# Patient Record
Sex: FEMALE | Race: OTHER | HISPANIC OR LATINO | Employment: OTHER | URBAN - NONMETROPOLITAN AREA
[De-identification: names, ages, dates, MRNs, and addresses within clinical notes are randomized per-mention and may not be internally consistent; named-entity substitution may affect disease eponyms.]

---

## 2017-01-04 ENCOUNTER — APPOINTMENT (OUTPATIENT)
Dept: WOUND CARE | Facility: HOSPITAL | Age: 62
End: 2017-01-04

## 2017-01-11 ENCOUNTER — APPOINTMENT (OUTPATIENT)
Dept: WOUND CARE | Facility: HOSPITAL | Age: 62
End: 2017-01-11

## 2017-01-18 ENCOUNTER — APPOINTMENT (OUTPATIENT)
Dept: WOUND CARE | Facility: HOSPITAL | Age: 62
End: 2017-01-18

## 2017-01-25 ENCOUNTER — APPOINTMENT (OUTPATIENT)
Dept: WOUND CARE | Facility: HOSPITAL | Age: 62
End: 2017-01-25

## 2017-01-25 PROCEDURE — 97605 NEG PRS WND THER DME<=50SQCM: CPT

## 2017-01-31 ENCOUNTER — OFFICE VISIT (OUTPATIENT)
Dept: GASTROENTEROLOGY | Facility: CLINIC | Age: 62
End: 2017-01-31

## 2017-01-31 VITALS
OXYGEN SATURATION: 98 % | SYSTOLIC BLOOD PRESSURE: 120 MMHG | BODY MASS INDEX: 29.63 KG/M2 | DIASTOLIC BLOOD PRESSURE: 68 MMHG | WEIGHT: 161 LBS | HEART RATE: 68 BPM | HEIGHT: 62 IN

## 2017-01-31 DIAGNOSIS — E86.0 DEHYDRATION: Primary | ICD-10-CM

## 2017-01-31 PROCEDURE — 99213 OFFICE O/P EST LOW 20 MIN: CPT | Performed by: INTERNAL MEDICINE

## 2017-01-31 NOTE — PROGRESS NOTES
[facility] Gastroenterology - Office note  1/31/2017    Ewa Blackwell 1955     Chief Complaint   Patient presents with   • GI Problem     Here to discuss recent surgery and ostomy       HISTORY OF PRESENT ILLNESS    Ewa Blackwell is a 61 y.o. female who presents for follow-up.  Doing much better.  The diarrhea has essentially resolved.  More dietary associated now nausea has improved.  Hopefully the wound VAC will come off in a few weeks     Past Medical History   Diagnosis Date   • A-fib    • Abnormal ECG      tachycardia, a fib   • Acid reflux    • Anemia    • Arthritis    • Diabetes mellitus    • Hyperkalemia    • Hypertension    • Hyponatremia    • IBS (irritable bowel syndrome)    • Sleep apnea      USES CPAP   • Vaginal vault prolapse        Past Surgical History   Procedure Laterality Date   • Colpopexy vaginal       2014   • Hysterectomy     • Sacrocolpopexy N/A 9/21/2016     Procedure: SACROCOLPOPEXY LAPAROSCOPIC WITH [employer] SI ROBOT, CONVERTED TO OPEN SACROCOLPOPEXY, RIGHT SALPINGO- OOPHERECTOMY, CYSTO;  Surgeon: Maribell Beth MD;  Location[facility];  Service:    • Exploratory laparotomy N/A 10/14/2016     Procedure: LAPAROTOMY EXPLORATORY, LYSIS OF ADHESIONS, IRRIAGATION OF ABDOMEN, POSSIBLE BOWEL RESECTION;  Surgeon: Bacilio Marcial MD;  Location: [facility] OR;  Service:    • Endoscopy N/A 11/3/2016     Procedure: ESOPHAGOGASTRODUODENOSCOPY WITH ANESTHESIA;  Surgeon: Alberto Farmer MD;  Location: [facility] ENDOSCOPY;  Service:    • Colonoscopy  09/28/2015     Normal exam repeat in 5 years   • Colon surgery     • Colostomy           Current Outpatient Prescriptions:   •  cholestyramine (QUESTRAN) 4 G packet, Take 1 packet by mouth Every 8 (Eight) Hours., Disp: 90 packet, Rfl: 0  •  ferrous sulfate 325 (65 FE) MG tablet, Take 1 tablet by mouth 2 (Two) Times a Day With Meals., Disp: 60 tablet, Rfl: 0  •  HYDROcodone-acetaminophen (NORCO) 5-325 MG per tablet, Take 1 tablet by mouth  Every 6 (Six) Hours As Needed for moderate pain (4-6)., Disp: , Rfl:   •  loperamide (IMODIUM) 2 MG capsule, Take 1 capsule by mouth 2 (Two) Times a Day., Disp: , Rfl: 0  •  Multiple Vitamin (MULTI VITAMIN DAILY PO), Take 1 tablet by mouth., Disp: , Rfl:   •  omeprazole (PriLOSEC) 20 MG capsule, Take 20 mg by mouth daily., Disp: , Rfl:   •  ondansetron (ZOFRAN) 4 MG tablet, 1-2 tablet every 6 hours as needed for nause, Disp: , Rfl: 0  •  PRADAXA 150 MG capsu, Take 150 mg by mouth Daily., Disp: , Rfl:   •  PREMARIN 0.625 MG/GM vaginal cream, Insert 0.625 mg into the vagina 2 (two) times a week. TAKES ON MONDAYS AND FRIDAYS, Disp: , Rfl:   •  RESTASIS 0.05 % ophthalmic emulsion, Administer 1 drop to both eyes As Needed., Disp: , Rfl:   •  therapeutic multivitamin-minerals (THERAGRAN-M) tablet, Take 1 tablet by mouth daily, Disp: , Rfl:   •  CARTIA  MG 24 hr capsule, Take 180 mg by mouth daily., Disp: , Rfl:   •  Cholecalciferol (VITAMIN D3) 5000 UNITS capsule capsule, Take 5,000 Units by mouth Daily. Patient no longer takes, Disp: , Rfl:   •  dakin's (HYSEPT) 0.25 % solution topical solution, Apply  topically Every 12 (Twelve) Hours. (Patient not taking: Reported on 12/21/2016), Disp: 473 mL, Rfl: 2  •  diphenoxylate-atropine (LOMOTIL) 2.5-0.025 MG per tablet, Take 1 tablet by mouth Every 2 (Two) Hours As Needed for diarrhea., Disp: 36 tablet, Rfl: 0  •  Magnesium 500 MG tablet, Take 500 mg by mouth daily., Disp: , Rfl:   •  metFORMIN (GLUCOPHAGE) 1000 MG tablet, Take 1,000 mg by mouth Daily. Patient no longer taking, Disp: , Rfl:   •  nystatin (MYCOSTATIN) 846217 UNIT/GM powder, Apply  topically As Needed (Patient has an high output Ileostomy & her skin is irritated.   New powder NOW so can put wafer/pouch back on)., Disp: 60 g, Rfl: 2  •  rosuvastatin (CRESTOR) 10 MG tablet, Take 10 mg by mouth Daily. Patient no longer taking, Disp: , Rfl:   •  traMADol (ULTRAM) 50 MG tablet, TK 1 T PO BID PRN, Disp: , Rfl:  "1  •  ZOFRAN 8 MG tablet, Take 1 tablet by mouth Every 8 (Eight) Hours As Needed for nausea or vomiting for up to 10 doses., Disp: 10 tablet, Rfl: 1    Allergies   Allergen Reactions   • Morphine And Related Dizziness     Gets very sick    • Percocet [Oxycodone-Acetaminophen] Dizziness     nausea       Social History     Social History   • Marital status:      Spouse name: N/A   • Number of children: N/A   • Years of education: N/A     Occupational History   • Not on file.     Social History Main Topics   • Smoking status: Never Smoker   • Smokeless tobacco: Never Used   • Alcohol use No   • Drug use: No   • Sexual activity: Defer     Other Topics Concern   • Not on file     Social History Narrative       Family History   Problem Relation Age of Onset   • Colon cancer Neg Hx    • Colon polyps Neg Hx        Review of Systems   All other systems reviewed and are negative.      Vitals:    01/31/17 1416   BP: 120/68   Pulse: 68   SpO2: 98%   Weight: 161 lb (73 kg)   Height: 62\" (157.5 cm)    Body mass index is 29.45 kg/(m^2).    Physical Exam   Constitutional: She is oriented to person, place, and time. She appears well-developed and well-nourished.   HENT:   Head: Normocephalic.   Eyes: Pupils are equal, round, and reactive to light.   Cardiovascular: Normal rate.    Pulmonary/Chest: Effort normal.   Abdominal: Soft.   Stoma is pink.  Functioning normally   Neurological: She is alert and oriented to person, place, and time.         ASSESSMENT AND PLAN      1. Dehydration  Stomas functionally much better at this time.  Less diarrhea.  Tolerating regular diet.  We will see back as needed    EMR Dragon/transcription disclaimer: Much of this encounter note is an electronic transcription/translation of spoken language to printed text.  The electronic translation of spoken language may permit erroneous, or at times, nonsensical words or phrases to be inadvertently transcribed.  Although I have reviewed the note for " such errors, some may still exist.    Joe France MD  1/31/2017  2:35 PM

## 2017-01-31 NOTE — MR AVS SNAPSHOT
Ewa Blackwell   1/31/2017 2:30 PM   Office Visit    Dept Phone:  [phone]   Encounter #:  [id number]    Provider:  Joe France MD   Department:  [facility] GASTROENTEROLOGY                Your Full Care Plan              Your Updated Medication List          This list is accurate as of: 1/31/17  2:34 PM.  Always use your most recent med list.                CARTIA  MG 24 hr capsule   Generic drug:  diltiaZEM CD       cholestyramine 4 G packet   Commonly known as:  QUESTRAN   Take 1 packet by mouth Every 8 (Eight) Hours.       dakin's 0.25 % solution topical solution   Apply  topically Every 12 (Twelve) Hours.       diphenoxylate-atropine 2.5-0.025 MG per tablet   Commonly known as:  LOMOTIL   Take 1 tablet by mouth Every 2 (Two) Hours As Needed for diarrhea.       ferrous sulfate 325 (65 FE) MG tablet   Take 1 tablet by mouth 2 (Two) Times a Day With Meals.       HYDROcodone-acetaminophen 5-325 MG per tablet   Commonly known as:  NORCO       loperamide 2 MG capsule   Commonly known as:  IMODIUM   Take 1 capsule by mouth 2 (Two) Times a Day.       Magnesium 500 MG tablet       metFORMIN 1000 MG tablet   Commonly known as:  GLUCOPHAGE       MULTI VITAMIN DAILY PO       nystatin 244424 UNIT/GM powder   Commonly known as:  MYCOSTATIN   Apply  topically As Needed (Patient has an high output Ileostomy & her skin is irritated.   New powder NOW so can put wafer/pouch back on).       omeprazole 20 MG capsule   Commonly known as:  priLOSEC       * ondansetron 4 MG tablet   Commonly known as:  ZOFRAN       * ZOFRAN 8 MG tablet   Generic drug:  ondansetron   Take 1 tablet by mouth Every 8 (Eight) Hours As Needed for nausea or vomiting for up to 10 doses.       PRADAXA 150 MG capsu   Generic drug:  dabigatran etexilate       PREMARIN 0.625 MG/GM vaginal cream   Generic drug:  conjugated estrogens       RESTASIS 0.05 % ophthalmic emulsion   Generic drug:  cycloSPORINE  "      rosuvastatin 10 MG tablet   Commonly known as:  CRESTOR       therapeutic multivitamin-minerals tablet       traMADol 50 MG tablet   Commonly known as:  ULTRAM       vitamin D3 5000 UNITS capsule capsule       * Notice:  This list has 2 medication(s) that are the same as other medications prescribed for you. Read the directions carefully, and ask your doctor or other care provider to review them with you.            Instructions     None    Patient Instructions History      Upcoming Appointments     Visit Type Date Time Department    OFFICE VISIT 1/31/2017  2:30 PM [facility] GASTRO [facility]    WOUND CHECK 2/1/2017  9:45 AM [facility] WOUND CARE      MyChart Signup     Our records indicate that you have declined [facility]. If you would like to sign up for MyChart, please email [facility] [email or web address] or call [phone] to obtain an activation code.             Other Info from Your Visit           Your Appointments     Feb 01, 2017  9:45 AM CST   Wound Check with Tang Rice MD   [facility] ([place])    [address] [place] [address] [address]  [facility] [id number] [phone]              Allergies     Morphine And Related Intolerance Dizziness    Gets very sick     Percocet [Oxycodone-acetaminophen] Intolerance Dizziness    nausea      Reason for Visit     GI Problem Here to discuss recent surgery and ostomy      Vital Signs     Blood Pressure Pulse Height Weight Oxygen Saturation Body Mass Index    120/68 68 62\" (157.5 cm) 161 lb (73 kg) 98% 29.45 kg/m2    Smoking Status                   Never Smoker             "

## 2017-02-01 ENCOUNTER — LAB REQUISITION (OUTPATIENT)
Dept: LAB | Facility: HOSPITAL | Age: 62
End: 2017-02-01

## 2017-02-01 ENCOUNTER — APPOINTMENT (OUTPATIENT)
Dept: WOUND CARE | Facility: HOSPITAL | Age: 62
End: 2017-02-01

## 2017-02-01 DIAGNOSIS — Z00.00 ENCOUNTER FOR GENERAL ADULT MEDICAL EXAMINATION WITHOUT ABNORMAL FINDINGS: ICD-10-CM

## 2017-02-01 PROCEDURE — 87205 SMEAR GRAM STAIN: CPT | Performed by: SURGERY

## 2017-02-01 PROCEDURE — 87186 SC STD MICRODIL/AGAR DIL: CPT | Performed by: SURGERY

## 2017-02-01 PROCEDURE — G0463 HOSPITAL OUTPT CLINIC VISIT: HCPCS

## 2017-02-01 PROCEDURE — 87070 CULTURE OTHR SPECIMN AEROBIC: CPT | Performed by: SURGERY

## 2017-02-01 PROCEDURE — 87077 CULTURE AEROBIC IDENTIFY: CPT | Performed by: SURGERY

## 2017-02-04 LAB
BACTERIA SPEC AEROBE CULT: ABNORMAL
BACTERIA SPEC AEROBE CULT: ABNORMAL
GRAM STN SPEC: ABNORMAL
GRAM STN SPEC: ABNORMAL

## 2017-02-08 ENCOUNTER — TRANSCRIBE ORDERS (OUTPATIENT)
Dept: ADMINISTRATIVE | Facility: HOSPITAL | Age: 62
End: 2017-02-08

## 2017-02-08 ENCOUNTER — APPOINTMENT (OUTPATIENT)
Dept: WOUND CARE | Facility: HOSPITAL | Age: 62
End: 2017-02-08

## 2017-02-08 DIAGNOSIS — T81.30XA ABDOMINAL WOUND DEHISCENCE, INITIAL ENCOUNTER: Primary | ICD-10-CM

## 2017-02-09 ENCOUNTER — APPOINTMENT (OUTPATIENT)
Dept: CT IMAGING | Facility: HOSPITAL | Age: 62
End: 2017-02-09

## 2017-02-09 ENCOUNTER — HOSPITAL ENCOUNTER (EMERGENCY)
Facility: HOSPITAL | Age: 62
Discharge: HOME | End: 2017-02-09
Attending: EMERGENCY MEDICINE | Admitting: EMERGENCY MEDICINE

## 2017-02-09 VITALS
OXYGEN SATURATION: 99 % | TEMPERATURE: 97.9 F | HEIGHT: 63 IN | RESPIRATION RATE: 16 BRPM | HEART RATE: 71 BPM | SYSTOLIC BLOOD PRESSURE: 110 MMHG | DIASTOLIC BLOOD PRESSURE: 54 MMHG | WEIGHT: 160 LBS | BODY MASS INDEX: 28.35 KG/M2

## 2017-02-09 DIAGNOSIS — K63.2 FISTULA OF INTESTINE TO ABDOMINAL WALL: Primary | ICD-10-CM

## 2017-02-09 LAB
ALBUMIN SERPL-MCNC: 3.3 G/DL (ref 3.5–5)
ALBUMIN/GLOB SERPL: 1.1 G/DL (ref 1.1–2.5)
ALP SERPL-CCNC: 259 U/L (ref 24–120)
ALT SERPL W P-5'-P-CCNC: 40 U/L (ref 0–54)
ANION GAP SERPL CALCULATED.3IONS-SCNC: 7 MMOL/L (ref 4–13)
AST SERPL-CCNC: 37 U/L (ref 7–45)
BASOPHILS # BLD AUTO: 0.02 10*3/MM3 (ref 0–0.2)
BASOPHILS NFR BLD AUTO: 0.3 % (ref 0–2)
BILIRUB SERPL-MCNC: 1.1 MG/DL (ref 0.1–1)
BUN BLD-MCNC: 15 MG/DL (ref 5–21)
BUN/CREAT SERPL: 20.8 (ref 7–25)
CALCIUM SPEC-SCNC: 9.3 MG/DL (ref 8.4–10.4)
CHLORIDE SERPL-SCNC: 106 MMOL/L (ref 98–110)
CO2 SERPL-SCNC: 25 MMOL/L (ref 24–31)
CREAT BLD-MCNC: 0.72 MG/DL (ref 0.5–1.4)
D-LACTATE SERPL-SCNC: 2.9 MMOL/L (ref 0.5–2)
DEPRECATED RDW RBC AUTO: 52.4 FL (ref 40–54)
EOSINOPHIL # BLD AUTO: 0.06 10*3/MM3 (ref 0–0.7)
EOSINOPHIL NFR BLD AUTO: 0.8 % (ref 0–4)
ERYTHROCYTE [DISTWIDTH] IN BLOOD BY AUTOMATED COUNT: 16 % (ref 12–15)
GFR SERPL CREATININE-BSD FRML MDRD: 82 ML/MIN/1.73
GLOBULIN UR ELPH-MCNC: 3.1 GM/DL
GLUCOSE BLD-MCNC: 166 MG/DL (ref 70–100)
HCT VFR BLD AUTO: 38.4 % (ref 37–47)
HGB BLD-MCNC: 12.6 G/DL (ref 12–16)
IMM GRANULOCYTES # BLD: 0.02 10*3/MM3 (ref 0–0.03)
IMM GRANULOCYTES NFR BLD: 0.3 % (ref 0–5)
LYMPHOCYTES # BLD AUTO: 0.99 10*3/MM3 (ref 0.72–4.86)
LYMPHOCYTES NFR BLD AUTO: 13.9 % (ref 15–45)
MCH RBC QN AUTO: 29.6 PG (ref 28–32)
MCHC RBC AUTO-ENTMCNC: 32.8 G/DL (ref 33–36)
MCV RBC AUTO: 90.4 FL (ref 82–98)
MONOCYTES # BLD AUTO: 0.54 10*3/MM3 (ref 0.19–1.3)
MONOCYTES NFR BLD AUTO: 7.6 % (ref 4–12)
NEUTROPHILS # BLD AUTO: 5.49 10*3/MM3 (ref 1.87–8.4)
NEUTROPHILS NFR BLD AUTO: 77.1 % (ref 39–78)
PLATELET # BLD AUTO: 271 10*3/MM3 (ref 130–400)
PMV BLD AUTO: 10.7 FL (ref 6–12)
POTASSIUM BLD-SCNC: 3.7 MMOL/L (ref 3.5–5.3)
PROT SERPL-MCNC: 6.4 G/DL (ref 6.3–8.7)
RBC # BLD AUTO: 4.25 10*6/MM3 (ref 4.2–5.4)
SODIUM BLD-SCNC: 138 MMOL/L (ref 135–145)
WBC NRBC COR # BLD: 7.12 10*3/MM3 (ref 4.8–10.8)

## 2017-02-09 PROCEDURE — 80053 COMPREHEN METABOLIC PANEL: CPT | Performed by: EMERGENCY MEDICINE

## 2017-02-09 PROCEDURE — 96375 TX/PRO/DX INJ NEW DRUG ADDON: CPT

## 2017-02-09 PROCEDURE — 85025 COMPLETE CBC W/AUTO DIFF WBC: CPT | Performed by: EMERGENCY MEDICINE

## 2017-02-09 PROCEDURE — 74177 CT ABD & PELVIS W/CONTRAST: CPT

## 2017-02-09 PROCEDURE — 25010000002 MEPERIDINE PER 100 MG: Performed by: EMERGENCY MEDICINE

## 2017-02-09 PROCEDURE — 96361 HYDRATE IV INFUSION ADD-ON: CPT

## 2017-02-09 PROCEDURE — 36415 COLL VENOUS BLD VENIPUNCTURE: CPT

## 2017-02-09 PROCEDURE — 96374 THER/PROPH/DIAG INJ IV PUSH: CPT

## 2017-02-09 PROCEDURE — 0 IOPAMIDOL PER 1 ML: Performed by: EMERGENCY MEDICINE

## 2017-02-09 PROCEDURE — 99284 EMERGENCY DEPT VISIT MOD MDM: CPT

## 2017-02-09 PROCEDURE — 87040 BLOOD CULTURE FOR BACTERIA: CPT | Performed by: EMERGENCY MEDICINE

## 2017-02-09 PROCEDURE — 25010000002 ONDANSETRON PER 1 MG: Performed by: EMERGENCY MEDICINE

## 2017-02-09 PROCEDURE — 83605 ASSAY OF LACTIC ACID: CPT | Performed by: EMERGENCY MEDICINE

## 2017-02-09 RX ORDER — ONDANSETRON 2 MG/ML
4 INJECTION INTRAMUSCULAR; INTRAVENOUS ONCE
Status: COMPLETED | OUTPATIENT
Start: 2017-02-09 | End: 2017-02-09

## 2017-02-09 RX ORDER — SODIUM CHLORIDE 9 MG/ML
125 INJECTION, SOLUTION INTRAVENOUS CONTINUOUS
Status: DISCONTINUED | OUTPATIENT
Start: 2017-02-09 | End: 2017-02-09 | Stop reason: HOSPADM

## 2017-02-09 RX ORDER — SODIUM CHLORIDE 0.9 % (FLUSH) 0.9 %
10 SYRINGE (ML) INJECTION AS NEEDED
Status: DISCONTINUED | OUTPATIENT
Start: 2017-02-09 | End: 2017-02-09 | Stop reason: HOSPADM

## 2017-02-09 RX ORDER — MEPERIDINE HYDROCHLORIDE 25 MG/ML
25 INJECTION INTRAMUSCULAR; INTRAVENOUS; SUBCUTANEOUS ONCE
Status: COMPLETED | OUTPATIENT
Start: 2017-02-09 | End: 2017-02-09

## 2017-02-09 RX ADMIN — IOPAMIDOL 100 ML: 755 INJECTION, SOLUTION INTRAVENOUS at 12:03

## 2017-02-09 RX ADMIN — MEPERIDINE HYDROCHLORIDE 25 MG: 25 INJECTION, SOLUTION INTRAMUSCULAR; INTRAVENOUS; SUBCUTANEOUS at 11:31

## 2017-02-09 RX ADMIN — SODIUM CHLORIDE 125 ML/HR: 9 INJECTION, SOLUTION INTRAVENOUS at 11:30

## 2017-02-09 RX ADMIN — ONDANSETRON HYDROCHLORIDE 4 MG: 2 SOLUTION INTRAMUSCULAR; INTRAVENOUS at 11:30

## 2017-02-09 NOTE — ED PROVIDER NOTES
Subjective   HPI Comments: Had surgery where colon was nicked and wound up with colostomy and wound drainage tube.  That drainage tube was pulled last week and had lot of pus coming out it and that was cultured but they were never called with results or treatment.  Now still having drainage and more pain.    Patient is a 61 y.o. female presenting with abdominal pain.   History provided by:  Patient and spouse   used: No    Abdominal Pain   Pain location:  RLQ  Pain quality: aching    Pain radiates to:  Does not radiate  Pain severity:  Severe  Onset quality:  Gradual  Timing:  Constant  Progression:  Worsening  Chronicity:  Recurrent  Context: previous surgery    Context: not alcohol use, not awakening from sleep, not diet changes, not eating, not laxative use, not medication withdrawal, not recent illness, not recent sexual activity, not recent travel, not retching, not sick contacts, not suspicious food intake and not trauma    Relieved by:  Nothing  Worsened by:  Nothing  Ineffective treatments:  None tried  Associated symptoms: no anorexia, no belching, no chest pain, no chills, no constipation and no fatigue    Risk factors: no alcohol abuse, no aspirin use and not elderly        Review of Systems   Constitutional: Negative.  Negative for chills and fatigue.   HENT: Negative.    Respiratory: Negative.    Cardiovascular: Negative for chest pain.   Gastrointestinal: Positive for abdominal pain. Negative for anorexia and constipation.   Genitourinary: Negative.    Musculoskeletal: Negative.    Hematological: Negative.    Psychiatric/Behavioral: Negative.    All other systems reviewed and are negative.      Past Medical History   Diagnosis Date   • A-fib    • Abnormal ECG      tachycardia, a fib   • Acid reflux    • Anemia    • Arthritis    • Diabetes mellitus    • Hyperkalemia    • Hypertension    • Hyponatremia    • IBS (irritable bowel syndrome)    • Sleep apnea      USES CPAP   • Vaginal  vault prolapse        Allergies   Allergen Reactions   • Morphine And Related Dizziness     Gets very sick    • Percocet [Oxycodone-Acetaminophen] Dizziness     nausea       Past Surgical History   Procedure Laterality Date   • Colpopexy vaginal       2014   • Hysterectomy     • Sacrocolpopexy N/A 9/21/2016     Procedure: SACROCOLPOPEXY LAPAROSCOPIC WITH [employer] SI ROBOT, CONVERTED TO OPEN SACROCOLPOPEXY, RIGHT SALPINGO- OOPHERECTOMY, CYSTO;  Surgeon: Maribell Beth MD;  Location: [facility] OR;  Service:    • Exploratory laparotomy N/A 10/14/2016     Procedure: LAPAROTOMY EXPLORATORY, LYSIS OF ADHESIONS, IRRIAGATION OF ABDOMEN, POSSIBLE BOWEL RESECTION;  Surgeon: Bacilio Marcial MD;  Location: [facility] OR;  Service:    • Endoscopy N/A 11/3/2016     Procedure: ESOPHAGOGASTRODUODENOSCOPY WITH ANESTHESIA;  Surgeon: Alberto Farmer MD;  Location: [facility] ENDOSCOPY;  Service:    • Colonoscopy  09/28/2015     Normal exam repeat in 5 years   • Colon surgery     • Colostomy         Family History   Problem Relation Age of Onset   • Colon cancer Neg Hx    • Colon polyps Neg Hx        Social History     Social History   • Marital status:      Spouse name: N/A   • Number of children: N/A   • Years of education: N/A     Social History Main Topics   • Smoking status: Never Smoker   • Smokeless tobacco: Never Used   • Alcohol use No   • Drug use: No   • Sexual activity: Defer     Other Topics Concern   • None     Social History Narrative       Prior to Admission medications    Medication Sig Start Date End Date Taking? Authorizing Provider   cholestyramine (QUESTRAN) 4 G packet Take 1 packet by mouth Every 8 (Eight) Hours. 12/23/16  Yes MARÍA Frederick   ferrous sulfate 325 (65 FE) MG tablet Take 1 tablet by mouth 2 (Two) Times a Day With Meals. 12/9/16  Yes MARÍA Frederick   HYDROcodone-acetaminophen (NORCO) 5-325 MG per tablet Take 1 tablet by mouth Every 6 (Six) Hours As Needed for moderate pain (4-6).   Yes  Historical Provider, MD   loperamide (IMODIUM) 2 MG capsule Take 1 capsule by mouth 2 (Two) Times a Day. 12/9/16  Yes MARÍA Frederick   nystatin (MYCOSTATIN) 189998 UNIT/GM powder Apply  topically As Needed (Patient has an high output Ileostomy & her skin is irritated.   New powder NOW so can put wafer/pouch back on). 11/14/16  Yes Maribell Beth MD   omeprazole (PriLOSEC) 20 MG capsule Take 20 mg by mouth daily. 8/11/16  Yes Historical Provider, MD   ondansetron (ZOFRAN) 4 MG tablet 1-2 tablet every 6 hours as needed for nause 9/28/16  Yes Historical Provider, MD   PRADAXA 150 MG capsu Take 150 mg by mouth Daily. 8/8/16  Yes Historical Provider, MD   PREMARIN 0.625 MG/GM vaginal cream Insert 0.625 mg into the vagina 2 (two) times a week. TAKES ON MONDAYS AND FRIDAYS 7/8/16  Yes Historical Provider, MD   therapeutic multivitamin-minerals (THERAGRAN-M) tablet Take 1 tablet by mouth daily   Yes Historical Provider, MD   traMADol (ULTRAM) 50 MG tablet TK 1 T PO BID PRN 10/3/16  Yes Historical Provider, MD   ZOFRAN 8 MG tablet Take 1 tablet by mouth Every 8 (Eight) Hours As Needed for nausea or vomiting for up to 10 doses.  Patient taking differently: Take 4 mg by mouth Every 12 (Twelve) Hours As Needed for nausea or vomiting. 11/11/16  Yes Bacilio Marcial MD   CARTIA  MG 24 hr capsule Take 180 mg by mouth daily. 8/15/16   Historical Provider, MD   Cholecalciferol (VITAMIN D3) 5000 UNITS capsule capsule Take 5,000 Units by mouth Daily. Patient no longer takes    Historical Provider, MD   dakin's (HYSEPT) 0.25 % solution topical solution Apply  topically Every 12 (Twelve) Hours.  Patient not taking: Reported on 12/21/2016 11/14/16   Maribell Beth MD   diphenoxylate-atropine (LOMOTIL) 2.5-0.025 MG per tablet Take 1 tablet by mouth Every 2 (Two) Hours As Needed for diarrhea. 12/9/16   MARÍA Frederick   Magnesium 500 MG tablet Take 500 mg by mouth daily.    Historical Provider, MD   metFORMIN  (GLUCOPHAGE) 1000 MG tablet Take 1,000 mg by mouth Daily. Patient no longer taking 8/1/16   Historical Provider, MD   Multiple Vitamin (MULTI VITAMIN DAILY PO) Take 1 tablet by mouth.    Historical Provider, MD   RESTASIS 0.05 % ophthalmic emulsion Administer 1 drop to both eyes As Needed. 8/11/16   Historical Provider, MD   rosuvastatin (CRESTOR) 10 MG tablet Take 10 mg by mouth Daily. Patient no longer taking    Historical Provider, MD       Medications   meperidine (DEMEROL) injection 25 mg (25 mg Intravenous Given 2/9/17 1131)   ondansetron (ZOFRAN) injection 4 mg (4 mg Intravenous Given 2/9/17 1130)   iopamidol (ISOVUE-370) 76 % injection 100 mL (100 mL Intravenous Given 2/9/17 1203)       Vitals:    02/09/17 1603   BP: 110/54   Pulse: 71   Resp: 16   Temp: 97.9 °F (36.6 °C)   SpO2: 99%         Objective   Physical Exam   Constitutional: She is oriented to person, place, and time. She appears well-developed and well-nourished.   HENT:   Head: Normocephalic and atraumatic.   Neck: Normal range of motion. Neck supple.   Cardiovascular: Normal rate and regular rhythm.    Pulmonary/Chest: Effort normal and breath sounds normal.   Abdominal: Soft. Bowel sounds are normal.   Colostomy noted and wound with drainage to right of midline.  Tender around this area.   Musculoskeletal: Normal range of motion.   Neurological: She is alert and oriented to person, place, and time.   Skin: Skin is warm and dry.   Psychiatric: She has a normal mood and affect. Her behavior is normal.   Nursing note and vitals reviewed.      Procedures         Lab Results (last 24 hours)     Procedure Component Value Units Date/Time    Blood Culture [[id number]] Collected:  02/09/17 1120    Specimen:  Blood from Arm, Left Updated:  02/09/17 1139    CBC & Differential [[id number]] Collected:  02/09/17 1128    Specimen:  Blood Updated:  02/09/17 1139    Narrative:       The following orders were created for panel order CBC & Differential.  Procedure                                Abnormality         Status                     ---------                               -----------         ------                     CBC Auto Differential[[id number]]         Abnormal            Final result                 Please view results for these tests on the individual orders.    Comprehensive Metabolic Panel [[id number]]  (Abnormal) Collected:  02/09/17 1128    Specimen:  Blood Updated:  02/09/17 1149     Glucose 166 (H) mg/dL      BUN 15 mg/dL      Creatinine 0.72 mg/dL      Sodium 138 mmol/L      Potassium 3.7 mmol/L      Chloride 106 mmol/L      CO2 25.0 mmol/L      Calcium 9.3 mg/dL      Total Protein 6.4 g/dL      Albumin 3.30 (L) g/dL      ALT (SGPT) 40 U/L      AST (SGOT) 37 U/L      Alkaline Phosphatase 259 (H) U/L      Total Bilirubin 1.1 (H) mg/dL      eGFR Non African Amer 82 mL/min/1.73      Globulin 3.1 gm/dL      A/G Ratio 1.1 g/dL      BUN/Creatinine Ratio 20.8      Anion Gap 7.0 mmol/L     Lactic Acid, Plasma [[id number]]  (Abnormal) Collected:  02/09/17 1128    Specimen:  Blood Updated:  02/09/17 1151     Lactate 2.9 (C) mmol/L     Blood Culture [[id number]] Collected:  02/09/17 1128    Specimen:  Blood from Arm, Right Updated:  02/09/17 1137    CBC Auto Differential [[id number]]  (Abnormal) Collected:  02/09/17 1128    Specimen:  Blood Updated:  02/09/17 1139     WBC 7.12 10*3/mm3      RBC 4.25 10*6/mm3      Hemoglobin 12.6 g/dL      Hematocrit 38.4 %      MCV 90.4 fL      MCH 29.6 pg      MCHC 32.8 (L) g/dL      RDW 16.0 (H) %      RDW-SD 52.4 fl      MPV 10.7 fL      Platelets 271 10*3/mm3      Neutrophil % 77.1 %      Lymphocyte % 13.9 (L) %      Monocyte % 7.6 %      Eosinophil % 0.8 %      Basophil % 0.3 %      Immature Grans % 0.3 %      Neutrophils, Absolute 5.49 10*3/mm3      Lymphocytes, Absolute 0.99 10*3/mm3      Monocytes, Absolute 0.54 10*3/mm3      Eosinophils, Absolute 0.06 10*3/mm3      Basophils, Absolute 0.02 10*3/mm3      Immature Grans, Absolute 0.02  10*3/mm3           CT Abdomen Pelvis With Contrast   Final Result          ED Course  ED Course   Comment By Time   Dr. Sosa has seen and arranged treatment plan. 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

## 2017-02-13 ENCOUNTER — LAB REQUISITION (OUTPATIENT)
Dept: LAB | Facility: HOSPITAL | Age: 62
End: 2017-02-13

## 2017-02-13 DIAGNOSIS — Z00.00 ENCOUNTER FOR GENERAL ADULT MEDICAL EXAMINATION WITHOUT ABNORMAL FINDINGS: ICD-10-CM

## 2017-02-13 LAB
ALBUMIN SERPL-MCNC: 3.6 G/DL (ref 3.5–5)
ALBUMIN/GLOB SERPL: 1.2 G/DL (ref 1.1–2.5)
ALP SERPL-CCNC: 282 U/L (ref 24–120)
ALT SERPL W P-5'-P-CCNC: 50 U/L (ref 0–54)
ANION GAP SERPL CALCULATED.3IONS-SCNC: 9 MMOL/L (ref 4–13)
AST SERPL-CCNC: 64 U/L (ref 7–45)
BASOPHILS # BLD AUTO: 0.03 10*3/MM3 (ref 0–0.2)
BASOPHILS NFR BLD AUTO: 0.5 % (ref 0–2)
BILIRUB SERPL-MCNC: 0.9 MG/DL (ref 0.1–1)
BUN BLD-MCNC: 14 MG/DL (ref 5–21)
BUN/CREAT SERPL: 15.6 (ref 7–25)
CALCIUM SPEC-SCNC: 9.4 MG/DL (ref 8.4–10.4)
CHLORIDE SERPL-SCNC: 107 MMOL/L (ref 98–110)
CO2 SERPL-SCNC: 23 MMOL/L (ref 24–31)
CREAT BLD-MCNC: 0.9 MG/DL (ref 0.5–1.4)
CRP SERPL-MCNC: <0.5 MG/DL (ref 0–0.99)
DEPRECATED RDW RBC AUTO: 54.1 FL (ref 40–54)
EOSINOPHIL # BLD AUTO: 0.07 10*3/MM3 (ref 0–0.7)
EOSINOPHIL NFR BLD AUTO: 1.2 % (ref 0–4)
ERYTHROCYTE [DISTWIDTH] IN BLOOD BY AUTOMATED COUNT: 16.3 % (ref 12–15)
GFR SERPL CREATININE-BSD FRML MDRD: 63 ML/MIN/1.73
GLOBULIN UR ELPH-MCNC: 3.1 GM/DL
GLUCOSE BLD-MCNC: 99 MG/DL (ref 70–100)
HCT VFR BLD AUTO: 38.3 % (ref 37–47)
HGB BLD-MCNC: 12.2 G/DL (ref 12–16)
IMM GRANULOCYTES # BLD: 0.06 10*3/MM3 (ref 0–0.03)
IMM GRANULOCYTES NFR BLD: 1 % (ref 0–5)
LYMPHOCYTES # BLD AUTO: 1.73 10*3/MM3 (ref 0.72–4.86)
LYMPHOCYTES NFR BLD AUTO: 28.6 % (ref 15–45)
MCH RBC QN AUTO: 29.2 PG (ref 28–32)
MCHC RBC AUTO-ENTMCNC: 31.9 G/DL (ref 33–36)
MCV RBC AUTO: 91.6 FL (ref 82–98)
MONOCYTES # BLD AUTO: 0.62 10*3/MM3 (ref 0.19–1.3)
MONOCYTES NFR BLD AUTO: 10.2 % (ref 4–12)
NEUTROPHILS # BLD AUTO: 3.54 10*3/MM3 (ref 1.87–8.4)
NEUTROPHILS NFR BLD AUTO: 58.5 % (ref 39–78)
PLATELET # BLD AUTO: 295 10*3/MM3 (ref 130–400)
PMV BLD AUTO: 11.3 FL (ref 6–12)
POTASSIUM BLD-SCNC: 4 MMOL/L (ref 3.5–5.3)
PROT SERPL-MCNC: 6.7 G/DL (ref 6.3–8.7)
RBC # BLD AUTO: 4.18 10*6/MM3 (ref 4.2–5.4)
SODIUM BLD-SCNC: 139 MMOL/L (ref 135–145)
WBC NRBC COR # BLD: 6.05 10*3/MM3 (ref 4.8–10.8)

## 2017-02-13 PROCEDURE — 80053 COMPREHEN METABOLIC PANEL: CPT | Performed by: SPECIALIST

## 2017-02-13 PROCEDURE — 85025 COMPLETE CBC W/AUTO DIFF WBC: CPT | Performed by: SPECIALIST

## 2017-02-13 PROCEDURE — 86140 C-REACTIVE PROTEIN: CPT | Performed by: SPECIALIST

## 2017-02-14 LAB
BACTERIA SPEC AEROBE CULT: NORMAL
BACTERIA SPEC AEROBE CULT: NORMAL

## 2017-02-22 ENCOUNTER — APPOINTMENT (OUTPATIENT)
Dept: WOUND CARE | Facility: HOSPITAL | Age: 62
End: 2017-02-22

## 2017-03-01 ENCOUNTER — APPOINTMENT (OUTPATIENT)
Dept: WOUND CARE | Facility: HOSPITAL | Age: 62
End: 2017-03-01

## 2017-03-01 ENCOUNTER — LAB REQUISITION (OUTPATIENT)
Dept: LAB | Facility: HOSPITAL | Age: 62
End: 2017-03-01

## 2017-03-01 DIAGNOSIS — E11.628 TYPE 2 DIABETES MELLITUS WITH OTHER SKIN COMPLICATIONS (HCC): ICD-10-CM

## 2017-03-01 PROCEDURE — 87077 CULTURE AEROBIC IDENTIFY: CPT | Performed by: SURGERY

## 2017-03-01 PROCEDURE — 87186 SC STD MICRODIL/AGAR DIL: CPT | Performed by: SURGERY

## 2017-03-01 PROCEDURE — 87070 CULTURE OTHR SPECIMN AEROBIC: CPT | Performed by: SURGERY

## 2017-03-01 PROCEDURE — 87205 SMEAR GRAM STAIN: CPT | Performed by: SURGERY

## 2017-03-08 ENCOUNTER — APPOINTMENT (OUTPATIENT)
Dept: WOUND CARE | Facility: HOSPITAL | Age: 62
End: 2017-03-08

## 2017-03-15 ENCOUNTER — APPOINTMENT (OUTPATIENT)
Dept: WOUND CARE | Facility: HOSPITAL | Age: 62
End: 2017-03-15

## 2017-03-15 PROCEDURE — G0463 HOSPITAL OUTPT CLINIC VISIT: HCPCS

## 2017-03-22 ENCOUNTER — APPOINTMENT (OUTPATIENT)
Dept: WOUND CARE | Facility: HOSPITAL | Age: 62
End: 2017-03-22

## 2017-03-22 PROCEDURE — G0463 HOSPITAL OUTPT CLINIC VISIT: HCPCS

## 2017-03-29 ENCOUNTER — APPOINTMENT (OUTPATIENT)
Dept: WOUND CARE | Facility: HOSPITAL | Age: 62
End: 2017-03-29

## 2017-04-01 ENCOUNTER — APPOINTMENT (OUTPATIENT)
Dept: GENERAL RADIOLOGY | Facility: HOSPITAL | Age: 62
End: 2017-04-01

## 2017-04-01 ENCOUNTER — APPOINTMENT (OUTPATIENT)
Dept: CT IMAGING | Facility: HOSPITAL | Age: 62
End: 2017-04-01

## 2017-04-01 ENCOUNTER — HOSPITAL ENCOUNTER (INPATIENT)
Facility: HOSPITAL | Age: 62
LOS: 2 days | Discharge: ANOTHER HOSPITAL | End: 2017-04-03
Attending: SPECIALIST | Admitting: SPECIALIST

## 2017-04-01 DIAGNOSIS — R10.84 GENERALIZED ABDOMINAL PAIN: Primary | ICD-10-CM

## 2017-04-01 DIAGNOSIS — N39.0 URINARY TRACT INFECTION, SITE UNSPECIFIED: ICD-10-CM

## 2017-04-01 LAB
ALBUMIN SERPL-MCNC: 4.2 G/DL (ref 3.5–5)
ALBUMIN/GLOB SERPL: 1.1 G/DL (ref 1.1–2.5)
ALP SERPL-CCNC: 341 U/L (ref 24–120)
ALT SERPL W P-5'-P-CCNC: 48 U/L (ref 0–54)
AMYLASE SERPL-CCNC: 123 U/L (ref 30–110)
ANION GAP SERPL CALCULATED.3IONS-SCNC: 19 MMOL/L (ref 4–13)
AST SERPL-CCNC: 78 U/L (ref 7–45)
BACTERIA UR QL AUTO: ABNORMAL /HPF
BASOPHILS # BLD AUTO: 0.04 10*3/MM3 (ref 0–0.2)
BASOPHILS NFR BLD AUTO: 0.4 % (ref 0–2)
BILIRUB SERPL-MCNC: 1.4 MG/DL (ref 0.1–1)
BILIRUB UR QL STRIP: ABNORMAL
BUN BLD-MCNC: 30 MG/DL (ref 5–21)
BUN/CREAT SERPL: 18.8 (ref 7–25)
CALCIUM SPEC-SCNC: 10.5 MG/DL (ref 8.4–10.4)
CHLORIDE SERPL-SCNC: 104 MMOL/L (ref 98–110)
CLARITY UR: ABNORMAL
CO2 SERPL-SCNC: 14 MMOL/L (ref 24–31)
COLOR UR: ABNORMAL
CREAT BLD-MCNC: 1.6 MG/DL (ref 0.5–1.4)
D-LACTATE SERPL-SCNC: 1.5 MMOL/L (ref 0.5–2)
D-LACTATE SERPL-SCNC: 3.8 MMOL/L (ref 0.5–2)
DEPRECATED RDW RBC AUTO: 42.4 FL (ref 40–54)
DEPRECATED RDW RBC AUTO: 43.6 FL (ref 40–54)
EOSINOPHIL # BLD AUTO: 0.07 10*3/MM3 (ref 0–0.7)
EOSINOPHIL NFR BLD AUTO: 0.7 % (ref 0–4)
ERYTHROCYTE [DISTWIDTH] IN BLOOD BY AUTOMATED COUNT: 13.1 % (ref 12–15)
ERYTHROCYTE [DISTWIDTH] IN BLOOD BY AUTOMATED COUNT: 13.2 % (ref 12–15)
GFR SERPL CREATININE-BSD FRML MDRD: 33 ML/MIN/1.73
GLOBULIN UR ELPH-MCNC: 3.7 GM/DL
GLUCOSE BLD-MCNC: 140 MG/DL (ref 70–100)
GLUCOSE UR STRIP-MCNC: NEGATIVE MG/DL
HCT VFR BLD AUTO: 42.8 % (ref 37–47)
HCT VFR BLD AUTO: 43.4 % (ref 37–47)
HGB BLD-MCNC: 14.4 G/DL (ref 12–16)
HGB BLD-MCNC: 14.9 G/DL (ref 12–16)
HGB UR QL STRIP.AUTO: NEGATIVE
IMM GRANULOCYTES # BLD: 0.06 10*3/MM3 (ref 0–0.03)
IMM GRANULOCYTES NFR BLD: 0.6 % (ref 0–5)
KETONES UR QL STRIP: ABNORMAL
LEUKOCYTE ESTERASE UR QL STRIP.AUTO: ABNORMAL
LIPASE SERPL-CCNC: 267 U/L (ref 23–203)
LYMPHOCYTES # BLD AUTO: 1.81 10*3/MM3 (ref 0.72–4.86)
LYMPHOCYTES NFR BLD AUTO: 17.6 % (ref 15–45)
MCH RBC QN AUTO: 30.6 PG (ref 28–32)
MCH RBC QN AUTO: 30.6 PG (ref 28–32)
MCHC RBC AUTO-ENTMCNC: 33.6 G/DL (ref 33–36)
MCHC RBC AUTO-ENTMCNC: 34.3 G/DL (ref 33–36)
MCV RBC AUTO: 89.1 FL (ref 82–98)
MCV RBC AUTO: 91.1 FL (ref 82–98)
MONOCYTES # BLD AUTO: 0.84 10*3/MM3 (ref 0.19–1.3)
MONOCYTES NFR BLD AUTO: 8.2 % (ref 4–12)
NEUTROPHILS # BLD AUTO: 7.44 10*3/MM3 (ref 1.87–8.4)
NEUTROPHILS NFR BLD AUTO: 72.5 % (ref 39–78)
NITRITE UR QL STRIP: NEGATIVE
PH UR STRIP.AUTO: <=5 [PH] (ref 5–8)
PLATELET # BLD AUTO: 287 10*3/MM3 (ref 130–400)
PLATELET # BLD AUTO: 295 10*3/MM3 (ref 130–400)
PMV BLD AUTO: 10.5 FL (ref 6–12)
PMV BLD AUTO: 11 FL (ref 6–12)
POTASSIUM BLD-SCNC: 4.2 MMOL/L (ref 3.5–5.3)
PROT SERPL-MCNC: 7.9 G/DL (ref 6.3–8.7)
PROT UR QL STRIP: ABNORMAL
RBC # BLD AUTO: 4.7 10*6/MM3 (ref 4.2–5.4)
RBC # BLD AUTO: 4.87 10*6/MM3 (ref 4.2–5.4)
RBC # UR: ABNORMAL /HPF
REF LAB TEST METHOD: ABNORMAL
SODIUM BLD-SCNC: 137 MMOL/L (ref 135–145)
SP GR UR STRIP: 1.02 (ref 1–1.03)
SQUAMOUS #/AREA URNS HPF: ABNORMAL /HPF
TRANS CELLS #/AREA URNS HPF: ABNORMAL /HPF
TROPONIN I SERPL-MCNC: <0.012 NG/ML (ref 0–0.03)
UROBILINOGEN UR QL STRIP: ABNORMAL
WBC NRBC COR # BLD: 10.26 10*3/MM3 (ref 4.8–10.8)
WBC NRBC COR # BLD: 9.51 10*3/MM3 (ref 4.8–10.8)
WBC UR QL AUTO: ABNORMAL /HPF
YEAST URNS QL MICRO: ABNORMAL /HPF

## 2017-04-01 PROCEDURE — 74176 CT ABD & PELVIS W/O CONTRAST: CPT

## 2017-04-01 PROCEDURE — 87147 CULTURE TYPE IMMUNOLOGIC: CPT | Performed by: NURSE PRACTITIONER

## 2017-04-01 PROCEDURE — 84484 ASSAY OF TROPONIN QUANT: CPT | Performed by: NURSE PRACTITIONER

## 2017-04-01 PROCEDURE — 85025 COMPLETE CBC W/AUTO DIFF WBC: CPT | Performed by: NURSE PRACTITIONER

## 2017-04-01 PROCEDURE — 25010000002 CEFTRIAXONE: Performed by: NURSE PRACTITIONER

## 2017-04-01 PROCEDURE — 25010000002 HYDROMORPHONE PER 4 MG: Performed by: SPECIALIST

## 2017-04-01 PROCEDURE — 83690 ASSAY OF LIPASE: CPT | Performed by: NURSE PRACTITIONER

## 2017-04-01 PROCEDURE — 83605 ASSAY OF LACTIC ACID: CPT | Performed by: SPECIALIST

## 2017-04-01 PROCEDURE — G0378 HOSPITAL OBSERVATION PER HR: HCPCS

## 2017-04-01 PROCEDURE — 74020 HC XR ABDOMEN FLAT & UPRIGHT: CPT

## 2017-04-01 PROCEDURE — 87086 URINE CULTURE/COLONY COUNT: CPT | Performed by: NURSE PRACTITIONER

## 2017-04-01 PROCEDURE — 25010000002 ONDANSETRON PER 1 MG: Performed by: NURSE PRACTITIONER

## 2017-04-01 PROCEDURE — 80053 COMPREHEN METABOLIC PANEL: CPT | Performed by: NURSE PRACTITIONER

## 2017-04-01 PROCEDURE — 87185 SC STD ENZYME DETCJ PER NZM: CPT | Performed by: NURSE PRACTITIONER

## 2017-04-01 PROCEDURE — 82150 ASSAY OF AMYLASE: CPT | Performed by: NURSE PRACTITIONER

## 2017-04-01 PROCEDURE — 93005 ELECTROCARDIOGRAM TRACING: CPT | Performed by: NURSE PRACTITIONER

## 2017-04-01 PROCEDURE — 36415 COLL VENOUS BLD VENIPUNCTURE: CPT | Performed by: SPECIALIST

## 2017-04-01 PROCEDURE — 93010 ELECTROCARDIOGRAM REPORT: CPT | Performed by: INTERNAL MEDICINE

## 2017-04-01 PROCEDURE — 85027 COMPLETE CBC AUTOMATED: CPT | Performed by: SPECIALIST

## 2017-04-01 PROCEDURE — 81001 URINALYSIS AUTO W/SCOPE: CPT | Performed by: NURSE PRACTITIONER

## 2017-04-01 PROCEDURE — 87186 SC STD MICRODIL/AGAR DIL: CPT | Performed by: NURSE PRACTITIONER

## 2017-04-01 PROCEDURE — 25010000002 HYDROMORPHONE PER 4 MG: Performed by: NURSE PRACTITIONER

## 2017-04-01 PROCEDURE — 83605 ASSAY OF LACTIC ACID: CPT | Performed by: NURSE PRACTITIONER

## 2017-04-01 PROCEDURE — 87070 CULTURE OTHR SPECIMN AEROBIC: CPT | Performed by: NURSE PRACTITIONER

## 2017-04-01 PROCEDURE — 25010000002 PROMETHAZINE PER 50 MG: Performed by: NURSE PRACTITIONER

## 2017-04-01 PROCEDURE — 87205 SMEAR GRAM STAIN: CPT | Performed by: NURSE PRACTITIONER

## 2017-04-01 PROCEDURE — 87077 CULTURE AEROBIC IDENTIFY: CPT | Performed by: NURSE PRACTITIONER

## 2017-04-01 PROCEDURE — 25010000002 ONDANSETRON PER 1 MG: Performed by: SPECIALIST

## 2017-04-01 PROCEDURE — 25010000002 ENOXAPARIN PER 10 MG: Performed by: SPECIALIST

## 2017-04-01 PROCEDURE — 99285 EMERGENCY DEPT VISIT HI MDM: CPT

## 2017-04-01 RX ORDER — DEXTROSE, SODIUM CHLORIDE, AND POTASSIUM CHLORIDE 5; .9; .15 G/100ML; G/100ML; G/100ML
100 INJECTION INTRAVENOUS CONTINUOUS
Status: DISCONTINUED | OUTPATIENT
Start: 2017-04-01 | End: 2017-04-03 | Stop reason: HOSPADM

## 2017-04-01 RX ORDER — ONDANSETRON 2 MG/ML
4 INJECTION INTRAMUSCULAR; INTRAVENOUS ONCE
Status: COMPLETED | OUTPATIENT
Start: 2017-04-01 | End: 2017-04-01

## 2017-04-01 RX ORDER — DEXTROSE MONOHYDRATE 25 G/50ML
25 INJECTION, SOLUTION INTRAVENOUS
Status: DISCONTINUED | OUTPATIENT
Start: 2017-04-01 | End: 2017-04-03 | Stop reason: HOSPADM

## 2017-04-01 RX ORDER — PANTOPRAZOLE SODIUM 40 MG/10ML
40 INJECTION, POWDER, LYOPHILIZED, FOR SOLUTION INTRAVENOUS
Status: DISCONTINUED | OUTPATIENT
Start: 2017-04-02 | End: 2017-04-03 | Stop reason: HOSPADM

## 2017-04-01 RX ORDER — NICOTINE POLACRILEX 4 MG
15 LOZENGE BUCCAL
Status: DISCONTINUED | OUTPATIENT
Start: 2017-04-01 | End: 2017-04-03 | Stop reason: HOSPADM

## 2017-04-01 RX ORDER — ONDANSETRON 2 MG/ML
4 INJECTION INTRAMUSCULAR; INTRAVENOUS EVERY 4 HOURS
Status: DISCONTINUED | OUTPATIENT
Start: 2017-04-01 | End: 2017-04-01

## 2017-04-01 RX ORDER — ONDANSETRON 2 MG/ML
4 INJECTION INTRAMUSCULAR; INTRAVENOUS EVERY 4 HOURS PRN
Status: DISCONTINUED | OUTPATIENT
Start: 2017-04-01 | End: 2017-04-03 | Stop reason: HOSPADM

## 2017-04-01 RX ORDER — PROMETHAZINE HYDROCHLORIDE 25 MG/ML
12.5 INJECTION, SOLUTION INTRAMUSCULAR; INTRAVENOUS ONCE
Status: COMPLETED | OUTPATIENT
Start: 2017-04-01 | End: 2017-04-01

## 2017-04-01 RX ADMIN — POTASSIUM CHLORIDE, DEXTROSE MONOHYDRATE AND SODIUM CHLORIDE 100 ML/HR: 150; 5; 900 INJECTION, SOLUTION INTRAVENOUS at 21:32

## 2017-04-01 RX ADMIN — ENOXAPARIN SODIUM 40 MG: 40 INJECTION SUBCUTANEOUS at 22:45

## 2017-04-01 RX ADMIN — HYDROMORPHONE HYDROCHLORIDE 1 MG: 1 INJECTION, SOLUTION INTRAMUSCULAR; INTRAVENOUS; SUBCUTANEOUS at 21:34

## 2017-04-01 RX ADMIN — ONDANSETRON HYDROCHLORIDE 4 MG: 2 SOLUTION INTRAMUSCULAR; INTRAVENOUS at 22:42

## 2017-04-01 RX ADMIN — CEFTRIAXONE 1 G: 1 INJECTION, POWDER, FOR SOLUTION INTRAMUSCULAR; INTRAVENOUS at 18:57

## 2017-04-01 RX ADMIN — HYDROMORPHONE HYDROCHLORIDE 1 MG: 1 INJECTION, SOLUTION INTRAMUSCULAR; INTRAVENOUS; SUBCUTANEOUS at 16:18

## 2017-04-01 RX ADMIN — ONDANSETRON HYDROCHLORIDE 4 MG: 2 SOLUTION INTRAMUSCULAR; INTRAVENOUS at 16:18

## 2017-04-01 RX ADMIN — SODIUM CHLORIDE 500 ML: 9 INJECTION, SOLUTION INTRAVENOUS at 16:22

## 2017-04-01 RX ADMIN — PROMETHAZINE HYDROCHLORIDE 12.5 MG: 25 INJECTION, SOLUTION INTRAMUSCULAR; INTRAVENOUS at 17:37

## 2017-04-02 LAB
ALBUMIN SERPL-MCNC: 3.7 G/DL (ref 3.5–5)
ALBUMIN/GLOB SERPL: 1 G/DL (ref 1.1–2.5)
ALP SERPL-CCNC: 277 U/L (ref 24–120)
ALT SERPL W P-5'-P-CCNC: 51 U/L (ref 0–54)
AMYLASE SERPL-CCNC: 107 U/L (ref 30–110)
ANION GAP SERPL CALCULATED.3IONS-SCNC: 11 MMOL/L (ref 4–13)
AST SERPL-CCNC: 60 U/L (ref 7–45)
BILIRUB SERPL-MCNC: 0.9 MG/DL (ref 0.1–1)
BUN BLD-MCNC: 27 MG/DL (ref 5–21)
BUN/CREAT SERPL: 22 (ref 7–25)
CALCIUM SPEC-SCNC: 9.5 MG/DL (ref 8.4–10.4)
CHLORIDE SERPL-SCNC: 108 MMOL/L (ref 98–110)
CO2 SERPL-SCNC: 22 MMOL/L (ref 24–31)
CREAT BLD-MCNC: 1.23 MG/DL (ref 0.5–1.4)
D-LACTATE SERPL-SCNC: 1.7 MMOL/L (ref 0.5–2)
GFR SERPL CREATININE-BSD FRML MDRD: 44 ML/MIN/1.73
GLOBULIN UR ELPH-MCNC: 3.6 GM/DL
GLUCOSE BLD-MCNC: 135 MG/DL (ref 70–100)
GLUCOSE BLDC GLUCOMTR-MCNC: 109 MG/DL (ref 70–130)
GLUCOSE BLDC GLUCOMTR-MCNC: 120 MG/DL (ref 70–130)
GLUCOSE BLDC GLUCOMTR-MCNC: 124 MG/DL (ref 70–130)
GLUCOSE BLDC GLUCOMTR-MCNC: 134 MG/DL (ref 70–130)
LIPASE SERPL-CCNC: 166 U/L (ref 23–203)
POTASSIUM BLD-SCNC: 4.7 MMOL/L (ref 3.5–5.3)
PROT SERPL-MCNC: 7.3 G/DL (ref 6.3–8.7)
SODIUM BLD-SCNC: 141 MMOL/L (ref 135–145)

## 2017-04-02 PROCEDURE — 80053 COMPREHEN METABOLIC PANEL: CPT | Performed by: SPECIALIST

## 2017-04-02 PROCEDURE — 83605 ASSAY OF LACTIC ACID: CPT | Performed by: SPECIALIST

## 2017-04-02 PROCEDURE — 25010000002 ENOXAPARIN PER 10 MG: Performed by: SPECIALIST

## 2017-04-02 PROCEDURE — 82150 ASSAY OF AMYLASE: CPT | Performed by: SPECIALIST

## 2017-04-02 PROCEDURE — G0378 HOSPITAL OBSERVATION PER HR: HCPCS

## 2017-04-02 PROCEDURE — 25010000002 PROMETHAZINE PER 50 MG: Performed by: SPECIALIST

## 2017-04-02 PROCEDURE — 25010000002 HYDROMORPHONE PER 4 MG: Performed by: SPECIALIST

## 2017-04-02 PROCEDURE — 82962 GLUCOSE BLOOD TEST: CPT

## 2017-04-02 PROCEDURE — 83690 ASSAY OF LIPASE: CPT | Performed by: SPECIALIST

## 2017-04-02 PROCEDURE — 25010000002 KETOROLAC TROMETHAMINE PER 15 MG: Performed by: SPECIALIST

## 2017-04-02 PROCEDURE — 25010000002 ONDANSETRON PER 1 MG: Performed by: SPECIALIST

## 2017-04-02 RX ORDER — HYDROMORPHONE HCL 110MG/55ML
2 PATIENT CONTROLLED ANALGESIA SYRINGE INTRAVENOUS
Status: DISCONTINUED | OUTPATIENT
Start: 2017-04-02 | End: 2017-04-03 | Stop reason: HOSPADM

## 2017-04-02 RX ORDER — KETOROLAC TROMETHAMINE 30 MG/ML
30 INJECTION, SOLUTION INTRAMUSCULAR; INTRAVENOUS EVERY 6 HOURS PRN
Status: DISCONTINUED | OUTPATIENT
Start: 2017-04-02 | End: 2017-04-03 | Stop reason: HOSPADM

## 2017-04-02 RX ORDER — PROMETHAZINE HYDROCHLORIDE 25 MG/ML
12.5 INJECTION, SOLUTION INTRAMUSCULAR; INTRAVENOUS EVERY 6 HOURS PRN
Status: DISCONTINUED | OUTPATIENT
Start: 2017-04-02 | End: 2017-04-03 | Stop reason: HOSPADM

## 2017-04-02 RX ADMIN — KETOROLAC TROMETHAMINE 30 MG: 30 INJECTION, SOLUTION INTRAMUSCULAR at 15:23

## 2017-04-02 RX ADMIN — POTASSIUM CHLORIDE, DEXTROSE MONOHYDRATE AND SODIUM CHLORIDE 100 ML/HR: 150; 5; 900 INJECTION, SOLUTION INTRAVENOUS at 17:16

## 2017-04-02 RX ADMIN — HYDROMORPHONE HYDROCHLORIDE 1 MG: 1 INJECTION, SOLUTION INTRAMUSCULAR; INTRAVENOUS; SUBCUTANEOUS at 09:13

## 2017-04-02 RX ADMIN — PANTOPRAZOLE SODIUM 40 MG: 40 INJECTION, POWDER, FOR SOLUTION INTRAVENOUS at 06:17

## 2017-04-02 RX ADMIN — HYDROMORPHONE HYDROCHLORIDE 1 MG: 1 INJECTION, SOLUTION INTRAMUSCULAR; INTRAVENOUS; SUBCUTANEOUS at 12:40

## 2017-04-02 RX ADMIN — PROMETHAZINE HYDROCHLORIDE 12.5 MG: 25 INJECTION, SOLUTION INTRAMUSCULAR; INTRAVENOUS at 18:43

## 2017-04-02 RX ADMIN — PROMETHAZINE HYDROCHLORIDE 12.5 MG: 25 INJECTION, SOLUTION INTRAMUSCULAR; INTRAVENOUS at 10:39

## 2017-04-02 RX ADMIN — ONDANSETRON HYDROCHLORIDE 4 MG: 2 SOLUTION INTRAMUSCULAR; INTRAVENOUS at 09:17

## 2017-04-02 RX ADMIN — KETOROLAC TROMETHAMINE 30 MG: 30 INJECTION, SOLUTION INTRAMUSCULAR at 23:28

## 2017-04-02 RX ADMIN — POTASSIUM CHLORIDE, DEXTROSE MONOHYDRATE AND SODIUM CHLORIDE 100 ML/HR: 150; 5; 900 INJECTION, SOLUTION INTRAVENOUS at 06:48

## 2017-04-02 RX ADMIN — ENOXAPARIN SODIUM 40 MG: 40 INJECTION SUBCUTANEOUS at 22:29

## 2017-04-02 RX ADMIN — HYDROMORPHONE HYDROCHLORIDE 1 MG: 1 INJECTION, SOLUTION INTRAMUSCULAR; INTRAVENOUS; SUBCUTANEOUS at 03:01

## 2017-04-02 RX ADMIN — HYDROMORPHONE HYDROCHLORIDE 1 MG: 1 INJECTION, SOLUTION INTRAMUSCULAR; INTRAVENOUS; SUBCUTANEOUS at 06:13

## 2017-04-02 RX ADMIN — HYDROMORPHONE HYDROCHLORIDE 2 MG: 2 INJECTION, SOLUTION INTRAMUSCULAR; INTRAVENOUS; SUBCUTANEOUS at 18:17

## 2017-04-02 RX ADMIN — ONDANSETRON HYDROCHLORIDE 4 MG: 2 SOLUTION INTRAMUSCULAR; INTRAVENOUS at 03:00

## 2017-04-03 VITALS
SYSTOLIC BLOOD PRESSURE: 102 MMHG | HEIGHT: 63 IN | DIASTOLIC BLOOD PRESSURE: 60 MMHG | OXYGEN SATURATION: 100 % | HEART RATE: 94 BPM | RESPIRATION RATE: 16 BRPM | BODY MASS INDEX: 25.73 KG/M2 | TEMPERATURE: 97.8 F | WEIGHT: 145.2 LBS

## 2017-04-03 LAB
BACTERIA SPEC AEROBE CULT: NORMAL
GLUCOSE BLDC GLUCOMTR-MCNC: 110 MG/DL (ref 70–130)
GLUCOSE BLDC GLUCOMTR-MCNC: 91 MG/DL (ref 70–130)

## 2017-04-03 PROCEDURE — 25010000002 PROMETHAZINE PER 50 MG: Performed by: SPECIALIST

## 2017-04-03 PROCEDURE — G0378 HOSPITAL OBSERVATION PER HR: HCPCS

## 2017-04-03 PROCEDURE — 25010000002 HYDROMORPHONE PER 4 MG: Performed by: SPECIALIST

## 2017-04-03 PROCEDURE — 25010000002 KETOROLAC TROMETHAMINE PER 15 MG: Performed by: SPECIALIST

## 2017-04-03 PROCEDURE — 82962 GLUCOSE BLOOD TEST: CPT

## 2017-04-03 RX ADMIN — PANTOPRAZOLE SODIUM 40 MG: 40 INJECTION, POWDER, FOR SOLUTION INTRAVENOUS at 06:49

## 2017-04-03 RX ADMIN — HYDROMORPHONE HYDROCHLORIDE 2 MG: 2 INJECTION, SOLUTION INTRAMUSCULAR; INTRAVENOUS; SUBCUTANEOUS at 04:50

## 2017-04-03 RX ADMIN — KETOROLAC TROMETHAMINE 30 MG: 30 INJECTION, SOLUTION INTRAMUSCULAR at 08:23

## 2017-04-03 RX ADMIN — PROMETHAZINE HYDROCHLORIDE 12.5 MG: 25 INJECTION, SOLUTION INTRAMUSCULAR; INTRAVENOUS at 04:38

## 2017-04-03 RX ADMIN — POTASSIUM CHLORIDE, DEXTROSE MONOHYDRATE AND SODIUM CHLORIDE 100 ML/HR: 150; 5; 900 INJECTION, SOLUTION INTRAVENOUS at 03:19

## 2017-04-06 ENCOUNTER — OFFICE VISIT (OUTPATIENT)
Dept: CARDIOLOGY | Age: 62
End: 2017-04-06
Payer: COMMERCIAL

## 2017-04-06 ENCOUNTER — TELEPHONE (OUTPATIENT)
Dept: CARDIOLOGY | Age: 62
End: 2017-04-06

## 2017-04-06 VITALS
HEIGHT: 63 IN | WEIGHT: 150 LBS | BODY MASS INDEX: 26.58 KG/M2 | HEART RATE: 54 BPM | SYSTOLIC BLOOD PRESSURE: 118 MMHG | DIASTOLIC BLOOD PRESSURE: 80 MMHG

## 2017-04-06 DIAGNOSIS — I48.0 PAROXYSMAL ATRIAL FIBRILLATION (HCC): ICD-10-CM

## 2017-04-06 PROBLEM — I48.91 A-FIB (HCC): Status: ACTIVE | Noted: 2017-04-06

## 2017-04-06 LAB
B-LACTAMASE USUAL SUSC ISLT: POSITIVE
BACTERIA SPEC AEROBE CULT: ABNORMAL
GRAM STN SPEC: ABNORMAL
GRAM STN SPEC: ABNORMAL

## 2017-04-06 PROCEDURE — 99214 OFFICE O/P EST MOD 30 MIN: CPT | Performed by: INTERNAL MEDICINE

## 2017-04-06 PROCEDURE — 93000 ELECTROCARDIOGRAM COMPLETE: CPT | Performed by: INTERNAL MEDICINE

## 2017-04-06 RX ORDER — ONDANSETRON 4 MG/1
4 TABLET, FILM COATED ORAL PRN
COMMUNITY
Start: 2017-02-14 | End: 2019-02-21 | Stop reason: ALTCHOICE

## 2017-04-06 RX ORDER — FERROUS SULFATE 325(65) MG
325 TABLET ORAL
COMMUNITY
End: 2022-05-20

## 2017-04-06 RX ORDER — PROMETHAZINE HYDROCHLORIDE 25 MG/1
25 TABLET ORAL EVERY 6 HOURS
COMMUNITY
Start: 2017-03-23 | End: 2019-02-21 | Stop reason: ALTCHOICE

## 2017-04-06 RX ORDER — TRAMADOL HYDROCHLORIDE 50 MG/1
50 TABLET ORAL 2 TIMES DAILY
COMMUNITY
Start: 2017-02-01 | End: 2019-02-21 | Stop reason: ALTCHOICE

## 2017-04-06 RX ORDER — LOPERAMIDE HYDROCHLORIDE 2 MG/1
2 CAPSULE ORAL 4 TIMES DAILY PRN
COMMUNITY
End: 2020-06-26

## 2017-04-12 ENCOUNTER — APPOINTMENT (OUTPATIENT)
Dept: WOUND CARE | Facility: HOSPITAL | Age: 62
End: 2017-04-12

## 2017-04-19 ENCOUNTER — APPOINTMENT (OUTPATIENT)
Dept: WOUND CARE | Facility: HOSPITAL | Age: 62
End: 2017-04-19

## 2017-04-25 ENCOUNTER — TELEPHONE (OUTPATIENT)
Dept: CARDIOLOGY | Age: 62
End: 2017-04-25

## 2017-04-26 ENCOUNTER — APPOINTMENT (OUTPATIENT)
Dept: WOUND CARE | Facility: HOSPITAL | Age: 62
End: 2017-04-26

## 2017-04-26 ENCOUNTER — TELEPHONE (OUTPATIENT)
Dept: CARDIOLOGY | Age: 62
End: 2017-04-26

## 2017-04-26 ENCOUNTER — TELEPHONE (OUTPATIENT)
Dept: GASTROENTEROLOGY | Facility: CLINIC | Age: 62
End: 2017-04-26

## 2017-04-26 NOTE — TELEPHONE ENCOUNTER
-  Tiffanie with Dr. Matt notified.     ---- Message from Joe France MD sent at 4/20/2017 12:22 PM CDT -----   Let her cardiologist know itshould be okay for her to resume anticoagulation.  Berta-Owens tears are acute injuries and we typically do not repeat endoscopy to document healing.  ----- Message -----     From: Sadie Reyes     Sent: 4/20/2017   7:45 AM       To: Joe France MD

## 2017-05-02 ENCOUNTER — TELEPHONE (OUTPATIENT)
Dept: CARDIOLOGY | Age: 62
End: 2017-05-02

## 2017-05-03 ENCOUNTER — APPOINTMENT (OUTPATIENT)
Dept: WOUND CARE | Facility: HOSPITAL | Age: 62
End: 2017-05-03

## 2017-05-10 ENCOUNTER — APPOINTMENT (OUTPATIENT)
Dept: WOUND CARE | Facility: HOSPITAL | Age: 62
End: 2017-05-10

## 2017-05-17 ENCOUNTER — APPOINTMENT (OUTPATIENT)
Dept: WOUND CARE | Facility: HOSPITAL | Age: 62
End: 2017-05-17

## 2017-05-24 ENCOUNTER — APPOINTMENT (OUTPATIENT)
Dept: WOUND CARE | Facility: HOSPITAL | Age: 62
End: 2017-05-24

## 2017-05-31 ENCOUNTER — APPOINTMENT (OUTPATIENT)
Dept: WOUND CARE | Facility: HOSPITAL | Age: 62
End: 2017-05-31

## 2017-06-02 ENCOUNTER — SURG/PROC ORDERS (OUTPATIENT)
Dept: CARDIOLOGY | Age: 62
End: 2017-06-02

## 2017-06-02 ENCOUNTER — TELEPHONE (OUTPATIENT)
Dept: CARDIOLOGY | Age: 62
End: 2017-06-02

## 2017-06-02 DIAGNOSIS — R94.39 ABNORMAL NUCLEAR STRESS TEST: Primary | ICD-10-CM

## 2017-06-02 RX ORDER — SODIUM CHLORIDE 9 MG/ML
INJECTION, SOLUTION INTRAVENOUS CONTINUOUS
Status: CANCELLED | OUTPATIENT
Start: 2017-06-02

## 2017-06-06 ENCOUNTER — APPOINTMENT (OUTPATIENT)
Dept: WOUND CARE | Facility: HOSPITAL | Age: 62
End: 2017-06-06

## 2017-06-07 ENCOUNTER — HOSPITAL ENCOUNTER (OUTPATIENT)
Dept: CARDIAC CATH/INVASIVE PROCEDURES | Age: 62
Discharge: HOME | End: 2017-06-07
Attending: INTERNAL MEDICINE | Admitting: INTERNAL MEDICINE
Payer: COMMERCIAL

## 2017-06-07 ENCOUNTER — APPOINTMENT (OUTPATIENT)
Dept: GENERAL RADIOLOGY | Age: 62
End: 2017-06-07
Attending: INTERNAL MEDICINE
Payer: COMMERCIAL

## 2017-06-07 ENCOUNTER — APPOINTMENT (OUTPATIENT)
Dept: WOUND CARE | Facility: HOSPITAL | Age: 62
End: 2017-06-07

## 2017-06-07 VITALS
WEIGHT: 144 LBS | HEART RATE: 89 BPM | OXYGEN SATURATION: 99 % | DIASTOLIC BLOOD PRESSURE: 77 MMHG | BODY MASS INDEX: 25.52 KG/M2 | SYSTOLIC BLOOD PRESSURE: 103 MMHG | TEMPERATURE: 98.2 F | HEIGHT: 63 IN | RESPIRATION RATE: 21 BRPM

## 2017-06-07 DIAGNOSIS — R94.39 ABNORMAL NUCLEAR STRESS TEST: ICD-10-CM

## 2017-06-07 LAB
ANION GAP SERPL CALCULATED.3IONS-SCNC: 17 MMOL/L (ref 7–19)
BUN BLDV-MCNC: 23 MG/DL (ref 8–23)
CALCIUM SERPL-MCNC: 10.2 MG/DL (ref 8.8–10.2)
CHLORIDE BLD-SCNC: 102 MMOL/L (ref 98–111)
CO2: 20 MMOL/L (ref 22–29)
CREAT SERPL-MCNC: 1.6 MG/DL (ref 0.5–0.9)
GFR NON-AFRICAN AMERICAN: 32
GLUCOSE BLD-MCNC: 107 MG/DL (ref 74–109)
HCT VFR BLD CALC: 40.8 % (ref 37–47)
HEMOGLOBIN: 13.4 G/DL (ref 12–16)
MCH RBC QN AUTO: 31.8 PG (ref 27–31)
MCHC RBC AUTO-ENTMCNC: 32.8 G/DL (ref 33–37)
MCV RBC AUTO: 96.7 FL (ref 81–99)
PDW BLD-RTO: 12.6 % (ref 11.5–14.5)
PLATELET # BLD: 297 K/UL (ref 130–400)
PMV BLD AUTO: 10.1 FL (ref 9.4–12.3)
POTASSIUM SERPL-SCNC: 4.7 MMOL/L (ref 3.5–5)
RBC # BLD: 4.22 M/UL (ref 4.2–5.4)
SODIUM BLD-SCNC: 139 MMOL/L (ref 136–145)
WBC # BLD: 6.1 K/UL (ref 4.8–10.8)

## 2017-06-07 PROCEDURE — 93005 ELECTROCARDIOGRAM TRACING: CPT

## 2017-06-07 PROCEDURE — C1760 CLOSURE DEV, VASC: HCPCS

## 2017-06-07 PROCEDURE — 85027 COMPLETE CBC AUTOMATED: CPT

## 2017-06-07 PROCEDURE — 99999 PR OFFICE/OUTPT VISIT,PROCEDURE ONLY: CPT | Performed by: INTERNAL MEDICINE

## 2017-06-07 PROCEDURE — 36415 COLL VENOUS BLD VENIPUNCTURE: CPT

## 2017-06-07 PROCEDURE — 93567 NJX CAR CTH SPRVLV AORTGRPHY: CPT | Performed by: INTERNAL MEDICINE

## 2017-06-07 PROCEDURE — 71020 XR CHEST STANDARD TWO VW: CPT

## 2017-06-07 PROCEDURE — 99024 POSTOP FOLLOW-UP VISIT: CPT | Performed by: INTERNAL MEDICINE

## 2017-06-07 PROCEDURE — C1887 CATHETER, GUIDING: HCPCS

## 2017-06-07 PROCEDURE — C1894 INTRO/SHEATH, NON-LASER: HCPCS

## 2017-06-07 PROCEDURE — 2720000001 HC MISC SURG SUPPLY STERILE $51-500

## 2017-06-07 PROCEDURE — 93458 L HRT ARTERY/VENTRICLE ANGIO: CPT | Performed by: INTERNAL MEDICINE

## 2017-06-07 PROCEDURE — 2580000003 HC RX 258: Performed by: INTERNAL MEDICINE

## 2017-06-07 PROCEDURE — 2709999900 HC NON-CHARGEABLE SUPPLY

## 2017-06-07 PROCEDURE — 6360000002 HC RX W HCPCS

## 2017-06-07 PROCEDURE — 80048 BASIC METABOLIC PNL TOTAL CA: CPT

## 2017-06-07 PROCEDURE — 2500000003 HC RX 250 WO HCPCS

## 2017-06-07 RX ORDER — SODIUM CHLORIDE 0.9 % (FLUSH) 0.9 %
10 SYRINGE (ML) INJECTION PRN
Status: DISCONTINUED | OUTPATIENT
Start: 2017-06-07 | End: 2017-06-08 | Stop reason: HOSPADM

## 2017-06-07 RX ORDER — CHOLESTYRAMINE 4 G/9G
1 POWDER, FOR SUSPENSION ORAL 2 TIMES DAILY
COMMUNITY
End: 2019-02-21 | Stop reason: ALTCHOICE

## 2017-06-07 RX ORDER — SODIUM CHLORIDE 0.9 % (FLUSH) 0.9 %
10 SYRINGE (ML) INJECTION EVERY 12 HOURS SCHEDULED
Status: DISCONTINUED | OUTPATIENT
Start: 2017-06-07 | End: 2017-06-08 | Stop reason: HOSPADM

## 2017-06-07 RX ORDER — SODIUM CHLORIDE 9 MG/ML
INJECTION, SOLUTION INTRAVENOUS CONTINUOUS
Status: DISCONTINUED | OUTPATIENT
Start: 2017-06-07 | End: 2017-06-07 | Stop reason: SDUPTHER

## 2017-06-07 RX ORDER — SODIUM CHLORIDE 9 MG/ML
INJECTION, SOLUTION INTRAVENOUS CONTINUOUS
Status: ACTIVE | OUTPATIENT
Start: 2017-06-07 | End: 2017-06-07

## 2017-06-07 RX ADMIN — SODIUM CHLORIDE: 9 INJECTION, SOLUTION INTRAVENOUS at 11:00

## 2017-06-07 RX ADMIN — SODIUM CHLORIDE: 9 INJECTION, SOLUTION INTRAVENOUS at 17:01

## 2017-06-12 LAB
EKG P AXIS: NORMAL DEGREES
EKG P-R INTERVAL: NORMAL MS
EKG Q-T INTERVAL: 404 MS
EKG QRS DURATION: 142 MS
EKG QTC CALCULATION (BAZETT): 439 MS
EKG T AXIS: -28 DEGREES

## 2017-06-14 ENCOUNTER — APPOINTMENT (OUTPATIENT)
Dept: WOUND CARE | Facility: HOSPITAL | Age: 62
End: 2017-06-14

## 2017-06-21 ENCOUNTER — APPOINTMENT (OUTPATIENT)
Dept: WOUND CARE | Facility: HOSPITAL | Age: 62
End: 2017-06-21

## 2017-07-13 ENCOUNTER — APPOINTMENT (OUTPATIENT)
Dept: WOUND CARE | Facility: HOSPITAL | Age: 62
End: 2017-07-13

## 2017-07-14 ENCOUNTER — APPOINTMENT (OUTPATIENT)
Dept: WOUND CARE | Facility: HOSPITAL | Age: 62
End: 2017-07-14

## 2017-07-25 ENCOUNTER — APPOINTMENT (OUTPATIENT)
Dept: WOUND CARE | Facility: HOSPITAL | Age: 62
End: 2017-07-25

## 2017-08-02 ENCOUNTER — APPOINTMENT (OUTPATIENT)
Dept: WOUND CARE | Facility: HOSPITAL | Age: 62
End: 2017-08-02

## 2017-08-09 ENCOUNTER — APPOINTMENT (OUTPATIENT)
Dept: WOUND CARE | Facility: HOSPITAL | Age: 62
End: 2017-08-09

## 2017-08-09 PROCEDURE — 97605 NEG PRS WND THER DME<=50SQCM: CPT

## 2017-08-16 ENCOUNTER — APPOINTMENT (OUTPATIENT)
Dept: WOUND CARE | Facility: HOSPITAL | Age: 62
End: 2017-08-16

## 2017-08-16 PROCEDURE — 97605 NEG PRS WND THER DME<=50SQCM: CPT

## 2017-08-23 ENCOUNTER — APPOINTMENT (OUTPATIENT)
Dept: WOUND CARE | Facility: HOSPITAL | Age: 62
End: 2017-08-23

## 2017-08-23 PROCEDURE — 97605 NEG PRS WND THER DME<=50SQCM: CPT

## 2017-08-30 ENCOUNTER — APPOINTMENT (OUTPATIENT)
Dept: WOUND CARE | Facility: HOSPITAL | Age: 62
End: 2017-08-30

## 2017-08-30 PROCEDURE — 97605 NEG PRS WND THER DME<=50SQCM: CPT

## 2017-09-06 ENCOUNTER — APPOINTMENT (OUTPATIENT)
Dept: WOUND CARE | Facility: HOSPITAL | Age: 62
End: 2017-09-06

## 2017-09-06 PROCEDURE — 97605 NEG PRS WND THER DME<=50SQCM: CPT

## 2017-09-13 ENCOUNTER — APPOINTMENT (OUTPATIENT)
Dept: WOUND CARE | Facility: HOSPITAL | Age: 62
End: 2017-09-13

## 2017-09-20 ENCOUNTER — APPOINTMENT (OUTPATIENT)
Dept: WOUND CARE | Facility: HOSPITAL | Age: 62
End: 2017-09-20

## 2017-09-27 ENCOUNTER — APPOINTMENT (OUTPATIENT)
Dept: WOUND CARE | Facility: HOSPITAL | Age: 62
End: 2017-09-27

## 2017-10-04 ENCOUNTER — APPOINTMENT (OUTPATIENT)
Dept: WOUND CARE | Facility: HOSPITAL | Age: 62
End: 2017-10-04

## 2017-10-11 ENCOUNTER — APPOINTMENT (OUTPATIENT)
Dept: WOUND CARE | Facility: HOSPITAL | Age: 62
End: 2017-10-11

## 2017-10-17 ENCOUNTER — APPOINTMENT (OUTPATIENT)
Dept: WOUND CARE | Facility: HOSPITAL | Age: 62
End: 2017-10-17

## 2017-10-17 PROCEDURE — G0463 HOSPITAL OUTPT CLINIC VISIT: HCPCS

## 2017-11-27 ENCOUNTER — HOSPITAL ENCOUNTER (OUTPATIENT)
Dept: WOMENS IMAGING | Age: 62
Discharge: HOME | End: 2017-11-27
Payer: COMMERCIAL

## 2017-11-27 DIAGNOSIS — Z12.31 ENCOUNTER FOR SCREENING MAMMOGRAM FOR BREAST CANCER: ICD-10-CM

## 2017-11-27 PROCEDURE — 77063 BREAST TOMOSYNTHESIS BI: CPT

## 2017-11-27 PROCEDURE — G0202 SCR MAMMO BI INCL CAD: HCPCS

## 2018-02-12 DIAGNOSIS — K58.9 IRRITABLE BOWEL SYNDROME, UNSPECIFIED TYPE: ICD-10-CM

## 2018-02-12 RX ORDER — DICYCLOMINE HCL 20 MG
TABLET ORAL
Qty: 360 TABLET | Refills: 3 | Status: SHIPPED | OUTPATIENT
Start: 2018-02-12 | End: 2020-11-16

## 2018-11-28 ENCOUNTER — HOSPITAL ENCOUNTER (OUTPATIENT)
Dept: WOMENS IMAGING | Age: 63
Discharge: HOME | End: 2018-11-28
Payer: MEDICARE

## 2018-11-28 DIAGNOSIS — Z12.31 ENCOUNTER FOR SCREENING MAMMOGRAM FOR BREAST CANCER: ICD-10-CM

## 2018-11-28 PROCEDURE — 77063 BREAST TOMOSYNTHESIS BI: CPT

## 2018-11-29 ENCOUNTER — TRANSCRIBE ORDERS (OUTPATIENT)
Dept: ADMINISTRATIVE | Facility: HOSPITAL | Age: 63
End: 2018-11-29

## 2018-12-07 ENCOUNTER — TELEPHONE (OUTPATIENT)
Dept: WOMENS IMAGING | Age: 63
End: 2018-12-07

## 2019-01-04 ENCOUNTER — HOSPITAL ENCOUNTER (OUTPATIENT)
Dept: WOMENS IMAGING | Age: 64
End: 2019-01-04
Payer: MEDICARE

## 2019-01-04 ENCOUNTER — HOSPITAL ENCOUNTER (OUTPATIENT)
Dept: WOMENS IMAGING | Age: 64
Discharge: HOME | End: 2019-01-04
Payer: MEDICARE

## 2019-01-04 ENCOUNTER — OFFICE VISIT (OUTPATIENT)
Dept: SURGERY | Age: 64
End: 2019-01-04
Payer: MEDICARE

## 2019-01-04 VITALS — HEIGHT: 63 IN | WEIGHT: 189 LBS | BODY MASS INDEX: 33.49 KG/M2

## 2019-01-04 DIAGNOSIS — R92.0 MICROCALCIFICATIONS OF THE BREAST: Primary | ICD-10-CM

## 2019-01-04 DIAGNOSIS — R92.0 MICROCALCIFICATIONS OF THE BREAST: ICD-10-CM

## 2019-01-04 PROCEDURE — G8399 PT W/DXA RESULTS DOCUMENT: HCPCS | Performed by: PHYSICIAN ASSISTANT

## 2019-01-04 PROCEDURE — 1090F PRES/ABSN URINE INCON ASSESS: CPT | Performed by: PHYSICIAN ASSISTANT

## 2019-01-04 PROCEDURE — 1101F PT FALLS ASSESS-DOCD LE1/YR: CPT | Performed by: PHYSICIAN ASSISTANT

## 2019-01-04 PROCEDURE — G8484 FLU IMMUNIZE NO ADMIN: HCPCS | Performed by: PHYSICIAN ASSISTANT

## 2019-01-04 PROCEDURE — 3017F COLORECTAL CA SCREEN DOC REV: CPT | Performed by: PHYSICIAN ASSISTANT

## 2019-01-04 PROCEDURE — 1036F TOBACCO NON-USER: CPT | Performed by: PHYSICIAN ASSISTANT

## 2019-01-04 PROCEDURE — 77065 DX MAMMO INCL CAD UNI: CPT

## 2019-01-04 PROCEDURE — 99202 OFFICE O/P NEW SF 15 MIN: CPT | Performed by: PHYSICIAN ASSISTANT

## 2019-01-04 PROCEDURE — G8417 CALC BMI ABV UP PARAM F/U: HCPCS | Performed by: PHYSICIAN ASSISTANT

## 2019-01-04 PROCEDURE — 1123F ACP DISCUSS/DSCN MKR DOCD: CPT | Performed by: PHYSICIAN ASSISTANT

## 2019-01-04 PROCEDURE — G8427 DOCREV CUR MEDS BY ELIG CLIN: HCPCS | Performed by: PHYSICIAN ASSISTANT

## 2019-01-04 PROCEDURE — 4040F PNEUMOC VAC/ADMIN/RCVD: CPT | Performed by: PHYSICIAN ASSISTANT

## 2019-01-04 RX ORDER — DICYCLOMINE HCL 20 MG
TABLET ORAL
COMMUNITY
Start: 2018-02-12 | End: 2019-02-21

## 2019-01-07 ENCOUNTER — TELEPHONE (OUTPATIENT)
Dept: SURGERY | Age: 64
End: 2019-01-07

## 2019-01-14 ENCOUNTER — HOSPITAL ENCOUNTER (OUTPATIENT)
Dept: WOMENS IMAGING | Age: 64
Discharge: HOME | End: 2019-01-14
Payer: MEDICARE

## 2019-01-14 DIAGNOSIS — R92.0 MICROCALCIFICATIONS OF THE BREAST: ICD-10-CM

## 2019-01-14 DIAGNOSIS — R92.8 ABNORMAL MAMMOGRAM: ICD-10-CM

## 2019-01-14 PROCEDURE — 19081 BX BREAST 1ST LESION STRTCTC: CPT | Performed by: SURGERY

## 2019-01-14 PROCEDURE — 88305 TISSUE EXAM BY PATHOLOGIST: CPT

## 2019-01-14 PROCEDURE — 77065 DX MAMMO INCL CAD UNI: CPT

## 2019-01-14 PROCEDURE — 2709999900 MAM STEREO BREAST BX W LOC DEVICE 1ST LESION LEFT

## 2019-01-18 ENCOUNTER — TELEPHONE (OUTPATIENT)
Dept: SURGERY | Age: 64
End: 2019-01-18

## 2019-02-14 ENCOUNTER — OFFICE VISIT (OUTPATIENT)
Dept: SURGERY | Age: 64
End: 2019-02-14
Payer: MEDICARE

## 2019-02-14 VITALS — HEART RATE: 80 BPM | SYSTOLIC BLOOD PRESSURE: 134 MMHG | DIASTOLIC BLOOD PRESSURE: 82 MMHG

## 2019-02-14 DIAGNOSIS — N60.99 ATYPICAL DUCTAL HYPERPLASIA OF BREAST: ICD-10-CM

## 2019-02-14 PROCEDURE — 99214 OFFICE O/P EST MOD 30 MIN: CPT | Performed by: SURGERY

## 2019-02-14 PROCEDURE — 3017F COLORECTAL CA SCREEN DOC REV: CPT | Performed by: SURGERY

## 2019-02-14 PROCEDURE — G8484 FLU IMMUNIZE NO ADMIN: HCPCS | Performed by: SURGERY

## 2019-02-14 PROCEDURE — G8417 CALC BMI ABV UP PARAM F/U: HCPCS | Performed by: SURGERY

## 2019-02-14 PROCEDURE — 1090F PRES/ABSN URINE INCON ASSESS: CPT | Performed by: SURGERY

## 2019-02-14 PROCEDURE — G8428 CUR MEDS NOT DOCUMENT: HCPCS | Performed by: SURGERY

## 2019-02-14 PROCEDURE — G8399 PT W/DXA RESULTS DOCUMENT: HCPCS | Performed by: SURGERY

## 2019-02-14 PROCEDURE — 1123F ACP DISCUSS/DSCN MKR DOCD: CPT | Performed by: SURGERY

## 2019-02-14 PROCEDURE — 4040F PNEUMOC VAC/ADMIN/RCVD: CPT | Performed by: SURGERY

## 2019-02-14 PROCEDURE — 1036F TOBACCO NON-USER: CPT | Performed by: SURGERY

## 2019-02-14 PROCEDURE — 1101F PT FALLS ASSESS-DOCD LE1/YR: CPT | Performed by: SURGERY

## 2019-02-21 ENCOUNTER — HOSPITAL ENCOUNTER (OUTPATIENT)
Dept: PREADMISSION TESTING | Age: 64
Discharge: HOME | End: 2019-02-25
Payer: MEDICARE

## 2019-02-21 VITALS — BODY MASS INDEX: 33.66 KG/M2 | HEIGHT: 63 IN | WEIGHT: 190 LBS

## 2019-02-21 LAB
ANION GAP SERPL CALCULATED.3IONS-SCNC: 13 MMOL/L (ref 7–19)
APTT: 35.9 SEC (ref 26–36.2)
BASOPHILS ABSOLUTE: 0 K/UL (ref 0–0.2)
BASOPHILS RELATIVE PERCENT: 0.6 % (ref 0–1)
BUN BLDV-MCNC: 16 MG/DL (ref 8–23)
CALCIUM SERPL-MCNC: 9.6 MG/DL (ref 8.8–10.2)
CHLORIDE BLD-SCNC: 104 MMOL/L (ref 98–111)
CO2: 28 MMOL/L (ref 22–29)
CREAT SERPL-MCNC: 0.5 MG/DL (ref 0.5–0.9)
EOSINOPHILS ABSOLUTE: 0.1 K/UL (ref 0–0.6)
EOSINOPHILS RELATIVE PERCENT: 1.6 % (ref 0–5)
GFR NON-AFRICAN AMERICAN: >60
GLUCOSE BLD-MCNC: 102 MG/DL (ref 74–109)
HCT VFR BLD CALC: 45.5 % (ref 37–47)
HEMOGLOBIN: 14.2 G/DL (ref 12–16)
INR BLD: 1.39 (ref 0.88–1.18)
LYMPHOCYTES ABSOLUTE: 1.5 K/UL (ref 1.1–4.5)
LYMPHOCYTES RELATIVE PERCENT: 23.2 % (ref 20–40)
MCH RBC QN AUTO: 29.6 PG (ref 27–31)
MCHC RBC AUTO-ENTMCNC: 31.2 G/DL (ref 33–37)
MCV RBC AUTO: 95 FL (ref 81–99)
MONOCYTES ABSOLUTE: 0.5 K/UL (ref 0–0.9)
MONOCYTES RELATIVE PERCENT: 7.7 % (ref 0–10)
NEUTROPHILS ABSOLUTE: 4.2 K/UL (ref 1.5–7.5)
NEUTROPHILS RELATIVE PERCENT: 66.6 % (ref 50–65)
PDW BLD-RTO: 13.1 % (ref 11.5–14.5)
PLATELET # BLD: 216 K/UL (ref 130–400)
PMV BLD AUTO: 11.3 FL (ref 9.4–12.3)
POTASSIUM SERPL-SCNC: 3.8 MMOL/L (ref 3.5–5)
PROTHROMBIN TIME: 16.4 SEC (ref 12–14.6)
RBC # BLD: 4.79 M/UL (ref 4.2–5.4)
SODIUM BLD-SCNC: 145 MMOL/L (ref 136–145)
WBC # BLD: 6.2 K/UL (ref 4.8–10.8)

## 2019-02-21 PROCEDURE — 80048 BASIC METABOLIC PNL TOTAL CA: CPT

## 2019-02-21 PROCEDURE — 93005 ELECTROCARDIOGRAM TRACING: CPT

## 2019-02-21 PROCEDURE — 85610 PROTHROMBIN TIME: CPT

## 2019-02-21 PROCEDURE — 85025 COMPLETE CBC W/AUTO DIFF WBC: CPT

## 2019-02-21 PROCEDURE — 85730 THROMBOPLASTIN TIME PARTIAL: CPT

## 2019-02-21 RX ORDER — DICYCLOMINE HCL 20 MG
20 TABLET ORAL 4 TIMES DAILY PRN
COMMUNITY
End: 2022-05-20

## 2019-02-22 LAB
EKG P AXIS: NORMAL DEGREES
EKG P-R INTERVAL: NORMAL MS
EKG Q-T INTERVAL: 412 MS
EKG QRS DURATION: 148 MS
EKG QTC CALCULATION (BAZETT): 454 MS
EKG T AXIS: -19 DEGREES

## 2019-02-26 ENCOUNTER — HOSPITAL ENCOUNTER (OUTPATIENT)
Dept: ULTRASOUND IMAGING | Age: 64
Discharge: HOME | End: 2019-02-26
Payer: MEDICARE

## 2019-02-26 ENCOUNTER — ANESTHESIA EVENT (OUTPATIENT)
Dept: OPERATING ROOM | Age: 64
End: 2019-02-26
Payer: MEDICARE

## 2019-02-26 ENCOUNTER — ANESTHESIA (OUTPATIENT)
Dept: OPERATING ROOM | Age: 64
End: 2019-02-26
Payer: MEDICARE

## 2019-02-26 ENCOUNTER — HOSPITAL ENCOUNTER (OUTPATIENT)
Age: 64
Setting detail: OUTPATIENT SURGERY
Discharge: HOME | End: 2019-02-26
Attending: SURGERY | Admitting: SURGERY
Payer: MEDICARE

## 2019-02-26 VITALS
SYSTOLIC BLOOD PRESSURE: 85 MMHG | TEMPERATURE: 98.6 F | RESPIRATION RATE: 8 BRPM | DIASTOLIC BLOOD PRESSURE: 43 MMHG | OXYGEN SATURATION: 97 %

## 2019-02-26 VITALS
BODY MASS INDEX: 33.66 KG/M2 | DIASTOLIC BLOOD PRESSURE: 86 MMHG | SYSTOLIC BLOOD PRESSURE: 140 MMHG | WEIGHT: 190 LBS | HEIGHT: 63 IN | RESPIRATION RATE: 18 BRPM | HEART RATE: 92 BPM | TEMPERATURE: 98 F | OXYGEN SATURATION: 94 %

## 2019-02-26 LAB
GLUCOSE BLD-MCNC: 95 MG/DL (ref 70–99)
PERFORMED ON: NORMAL

## 2019-02-26 PROCEDURE — 19285 PERQ DEV BREAST 1ST US IMAG: CPT

## 2019-02-26 PROCEDURE — 2580000003 HC RX 258: Performed by: SURGERY

## 2019-02-26 PROCEDURE — 99999 PR OFFICE/OUTPT VISIT,PROCEDURE ONLY: CPT | Performed by: PHYSICIAN ASSISTANT

## 2019-02-26 PROCEDURE — 2709999900 HC NON-CHARGEABLE SUPPLY: Performed by: SURGERY

## 2019-02-26 PROCEDURE — 3700000001 HC ADD 15 MINUTES (ANESTHESIA): Performed by: SURGERY

## 2019-02-26 PROCEDURE — 6360000002 HC RX W HCPCS: Performed by: NURSE ANESTHETIST, CERTIFIED REGISTERED

## 2019-02-26 PROCEDURE — 6360000002 HC RX W HCPCS: Performed by: SURGERY

## 2019-02-26 PROCEDURE — 7100000010 HC PHASE II RECOVERY - FIRST 15 MIN: Performed by: SURGERY

## 2019-02-26 PROCEDURE — 82948 REAGENT STRIP/BLOOD GLUCOSE: CPT

## 2019-02-26 PROCEDURE — 19285 PERQ DEV BREAST 1ST US IMAG: CPT | Performed by: SURGERY

## 2019-02-26 PROCEDURE — 6370000000 HC RX 637 (ALT 250 FOR IP): Performed by: ANESTHESIOLOGY

## 2019-02-26 PROCEDURE — 2500000003 HC RX 250 WO HCPCS: Performed by: SURGERY

## 2019-02-26 PROCEDURE — 3600000004 HC SURGERY LEVEL 4 BASE: Performed by: SURGERY

## 2019-02-26 PROCEDURE — 2500000003 HC RX 250 WO HCPCS: Performed by: NURSE ANESTHETIST, CERTIFIED REGISTERED

## 2019-02-26 PROCEDURE — 6370000000 HC RX 637 (ALT 250 FOR IP): Performed by: SURGERY

## 2019-02-26 PROCEDURE — 2580000003 HC RX 258: Performed by: ANESTHESIOLOGY

## 2019-02-26 PROCEDURE — 88307 TISSUE EXAM BY PATHOLOGIST: CPT

## 2019-02-26 PROCEDURE — 3600000014 HC SURGERY LEVEL 4 ADDTL 15MIN: Performed by: SURGERY

## 2019-02-26 PROCEDURE — 6360000002 HC RX W HCPCS: Performed by: ANESTHESIOLOGY

## 2019-02-26 PROCEDURE — 7100000011 HC PHASE II RECOVERY - ADDTL 15 MIN: Performed by: SURGERY

## 2019-02-26 PROCEDURE — 3700000000 HC ANESTHESIA ATTENDED CARE: Performed by: SURGERY

## 2019-02-26 PROCEDURE — 19125 EXCISION BREAST LESION: CPT | Performed by: SURGERY

## 2019-02-26 RX ORDER — SCOLOPAMINE TRANSDERMAL SYSTEM 1 MG/1
1 PATCH, EXTENDED RELEASE TRANSDERMAL ONCE
Status: DISCONTINUED | OUTPATIENT
Start: 2019-02-26 | End: 2019-02-26 | Stop reason: HOSPADM

## 2019-02-26 RX ORDER — FENTANYL CITRATE 50 UG/ML
25 INJECTION, SOLUTION INTRAMUSCULAR; INTRAVENOUS
Status: DISCONTINUED | OUTPATIENT
Start: 2019-02-26 | End: 2019-02-26 | Stop reason: HOSPADM

## 2019-02-26 RX ORDER — LABETALOL HYDROCHLORIDE 5 MG/ML
5 INJECTION, SOLUTION INTRAVENOUS EVERY 10 MIN PRN
Status: DISCONTINUED | OUTPATIENT
Start: 2019-02-26 | End: 2019-02-26 | Stop reason: HOSPADM

## 2019-02-26 RX ORDER — MEPERIDINE HYDROCHLORIDE 50 MG/ML
12.5 INJECTION INTRAMUSCULAR; INTRAVENOUS; SUBCUTANEOUS EVERY 5 MIN PRN
Status: DISCONTINUED | OUTPATIENT
Start: 2019-02-26 | End: 2019-02-26 | Stop reason: HOSPADM

## 2019-02-26 RX ORDER — ENALAPRILAT 2.5 MG/2ML
1.25 INJECTION INTRAVENOUS
Status: DISCONTINUED | OUTPATIENT
Start: 2019-02-26 | End: 2019-02-26 | Stop reason: HOSPADM

## 2019-02-26 RX ORDER — METOCLOPRAMIDE HYDROCHLORIDE 5 MG/ML
10 INJECTION INTRAMUSCULAR; INTRAVENOUS
Status: DISCONTINUED | OUTPATIENT
Start: 2019-02-26 | End: 2019-02-26 | Stop reason: HOSPADM

## 2019-02-26 RX ORDER — LIDOCAINE HYDROCHLORIDE 10 MG/ML
1 INJECTION, SOLUTION EPIDURAL; INFILTRATION; INTRACAUDAL; PERINEURAL
Status: DISCONTINUED | OUTPATIENT
Start: 2019-02-26 | End: 2019-02-26 | Stop reason: HOSPADM

## 2019-02-26 RX ORDER — FENTANYL CITRATE 50 UG/ML
50 INJECTION, SOLUTION INTRAMUSCULAR; INTRAVENOUS
Status: DISCONTINUED | OUTPATIENT
Start: 2019-02-26 | End: 2019-02-26 | Stop reason: HOSPADM

## 2019-02-26 RX ORDER — SODIUM CHLORIDE 0.9 % (FLUSH) 0.9 %
10 SYRINGE (ML) INJECTION PRN
Status: DISCONTINUED | OUTPATIENT
Start: 2019-02-26 | End: 2019-02-26 | Stop reason: HOSPADM

## 2019-02-26 RX ORDER — DIPHENHYDRAMINE HYDROCHLORIDE 50 MG/ML
12.5 INJECTION INTRAMUSCULAR; INTRAVENOUS
Status: DISCONTINUED | OUTPATIENT
Start: 2019-02-26 | End: 2019-02-26 | Stop reason: HOSPADM

## 2019-02-26 RX ORDER — ONDANSETRON 2 MG/ML
INJECTION INTRAMUSCULAR; INTRAVENOUS PRN
Status: DISCONTINUED | OUTPATIENT
Start: 2019-02-26 | End: 2019-02-26 | Stop reason: SDUPTHER

## 2019-02-26 RX ORDER — MORPHINE SULFATE 2 MG/ML
2 INJECTION, SOLUTION INTRAMUSCULAR; INTRAVENOUS EVERY 5 MIN PRN
Status: DISCONTINUED | OUTPATIENT
Start: 2019-02-26 | End: 2019-02-26 | Stop reason: HOSPADM

## 2019-02-26 RX ORDER — LIDOCAINE HYDROCHLORIDE 10 MG/ML
INJECTION, SOLUTION EPIDURAL; INFILTRATION; INTRACAUDAL; PERINEURAL PRN
Status: DISCONTINUED | OUTPATIENT
Start: 2019-02-26 | End: 2019-02-26 | Stop reason: SDUPTHER

## 2019-02-26 RX ORDER — SODIUM CHLORIDE 0.9 % (FLUSH) 0.9 %
10 SYRINGE (ML) INJECTION EVERY 12 HOURS SCHEDULED
Status: DISCONTINUED | OUTPATIENT
Start: 2019-02-26 | End: 2019-02-26 | Stop reason: HOSPADM

## 2019-02-26 RX ORDER — MIDAZOLAM HYDROCHLORIDE 1 MG/ML
2 INJECTION INTRAMUSCULAR; INTRAVENOUS
Status: DISCONTINUED | OUTPATIENT
Start: 2019-02-26 | End: 2019-02-26 | Stop reason: HOSPADM

## 2019-02-26 RX ORDER — FENTANYL CITRATE 50 UG/ML
INJECTION, SOLUTION INTRAMUSCULAR; INTRAVENOUS PRN
Status: DISCONTINUED | OUTPATIENT
Start: 2019-02-26 | End: 2019-02-26 | Stop reason: SDUPTHER

## 2019-02-26 RX ORDER — DEXAMETHASONE SODIUM PHOSPHATE 10 MG/ML
INJECTION INTRAMUSCULAR; INTRAVENOUS PRN
Status: DISCONTINUED | OUTPATIENT
Start: 2019-02-26 | End: 2019-02-26 | Stop reason: SDUPTHER

## 2019-02-26 RX ORDER — HYDRALAZINE HYDROCHLORIDE 20 MG/ML
5 INJECTION INTRAMUSCULAR; INTRAVENOUS EVERY 10 MIN PRN
Status: DISCONTINUED | OUTPATIENT
Start: 2019-02-26 | End: 2019-02-26 | Stop reason: HOSPADM

## 2019-02-26 RX ORDER — SODIUM CHLORIDE, SODIUM LACTATE, POTASSIUM CHLORIDE, CALCIUM CHLORIDE 600; 310; 30; 20 MG/100ML; MG/100ML; MG/100ML; MG/100ML
INJECTION, SOLUTION INTRAVENOUS CONTINUOUS
Status: DISCONTINUED | OUTPATIENT
Start: 2019-02-26 | End: 2019-02-26 | Stop reason: HOSPADM

## 2019-02-26 RX ORDER — SODIUM CHLORIDE 9 MG/ML
INJECTION, SOLUTION INTRAVENOUS CONTINUOUS
Status: DISCONTINUED | OUTPATIENT
Start: 2019-02-26 | End: 2019-02-26 | Stop reason: HOSPADM

## 2019-02-26 RX ORDER — APREPITANT 40 MG/1
40 CAPSULE ORAL ONCE
Status: COMPLETED | OUTPATIENT
Start: 2019-02-26 | End: 2019-02-26

## 2019-02-26 RX ORDER — PROMETHAZINE HYDROCHLORIDE 25 MG/ML
6.25 INJECTION, SOLUTION INTRAMUSCULAR; INTRAVENOUS
Status: DISCONTINUED | OUTPATIENT
Start: 2019-02-26 | End: 2019-02-26 | Stop reason: HOSPADM

## 2019-02-26 RX ORDER — MORPHINE SULFATE 2 MG/ML
4 INJECTION, SOLUTION INTRAMUSCULAR; INTRAVENOUS EVERY 5 MIN PRN
Status: DISCONTINUED | OUTPATIENT
Start: 2019-02-26 | End: 2019-02-26 | Stop reason: HOSPADM

## 2019-02-26 RX ORDER — ACETAMINOPHEN 325 MG/1
650 TABLET ORAL EVERY 4 HOURS PRN
Status: DISCONTINUED | OUTPATIENT
Start: 2019-02-26 | End: 2019-02-26 | Stop reason: HOSPADM

## 2019-02-26 RX ORDER — PROPOFOL 10 MG/ML
INJECTION, EMULSION INTRAVENOUS PRN
Status: DISCONTINUED | OUTPATIENT
Start: 2019-02-26 | End: 2019-02-26 | Stop reason: SDUPTHER

## 2019-02-26 RX ADMIN — ACETAMINOPHEN 650 MG: 325 TABLET, FILM COATED ORAL at 15:58

## 2019-02-26 RX ADMIN — FENTANYL CITRATE 25 MCG: 50 INJECTION INTRAMUSCULAR; INTRAVENOUS at 13:12

## 2019-02-26 RX ADMIN — SODIUM CHLORIDE, SODIUM LACTATE, POTASSIUM CHLORIDE, AND CALCIUM CHLORIDE: 600; 310; 30; 20 INJECTION, SOLUTION INTRAVENOUS at 10:49

## 2019-02-26 RX ADMIN — ONDANSETRON HYDROCHLORIDE 4 MG: 2 INJECTION, SOLUTION INTRAMUSCULAR; INTRAVENOUS at 13:23

## 2019-02-26 RX ADMIN — DEXAMETHASONE SODIUM PHOSPHATE 10 MG: 10 INJECTION INTRAMUSCULAR; INTRAVENOUS at 13:00

## 2019-02-26 RX ADMIN — PROPOFOL 150 MG: 10 INJECTION, EMULSION INTRAVENOUS at 12:56

## 2019-02-26 RX ADMIN — SODIUM CHLORIDE, SODIUM LACTATE, POTASSIUM CHLORIDE, AND CALCIUM CHLORIDE: 600; 310; 30; 20 INJECTION, SOLUTION INTRAVENOUS at 13:23

## 2019-02-26 RX ADMIN — HYDROMORPHONE HYDROCHLORIDE 0.2 MG: 1 INJECTION, SOLUTION INTRAMUSCULAR; INTRAVENOUS; SUBCUTANEOUS at 13:30

## 2019-02-26 RX ADMIN — LIDOCAINE HYDROCHLORIDE 50 MG: 10 INJECTION, SOLUTION EPIDURAL; INFILTRATION; INTRACAUDAL; PERINEURAL at 12:56

## 2019-02-26 RX ADMIN — APREPITANT 40 MG: 40 CAPSULE ORAL at 10:58

## 2019-02-26 RX ADMIN — Medication 2 G: at 12:59

## 2019-02-26 ASSESSMENT — PAIN SCALES - GENERAL
PAINLEVEL_OUTOF10: 0
PAINLEVEL_OUTOF10: 8
PAINLEVEL_OUTOF10: 0
PAINLEVEL_OUTOF10: 0

## 2019-02-26 ASSESSMENT — LIFESTYLE VARIABLES: SMOKING_STATUS: 0

## 2019-02-26 ASSESSMENT — PAIN - FUNCTIONAL ASSESSMENT: PAIN_FUNCTIONAL_ASSESSMENT: 0-10

## 2019-02-26 ASSESSMENT — ENCOUNTER SYMPTOMS
DYSPNEA ACTIVITY LEVEL: AFTER AMBULATING 1 FLIGHT OF STAIRS
SHORTNESS OF BREATH: 0

## 2019-02-26 ASSESSMENT — PAIN DESCRIPTION - LOCATION: LOCATION: HEAD

## 2019-03-01 ENCOUNTER — TELEPHONE (OUTPATIENT)
Dept: SURGERY | Age: 64
End: 2019-03-01

## 2019-03-13 ENCOUNTER — OFFICE VISIT (OUTPATIENT)
Dept: SURGERY | Age: 64
End: 2019-03-13

## 2019-03-13 VITALS — SYSTOLIC BLOOD PRESSURE: 120 MMHG | DIASTOLIC BLOOD PRESSURE: 82 MMHG | HEART RATE: 80 BPM

## 2019-03-13 DIAGNOSIS — Z98.890 S/P BREAST BIOPSY: ICD-10-CM

## 2019-03-13 DIAGNOSIS — N60.99 ATYPICAL DUCTAL HYPERPLASIA OF BREAST: Primary | ICD-10-CM

## 2019-03-13 PROCEDURE — 99024 POSTOP FOLLOW-UP VISIT: CPT | Performed by: SURGERY

## 2019-03-19 DIAGNOSIS — Z12.31 VISIT FOR SCREENING MAMMOGRAM: Primary | ICD-10-CM

## 2019-12-02 ENCOUNTER — HOSPITAL ENCOUNTER (OUTPATIENT)
Dept: WOMENS IMAGING | Age: 64
Discharge: HOME | End: 2019-12-02
Payer: MEDICARE

## 2019-12-02 ENCOUNTER — OFFICE VISIT (OUTPATIENT)
Dept: SURGERY | Age: 64
End: 2019-12-02
Payer: MEDICARE

## 2019-12-02 VITALS
WEIGHT: 167 LBS | DIASTOLIC BLOOD PRESSURE: 85 MMHG | SYSTOLIC BLOOD PRESSURE: 131 MMHG | BODY MASS INDEX: 29.59 KG/M2 | TEMPERATURE: 98.6 F | HEIGHT: 63 IN

## 2019-12-02 DIAGNOSIS — Z12.31 VISIT FOR SCREENING MAMMOGRAM: ICD-10-CM

## 2019-12-02 DIAGNOSIS — R92.0 MICROCALCIFICATION OF BREAST: Primary | ICD-10-CM

## 2019-12-02 DIAGNOSIS — R92.0 MICROCALCIFICATION OF BREAST: ICD-10-CM

## 2019-12-02 DIAGNOSIS — Z01.419 WELL WOMAN EXAM: ICD-10-CM

## 2019-12-02 PROCEDURE — 3017F COLORECTAL CA SCREEN DOC REV: CPT | Performed by: PHYSICIAN ASSISTANT

## 2019-12-02 PROCEDURE — G8484 FLU IMMUNIZE NO ADMIN: HCPCS | Performed by: PHYSICIAN ASSISTANT

## 2019-12-02 PROCEDURE — G8427 DOCREV CUR MEDS BY ELIG CLIN: HCPCS | Performed by: PHYSICIAN ASSISTANT

## 2019-12-02 PROCEDURE — 1090F PRES/ABSN URINE INCON ASSESS: CPT | Performed by: PHYSICIAN ASSISTANT

## 2019-12-02 PROCEDURE — 4040F PNEUMOC VAC/ADMIN/RCVD: CPT | Performed by: PHYSICIAN ASSISTANT

## 2019-12-02 PROCEDURE — 99213 OFFICE O/P EST LOW 20 MIN: CPT | Performed by: PHYSICIAN ASSISTANT

## 2019-12-02 PROCEDURE — G8417 CALC BMI ABV UP PARAM F/U: HCPCS | Performed by: PHYSICIAN ASSISTANT

## 2019-12-02 PROCEDURE — 1123F ACP DISCUSS/DSCN MKR DOCD: CPT | Performed by: PHYSICIAN ASSISTANT

## 2019-12-02 PROCEDURE — 1036F TOBACCO NON-USER: CPT | Performed by: PHYSICIAN ASSISTANT

## 2019-12-02 PROCEDURE — G8399 PT W/DXA RESULTS DOCUMENT: HCPCS | Performed by: PHYSICIAN ASSISTANT

## 2019-12-02 PROCEDURE — 77065 DX MAMMO INCL CAD UNI: CPT

## 2019-12-02 PROCEDURE — 77063 BREAST TOMOSYNTHESIS BI: CPT

## 2019-12-04 ENCOUNTER — HOSPITAL ENCOUNTER (OUTPATIENT)
Dept: WOMENS IMAGING | Age: 64
Discharge: HOME | End: 2019-12-04
Payer: MEDICARE

## 2019-12-04 DIAGNOSIS — R92.0 MICROCALCIFICATION OF BREAST: ICD-10-CM

## 2019-12-04 DIAGNOSIS — R92.0 MICROCALCIFICATIONS OF THE BREAST: ICD-10-CM

## 2019-12-04 PROCEDURE — 19081 BX BREAST 1ST LESION STRTCTC: CPT | Performed by: SURGERY

## 2019-12-04 PROCEDURE — 88361 TUMOR IMMUNOHISTOCHEM/COMPUT: CPT

## 2019-12-04 PROCEDURE — 2720000010 MAMMOGRAM POST BX CLIP PLACEMENT LEFT

## 2019-12-04 PROCEDURE — 19081 BX BREAST 1ST LESION STRTCTC: CPT

## 2019-12-04 PROCEDURE — 88305 TISSUE EXAM BY PATHOLOGIST: CPT

## 2019-12-10 ENCOUNTER — TELEPHONE (OUTPATIENT)
Dept: SURGERY | Age: 64
End: 2019-12-10

## 2019-12-10 DIAGNOSIS — D05.12 BREAST NEOPLASM, TIS (DCIS), LEFT: Primary | ICD-10-CM

## 2019-12-12 ENCOUNTER — TELEPHONE (OUTPATIENT)
Dept: OTHER | Age: 64
End: 2019-12-12

## 2019-12-18 ENCOUNTER — HOSPITAL ENCOUNTER (OUTPATIENT)
Dept: MRI IMAGING | Age: 64
Discharge: HOME | End: 2019-12-18
Payer: MEDICARE

## 2019-12-18 DIAGNOSIS — D05.12 BREAST NEOPLASM, TIS (DCIS), LEFT: ICD-10-CM

## 2019-12-18 LAB
GFR NON-AFRICAN AMERICAN: >60
PERFORMED ON: NORMAL
POC CREATININE: 0.6 MG/DL (ref 0.3–1.3)
POC SAMPLE TYPE: NORMAL

## 2019-12-18 PROCEDURE — A9577 INJ MULTIHANCE: HCPCS | Performed by: PHYSICIAN ASSISTANT

## 2019-12-18 PROCEDURE — 82565 ASSAY OF CREATININE: CPT

## 2019-12-18 PROCEDURE — 77049 MRI BREAST C-+ W/CAD BI: CPT

## 2019-12-18 PROCEDURE — 6360000004 HC RX CONTRAST MEDICATION: Performed by: PHYSICIAN ASSISTANT

## 2019-12-18 RX ADMIN — GADOBENATE DIMEGLUMINE 15 ML: 529 INJECTION, SOLUTION INTRAVENOUS at 12:32

## 2019-12-23 ENCOUNTER — HOSPITAL ENCOUNTER (OUTPATIENT)
Dept: WOMENS IMAGING | Age: 64
Discharge: HOME | End: 2019-12-23
Payer: MEDICARE

## 2019-12-23 DIAGNOSIS — D05.12 BREAST NEOPLASM, TIS (DCIS), LEFT: ICD-10-CM

## 2019-12-26 PROBLEM — D05.12 DUCTAL CARCINOMA IN SITU (DCIS) OF LEFT BREAST: Status: ACTIVE | Noted: 2019-12-26

## 2019-12-30 ENCOUNTER — TELEPHONE (OUTPATIENT)
Dept: OTHER | Age: 64
End: 2019-12-30

## 2020-01-24 RX ORDER — TAMOXIFEN CITRATE 10 MG/1
10 TABLET ORAL DAILY
COMMUNITY
End: 2020-06-26

## 2020-01-24 RX ORDER — MONTELUKAST SODIUM 10 MG/1
10 TABLET ORAL DAILY
COMMUNITY
End: 2021-01-26 | Stop reason: ALTCHOICE

## 2020-01-27 ENCOUNTER — TELEPHONE (OUTPATIENT)
Dept: OTHER | Age: 65
End: 2020-01-27

## 2020-01-27 ENCOUNTER — TELEPHONE (OUTPATIENT)
Dept: SURGERY | Age: 65
End: 2020-01-27

## 2020-01-27 NOTE — TELEPHONE ENCOUNTER
Diane Reyes left me a voicemail asking me to call her back. I called her back and she is concerned and debating a mastectomy instead of a lumpectomy. She stated due to her family history she is worried it will comeback in the same or other breast. We talked for 20 min and I told her I would get this info to Dr. Yee Martell. I called and talked to Sweetie and gave her this info and she stated she would tell Dr. Yee Martell.

## 2020-01-28 NOTE — PROGRESS NOTES
HISTORY OF PRESENT ILLNESS:    Ms. 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 ER is positive at 100%. W[place] is positive at 98%. Prelude DX demonstrates a reduction of 15% all recurrences to 7%. Reduction in invasive cancer recurrences goes from 9% down to 5%. MRI- 12/18/2019     EXAMINATION: MRI BREAST BILATERAL W WO CONTRAST 12/18/2019 2:49 PM   HISTORY: Left breast cancer, DCIS. Strong family history of breast   cancer   COMPARISON: Screening mammogram 12/2/2019, diagnostic mammogram   12/2/2019; left breast biopsy 12/4/2019   Technical: Multiplanar, multisequence imaging was performed through   the breasts before and after the administration of IV contrast.   FINDINGS:   Background parenchymal enhancement is minimal and appears symmetric. There are scattered areas of fibroglandular density. Right breast:   No mass or nonmass enhancement in the right breast to suggest   malignancy. Left breast:   There is a discrepancy in the breast size, with the left breast   smaller in size in the right. Postbiopsy changes are seen within the   upper slightly outer quadrant of the left breast. A biopsy marking   clip is present. A small hematoma is present which measures up to 1.4   cm in size. There is discontinuous nonmass enhancement extending   posteriorly from the hematoma site for approximately 3.3 cm. There is   associated increased T2 signal. No additional areas of mass or nonmass   enhancement are seen in the left breast.   Lymph nodes:   No suspicious axillary or internal mammary lymphadenopathy is   identified.       Impression   1. Postbiopsy changes in the left breast with a small hematoma at the   biopsy site. Abnormal enhancement extends in a linear configuration   from the posterior aspect of the biopsy site.  Based upon the   postbiopsy mammogram, this is favored to represent postbiopsy change,

## 2020-01-29 ENCOUNTER — OFFICE VISIT (OUTPATIENT)
Dept: SURGERY | Age: 65
End: 2020-01-29
Payer: MEDICARE

## 2020-01-29 VITALS — HEART RATE: 80 BPM | SYSTOLIC BLOOD PRESSURE: 118 MMHG | DIASTOLIC BLOOD PRESSURE: 70 MMHG

## 2020-01-29 PROCEDURE — 3017F COLORECTAL CA SCREEN DOC REV: CPT | Performed by: SURGERY

## 2020-01-29 PROCEDURE — G8427 DOCREV CUR MEDS BY ELIG CLIN: HCPCS | Performed by: SURGERY

## 2020-01-29 PROCEDURE — G8417 CALC BMI ABV UP PARAM F/U: HCPCS | Performed by: SURGERY

## 2020-01-29 PROCEDURE — 4040F PNEUMOC VAC/ADMIN/RCVD: CPT | Performed by: SURGERY

## 2020-01-29 PROCEDURE — G8484 FLU IMMUNIZE NO ADMIN: HCPCS | Performed by: SURGERY

## 2020-01-29 PROCEDURE — 99215 OFFICE O/P EST HI 40 MIN: CPT | Performed by: SURGERY

## 2020-01-29 PROCEDURE — 1123F ACP DISCUSS/DSCN MKR DOCD: CPT | Performed by: SURGERY

## 2020-01-29 PROCEDURE — 1090F PRES/ABSN URINE INCON ASSESS: CPT | Performed by: SURGERY

## 2020-01-29 PROCEDURE — 1036F TOBACCO NON-USER: CPT | Performed by: SURGERY

## 2020-01-29 PROCEDURE — G8399 PT W/DXA RESULTS DOCUMENT: HCPCS | Performed by: SURGERY

## 2020-01-30 ENCOUNTER — HOSPITAL ENCOUNTER (OUTPATIENT)
Dept: WOMENS IMAGING | Age: 65
Discharge: HOME | End: 2020-01-30
Payer: MEDICARE

## 2020-01-30 PROCEDURE — 76642 ULTRASOUND BREAST LIMITED: CPT

## 2020-01-31 ENCOUNTER — HOSPITAL ENCOUNTER (OUTPATIENT)
Age: 65
Setting detail: OUTPATIENT SURGERY
Discharge: HOME | End: 2020-01-31
Attending: SURGERY | Admitting: SURGERY
Payer: MEDICARE

## 2020-01-31 ENCOUNTER — ANESTHESIA EVENT (OUTPATIENT)
Dept: OPERATING ROOM | Age: 65
End: 2020-01-31
Payer: MEDICARE

## 2020-01-31 ENCOUNTER — HOSPITAL ENCOUNTER (OUTPATIENT)
Dept: WOMENS IMAGING | Age: 65
Discharge: HOME | End: 2020-01-31
Payer: MEDICARE

## 2020-01-31 ENCOUNTER — ANESTHESIA (OUTPATIENT)
Dept: OPERATING ROOM | Age: 65
End: 2020-01-31
Payer: MEDICARE

## 2020-01-31 VITALS
WEIGHT: 162 LBS | DIASTOLIC BLOOD PRESSURE: 80 MMHG | RESPIRATION RATE: 16 BRPM | BODY MASS INDEX: 28.7 KG/M2 | HEIGHT: 63 IN | TEMPERATURE: 97.4 F | OXYGEN SATURATION: 96 % | SYSTOLIC BLOOD PRESSURE: 148 MMHG | HEART RATE: 88 BPM

## 2020-01-31 VITALS
OXYGEN SATURATION: 87 % | TEMPERATURE: 97 F | RESPIRATION RATE: 2 BRPM | SYSTOLIC BLOOD PRESSURE: 152 MMHG | DIASTOLIC BLOOD PRESSURE: 74 MMHG

## 2020-01-31 LAB
GLUCOSE BLD-MCNC: 107 MG/DL (ref 70–99)
PERFORMED ON: ABNORMAL

## 2020-01-31 PROCEDURE — 2580000003 HC RX 258: Performed by: SURGERY

## 2020-01-31 PROCEDURE — 19297 PLACE BREAST CATH FOR RAD: CPT | Performed by: SURGERY

## 2020-01-31 PROCEDURE — 3600000005 HC SURGERY LEVEL 5 BASE: Performed by: SURGERY

## 2020-01-31 PROCEDURE — 82948 REAGENT STRIP/BLOOD GLUCOSE: CPT

## 2020-01-31 PROCEDURE — 6360000002 HC RX W HCPCS: Performed by: SURGERY

## 2020-01-31 PROCEDURE — 14301 TIS TRNFR ANY 30.1-60 SQ CM: CPT | Performed by: SURGERY

## 2020-01-31 PROCEDURE — 7100000001 HC PACU RECOVERY - ADDTL 15 MIN: Performed by: SURGERY

## 2020-01-31 PROCEDURE — 3700000001 HC ADD 15 MINUTES (ANESTHESIA): Performed by: SURGERY

## 2020-01-31 PROCEDURE — 2500000003 HC RX 250 WO HCPCS: Performed by: SURGERY

## 2020-01-31 PROCEDURE — 6370000000 HC RX 637 (ALT 250 FOR IP): Performed by: ANESTHESIOLOGY

## 2020-01-31 PROCEDURE — 3700000000 HC ANESTHESIA ATTENDED CARE: Performed by: SURGERY

## 2020-01-31 PROCEDURE — 14302 TIS TRNFR ADDL 30 SQ CM: CPT | Performed by: SURGERY

## 2020-01-31 PROCEDURE — 7100000000 HC PACU RECOVERY - FIRST 15 MIN: Performed by: SURGERY

## 2020-01-31 PROCEDURE — 2500000003 HC RX 250 WO HCPCS: Performed by: NURSE ANESTHETIST, CERTIFIED REGISTERED

## 2020-01-31 PROCEDURE — 7100000011 HC PHASE II RECOVERY - ADDTL 15 MIN: Performed by: SURGERY

## 2020-01-31 PROCEDURE — 2709999900 HC NON-CHARGEABLE SUPPLY: Performed by: SURGERY

## 2020-01-31 PROCEDURE — 6360000002 HC RX W HCPCS: Performed by: ANESTHESIOLOGY

## 2020-01-31 PROCEDURE — 6360000002 HC RX W HCPCS: Performed by: NURSE ANESTHETIST, CERTIFIED REGISTERED

## 2020-01-31 PROCEDURE — 77424 IO RAD TX DELIVERY BY X-RAY: CPT | Performed by: SURGERY

## 2020-01-31 PROCEDURE — A4648 IMPLANTABLE TISSUE MARKER: HCPCS | Performed by: SURGERY

## 2020-01-31 PROCEDURE — 3600000015 HC SURGERY LEVEL 5 ADDTL 15MIN: Performed by: SURGERY

## 2020-01-31 PROCEDURE — 19301 PARTIAL MASTECTOMY: CPT | Performed by: SURGERY

## 2020-01-31 PROCEDURE — 19285 PERQ DEV BREAST 1ST US IMAG: CPT | Performed by: SURGERY

## 2020-01-31 PROCEDURE — 7100000010 HC PHASE II RECOVERY - FIRST 15 MIN: Performed by: SURGERY

## 2020-01-31 PROCEDURE — 14302 TIS TRNFR ADDL 30 SQ CM: CPT | Performed by: PHYSICIAN ASSISTANT

## 2020-01-31 PROCEDURE — 88307 TISSUE EXAM BY PATHOLOGIST: CPT

## 2020-01-31 PROCEDURE — 2500000003 HC RX 250 WO HCPCS: Performed by: ANESTHESIOLOGY

## 2020-01-31 PROCEDURE — 6370000000 HC RX 637 (ALT 250 FOR IP)

## 2020-01-31 PROCEDURE — 19301 PARTIAL MASTECTOMY: CPT | Performed by: PHYSICIAN ASSISTANT

## 2020-01-31 PROCEDURE — 19294 PREPJ TUM CAV IORT PRTL MAST: CPT | Performed by: SURGERY

## 2020-01-31 PROCEDURE — C1728 CATH, BRACHYTX SEED ADM: HCPCS | Performed by: SURGERY

## 2020-01-31 PROCEDURE — 88329 PATH CONSLTJ DRG SURG: CPT

## 2020-01-31 PROCEDURE — 2709999900 US PLACE BREAST LOC DEVICE 1ST LESION LEFT

## 2020-01-31 PROCEDURE — 14301 TIS TRNFR ANY 30.1-60 SQ CM: CPT | Performed by: PHYSICIAN ASSISTANT

## 2020-01-31 DEVICE — THE MARKER IS A RADIOGRAPHIC IMPLANTABLE MARKER USED TO MARK SOFT TISSUE.IT IS COMPRISED OF A BIOABSORBABLE SPACER THAT HOLDS RADIOPAQUE MARKER CLIPS.
Type: IMPLANTABLE DEVICE | Site: BREAST | Status: FUNCTIONAL
Brand: BIOZORB MARKER

## 2020-01-31 RX ORDER — LIDOCAINE HYDROCHLORIDE 10 MG/ML
INJECTION, SOLUTION INFILTRATION; PERINEURAL PRN
Status: DISCONTINUED | OUTPATIENT
Start: 2020-01-31 | End: 2020-01-31 | Stop reason: SDUPTHER

## 2020-01-31 RX ORDER — MORPHINE SULFATE 4 MG/ML
4 INJECTION, SOLUTION INTRAMUSCULAR; INTRAVENOUS
Status: DISCONTINUED | OUTPATIENT
Start: 2020-01-31 | End: 2020-01-31

## 2020-01-31 RX ORDER — HYDROCODONE BITARTRATE AND ACETAMINOPHEN 5; 325 MG/1; MG/1
TABLET ORAL
Status: COMPLETED
Start: 2020-01-31 | End: 2020-01-31

## 2020-01-31 RX ORDER — SCOLOPAMINE TRANSDERMAL SYSTEM 1 MG/1
1 PATCH, EXTENDED RELEASE TRANSDERMAL ONCE
Status: DISCONTINUED | OUTPATIENT
Start: 2020-01-31 | End: 2020-01-31 | Stop reason: HOSPADM

## 2020-01-31 RX ORDER — MORPHINE SULFATE 4 MG/ML
4 INJECTION, SOLUTION INTRAMUSCULAR; INTRAVENOUS EVERY 5 MIN PRN
Status: DISCONTINUED | OUTPATIENT
Start: 2020-01-31 | End: 2020-01-31 | Stop reason: HOSPADM

## 2020-01-31 RX ORDER — PROPOFOL 10 MG/ML
INJECTION, EMULSION INTRAVENOUS PRN
Status: DISCONTINUED | OUTPATIENT
Start: 2020-01-31 | End: 2020-01-31 | Stop reason: SDUPTHER

## 2020-01-31 RX ORDER — ONDANSETRON 2 MG/ML
INJECTION INTRAMUSCULAR; INTRAVENOUS PRN
Status: DISCONTINUED | OUTPATIENT
Start: 2020-01-31 | End: 2020-01-31 | Stop reason: SDUPTHER

## 2020-01-31 RX ORDER — MEPERIDINE HYDROCHLORIDE 50 MG/ML
12.5 INJECTION INTRAMUSCULAR; INTRAVENOUS; SUBCUTANEOUS EVERY 5 MIN PRN
Status: DISCONTINUED | OUTPATIENT
Start: 2020-01-31 | End: 2020-01-31 | Stop reason: HOSPADM

## 2020-01-31 RX ORDER — KETAMINE HYDROCHLORIDE 50 MG/ML
INJECTION, SOLUTION, CONCENTRATE INTRAMUSCULAR; INTRAVENOUS PRN
Status: DISCONTINUED | OUTPATIENT
Start: 2020-01-31 | End: 2020-01-31 | Stop reason: SDUPTHER

## 2020-01-31 RX ORDER — SODIUM CHLORIDE, SODIUM LACTATE, POTASSIUM CHLORIDE, CALCIUM CHLORIDE 600; 310; 30; 20 MG/100ML; MG/100ML; MG/100ML; MG/100ML
INJECTION, SOLUTION INTRAVENOUS CONTINUOUS
Status: DISCONTINUED | OUTPATIENT
Start: 2020-01-31 | End: 2020-01-31 | Stop reason: HOSPADM

## 2020-01-31 RX ORDER — KETOROLAC TROMETHAMINE 30 MG/ML
INJECTION, SOLUTION INTRAMUSCULAR; INTRAVENOUS PRN
Status: DISCONTINUED | OUTPATIENT
Start: 2020-01-31 | End: 2020-01-31 | Stop reason: SDUPTHER

## 2020-01-31 RX ORDER — METOCLOPRAMIDE HYDROCHLORIDE 5 MG/ML
10 INJECTION INTRAMUSCULAR; INTRAVENOUS
Status: DISCONTINUED | OUTPATIENT
Start: 2020-01-31 | End: 2020-01-31 | Stop reason: HOSPADM

## 2020-01-31 RX ORDER — HYDRALAZINE HYDROCHLORIDE 20 MG/ML
5 INJECTION INTRAMUSCULAR; INTRAVENOUS EVERY 10 MIN PRN
Status: DISCONTINUED | OUTPATIENT
Start: 2020-01-31 | End: 2020-01-31 | Stop reason: HOSPADM

## 2020-01-31 RX ORDER — MIDAZOLAM HYDROCHLORIDE 1 MG/ML
2 INJECTION INTRAMUSCULAR; INTRAVENOUS
Status: COMPLETED | OUTPATIENT
Start: 2020-01-31 | End: 2020-01-31

## 2020-01-31 RX ORDER — MORPHINE SULFATE 4 MG/ML
2 INJECTION, SOLUTION INTRAMUSCULAR; INTRAVENOUS EVERY 5 MIN PRN
Status: DISCONTINUED | OUTPATIENT
Start: 2020-01-31 | End: 2020-01-31 | Stop reason: HOSPADM

## 2020-01-31 RX ORDER — APREPITANT 40 MG/1
40 CAPSULE ORAL ONCE
Status: COMPLETED | OUTPATIENT
Start: 2020-01-31 | End: 2020-01-31

## 2020-01-31 RX ORDER — LIDOCAINE HYDROCHLORIDE 10 MG/ML
1 INJECTION, SOLUTION EPIDURAL; INFILTRATION; INTRACAUDAL; PERINEURAL
Status: DISCONTINUED | OUTPATIENT
Start: 2020-01-31 | End: 2020-01-31 | Stop reason: HOSPADM

## 2020-01-31 RX ORDER — SODIUM CHLORIDE 0.9 % (FLUSH) 0.9 %
10 SYRINGE (ML) INJECTION PRN
Status: DISCONTINUED | OUTPATIENT
Start: 2020-01-31 | End: 2020-01-31 | Stop reason: HOSPADM

## 2020-01-31 RX ORDER — SODIUM CHLORIDE 0.9 % (FLUSH) 0.9 %
10 SYRINGE (ML) INJECTION EVERY 12 HOURS SCHEDULED
Status: DISCONTINUED | OUTPATIENT
Start: 2020-01-31 | End: 2020-01-31 | Stop reason: HOSPADM

## 2020-01-31 RX ORDER — PROMETHAZINE HYDROCHLORIDE 25 MG/ML
6.25 INJECTION, SOLUTION INTRAMUSCULAR; INTRAVENOUS
Status: DISCONTINUED | OUTPATIENT
Start: 2020-01-31 | End: 2020-01-31 | Stop reason: HOSPADM

## 2020-01-31 RX ORDER — HYDROCODONE BITARTRATE AND ACETAMINOPHEN 5; 325 MG/1; MG/1
1 TABLET ORAL EVERY 6 HOURS PRN
Qty: 12 TABLET | Refills: 0 | Status: SHIPPED | OUTPATIENT
Start: 2020-01-31 | End: 2020-06-26

## 2020-01-31 RX ORDER — DEXAMETHASONE SODIUM PHOSPHATE 10 MG/ML
INJECTION, SOLUTION INTRAMUSCULAR; INTRAVENOUS PRN
Status: DISCONTINUED | OUTPATIENT
Start: 2020-01-31 | End: 2020-01-31 | Stop reason: SDUPTHER

## 2020-01-31 RX ORDER — FENTANYL CITRATE 50 UG/ML
50 INJECTION, SOLUTION INTRAMUSCULAR; INTRAVENOUS
Status: DISCONTINUED | OUTPATIENT
Start: 2020-01-31 | End: 2020-01-31 | Stop reason: HOSPADM

## 2020-01-31 RX ORDER — FENTANYL CITRATE 50 UG/ML
INJECTION, SOLUTION INTRAMUSCULAR; INTRAVENOUS PRN
Status: DISCONTINUED | OUTPATIENT
Start: 2020-01-31 | End: 2020-01-31 | Stop reason: SDUPTHER

## 2020-01-31 RX ORDER — DIPHENHYDRAMINE HYDROCHLORIDE 50 MG/ML
12.5 INJECTION INTRAMUSCULAR; INTRAVENOUS
Status: DISCONTINUED | OUTPATIENT
Start: 2020-01-31 | End: 2020-01-31 | Stop reason: HOSPADM

## 2020-01-31 RX ORDER — LABETALOL 20 MG/4 ML (5 MG/ML) INTRAVENOUS SYRINGE
5 EVERY 10 MIN PRN
Status: DISCONTINUED | OUTPATIENT
Start: 2020-01-31 | End: 2020-01-31 | Stop reason: HOSPADM

## 2020-01-31 RX ADMIN — Medication 20 MG: at 11:06

## 2020-01-31 RX ADMIN — SODIUM CHLORIDE, SODIUM LACTATE, POTASSIUM CHLORIDE, AND CALCIUM CHLORIDE: 600; 310; 30; 20 INJECTION, SOLUTION INTRAVENOUS at 11:03

## 2020-01-31 RX ADMIN — PHENYLEPHRINE HYDROCHLORIDE 100 MCG: 10 INJECTION INTRAVENOUS at 11:15

## 2020-01-31 RX ADMIN — FENTANYL CITRATE 25 MCG: 50 INJECTION INTRAMUSCULAR; INTRAVENOUS at 11:36

## 2020-01-31 RX ADMIN — PHENYLEPHRINE HYDROCHLORIDE 100 MCG: 10 INJECTION INTRAVENOUS at 11:30

## 2020-01-31 RX ADMIN — PROPOFOL 50 MG: 10 INJECTION, EMULSION INTRAVENOUS at 11:07

## 2020-01-31 RX ADMIN — DEXAMETHASONE SODIUM PHOSPHATE 10 MG: 10 INJECTION, SOLUTION INTRAMUSCULAR; INTRAVENOUS at 11:12

## 2020-01-31 RX ADMIN — PHENYLEPHRINE HYDROCHLORIDE 100 MCG: 10 INJECTION INTRAVENOUS at 12:14

## 2020-01-31 RX ADMIN — Medication 10 MG: at 12:11

## 2020-01-31 RX ADMIN — PROPOFOL 120 MG: 10 INJECTION, EMULSION INTRAVENOUS at 11:06

## 2020-01-31 RX ADMIN — LIDOCAINE HYDROCHLORIDE 50 MG: 10 INJECTION, SOLUTION INFILTRATION; PERINEURAL at 11:06

## 2020-01-31 RX ADMIN — MIDAZOLAM 2 MG: 1 INJECTION INTRAMUSCULAR; INTRAVENOUS at 09:41

## 2020-01-31 RX ADMIN — ONDANSETRON HYDROCHLORIDE 4 MG: 2 INJECTION, SOLUTION INTRAMUSCULAR; INTRAVENOUS at 13:14

## 2020-01-31 RX ADMIN — KETOROLAC TROMETHAMINE 10 MG: 30 INJECTION, SOLUTION INTRAMUSCULAR; INTRAVENOUS at 13:40

## 2020-01-31 RX ADMIN — PHENYLEPHRINE HYDROCHLORIDE 100 MCG: 10 INJECTION INTRAVENOUS at 11:20

## 2020-01-31 RX ADMIN — APREPITANT 40 MG: 40 CAPSULE ORAL at 09:53

## 2020-01-31 RX ADMIN — FAMOTIDINE 20 MG: 10 INJECTION INTRAVENOUS at 09:41

## 2020-01-31 RX ADMIN — HYDROCODONE BITARTRATE AND ACETAMINOPHEN 1 TABLET: 5; 325 TABLET ORAL at 14:58

## 2020-01-31 RX ADMIN — PHENYLEPHRINE HYDROCHLORIDE 100 MCG: 10 INJECTION INTRAVENOUS at 12:30

## 2020-01-31 RX ADMIN — MEPERIDINE HYDROCHLORIDE 12.5 MG: 50 INJECTION, SOLUTION INTRAMUSCULAR; INTRAVENOUS; SUBCUTANEOUS at 14:05

## 2020-01-31 RX ADMIN — PHENYLEPHRINE HYDROCHLORIDE 100 MCG: 10 INJECTION INTRAVENOUS at 12:45

## 2020-01-31 RX ADMIN — SODIUM CHLORIDE, SODIUM LACTATE, POTASSIUM CHLORIDE, AND CALCIUM CHLORIDE: 600; 310; 30; 20 INJECTION, SOLUTION INTRAVENOUS at 11:02

## 2020-01-31 RX ADMIN — FENTANYL CITRATE 25 MCG: 50 INJECTION INTRAMUSCULAR; INTRAVENOUS at 13:04

## 2020-01-31 RX ADMIN — WATER 2 G: 1 INJECTION INTRAMUSCULAR; INTRAVENOUS; SUBCUTANEOUS at 11:12

## 2020-01-31 ASSESSMENT — PAIN SCALES - WONG BAKER: WONGBAKER_NUMERICALRESPONSE: 2

## 2020-01-31 ASSESSMENT — PAIN - FUNCTIONAL ASSESSMENT: PAIN_FUNCTIONAL_ASSESSMENT: 0-10

## 2020-01-31 ASSESSMENT — PAIN SCALES - GENERAL
PAINLEVEL_OUTOF10: 2
PAINLEVEL_OUTOF10: 4
PAINLEVEL_OUTOF10: 5

## 2020-01-31 ASSESSMENT — ENCOUNTER SYMPTOMS: SHORTNESS OF BREATH: 0

## 2020-01-31 ASSESSMENT — LIFESTYLE VARIABLES: SMOKING_STATUS: 0

## 2020-01-31 NOTE — BRIEF OP NOTE
Brief Postoperative Note      DATE OF PROCEDURE: 1/31/2020     SURGEON: Devyn Feng MD    PREOPERATIVE DIAGNOSIS: D05.12    POSTOPERATIVE DIAGNOSIS: Same     OPERATION: Procedure(s):  PREOP ULTRASOUND WITH LEFT LUMPECTOMY, NO SENTINEL NODE BIOPSY, INTRAOP ULTRASOUND GUIDED NEEDLE LOCALIZATION, BIOZORB, FLAPS, PECTORAL BLOCK, IORT    ANESTHESIA: General    ESTIMATED BLOOD LOSS: Minimal    COMPLICATIONS: None. SPECIMENS:   ID Type Source Tests Collected by Time Destination   A : left breast tissue Tissue Breast SURGICAL PATHOLOGY Devyn Feng MD 1/31/2020 1152    B : left breast tissue additional cranial lateral margins Tissue Breast SURGICAL PATHOLOGY Devyn Feng MD 1/31/2020 1204    C : left breast tissue additional caudal margins Tissue Breast SURGICAL PATHOLOGY Devyn Feng MD 1/31/2020 1204    D : left breast tissue additional medial margins Tissue Breast SURGICAL PATHOLOGY Devyn Feng MD 1/31/2020 1204    E : left breast tissue additional deep margins Tissue Breast SURGICAL PATHOLOGY Devyn Feng MD 1/31/2020 1204        DRAINS: MELISA    The patient tolerated the procedure well.     Electronically signed by Devyn Feng MD  on 1/31/2020 at 1:14 PM

## 2020-01-31 NOTE — OP NOTE
Radiation Oncology      Patient's Name/Date of Birth:    Virgie Rosado / 1955 (31 y.o.)    DATE OF OPERATION:    01/31/2020    SURGEON:    Devyn Feng MD  Phone Number: [phone]    OTHER SURGEONS:      Surgeon(s):  MD Devyn Sanders MD    PREOPERATIVE DIAGNOSIS:    N45.64    POSTOPERATIVE DIAGNOSIS:    D05.12    BRIEF HISTORY:    Ms. Tamanna Graf is a 75 y.o. female with a history of screening mammogram 12/2/2019 showing a calcifications in the upper outer quadrant of the left breast.  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     MRI of the breasts obtained 12/18/2019 showed postbiopsy changes. No lymphadenopathy was seen.     The patient was planned now for left breast lumpectomy with consideration of intraoperative radiation therapy utilizing the [facility] electronic brachytherapy system. STAGE: 0 (Tis N0 M0)    OPERATION PERFORMED:    1. Left partial mastectomy. 2. Ultrasound examination of the left breast following insertion of the Xoft balloon applicator into the lumpectomy cavity. 3. Treatment planning. 4. Intraoperative irradiation using the Xoft electronic brachytherapy system. DESCRIPTION OF PROCEDURE:    Under general anesthesia, following prepping and draping in the usual sterile manner, left partial mastectomy was performed by Dr. Kishan Rodriguez. This will be dictated separately. A 5-6 cm Xoft balloon applicator was inserted into the left breast lumpectomy cavity and filled with 90 cc sterile saline. The breast tissue and overlying skin were approximated using suture material. Ultrasound examination showed a minimum skin bridge of 1.9 cm. The miniature x-ray tube (with a measured length of 25.0 cm) was inserted into the central channel of the balloon applicator (with a measured depth of 24.95 cm) and a pull back test of the source was successfully performed.  A thin, flexible lead

## 2020-01-31 NOTE — H&P
Active Problem List    Diagnosis Date Noted    Abnormal nuclear stress test      Priority: High    Precordial pain      Priority: High    Ductal carcinoma in situ (DCIS) of left breast 12/26/2019    S/P breast biopsy 03/13/2019    Atypical ductal hyperplasia of breast 02/14/2019    A-fib ([facility] [address] 04/06/2017     Current Facility-Administered Medications   Medication Dose Route Frequency Provider Last Rate Last Dose    lactated ringers infusion   Intravenous Continuous Christiane Keating MD        lactated ringers infusion   Intravenous Continuous Tori Rubalcava MD        sodium chloride flush 0.9 % injection 10 mL  10 mL Intravenous 2 times per day Tori Rubalcava MD        sodium chloride flush 0.9 % injection 10 mL  10 mL Intravenous PRN Tori Rubalcava MD        lidocaine PF 1 % injection 1 mL  1 mL Intradermal Once PRN Tori Rubalcava MD        morphine injection 4 mg  4 mg Intravenous Once PRN Tori Rubalcava MD        morphine injection 2 mg  2 mg Intravenous Q5 Min PRN Tori Rubalcava MD        morphine injection 4 mg  4 mg Intravenous Q5 Min PRN Tori Rubalcava MD        HYDROmorphone (DILAUDID) injection 0.25 mg  0.25 mg Intravenous Q5 Min PRN Tori Rubalcava MD        HYDROmorphone (DILAUDID) injection 0.5 mg  0.5 mg Intravenous Q5 Min PRN Tori Rubalcava MD        fentaNYL (SUBLIMAZE) injection 50 mcg  50 mcg Intravenous Once PRN Tori Rubalcava MD        midazolam (VERSED) injection 2 mg  2 mg Intravenous Once PRN Tori Rubalcava MD        diphenhydrAMINE (BENADRYL) injection 12.5 mg  12.5 mg Intravenous Once PRN Tori Rubalcava MD        famotidine (PEPCID) injection 20 mg  20 mg Intravenous Once Tori Rubalcava MD        scopolamine (TRANSDERM-SCOP) transdermal patch 1 patch  1 patch Transdermal Once Tori Rubalcava MD        promethazine (PHENERGAN) injection 6.25 mg  6.25 mg Intravenous Once PRN Tori Rubalcava MD        metoclopramide (REGLAN) injection 10 mg  10 mg Intravenous Once PRN Sorin Milton MD        labetalol (NORMODYNE;TRANDATE) injection syringe 5 mg  5 mg Intravenous Q10 Min PRN Sorin Milton MD        hydrALAZINE (APRESOLINE) injection 5 mg  5 mg Intravenous Q10 Min PRN Sorin Milton MD        meperidine (DEMEROL) injection 12.5 mg  12.5 mg Intravenous Q5 Min PRN Sorin Milton MD         Allergies: Morphine and Oxycodone  Past Medical History:   Diagnosis Date    A-fib [facility])     sees dr. Acosta Ba in [place] but has seen [facility] cardiology    Acid reflux     Breast mass     Diabetes ([facility] [address]     type 2 (currently diet-controlled)    Hyperlipidemia     elevated triglycerides, but low cholesterol level    Hypertension     Prolonged emergence from general anesthesia     Sleep apnea     CPAP     Past Surgical History:   Procedure Laterality Date    BREAST BIOPSY Left 2/26/2019    EXCISION LEFT BREAST MASS WITH INTRAOP ULTRASOUND GUIDED NEEDLE LOCALIZATION performed by Vicky Fernando MD at [facility]      after vaginal prolapse repair/damaged    HYSTERECTOMY      HYSTERECTOMY, VAGINAL      PLANTAR FASCIA SURGERY Bilateral 7/19/2016    PLANTAR FASCIA INJECTION FEET performed by Abby Elias DPM at [facility]  2013     Family History   Problem Relation Age of Onset    Diabetes Mother     Diabetes Father     Diabetes Sister     Diabetes Sister     Diabetes Sister     Diabetes Sister     Diabetes Brother     Diabetes Brother     Diabetes Brother      Social History     Tobacco Use    Smoking status: Never Smoker    Smokeless tobacco: Never Used   Substance Use Topics    Alcohol use: No       ROS:  14 point review of systems is negative except for the above. PHYSICAL EXAM:    The patient is a 75 y.o. female  in no acute distress. She is alert oriented and cooperative. Mood and affect are appropriate. Skin is warm and dry without rashes.     BP (!) 141/73   Pulse of the genetic aspects involved in familial breast cancers. We discussed the BrCa genetic analysis and why it is  appropriate for her. We discussed breast MRI and how it assists in evaluation of DCIS and the results of her MRI if done.          We discussed the surgical options including simple mastectomy and lumpectomy usually with sentinel lymph node biopsy but not axillary lymph node dissection. We explained in depth why breast conservation therapy requires radiation treatments for the majority of women. I explained that there are situations in older women with small,  less aggressive tumors where no radiation is needed. These treatments may be external beam for 6 weeks, partial breast for 5 days,  or intraoperative. I explained that hormonal therapy postoperatively may be appropriate  depending upon the final pathology and the hormone receptor status. I discussed Oncotype Dx, Mammoprint, and Adjuvant Online as tools which aid in the decision for chemotherapy or hormonal therapy. We also discussed the possibility of breast reconstruction if a mastectomy was required. .  I explained to her the different techniques including placement of a subpectoral implant with a Alloderm sling  versus TRAM flap reconstruction as welll as other methods of reconstruction. She does not wish to pursue reconstruction at this time.         After a prolonged discussion lasting 130 minutes  we felt it was most appropriate that she undergo left lumpectomy, with preop ultrasound and and intraop ultrasound guided needle localization with IORT       We discussed the risks and benefits of the surgery including but not limited to bleeding, infection, pain, lymphedema, numbness, and also the risks of general anesthesia including pneumonia, blood clots, heart attack, stroke, and death.   She expresses good understanding and is agreeable to proceed with surgery.       We will schedule this to be done when convenient  at [facility]

## 2020-01-31 NOTE — CONSULTS
Left    Result Date: 1/30/2020  EXAMINATION: Limited left breast ultrasound 1/30/2020 HISTORY: Breast neoplasm left FINDINGS: Sonography was used for surgical planning. The skin overlying the patient's postbiopsy clip in the upper outer quadrant of the left breast 6 cm from the nipple is marked. The films are available to the OR for review. Signed by Dr Kameron Gallardo on 1/30/2020 6:06 PM      IMPRESSION AND PLAN:  Stage: 0 (Tis, N0, M0)    After full discussion of treatment options, the patient has elected to proceed with left breast lumpectomy. This will be followed by consideration of intraoperative radiation therapy using the [facility] electronic brachytherapy system.        Pa Hamlin MD    6/50/5401 10:23 AM

## 2020-01-31 NOTE — ANESTHESIA PRE PROCEDURE
Department of Anesthesiology  Preprocedure Note       Name:  Carolina Allen   Age:  75 y.o.  :  1955                                          MRN:  [id number]         Date:  2020      Surgeon: Erna Fang):  Ximena Yang MD    Procedure: PREOP ULTRASOUND WITH LEFT LUMPECTOMY, NO SENTINEL NODE BIOPSY, INTRAOP ULTRASOUND GUIDED NEEDLE LOCALIZATION, BIOZORB, FLAPS, PECTORAL BLOCK, IORT (Left )    Medications prior to admission:   Prior to Admission medications    Medication Sig Start Date End Date Taking? Authorizing Provider   tamoxifen (NOLVADEX) 10 MG tablet Take 10 mg by mouth daily   Yes Historical Provider, MD   montelukast (SINGULAIR) 10 MG tablet Take 10 mg by mouth daily   Yes Historical Provider, MD   dicyclomine (BENTYL) 20 MG tablet Take 20 mg by mouth 4 times daily as needed   Yes Historical Provider, MD   psyllium (METAMUCIL) 0.52 g capsule Take 0.52 g by mouth 2 times daily   Yes Historical Provider, MD   diltiazem (CARDIZEM) 30 MG tablet Take 30 mg by mouth 2 times daily   Yes Historical Provider, MD   apixaban (ELIQUIS) 5 MG TABS tablet Take 1 tablet by mouth 2 times daily 17  Yes Hi Baires MD   ferrous sulfate 325 (65 FE) MG tablet Take 325 mg by mouth daily (with breakfast)   Yes Historical Provider, MD   loperamide (IMODIUM) 2 MG capsule Take 2 mg by mouth 4 times daily as needed for Diarrhea   Yes Historical Provider, MD   omeprazole (PRILOSEC) 20 MG capsule Take 20 mg by mouth daily   Yes Historical Provider, MD   Multiple Vitamins-Minerals (THERAPEUTIC MULTIVITAMIN-MINERALS) tablet Take 1 tablet by mouth daily   Yes Historical Provider, MD       Current medications:    Current Facility-Administered Medications   Medication Dose Route Frequency Provider Last Rate Last Dose    lactated ringers infusion   Intravenous Continuous Ximena Yang MD           Allergies:     Allergies   Allergen Reactions    Morphine Nausea And Vomiting    Oxycodone Nausea And Vomiting seizures, CVA and depression/anxiety            GI/Hepatic/Renal: Neg GI/Hepatic/Renal ROS  (+) GERD: well controlled,      (-) hiatal hernia      ROS comment: Esophageal stricture, dilation by jese  Colon revision after bladder lift . Endo/Other: Negative Endo/Other ROS   (+) DiabetesType II DM, , blood dyscrasia: bridge therapy and anticoagulation therapy:., malignancy/cancer. Pt had PAT visit. ROS comment: Off eliquis 4 days  Abdominal:       Abdomen: soft. Vascular:                                      Anesthesia Plan      general     ASA 3     (Possible difficult intubation due to overbite and dec TM d)  Induction: intravenous. BIS  MIPS: Postoperative opioids intended and Prophylactic antiemetics administered. Anesthetic plan and risks discussed with patient. Use of blood products discussed with patient whom. Plan discussed with CRNA.     Attending anesthesiologist reviewed and agrees with Pre Eval content              Vibha French MD   1/31/2020

## 2020-01-31 NOTE — ANESTHESIA POSTPROCEDURE EVALUATION
Department of Anesthesiology  Postprocedure Note    Patient: Isabelle Archer  MRN: [id number]  [place]: 1955  Date of evaluation: 1/31/2020  Time:  2:01 PM     Procedure Summary     Date:  01/31/20 Room / Location:  [address]    Anesthesia Start:  1101 Anesthesia Stop:  8059    Procedure:  PREOP ULTRASOUND WITH LEFT LUMPECTOMY, NO SENTINEL NODE BIOPSY, INTRAOP ULTRASOUND GUIDED NEEDLE LOCALIZATION, BIOZORB, FLAPS, PECTORAL BLOCK, IORT (Left ) Diagnosis:  (M64.40)    Surgeon:  Casie Saeed MD Responsible Provider:  ROSLYN Owusu CRNA    Anesthesia Type:  general ASA Status:  3          Anesthesia Type: general    Rhiannon Phase I: Rhiannon Score: 10    Rhiannon Phase II:      Last vitals: Reviewed and per EMR flowsheets.        Anesthesia Post Evaluation    Patient location during evaluation: PACU  Patient participation: complete - patient participated  Level of consciousness: sleepy but conscious  Pain score: 0  Airway patency: patent  Nausea & Vomiting: no nausea and no vomiting  Complications: no  Cardiovascular status: hemodynamically stable  Respiratory status: acceptable and nasal cannula  Hydration status: euvolemic

## 2020-02-01 NOTE — OP NOTE
intercostal  perforators under ultrasound guidance. We then re-prepped and  re-draped, made a lateral curvilinear incision, dissected down into the  subcu and then elevated the skin and subcu off the breast parenchyma  centrally to the nipple. We also dissected laterally somewhat and then  using ultrasound and our previously placed wire, we clearly identified  the site of the lesion. We then excised a radially oriented ellipse  containing the clip. We marked cranial, caudal, medial, and lateral  margins before we removed the lesion and then once we had completed,  then we placed the deep margin. We then did specimen ultrasound, which  showed good margins in all directions. The closest was medial at 3 mm,  but that was with significant compression. We then irrigated copiously  and then utilized the MarginProbe intraoperative margin assessment  device. Using this on the cranial margin, there was a small area of  positivity at the craniolateral portion, this was re-excised. It was  negative on the lateral aspect. Caudal was positive across the entire  area, so the entire caudal margin was re-excised and the medial margin  had some positivity, so we re-excised this. The deep margin was also  positive, so we re-excised the deep margin as well. We then irrigated  copiously, made sure we had good hemostasis and then utilized the sizer. It appeared that a 90 mL balloon would give us an adequate coverage and  fill the space quite nicely. We then utilized the 90 mL balloon. We  filled it with saline. We checked for symmetry, which was good. The  balloon was intact. We then _____ the cavity with a 2-0 Prolene suture  around it, put the catheter in place and tied down our pursestring, it  fit quite nicely. We did ultrasound for skin to balloon distance. It  was essentially 2 or more cm except in one view, which was 1.9 cm. This  was adequate for minimal skin damage.   She then underwent uncomplicated  intraoperative radiation therapy. I did review the specimen with  Pathology and it appeared that we had excellent margins. After  radiation therapy, we removed the catheter, removed our suture,  irrigated copiously, obtained good hemostasis. We then placed a  three-dimensional implant into the cavity. Our excision cavity measured  approximately 5 x 9 cm in size. The secondary defect measured 10 x 15  cm in size. Once we had placed the implant, we rotated our breast  parenchymal flaps into the wound, covering the defect quite nicely. We  then placed a large round Guero-Ochoa drain in the subcu and closed  that with 2-0 and 3-0 Vicryl and 4-0 Stratafix for the skin. Estimated  blood loss, minimal.  Complications, none. She tolerated all these  quite well.         Vivienne Farley MD    D: 01/31/2020 15:02:27      T: 01/31/2020 22:34:32     [email or web address]  Job#: [id number]     Doc#: [id number]    CC:

## 2020-02-04 NOTE — PROGRESS NOTES
HISTORY OF PRESENT ILLNESS:    Ms. Jony Hood  is recently status post stereotactic breast biopsy  on the left which revealed a 0.4  cm intermediate grade ductal carcinoma in situ . ER is positive at 100%. VT is positive at 98%. Prelude DX demonstrates a reduction of 15% all recurrences to 7%. Reduction in invasive cancer recurrences goes from 9% down to 5%. MRI- 12/18/2019     EXAMINATION: MRI BREAST BILATERAL W WO CONTRAST 12/18/2019 2:49 PM   HISTORY: Left breast cancer, DCIS. Strong family history of breast   cancer   COMPARISON: Screening mammogram 12/2/2019, diagnostic mammogram   12/2/2019; left breast biopsy 12/4/2019   Technical: Multiplanar, multisequence imaging was performed through   the breasts before and after the administration of IV contrast.   FINDINGS:   Background parenchymal enhancement is minimal and appears symmetric. There are scattered areas of fibroglandular density. Right breast:   No mass or nonmass enhancement in the right breast to suggest   malignancy. Left breast:   There is a discrepancy in the breast size, with the left breast   smaller in size in the right. Postbiopsy changes are seen within the   upper slightly outer quadrant of the left breast. A biopsy marking   clip is present. A small hematoma is present which measures up to 1.4   cm in size. There is discontinuous nonmass enhancement extending   posteriorly from the hematoma site for approximately 3.3 cm. There is   associated increased T2 signal. No additional areas of mass or nonmass   enhancement are seen in the left breast.   Lymph nodes:   No suspicious axillary or internal mammary lymphadenopathy is   identified.       Impression   1. Postbiopsy changes in the left breast with a small hematoma at the   biopsy site. Abnormal enhancement extends in a linear configuration   from the posterior aspect of the biopsy site.  Based upon the   postbiopsy mammogram, this is favored to represent postbiopsy change, with air noted in the soft tissues posterior to the biopsy site on the   mammogram. However, additional involvement with DCIS is not excluded. 2. No MRI evidence of right breast malignancy. 3. No suspicious lymphadenopathy. 4. BI-RADS Category 6-known biopsy-proven malignancy. We discussed this previously at great length and she was agreeable to proceed with lumpectomy and IORT. Unfortunately she has been talking with her friends and neighbors and everyone is giving her a different opinion on what she should do. She is overwhelmed with information and is basically a nervous wreck and comes in to discuss this further because she is just feeling overwhelmed by all of this. DISCUSSION:  We had a lengthy discussion with Ms. Shwetha Baird about her choices options and the risks and benefits of all of her choices regarding lumpectomy versus mastectomy versus reconstruction etc.  This discussion took essentially an hour and afterwards she seemed to be satisfied and we will proceed with the planned lumpectomy with IORT later this week       She is now status post Left lumpectomy with IORT and Ultrasound-guided needle localization.     PATHOLOGY REVEALS:  FINAL DIAGNOSIS:    A.  Breast, left 3 o'clock, wire localized lumpectomy:  Ductal carcinoma in situ, micropapillary type  Ductal carcinoma in situ measures 7 mm in greatest linear dimension  Previous biopsy site  Margins of resection are negative  Ductal carcinoma in situ measures 5 mm from the anterior margin of  resection  Intraductal papillomatosis  Usual ductal hyperplasia    B.  Breast, left, additional cranial lateral margin:  Negative for ductal carcinoma in situ    C.  Breast, left, additional caudal margin:  Fat necrosis  Fibrocystic changes  Negative for ductal carcinoma in situ    D.  Breast, left, additional medial margin:  Negative for ductal carcinoma in situ    PHYSICAL EXAM:  The  wounds look good with no evidence of infection, fluid accumulation,

## 2020-02-05 ENCOUNTER — OFFICE VISIT (OUTPATIENT)
Dept: SURGERY | Age: 65
End: 2020-02-05

## 2020-02-05 VITALS — SYSTOLIC BLOOD PRESSURE: 128 MMHG | HEART RATE: 76 BPM | DIASTOLIC BLOOD PRESSURE: 80 MMHG

## 2020-02-05 PROCEDURE — 99024 POSTOP FOLLOW-UP VISIT: CPT | Performed by: SURGERY

## 2020-02-08 PROBLEM — Z98.890 STATUS POST LEFT BREAST LUMPECTOMY: Status: ACTIVE | Noted: 2020-02-08

## 2020-02-14 ENCOUNTER — OFFICE VISIT (OUTPATIENT)
Dept: SURGERY | Age: 65
End: 2020-02-14

## 2020-02-14 VITALS — BODY MASS INDEX: 29.41 KG/M2 | WEIGHT: 166 LBS | TEMPERATURE: 98.6 F | HEIGHT: 63 IN

## 2020-02-14 PROCEDURE — 99024 POSTOP FOLLOW-UP VISIT: CPT | Performed by: PHYSICIAN ASSISTANT

## 2020-02-25 NOTE — PROGRESS NOTES
MEDICAL ONCOLOGY CONSULTATION    Pt Name: Glenny Felton  MRN: [id number]  YOB: 1955  Date of evaluation: 2/26/2020    REASON FOR CONSULTATION:  DCIS     History Obtained From:  patient and old medical records    HISTORY OF PRESENT ILLNESS:      Diagnosis  · DCIS left breast, December 2020  · Stage 0  · Grade 2  · %, UT 98%  · [employer] 84 genes- VUS. No deleterious mutation  Treatment summary  · 1/31/2020- left lumpectomy  · January 2020-tamoxifen x5 years    The patient is a 75years old female who has been referred by Dr. Kerwin Mixon for a diagnosis of DCIS of left breast.  This was a screening detected lesion. She had a sister and a niece with breast cancer. She denies any hormone replacement therapy. She had genetic assessment with a 84 gene panel that was unremarkable for a deleterious mutation but with findings of a VUS. · 12/4/2018-core needle biopsy consistent with intermediate grade DCIS measuring 4 mm. %, % 1. DCISRT high risk  · 12/18/2019-MRI breast bilateral showed postbiopsy change in the left breast with a small hematoma. Abnormal enhancement extending into a linear configuration. No MRI evidence of right breast malignancy. No suspicious lymphadenopathy. 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 Grade 2. Clear margins. Usual ductal hyperplasia. · 1/31/2020-IORT 2000 Gy  · 2/26/2020-she was first seen by me.        Past Medical History:    Past Medical History:   Diagnosis Date    [facility]     [facility]    Acid reflux     Breast mass     Cancer ([facility] [address]     Breast Cancer in [place]    Diabetes ([facility] [address]     type 2 (currently diet-controlled)    Hyperlipidemia     elevated triglycerides, but low cholesterol level    Hypertension     Prolonged emergence from general anesthesia     Sleep apnea     CPAP       Past Surgical History:    Past Surgical History:   Procedure Laterality Date    BREAST BIOPSY Left 2/26/2019    EXCISION LEFT BREAST MASS WITH INTRAOP ULTRASOUND GUIDED NEEDLE LOCALIZATION performed by Christiane Keating MD at [address] LUMPECTOMY Left 1/31/2020    PREOP ULTRASOUND WITH LEFT LUMPECTOMY, NO SENTINEL NODE BIOPSY, INTRAOP ULTRASOUND GUIDED NEEDLE LOCALIZATION, BIOZORB, FLAPS, PECTORAL BLOCK, IORT performed by Christiane Keating MD at [place] [address]      after vaginal prolapse repair/damaged    HYSTERECTOMY      HYSTERECTOMY, VAGINAL      PLANTAR FASCIA SURGERY Bilateral 7/19/2016    PLANTAR FASCIA INJECTION FEET performed by Hollie Riddle DPM at [facility]  2013       Social History:    Social History     Socioeconomic History    Marital status:       Spouse name: Not on file    Number of children: Not on file    Years of education: Not on file    Highest education level: Not on file   Occupational History    Not on file   Social Needs    Financial resource strain: Not on file    Food insecurity:     Worry: Not on file     Inability: Not on file    Transportation needs:     Medical: Not on file     Non-medical: Not on file   Tobacco Use    Smoking status: Never Smoker    Smokeless tobacco: Never Used   Substance and Sexual Activity    Alcohol use: No    Drug use: No    Sexual activity: Not on file   Lifestyle    Physical activity:     Days per week: Not on file     Minutes per session: Not on file    Stress: Not on file   Relationships    Social connections:     Talks on phone: Not on file     Gets together: Not on file     Attends Hindu service: Not on file     Active member of club or organization: Not on file     Attends meetings of clubs or organizations: Not on file     Relationship status: Not on file    Intimate partner violence:     Fear of current or ex partner: Not on file     Emotionally abused: Not on file     Physically abused: Not on file     Forced sexual activity: Not on

## 2020-02-26 ENCOUNTER — HOSPITAL ENCOUNTER (OUTPATIENT)
Dept: INFUSION THERAPY | Age: 65
Discharge: HOME | End: 2020-02-26
Payer: MEDICARE

## 2020-02-26 ENCOUNTER — OFFICE VISIT (OUTPATIENT)
Dept: HEMATOLOGY | Age: 65
End: 2020-02-26
Payer: MEDICARE

## 2020-02-26 VITALS
HEIGHT: 63 IN | HEART RATE: 98 BPM | OXYGEN SATURATION: 97 % | WEIGHT: 166.9 LBS | SYSTOLIC BLOOD PRESSURE: 124 MMHG | BODY MASS INDEX: 29.57 KG/M2 | DIASTOLIC BLOOD PRESSURE: 64 MMHG

## 2020-02-26 DIAGNOSIS — D05.12 DUCTAL CARCINOMA IN SITU (DCIS) OF LEFT BREAST: ICD-10-CM

## 2020-02-26 PROCEDURE — 99202 OFFICE O/P NEW SF 15 MIN: CPT

## 2020-02-26 PROCEDURE — G8417 CALC BMI ABV UP PARAM F/U: HCPCS | Performed by: INTERNAL MEDICINE

## 2020-02-26 PROCEDURE — 85025 COMPLETE CBC W/AUTO DIFF WBC: CPT

## 2020-02-26 PROCEDURE — 1123F ACP DISCUSS/DSCN MKR DOCD: CPT | Performed by: INTERNAL MEDICINE

## 2020-02-26 PROCEDURE — G8484 FLU IMMUNIZE NO ADMIN: HCPCS | Performed by: INTERNAL MEDICINE

## 2020-02-26 PROCEDURE — 99205 OFFICE O/P NEW HI 60 MIN: CPT | Performed by: INTERNAL MEDICINE

## 2020-02-26 PROCEDURE — 3017F COLORECTAL CA SCREEN DOC REV: CPT | Performed by: INTERNAL MEDICINE

## 2020-02-26 PROCEDURE — 1090F PRES/ABSN URINE INCON ASSESS: CPT | Performed by: INTERNAL MEDICINE

## 2020-02-26 PROCEDURE — G8427 DOCREV CUR MEDS BY ELIG CLIN: HCPCS | Performed by: INTERNAL MEDICINE

## 2020-02-26 PROCEDURE — G8399 PT W/DXA RESULTS DOCUMENT: HCPCS | Performed by: INTERNAL MEDICINE

## 2020-02-26 PROCEDURE — 4040F PNEUMOC VAC/ADMIN/RCVD: CPT | Performed by: INTERNAL MEDICINE

## 2020-02-26 PROCEDURE — 1036F TOBACCO NON-USER: CPT | Performed by: INTERNAL MEDICINE

## 2020-03-13 NOTE — PROGRESS NOTES
HISTORY OF PRESENT ILLNESS:    Ms. Vivian Umana  is status post stereotactic breast biopsy  on the left which revealed a 0.4  cm intermediate grade ductal carcinoma in situ . ER is positive at 100%. NE is positive at 98%. Prelude DX demonstrates a reduction of 15% all recurrences to 7%. Reduction in invasive cancer recurrences goes from 9% down to 5%. MRI- 12/18/2019     EXAMINATION: MRI BREAST BILATERAL W WO CONTRAST 12/18/2019 2:49 PM   HISTORY: Left breast cancer, DCIS. Strong family history of breast   cancer   COMPARISON: Screening mammogram 12/2/2019, diagnostic mammogram   12/2/2019; left breast biopsy 12/4/2019   Technical: Multiplanar, multisequence imaging was performed through   the breasts before and after the administration of IV contrast.   FINDINGS:   Background parenchymal enhancement is minimal and appears symmetric. There are scattered areas of fibroglandular density. Right breast:   No mass or nonmass enhancement in the right breast to suggest   malignancy. Left breast:   There is a discrepancy in the breast size, with the left breast   smaller in size in the right. Postbiopsy changes are seen within the   upper slightly outer quadrant of the left breast. A biopsy marking   clip is present. A small hematoma is present which measures up to 1.4   cm in size. There is discontinuous nonmass enhancement extending   posteriorly from the hematoma site for approximately 3.3 cm. There is   associated increased T2 signal. No additional areas of mass or nonmass   enhancement are seen in the left breast.   Lymph nodes:   No suspicious axillary or internal mammary lymphadenopathy is   identified.       Impression   1. Postbiopsy changes in the left breast with a small hematoma at the   biopsy site. Abnormal enhancement extends in a linear configuration   from the posterior aspect of the biopsy site.  Based upon the   postbiopsy mammogram, this is favored to represent postbiopsy change,   with air noted in the soft tissues posterior to the biopsy site on the   mammogram. However, additional involvement with DCIS is not excluded. 2. No MRI evidence of right breast malignancy. 3. No suspicious lymphadenopathy. 4. BI-RADS Category 6-known biopsy-proven malignancy. She is now status post Left lumpectomy with IORT on 1/31/2020    PATHOLOGY REVEALS:  FINAL DIAGNOSIS:    A.  Breast, left 3 o'clock, wire localized lumpectomy:  Ductal carcinoma in situ, micropapillary type  Ductal carcinoma in situ measures 7 mm in greatest linear dimension  Previous biopsy site  Margins of resection are negative  Ductal carcinoma in situ measures 5 mm from the anterior margin of  resection  Intraductal papillomatosis  Usual ductal hyperplasia    B.  Breast, left, additional cranial lateral margin:  Negative for ductal carcinoma in situ    C.  Breast, left, additional caudal margin:  Fat necrosis  Fibrocystic changes  Negative for ductal carcinoma in situ    D.  Breast, left, additional medial margin:  Negative for ductal carcinoma in situ    PHYSICAL EXAM:  The  wounds look good with no evidence of infection, fluid accumulation, or skin necrosis. IMPRESSION:    Doing well s/p Left lumpectomy with IORT 1/31/2020      PLAN:Return in July with a unilateral Left breast mammogram      I have seen, examined and reviewed this patient medication list, appropriate labs and imaging studies. I reviewed relevant medical records and others physicians notes. I discussed the plans of care with the patient. I answered all the questions to the patients satisfaction. I, Dr Nelly Vail, personally performed the services described in this documentation as scribed by Oneida Gonsales MA in my presence and is both accurate and complete.      (Please note that portions of this note were completed with a voice recognition program. Efforts were made to edit the dictations but occasionally words are mis-transcribed.)  Over 50% of the

## 2020-03-16 ENCOUNTER — OFFICE VISIT (OUTPATIENT)
Dept: SURGERY | Age: 65
End: 2020-03-16

## 2020-03-16 VITALS — HEART RATE: 80 BPM | SYSTOLIC BLOOD PRESSURE: 120 MMHG | DIASTOLIC BLOOD PRESSURE: 80 MMHG

## 2020-03-16 PROCEDURE — 99024 POSTOP FOLLOW-UP VISIT: CPT | Performed by: SURGERY

## 2020-04-09 ENCOUNTER — TELEPHONE (OUTPATIENT)
Dept: HEMATOLOGY | Age: 65
End: 2020-04-09

## 2020-05-14 ENCOUNTER — TELEPHONE (OUTPATIENT)
Dept: SURGERY | Age: 65
End: 2020-05-14

## 2020-06-25 NOTE — PROGRESS NOTES
appropriated screening for cancer, well-being visit (preventative  measures), follow-up and treatment of other medical comorbidities. PLAN:  Continue tamoxifen  Mammogram January 2021  RTC 6month with Ray Randle  Continue follow-up with Dr. Ashley Romero      I have seen, examined and reviewed this patient medication list, appropriate labs and imaging studies. I reviewed relevant medical records and others physicians notes. I discussed the plans of care with the patient. I answered all the questions to the patients satisfaction. (Please note that portions of this note were completed with a voice recognition program. Efforts were made to edit the dictations but occasionally words are mis-transcribed.)  I, Dr Jane Roman, personally performed the services described in this documentation as scribed by Dora Whiting MA in my presence and is both accurate and complete. Over 50% of the total visit time of 15 minutes in face to face encounter with the patient, out of which more than 50% of the time was spent in counseling patient or family and coordination of care. Counseling included but was not limited to time spent reviewing labs, imaging studies/ treatment plan and answering questions.      Dimitri Lemus MD    06/26/20  9:25 AM

## 2020-06-26 ENCOUNTER — OFFICE VISIT (OUTPATIENT)
Dept: HEMATOLOGY | Age: 65
End: 2020-06-26
Payer: MEDICARE

## 2020-06-26 ENCOUNTER — HOSPITAL ENCOUNTER (OUTPATIENT)
Dept: INFUSION THERAPY | Age: 65
Discharge: HOME | End: 2020-06-26
Payer: MEDICARE

## 2020-06-26 VITALS
OXYGEN SATURATION: 97 % | WEIGHT: 169.7 LBS | TEMPERATURE: 97.8 F | SYSTOLIC BLOOD PRESSURE: 118 MMHG | HEART RATE: 103 BPM | HEIGHT: 63 IN | DIASTOLIC BLOOD PRESSURE: 62 MMHG | BODY MASS INDEX: 30.07 KG/M2

## 2020-06-26 LAB
HCT VFR BLD CALC: 42 % (ref 34.1–44.9)
HEMOGLOBIN: 13.3 G/DL (ref 11.2–15.7)
MCH RBC QN AUTO: 30 PG (ref 25.6–32.2)
MCHC RBC AUTO-ENTMCNC: 31.7 G/DL (ref 32.3–35.5)
MCV RBC AUTO: 94.8 FL (ref 79.4–94.8)
PDW BLD-RTO: 12.5 % (ref 11.7–14.4)
PLATELET # BLD: 143 K/UL (ref 182–369)
PMV BLD AUTO: 10.7 FL (ref 7.4–10.4)
RBC # BLD: 4.43 M/UL (ref 3.93–5.22)
WBC # BLD: 6 K/UL (ref 3.98–10.04)

## 2020-06-26 PROCEDURE — 4040F PNEUMOC VAC/ADMIN/RCVD: CPT | Performed by: INTERNAL MEDICINE

## 2020-06-26 PROCEDURE — 1090F PRES/ABSN URINE INCON ASSESS: CPT | Performed by: INTERNAL MEDICINE

## 2020-06-26 PROCEDURE — G8427 DOCREV CUR MEDS BY ELIG CLIN: HCPCS | Performed by: INTERNAL MEDICINE

## 2020-06-26 PROCEDURE — G8399 PT W/DXA RESULTS DOCUMENT: HCPCS | Performed by: INTERNAL MEDICINE

## 2020-06-26 PROCEDURE — 36415 COLL VENOUS BLD VENIPUNCTURE: CPT

## 2020-06-26 PROCEDURE — 85027 COMPLETE CBC AUTOMATED: CPT

## 2020-06-26 PROCEDURE — 1123F ACP DISCUSS/DSCN MKR DOCD: CPT | Performed by: INTERNAL MEDICINE

## 2020-06-26 PROCEDURE — G8417 CALC BMI ABV UP PARAM F/U: HCPCS | Performed by: INTERNAL MEDICINE

## 2020-06-26 PROCEDURE — 3017F COLORECTAL CA SCREEN DOC REV: CPT | Performed by: INTERNAL MEDICINE

## 2020-06-26 PROCEDURE — 1036F TOBACCO NON-USER: CPT | Performed by: INTERNAL MEDICINE

## 2020-06-26 PROCEDURE — 99213 OFFICE O/P EST LOW 20 MIN: CPT | Performed by: INTERNAL MEDICINE

## 2020-06-26 PROCEDURE — 99211 OFF/OP EST MAY X REQ PHY/QHP: CPT

## 2020-06-26 RX ORDER — LOPERAMIDE HYDROCHLORIDE 2 MG/1
2 CAPSULE ORAL 4 TIMES DAILY PRN
COMMUNITY
End: 2022-05-20

## 2020-06-26 RX ORDER — TAMOXIFEN CITRATE 20 MG/1
20 TABLET ORAL DAILY
Qty: 90 TABLET | Refills: 3 | Status: SHIPPED | OUTPATIENT
Start: 2020-06-26 | End: 2021-06-14 | Stop reason: SDUPTHER

## 2020-06-26 RX ORDER — WARFARIN SODIUM 4 MG/1
5 TABLET ORAL
COMMUNITY
End: 2021-01-26 | Stop reason: ALTCHOICE

## 2020-07-01 ENCOUNTER — TELEPHONE (OUTPATIENT)
Dept: SURGERY | Age: 65
End: 2020-07-01

## 2020-07-06 NOTE — TELEPHONE ENCOUNTER
I called patient and was able to reach her. She was reading the Appointment instructions and was confused by that. I explained she was not seeing [place] and she understood once this was explained to her.

## 2020-07-17 ENCOUNTER — TELEPHONE (OUTPATIENT)
Dept: SURGERY | Age: 65
End: 2020-07-17

## 2020-07-17 RX ORDER — MONTELUKAST SODIUM 10 MG/1
10 TABLET ORAL NIGHTLY
Qty: 30 TABLET | Refills: 0 | Status: SHIPPED | OUTPATIENT
Start: 2020-07-17

## 2020-07-17 RX ORDER — TAMOXIFEN CITRATE 20 MG/1
20 TABLET ORAL DAILY
Qty: 30 TABLET | Refills: 0 | Status: SHIPPED | OUTPATIENT
Start: 2020-07-17 | End: 2021-01-26 | Stop reason: ALTCHOICE

## 2020-07-17 NOTE — TELEPHONE ENCOUNTER
Patient left voicemail that stated she has misplaced her medication while moving that Dr Julian Bowling has her on. She has asked if she could get some sampels or a few be called in to [place] on [facility] until she can find where she packed them.  [phone]  [facility] / Edith Figueroa  Please call patient and advise.   Thank you

## 2020-07-17 NOTE — TELEPHONE ENCOUNTER
I attempted to call pt on both numbers listed. I was unable to leave a message. If she calls back please find out what medication she is needing. Thanks.

## 2020-07-31 ENCOUNTER — HOSPITAL ENCOUNTER (OUTPATIENT)
Dept: WOMENS IMAGING | Age: 65
Discharge: HOME | End: 2020-07-31
Payer: MEDICARE

## 2020-07-31 PROCEDURE — G0279 TOMOSYNTHESIS, MAMMO: HCPCS

## 2020-08-05 ENCOUNTER — OFFICE VISIT (OUTPATIENT)
Dept: SURGERY | Age: 65
End: 2020-08-05
Payer: MEDICARE

## 2020-08-05 VITALS
BODY MASS INDEX: 29.41 KG/M2 | TEMPERATURE: 98.6 F | HEART RATE: 83 BPM | HEIGHT: 63 IN | WEIGHT: 166 LBS | OXYGEN SATURATION: 98 %

## 2020-08-05 PROCEDURE — 99213 OFFICE O/P EST LOW 20 MIN: CPT | Performed by: PHYSICIAN ASSISTANT

## 2020-08-05 PROCEDURE — G8417 CALC BMI ABV UP PARAM F/U: HCPCS | Performed by: PHYSICIAN ASSISTANT

## 2020-08-05 PROCEDURE — 1090F PRES/ABSN URINE INCON ASSESS: CPT | Performed by: PHYSICIAN ASSISTANT

## 2020-08-05 PROCEDURE — 1036F TOBACCO NON-USER: CPT | Performed by: PHYSICIAN ASSISTANT

## 2020-08-05 PROCEDURE — G8399 PT W/DXA RESULTS DOCUMENT: HCPCS | Performed by: PHYSICIAN ASSISTANT

## 2020-08-05 PROCEDURE — 1123F ACP DISCUSS/DSCN MKR DOCD: CPT | Performed by: PHYSICIAN ASSISTANT

## 2020-08-05 PROCEDURE — 4040F PNEUMOC VAC/ADMIN/RCVD: CPT | Performed by: PHYSICIAN ASSISTANT

## 2020-08-05 PROCEDURE — 3017F COLORECTAL CA SCREEN DOC REV: CPT | Performed by: PHYSICIAN ASSISTANT

## 2020-08-05 PROCEDURE — G8428 CUR MEDS NOT DOCUMENT: HCPCS | Performed by: PHYSICIAN ASSISTANT

## 2020-08-05 NOTE — PROGRESS NOTES
Yovanny Lancean today for her follow-up breast exam.  She has no new breast complaints. She is had no new palpable masses. There is no skin or nipple changes. There is no nipple discharge. She has no appreciable evidence of supraclavicular or axillary adenopathy. Of note, she is s/p left lumpectomy with IORT 1/31/2020.     Previous biopsy demonstrated 0.4  cm intermediate grade ductal carcinoma in situ . ER is positive at 100%. NC is positive at 98%.     Prelude DX demonstrates a reduction of 15% all recurrences to 7%. Reduction in invasive cancer recurrences goes from 9% down to 5%.     MRI- 12/18/2019      EXAMINATION: MRI BREAST BILATERAL W WO CONTRAST 12/18/2019 2:49 PM   HISTORY: Left breast cancer, DCIS. Strong family history of breast   cancer   COMPARISON: Screening mammogram 12/2/2019, diagnostic mammogram   12/2/2019; left breast biopsy 12/4/2019   Technical: Multiplanar, multisequence imaging was performed through   the breasts before and after the administration of IV contrast.   FINDINGS:   Background parenchymal enhancement is minimal and appears symmetric. There are scattered areas of fibroglandular density. Right breast:   No mass or nonmass enhancement in the right breast to suggest   malignancy. Left breast:   There is a discrepancy in the breast size, with the left breast   smaller in size in the right. Postbiopsy changes are seen within the   upper slightly outer quadrant of the left breast. A biopsy marking   clip is present. A small hematoma is present which measures up to 1.4   cm in size. There is discontinuous nonmass enhancement extending   posteriorly from the hematoma site for approximately 3.3 cm. There is   associated increased T2 signal. No additional areas of mass or nonmass   enhancement are seen in the left breast.   Lymph nodes:   No suspicious axillary or internal mammary lymphadenopathy is   identified.       Impression   1.  Postbiopsy changes in the left breast with a small hematoma at the   biopsy site. Abnormal enhancement extends in a linear configuration   from the posterior aspect of the biopsy site. Based upon the   postbiopsy mammogram, this is favored to represent postbiopsy change,   with air noted in the soft tissues posterior to the biopsy site on the   mammogram. However, additional involvement with DCIS is not excluded. 2. No MRI evidence of right breast malignancy. 3. No suspicious lymphadenopathy. 4. BI-RADS Category 6-known biopsy-proven malignancy.      She is now status post Left lumpectomy with IORT on 1/31/2020     PATHOLOGY REVEALS:  FINAL DIAGNOSIS:    A.  Breast, left 3 o'clock, wire localized lumpectomy:  Ductal carcinoma in situ, micropapillary type  Ductal carcinoma in situ measures 7 mm in greatest linear dimension  Previous biopsy site  Margins of resection are negative  Ductal carcinoma in situ measures 5 mm from the anterior margin of  resection  Intraductal papillomatosis  Usual ductal hyperplasia    B.  Breast, left, additional cranial lateral margin:  Negative for ductal carcinoma in situ    C.  Breast, left, additional caudal margin:  Fat necrosis  Fibrocystic changes  Negative for ductal carcinoma in situ    D.  Breast, left, additional medial margin:  Negative for ductal carcinoma in situ     PHYSICAL EXAM:  The  wounds look good with no evidence of infection, fluid accumulation, or skin necrosis.        Patient Active Problem List    Diagnosis Date Noted    Abnormal nuclear stress test      Priority: High    Precordial pain      Priority: High    Status post left breast lumpectomy with IORT 1/31/2020 02/08/2020    Ductal carcinoma in situ (DCIS) of left breast 12/26/2019    S/P breast biopsy 03/13/2019    Atypical ductal hyperplasia of breast 02/14/2019    A-fib ([facility] [address] 04/06/2017       Current Outpatient Medications   Medication Sig Dispense Refill    warfarin (COUMADIN) 4 MG tablet Take 4 mg by mouth      tamoxifen (NOLVADEX) 20 MG tablet Take 1 tablet by mouth daily 90 tablet 3    montelukast (SINGULAIR) 10 MG tablet Take 10 mg by mouth daily      dicyclomine (BENTYL) 20 MG tablet Take 20 mg by mouth 4 times daily as needed      diltiazem (CARDIZEM) 30 MG tablet Take 30 mg by mouth 2 times daily      omeprazole (PRILOSEC) 20 MG capsule Take 20 mg by mouth daily      Multiple Vitamins-Minerals (THERAPEUTIC MULTIVITAMIN-MINERALS) tablet Take 1 tablet by mouth daily      tamoxifen (NOLVADEX) 20 MG tablet Take 1 tablet by mouth daily (Patient not taking: Reported on 8/5/2020) 30 tablet 0    montelukast (SINGULAIR) 10 MG tablet Take 1 tablet by mouth nightly (Patient not taking: Reported on 8/5/2020) 30 tablet 0    loperamide (IMODIUM) 2 MG capsule Take 2 mg by mouth 4 times daily as needed for Diarrhea      psyllium (METAMUCIL) 0.52 g capsule Take 0.52 g by mouth 2 times daily      ferrous sulfate 325 (65 FE) MG tablet Take 325 mg by mouth daily (with breakfast)       No current facility-administered medications for this visit.         Allergies: Morphine and Oxycodone    Past Medical History:   Diagnosis Date    A-fib [facility])     sees dr. Murray Born in [place] but [facility]    Acid reflux     Breast mass     Cancer ([facility] [address]     Breast Cancer in [place]    Diabetes ([facility] [address]     type 2 (currently diet-controlled)    Hyperlipidemia     elevated triglycerides, but low cholesterol level    Hypertension     Prolonged emergence from general anesthesia     Sleep apnea     CPAP       Past Surgical History:   Procedure Laterality Date    BREAST BIOPSY Left 2/26/2019    EXCISION LEFT BREAST MASS WITH INTRAOP ULTRASOUND GUIDED NEEDLE LOCALIZATION performed by Chad Banuelos MD at [address] LUMPECTOMY Left 1/31/2020    PREOP ULTRASOUND WITH LEFT LUMPECTOMY, NO SENTINEL NODE BIOPSY, INTRAOP ULTRASOUND GUIDED NEEDLE LOCALIZATION, BIOZORB, FLAPS, PECTORAL BLOCK, IORT performed by Chad Banuelos MD at [address] COLECTOMY      after vaginal prolapse repair/damaged    HYSTERECTOMY      HYSTERECTOMY, VAGINAL      PLANTAR FASCIA SURGERY Bilateral 7/19/2016    PLANTAR FASCIA INJECTION FEET performed by Khushi Daugherty DPM at [place] [address]  2013       Family History   Problem Relation Age of Onset    Diabetes Mother     Diabetes Father    Francoise Torres Father     Breast Cancer Sister     Brain Cancer Brother     Diabetes Sister     Diabetes Sister     Diabetes Sister     Diabetes Sister     Diabetes Brother     Diabetes Brother     Diabetes Brother     Uterine Cancer Maternal Aunt     Uterine Cancer Niece         Age Unknown    ADHD Niece     Breast Cancer Niece        Social History     Tobacco Use    Smoking status: Never Smoker    Smokeless tobacco: Never Used   Substance Use Topics    Alcohol use: No      Mammogram:  No mammographic evidence of malignancy. ROS:  review of system reviewed and positive for the above all other systems noted to be negative      Physical Exam  Pulse 83, temperature 98.6 °F (37 °C), temperature source Temporal, height 5' 3\" (1.6 m), weight 166 lb (75.3 kg), SpO2 98 %. Constitutional:  This is a 75 y. o.female that appears to be in no acute distress. She is pleasant and answers questions appropriately. Breast:  In examination to her breast, patient has no dominant palpable masses. She has fibrocystic changes throughout both breast.  There is no appreciable skin dimpling. There is no axillary adenopathy or supraclavicular adenopathy appreciable.           PLAN  We will see her back next year for yearly exam and mammography        15 minutes was spent during this exam with face-to-face counseling, review of data and physical exam

## 2020-08-12 ENCOUNTER — TELEPHONE (OUTPATIENT)
Dept: SURGERY | Age: 65
End: 2020-08-12

## 2020-08-12 NOTE — TELEPHONE ENCOUNTER
Called patient and left the following appt information on her vm:    Patient scheduled to have her follow up mammogram on 8/2/21 at 10:10 [facility], she will see Dr. Cain Going to go over the results on 8/9/21 at 1:00.

## 2020-09-29 ENCOUNTER — OFFICE VISIT (OUTPATIENT)
Dept: GASTROENTEROLOGY | Facility: CLINIC | Age: 65
End: 2020-09-29

## 2020-09-29 VITALS
TEMPERATURE: 97.8 F | BODY MASS INDEX: 28.89 KG/M2 | SYSTOLIC BLOOD PRESSURE: 122 MMHG | HEIGHT: 62 IN | DIASTOLIC BLOOD PRESSURE: 70 MMHG | WEIGHT: 157 LBS | HEART RATE: 60 BPM

## 2020-09-29 DIAGNOSIS — I10 HTN (HYPERTENSION), BENIGN: ICD-10-CM

## 2020-09-29 DIAGNOSIS — Z12.11 ENCOUNTER FOR SCREENING FOR MALIGNANT NEOPLASM OF COLON: Primary | ICD-10-CM

## 2020-09-29 DIAGNOSIS — Z79.01 ANTICOAGULATED ON COUMADIN: ICD-10-CM

## 2020-09-29 PROCEDURE — S0260 H&P FOR SURGERY: HCPCS | Performed by: CLINICAL NURSE SPECIALIST

## 2020-09-29 RX ORDER — SODIUM, POTASSIUM,MAG SULFATES 17.5-3.13G
SOLUTION, RECONSTITUTED, ORAL ORAL
Qty: 2 BOTTLE | Refills: 0 | Status: ON HOLD | OUTPATIENT
Start: 2020-09-29 | End: 2020-11-13

## 2020-09-29 RX ORDER — MONTELUKAST SODIUM 10 MG/1
10 TABLET ORAL NIGHTLY
COMMUNITY
End: 2021-03-24 | Stop reason: SDUPTHER

## 2020-09-29 RX ORDER — WARFARIN SODIUM 4 MG/1
4 TABLET ORAL
COMMUNITY
End: 2020-11-24

## 2020-09-29 RX ORDER — TIZANIDINE 4 MG/1
4 TABLET ORAL NIGHTLY PRN
COMMUNITY
End: 2021-01-25

## 2020-09-29 RX ORDER — TAMOXIFEN CITRATE 20 MG/1
TABLET ORAL DAILY
COMMUNITY
End: 2021-01-25

## 2020-09-29 NOTE — PROGRESS NOTES
DIETER Blackwell  1955 9/29/2020  Chief Complaint   Patient presents with   • Colonoscopy     Subjective   HPI  DIETER Blackwell is a 64 y.o. female who presents as a referral for preventative maintenance. She has no complaints of nausea or vomiting. No change in bowels. No wt loss. No BRBPR. No melena. There is NO family hx for colon cancer. No abdominal pain.  Past Medical History:   Diagnosis Date   • A-fib (CMS/HCC)    • Abnormal ECG     tachycardia, a fib   • Acid reflux    • Anemia    • Arthritis    • Diabetes mellitus (CMS/HCC)    • Hyperkalemia    • Hypertension    • Hyponatremia    • IBS (irritable bowel syndrome)    • Sleep apnea     USES CPAP   • Vaginal vault prolapse      Past Surgical History:   Procedure Laterality Date   • COLON SURGERY     • COLONOSCOPY  09/21/2015    TRANSVERSE COLON ADENOMATOUS POLYP, HEMORRHOIDS, RECALL 5 YEARS, DR LAMAS   • COLOSTOMY     • COLPOPEXY VAGINAL      2014   • ENDOSCOPY N/A 11/3/2016    LA GRADE B ESOPHAGITIS WITH NO BLEEDING UPPER THIRD OF ESOPHAGUS, GERI-[place]TH TEAR GEJ, BILIOUS BLOOD TINGED COFFEE GROUND GATRIC FLUIDDR LAMAS   • EXPLORATORY LAPAROTOMY N/A 10/14/2016    Procedure: LAPAROTOMY EXPLORATORY, LYSIS OF ADHESIONS, IRRIAGATION OF ABDOMEN, POSSIBLE BOWEL RESECTION;  Surgeon: Bacilio Marcial MD;  Location:  [facility];  Service:    • HYSTERECTOMY     • SACROCOLPOPEXY N/A 9/21/2016    Procedure: SACROCOLPOPEXY LAPAROSCOPIC WITH DAVINCI SI ROBOT, CONVERTED TO OPEN SACROCOLPOPEXY, RIGHT SALPINGO- OOPHERECTOMY, CYSTO;  Surgeon: Maribell Beth MD;  Location:  [facility] OR;  Service:      Outpatient Medications Marked as Taking for the 9/29/20 encounter (Office Visit) with Teodora Macario APRN   Medication Sig Dispense Refill   • Cholecalciferol (VITAMIN D3) 5000 UNITS capsule capsule Take 5,000 Units by mouth Daily. Patient no longer takes     • dicyclomine (BENTYL) 20 MG tablet TAKE ONE-HALF (1/2) TABLET FOUR TIMES A  tablet 3   • dilTIAZem  (CARDIZEM) 30 MG tablet Take 30 mg by mouth 2 (two) times a day.     • ferrous sulfate 325 (65 FE) MG tablet Take 1 tablet by mouth 2 (Two) Times a Day With Meals. 60 tablet 0   • montelukast (SINGULAIR) 10 MG tablet Take 10 mg by mouth Every Night.     • Multiple Vitamin (MULTI VITAMIN DAILY PO) Take 1 tablet by mouth.     • omeprazole (PriLOSEC) 20 MG capsule Take 20 mg by mouth daily.     • RESTASIS 0.05 % ophthalmic emulsion Administer 1 drop to both eyes As Needed.     • tamoxifen (NOLVADEX) 20 MG chemo tablet Take  by mouth Daily.     • tiZANidine (ZANAFLEX) 4 MG tablet Take 4 mg by mouth At Night As Needed for Muscle Spasms.     • warfarin (COUMADIN) 4 MG tablet Take 4 mg by mouth Daily.       Allergies   Allergen Reactions   • Morphine And Related Dizziness     Gets very sick    • Percocet [Oxycodone-Acetaminophen] Dizziness     nausea     Social History     Socioeconomic History   • Marital status:      Spouse name: Not on file   • Number of children: Not on file   • Years of education: Not on file   • Highest education level: Not on file   Tobacco Use   • Smoking status: Never Smoker   • Smokeless tobacco: Never Used   Substance and Sexual Activity   • Alcohol use: No   • Drug use: No   • Sexual activity: Defer     Family History   Problem Relation Age of Onset   • Colon cancer Neg Hx    • Colon polyps Neg Hx      Health Maintenance   Topic Date Due   • URINE MICROALBUMIN  1955   • TDAP/TD VACCINES (1 - Tdap) 11/12/1972   • ZOSTER VACCINE (1 of 2) 11/12/2003   • HEPATITIS C SCREENING  09/12/2016   • MEDICARE ANNUAL WELLNESS  09/12/2016   • DIABETIC FOOT EXAM  09/12/2016   • DIABETIC EYE EXAM  09/12/2016   • HEMOGLOBIN A1C  04/16/2017   • MAMMOGRAM  04/19/2017   • Pneumococcal Vaccine 65+ (1 of 1 - PPSV23) 11/12/2018   • INFLUENZA VACCINE  08/01/2020   • COLONOSCOPY  09/21/2025       REVIEW OF SYSTEMS  General: well appearing, no fever chills or sweats, no unexplained wt loss  HEENT: no acute  "visual or hearing disturbances  Cardiovascular: No chest pain or palpitations  Pulmonary: No shortness of breath, coughing, wheezing or hemoptysis  : No burning, urgency, hematuria, or dysuria  Musculoskeletal: No joint pain or stiffness  Peripheral: no edema  Skin: No lesions or rashes  Neuro: No dizziness, headaches, stroke, syncope  Endocrine: No hot or cold intolerances  Hematological: No blood dyscrasias    Objective   Vitals:    09/29/20 0845   BP: 122/70   Pulse: 60   Temp: 97.8 °F (36.6 °C)   Weight: 71.2 kg (157 lb)   Height: 157.5 cm (62\")     Body mass index is 28.72 kg/m².  Patient's Body mass index is 28.72 kg/m². BMI is above normal parameters. Recommendations include: nutrition counseling.      PHYSICAL EXAM  General: age appropriate well nourished well appearing, no acute distress  Head: normocephalic and atraumatic  Global assessment-supple  Neck-No JVD noted, no lymphadenopathy  Pulmonary-clear to auscultation bilaterally, normal respiratory effort  Cardiovascular-normal rate and rhythm, normal heart sounds, S1 and S2 noted  Abdomen-soft, non tender, non distended, normal bowel sounds all 4 quadrants, no hepatosplenomegaly noted  Extremities-No clubbing cyanosis or edema  Neuro-Non focal, converses appropriately, awake, alert, oriented    Assessment/Plan     DIETER was seen today for colonoscopy.    Diagnoses and all orders for this visit:    Encounter for screening for malignant neoplasm of colon  -     Case Request; Standing  -     Follow Anesthesia Guidelines / Standing Orders; Future  -     Obtain Informed Consent; Future  -     Case Request  -     Suprep Bowel Prep Kit 17.5-3.13-1.6 GM/177ML solution oral solution; Take as directed by office instructions provided    Anticoagulated on Coumadin  Comments:  To hold per Dr Sims she takes due to Afib    HTN (hypertension), benign  Comments:  cont BP medication the day of procedure    She does have a hx of perforated bowel due to a surgical " complication she has had reversal this was in 2016.    COLONOSCOPY WITH ANESTHESIA (N/A)  Body mass index is 28.72 kg/m².    Patient instructions on prep prior to procedure provided to the patient.    All risks, benefits, alternatives, and indications of colonoscopy procedure have been discussed with the patient. Risks to include perforation of the colon requiring possible surgery or colostomy, risk of bleeding from biopsies or removal of colon tissue, possibility of missing a colon polyp or cancer, or adverse drug reaction.  Benefits to include the diagnosis and management of disease of the colon and rectum. Alternatives to include barium enema, radiographic evaluation, lab testing or no intervention. Pt verbalizes understanding and agrees.     Teodora Macario, MARÍA  2020  09:02 CDT      IF YOU SMOKE OR USE TOBACCO PLEASE READ THE FOLLOWIN minutes reading provided    Why is smoking bad for me?  Smoking increases the risk of heart disease, lung disease, vascular disease, stroke, and cancer.     If you smoke, STOP!    If you would like more information on quitting smoking, please visit the [employer] website: [email or web address]   This link will provide additional resources including the QUIT line and the [employer] support groups.     For more information:    Quit Now [place]  -QUIT-NOW  [email or web address]    Obesity, Adult  Obesity is the condition of having too much total body fat. Being overweight or obese means that your weight is greater than what is considered healthy for your body size. Obesity is determined by a measurement called BMI. BMI is an estimate of body fat and is calculated from height and weight. For adults, a BMI of 30 or higher is considered obese.  Obesity can eventually lead to other health concerns and major illnesses, including:  · Stroke.  · Coronary artery disease (CAD).  · Type 2 diabetes.  · Some  types of cancer, including cancers of the colon, breast, uterus, and gallbladder.  · Osteoarthritis.  · High blood pressure (hypertension).  · High cholesterol.  · Sleep apnea.  · Gallbladder stones.  · Infertility problems.  What are the causes?  The main cause of obesity is taking in (consuming) more calories than your body uses for energy. Other factors that contribute to this condition may include:  · Being born with genes that make you more likely to become obese.  · Having a medical condition that causes obesity. These conditions include:  ¨ Hypothyroidism.  ¨ Polycystic ovarian syndrome (PCOS).  ¨ Binge-eating disorder.  ¨ Cushing syndrome.  · Taking certain medicines, such as steroids, antidepressants, and seizure medicines.  · Not being physically active (sedentary lifestyle).  · Living where there are limited places to exercise safely or buy healthy foods.  · Not getting enough sleep.  What increases the risk?  The following factors may increase your risk of this condition:  · Having a family history of obesity.  · Being a woman of -American descent.  · Being a man of  descent.  What are the signs or symptoms?  Having excessive body fat is the main symptom of this condition.  How is this diagnosed?  This condition may be diagnosed based on:  · Your symptoms.  · Your medical history.  · A physical exam. Your health care provider may measure:  ¨ Your BMI. If you are an adult with a BMI between 25 and less than 30, you are considered overweight. If you are an adult with a BMI of 30 or higher, you are considered obese.  ¨ The distances around your hips and your waist (circumferences). These may be compared to each other to help diagnose your condition.  ¨ Your skinfold thickness. Your health care provider may gently pinch a fold of your skin and measure it.  How is this treated?  Treatment for this condition often includes changing your lifestyle. Treatment may include some or all of the  following:  · Dietary changes. Work with your health care provider and a dietitian to set a weight-loss goal that is healthy and reasonable for you. Dietary changes may include eating:  ¨ Smaller portions. A portion size is the amount of a particular food that is healthy for you to eat at one time. This varies from person to person.  ¨ Low-calorie or low-fat options.  ¨ More whole grains, fruits, and vegetables.  · Regular physical activity. This may include aerobic activity (cardio) and strength training.  · Medicine to help you lose weight. Your health care provider may prescribe medicine if you are unable to lose 1 pound a week after 6 weeks of eating more healthily and doing more physical activity.  · Surgery. Surgical options may include gastric banding and gastric bypass. Surgery may be done if:  ¨ Other treatments have not helped to improve your condition.  ¨ You have a BMI of 40 or higher.  ¨ You have life-threatening health problems related to obesity.  Follow these instructions at home:     Eating and drinking     · Follow recommendations from your health care provider about what you eat and drink. Your health care provider may advise you to:  ¨ Limit fast foods, sweets, and processed snack foods.  ¨ Choose low-fat options, such as low-fat milk instead of whole milk.  ¨ Eat 5 or more servings of fruits or vegetables every day.  ¨ Eat at home more often. This gives you more control over what you eat.  ¨ Choose healthy foods when you eat out.  ¨ Learn what a healthy portion size is.  ¨ Keep low-fat snacks on hand.  ¨ Avoid sugary drinks, such as soda, fruit juice, iced tea sweetened with sugar, and flavored milk.  ¨ Eat a healthy breakfast.  · Drink enough water to keep your urine clear or pale yellow.  · Do not go without eating for long periods of time (do not fast) or follow a fad diet. Fasting and fad diets can be unhealthy and even dangerous.  Physical Activity   · Exercise regularly, as told by your  health care provider. Ask your health care provider what types of exercise are safe for you and how often you should exercise.  · Warm up and stretch before being active.  · Cool down and stretch after being active.  · Rest between periods of activity.  Lifestyle   · Limit the time that you spend in front of your TV, computer, or video game system.  · Find ways to reward yourself that do not involve food.  · Limit alcohol intake to no more than 1 drink a day for nonpregnant women and 2 drinks a day for men. One drink equals 12 oz of beer, 5 oz of wine, or 1½ oz of hard liquor.  General instructions   · Keep a weight loss journal to keep track of the food you eat and how much you exercise you get.  · Take over-the-counter and prescription medicines only as told by your health care provider.  · Take vitamins and supplements only as told by your health care provider.  · Consider joining a support group. Your health care provider may be able to recommend a support group.  · Keep all follow-up visits as told by your health care provider. This is important.  Contact a health care provider if:  · You are unable to meet your weight loss goal after 6 weeks of dietary and lifestyle changes.  This information is not intended to replace advice given to you by your health care provider. Make sure you discuss any questions you have with your health care provider.  Document Released: 01/25/2006 Document Revised: 05/22/2017 Document Reviewed: 10/05/2016  Elsevier Interactive Patient Education © 2017 [employer]vier [employer]

## 2020-09-30 PROBLEM — Z12.11 ENCOUNTER FOR SCREENING FOR MALIGNANT NEOPLASM OF COLON: Status: ACTIVE | Noted: 2020-09-30

## 2020-10-01 ENCOUNTER — TELEPHONE (OUTPATIENT)
Dept: SURGERY | Age: 65
End: 2020-10-01

## 2020-10-01 NOTE — TELEPHONE ENCOUNTER
Spoke with pt and discussed her new genetic testing report. 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 Her gene increases her risk of pancreatic cancer and melanoma. I told her I would reach out to Dr. Ashutosh Virk. I will also refer her to [facility] to have skin checks every 6 months. It is also recommended the have MRI of her pancreas. She is a patient of Dr. Zaid Hidalgo. Please refer pt to [facility].

## 2020-10-02 ENCOUNTER — TELEPHONE (OUTPATIENT)
Dept: SURGERY | Age: 65
End: 2020-10-02

## 2020-10-02 NOTE — TELEPHONE ENCOUNTER
Please call patient and let her know I discussed her care with Dr. Dave Godwin. She will need MRCP every year and  every 6 months. I have placed orders. Please schedule MRCP and let her know she can come to the lab without an appt to have the lab drawn. I placed orders for both.   Please let her know her insurance may not cover the lab test.

## 2020-10-02 NOTE — TELEPHONE ENCOUNTER
----- Message from Fabiana Correia MD sent at 10/2/2020  2:32 PM CDT -----  Regarding: RE: Amended genetic testing report  Agreed. UpToDate has a nice recommendation. I would say MRCP yearly and CA 19-9 q 6 months ( no good data to support it , but would do it)  ----- Message -----  From: John Pan PA-C  Sent: 10/2/2020  10:19 AM CDT  To: Fabiana Correia MD  Subject: RE: Amended genetic testing report               Donya Griffin.  Thank you so much for your input. The screening recommendations for pancreatic cancer are evolving. I was thinking annual MRCP since she has no known pancreatic cancer history in her family. Would you agree? There are no clear cut guidelines. I referenced NCCN criteria and it doesn't give an interval for screening MRCP. Thanks,  Wilber Holloway  ----- Message -----  From: Fabiana Correia MD  Sent: 10/1/2020   4:43 PM CDT  To: John Pan PA-C  Subject: RE: Amended genetic testing report               Thanks Wilber Holloway. Please go ahead and manage that. I do not think that GI will be managing screening. I would rather you to take care of it.  ----- Message -----  From: John Pan PA-C  Sent: 10/1/2020   2:10 PM CDT  To: Fabiana oCrreia MD  Subject: Amended genetic testing report                   Dr. Richie Mari,    I wanted to make you aware of Ms. Peter Gates' amended genetic testing report. 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 She is at increased risk for pancreatic cancer and melanoma. I'm planning to refer her to [facility] for skin checks. She is a GI patient of Dr. Willie Maria. Would it be your preference to have him manage pancreatic imaging (MRI/magnetic resonance cholangiopancreatography and/or endoscopic ultrasound) or you or me do that?       Thanks,  Wilber Holloway

## 2020-10-05 NOTE — TELEPHONE ENCOUNTER
Confirmed with patient  She is scheduled for MRI at [facility] on 10/29/2020 at 12 PM. NPO 4-6 hours    She will see Dr. Carolina Morales (Dermatology) on 10/12/2020 @ 1:55 Pm. Al referral information has been faxed to their office at [phone]    I did let her know that insurance may not cover the lab so, she will call her insurance and ask.  I gave her Info of Lab Order

## 2020-10-19 ENCOUNTER — HOSPITAL ENCOUNTER (OUTPATIENT)
Dept: MRI IMAGING | Age: 65
Discharge: HOME | End: 2020-10-19
Payer: MEDICARE

## 2020-10-19 LAB
GFR AFRICAN AMERICAN: >60
GFR NON-AFRICAN AMERICAN: >60
PERFORMED ON: NORMAL
POC CREATININE: 0.5 MG/DL (ref 0.3–1.3)
POC SAMPLE TYPE: NORMAL

## 2020-10-19 PROCEDURE — 82565 ASSAY OF CREATININE: CPT

## 2020-10-19 PROCEDURE — 6360000004 HC RX CONTRAST MEDICATION: Performed by: PHYSICIAN ASSISTANT

## 2020-10-19 PROCEDURE — A9577 INJ MULTIHANCE: HCPCS | Performed by: PHYSICIAN ASSISTANT

## 2020-10-19 PROCEDURE — 74183 MRI ABD W/O CNTR FLWD CNTR: CPT

## 2020-10-19 RX ADMIN — GADOBENATE DIMEGLUMINE 14 ML: 529 INJECTION, SOLUTION INTRAVENOUS at 12:54

## 2020-10-26 ENCOUNTER — TELEPHONE (OUTPATIENT)
Dept: SURGERY | Age: 65
End: 2020-10-26

## 2020-10-26 NOTE — TELEPHONE ENCOUNTER
Allergy Staff Appt Hours Shot Hours Locations    Physician     Harinder Borden DO       Support Staff     Cat ROJAS RN      Mirella ALEX MA  Monday:                      [place] 8-7     Tuesday:         [place] 8-5 Wednesday:        [place]: 7-5     Friday:        [place] 7-5   [place]        Monday: 9-6        Friday: 7-2     [place]        Tuesday: 7-11 Thursday: 1:30-7     [place]        Tuesday: 1-7        Wednesday: 11-6 Thursday: 7-12 [facility]  [address] [place] [address]  Appt Line: [phone]  Allergy RN (Monday):  [phone]    [facility]  [address] [place] [address]  Appt Line: [phone]  Allergy RN (Tues & Wed):  [phone]    [facility]  [address] [place] [address]  Appt Line: [phone]  Allergy RN (Friday):  [phone]       Important Scheduling Information  Aspirin Desensitization: Appt will last 2 clinic days. Please call the Allergy RN line for your clinic to schedule. Discontinue antihistamines 7 days prior to the appointment.     Food Challenges: Appt will last 3-4 hours. Please call the Allergy RN line for your clinic to schedule. Discontinue antihistamines 7 days prior to the appointment.     Penicillin Testing: Appt will last 2-3 hours. Please call the Allergy RN line for your clinic to schedule. Discontinue antihistamines 7 days prior to the appointment.     Skin Testing: Appt will about 40 minutes. Call the appointment line for your clinic to schedule. Discontinue antihistamines 7 days prior to the appointment.     Venom Testing: Appt will last 2-3 hours. Please call the Allergy RN line for your clinic to schedule. Discontinue antihistamines 7 days prior to the appointment.     Thank you for trusting us with your Allergy, Asthma, and Immunology care. Please feel free to contact us with any questions or concerns you may have.      - Dymista 1 sprays/nostril twice daily as needed.   - Cetirizine  Patient called in and stated that she had an MRI on October 19th and she has not been called with results ands he is worried. Please call patient back and advise.   Thank you  Ph [phone]  TING Jerry (Zyrtec) 10mg by mouth daily.   - Use medications spring and fall.   - Return in spring of 2018.

## 2020-11-05 ENCOUNTER — TRANSCRIBE ORDERS (OUTPATIENT)
Dept: LAB | Facility: HOSPITAL | Age: 65
End: 2020-11-05

## 2020-11-05 DIAGNOSIS — Z01.818 PREOPERATIVE TESTING: Primary | ICD-10-CM

## 2020-11-10 ENCOUNTER — HOSPITAL ENCOUNTER (EMERGENCY)
Age: 65
Discharge: HOME | End: 2020-11-10
Attending: EMERGENCY MEDICINE
Payer: MEDICARE

## 2020-11-10 ENCOUNTER — LAB (OUTPATIENT)
Dept: LAB | Facility: HOSPITAL | Age: 65
End: 2020-11-10

## 2020-11-10 ENCOUNTER — OFFICE VISIT (OUTPATIENT)
Dept: URGENT CARE | Age: 65
End: 2020-11-10

## 2020-11-10 VITALS
RESPIRATION RATE: 20 BRPM | TEMPERATURE: 98.1 F | OXYGEN SATURATION: 95 % | BODY MASS INDEX: 27.97 KG/M2 | HEART RATE: 70 BPM | SYSTOLIC BLOOD PRESSURE: 138 MMHG | HEIGHT: 62 IN | WEIGHT: 152 LBS | DIASTOLIC BLOOD PRESSURE: 70 MMHG

## 2020-11-10 LAB
ALBUMIN SERPL-MCNC: 3.9 G/DL (ref 3.5–5.2)
ALP BLD-CCNC: 98 U/L (ref 35–104)
ALT SERPL-CCNC: 18 U/L (ref 5–33)
ANION GAP SERPL CALCULATED.3IONS-SCNC: 7 MMOL/L (ref 7–19)
APTT: 33 SEC (ref 26–36.2)
AST SERPL-CCNC: 19 U/L (ref 5–32)
BASOPHILS ABSOLUTE: 0 K/UL (ref 0–0.2)
BASOPHILS RELATIVE PERCENT: 0.5 % (ref 0–1)
BILIRUB SERPL-MCNC: 0.8 MG/DL (ref 0.2–1.2)
BUN BLDV-MCNC: 20 MG/DL (ref 8–23)
CALCIUM SERPL-MCNC: 8.8 MG/DL (ref 8.8–10.2)
CHLORIDE BLD-SCNC: 111 MMOL/L (ref 98–111)
CO2: 26 MMOL/L (ref 22–29)
CREAT SERPL-MCNC: 0.5 MG/DL (ref 0.5–0.9)
EOSINOPHILS ABSOLUTE: 0.1 K/UL (ref 0–0.6)
EOSINOPHILS RELATIVE PERCENT: 1.7 % (ref 0–5)
GFR AFRICAN AMERICAN: >59
GFR NON-AFRICAN AMERICAN: >60
GLUCOSE BLD-MCNC: 101 MG/DL (ref 74–109)
HCT VFR BLD CALC: 38.7 % (ref 37–47)
HEMOGLOBIN: 12.2 G/DL (ref 12–16)
IMMATURE GRANULOCYTES #: 0 K/UL
INR BLD: 1.59 (ref 0.88–1.18)
LYMPHOCYTES ABSOLUTE: 1.5 K/UL (ref 1.1–4.5)
LYMPHOCYTES RELATIVE PERCENT: 24.1 % (ref 20–40)
MCH RBC QN AUTO: 30.1 PG (ref 27–31)
MCHC RBC AUTO-ENTMCNC: 31.5 G/DL (ref 33–37)
MCV RBC AUTO: 95.6 FL (ref 81–99)
MONOCYTES ABSOLUTE: 0.7 K/UL (ref 0–0.9)
MONOCYTES RELATIVE PERCENT: 11.1 % (ref 0–10)
NEUTROPHILS ABSOLUTE: 3.9 K/UL (ref 1.5–7.5)
NEUTROPHILS RELATIVE PERCENT: 62.3 % (ref 50–65)
PDW BLD-RTO: 13.5 % (ref 11.5–14.5)
PLATELET # BLD: 161 K/UL (ref 130–400)
PMV BLD AUTO: 10.8 FL (ref 9.4–12.3)
POTASSIUM SERPL-SCNC: 3.7 MMOL/L (ref 3.5–5)
PROTHROMBIN TIME: 19.1 SEC (ref 12–14.6)
RBC # BLD: 4.05 M/UL (ref 4.2–5.4)
SODIUM BLD-SCNC: 144 MMOL/L (ref 136–145)
TOTAL PROTEIN: 6.4 G/DL (ref 6.6–8.7)
WBC # BLD: 6.3 K/UL (ref 4.8–10.8)

## 2020-11-10 PROCEDURE — 85730 THROMBOPLASTIN TIME PARTIAL: CPT

## 2020-11-10 PROCEDURE — 80053 COMPREHEN METABOLIC PANEL: CPT

## 2020-11-10 PROCEDURE — 99999 PR OFFICE/OUTPT VISIT,PROCEDURE ONLY: CPT | Performed by: NURSE PRACTITIONER

## 2020-11-10 PROCEDURE — 99284 EMERGENCY DEPT VISIT MOD MDM: CPT

## 2020-11-10 PROCEDURE — 36415 COLL VENOUS BLD VENIPUNCTURE: CPT

## 2020-11-10 PROCEDURE — C9803 HOPD COVID-19 SPEC COLLECT: HCPCS | Performed by: INTERNAL MEDICINE

## 2020-11-10 PROCEDURE — 99999 PR OFFICE/OUTPT VISIT,PROCEDURE ONLY: CPT | Performed by: EMERGENCY MEDICINE

## 2020-11-10 PROCEDURE — 85025 COMPLETE CBC W/AUTO DIFF WBC: CPT

## 2020-11-10 PROCEDURE — 85610 PROTHROMBIN TIME: CPT

## 2020-11-10 PROCEDURE — U0003 INFECTIOUS AGENT DETECTION BY NUCLEIC ACID (DNA OR RNA); SEVERE ACUTE RESPIRATORY SYNDROME CORONAVIRUS 2 (SARS-COV-2) (CORONAVIRUS DISEASE [COVID-19]), AMPLIFIED PROBE TECHNIQUE, MAKING USE OF HIGH THROUGHPUT TECHNOLOGIES AS DESCRIBED BY CMS-2020-01-R: HCPCS | Performed by: INTERNAL MEDICINE

## 2020-11-10 ASSESSMENT — ENCOUNTER SYMPTOMS
CHEST TIGHTNESS: 0
ABDOMINAL DISTENTION: 0
RHINORRHEA: 0
BLOOD IN STOOL: 0
WHEEZING: 0
BACK PAIN: 0
ABDOMINAL PAIN: 0
CONSTIPATION: 0
SHORTNESS OF BREATH: 0
EYE PAIN: 0
SORE THROAT: 0
DIARRHEA: 0
NAUSEA: 0
VOMITING: 0
TROUBLE SWALLOWING: 0
COLOR CHANGE: 0
PHOTOPHOBIA: 0
ANAL BLEEDING: 1
COUGH: 0

## 2020-11-10 NOTE — ED PROVIDER NOTES
[facility] [place] [facility] EMERGENCY DEPT  eMERGENCY dEPARTMENT eNCOUnter      Pt Name: Kina Goodwin  MRN: [id number]  Leonorgfiker 1955  Date of evaluation: 11/10/2020  Provider: Doris Gee MD    [address] [place] [address]       Chief Complaint   Patient presents with    Rectal Bleeding         HISTORY OF PRESENT ILLNESS   (Location/Symptom, Timing/Onset,Context/Setting, Quality, Duration, Modifying Factors, Severity)  Note limiting factors. Kina Goodwin is a 75 y.o. female who presents to the emergency department for rectal bleeding. Started around mid morning today. It began mildly. Pt had three episodes where she wiped and then had the blood on the tissue. Denies any pain. No prior hx of bleeding. HPI    NursingNotes were reviewed. REVIEW OF SYSTEMS    (2-9 systems for level 4, 10 or more for level 5)     Review of Systems   Constitutional: Negative for activity change, appetite change, chills, fatigue and fever. HENT: Negative for congestion, ear pain, rhinorrhea, sore throat and trouble swallowing. Eyes: Negative for photophobia, pain and visual disturbance. Respiratory: Negative for cough, chest tightness, shortness of breath and wheezing. Cardiovascular: Negative for chest pain, palpitations and leg swelling. Gastrointestinal: Positive for anal bleeding. Negative for abdominal distention, abdominal pain, blood in stool, constipation, diarrhea, nausea and vomiting. Genitourinary: Negative for difficulty urinating, dysuria, flank pain, urgency, vaginal bleeding and vaginal discharge. Musculoskeletal: Negative for back pain, myalgias and neck stiffness. Skin: Negative for color change, pallor and rash. Neurological: Negative for dizziness, weakness, light-headedness, numbness and headaches. Psychiatric/Behavioral: Negative for agitation, behavioral problems, confusion, hallucinations and suicidal ideas.             PAST MEDICALHISTORY     Past Medical History:   Diagnosis Date    [facility]    Acid reflux     Breast mass     Cancer ([facility] [address]     Breast Cancer in [address] Diabetes ([facility] [address]     type 2 (currently diet-controlled)    Hyperlipidemia     elevated triglycerides, but low cholesterol level    Hypertension     Prolonged emergence from general anesthesia     Sleep apnea     CPAP         SURGICAL HISTORY       Past Surgical History:   Procedure Laterality Date    BREAST BIOPSY Left 2/26/2019    EXCISION LEFT BREAST MASS WITH INTRAOP ULTRASOUND GUIDED NEEDLE LOCALIZATION performed by Hiram Frank MD at [address] [facility] LUMPECTOMY Left 1/31/2020    PREOP ULTRASOUND WITH LEFT LUMPECTOMY, NO SENTINEL NODE BIOPSY, INTRAOP ULTRASOUND GUIDED NEEDLE LOCALIZATION, BIOZORB, FLAPS, PECTORAL BLOCK, IORT performed by Hiram Frank MD at [place] [address]      after vaginal prolapse repair/damaged    HYSTERECTOMY      HYSTERECTOMY, VAGINAL      PLANTAR FASCIA SURGERY Bilateral 7/19/2016    PLANTAR FASCIA INJECTION FEET performed by Blane Rodrigues DPM at [place] [address]  2013         CURRENT MEDICATIONS     Previous Medications    DICYCLOMINE (BENTYL) 20 MG TABLET    Take 20 mg by mouth 4 times daily as needed    DILTIAZEM (CARDIZEM) 30 MG TABLET    Take 30 mg by mouth 2 times daily    FERROUS SULFATE 325 (65 FE) MG TABLET    Take 325 mg by mouth daily (with breakfast)    LOPERAMIDE (IMODIUM) 2 MG CAPSULE    Take 2 mg by mouth 4 times daily as needed for Diarrhea    MONTELUKAST (SINGULAIR) 10 MG TABLET    Take 10 mg by mouth daily    MONTELUKAST (SINGULAIR) 10 MG TABLET    Take 1 tablet by mouth nightly    MULTIPLE VITAMINS-MINERALS (THERAPEUTIC MULTIVITAMIN-MINERALS) TABLET    Take 1 tablet by mouth daily    OMEPRAZOLE (PRILOSEC) 20 MG CAPSULE    Take 20 mg by mouth daily    PSYLLIUM (METAMUCIL) 0.52 G CAPSULE    Take 0.52 g by mouth 2 times daily    TAMOXIFEN (NOLVADEX) 20 MG TABLET Take 1 tablet by mouth daily    TAMOXIFEN (NOLVADEX) 20 MG TABLET    Take 1 tablet by mouth daily    WARFARIN (COUMADIN) 4 MG TABLET    Take 4 mg by mouth       ALLERGIES     Morphine and Oxycodone    FAMILY HISTORY       Family History   Problem Relation Age of Onset    Diabetes Mother     Diabetes Father     Lung Cancer Father     Breast Cancer Sister     Brain Cancer Brother     Diabetes Sister     Diabetes Sister     Diabetes Sister     Diabetes Sister     Diabetes Brother     Diabetes Brother     Diabetes Brother     Uterine Cancer Maternal Aunt     Uterine Cancer Niece         Age Unknown    ADHD Niece     Breast Cancer Niece           SOCIAL HISTORY       Social History     Socioeconomic History    Marital status:       Spouse name: None    Number of children: None    Years of education: None    Highest education level: None   Occupational History    None   Social Needs    Financial resource strain: None    Food insecurity     Worry: None     Inability: None    Transportation needs     Medical: None     Non-medical: None   Tobacco Use    Smoking status: Never Smoker    Smokeless tobacco: Never Used   Substance and Sexual Activity    Alcohol use: No    Drug use: No    Sexual activity: None   Lifestyle    Physical activity     Days per week: None     Minutes per session: None    Stress: None   Relationships    Social connections     Talks on phone: None     Gets together: None     Attends Congregation service: None     Active member of club or organization: None     Attends meetings of clubs or organizations: None     Relationship status: None    Intimate partner violence     Fear of current or ex partner: None     Emotionally abused: None     Physically abused: None     Forced sexual activity: None   Other Topics Concern    None   Social History Narrative    None       SCREENINGS    [place] Coma Scale  Eye Opening: Spontaneous  Best Verbal Response: Oriented  Best Motor Response: Obeys commands  Evar[place] Coma Scale Score: 15        PHYSICAL EXAM    (up to 7 for level 4, 8 or more for level 5)     ED Triage Vitals [11/10/20 1503]   BP Temp Temp src Pulse Resp SpO2 Height Weight   (!) 140/91 98.1 °F (36.7 °C) -- 107 20 93 % 5' 2\" (1.575 m) 152 lb (68.9 kg)       Physical Exam  Vitals signs and nursing note reviewed. Constitutional:       Appearance: Normal appearance. She is not ill-appearing. HENT:      Head: Normocephalic and atraumatic. Nose: Nose normal.      Mouth/Throat:      Mouth: Mucous membranes are moist.      Pharynx: Oropharynx is clear. Eyes:      Extraocular Movements: Extraocular movements intact. Pupils: Pupils are equal, round, and reactive to light. Cardiovascular:      Rate and Rhythm: Normal rate and regular rhythm. Heart sounds: No murmur. Pulmonary:      Effort: Pulmonary effort is normal.      Breath sounds: Normal breath sounds. No wheezing, rhonchi or rales. Abdominal:      General: There is no distension. Palpations: Abdomen is soft. Tenderness: There is no abdominal tenderness. Skin:     General: Skin is warm and dry. Capillary Refill: Capillary refill takes less than 2 seconds. Neurological:      General: No focal deficit present. Mental Status: She is alert and oriented to person, place, and time. Cranial Nerves: No cranial nerve deficit. Psychiatric:         Mood and Affect: Mood is anxious.          Speech: Speech normal.         Behavior: Behavior normal.         DIAGNOSTIC RESULTS     EKG: All EKG's areinterpreted by the Emergency Department Physician who either signs or Co-signs this chart in the absence of a cardiologist.        RADIOLOGY:  Non-plain film images such as CT, Ultrasound and MRI are read by the radiologist. Plain radiographic images are visualized and preliminarily interpreted bythe emergency physician with the below findings:          No orders to display           LABS:  Labs Reviewed   CBC WITH AUTO DIFFERENTIAL - Abnormal; Notable for the following components:       Result Value    RBC 4.05 (*)     MCHC 31.5 (*)     Monocytes % 11.1 (*)     All other components within normal limits   COMPREHENSIVE METABOLIC PANEL - Abnormal; Notable for the following components: Total Protein 6.4 (*)     All other components within normal limits   PROTIME-INR - Abnormal; Notable for the following components:    Protime 19.1 (*)     INR 1.59 (*)     All other components within normal limits   APTT       All other labs were within normal range or not returned as of this dictation. EMERGENCY DEPARTMENT COURSE and DIFFERENTIAL DIAGNOSIS/MDM:   Vitals:    Vitals:    11/10/20 1503 11/10/20 1845   BP: (!) 140/91 136/89   Pulse: 107 101   Resp: 20 18   Temp: 98.1 °F (36.7 °C)    SpO2: 93% 94%   Weight: 152 lb (68.9 kg)    Height: 5' 2\" (1.575 m)        MDM  Number of Diagnoses or Management Options  Rectal bleed: new and requires workup  Diagnosis management comments: Work-up this evening is unremarkable and labs. Patient has decided that she would like to leave prior to finishing the exam.  She has not had any further rectal bleeding while here in the ED. Based on what she is telling me I suspect that this might be more of a hemorrhoid. Her INR still a bit elevated at 1.5, but she told me it was 3.42 days ago. I suspect that the increased bleeding time is going to be part of why she had blood. Patient has a colonoscopy scheduled in 3 days. I explained to her about strict return precautions for the rectal bleeding. She was told she would return if she has any further issues or new complaints. Patient agreement with discharge. Patient Progress  Patient progress: stable      Reassessment      CONSULTS:  None    PROCEDURES:  Unless otherwise noted below, none     Procedures    FINAL IMPRESSION      1.  Rectal bleed          DISPOSITION/PLAN   DISPOSITION Decision To Discharge 11/10/2020 08:15:17 PM      PATIENT REFERRED TO:  Tim Tanner MD    Call in 3 days  For follow up      [address]:  New Prescriptions    No medications on file          (Please note that portions of this note were completed with a voice recognition program.  Efforts were made to edit thedictations but occasionally words are mis-transcribed.)    Nano Romano MD (electronically signed)  Attending Emergency Physician         Vicki Brown MD  11/10/20 2023

## 2020-11-11 LAB
COVID LABCORP PRIORITY: NORMAL
SARS-COV-2 RNA RESP QL NAA+PROBE: NOT DETECTED

## 2020-11-13 ENCOUNTER — ANESTHESIA EVENT (OUTPATIENT)
Dept: GASTROENTEROLOGY | Facility: HOSPITAL | Age: 65
End: 2020-11-13

## 2020-11-13 ENCOUNTER — ANESTHESIA (OUTPATIENT)
Dept: GASTROENTEROLOGY | Facility: HOSPITAL | Age: 65
End: 2020-11-13

## 2020-11-13 ENCOUNTER — HOSPITAL ENCOUNTER (OUTPATIENT)
Facility: HOSPITAL | Age: 65
Setting detail: HOSPITAL OUTPATIENT SURGERY
Discharge: HOME | End: 2020-11-13
Attending: INTERNAL MEDICINE | Admitting: INTERNAL MEDICINE

## 2020-11-13 VITALS
WEIGHT: 150 LBS | SYSTOLIC BLOOD PRESSURE: 115 MMHG | HEIGHT: 63 IN | HEART RATE: 77 BPM | OXYGEN SATURATION: 98 % | TEMPERATURE: 98 F | RESPIRATION RATE: 18 BRPM | DIASTOLIC BLOOD PRESSURE: 83 MMHG | BODY MASS INDEX: 26.58 KG/M2

## 2020-11-13 DIAGNOSIS — Z12.11 ENCOUNTER FOR SCREENING FOR MALIGNANT NEOPLASM OF COLON: ICD-10-CM

## 2020-11-13 LAB — GLUCOSE BLDC GLUCOMTR-MCNC: 93 MG/DL (ref 70–130)

## 2020-11-13 PROCEDURE — 88305 TISSUE EXAM BY PATHOLOGIST: CPT | Performed by: INTERNAL MEDICINE

## 2020-11-13 PROCEDURE — 82962 GLUCOSE BLOOD TEST: CPT

## 2020-11-13 PROCEDURE — 25010000002 PROPOFOL 10 MG/ML EMULSION: Performed by: NURSE ANESTHETIST, CERTIFIED REGISTERED

## 2020-11-13 PROCEDURE — 45385 COLONOSCOPY W/LESION REMOVAL: CPT | Performed by: INTERNAL MEDICINE

## 2020-11-13 RX ORDER — SODIUM CHLORIDE 0.9 % (FLUSH) 0.9 %
10 SYRINGE (ML) INJECTION AS NEEDED
Status: DISCONTINUED | OUTPATIENT
Start: 2020-11-13 | End: 2020-11-13 | Stop reason: HOSPADM

## 2020-11-13 RX ORDER — LIDOCAINE HYDROCHLORIDE 10 MG/ML
0.5 INJECTION, SOLUTION EPIDURAL; INFILTRATION; INTRACAUDAL; PERINEURAL ONCE AS NEEDED
Status: DISCONTINUED | OUTPATIENT
Start: 2020-11-13 | End: 2020-11-13 | Stop reason: HOSPADM

## 2020-11-13 RX ORDER — SODIUM CHLORIDE 9 MG/ML
500 INJECTION, SOLUTION INTRAVENOUS CONTINUOUS PRN
Status: DISCONTINUED | OUTPATIENT
Start: 2020-11-13 | End: 2020-11-13 | Stop reason: HOSPADM

## 2020-11-13 RX ORDER — PROPOFOL 10 MG/ML
VIAL (ML) INTRAVENOUS AS NEEDED
Status: DISCONTINUED | OUTPATIENT
Start: 2020-11-13 | End: 2020-11-13 | Stop reason: SURG

## 2020-11-13 RX ADMIN — LIDOCAINE HYDROCHLORIDE 50 MG: 20 INJECTION, SOLUTION INTRAVENOUS at 09:33

## 2020-11-13 RX ADMIN — SODIUM CHLORIDE 500 ML: 9 INJECTION, SOLUTION INTRAVENOUS at 07:51

## 2020-11-13 RX ADMIN — PROPOFOL 180 MG: 10 INJECTION, EMULSION INTRAVENOUS at 09:33

## 2020-11-13 NOTE — H&P
[facility] Gastroenterology  Pre Procedure History & Physical    Chief Complaint:   History of polyps    Subjective     HPI:   Here for colonoscopy.  History of polyps    Past Medical History:   Past Medical History:   Diagnosis Date   • A-fib (CMS/HCC)    • Abnormal ECG     tachycardia, a fib   • Acid reflux    • Anemia    • Arthritis    • Diabetes mellitus (CMS/HCC)    • Hyperkalemia    • Hypertension    • Hyponatremia    • IBS (irritable bowel syndrome)    • Sleep apnea     USES CPAP   • Vaginal vault prolapse        Past Surgical History:  Past Surgical History:   Procedure Laterality Date   • COLON SURGERY     • COLONOSCOPY  09/21/2015    TRANSVERSE COLON ADENOMATOUS POLYP, HEMORRHOIDS, RECALL 5 YEARS, DR LAMAS   • COLOSTOMY     • COLPOPEXY VAGINAL      2014   • ENDOSCOPY N/A 11/3/2016    LA GRADE B ESOPHAGITIS WITH NO BLEEDING UPPER THIRD OF ESOPHAGUS, GERI-[place] TEAR GEJ, BILIOUS BLOOD TINGED COFFEE GROUND GATRIC FLUIDDR LAMAS   • EXPLORATORY LAPAROTOMY N/A 10/14/2016    Procedure: LAPAROTOMY EXPLORATORY, LYSIS OF ADHESIONS, IRRIAGATION OF ABDOMEN, POSSIBLE BOWEL RESECTION;  Surgeon: Bacilio Marcial MD;  Location: [facility] OR;  Service:    • HYSTERECTOMY     • REVISION / TAKEDOWN COLOSTOMY     • SACROCOLPOPEXY N/A 9/21/2016    Procedure: SACROCOLPOPEXY LAPAROSCOPIC WITH DAVINCI SI ROBOT, CONVERTED TO OPEN SACROCOLPOPEXY, RIGHT SALPINGO- OOPHERECTOMY, CYSTO;  Surgeon: Maribell Beth MD;  Location: [facility] OR;  Service:        Family History:  Family History   Problem Relation Age of Onset   • Colon cancer Neg Hx    • Colon polyps Neg Hx        Social History:   reports that she has never smoked. She has never used smokeless tobacco. She reports that she does not drink alcohol or use drugs.    Medications:   Prior to Admission medications    Medication Sig Start Date End Date Taking? Authorizing Provider   Cholecalciferol (VITAMIN D3) 5000 UNITS capsule capsule Take 5,000 Units by mouth Daily. Patient  "no longer takes   Yes David Hand MD   dicyclomine (BENTYL) 20 MG tablet TAKE ONE-HALF (1/2) TABLET FOUR TIMES A DAY 2/12/18  Yes Teodora Macario APRN   enoxaparin (LOVENOX) 80 MG/0.8ML solution syringe Inject 80 mg under the skin into the appropriate area as directed Every 12 (Twelve) Hours.   Yes David Hand MD   ferrous sulfate 325 (65 FE) MG tablet Take 1 tablet by mouth 2 (Two) Times a Day With Meals. 12/9/16  Yes Chelly Romano APRN   montelukast (SINGULAIR) 10 MG tablet Take 10 mg by mouth Every Night.   Yes David Hand MD   Multiple Vitamin (MULTI VITAMIN DAILY PO) Take 1 tablet by mouth.   Yes David Hand MD   omeprazole (PriLOSEC) 20 MG capsule Take 20 mg by mouth daily. 8/11/16  Yes David Hand MD   tamoxifen (NOLVADEX) 20 MG chemo tablet Take  by mouth Daily.   Yes David Hand MD   tiZANidine (ZANAFLEX) 4 MG tablet Take 4 mg by mouth At Night As Needed for Muscle Spasms.   Yes David Hand MD   dilTIAZem (CARDIZEM) 30 MG tablet Take 30 mg by mouth 2 (two) times a day.    David Hand MD   RESTASIS 0.05 % ophthalmic emulsion Administer 1 drop to both eyes As Needed. 8/11/16   aDvid Hand MD   warfarin (COUMADIN) 4 MG tablet Take 4 mg by mouth Daily.    David Hand MD   Suprep Bowel Prep Kit 17.5-3.13-1.6 GM/177ML solution oral solution Take as directed by office instructions provided 9/29/20 11/13/20  Teodora Macario APRN       Allergies:  Morphine and related and Percocet [oxycodone-acetaminophen]    Objective     Blood pressure 135/90, pulse 104, temperature 98 °F (36.7 °C), temperature source Temporal, resp. rate 18, height 160 cm (63\"), weight 68 kg (150 lb), SpO2 95 %, not currently breastfeeding.    Physical Exam   Constitutional: Pt is oriented to person, place, and in no distress.   HENT: Mouth/Throat: Oropharynx is clear.   Cardiovascular: Normal rate, regular rhythm.  "   Pulmonary/Chest: Effort normal. No respiratory distress. No  wheezes.   Abdominal: Soft. Non-distended.  Skin: Skin is warm and dry.   Psychiatric: Mood, memory, affect and judgment appear normal.     Assessment/Plan     Diagnosis:  History of polyps    Anticipated Surgical Procedure:    Proceed with colonoscopy as scheduled    The following major R/B/A were discussed with the patient, however the list is not all inclusive . Risk:  Bleeding (immediate and delayed), perforation (rupture or tear), reaction to medication, missed lesion/cancer, pain during the procedure, infection, need for surgery, need for ostomy, need for mechanical ventilation (breathing machine), death.  Benefits: removal of polyp/tissue, burn/clip/or inject to stop bleeding, removal of foreign body, dilate any stricture.  Alternatives: Xray or CT, surgery, do nothing with associated risk   The patient was given time to ask question and received explanation, and agrees to proceed as per History and Physical.   No guarantee given or expressed.    EMR Dragon/transcription disclaimer: Much of this encounter note is an electronic transcription/translation of spoken language to printed text.  The electronic translation of spoken language may permit erroneous, or at times, nonsensical words or phrases to be inadvertently transcribed.  Although I have reviewed the note for such errors, some may still exist.    Joe France MD  09:34 CST  11/13/2020

## 2020-11-13 NOTE — ANESTHESIA PREPROCEDURE EVALUATION
Anesthesia Evaluation     history of anesthetic complications: prolonged sedation  NPO Solid Status: > 8 hours  NPO Liquid Status: > 2 hours           Airway   Mallampati: I  TM distance: >3 FB  Neck ROM: full  No difficulty expected  Dental      Pulmonary    (+) sleep apnea on CPAP,   (-) asthma, not a smoker  Cardiovascular   Exercise tolerance: good (4-7 METS)    (+) hypertension, dysrhythmias Atrial Fib,       Neuro/Psych  (-) seizures, TIA, CVA  GI/Hepatic/Renal/Endo    (+)  GERD,  diabetes mellitus (diet controlled) type 2,     ROS Comment: Vaginal vault prolapse surgery, complication frrom surgery had colon resection and iliostomy 2016    Musculoskeletal     Abdominal    Substance History      OB/GYN          Other   arthritis,                      Anesthesia Plan    ASA 3     MAC     intravenous induction     Anesthetic plan, all risks, benefits, and alternatives have been provided, discussed and informed consent has been obtained with: patient.

## 2020-11-13 NOTE — ANESTHESIA POSTPROCEDURE EVALUATION
Patient: DIETER Blackwell    Procedure Summary     Date: 11/13/20 Room / Location: [facility] ENDOSCOPY 6 / [facility]COPY    Anesthesia Start: 0922 Anesthesia Stop: 0949    Procedure: COLONOSCOPY WITH ANESTHESIA (N/A ) Diagnosis:       Encounter for screening for malignant neoplasm of colon      (Encounter for screening for malignant neoplasm of colon [Z12.11])    Surgeon: Joe France MD Provider: Belkis Vásquez CRNA    Anesthesia Type: MAC ASA Status: 3          Anesthesia Type: MAC    Vitals  Vitals Value Taken Time   /67 11/13/20 0955   Temp     Pulse 80 11/13/20 0956   Resp 16 11/13/20 0950   SpO2 99 % 11/13/20 0956   Vitals shown include unvalidated device data.        Post Anesthesia Care and Evaluation    Patient location during evaluation: PHASE II  Patient participation: complete - patient participated  Level of consciousness: awake and alert  Pain management: adequate  Airway patency: patent  Anesthetic complications: No anesthetic complications  PONV Status: none  Cardiovascular status: acceptable  Respiratory status: acceptable  Hydration status: acceptable

## 2020-11-16 ENCOUNTER — OFFICE VISIT (OUTPATIENT)
Dept: INTERNAL MEDICINE | Facility: CLINIC | Age: 65
End: 2020-11-16

## 2020-11-16 ENCOUNTER — LAB (OUTPATIENT)
Dept: LAB | Facility: HOSPITAL | Age: 65
End: 2020-11-16

## 2020-11-16 VITALS
TEMPERATURE: 97.8 F | HEIGHT: 63 IN | BODY MASS INDEX: 27.54 KG/M2 | OXYGEN SATURATION: 98 % | WEIGHT: 155.4 LBS | RESPIRATION RATE: 16 BRPM | DIASTOLIC BLOOD PRESSURE: 70 MMHG | HEART RATE: 74 BPM | SYSTOLIC BLOOD PRESSURE: 112 MMHG

## 2020-11-16 DIAGNOSIS — E11.9 TYPE 2 DIABETES MELLITUS WITHOUT COMPLICATION, WITHOUT LONG-TERM CURRENT USE OF INSULIN (HCC): ICD-10-CM

## 2020-11-16 DIAGNOSIS — Z79.01 CURRENT USE OF LONG TERM ANTICOAGULATION: ICD-10-CM

## 2020-11-16 DIAGNOSIS — I48.91 ATRIAL FIBRILLATION, UNSPECIFIED TYPE (HCC): ICD-10-CM

## 2020-11-16 DIAGNOSIS — K58.9 IRRITABLE BOWEL SYNDROME, UNSPECIFIED TYPE: ICD-10-CM

## 2020-11-16 DIAGNOSIS — Z11.59 NEED FOR HEPATITIS C SCREENING TEST: ICD-10-CM

## 2020-11-16 DIAGNOSIS — I48.20 CHRONIC ATRIAL FIBRILLATION (HCC): ICD-10-CM

## 2020-11-16 DIAGNOSIS — R01.2 SPLIT S2 (SECOND HEART SOUND): ICD-10-CM

## 2020-11-16 DIAGNOSIS — E53.8 B12 DEFICIENCY: ICD-10-CM

## 2020-11-16 DIAGNOSIS — E11.9 TYPE 2 DIABETES MELLITUS WITHOUT COMPLICATION, WITHOUT LONG-TERM CURRENT USE OF INSULIN (HCC): Primary | ICD-10-CM

## 2020-11-16 LAB
CYTO UR: NORMAL
INR PPP: 1.49 (ref 0.91–1.09)
LAB AP CASE REPORT: NORMAL
PATH REPORT.FINAL DX SPEC: NORMAL
PATH REPORT.GROSS SPEC: NORMAL
PROTHROMBIN TIME: 17.7 SECONDS (ref 11.9–14.6)

## 2020-11-16 PROCEDURE — 82570 ASSAY OF URINE CREATININE: CPT

## 2020-11-16 PROCEDURE — 86803 HEPATITIS C AB TEST: CPT

## 2020-11-16 PROCEDURE — 82043 UR ALBUMIN QUANTITATIVE: CPT

## 2020-11-16 PROCEDURE — 80061 LIPID PANEL: CPT

## 2020-11-16 PROCEDURE — 82607 VITAMIN B-12: CPT

## 2020-11-16 PROCEDURE — 36415 COLL VENOUS BLD VENIPUNCTURE: CPT

## 2020-11-16 PROCEDURE — 99204 OFFICE O/P NEW MOD 45 MIN: CPT | Performed by: INTERNAL MEDICINE

## 2020-11-16 PROCEDURE — 83036 HEMOGLOBIN GLYCOSYLATED A1C: CPT

## 2020-11-16 PROCEDURE — 85610 PROTHROMBIN TIME: CPT

## 2020-11-16 NOTE — PATIENT INSTRUCTIONS
Medicare Open enrollment information    [email or web address]_[email or web address]_[id number]_content=b&utm_term=%2Bmedicare&iv=[id number]

## 2020-11-16 NOTE — PROGRESS NOTES
Chief Complaint   Patient presents with   • Establish Care   • Diabetes         History:  DIETER Blackwell is a 65 y.o. female who presents today for evaluation of the above problems.      She is here to establish care.  She has a past medical history for atrial fibrillation on warfarin, hypertension, type 2 diabetes, ductal carcinoma in situ of left breast on tamoxifen per Dr. Mosley, among other issues.    She recently had colonoscopy with Dr. France on November 13.  She had polypectomy and is on 5-year recall.  It should be noted her warfarin was on hold and she restarted it back on November 13.    Previous patient of Dr. Sims in [place].  She recently moved to [place] and is here to establish.    She has diet-controlled type 2 diabetes.  She has been watching her sugar and what she eats.  However, she does not have a glucometer currently and has been unable to watch her blood sugar log.    She has chronic atrial fibrillation and reports INR has been very good with warfarin 4 mg daily.  Dr. Sims had been managing her warfarin.  Her last echocardiogram was December 5, 2016.  This was reviewed today.  On my exam she is irregularly irregular but also has a split S2    In 2016 she had surgery for vaginal prolapse.  Her colon was inadvertently nicked and subsequently had partial colectomy and ileostomy.  This has since been reversed    She also has IBS related to this and is currently on Bentyl.  However, the Bentyl is becoming too expensive and she is using an old prescription    She is to get started back on Eliquis but this was too expensive.    I did discuss Medicare open management with the patient today and have given her information regarding the website.    Last medicare wellness exam over one year ago per pt          HPI    ROS:  Review of Systems   Constitutional: Negative for activity change, appetite change, fatigue, fever and unexpected weight change.   HENT: Negative for nosebleeds, sore throat and  trouble swallowing.    Eyes: Negative for pain and visual disturbance.   Respiratory: Negative for cough, chest tightness and shortness of breath.    Cardiovascular: Negative for chest pain, palpitations and leg swelling.   Gastrointestinal: Positive for abdominal pain. Negative for blood in stool, constipation, diarrhea, nausea and vomiting.   Endocrine: Negative for cold intolerance and heat intolerance.   Genitourinary: Negative for dysuria and hematuria.   Musculoskeletal: Negative.  Negative for back pain, neck pain and neck stiffness.   Skin: Negative for rash and wound.   Neurological: Negative for dizziness, syncope, weakness, light-headedness and headaches.   Hematological: Negative for adenopathy. Does not bruise/bleed easily.   Psychiatric/Behavioral: Negative for agitation, behavioral problems and confusion.       Allergies   Allergen Reactions   • Morphine And Related Dizziness     Gets very sick    • Percocet [Oxycodone-Acetaminophen] Dizziness     nausea     Past Medical History:   Diagnosis Date   • A-fib (CMS/[facility])    • Abnormal ECG     tachycardia, a fib   • Acid reflux    • Anemia    • Arthritis    • Diabetes mellitus (CMS/[facility])    • Hyperkalemia    • Hypertension    • Hyponatremia    • IBS (irritable bowel syndrome)    • Sleep apnea     USES CPAP   • Vaginal vault prolapse      Past Surgical History:   Procedure Laterality Date   • COLON SURGERY     • COLONOSCOPY  09/21/2015    TRANSVERSE COLON ADENOMATOUS POLYP, HEMORRHOIDS, RECALL 5 YEARS, DR FRANCE   • COLONOSCOPY N/A 11/13/2020    Procedure: COLONOSCOPY WITH ANESTHESIA;  Surgeon: Joe France MD;  Location: [facility] ENDOSCOPY;  Service: Gastroenterology;  Laterality: N/A;  pre op: screening  post op:divdrticulosis, polyp  PCP: ANGEL Healy MD   • COLOSTOMY     • COLPOPEXY VAGINAL      2014   • ENDOSCOPY N/A 11/3/2016    LA GRADE B ESOPHAGITIS WITH NO BLEEDING UPPER THIRD OF ESOPHAGUS, GERI-[place]ORTH TEAR GEJ, BILIOUS BLOOD TINGED COFFEE  "GROUND PRIYA LAMAS   • EXPLORATORY LAPAROTOMY N/A 10/14/2016    Procedure: LAPAROTOMY EXPLORATORY, LYSIS OF ADHESIONS, IRRIAGATION OF ABDOMEN, POSSIBLE BOWEL RESECTION;  Surgeon: Bacilio Marcial MD;  Location:  [facility] OR;  Service:    • HYSTERECTOMY     • REVISION / TAKEDOWN COLOSTOMY     • SACROCOLPOPEXY N/A 9/21/2016    Procedure: SACROCOLPOPEXY LAPAROSCOPIC WITH DAVINCI SI ROBOT, CONVERTED TO OPEN SACROCOLPOPEXY, RIGHT SALPINGO- OOPHERECTOMY, CYSTO;  Surgeon: Maribell Beth MD;  Location:  [facility] OR;  Service:      Family History   Problem Relation Age of Onset   • Colon cancer Neg Hx    • Colon polyps Neg Hx      DIETER  reports that she has never smoked. She has never used smokeless tobacco. She reports that she does not drink alcohol or use drugs.    I have reviewed and updated the above documentation (if necessary) including but not limited to chief complaint, ROS, PFSH, allergies and medications        Current Outpatient Medications:   •  dilTIAZem (CARDIZEM) 30 MG tablet, Take 30 mg by mouth 2 (two) times a day., Disp: , Rfl:   •  montelukast (SINGULAIR) 10 MG tablet, Take 10 mg by mouth Every Night., Disp: , Rfl:   •  omeprazole (PriLOSEC) 20 MG capsule, Take 20 mg by mouth daily., Disp: , Rfl:   •  tamoxifen (NOLVADEX) 20 MG chemo tablet, Take  by mouth Daily., Disp: , Rfl:   •  tiZANidine (ZANAFLEX) 4 MG tablet, Take 4 mg by mouth At Night As Needed for Muscle Spasms., Disp: , Rfl:   •  warfarin (COUMADIN) 4 MG tablet, Take 4 mg by mouth Daily., Disp: , Rfl:       OBJECTIVE:  Visit Vitals  /70 (BP Location: Left arm, Patient Position: Sitting, Cuff Size: Adult)   Pulse 74   Temp 97.8 °F (36.6 °C) (Oral)   Resp 16   Ht 160 cm (62.99\")   Wt 70.5 kg (155 lb 6.4 oz)   SpO2 98%   BMI 27.53 kg/m²      Physical Exam  Constitutional:       General: She is not in acute distress.     Appearance: Normal appearance. She is well-developed.      Comments: Very pleasant   HENT:      Head: Normocephalic " and atraumatic.      Mouth/Throat:      Pharynx: No oropharyngeal exudate.   Eyes:      General: No scleral icterus.     Conjunctiva/sclera: Conjunctivae normal.      Pupils: Pupils are equal, round, and reactive to light.   Neck:      Musculoskeletal: Normal range of motion and neck supple.      Thyroid: No thyromegaly.      Vascular: No JVD.   Cardiovascular:      Rate and Rhythm: Normal rate. Rhythm irregularly irregular.      Heart sounds: Murmur present. No friction rub. No gallop.       Comments: Split S2  Pulmonary:      Effort: Pulmonary effort is normal.      Breath sounds: Normal breath sounds. No stridor. No wheezing or rales.   Abdominal:      General: Bowel sounds are normal. There is no distension.      Palpations: Abdomen is soft.      Tenderness: There is no abdominal tenderness. There is no guarding or rebound.   Musculoskeletal:         General: No tenderness.      Right lower leg: No edema.      Left lower leg: No edema.   Lymphadenopathy:      Cervical: No cervical adenopathy.   Skin:     General: Skin is warm and dry.   Neurological:      Mental Status: She is alert and oriented to person, place, and time.      Cranial Nerves: No cranial nerve deficit.   Psychiatric:         Behavior: Behavior normal.         MDM  Number of Diagnoses or Management Options  Atrial fibrillation, unspecified type (CMS/HCC): new, needed workup  B12 deficiency: new, needed workup  Chronic atrial fibrillation (CMS/HCC): new, needed workup  Current use of long term anticoagulation: new, needed workup  Irritable bowel syndrome, unspecified type: new, no workup  Need for hepatitis C screening test: new, needed workup  Split S2 (second heart sound): new, needed workup  Type 2 diabetes mellitus without complication, without long-term current use of insulin (CMS/HCC): new, needed workup     Amount and/or Complexity of Data Reviewed  Clinical lab tests: ordered and reviewed  Tests in the radiology section of CPT®:  ordered  Tests in the medicine section of CPT®: reviewed  Decide to obtain previous medical records or to obtain history from someone other than the patient: yes  Review and summarize past medical records: yes    Risk of Complications, Morbidity, and/or Mortality  Presenting problems: moderate  Diagnostic procedures: moderate  Management options: moderate        Assessment/Plan    Diagnoses and all orders for this visit:    1. Type 2 diabetes mellitus without complication, without long-term current use of insulin (CMS/[facility]) (Primary)  -     Lipid Panel; Future  -     Microalbumin / Creatinine Urine Ratio - Urine, Clean Catch; Future  -     Hemoglobin A1c; Future    2. Atrial fibrillation, unspecified type (CMS/HCC)  -     Protime-INR; Future  -     Adult Transthoracic Echo Complete W/ Cont if Necessary Per Protocol; Future    3. Current use of long term anticoagulation  -     Protime-INR; Future    4. Need for hepatitis C screening test  -     Hepatitis C Antibody; Future    5. B12 deficiency  -     Vitamin B12; Future    6. Irritable bowel syndrome, unspecified type    7. Split S2 (second heart sound)  -     Adult Transthoracic Echo Complete W/ Cont if Necessary Per Protocol; Future    8. Chronic atrial fibrillation (CMS/HCC)      Will check A1c, microalbumin and a lipid panel for her diet-controlled diabetes.  She was on metformin previously.  If A1c is above our goal will restart Metformin.    Check lipid panel and start statin if above goal    I suspect her INR will be low as it she just restarted it 3 days ago after her colonoscopy    She does have atrial fibrillation and a split S2.  Her last echo was 4 years ago and I am ordering a repeat today.  She is completely asymptomatic    Check other labs as above    She does not need any refills at this time    Declined influenza vaccine     Check INR in 2 weeks, and follow-up 4 months with me for Medicare wellness visit or sooner if clinically indicated    Education  materials and an After Visit Summary were printed and given to the patient at discharge.  Return in about 4 months (around 3/16/2021) for Medicare Wellness.         DANIELLE Healy MD   12:50 CST  11/16/2020

## 2020-11-17 ENCOUNTER — TELEPHONE (OUTPATIENT)
Dept: INTERNAL MEDICINE | Facility: CLINIC | Age: 65
End: 2020-11-17

## 2020-11-17 LAB
ALBUMIN UR-MCNC: 4.1 MG/DL
CHOLEST SERPL-MCNC: 119 MG/DL (ref 0–200)
CREAT UR-MCNC: 91.5 MG/DL
HBA1C MFR BLD: 5.4 % (ref 4.8–5.6)
HCV AB SER DONR QL: NORMAL
HDLC SERPL-MCNC: 57 MG/DL (ref 40–60)
LDLC SERPL CALC-MCNC: 36 MG/DL (ref 0–100)
LDLC/HDLC SERPL: 0.53 {RATIO}
MICROALBUMIN/CREAT UR: 44.8 MG/G
TRIGL SERPL-MCNC: 160 MG/DL (ref 0–150)
VIT B12 BLD-MCNC: 284 PG/ML (ref 211–946)
VLDLC SERPL-MCNC: 26 MG/DL (ref 5–40)

## 2020-11-17 NOTE — TELEPHONE ENCOUNTER
----- Message from ANGEL Healy MD sent at 11/17/2020  8:07 AM CST -----  Can you call patient to notify them of normal labs?  Her labs looked good overall.  Her A1c is perfect at 5.4.  Her INR is still low and we need to make her appointment for 1 week to recheck her INR level.  Thanks

## 2020-11-17 NOTE — PROGRESS NOTES
Spoke with patient regarding results. Noted understanding. The appointment has been made for her to come in for a nurse visit on November 24th.

## 2020-11-24 ENCOUNTER — CLINICAL SUPPORT (OUTPATIENT)
Dept: INTERNAL MEDICINE | Facility: CLINIC | Age: 65
End: 2020-11-24

## 2020-11-24 ENCOUNTER — HOSPITAL ENCOUNTER (OUTPATIENT)
Dept: CARDIOLOGY | Facility: HOSPITAL | Age: 65
Discharge: HOME | End: 2020-11-24
Admitting: INTERNAL MEDICINE

## 2020-11-24 VITALS
WEIGHT: 155 LBS | DIASTOLIC BLOOD PRESSURE: 70 MMHG | HEIGHT: 63 IN | SYSTOLIC BLOOD PRESSURE: 112 MMHG | BODY MASS INDEX: 27.46 KG/M2

## 2020-11-24 DIAGNOSIS — I48.91 ATRIAL FIBRILLATION, UNSPECIFIED TYPE (HCC): ICD-10-CM

## 2020-11-24 DIAGNOSIS — R01.2 SPLIT S2 (SECOND HEART SOUND): ICD-10-CM

## 2020-11-24 DIAGNOSIS — I48.20 CHRONIC ATRIAL FIBRILLATION (HCC): Primary | ICD-10-CM

## 2020-11-24 PROBLEM — Z79.01 CURRENT USE OF LONG TERM ANTICOAGULATION: Status: RESOLVED | Noted: 2020-11-16 | Resolved: 2020-11-24

## 2020-11-24 LAB — INR PPP: 1.5 (ref 0.9–1.1)

## 2020-11-24 PROCEDURE — 85610 PROTHROMBIN TIME: CPT | Performed by: INTERNAL MEDICINE

## 2020-11-24 PROCEDURE — 93793 ANTICOAG MGMT PT WARFARIN: CPT | Performed by: INTERNAL MEDICINE

## 2020-11-24 PROCEDURE — 93306 TTE W/DOPPLER COMPLETE: CPT | Performed by: INTERNAL MEDICINE

## 2020-11-24 PROCEDURE — 36416 COLLJ CAPILLARY BLOOD SPEC: CPT | Performed by: INTERNAL MEDICINE

## 2020-11-24 PROCEDURE — 93306 TTE W/DOPPLER COMPLETE: CPT

## 2020-11-24 RX ORDER — WARFARIN SODIUM 5 MG/1
5 TABLET ORAL NIGHTLY
Qty: 90 TABLET | Refills: 3 | Status: SHIPPED | OUTPATIENT
Start: 2020-11-24 | End: 2020-11-25 | Stop reason: SDUPTHER

## 2020-11-24 NOTE — PROGRESS NOTES
Chief complaint: F/u INR on warfarin    History:  DIETER Blackwell is a 65 y.o. female who presents today for follow-up INR check while on warfarin therapy for AFIB    OBJECTIVE:  INR value is 1.5 in the office today.    Assessment/Plan    Diagnoses and all orders for this visit:    1. Chronic atrial fibrillation (CMS/HCC) (Primary)  -     warfarin (Coumadin) 5 MG tablet; Take 1 tablet by mouth Every Night.  Dispense: 90 tablet; Refill: 3  -     POCT INR        Patient was advised to take 8mg for the next two days and 5mg there after, recheck in 1 week.        DANIELLE Healy  11/24/2020

## 2020-11-25 ENCOUNTER — NURSE TRIAGE (OUTPATIENT)
Dept: CALL CENTER | Facility: HOSPITAL | Age: 65
End: 2020-11-25

## 2020-11-25 DIAGNOSIS — I48.20 CHRONIC ATRIAL FIBRILLATION (HCC): ICD-10-CM

## 2020-11-25 LAB
BH CV ECHO MEAS - AO MAX PG (FULL): 8.2 MMHG
BH CV ECHO MEAS - AO MAX PG: 12 MMHG
BH CV ECHO MEAS - AO MEAN PG (FULL): 6 MMHG
BH CV ECHO MEAS - AO MEAN PG: 8 MMHG
BH CV ECHO MEAS - AO ROOT AREA (BSA CORRECTED): 1.6
BH CV ECHO MEAS - AO ROOT AREA: 6.2 CM^2
BH CV ECHO MEAS - AO ROOT DIAM: 2.8 CM
BH CV ECHO MEAS - AO V2 MAX: 173 CM/SEC
BH CV ECHO MEAS - AO V2 MEAN: 130 CM/SEC
BH CV ECHO MEAS - AO V2 VTI: 41.2 CM
BH CV ECHO MEAS - AVA(I,A): 1.6 CM^2
BH CV ECHO MEAS - AVA(I,D): 1.6 CM^2
BH CV ECHO MEAS - AVA(V,A): 1.8 CM^2
BH CV ECHO MEAS - AVA(V,D): 1.8 CM^2
BH CV ECHO MEAS - BSA(HAYCOCK): 1.8 M^2
BH CV ECHO MEAS - BSA: 1.7 M^2
BH CV ECHO MEAS - BZI_BMI: 28.4 KILOGRAMS/M^2
BH CV ECHO MEAS - BZI_METRIC_HEIGHT: 157.5 CM
BH CV ECHO MEAS - BZI_METRIC_WEIGHT: 70.3 KG
BH CV ECHO MEAS - EDV(CUBED): 49.8 ML
BH CV ECHO MEAS - EDV(MOD-SP4): 58.3 ML
BH CV ECHO MEAS - EDV(TEICH): 57.4 ML
BH CV ECHO MEAS - EF(CUBED): 74.6 %
BH CV ECHO MEAS - EF(MOD-SP4): 69 %
BH CV ECHO MEAS - EF(TEICH): 67.4 %
BH CV ECHO MEAS - ESV(CUBED): 12.6 ML
BH CV ECHO MEAS - ESV(MOD-SP4): 18.1 ML
BH CV ECHO MEAS - ESV(TEICH): 18.7 ML
BH CV ECHO MEAS - FS: 36.7 %
BH CV ECHO MEAS - IVS/LVPW: 0.85
BH CV ECHO MEAS - IVSD: 1.3 CM
BH CV ECHO MEAS - LA DIMENSION: 4.9 CM
BH CV ECHO MEAS - LA/AO: 1.8
BH CV ECHO MEAS - LAT PEAK E' VEL: 12.7 CM/SEC
BH CV ECHO MEAS - LV DIASTOLIC VOL/BSA (35-75): 34 ML/M^2
BH CV ECHO MEAS - LV MASS(C)D: 192.9 GRAMS
BH CV ECHO MEAS - LV MASS(C)DI: 112.5 GRAMS/M^2
BH CV ECHO MEAS - LV MAX PG: 3.8 MMHG
BH CV ECHO MEAS - LV MEAN PG: 2 MMHG
BH CV ECHO MEAS - LV SYSTOLIC VOL/BSA (12-30): 10.6 ML/M^2
BH CV ECHO MEAS - LV V1 MAX: 97 CM/SEC
BH CV ECHO MEAS - LV V1 MEAN: 71.4 CM/SEC
BH CV ECHO MEAS - LV V1 VTI: 21.6 CM
BH CV ECHO MEAS - LVIDD: 3.7 CM
BH CV ECHO MEAS - LVIDS: 2.3 CM
BH CV ECHO MEAS - LVLD AP4: 6.3 CM
BH CV ECHO MEAS - LVLS AP4: 5.6 CM
BH CV ECHO MEAS - LVOT AREA (M): 3.1 CM^2
BH CV ECHO MEAS - LVOT AREA: 3.1 CM^2
BH CV ECHO MEAS - LVOT DIAM: 2 CM
BH CV ECHO MEAS - LVPWD: 1.6 CM
BH CV ECHO MEAS - MED PEAK E' VEL: 10.3 CM/SEC
BH CV ECHO MEAS - MV DEC SLOPE: 536 CM/SEC^2
BH CV ECHO MEAS - MV DEC TIME: 0.22 SEC
BH CV ECHO MEAS - MV E MAX VEL: 115 CM/SEC
BH CV ECHO MEAS - RAP SYSTOLE: 10 MMHG
BH CV ECHO MEAS - RVSP: 39.2 MMHG
BH CV ECHO MEAS - SI(AO): 147.9 ML/M^2
BH CV ECHO MEAS - SI(CUBED): 21.7 ML/M^2
BH CV ECHO MEAS - SI(LVOT): 39.6 ML/M^2
BH CV ECHO MEAS - SI(MOD-SP4): 23.4 ML/M^2
BH CV ECHO MEAS - SI(TEICH): 22.5 ML/M^2
BH CV ECHO MEAS - SV(AO): 253.7 ML
BH CV ECHO MEAS - SV(CUBED): 37.2 ML
BH CV ECHO MEAS - SV(LVOT): 67.9 ML
BH CV ECHO MEAS - SV(MOD-SP4): 40.2 ML
BH CV ECHO MEAS - SV(TEICH): 38.7 ML
BH CV ECHO MEAS - TR MAX VEL: 270 CM/SEC
BH CV ECHO MEASUREMENTS AVERAGE E/E' RATIO: 10
LEFT ATRIUM VOLUME INDEX: 45.9 ML/M2
LEFT ATRIUM VOLUME: 78 CM3

## 2020-11-25 RX ORDER — WARFARIN SODIUM 5 MG/1
5 TABLET ORAL NIGHTLY
Qty: 90 TABLET | Refills: 3 | Status: SHIPPED | OUTPATIENT
Start: 2020-11-25 | End: 2020-11-30 | Stop reason: SDUPTHER

## 2020-11-25 NOTE — TELEPHONE ENCOUNTER
Called [facility] who state they can send this prescription to [facility] [place], but [facility] will need to call them for it. Advised the patient  to call [facility] and ask them if they have the drug in stock and if they do to ask them to call [facility] and have the prescription transferred to them. Caller agreed to follow care advice.     Reason for Disposition  • [1] Caller has medication question about med not prescribed by PCP AND [2] triager unable to answer question (e.g., compatibility with other med, storage)    Additional Information  • Negative: Drug overdose and triager unable to answer question  • Negative: Caller requesting information unrelated to medicine  • Negative: Caller requesting a prescription for Strep throat and has a positive culture result  • Negative: Rash while taking a medication or within 3 days of stopping it  • Negative: Immunization reaction suspected  • Negative: [1] Asthma and [2] having symptoms of asthma (cough, wheezing, etc.)  • Negative: [1] Influenza symptoms AND [2] anti-viral med prescription request, such as Tamiflu  • Negative: [1] Symptom of illness (e.g., headache, abdominal pain, earache, vomiting) AND [2] more than mild  • Negative: MORE THAN A DOUBLE DOSE of a prescription or over-the-counter (OTC) drug  • Negative: [1] DOUBLE DOSE (an extra dose or lesser amount) of over-the-counter (OTC) drug AND [2] any symptoms (e.g., dizziness, nausea, pain, sleepiness)  • Negative: [1] DOUBLE DOSE (an extra dose or lesser amount) of prescription drug AND [2] any symptoms (e.g., dizziness, nausea, pain, sleepiness)  • Negative: Took another person's prescription drug  • Negative: [1] DOUBLE DOSE (an extra dose or lesser amount) of prescription drug AND [2] NO symptoms (Exception: a double dose of antibiotics)  • Negative: Diabetes drug error or overdose (e.g., took wrong type of insulin or took extra dose)  • Negative: [1] Request for URGENT new  "prescription or refill of \"essential\" medication (i.e., likelihood of harm to patient if not taken) AND [2] triager unable to fill per unit policy  • Negative: [1] Prescription not at pharmacy AND [2] was prescribed by PCP recently  • Negative: [1] Pharmacy calling with prescription questions AND [2] triager unable to answer question  • Negative: [1] Caller has URGENT medication question about med that PCP or specialist prescribed AND [2] triager unable to answer question  • Negative: [1] Caller has NON-URGENT medication question about med that PCP prescribed AND [2] triager unable to answer question  • Negative: [1] Caller requesting a NON-URGENT new prescription or refill AND [2] triager unable to refill per unit policy    Answer Assessment - Initial Assessment Questions  1.   NAME of MEDICATION: \"What medicine are you calling about?\"      Coumadin 5 mg  2.   QUESTION: \"What is your question?\"      [facility] says they do not have it in stock and may not have it until Saturday, what do I do?  3.   PRESCRIBING HCP: \"Who prescribed it?\" Reason: if prescribed by specialist, call should be referred to that group.      Dr. Healy  4. SYMPTOMS: \"Do you have any symptoms?\"      na  5. SEVERITY: If symptoms are present, ask \"Are they mild, moderate or severe?\"      na  6.  PREGNANCY:  \"Is there any chance that you are pregnant?\" \"When was your last menstrual period?\"      na    Protocols used: MEDICATION QUESTION CALL-ADULT-AH      "

## 2020-11-30 DIAGNOSIS — I48.20 CHRONIC ATRIAL FIBRILLATION (HCC): ICD-10-CM

## 2020-11-30 RX ORDER — WARFARIN SODIUM 5 MG/1
5 TABLET ORAL NIGHTLY
Qty: 90 TABLET | Refills: 3 | Status: SHIPPED | OUTPATIENT
Start: 2020-11-30 | End: 2020-12-28

## 2020-12-01 ENCOUNTER — TELEPHONE (OUTPATIENT)
Dept: INTERNAL MEDICINE | Facility: CLINIC | Age: 65
End: 2020-12-01

## 2020-12-01 ENCOUNTER — CLINICAL SUPPORT (OUTPATIENT)
Dept: INTERNAL MEDICINE | Facility: CLINIC | Age: 65
End: 2020-12-01

## 2020-12-01 DIAGNOSIS — I07.1 TRICUSPID VALVE INSUFFICIENCY, UNSPECIFIED ETIOLOGY: ICD-10-CM

## 2020-12-01 DIAGNOSIS — I51.7 RIGHT VENTRICULAR DILATION: ICD-10-CM

## 2020-12-01 DIAGNOSIS — I48.20 CHRONIC ATRIAL FIBRILLATION (HCC): Primary | ICD-10-CM

## 2020-12-01 DIAGNOSIS — I51.7 RIGHT ATRIAL ENLARGEMENT: Primary | ICD-10-CM

## 2020-12-01 LAB — INR PPP: 2.2 (ref 0.9–1.1)

## 2020-12-01 PROCEDURE — 36416 COLLJ CAPILLARY BLOOD SPEC: CPT | Performed by: INTERNAL MEDICINE

## 2020-12-01 PROCEDURE — 93793 ANTICOAG MGMT PT WARFARIN: CPT | Performed by: INTERNAL MEDICINE

## 2020-12-01 PROCEDURE — 85610 PROTHROMBIN TIME: CPT | Performed by: INTERNAL MEDICINE

## 2020-12-01 NOTE — PROGRESS NOTES
Chief complaint: F/u INR on warfarin    History:  Yun Blackwell is a 65 y.o. female who presents today for follow-up INR check while on warfarin therapy for Chronic Atrial Fibrillation.    OBJECTIVE:  INR value is 2.2 in the office today.    Assessment/Plan    Diagnoses and all orders for this visit:    1. Chronic atrial fibrillation (CMS/HCC) (Primary)  -     POCT INR    Patient was instructed to continue taking the current dose of Warfarin 5 mg at night.     Return in about 2 weeks (around 12/15/2020) for Recheck. Sooner if problems arise.         DANIELLE Healy MD  09:43 CST   12/1/2020

## 2020-12-02 ENCOUNTER — OFFICE VISIT (OUTPATIENT)
Dept: SURGERY | Age: 65
End: 2020-12-02
Payer: MEDICARE

## 2020-12-02 ENCOUNTER — HOSPITAL ENCOUNTER (OUTPATIENT)
Dept: WOMENS IMAGING | Age: 65
Discharge: HOME | End: 2020-12-02
Payer: MEDICARE

## 2020-12-02 VITALS
BODY MASS INDEX: 28.88 KG/M2 | HEART RATE: 90 BPM | HEIGHT: 63 IN | TEMPERATURE: 99 F | DIASTOLIC BLOOD PRESSURE: 80 MMHG | SYSTOLIC BLOOD PRESSURE: 130 MMHG | WEIGHT: 163 LBS

## 2020-12-02 PROCEDURE — G8484 FLU IMMUNIZE NO ADMIN: HCPCS | Performed by: PHYSICIAN ASSISTANT

## 2020-12-02 PROCEDURE — 99213 OFFICE O/P EST LOW 20 MIN: CPT | Performed by: PHYSICIAN ASSISTANT

## 2020-12-02 PROCEDURE — 1090F PRES/ABSN URINE INCON ASSESS: CPT | Performed by: PHYSICIAN ASSISTANT

## 2020-12-02 PROCEDURE — G8417 CALC BMI ABV UP PARAM F/U: HCPCS | Performed by: PHYSICIAN ASSISTANT

## 2020-12-02 PROCEDURE — G8427 DOCREV CUR MEDS BY ELIG CLIN: HCPCS | Performed by: PHYSICIAN ASSISTANT

## 2020-12-02 PROCEDURE — 4040F PNEUMOC VAC/ADMIN/RCVD: CPT | Performed by: PHYSICIAN ASSISTANT

## 2020-12-02 PROCEDURE — G0279 TOMOSYNTHESIS, MAMMO: HCPCS

## 2020-12-02 PROCEDURE — 1123F ACP DISCUSS/DSCN MKR DOCD: CPT | Performed by: PHYSICIAN ASSISTANT

## 2020-12-02 PROCEDURE — 1036F TOBACCO NON-USER: CPT | Performed by: PHYSICIAN ASSISTANT

## 2020-12-02 PROCEDURE — G8399 PT W/DXA RESULTS DOCUMENT: HCPCS | Performed by: PHYSICIAN ASSISTANT

## 2020-12-02 PROCEDURE — 3017F COLORECTAL CA SCREEN DOC REV: CPT | Performed by: PHYSICIAN ASSISTANT

## 2020-12-02 RX ORDER — TIZANIDINE 4 MG/1
4 TABLET ORAL NIGHTLY PRN
COMMUNITY

## 2020-12-07 ENCOUNTER — OFFICE VISIT (OUTPATIENT)
Dept: CARDIOLOGY | Facility: CLINIC | Age: 65
End: 2020-12-07

## 2020-12-07 VITALS
WEIGHT: 164 LBS | BODY MASS INDEX: 29.06 KG/M2 | OXYGEN SATURATION: 100 % | DIASTOLIC BLOOD PRESSURE: 78 MMHG | HEIGHT: 63 IN | HEART RATE: 91 BPM | SYSTOLIC BLOOD PRESSURE: 121 MMHG

## 2020-12-07 DIAGNOSIS — Q21.11 ASD SECUNDUM: Primary | ICD-10-CM

## 2020-12-07 DIAGNOSIS — R00.2 PALPITATIONS: ICD-10-CM

## 2020-12-07 DIAGNOSIS — I51.7 RIGHT VENTRICULAR ENLARGEMENT: ICD-10-CM

## 2020-12-07 DIAGNOSIS — I48.21 PERMANENT ATRIAL FIBRILLATION (HCC): ICD-10-CM

## 2020-12-07 DIAGNOSIS — Q24.5 CONGENITAL CORONARY ARTERY FISTULA TO PULMONARY ARTERY: ICD-10-CM

## 2020-12-07 DIAGNOSIS — I51.7 RIGHT ATRIAL ENLARGEMENT: ICD-10-CM

## 2020-12-07 PROCEDURE — 99205 OFFICE O/P NEW HI 60 MIN: CPT | Performed by: INTERNAL MEDICINE

## 2020-12-07 PROCEDURE — 93000 ELECTROCARDIOGRAM COMPLETE: CPT | Performed by: INTERNAL MEDICINE

## 2020-12-07 RX ORDER — DICYCLOMINE HCL 20 MG
20 TABLET ORAL EVERY 6 HOURS
COMMUNITY
End: 2022-05-12

## 2020-12-07 NOTE — PROGRESS NOTES
"    P - CARDIOLOGY  New Patient Initial Outpatient Evaulation    Primary Care Physician: ANGEL Healy MD    Subjective     Chief Complaint: Establish care for atrial fibrillation.    History of Present Illness    65-year-old female kindly referred by Dr. Alexander Healy to transfer her cardiovascular care to me after recent echocardiogram showed tricuspid regurgitation and right ventricular enlargement.  She had been seen years ago by the cardiology group at [facility], with records from them that I reviewed indicating she was being seen for atrial fibrillation.  There was mention in 2017 of her having had significant GI bleeds in the past and so she was on a \"unusual dose of blood thinner\" at the time according to that provider.  She was also reluctant to be back on any beta-blocker due to prior issues.  It does not appear as though she has been seen by cardiologist since, but after recently establishing care with Dr. Healy here for her primary physician, was referred back to us to establish care.    Regarding her atrial fibrillation, she thinks she was diagnosed about 7 years ago while working at [employer].  Reports she was noticing palpitations that day, which was nothing new for her. No associated symptoms. Has never had attempted cardioversion or ablation.  Has never been on anti-arrhythmics. Has not had any issues with bowel bleeding in a month - was in [facility] 11/10/20 for this. Had c-scope unremarkable 3d later.   Currently, she reports associated symptoms that include: she does note palpitations occasionally (<once per week) with associated light-headedness that lasts about 5-10 minutes and resolves on its own. These have been noticeable within the last year, but not particularly changing in terms of frequency, severity, or duration, since initial onset.         Review of Systems   Constitution: Negative.   HENT: Negative.    Eyes: Negative.    Cardiovascular: Positive for " near-syncope and palpitations.   Respiratory: Positive for snoring.    Endocrine: Negative.    Hematologic/Lymphatic: Negative.    Skin: Negative.    Musculoskeletal: Negative.    Gastrointestinal: Negative.    Genitourinary: Negative.    Neurological: Negative.    Psychiatric/Behavioral: Negative.    Allergic/Immunologic: Negative.    Otherwise complete ROS reviewed and negative except as mentioned in the HPI.  I have reviewed the Review of Systems as provided above.       Past Medical History:   Past Medical History:   Diagnosis Date   • A-fib (CMS/HCC)    • Abnormal ECG     tachycardia, a fib   • Acid reflux    • Anemia    • Arthritis    • Cancer (CMS/HCC)    • Diabetes mellitus (CMS/HCC)    • Hyperkalemia    • Hyperlipidemia    • Hypertension    • Hyponatremia    • IBS (irritable bowel syndrome)    • Sleep apnea     USES CPAP   • Vaginal vault prolapse        Past Surgical History:  Past Surgical History:   Procedure Laterality Date   • CARDIAC CATHETERIZATION     • COLON SURGERY     • COLONOSCOPY  09/21/2015    TRANSVERSE COLON ADENOMATOUS POLYP, HEMORRHOIDS, RECALL 5 YEARS, DR LAMAS   • COLONOSCOPY N/A 11/13/2020    Procedure: COLONOSCOPY WITH ANESTHESIA;  Surgeon: Joe Lamas MD;  Location: [facility] ENDOSCOPY;  Service: Gastroenterology;  Laterality: N/A;  pre op: screening  post op:divdrticulosis, polyp  PCP: ANGEL Healy MD   • COLOSTOMY     • COLPOPEXY VAGINAL      2014   • ENDOSCOPY N/A 11/3/2016    LA GRADE B ESOPHAGITIS WITH NO BLEEDING UPPER THIRD OF ESOPHAGUS, GERI-WADSWORTH TEAR GEJ, BILIOUS BLOOD TINGED COFFEE GROUND GATRIC FLUIDDR LAMAS   • EXPLORATORY LAPAROTOMY N/A 10/14/2016    Procedure: LAPAROTOMY EXPLORATORY, LYSIS OF ADHESIONS, IRRIAGATION OF ABDOMEN, POSSIBLE BOWEL RESECTION;  Surgeon: Bacilio Marcial MD;  Location: [facility] OR;  Service:    • HYSTERECTOMY     • REVISION / TAKEDOWN COLOSTOMY     • SACROCOLPOPEXY N/A 9/21/2016    Procedure: SACROCOLPOPEXY LAPAROSCOPIC WITH DAVINCI  "SI ROBOT, CONVERTED TO OPEN SACROCOLPOPEXY, RIGHT SALPINGO- OOPHERECTOMY, CYSTO;  Surgeon: Maribell Beth MD;  Location: [facility] OR;  Service:        Family History: family history is not on file.    Social History:  reports that she has never smoked. She has never used smokeless tobacco. She reports that she does not drink alcohol or use drugs.    Medications:  Prior to Admission medications    Medication Sig Start Date End Date Taking? Authorizing Provider   dicyclomine (BENTYL) 20 MG tablet Take 20 mg by mouth Every 6 (Six) Hours. Takes 1/2 tablet 4 times daily   Yes David Hand MD   dilTIAZem (CARDIZEM) 30 MG tablet Take 30 mg by mouth 2 (two) times a day.   Yes David Hand MD   montelukast (SINGULAIR) 10 MG tablet Take 10 mg by mouth Every Night.   Yes David Hand MD   omeprazole (PriLOSEC) 20 MG capsule Take 20 mg by mouth daily. 8/11/16  Yes David Hand MD   tamoxifen (NOLVADEX) 20 MG chemo tablet Take  by mouth Daily.   Yes ProviderDavid MD   tiZANidine (ZANAFLEX) 4 MG tablet Take 4 mg by mouth At Night As Needed for Muscle Spasms. Takes 1/2 to 1 tablet daily   Yes ProviderDavid MD   warfarin (Coumadin) 5 MG tablet Take 1 tablet by mouth Every Night.  Patient taking differently: Take 4 mg by mouth Every Night. 11/30/20  Yes ANGEL Healy MD     Allergies:  Allergies   Allergen Reactions   • Morphine And Related Dizziness     Gets very sick    • Percocet [Oxycodone-Acetaminophen] Dizziness     nausea       Objective     Vital Signs: /78   Pulse 91   Ht 160 cm (63\")   Wt 74.4 kg (164 lb)   SpO2 100%   BMI 29.05 kg/m²     Vitals signs and nursing note reviewed.   Constitutional:       General: Not in acute distress.     Appearance: Not in distress.   HENT:    Mouth/Throat:      Pharynx: Oropharynx is clear. Normal.   Neck:      Musculoskeletal: Normal range of motion and neck supple.      Thyroid: Thyroid normal. No thyromegaly.      " Vascular: No JVD. JVD normal.   Pulmonary:      Effort: Pulmonary effort is normal.      Breath sounds: Normal breath sounds.   Cardiovascular:      Normal rate. Regularly irregular rhythm. Fixed split S2.      Murmurs: There is no murmur.   Pulses:     Intact distal pulses.   Edema:     Peripheral edema absent.   Abdominal:      General: Bowel sounds are normal. There is no distension.      Palpations: Abdomen is soft. There is no hepatomegaly or splenomegaly.      Tenderness: There is no abdominal tenderness.   Musculoskeletal:         General: No tenderness.   Skin:     General: Skin is warm and dry.   Neurological:      Mental Status: Alert, oriented to person, place, and time and oriented to person, place and time.         Results Reviewed:      ECG 12 Lead    Date/Time: 12/7/2020 10:35 PM  Performed by: Van Marcelino MD  Authorized by: Van Marcelino MD   Comparison: compared with previous ECG from 4/1/2017  Similar to previous ECG  Rhythm: atrial fibrillation  Rate: normal  Conduction: right bundle branch block    Clinical impression: abnormal EKG              Lab Results   Component Value Date    CHOL 119 11/16/2020    TRIG 160 (H) 11/16/2020    HDL 57 11/16/2020    VLDL 26 11/16/2020    LDLHDL 0.53 11/16/2020     Lab Results   Component Value Date    HGBA1C 5.40 11/16/2020     Results for orders placed during the hospital encounter of 11/24/20   Adult Transthoracic Echo Complete W/ Cont if Necessary Per Protocol    Narrative · Left ventricular ejection fraction appears to be 66 - 70%. Left   ventricular systolic function is normal.  · The right ventricular cavity is moderately dilated. Systolic function is   normal.  · The right atrial cavity is severely dilated.  · Moderate to severe tricuspid valve regurgitation is present.  · Estimated right ventricular systolic pressure from tricuspid   regurgitation is mildly elevated (35-45 mmHg).        Old records reviewed:  I reviewed prior office visit notes  from her visits with the [facility] cardiology group, which I learned primarily were focused on treating her permanent atrial fibrillation.  There is no direct rationalization provided in any note that I could access as to why she had never been cardioverted out of atrial fibrillation.  Also, I did take note of a cardiac catheterization performed at [facility] in 2017 which showed no obstructive coronary disease.  However, in it, Dr. Sanju Healy does mention a small circumflex branch artery emptying into the pulmonary artery.  This is not mentioned in any subsequent records there.  See cath report below:        Independent review/interpretation of images:  I reviewed the images from echocardiogram noted above, and agree with the dilation of the right ventricle and right atrium, along with tricuspid regurgitation is reported.  However, I also inspected the interatrial septum, particularly on subcostal views, and am suspicious there may be an ASD.  There appears to be a color Doppler jet crossing the interatrial septum in the apical four-chamber views:          I also reviewed an old echocardiogram in our system from December 5, 2016.  To me, the amount of right ventricular enlargement and tricuspid regurgitation look similar on that study as it does to the more recent one from 2020.  There are no views on the 2016 study to assess for the interatrial septum.      Bubble study was not performed on either study.  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 Healy in '17           Other Visit Diagnoses     ASD secundum    -  Primary    Relevant Orders    Adult Transesophageal Echo (DEANN) W/ Cont if Necessary Per Protocol          A/P (all diagnoses new to me today, including new possibilities in the differential diagnosis generated simply by my record review and personally visualizing former echo images as noted above, with further work-up to be done):  1.  Right ventricular enlargement: Appears similar to prior study from 2016.  No obvious causes as she is a non-smoker and does not have any known lung disease.  Given the fixed split S2 noted on her examination, I became suspicious of the possibility of an atrial septal defect.  I went back and reviewed transthoracic echocardiographic imaging as noted above, and see what appears to be a secundum type atrial septal defect.  -We will perform transesophageal echocardiogram for better definition, and if ASD is confirmed, then discuss closure as her right ventricular enlargement would certainly be an indication for doing so  -Though Dr. Sanju Healy did find a small coronary artery-pulmonary artery fistula in 2017, I would not think this would account for her degree of right ventricular enlargement.    2.  Permanent atrial fibrillation: Remains rate controlled, though does have intermittent palpitations as further described below.   -she would like to switch back to direct oral anticoagulant if possible (i.e. financially feasible).  -She is in the process of transitioning her insurance carrier currently and I asked her to call us back when this process is finished, and I will be happy to prescribe whichever DOAC is preferred by her plan with intentions of transitioning her off of Coumadin to this  -Continue current dose of Cardizem for now    3.  Palpitations: I suspect these relate to when she is having rapid ventricular response to atrial fibrillation, but cannot be sure as of  "yet.  -We will have patient wear a Zio patch for symptom-rhythm correlation.  If indeed she is having frequent symptomatic occurrences of RVR, may need to increase the dose of her Cardizem for better rate control.    4.  Tricuspid regurgitation: I see no structural abnormalities with the valve itself.  This is likely due to a dilated annulus from the right ventricular and right atrial enlargement.  Therefore, I think this is a \"clue\" that there is some other problem, but is not the problem itself.    5.  Coronary artery-pulmonary artery fistula: As per #1 above.    F/u TBD after DEANN    MDM: high complexity    Van Marcelino MD   12/08/20   12:52 CST  "

## 2020-12-08 PROBLEM — Q24.5 CONGENITAL CORONARY ARTERY FISTULA TO PULMONARY ARTERY: Status: ACTIVE | Noted: 2020-12-08

## 2020-12-11 ENCOUNTER — TELEPHONE (OUTPATIENT)
Dept: INTERNAL MEDICINE | Facility: CLINIC | Age: 65
End: 2020-12-11

## 2020-12-11 NOTE — TELEPHONE ENCOUNTER
Patients b12 was within normal range. Is it okay to tell patient that b12 injection is not needed at this time?

## 2020-12-12 NOTE — PROGRESS NOTES
HISTORY OF PRESENT ILLNESS:    Ms. 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 ER is positive at 100%. IA is positive at 98%. Prelude DX demonstrates a reduction of 15% all recurrences to 7%. Reduction in invasive cancer recurrences goes from 9% down to 5%. MRI 12/18/2019     EXAMINATION: MRI BREAST BILATERAL W WO CONTRAST 12/18/2019 2:49 PM   HISTORY: Left breast cancer, DCIS. Strong family history of breast   cancer   COMPARISON: Screening mammogram 12/2/2019, diagnostic mammogram   12/2/2019; left breast biopsy 12/4/2019   Technical: Multiplanar, multisequence imaging was performed through   the breasts before and after the administration of IV contrast.   FINDINGS:   Background parenchymal enhancement is minimal and appears symmetric. There are scattered areas of fibroglandular density. Right breast:   No mass or nonmass enhancement in the right breast to suggest   malignancy. Left breast:   There is a discrepancy in the breast size, with the left breast   smaller in size in the right. Postbiopsy changes are seen within the   upper slightly outer quadrant of the left breast. A biopsy marking   clip is present. A small hematoma is present which measures up to 1.4   cm in size. There is discontinuous nonmass enhancement extending   posteriorly from the hematoma site for approximately 3.3 cm. There is   associated increased T2 signal. No additional areas of mass or nonmass   enhancement are seen in the left breast.   Lymph nodes:   No suspicious axillary or internal mammary lymphadenopathy is   identified.       Impression   1. Postbiopsy changes in the left breast with a small hematoma at the   biopsy site. Abnormal enhancement extends in a linear configuration   from the posterior aspect of the biopsy site.  Based upon the   postbiopsy mammogram, this is favored to represent postbiopsy change,   with shows no new areas of   architectural distortion.     The mammograms were evaluated using a computer aided detection   software program (CAD). The patient's information has been added to a reminder system with a   target due date for the next mammogram.       Impression   1. Benign mammograms. 2. BI-RADS category 2.   3. One year bilateral screening mammography follow-up is recommended. PHYSICAL EXAM:  BREAST EXAM:  The left lumpectomy site is well healed. There are fibrocystic changes throughout both breasts. There are no dominant masses, skin dimpling or nipple retraction. There is no axillary lymphadenopathy bilaterally. IMPRESSION:  Doing well s/p Left lumpectomy with IORT 1/31/2020      PLAN:  I will see her in 1 year with bilateral mammograms. She will be due for a MRI of her abdomen in 10/2021 for her CDKN2A ([id number]) mutation. She will call sooner with any concerns. 15 minutes spent, which includes face to face with patient, record review, evaluation, planning, and education. I spent over 50% of this visit counseling patient.

## 2020-12-15 ENCOUNTER — CLINICAL SUPPORT (OUTPATIENT)
Dept: INTERNAL MEDICINE | Facility: CLINIC | Age: 65
End: 2020-12-15

## 2020-12-15 DIAGNOSIS — I48.20 CHRONIC ATRIAL FIBRILLATION (HCC): Primary | ICD-10-CM

## 2020-12-15 LAB — INR PPP: 3.2 (ref 0.9–1.1)

## 2020-12-15 PROCEDURE — 93793 ANTICOAG MGMT PT WARFARIN: CPT | Performed by: INTERNAL MEDICINE

## 2020-12-15 PROCEDURE — 36416 COLLJ CAPILLARY BLOOD SPEC: CPT | Performed by: INTERNAL MEDICINE

## 2020-12-15 PROCEDURE — 85610 PROTHROMBIN TIME: CPT | Performed by: INTERNAL MEDICINE

## 2020-12-15 RX ORDER — LANOLIN ALCOHOL/MO/W.PET/CERES
1000 CREAM (GRAM) TOPICAL DAILY
Qty: 90 TABLET | Refills: 3 | Status: SHIPPED | OUTPATIENT
Start: 2020-12-15 | End: 2020-12-28 | Stop reason: SDUPTHER

## 2020-12-15 NOTE — PROGRESS NOTES
Chief complaint: F/u INR on warfarin    History:  Yun Blackwell is a 65 y.o. female who presents today for follow-up INR check while on warfarin therapy for Afib.    OBJECTIVE:  INR value is 3.2 in the office today.    Assessment/Plan    Diagnoses and all orders for this visit:    1. Chronic atrial fibrillation (CMS/HCC) (Primary)  -     POCT INR    Other orders  -     vitamin B-12 (CYANOCOBALAMIN) 1000 MCG tablet; Take 1 tablet by mouth Daily.  Dispense: 90 tablet; Refill: 3    Hold warfarin for 2 days then restart her usual dose of 5 mg nightly.  Recheck in 2 weeks    Sent B12 tablets to her pharmacy.  Injections not covered by her insurance    Return in about 2 weeks (around 12/29/2020) for Recheck. Sooner if problems arise.         DANIELLE Healy MD  12:23 CST   12/15/2020

## 2020-12-28 ENCOUNTER — CLINICAL SUPPORT (OUTPATIENT)
Dept: INTERNAL MEDICINE | Facility: CLINIC | Age: 65
End: 2020-12-28

## 2020-12-28 DIAGNOSIS — I48.20 CHRONIC ATRIAL FIBRILLATION (HCC): ICD-10-CM

## 2020-12-28 DIAGNOSIS — I48.20 CHRONIC ATRIAL FIBRILLATION (HCC): Primary | ICD-10-CM

## 2020-12-28 LAB — INR PPP: 3.6 (ref 0.9–1.1)

## 2020-12-28 PROCEDURE — 93793 ANTICOAG MGMT PT WARFARIN: CPT | Performed by: INTERNAL MEDICINE

## 2020-12-28 PROCEDURE — 85610 PROTHROMBIN TIME: CPT | Performed by: INTERNAL MEDICINE

## 2020-12-28 PROCEDURE — 36416 COLLJ CAPILLARY BLOOD SPEC: CPT | Performed by: INTERNAL MEDICINE

## 2020-12-28 RX ORDER — LANOLIN ALCOHOL/MO/W.PET/CERES
1000 CREAM (GRAM) TOPICAL DAILY
Qty: 90 TABLET | Refills: 3 | Status: SHIPPED | OUTPATIENT
Start: 2020-12-28 | End: 2021-03-24 | Stop reason: SDUPTHER

## 2020-12-28 RX ORDER — LANOLIN ALCOHOL/MO/W.PET/CERES
1000 CREAM (GRAM) TOPICAL DAILY
Qty: 90 TABLET | Refills: 3 | Status: SHIPPED | OUTPATIENT
Start: 2020-12-28 | End: 2020-12-28 | Stop reason: SDUPTHER

## 2020-12-28 RX ORDER — WARFARIN SODIUM 5 MG/1
5 TABLET ORAL NIGHTLY
Qty: 90 TABLET | Refills: 2 | Status: SHIPPED | OUTPATIENT
Start: 2020-12-28 | End: 2020-12-28 | Stop reason: SDUPTHER

## 2020-12-28 RX ORDER — WARFARIN SODIUM 5 MG/1
5 TABLET ORAL NIGHTLY
Qty: 90 TABLET | Refills: 2 | Status: SHIPPED | OUTPATIENT
Start: 2020-12-28 | End: 2021-01-25

## 2020-12-28 RX ORDER — LANOLIN ALCOHOL/MO/W.PET/CERES
1000 CREAM (GRAM) TOPICAL DAILY
Qty: 90 TABLET | Refills: 3 | Status: CANCELLED | OUTPATIENT
Start: 2020-12-28

## 2020-12-28 RX ORDER — WARFARIN SODIUM 4 MG/1
4 TABLET ORAL NIGHTLY
Qty: 30 TABLET | Refills: 5 | Status: CANCELLED | OUTPATIENT
Start: 2020-12-28

## 2020-12-28 NOTE — PROGRESS NOTES
Chief complaint: F/u INR on warfarin    History:  Yun Blackwell is a 65 y.o. female who presents today for follow-up INR check while on warfarin therapy for chronic anticoagulation.    OBJECTIVE:  INR value is 3.6 in the office today.    Assessment/Plan    Diagnoses and all orders for this visit:    1. Chronic atrial fibrillation (CMS/HCC) (Primary)  -     POCT INR    Other orders  -     Cancel: vitamin B-12 (CYANOCOBALAMIN) 1000 MCG tablet; Take 1 tablet by mouth Daily.  Dispense: 90 tablet; Refill: 3  -     Cancel: warfarin (COUMADIN) 4 MG tablet; Take 1 tablet by mouth Every Night.  Dispense: 30 tablet; Refill: 5    Patient was instructed to hold Warfarin 2 days. Wednesday take Warfarin 5 mg and then alternate with 2.5 mg of Warfarin.     No follow-ups on file. Sooner if problems arise.         DANIELLE Healy MD  16:53 CST   12/28/2020

## 2020-12-29 ENCOUNTER — HOSPITAL ENCOUNTER (OUTPATIENT)
Dept: MRI IMAGING | Age: 65
Discharge: HOME | End: 2020-12-29
Payer: MEDICARE

## 2021-01-04 ENCOUNTER — CLINICAL SUPPORT (OUTPATIENT)
Dept: INTERNAL MEDICINE | Facility: CLINIC | Age: 66
End: 2021-01-04

## 2021-01-04 DIAGNOSIS — I48.20 CHRONIC ATRIAL FIBRILLATION (HCC): Primary | ICD-10-CM

## 2021-01-04 LAB — INR PPP: 1.5 (ref 0.9–1.1)

## 2021-01-04 PROCEDURE — 85610 PROTHROMBIN TIME: CPT | Performed by: INTERNAL MEDICINE

## 2021-01-04 PROCEDURE — 93793 ANTICOAG MGMT PT WARFARIN: CPT | Performed by: INTERNAL MEDICINE

## 2021-01-04 PROCEDURE — 36416 COLLJ CAPILLARY BLOOD SPEC: CPT | Performed by: INTERNAL MEDICINE

## 2021-01-08 ENCOUNTER — HOSPITAL ENCOUNTER (OUTPATIENT)
Dept: INFUSION THERAPY | Age: 66
Discharge: HOME | End: 2021-01-08
Payer: MEDICARE

## 2021-01-08 DIAGNOSIS — D05.12 DUCTAL CARCINOMA IN SITU (DCIS) OF LEFT BREAST: ICD-10-CM

## 2021-01-18 ENCOUNTER — TELEPHONE (OUTPATIENT)
Dept: INTERNAL MEDICINE | Facility: CLINIC | Age: 66
End: 2021-01-18

## 2021-01-18 ENCOUNTER — CLINICAL SUPPORT (OUTPATIENT)
Dept: INTERNAL MEDICINE | Facility: CLINIC | Age: 66
End: 2021-01-18

## 2021-01-18 DIAGNOSIS — I48.20 CHRONIC ATRIAL FIBRILLATION (HCC): Primary | ICD-10-CM

## 2021-01-18 LAB — INR PPP: 2.2 (ref 0.9–1.1)

## 2021-01-18 PROCEDURE — 36416 COLLJ CAPILLARY BLOOD SPEC: CPT | Performed by: INTERNAL MEDICINE

## 2021-01-18 PROCEDURE — 85610 PROTHROMBIN TIME: CPT | Performed by: INTERNAL MEDICINE

## 2021-01-18 NOTE — TELEPHONE ENCOUNTER
Patient came into the office with Yoko Gomez. Yoko stated that the patient does have medicare part D. We are able to prescribe the Eliquis 5 mg. We will have to write the script as BID. Her deducible is $415 and she will only have to pay every other if we were to write the script for 60 tablets BID.

## 2021-01-18 NOTE — TELEPHONE ENCOUNTER
Spoke with patient regarding her medication. Dr. leonard sent in Eliquis 5 mg to her pharmacy. He also advised that once she starts taking the Eliquis to discontinue the Warfarin.

## 2021-01-19 ENCOUNTER — TRANSCRIBE ORDERS (OUTPATIENT)
Dept: LAB | Facility: HOSPITAL | Age: 66
End: 2021-01-19

## 2021-01-19 DIAGNOSIS — Z01.818 PRE-OP TESTING: Primary | ICD-10-CM

## 2021-01-22 ENCOUNTER — LAB (OUTPATIENT)
Dept: LAB | Facility: HOSPITAL | Age: 66
End: 2021-01-22

## 2021-01-22 LAB — SARS-COV-2 ORF1AB RESP QL NAA+PROBE: NOT DETECTED

## 2021-01-22 PROCEDURE — U0004 COV-19 TEST NON-CDC HGH THRU: HCPCS | Performed by: INTERNAL MEDICINE

## 2021-01-22 PROCEDURE — C9803 HOPD COVID-19 SPEC COLLECT: HCPCS | Performed by: INTERNAL MEDICINE

## 2021-01-25 ENCOUNTER — ANESTHESIA (OUTPATIENT)
Dept: CARDIOLOGY | Facility: HOSPITAL | Age: 66
End: 2021-01-25

## 2021-01-25 ENCOUNTER — ANESTHESIA EVENT (OUTPATIENT)
Dept: CARDIOLOGY | Facility: HOSPITAL | Age: 66
End: 2021-01-25

## 2021-01-25 ENCOUNTER — HOSPITAL ENCOUNTER (OUTPATIENT)
Dept: CARDIOLOGY | Facility: HOSPITAL | Age: 66
Discharge: HOME | End: 2021-01-25

## 2021-01-25 VITALS
BODY MASS INDEX: 29.62 KG/M2 | RESPIRATION RATE: 16 BRPM | WEIGHT: 167.2 LBS | OXYGEN SATURATION: 97 % | TEMPERATURE: 98.6 F | DIASTOLIC BLOOD PRESSURE: 89 MMHG | HEART RATE: 67 BPM | HEIGHT: 63 IN | SYSTOLIC BLOOD PRESSURE: 134 MMHG

## 2021-01-25 DIAGNOSIS — I51.7 RIGHT VENTRICULAR ENLARGEMENT: ICD-10-CM

## 2021-01-25 DIAGNOSIS — Q21.11 ASD SECUNDUM: ICD-10-CM

## 2021-01-25 PROCEDURE — 93325 DOPPLER ECHO COLOR FLOW MAPG: CPT

## 2021-01-25 PROCEDURE — 93320 DOPPLER ECHO COMPLETE: CPT

## 2021-01-25 PROCEDURE — 93312 ECHO TRANSESOPHAGEAL: CPT

## 2021-01-25 PROCEDURE — 25010000002 PROPOFOL 10 MG/ML EMULSION: Performed by: NURSE ANESTHETIST, CERTIFIED REGISTERED

## 2021-01-25 PROCEDURE — 93312 ECHO TRANSESOPHAGEAL: CPT | Performed by: INTERNAL MEDICINE

## 2021-01-25 PROCEDURE — 93320 DOPPLER ECHO COMPLETE: CPT | Performed by: INTERNAL MEDICINE

## 2021-01-25 PROCEDURE — 93325 DOPPLER ECHO COLOR FLOW MAPG: CPT | Performed by: INTERNAL MEDICINE

## 2021-01-25 RX ORDER — SODIUM CHLORIDE 0.9 % (FLUSH) 0.9 %
10 SYRINGE (ML) INJECTION EVERY 12 HOURS SCHEDULED
Status: DISCONTINUED | OUTPATIENT
Start: 2021-01-25 | End: 2021-01-26 | Stop reason: HOSPADM

## 2021-01-25 RX ORDER — PROPOFOL 10 MG/ML
VIAL (ML) INTRAVENOUS AS NEEDED
Status: DISCONTINUED | OUTPATIENT
Start: 2021-01-25 | End: 2021-01-25 | Stop reason: SURG

## 2021-01-25 RX ORDER — SODIUM CHLORIDE 9 MG/ML
50 INJECTION, SOLUTION INTRAVENOUS CONTINUOUS
Status: DISCONTINUED | OUTPATIENT
Start: 2021-01-25 | End: 2021-01-26 | Stop reason: HOSPADM

## 2021-01-25 RX ORDER — SODIUM CHLORIDE 0.9 % (FLUSH) 0.9 %
10 SYRINGE (ML) INJECTION AS NEEDED
Status: DISCONTINUED | OUTPATIENT
Start: 2021-01-25 | End: 2021-01-26 | Stop reason: HOSPADM

## 2021-01-25 RX ORDER — LIDOCAINE HYDROCHLORIDE 20 MG/ML
INJECTION, SOLUTION EPIDURAL; INFILTRATION; INTRACAUDAL; PERINEURAL AS NEEDED
Status: DISCONTINUED | OUTPATIENT
Start: 2021-01-25 | End: 2021-01-25 | Stop reason: SURG

## 2021-01-25 RX ADMIN — SODIUM CHLORIDE 50 ML/HR: 9 INJECTION, SOLUTION INTRAVENOUS at 08:02

## 2021-01-25 RX ADMIN — LIDOCAINE HYDROCHLORIDE 80 MG: 20 INJECTION, SOLUTION EPIDURAL; INFILTRATION; INTRACAUDAL; PERINEURAL at 08:40

## 2021-01-25 RX ADMIN — PROPOFOL 270 MG: 10 INJECTION, EMULSION INTRAVENOUS at 08:40

## 2021-01-25 NOTE — H&P
[facility] - CARDIOLOGY  HISTORY AND PHYSICAL    Date of Admission: 1/25/2021  Primary Care Physician: ANGEL Healy MD    Subjective     Chief Complaint: possible asd    History of Present Illness    64yo recent establish care with me for permanent A. fib and also noted on transthoracic imaging to have an enlarged RV.  She complains of chronic dyspnea.  Transthoracic echo was concerning for ASD, so she presents today for DEANN for further evaluation.    Review of Systems   Otherwise complete ROS reviewed and negative except as mentioned in the HPI.    Past Medical History:   Past Medical History:   Diagnosis Date   • A-fib (CMS/HCC)    • Abnormal ECG     tachycardia, a fib   • Acid reflux    • Anemia    • Arthritis    • Cancer (CMS/HCC)    • Diabetes mellitus (CMS/HCC)    • Hyperkalemia    • Hyperlipidemia    • Hypertension    • Hyponatremia    • IBS (irritable bowel syndrome)    • Sleep apnea     USES CPAP   • Vaginal vault prolapse        Past Surgical History:  Past Surgical History:   Procedure Laterality Date   • CARDIAC CATHETERIZATION     • COLON SURGERY     • COLONOSCOPY  09/21/2015    TRANSVERSE COLON ADENOMATOUS POLYP, HEMORRHOIDS, RECALL 5 YEARS, DR LAMAS   • COLONOSCOPY N/A 11/13/2020    Procedure: COLONOSCOPY WITH ANESTHESIA;  Surgeon: Joe Lamas MD;  Location: [facility] ENDOSCOPY;  Service: Gastroenterology;  Laterality: N/A;  pre op: screening  post op:divdrticulosis, polyp  PCP: ANGEL Healy MD   • COLOSTOMY     • COLPOPEXY VAGINAL      2014   • ENDOSCOPY N/A 11/3/2016    LA GRADE B ESOPHAGITIS WITH NO BLEEDING UPPER THIRD OF ESOPHAGUS, GERI-WADSWORTH TEAR GEJ, BILIOUS BLOOD TINGED COFFEE GROUND GATRIC FLUIDDR LAMAS   • EXPLORATORY LAPAROTOMY N/A 10/14/2016    Procedure: LAPAROTOMY EXPLORATORY, LYSIS OF ADHESIONS, IRRIAGATION OF ABDOMEN, POSSIBLE BOWEL RESECTION;  Surgeon: Bacilio Marcial MD;  Location: [facility] OR;  Service:    • HYSTERECTOMY     • REVISION / TAKEDOWN COLOSTOMY     •  SACROCOLPOPEXY N/A 9/21/2016    Procedure: SACROCOLPOPEXY LAPAROSCOPIC WITH DAVINCI SI ROBOT, CONVERTED TO OPEN SACROCOLPOPEXY, RIGHT SALPINGO- OOPHERECTOMY, CYSTO;  Surgeon: Maribell Beth MD;  Location: [facility];  Service:        Family History: family history is not on file.    Social History:  reports that she has never smoked. She has never used smokeless tobacco. She reports that she does not drink alcohol or use drugs.    Medications:  Prior to Admission medications    Medication Sig Start Date End Date Taking? Authorizing Provider   apixaban (ELIQUIS) 5 MG tablet tablet Take 1 tablet by mouth Every 12 (Twelve) Hours. 1/18/21  Yes ANGEL Healy MD   dicyclomine (BENTYL) 20 MG tablet Take 20 mg by mouth Every 6 (Six) Hours. Takes 1/2 tablet 4 times daily   Yes David Hand MD   dilTIAZem (CARDIZEM) 30 MG tablet Take 30 mg by mouth 2 (two) times a day.   Yes David Hand MD   montelukast (SINGULAIR) 10 MG tablet Take 10 mg by mouth Every Night.   Yes David Hand MD   omeprazole (PriLOSEC) 20 MG capsule Take 20 mg by mouth daily. 8/11/16  Yes David Hand MD   tamoxifen (NOLVADEX) 20 MG chemo tablet Take 1 tablet by mouth once a day. 12/19/20  Yes    vitamin B-12 (CYANOCOBALAMIN) 1000 MCG tablet Take 1 tablet by mouth Daily. 12/28/20  Yes ANGEL Healy MD   tiZANidine (ZANAFLEX) 4 MG tablet Take one-half to 1 tablet by mouth at bedtime. 9/23/20   Berta Lozada FNP   tamoxifen (NOLVADEX) 20 MG chemo tablet Take  by mouth Daily.  1/25/21  David Hand MD   tiZANidine (ZANAFLEX) 4 MG tablet Take 4 mg by mouth At Night As Needed for Muscle Spasms. Takes 1/2 to 1 tablet daily  1/25/21  David Hand MD   vitamin B-12 (CYANOCOBALAMIN) 1000 MCG tablet Take 1 tablet by mouth once a day. 1/2/21 1/25/21     warfarin (Coumadin) 5 MG tablet Take 1 tablet by mouth Every Night. 12/28/20 1/25/21  ANGEL Healy MD     Allergies:  Allergies   Allergen  "Reactions   • Morphine And Related Dizziness     Gets very sick    • Percocet [Oxycodone-Acetaminophen] Dizziness     nausea       Objective     Vital Signs: /82 (BP Location: Right arm, Patient Position: Lying)   Pulse 85   Temp 98.6 °F (37 °C) (Temporal)   Resp 19   Ht 160 cm (63\")   Wt 75.8 kg (167 lb 3.2 oz)   SpO2 96%   BMI 29.62 kg/m²     Nursing note reviewed.   Constitutional:       Appearance: Not in distress.           Assessment / Plan        1.  Enlarged right ventricle  2.  Chronic dyspnea on exertion  3.  Abnormal diagnostic cardiovascular imaging (transthoracic echo concerning for ASD)    Plan: Proceed with DEANN for further delineation          Van Marcelino MD   01/25/21   08:16 CST    "

## 2021-01-25 NOTE — ANESTHESIA PREPROCEDURE EVALUATION
Anesthesia Evaluation     no history of anesthetic complications:               Airway   Mallampati: II  TM distance: >3 FB  Dental      Pulmonary    (+) sleep apnea on CPAP,   Cardiovascular     ECG reviewed  PT is on anticoagulation therapy    (+) hypertension, CAD, dysrhythmias Atrial Fib, hyperlipidemia,       Neuro/Psych- negative ROS  GI/Hepatic/Renal/Endo    (+)  GERD,  diabetes mellitus (diet control ) well controlled,     Musculoskeletal     Abdominal    Substance History      OB/GYN          Other   arthritis,                      Anesthesia Plan    ASA 3     MAC

## 2021-01-25 NOTE — ANESTHESIA POSTPROCEDURE EVALUATION
Patient: Yun Blackwell    Procedure Summary     Date: 01/25/21 Room / Location: [facility] CATH LAB; [facility] CARDIOLOGY    Anesthesia Start: 0839 Anesthesia Stop: 0913    Procedure: ADULT TRANSESOPHAGEAL ECHO (DEANN) W/ CONT IF NECESSARY PER PROTOCOL Diagnosis:       Right ventricular enlargement      ASD secundum      (Non-diagnostic Echo)    Scheduled Providers: Van Marcelino MD Provider: Sofie Toney CRNA    Anesthesia Type: MAC ASA Status: 3          Anesthesia Type: MAC    Vitals  HR 94  BP 89/60  SpO2 97%  RR 20        Post Anesthesia Care and Evaluation    Patient location during evaluation: PHASE II  Patient participation: complete - patient participated  Level of consciousness: awake  Pain management: adequate  Airway patency: patent  Anesthetic complications: No anesthetic complications  PONV Status: none  Cardiovascular status: acceptable  Respiratory status: acceptable  Hydration status: acceptable

## 2021-01-26 ENCOUNTER — HOSPITAL ENCOUNTER (OUTPATIENT)
Dept: INFUSION THERAPY | Age: 66
Discharge: HOME | End: 2021-01-26
Payer: MEDICARE

## 2021-01-26 ENCOUNTER — OFFICE VISIT (OUTPATIENT)
Dept: HEMATOLOGY | Age: 66
End: 2021-01-26
Payer: MEDICARE

## 2021-01-26 VITALS
HEIGHT: 63 IN | TEMPERATURE: 97.1 F | DIASTOLIC BLOOD PRESSURE: 78 MMHG | WEIGHT: 165.8 LBS | OXYGEN SATURATION: 95 % | BODY MASS INDEX: 29.38 KG/M2 | HEART RATE: 71 BPM | SYSTOLIC BLOOD PRESSURE: 122 MMHG

## 2021-01-26 DIAGNOSIS — Z79.810: ICD-10-CM

## 2021-01-26 DIAGNOSIS — Z86.39 HX OF IRON DEFICIENCY: ICD-10-CM

## 2021-01-26 DIAGNOSIS — Z71.89 CARE PLAN DISCUSSED WITH PATIENT: ICD-10-CM

## 2021-01-26 DIAGNOSIS — D05.12 DUCTAL CARCINOMA IN SITU (DCIS) OF LEFT BREAST: ICD-10-CM

## 2021-01-26 DIAGNOSIS — T38.6X5D ADVERSE EFFECT OF TAMOXIFEN, SUBSEQUENT ENCOUNTER: ICD-10-CM

## 2021-01-26 DIAGNOSIS — E53.8 VITAMIN B 12 DEFICIENCY: ICD-10-CM

## 2021-01-26 DIAGNOSIS — D05.12 DUCTAL CARCINOMA IN SITU (DCIS) OF LEFT BREAST: Primary | ICD-10-CM

## 2021-01-26 LAB
BASOPHILS ABSOLUTE: 0.02 K/UL (ref 0.01–0.08)
BASOPHILS RELATIVE PERCENT: 0.3 % (ref 0.1–1.2)
EOSINOPHILS ABSOLUTE: 0.15 K/UL (ref 0.04–0.54)
EOSINOPHILS RELATIVE PERCENT: 2 % (ref 0.7–7)
HCT VFR BLD CALC: 41.8 % (ref 34.1–44.9)
HEMOGLOBIN: 13.4 G/DL (ref 11.2–15.7)
LYMPHOCYTES ABSOLUTE: 1.79 K/UL (ref 1.18–3.74)
LYMPHOCYTES RELATIVE PERCENT: 23.9 % (ref 19.3–53.1)
MCH RBC QN AUTO: 31.8 PG (ref 25.6–32.2)
MCHC RBC AUTO-ENTMCNC: 32.1 G/DL (ref 32.3–35.5)
MCV RBC AUTO: 99.3 FL (ref 79.4–94.8)
MONOCYTES ABSOLUTE: 0.73 K/UL (ref 0.24–0.82)
MONOCYTES RELATIVE PERCENT: 9.7 % (ref 4.7–12.5)
NEUTROPHILS ABSOLUTE: 4.81 K/UL (ref 1.56–6.13)
NEUTROPHILS RELATIVE PERCENT: 64.1 % (ref 34–71.1)
PDW BLD-RTO: 12.8 % (ref 11.7–14.4)
PLATELET # BLD: 176 K/UL (ref 182–369)
PMV BLD AUTO: 11.2 FL (ref 7.4–10.4)
RBC # BLD: 4.21 M/UL (ref 3.93–5.22)
WBC # BLD: 7.5 K/UL (ref 3.98–10.04)

## 2021-01-26 PROCEDURE — 4040F PNEUMOC VAC/ADMIN/RCVD: CPT | Performed by: NURSE PRACTITIONER

## 2021-01-26 PROCEDURE — 3017F COLORECTAL CA SCREEN DOC REV: CPT | Performed by: NURSE PRACTITIONER

## 2021-01-26 PROCEDURE — 99211 OFF/OP EST MAY X REQ PHY/QHP: CPT

## 2021-01-26 PROCEDURE — 1036F TOBACCO NON-USER: CPT | Performed by: NURSE PRACTITIONER

## 2021-01-26 PROCEDURE — G8417 CALC BMI ABV UP PARAM F/U: HCPCS | Performed by: NURSE PRACTITIONER

## 2021-01-26 PROCEDURE — 99213 OFFICE O/P EST LOW 20 MIN: CPT | Performed by: NURSE PRACTITIONER

## 2021-01-26 PROCEDURE — 1123F ACP DISCUSS/DSCN MKR DOCD: CPT | Performed by: NURSE PRACTITIONER

## 2021-01-26 PROCEDURE — G8427 DOCREV CUR MEDS BY ELIG CLIN: HCPCS | Performed by: NURSE PRACTITIONER

## 2021-01-26 PROCEDURE — G8399 PT W/DXA RESULTS DOCUMENT: HCPCS | Performed by: NURSE PRACTITIONER

## 2021-01-26 PROCEDURE — G8484 FLU IMMUNIZE NO ADMIN: HCPCS | Performed by: NURSE PRACTITIONER

## 2021-01-26 PROCEDURE — 1090F PRES/ABSN URINE INCON ASSESS: CPT | Performed by: NURSE PRACTITIONER

## 2021-01-26 PROCEDURE — 85025 COMPLETE CBC W/AUTO DIFF WBC: CPT

## 2021-01-26 RX ORDER — LANOLIN ALCOHOL/MO/W.PET/CERES
1000 CREAM (GRAM) TOPICAL DAILY
COMMUNITY

## 2021-01-26 NOTE — PROGRESS NOTES
Progress Note      Pt Name: Zane Cooper  YOB: 1955  MRN: [id number]    Date of evaluation: 1/26/2021  History Obtained From:  patient, electronic medical record    CHIEF COMPLAINT:    Chief Complaint   Patient presents with    Follow-up     Ductal carcinoma in situ (DCIS) of left breast     HISTORY OF PRESENT ILLNESS:    Zane Cooper is a 77 y.o.  female who is currently being followed for DCIS of the left breast and a history of iron and B12 deficiency. She is currently taking preventive endocrine therapy with tamoxifen for anticipated 5 years with completing dosing in January 2025. Izabel Ferguosn presents today with no new complaints specifically to her breast continues have occasional discomfort to left breast lumpectomy site. She denies any hot flashes or vaginal bleeding. Bilateral mammogram on 12/2/2020 documented no mammographic evidence of malignancy and she was also evaluated by ARELY Lofton on 12/2/2020. I reviewed the progress note from office visit that indicated bilateral breast examination was within acceptable limits with no concerning findings to suggest recurrent disease and a 1 year follow-up was recommended. CBC was reviewed today, is in within acceptable limits and is as documented below. ONCOLOGIC HISTORY:     Diagnosis  · DCIS left breast, December 2020  · Stage 0  · Grade 2  · %, ID 98%  · [employer] 84 genes-  CDKN2A ([id number]) mutation (mutation associated with autosomal dominant melanomapancreatic cancer and melanoma neural system tumor      Treatment summary  · 1/31/2020- left lumpectomy  · January 2020tamoxifen x5 years     Dilma was referred by Dr. Denise Roblero for a diagnosis of DCIS of left breast.  This was a screening detected lesion. She had a sister and a niece with breast cancer. She denied any hormone replacement therapy.  She had genetic assessment with a 84 gene panel that was unremarkable for a deleterious mutation but with [facility] OR    COLECTOMY      after vaginal prolapse repair/damaged    HYSTERECTOMY      HYSTERECTOMY, VAGINAL      PLANTAR FASCIA SURGERY Bilateral 7/19/2016    PLANTAR FASCIA INJECTION FEET performed by Love Mcallister DPM at [place] [address]  2013       Current Medications:    Current Outpatient Medications   Medication Sig Dispense Refill    apixaban (ELIQUIS) 5 MG TABS tablet Take by mouth 2 times daily      vitamin B-12 (CYANOCOBALAMIN) 1000 MCG tablet Take 1,000 mcg by mouth daily      tiZANidine (ZANAFLEX) 4 MG tablet Take 4 mg by mouth nightly as needed      montelukast (SINGULAIR) 10 MG tablet Take 1 tablet by mouth nightly 30 tablet 0    loperamide (IMODIUM) 2 MG capsule Take 2 mg by mouth 4 times daily as needed for Diarrhea      tamoxifen (NOLVADEX) 20 MG tablet Take 1 tablet by mouth daily 90 tablet 3    dicyclomine (BENTYL) 20 MG tablet Take 20 mg by mouth 4 times daily as needed      psyllium (METAMUCIL) 0.52 g capsule Take 0.52 g by mouth 2 times daily      diltiazem (CARDIZEM) 30 MG tablet Take 30 mg by mouth 2 times daily      ferrous sulfate 325 (65 FE) MG tablet Take 325 mg by mouth daily (with breakfast)      omeprazole (PRILOSEC) 20 MG capsule Take 20 mg by mouth daily      Multiple Vitamins-Minerals (THERAPEUTIC MULTIVITAMIN-MINERALS) tablet Take 1 tablet by mouth daily       No current facility-administered medications for this visit. Allergies:    Allergies   Allergen Reactions    Morphine Nausea And Vomiting    Oxycodone Nausea And Vomiting       Social History:    Social History     Tobacco Use    Smoking status: Never Smoker    Smokeless tobacco: Never Used   Substance Use Topics    Alcohol use: No    Drug use: No       Family History:   Family History   Problem Relation Age of Onset    Diabetes Mother     Diabetes Father     Lung Cancer Father     Breast Cancer Sister     Brain Cancer Brother     Diabetes Sister     Diabetes Sister     Diabetes Sister     Diabetes Sister     Diabetes Brother     Diabetes Brother     Diabetes Brother     Uterine Cancer Maternal Aunt     Uterine Cancer Niece         Age Unknown    ADHD Niece     Breast Cancer Niece        Vitals:  Vitals:    01/26/21 1211   BP: 122/78   Pulse: 71   Temp: 97.1 °F (36.2 °C)   TempSrc: Temporal   SpO2: 95%   Weight: 165 lb 12.8 oz (75.2 kg)   Height: 5' 3\" (1.6 m)        Subjective   REVIEW OF SYSTEMS:   Review of Systems   Constitutional: Negative. Negative for chills, diaphoresis and fever. HENT: Negative. Negative for congestion, ear pain, hearing loss, nosebleeds, sore throat and tinnitus. Eyes: Negative. Negative for pain, discharge and redness. Respiratory: Negative. Negative for cough, shortness of breath and wheezing. Cardiovascular: Negative. Negative for chest pain, palpitations and leg swelling. Gastrointestinal: Negative. Negative for abdominal pain, blood in stool, constipation, diarrhea, nausea and vomiting. Endocrine: Negative for polydipsia. Genitourinary: Negative for dysuria, flank pain, frequency, hematuria and urgency. Musculoskeletal: Negative. Negative for back pain, myalgias and neck pain. Skin: Negative. Negative for rash. Neurological: Negative. Negative for dizziness, tremors, seizures, weakness and headaches. Hematological: Does not bruise/bleed easily. Psychiatric/Behavioral: Negative. The patient is not nervous/anxious. Objective   PHYSICAL EXAM:  Physical Exam  Vitals signs reviewed. Constitutional:       General: She is not in acute distress. Appearance: She is well-developed. She is not diaphoretic. HENT:      Head: Normocephalic and atraumatic. Mouth/Throat:      Pharynx: Uvula midline. Tonsils: No tonsillar exudate. Eyes:      General: Lids are normal. No scleral icterus. Right eye: No discharge. Left eye: No discharge. Conjunctiva/sclera: Conjunctivae normal.      Pupils: Pupils are equal, round, and reactive to light. Neck:      Musculoskeletal: Normal range of motion and neck supple. Thyroid: No thyroid mass or thyromegaly. Vascular: No JVD. Trachea: Trachea normal. No tracheal deviation. Cardiovascular:      Rate and Rhythm: Normal rate and regular rhythm. Heart sounds: Normal heart sounds. No murmur. No friction rub. No gallop. Pulmonary:      Effort: Pulmonary effort is normal. No respiratory distress. Breath sounds: Normal breath sounds. No wheezing or rales. Chest:      Chest wall: No tenderness. Breasts:         Right: Normal.         Left: Tenderness present. Comments: Left breast with well-healed lumpectomy incision. Fibrocystic changes throughout both breasts, no dominant masses, no skin or nipple changes and no axillary adenopathy. Abdominal:      General: Bowel sounds are normal. There is no distension. Palpations: Abdomen is soft. There is no mass. Tenderness: There is no abdominal tenderness. There is no guarding or rebound. Hernia: No hernia is present. Comments: Colostomy appliance intact with stoma red and beefy   Musculoskeletal:         General: No tenderness or deformity. Comments: Range of motion within normal limits x4 extremities   Lymphadenopathy:      Upper Body:      Right upper body: No supraclavicular or axillary adenopathy. Left upper body: No supraclavicular or axillary adenopathy. Skin:     General: Skin is warm. Coloration: Skin is not pale. Findings: No erythema or rash. Neurological:      Mental Status: She is alert and oriented to person, place, and time. Cranial Nerves: No cranial nerve deficit. Coordination: Coordination normal.   Psychiatric:         Behavior: Behavior normal.         Thought Content:  Thought content normal.         Labs reviewed today:  Lab Results   Component Value Date WBC 7.50 01/26/2021    HGB 13.4 01/26/2021    HCT 41.8 01/26/2021    MCV 99.3 (H) 01/26/2021     (L) 01/26/2021     Lab Results   Component Value Date    NEUTROABS 4.81 01/26/2021       ASSESSMENT/PLAN:      1. Ductal carcinoma in situ (DCIS) of left breast, currently taking preventive endocrine therapy with tamoxifen for anticipated 5 years, through January 2025. Bilateral mammogram on 12/2/2020 documented no mammographic evidence of malignancy and she was also evaluated by ARELY Montero on 12/2/2020. I reviewed the progress note from office visit that indicated bilateral breast examination was within acceptable limits with no concerning findings to suggest recurrent disease and a 1 year follow-up was recommended. Bilateral breast examination revealed fibrocystic changes throughout both breasts, no dominant masses, no skin or nipple changes and no axillary adenopathy. No suspicious abnormality to suggest recurrent breast cancer. Well-healed left lumpectomy incision site. 2. Adverse effect of tamoxifen, subsequent encounter  -Reports tolerating without difficulty denies any hot flashes or vaginal dryness    3. Vitamin B 12 deficiency history of and reports currently taking oral B12 1000 mcg daily with a normal hemoglobin of 13.4 and MCV of 99.3    - Comprehensive Metabolic Panel; Future  - Folate; Future  - Vitamin B12; Future    4. Hx of iron deficiency, reports currently taking oral ferrous sulfate twice a week due to GI symptoms, constipation  - Ferritin; Future  - Iron and TIBC; Future    5. Care plan discussed with patient    6. Survivorship and health maintenance recommendations  Keep a healthy body weight. Dietary recommendations include limit energy intake from total fats and sugars; increase consumption of fruit and vegetables, as well as legumes, whole grains and nuts. Engage in regular physical activity (150 minutes spread through the week).   Other recommendations include minimizing alcohol intake, avoid use of tobacco products. Practice sun safety: Utilize a sunscreen with an SPF of at least 27. Avoiding tanning beds. Ensure adequate amount of sleep. Follow-up with primary care regularly and for further recommendations regarding age-appropriate screening for cancer, wellbeing visit (preventive measures), follow-up and treatment for other medical comorbidities. I discussed all of the above findings included in the assessment and plan with the patient and the patient is in agreement to move forward with current recommendations/treatment. I have addressed all of their questions and concerns that were verbalized. FOLLOW UP:  1. Follow-up appointment given for 4 months  2. Continue to follow with other medical providers as recommended    Discussed precautions related to [address] and being at increased risk. Discussed proper handwashing to be done frequently, limit exposure to other individuals and maintain social distancing of 6 feet. Recommend contacting primary care provider if having respiratory symptoms for further recommendations and consideration for testing.     (Please note that portions of this note were completed with a voice recognition program. Efforts were made to edit the dictations but occasionally words are mis-transcribed,  Also, portions of this note have been copied forward, however, changed to reflect the most current clinical status of this patient.)      Electronically signed by ROSLYN Cabrera on 2/12/2021 at 7:45 PM

## 2021-01-29 LAB — BH CV ECHO MEAS - TR MAX VEL: 3 CM/SEC

## 2021-02-01 ENCOUNTER — TRANSCRIBE ORDERS (OUTPATIENT)
Dept: ADMINISTRATIVE | Facility: HOSPITAL | Age: 66
End: 2021-02-01

## 2021-02-01 ENCOUNTER — PREP FOR SURGERY (OUTPATIENT)
Dept: OTHER | Facility: HOSPITAL | Age: 66
End: 2021-02-01

## 2021-02-01 DIAGNOSIS — Z01.818 PRE-OP TESTING: Primary | ICD-10-CM

## 2021-02-01 DIAGNOSIS — Q21.11 ASD SECUNDUM: ICD-10-CM

## 2021-02-01 DIAGNOSIS — I51.7 RIGHT VENTRICULAR ENLARGEMENT: Primary | ICD-10-CM

## 2021-02-01 DIAGNOSIS — I27.20 PULMONARY HYPERTENSION (HCC): ICD-10-CM

## 2021-02-01 RX ORDER — SODIUM CHLORIDE 0.9 % (FLUSH) 0.9 %
3 SYRINGE (ML) INJECTION EVERY 12 HOURS SCHEDULED
Status: CANCELLED | OUTPATIENT
Start: 2021-02-01

## 2021-02-01 RX ORDER — SODIUM CHLORIDE 0.9 % (FLUSH) 0.9 %
10 SYRINGE (ML) INJECTION AS NEEDED
Status: CANCELLED | OUTPATIENT
Start: 2021-02-01

## 2021-02-02 ENCOUNTER — LAB (OUTPATIENT)
Dept: LAB | Facility: HOSPITAL | Age: 66
End: 2021-02-02

## 2021-02-02 LAB — SARS-COV-2 ORF1AB RESP QL NAA+PROBE: NOT DETECTED

## 2021-02-02 PROCEDURE — C9803 HOPD COVID-19 SPEC COLLECT: HCPCS | Performed by: INTERNAL MEDICINE

## 2021-02-02 PROCEDURE — U0004 COV-19 TEST NON-CDC HGH THRU: HCPCS | Performed by: INTERNAL MEDICINE

## 2021-02-05 ENCOUNTER — HOSPITAL ENCOUNTER (OUTPATIENT)
Facility: HOSPITAL | Age: 66
Discharge: HOME | End: 2021-02-05
Attending: INTERNAL MEDICINE | Admitting: INTERNAL MEDICINE

## 2021-02-05 VITALS
BODY MASS INDEX: 29.27 KG/M2 | RESPIRATION RATE: 20 BRPM | SYSTOLIC BLOOD PRESSURE: 120 MMHG | HEART RATE: 72 BPM | TEMPERATURE: 97 F | OXYGEN SATURATION: 99 % | DIASTOLIC BLOOD PRESSURE: 76 MMHG | WEIGHT: 165.2 LBS | HEIGHT: 63 IN

## 2021-02-05 DIAGNOSIS — I27.20 PULMONARY HYPERTENSION (HCC): ICD-10-CM

## 2021-02-05 DIAGNOSIS — I51.7 RIGHT VENTRICULAR ENLARGEMENT: ICD-10-CM

## 2021-02-05 DIAGNOSIS — Q21.11 ASD SECUNDUM: ICD-10-CM

## 2021-02-05 LAB
ANION GAP SERPL CALCULATED.3IONS-SCNC: 8 MMOL/L (ref 5–15)
ATMOSPHERIC PRESS: 750 MMHG
BASE EXCESS BLDV CALC-SCNC: 2.9 MMOL/L (ref 0–2)
BASE EXCESS BLDV CALC-SCNC: 2.9 MMOL/L (ref 0–2)
BASE EXCESS BLDV CALC-SCNC: 3.3 MMOL/L (ref 0–2)
BASE EXCESS BLDV CALC-SCNC: 3.4 MMOL/L (ref 0–2)
BASE EXCESS BLDV CALC-SCNC: 3.6 MMOL/L (ref 0–2)
BASE EXCESS BLDV CALC-SCNC: 3.7 MMOL/L (ref 0–2)
BDY SITE: ABNORMAL
BODY TEMPERATURE: 37 C
BUN SERPL-MCNC: 17 MG/DL (ref 8–23)
BUN/CREAT SERPL: 37.8 (ref 7–25)
CALCIUM SPEC-SCNC: 9.1 MG/DL (ref 8.6–10.5)
CHLORIDE SERPL-SCNC: 107 MMOL/L (ref 98–107)
CO2 SERPL-SCNC: 27 MMOL/L (ref 22–29)
COHGB MFR BLD: 0.9 % (ref 0–5)
COHGB MFR BLD: 1 % (ref 0–5)
CREAT SERPL-MCNC: 0.45 MG/DL (ref 0.57–1)
DEPRECATED RDW RBC AUTO: 42.5 FL (ref 37–54)
ERYTHROCYTE [DISTWIDTH] IN BLOOD BY AUTOMATED COUNT: 12.4 % (ref 12.3–15.4)
GFR SERPL CREATININE-BSD FRML MDRD: 139 ML/MIN/1.73
GLUCOSE SERPL-MCNC: 115 MG/DL (ref 65–99)
HCO3 BLDV-SCNC: 29.4 MMOL/L (ref 22–28)
HCO3 BLDV-SCNC: 29.6 MMOL/L (ref 22–28)
HCO3 BLDV-SCNC: 29.7 MMOL/L (ref 22–28)
HCO3 BLDV-SCNC: 29.9 MMOL/L (ref 22–28)
HCO3 BLDV-SCNC: 30 MMOL/L (ref 22–28)
HCO3 BLDV-SCNC: 30 MMOL/L (ref 22–28)
HCO3 BLDV-SCNC: 30.3 MMOL/L (ref 22–28)
HCT VFR BLD AUTO: 41.2 % (ref 34–46.6)
HGB BLD-MCNC: 13.2 G/DL (ref 12–15.9)
HGB BLDA-MCNC: 12.5 G/DL (ref 12–16)
HGB BLDA-MCNC: 12.6 G/DL (ref 12–16)
HGB BLDA-MCNC: 12.6 G/DL (ref 12–16)
HGB BLDA-MCNC: 12.7 G/DL (ref 12–16)
HGB BLDA-MCNC: 12.8 G/DL (ref 12–16)
Lab: ABNORMAL
MCH RBC QN AUTO: 29.7 PG (ref 26.6–33)
MCHC RBC AUTO-ENTMCNC: 32 G/DL (ref 31.5–35.7)
MCV RBC AUTO: 92.8 FL (ref 79–97)
METHGB BLD QL: 1.2 % (ref 0–3)
METHGB BLD QL: 1.2 % (ref 0–3)
METHGB BLD QL: 1.3 % (ref 0–3)
METHGB BLD QL: 1.3 % (ref 0–3)
METHGB BLD QL: 1.4 % (ref 0–3)
METHGB BLD QL: 1.6 % (ref 0–3)
MODALITY: ABNORMAL
NOTE: ABNORMAL
OXYHGB MFR BLDV: 66.4 % (ref 60–85)
OXYHGB MFR BLDV: 66.5 % (ref 60–85)
OXYHGB MFR BLDV: 67.1 % (ref 60–85)
OXYHGB MFR BLDV: 68.1 % (ref 60–85)
OXYHGB MFR BLDV: 69 % (ref 60–85)
OXYHGB MFR BLDV: 69.9 % (ref 60–85)
OXYHGB MFR BLDV: 76 % (ref 60–85)
PCO2 BLDV: 49.8 MM HG (ref 41–51)
PCO2 BLDV: 51.3 MM HG (ref 41–51)
PCO2 BLDV: 52.1 MM HG (ref 41–51)
PCO2 BLDV: 52.2 MM HG (ref 41–51)
PCO2 BLDV: 52.5 MM HG (ref 41–51)
PCO2 BLDV: 52.7 MM HG (ref 41–51)
PCO2 BLDV: 53.3 MM HG (ref 41–51)
PCO2 BLDV: 53.5 MM HG (ref 41–51)
PCO2 BLDV: 53.6 MM HG (ref 41–51)
PH BLDV: 7.35 PH UNITS (ref 7.32–7.42)
PH BLDV: 7.36 PH UNITS (ref 7.32–7.42)
PH BLDV: 7.37 PH UNITS (ref 7.32–7.42)
PH BLDV: 7.37 PH UNITS (ref 7.32–7.42)
PH BLDV: 7.38 PH UNITS (ref 7.32–7.42)
PLATELET # BLD AUTO: 193 10*3/MM3 (ref 140–450)
PMV BLD AUTO: 11.1 FL (ref 6–12)
PO2 BLDV: 37.2 MM HG (ref 27–53)
PO2 BLDV: 37.3 MM HG (ref 27–53)
PO2 BLDV: 37.4 MM HG (ref 27–53)
PO2 BLDV: 37.7 MM HG (ref 27–53)
PO2 BLDV: 38 MM HG (ref 27–53)
PO2 BLDV: 38.1 MM HG (ref 27–53)
PO2 BLDV: 39.2 MM HG (ref 27–53)
PO2 BLDV: 39.6 MM HG (ref 27–53)
PO2 BLDV: 43.9 MM HG (ref 27–53)
POTASSIUM BLDV-SCNC: 3.6 MMOL/L (ref 3.5–5.2)
POTASSIUM BLDV-SCNC: 3.7 MMOL/L (ref 3.5–5.2)
POTASSIUM BLDV-SCNC: 3.8 MMOL/L (ref 3.5–5.2)
POTASSIUM BLDV-SCNC: 3.8 MMOL/L (ref 3.5–5.2)
POTASSIUM SERPL-SCNC: 4 MMOL/L (ref 3.5–5.2)
RBC # BLD AUTO: 4.44 10*6/MM3 (ref 3.77–5.28)
SAO2 % BLDCOV: 67.8 % (ref 45–75)
SAO2 % BLDCOV: 67.9 % (ref 45–75)
SAO2 % BLDCOV: 67.9 % (ref 45–75)
SAO2 % BLDCOV: 68.1 % (ref 45–75)
SAO2 % BLDCOV: 68.7 % (ref 45–75)
SAO2 % BLDCOV: 69.7 % (ref 45–75)
SAO2 % BLDCOV: 70.6 % (ref 45–75)
SAO2 % BLDCOV: 71.5 % (ref 45–75)
SAO2 % BLDCOV: 77.9 % (ref 45–75)
SODIUM BLDV-SCNC: 143 MMOL/L (ref 136–145)
SODIUM BLDV-SCNC: 144 MMOL/L (ref 136–145)
SODIUM SERPL-SCNC: 142 MMOL/L (ref 136–145)
VENTILATOR MODE: ABNORMAL
WBC # BLD AUTO: 5.54 10*3/MM3 (ref 3.4–10.8)

## 2021-02-05 PROCEDURE — 80048 BASIC METABOLIC PNL TOTAL CA: CPT | Performed by: INTERNAL MEDICINE

## 2021-02-05 PROCEDURE — 82805 BLOOD GASES W/O2 SATURATION: CPT

## 2021-02-05 PROCEDURE — 93530: CPT | Performed by: INTERNAL MEDICINE

## 2021-02-05 PROCEDURE — 76937 US GUIDE VASCULAR ACCESS: CPT

## 2021-02-05 PROCEDURE — 25010000002 HEPARIN (PORCINE) 2000-0.9 UNIT/L-% SOLUTION: Performed by: INTERNAL MEDICINE

## 2021-02-05 PROCEDURE — 82820 HEMOGLOBIN-OXYGEN AFFINITY: CPT

## 2021-02-05 PROCEDURE — 93451 RIGHT HEART CATH: CPT | Performed by: INTERNAL MEDICINE

## 2021-02-05 PROCEDURE — C1894 INTRO/SHEATH, NON-LASER: HCPCS | Performed by: INTERNAL MEDICINE

## 2021-02-05 PROCEDURE — 25010000002 HEPARIN (PORCINE) 1000-0.9 UT/500ML-% SOLUTION: Performed by: INTERNAL MEDICINE

## 2021-02-05 PROCEDURE — C1769 GUIDE WIRE: HCPCS | Performed by: INTERNAL MEDICINE

## 2021-02-05 PROCEDURE — 85027 COMPLETE CBC AUTOMATED: CPT | Performed by: INTERNAL MEDICINE

## 2021-02-05 PROCEDURE — C1751 CATH, INF, PER/CENT/MIDLINE: HCPCS | Performed by: INTERNAL MEDICINE

## 2021-02-05 RX ORDER — SODIUM CHLORIDE 0.9 % (FLUSH) 0.9 %
3 SYRINGE (ML) INJECTION EVERY 12 HOURS SCHEDULED
Status: DISCONTINUED | OUTPATIENT
Start: 2021-02-05 | End: 2021-02-05 | Stop reason: HOSPADM

## 2021-02-05 RX ORDER — HEPARIN SODIUM 200 [USP'U]/100ML
INJECTION, SOLUTION INTRAVENOUS AS NEEDED
Status: DISCONTINUED | OUTPATIENT
Start: 2021-02-05 | End: 2021-02-05 | Stop reason: HOSPADM

## 2021-02-05 RX ORDER — LIDOCAINE HYDROCHLORIDE 20 MG/ML
INJECTION, SOLUTION INFILTRATION; PERINEURAL AS NEEDED
Status: DISCONTINUED | OUTPATIENT
Start: 2021-02-05 | End: 2021-02-05 | Stop reason: HOSPADM

## 2021-02-05 RX ORDER — SODIUM CHLORIDE 0.9 % (FLUSH) 0.9 %
10 SYRINGE (ML) INJECTION AS NEEDED
Status: DISCONTINUED | OUTPATIENT
Start: 2021-02-05 | End: 2021-02-05 | Stop reason: HOSPADM

## 2021-02-05 RX ADMIN — SODIUM CHLORIDE, PRESERVATIVE FREE 10 ML: 5 INJECTION INTRAVENOUS at 07:48

## 2021-02-05 NOTE — INTERVAL H&P NOTE
"  H&P reviewed. The patient was examined and there are no changes to the H&P.       Patient presents electively for right heart catheterization including full oxygen saturation \"run\" looking for location of step up.  I discussed the risk, benefits, and alternatives with the patient, and she agrees to proceed with attempting no sedation and no oxygen supplementation to increase validity of measurements.  Will use ultrasound guidance for vascular access, particular in light of her chronic anticoagulation, and use lidocaine for topical anesthetic only.      "

## 2021-02-05 NOTE — DISCHARGE INSTR - ACTIVITY
Watch Right Jugular site for signs of infection I.e. redness, swelling, and yellow/green colored drainage.  Keep dressing on Right Jugular site until tomorrow 02/06/2021

## 2021-02-12 ASSESSMENT — ENCOUNTER SYMPTOMS
SORE THROAT: 0
DIARRHEA: 0
COUGH: 0
EYE PAIN: 0
CONSTIPATION: 0
BACK PAIN: 0
BLOOD IN STOOL: 0
EYE REDNESS: 0
WHEEZING: 0
SHORTNESS OF BREATH: 0
VOMITING: 0
NAUSEA: 0
EYE DISCHARGE: 0
ABDOMINAL PAIN: 0
RESPIRATORY NEGATIVE: 1
GASTROINTESTINAL NEGATIVE: 1
EYES NEGATIVE: 1

## 2021-03-24 RX ORDER — MONTELUKAST SODIUM 10 MG/1
10 TABLET ORAL NIGHTLY
Qty: 30 TABLET | Refills: 5 | Status: SHIPPED | OUTPATIENT
Start: 2021-03-24 | End: 2021-09-20

## 2021-03-24 RX ORDER — LANOLIN ALCOHOL/MO/W.PET/CERES
1000 CREAM (GRAM) TOPICAL DAILY
Qty: 90 TABLET | Refills: 3 | Status: SHIPPED | OUTPATIENT
Start: 2021-03-24 | End: 2021-10-12

## 2021-03-24 NOTE — TELEPHONE ENCOUNTER
Caller: [facility]    Relationship: Pharmacy    Best call back number: [phone]    Medication needed:   Requested Prescriptions     Pending Prescriptions Disp Refills   • montelukast (SINGULAIR) 10 MG tablet       Sig: Take 1 tablet by mouth Every Night.   • vitamin B-12 (CYANOCOBALAMIN) 1000 MCG tablet 90 tablet 3     Sig: Take 1 tablet by mouth Daily.       When do you need the refill by:   ASAP    What additional details did the patient provide when requesting the medication:   PATIENT IS OUT OF MEDICATION     Does the patient have less than a 3 day supply:  [x] Yes  [] No    What is the patient's preferred pharmacy: [facility]

## 2021-04-05 RX ORDER — OMEPRAZOLE 20 MG/1
20 CAPSULE, DELAYED RELEASE ORAL DAILY
Qty: 30 CAPSULE | Refills: 5 | Status: SHIPPED | OUTPATIENT
Start: 2021-04-05 | End: 2021-09-29 | Stop reason: SDUPTHER

## 2021-04-05 NOTE — TELEPHONE ENCOUNTER
Caller: Yun Blackwell    Relationship: Self    Best call back number: [phone]    Medication needed:   Requested Prescriptions     Pending Prescriptions Disp Refills   • omeprazole (priLOSEC) 20 MG capsule       Sig: Take 1 capsule by mouth Daily.       When do you need the refill by:4/5/21        Does the patient have less than a 3 day supply:  [x] Yes  [] No    What is the patient's preferred pharmacy: [facility]

## 2021-04-12 ENCOUNTER — OFFICE VISIT (OUTPATIENT)
Dept: INTERNAL MEDICINE | Facility: CLINIC | Age: 66
End: 2021-04-12

## 2021-04-12 VITALS
HEIGHT: 63 IN | SYSTOLIC BLOOD PRESSURE: 115 MMHG | DIASTOLIC BLOOD PRESSURE: 70 MMHG | HEART RATE: 102 BPM | BODY MASS INDEX: 29.95 KG/M2 | RESPIRATION RATE: 16 BRPM | OXYGEN SATURATION: 98 % | TEMPERATURE: 98.6 F | WEIGHT: 169 LBS

## 2021-04-12 DIAGNOSIS — Z00.00 MEDICARE ANNUAL WELLNESS VISIT, SUBSEQUENT: Primary | ICD-10-CM

## 2021-04-12 DIAGNOSIS — Z13.31 DEPRESSION SCREEN: ICD-10-CM

## 2021-04-12 DIAGNOSIS — Z78.0 POSTMENOPAUSE: ICD-10-CM

## 2021-04-12 DIAGNOSIS — I48.21 PERMANENT ATRIAL FIBRILLATION (HCC): ICD-10-CM

## 2021-04-12 PROBLEM — R10.84 GENERALIZED ABDOMINAL PAIN: Status: RESOLVED | Noted: 2017-04-01 | Resolved: 2021-04-12

## 2021-04-12 PROCEDURE — 99213 OFFICE O/P EST LOW 20 MIN: CPT | Performed by: INTERNAL MEDICINE

## 2021-04-13 ENCOUNTER — OFFICE VISIT (OUTPATIENT)
Dept: CARDIOLOGY | Facility: CLINIC | Age: 66
End: 2021-04-13

## 2021-04-13 VITALS
OXYGEN SATURATION: 98 % | HEIGHT: 63 IN | BODY MASS INDEX: 29.95 KG/M2 | DIASTOLIC BLOOD PRESSURE: 86 MMHG | SYSTOLIC BLOOD PRESSURE: 118 MMHG | WEIGHT: 169 LBS | HEART RATE: 91 BPM

## 2021-04-13 DIAGNOSIS — I48.21 PERMANENT ATRIAL FIBRILLATION (HCC): Primary | ICD-10-CM

## 2021-04-13 DIAGNOSIS — Q21.11 ASD SECUNDUM: ICD-10-CM

## 2021-04-13 DIAGNOSIS — I51.7 RIGHT VENTRICULAR ENLARGEMENT: ICD-10-CM

## 2021-04-13 DIAGNOSIS — Q24.5 CONGENITAL CORONARY ARTERY FISTULA TO PULMONARY ARTERY: ICD-10-CM

## 2021-04-13 PROCEDURE — 93000 ELECTROCARDIOGRAM COMPLETE: CPT | Performed by: NURSE PRACTITIONER

## 2021-04-13 PROCEDURE — 99213 OFFICE O/P EST LOW 20 MIN: CPT | Performed by: NURSE PRACTITIONER

## 2021-04-13 NOTE — PROGRESS NOTES
"    Subjective:     Encounter Date:04/13/2021      Patient ID: Yun Blackwell is a 65 y.o. female.    Chief Complaint: Follow-up permanent atrial fibrillation, ASD    The patient was referred to Dr. Marcelino in December 2020 after an echocardiogram showed tricuspid regurgitation and right ventricular enlargement.  She had been seen several years prior by cardiology at [facility], with records indicating she was followed for atrial fibrillation.  There was mention in 2017 of her having had significant GI bleeds in the past and it was noted she was on an \"unusual dose of a blood thinner\" at that time. She was reluctant to be back on any beta blocker d/t prior issues.  It did not appear as though she had been seen by cardiologist since, but she recently established care with Dr. Healy for primary care, and was referred to Dr. Marcelino to establish cardiology care.    She reported she was diagnosed with atrial fibrillation approximately 7 years ago while working at [employer].  She reports she was noticing palpitations that day, which was nothing new for her.  She had no associated symptoms.  She had never had attempted cardioversion or ablation.  Has never been on anti-arrhythmics. Has not had any issues with bowel bleeding since she was  in [facility] 11/10/20 for this. Had c-scope unremarkable 3d later.       When she established care with Dr. Marcelino in December 2020 she reported occasional palpitations, which would occur less than once per week with associated lightheadedness that would last about 5 to 10 minutes and then resolved on its own.  These have been noticeable within the previous year, but were not changing in terms of frequency, severity or duration since initial onset.  At that visit, DEANN was ordered to assess for possible ASD seen on TTE, as she did have right ventricular enlargement.  The small coronary artery-pulmonary artery fistula reported in 2017, but was not thought to account for her degree of " "right ventricular enlargement.  She was switched from warfarin to Eliquis for anticoagulation.  She was placed in a 7-day Holter monitor for symptom-rhythm correlation given her palpitations.  No changes were made in diltiazem dose at that time.  DEANN confirmed ASD.  See full details below.  She later underwent right heart cath and this revealed no evidence of pulmonary hypertension, normal mean PA pressure and normal pulmonary vascular resistance.  There was no evidence of left or right shunt (no oxygen \"step up\").  She had normal cardiac output.  At that point, it did not seem as though the patient would benefit from ASD closure due to those findings and her right ventricular and right atrial dilation were felt to be dilated for some other reason.    See Holter monitor results below.  No changes were made in her diltiazem dose based on findings at that time, although notes do indicate some of her symptoms did correlate with when her heart rate would speed up a little bit.    Today the patient reports that since transitioning to Eliquis from warfarin, her palpitations have essentially resolved.  She also reports she previously had associated nausea and dizziness and the symptoms have resolved as well.  She is starting to walk more around her neighborhood and does not have any symptoms with exertion.  She denies chest pain, shortness of breath, edema, orthopnea, PND, syncope or presyncope.  She reports compliance with her medications and good blood pressure control.  No bleeding issues since starting Eliquis.      The following portions of the patient's history were reviewed and updated as appropriate: allergies, current medications, past family history, past medical history, past social history, past surgical history and problem list.    Review of Systems   Constitutional: Negative for malaise/fatigue.   Cardiovascular: Negative for chest pain, claudication, dyspnea on exertion, leg swelling, near-syncope, " "orthopnea, palpitations, paroxysmal nocturnal dyspnea and syncope.   Respiratory: Negative for cough and shortness of breath.    Hematologic/Lymphatic: Does not bruise/bleed easily.   Musculoskeletal: Negative for falls.   Gastrointestinal: Negative for bloating.   Neurological: Negative for dizziness, light-headedness and weakness.       Allergies   Allergen Reactions   • Morphine And Related Dizziness     Gets very sick    • Percocet [Oxycodone-Acetaminophen] Dizziness     nausea       Current Outpatient Medications:   •  apixaban (ELIQUIS) 5 MG tablet tablet, Take 1 tablet by mouth Every 12 (Twelve) Hours., Disp: 60 tablet, Rfl: 5  •  dicyclomine (BENTYL) 20 MG tablet, Take 20 mg by mouth Every 6 (Six) Hours. Takes 1/2 tablet 4 times daily, Disp: , Rfl:   •  dilTIAZem (CARDIZEM) 30 MG tablet, Take 30 mg by mouth 2 (two) times a day., Disp: , Rfl:   •  montelukast (SINGULAIR) 10 MG tablet, Take 1 tablet by mouth Every Night., Disp: 30 tablet, Rfl: 5  •  omeprazole (priLOSEC) 20 MG capsule, Take 1 capsule by mouth Daily., Disp: 30 capsule, Rfl: 5  •  tamoxifen (NOLVADEX) 20 MG chemo tablet, Take 1 tablet by mouth once a day., Disp: 90 tablet, Rfl: 2  •  tiZANidine (ZANAFLEX) 4 MG tablet, Take one-half to 1 tablet by mouth at bedtime., Disp: 30 tablet, Rfl: 2  •  vitamin B-12 (CYANOCOBALAMIN) 1000 MCG tablet, Take 1 tablet by mouth Daily., Disp: 90 tablet, Rfl: 3         Objective:    /86   Pulse 91   Ht 160 cm (63\")   Wt 76.7 kg (169 lb)   SpO2 98%   BMI 29.94 kg/m²        Vitals and nursing note reviewed.   Constitutional:       General: Not in acute distress.     Appearance: Well-developed and not in distress. Not diaphoretic.   Neck:      Vascular: No JVD.   Pulmonary:      Effort: Pulmonary effort is normal. No respiratory distress.      Breath sounds: Normal breath sounds.   Cardiovascular:      Normal rate. Irregular rhythm.      Murmurs: There is no murmur.   Edema:     Peripheral edema absent. "   Abdominal:      Tenderness: There is no abdominal tenderness.   Skin:     General: Skin is warm and dry.   Neurological:      Mental Status: Alert and oriented to person, place, and time.         Lab Review:   Lab Results   Component Value Date    GLUCOSE 115 (H) 02/05/2021    BUN 17 02/05/2021    CREATININE 0.45 (L) 02/05/2021    EGFRIFNONA 139 02/05/2021    EGFRIFAFRI >59 11/10/2020    BCR 37.8 (H) 02/05/2021    K 3.7 02/05/2021    CO2 27.0 02/05/2021    CALCIUM 9.1 02/05/2021    ALBUMIN 3.9 11/10/2020    AST 19 11/10/2020    ALT 18 11/10/2020     No results found for: TSH          ECG 12 Lead    Date/Time: 4/13/2021 12:29 PM  Performed by: Abbey Watson APRN  Authorized by: Abbey Watson APRN   Comparison: compared with previous ECG from 12/7/2020  Similar to previous ECG  Rhythm: atrial fibrillation  BPM: 92  Conduction: right bundle branch block            Results for orders placed during the hospital encounter of 01/25/21    Adult Transesophageal Echo (DEANN) W/ Cont if Necessary Per Protocol    Interpretation Summary  · The right ventricular cavity is dilated.  · Moderate tricuspid valve regurgitation is present.  · Estimated right ventricular systolic pressure from tricuspid regurgitation is mildly elevated (35-45 mmHg).  · Small-moderate sized multifenestrated secundum-type atrial septal defect (ASD).  · Severe biatrial enlargement.  · Normal LVEF.    12/2020 7 day holter -    · An abnormal monitor study.  · 100% burden of atrial fibrillation, with average ventricular rate of 91 bpm. Range  bpm.  · Four patient-triggered events and five diary entries (symptoms including syncope, light-headedness, dizziness, palpitations), with no correlation to any change in rate or rhythm.     2/2021 R[facility]:  Findings:     Ultrasound examination of the right neck: Right internal jugular vein was identified lateral to the right internal carotid artery.  Pulsatile flow was confirmed using color Doppler in the artery, the  "vein was compressible without any evidence of thrombus.  Needle was seen directly entering the anterior surface of the vein.     Right heart catheterization:  Hemodynamics (in mmHg)  RA: 9  RV: 28/8  PA: 28/12, m17  PCW: 14     Oximetry (all %)  High SVC: 69  Low SVC: 70  High IVC: 72  Low IVC: 78  High RA: 68  Low RA: 68  RV: 68  PA: 68  PCW: 70 (confirmed both by fluoroscopy and waveform)     Ao (by pulse oximetry): 98%     Cardiac output and index via assumed Flex method: 4.4 L/min, 2.5 L/min/m2     TPG: 3  PVR: <1 Wood unit     Estimated Blood Loss: 5mL  Specimens: None  Complications: None     Impression:  1. No evidence of pulmonary hypertension; normal mean PA pressure and normal pulmonary vascular resistence  2. No evidence of left-to-right shunt (ie no oxygen \"step up\")  3. Normal cardiac output     Plan: resume Eliquis tonight; office f/u.  At this point, and based on this data, does not seem as though patient would benefit from ASD closure and that her right ventricle and right atrium are severely dilated for some other reason.       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 Healy in '17         ASD secundum    Overview     Added automatically from request for surgery [id number]               Plan:     1.  Permanent atrial fibrillation: Established problem, stable.  Her palpitations have resolved.  See December 2020 Holter findings above.  Overall average ventricular rate was controlled around 90.  She remains rate controlled per EKG today.  -Continue Eliquis 5 mg twice daily for stroke prophylaxis  -Continue current dose of diltiazem for rate control.  We did discuss the fact that we could uptitrate this if she were to have recurrent palpitations as Dr. Marcelino' note " previously indicates some of her symptoms seem to correlate with when her heart rate would speed up a bit.    2.  ASD: See notes above.  Given her right heart cath findings, it was felt that she would not benefit from ASD closure and that her right ventricular and right atrial dilation were not secondary to the ASD.  She continues to deny any shortness of breath.     Follow-up with Dr. Marcelino in 6 months, sooner with symptoms or concerns.

## 2021-04-22 ENCOUNTER — HOSPITAL ENCOUNTER (OUTPATIENT)
Dept: BONE DENSITY | Facility: HOSPITAL | Age: 66
Discharge: HOME | End: 2021-04-22
Admitting: INTERNAL MEDICINE

## 2021-04-22 DIAGNOSIS — Z00.00 MEDICARE ANNUAL WELLNESS VISIT, SUBSEQUENT: ICD-10-CM

## 2021-04-22 DIAGNOSIS — Z78.0 POSTMENOPAUSE: ICD-10-CM

## 2021-04-22 PROCEDURE — 77080 DXA BONE DENSITY AXIAL: CPT

## 2021-05-05 RX ORDER — TIZANIDINE 4 MG/1
4 TABLET ORAL
Qty: 30 TABLET | Refills: 2 | OUTPATIENT
Start: 2021-05-05

## 2021-06-14 ENCOUNTER — HOSPITAL ENCOUNTER (OUTPATIENT)
Dept: GENERAL RADIOLOGY | Age: 66
Discharge: HOME | End: 2021-06-14
Payer: MEDICARE

## 2021-06-14 ENCOUNTER — HOSPITAL ENCOUNTER (OUTPATIENT)
Dept: INFUSION THERAPY | Age: 66
Discharge: HOME | End: 2021-06-14
Payer: MEDICARE

## 2021-06-14 ENCOUNTER — OFFICE VISIT (OUTPATIENT)
Dept: HEMATOLOGY | Age: 66
End: 2021-06-14
Payer: MEDICARE

## 2021-06-14 VITALS
HEIGHT: 63 IN | HEART RATE: 104 BPM | OXYGEN SATURATION: 98 % | WEIGHT: 170 LBS | DIASTOLIC BLOOD PRESSURE: 98 MMHG | SYSTOLIC BLOOD PRESSURE: 140 MMHG | BODY MASS INDEX: 30.12 KG/M2

## 2021-06-14 DIAGNOSIS — D05.12 DUCTAL CARCINOMA IN SITU (DCIS) OF LEFT BREAST: Primary | ICD-10-CM

## 2021-06-14 DIAGNOSIS — Z86.39 HX OF IRON DEFICIENCY: ICD-10-CM

## 2021-06-14 DIAGNOSIS — T38.6X5D ADVERSE EFFECT OF TAMOXIFEN, SUBSEQUENT ENCOUNTER: ICD-10-CM

## 2021-06-14 DIAGNOSIS — D05.12 DUCTAL CARCINOMA IN SITU (DCIS) OF LEFT BREAST: ICD-10-CM

## 2021-06-14 DIAGNOSIS — M79.604 RIGHT LEG PAIN: ICD-10-CM

## 2021-06-14 DIAGNOSIS — M25.50 ARTHRALGIA, UNSPECIFIED JOINT: ICD-10-CM

## 2021-06-14 DIAGNOSIS — Z71.89 CARE PLAN DISCUSSED WITH PATIENT: ICD-10-CM

## 2021-06-14 DIAGNOSIS — E53.8 VITAMIN B 12 DEFICIENCY: ICD-10-CM

## 2021-06-14 LAB
BASOPHILS ABSOLUTE: 0.02 K/UL (ref 0.01–0.08)
BASOPHILS RELATIVE PERCENT: 0.3 % (ref 0.1–1.2)
EOSINOPHILS ABSOLUTE: 0.09 K/UL (ref 0.04–0.54)
EOSINOPHILS RELATIVE PERCENT: 1.2 % (ref 0.7–7)
HCT VFR BLD CALC: 41.6 % (ref 34.1–44.9)
HEMOGLOBIN: 13.4 G/DL (ref 11.2–15.7)
LYMPHOCYTES ABSOLUTE: 1.68 K/UL (ref 1.18–3.74)
LYMPHOCYTES RELATIVE PERCENT: 23 % (ref 19.3–53.1)
MCH RBC QN AUTO: 30.6 PG (ref 25.6–32.2)
MCHC RBC AUTO-ENTMCNC: 32.2 G/DL (ref 32.3–35.5)
MCV RBC AUTO: 95 FL (ref 79.4–94.8)
MONOCYTES ABSOLUTE: 0.68 K/UL (ref 0.24–0.82)
MONOCYTES RELATIVE PERCENT: 9.3 % (ref 4.7–12.5)
NEUTROPHILS ABSOLUTE: 4.83 K/UL (ref 1.56–6.13)
NEUTROPHILS RELATIVE PERCENT: 66.2 % (ref 34–71.1)
PDW BLD-RTO: 13.2 % (ref 11.7–14.4)
PLATELET # BLD: 142 K/UL (ref 182–369)
PMV BLD AUTO: 11.7 FL (ref 7.4–10.4)
RBC # BLD: 4.38 M/UL (ref 3.93–5.22)
WBC # BLD: 7.3 K/UL (ref 3.98–10.04)

## 2021-06-14 PROCEDURE — 73562 X-RAY EXAM OF KNEE 3: CPT

## 2021-06-14 PROCEDURE — G8399 PT W/DXA RESULTS DOCUMENT: HCPCS | Performed by: NURSE PRACTITIONER

## 2021-06-14 PROCEDURE — 1036F TOBACCO NON-USER: CPT | Performed by: NURSE PRACTITIONER

## 2021-06-14 PROCEDURE — 36415 COLL VENOUS BLD VENIPUNCTURE: CPT

## 2021-06-14 PROCEDURE — 3017F COLORECTAL CA SCREEN DOC REV: CPT | Performed by: NURSE PRACTITIONER

## 2021-06-14 PROCEDURE — 4040F PNEUMOC VAC/ADMIN/RCVD: CPT | Performed by: NURSE PRACTITIONER

## 2021-06-14 PROCEDURE — 73590 X-RAY EXAM OF LOWER LEG: CPT

## 2021-06-14 PROCEDURE — G8417 CALC BMI ABV UP PARAM F/U: HCPCS | Performed by: NURSE PRACTITIONER

## 2021-06-14 PROCEDURE — G8427 DOCREV CUR MEDS BY ELIG CLIN: HCPCS | Performed by: NURSE PRACTITIONER

## 2021-06-14 PROCEDURE — 99214 OFFICE O/P EST MOD 30 MIN: CPT | Performed by: NURSE PRACTITIONER

## 2021-06-14 PROCEDURE — 1090F PRES/ABSN URINE INCON ASSESS: CPT | Performed by: NURSE PRACTITIONER

## 2021-06-14 PROCEDURE — 99213 OFFICE O/P EST LOW 20 MIN: CPT

## 2021-06-14 PROCEDURE — 1123F ACP DISCUSS/DSCN MKR DOCD: CPT | Performed by: NURSE PRACTITIONER

## 2021-06-14 PROCEDURE — 85025 COMPLETE CBC W/AUTO DIFF WBC: CPT

## 2021-06-14 RX ORDER — TAMOXIFEN CITRATE 20 MG/1
20 TABLET ORAL DAILY
Qty: 90 TABLET | Refills: 3 | Status: SHIPPED | OUTPATIENT
Start: 2021-06-14 | End: 2022-09-09 | Stop reason: SDUPTHER

## 2021-06-14 ASSESSMENT — ENCOUNTER SYMPTOMS
EYE PAIN: 0
SORE THROAT: 0
BLOOD IN STOOL: 0
WHEEZING: 0
COUGH: 0
BACK PAIN: 0
EYE REDNESS: 0
EYE DISCHARGE: 0
ABDOMINAL PAIN: 0
VOMITING: 0
SHORTNESS OF BREATH: 0
DIARRHEA: 0
RESPIRATORY NEGATIVE: 1
EYES NEGATIVE: 1
NAUSEA: 0

## 2021-06-14 NOTE — PATIENT INSTRUCTIONS
Patient Education        Learning About High-Iron Foods  What foods are high in iron? The foods you eat contain nutrients, such as vitamins and minerals. Iron is a nutrient. Your body needs the right amount to stay healthy and work as it should. You can use the list below to help you make choices about which foods to eat. Here are some foods that contain iron. They have 1 to 2 milligrams of iron per serving. Fruits  · Figs (dried), 5 figs  Vegetables  · Asparagus (canned), 6 reyna  · Shan, beet, Swiss chard, or turnip greens, 1 cup  · Dried peas, cooked, ½ cup  · Seaweed, spirulina (dried), ¼ cup  · Spinach, (cooked) ½ cup or (raw) 1 cup  Grains  · Cereals, fortified with iron, 1 cup  · Grits (instant, cooked), fortified with iron, ½ cup  Meats and other protein foods  · Beans (kidney, lima, navy, white), canned or cooked, ½ cup  · Beef or lamb, 3 oz  · Chicken giblets, 3 oz  · Chickpeas (garbanzo beans), ½ cup  · Liver of beef, lamb, or pork, 3 oz  · Oysters (cooked), 3 oz  · Sardines (canned), 3 oz  · Soybeans (boiled), ½ cup  · Tofu (firm), ½ cup  Work with your doctor to find out how much of this nutrient you need. Depending on your health, you may need more or less of it in your diet. Current as of: December 17, 2020               Content Version: 12.8  © 2006-2021 Healthwise, Incorporated. Care instructions adapted under license by [facility] ([facility]). If you have questions about a medical condition or this instruction, always ask your healthcare professional. [place] [address] any warranty or liability for your use of this information. Patient Education        Iron-Rich Diet: Care Instructions  Your Care Instructions     Your body needs iron to make hemoglobin. Hemoglobin is a substance in red blood cells that carries oxygen from the lungs to cells all through your body. If you do not get enough iron, your body makes fewer and smaller red blood cells.  As a result, your body's Stroke Week 2 Survey      Responses   Facility patient discharged from?  [facility]   Does the patient have one of the following disease processes/diagnoses(primary or secondary)?  Stroke (TIA)   Week 2 attempt successful?  No   Rescheduled  Revoked          Marylin Gardiner RN   cells may not get enough oxygen. Adult men need 8 milligrams of iron a day; adult women need 18 milligrams of iron a day. After menopause, women need 8 milligrams of iron a day. A pregnant woman needs 27 milligrams of iron a day. Infants and young children have higher iron needs relative to their size than other age groups. People who have lost blood because of ulcers or heavy menstrual periods may become very low in iron and may develop anemia. Most people can get the iron their bodies need by eating enough of certain iron-rich foods. Your doctor may recommend that you take an iron supplement along with eating an iron-rich diet. Follow-up care is a key part of your treatment and safety. Be sure to make and go to all appointments, and call your doctor if you are having problems. It's also a good idea to know your test results and keep a list of the medicines you take. How can you care for yourself at home? · Make iron-rich foods a part of your daily diet. Iron-rich foods include:  ? All meats, such as chicken, beef, lamb, pork, fish, and shellfish. Liver is especially high in iron. ? Leafy green vegetables. ? Raisins, peas, beans, lentils, barley, and eggs. ? Iron-fortified breakfast cereals. · Eat foods with vitamin C along with iron-rich foods. Vitamin C helps you absorb more iron from food. Drink a glass of orange juice or another citrus juice with your food. · Eat meat and vegetables or grains together. The iron in meat helps your body absorb the iron in other foods. Where can you learn more? Go to https://joann.[employer]. org and sign in to your [employer] account. Enter [id number] in the [facility] box to learn more about \"Iron-Rich Diet: Care Instructions. \"     If you do not have an account, please click on the \"Sign Up Now\" link. Current as of: December 17, 2020               Content Version: 12.8  © 7364-6994 Healthwise, Incorporated.    Care instructions adapted under license by [facility] ([facility]). If you have questions about a medical condition or this instruction, always ask your healthcare professional. [place] [address] any warranty or liability for your use of this information.

## 2021-06-14 NOTE — PROGRESS NOTES
Progress Note      Pt Name: Chase Martin  YOB: 1955  MRN: [id number]    Date of evaluation: 6/14/2021  History Obtained From:  patient, electronic medical record    CHIEF COMPLAINT:    Chief Complaint   Patient presents with    Follow-up     Ductal carcinoma in situ (DCIS) of left breast    Discuss Labs    Other     Right knee pain     HISTORY OF PRESENT ILLNESS:    Chase Martin is a 77 y.o.  female who is currently being followed for DCIS of the left breast and a history of iron and B12 deficiency. She is currently taking preventive endocrine therapy with tamoxifen for anticipated 5 years with completing dosing in January 2025 and oral iron replacement. Ophelia Fowler returns today in scheduled follow-up for evaluation, side effect monitoring, lab monitoring and further treatment recommendations. She presents today with no new breast complaints, continues to have occasional discomfort to left breast lumpectomy site and reports she is tolerating the tamoxifen without difficulty. Ophelia Fowler, reports that she is experiencing constipation with oral iron and is currently only taking the ferrous sulfate 325 mg 2-3 times weekly. Bilateral annual mammogram was last completed on 12/2/2020 that documented no mammographic evidence of malignancy. Bilateral breast examination today revealed no dominant masses, no skin or nipple changes and no axillary adenopathy. No suspicious abnormality to suggest recurrent breast cancer. Left breast has palpable IORT and surgical scarring noted, no change from previous evaluation. CBC was reviewed today, is within acceptable limits and is documented below. Serology studies 1/26/2021  · Iron 33  · Iron saturation 12%  · TIBC 271  · Vitamin B-12: 603  · Folate 17.2  · Ferritin 463    Dilma complained of persistent right knee/lower extremity pain with difficulty ambulating.   She also experience some difficulty positioning herself up on the exam table today secondary to the knee pain. Upon examination significant tenderness and palpable protruding nodule to outer right knee noted. Will obtain x-rays for further evaluation and obtain serology studies to evaluate for acute on chronic inflammatory process and rheumatoid arthritis. ONCOLOGIC HISTORY:     Diagnosis  · DCIS left breast, December 2020  · Stage 0  · Grade 2  · %, DE 98%  · [employer] 84 genes-  CDKN2A ([id number]) mutation (mutation associated with autosomal dominant melanoma-pancreatic cancer and melanoma neural system tumor      Treatment summary  · 1/31/2020- left lumpectomy  · January 2020-tamoxifen x5 years     Dilma was referred by Dr. Martha Dempsey for a diagnosis of DCIS of left breast.  This was a screening detected lesion. She had a sister and a niece with breast cancer. She denied any hormone replacement therapy. She had genetic assessment with a 84 gene panel that was unremarkable for a deleterious mutation but with findings of a VUS. · 12/4/2018-core needle biopsy consistent with intermediate grade DCIS measuring 4 mm. %, % 1. DCISRT high risk  · 12/18/2019-MRI breast bilateral showed postbiopsy change in the left breast with a small hematoma. Abnormal enhancement extending into a linear configuration. No MRI evidence of right breast malignancy. No suspicious lymphadenopathy. 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 Grade 2. Clear margins. Usual ductal hyperplasia. · 1/31/2020-IORT 2000 Gy  · 2/26/2020-seen in initial consultation by Dr. Brooklyn Barba:  · 2/14/2020 - [employer] 84 genes-  CDKN2A ([id number]) mutation (mutation associated with autosomal dominant melanoma-pancreatic cancer and melanoma neural system tumor. Family history of carcinoma consists of breast, uterine and lung.     Serology studies 1/26/2021  · Iron 33  · Iron saturation 12%  · TIBC 271  · Vitamin B-12: 603  · Folate 17.2  · Ferritin 463      Age-appropriate health screenin2020- Colonoscopy completed by Dr. Nicole De Los Santos with removal of a biopsy from large intestinal polyp with pathology revealing no high grade dysplasia  2021 Dexa Bone Density Scan at [facility] documented as normal with a femoral T-score of -0.7 and a lumbar T-score of 0.2.     Past Medical History:    Past Medical History:   Diagnosis Date    [facility]     [facility]    Acid reflux     Breast mass     Cancer ([facility]     Breast Cancer in [facility]    Diabetes ([facility]     type 2 (currently diet-controlled)    Hyperlipidemia     elevated triglycerides, but low cholesterol level    Hypertension     Prolonged emergence from general anesthesia     Sleep apnea     CPAP       Past Surgical History:    Past Surgical History:   Procedure Laterality Date    BREAST BIOPSY Left 2019    EXCISION LEFT BREAST MASS WITH INTRAOP ULTRASOUND GUIDED NEEDLE LOCALIZATION performed by Gavin Antonio MD at [address] [facility] LUMPECTOMY Left 2020    PREOP ULTRASOUND WITH LEFT LUMPECTOMY, NO SENTINEL NODE BIOPSY, INTRAOP ULTRASOUND GUIDED NEEDLE LOCALIZATION, BIOZORB, FLAPS, PECTORAL BLOCK, IORT performed by Gavin Antonio MD at [facility]      after vaginal prolapse repair/damaged    HYSTERECTOMY      HYSTERECTOMY, VAGINAL      PLANTAR FASCIA SURGERY Bilateral 2016    PLANTAR FASCIA INJECTION FEET performed by Miguel Prabhakar DPM at [place] [address]         Current Medications:    Current Outpatient Medications   Medication Sig Dispense Refill    tamoxifen (NOLVADEX) 20 MG tablet Take 1 tablet by mouth daily 90 tablet 3    apixaban (ELIQUIS) 5 MG TABS tablet Take by mouth 2 times daily      vitamin B-12 (CYANOCOBALAMIN) 1000 MCG tablet Take 1,000 mcg by mouth daily      montelukast (SINGULAIR) 10 MG tablet Take 1 tablet by mouth nightly 30 tablet 0    dicyclomine (BENTYL) 20 MG congestion, ear pain, hearing loss, nosebleeds, sore throat and tinnitus. Eyes: Negative. Negative for pain, discharge and redness. Respiratory: Negative. Negative for cough, shortness of breath and wheezing. Cardiovascular: Negative. Negative for chest pain, palpitations and leg swelling. Gastrointestinal: Positive for constipation. Negative for abdominal pain, blood in stool, diarrhea, nausea and vomiting. Endocrine: Negative for polydipsia. Genitourinary: Negative for dysuria, flank pain, frequency, hematuria and urgency. Musculoskeletal: Positive for arthralgias and gait problem. Negative for back pain, myalgias and neck pain. Right knee and lower extremity pain   Skin: Negative. Negative for rash. Neurological: Negative for dizziness, tremors, seizures, weakness and headaches. Hematological: Does not bruise/bleed easily. Psychiatric/Behavioral: Negative. The patient is not nervous/anxious. Objective   PHYSICAL EXAM:  Physical Exam  Vitals reviewed. Constitutional:       General: She is not in acute distress. Appearance: She is well-developed. She is not diaphoretic. HENT:      Head: Normocephalic and atraumatic. Mouth/Throat:      Pharynx: Uvula midline. Tonsils: No tonsillar exudate. Eyes:      General: Lids are normal. No scleral icterus. Right eye: No discharge. Left eye: No discharge. Conjunctiva/sclera: Conjunctivae normal.      Pupils: Pupils are equal, round, and reactive to light. Neck:      Thyroid: No thyroid mass or thyromegaly. Vascular: No JVD. Trachea: Trachea normal. No tracheal deviation. Cardiovascular:      Rate and Rhythm: Normal rate and regular rhythm. Heart sounds: Normal heart sounds. No murmur heard. No friction rub. No gallop. Pulmonary:      Effort: Pulmonary effort is normal. No respiratory distress. Breath sounds: Normal breath sounds. No wheezing or rales.    Chest: Chest wall: No tenderness. Abdominal:      General: Bowel sounds are normal. There is no distension. Palpations: Abdomen is soft. There is no mass. Tenderness: There is no abdominal tenderness. There is no guarding or rebound. Hernia: No hernia is present. Musculoskeletal:         General: No tenderness or deformity. Cervical back: Normal range of motion and neck supple. Comments: Range of motion within normal limits x3 extremities  RLE with some difficulty with range of motion  Tender, warm to touch large protruding nodule on the outer right knee   Skin:     General: Skin is warm. Coloration: Skin is not pale. Findings: No erythema or rash. Neurological:      Mental Status: She is alert and oriented to person, place, and time. Cranial Nerves: No cranial nerve deficit. Coordination: Coordination normal.   Psychiatric:         Behavior: Behavior normal.         Thought Content: Thought content normal.         Labs reviewed today:  Lab Results   Component Value Date    WBC 7.30 06/14/2021    HGB 13.4 06/14/2021    HCT 41.6 06/14/2021    MCV 95.0 (H) 06/14/2021     (L) 06/14/2021     Lab Results   Component Value Date    NEUTROABS 4.83 06/14/2021       ASSESSMENT/PLAN:      1. Ductal carcinoma in situ (DCIS) of left breast, currently taking preventive endocrine therapy with tamoxifen for anticipated 5 years, through January 2025. No new breast complaints, continues to have tenderness at IORT site. Bilateral mammogram on 12/2/2020 documented no mammographic evidence of malignancy    Bilateral breast examination today revealed no dominant masses, no skin or nipple changes and no axillary adenopathy. No suspicious abnormality to suggest recurrent breast cancer. Left breast has palpable IORT and surgical scarring noted, no change from previous evaluation.    -Continue tamoxifen    2.  Adverse effect of tamoxifen, subsequent encounter  -Reports tolerating without difficulty, denies hot flashes or vaginal dryness    3. Vitamin B 12 deficiency history of and reports currently taking oral B12 1000 mcg daily with a normal hemoglobin of 13.4 and MCV of 95.0. Vitamin B12 level 603 on 1/26/2021.      4. Hx of iron deficiency, continues to take oral ferrous sulfate 2-3 times weekly reports currently taking oral ferrous sulfate twice a week due to GI symptoms, constipation    Serology studies 1/26/2021  · Iron 33  · Iron saturation 12%  · TIBC 271  · Vitamin B-12: 603  · Folate 17.2  · Ferritin 463    5. Care plan discussed with patient    6. Survivorship and health maintenance recommendations  Keep a healthy body weight. Dietary recommendations include limit energy intake from total fats and sugars; increase consumption of fruit and vegetables, as well as legumes, whole grains and nuts. Engage in regular physical activity (150 minutes spread through the week). Other recommendations include minimizing alcohol intake, avoid use of tobacco products. Practice sun safety: Utilize a sunscreen with an SPF of at least 27. Avoiding tanning beds. Ensure adequate amount of sleep. Follow-up with primary care regularly and for further recommendations regarding age-appropriate screening for cancer, wellbeing visit (preventive measures), follow-up and treatment for other medical comorbidities. 7.  Right knee and lower extremity pain with difficulty ambulating, reports has been persistent for the last 3 months. Upon examination significant tenderness and palpable protruding nodule to outer right knee noted. -Pain x-ray of right knee/fibula/tibia  -Obtain CRP, sed rate, RA and CMP today    Addendum: I reviewed the x-rays results after visit  · X-ray of right knee on 6/14/2021 revealed severe degenerative osteoarthritis of the right knee with severe narrowing of the medial and 4216. medial and lateral compartment spurring. No fracture seen.   · X-ray of right fibula/tibia on 6/14/2021 documented no evident fracture. Degenerative osteoarthritis of the right knee. Calcaneal spurring and enthesopathy. Labs on 6/14/2021:  · CRP 4  · Sed rate 15  · RA <10    A referral to Dr. Belen Navarro with orthopedics ( Lesly Palacio reports previously established)    I discussed all of the above findings included in the assessment and plan with the patient and the patient is in agreement to move forward with current recommendations/treatment. I have addressed all of their questions and concerns that were verbalized. FOLLOW UP:  1. Follow-up appointment given for 4 months  2. Continue to follow with other medical providers as recommended    Discussed precautions related to [address] and being at increased risk. Discussed proper handwashing to be done frequently, limit exposure to other individuals and maintain social distancing of 6 feet. Recommend contacting primary care provider if having respiratory symptoms for further recommendations and consideration for testing. (Please note that portions of this note were completed with a voice recognition program. Efforts were made to edit the dictations but occasionally words are mis-transcribed,  Also, portions of this note have been copied forward, however, changed to reflect the most current clinical status of this patient.)    Steve BARR, am scribing for ROSLYN Burton. Electronically signed by Steve Arizmendi RN on 6/14/2021 at 1430. Tavares BARR APRN personally performed the services described in this documentation as scribed by Steve Arizmendi RN in my presence and is both accurate and complete.   Electronically signed by ROSLYN Burton on 6/15/2021 at 11:58 AM

## 2021-06-15 ASSESSMENT — ENCOUNTER SYMPTOMS: CONSTIPATION: 1

## 2021-06-29 ENCOUNTER — HOSPITAL ENCOUNTER (OUTPATIENT)
Dept: GENERAL RADIOLOGY | Facility: HOSPITAL | Age: 66
Discharge: HOME | End: 2021-06-29
Admitting: NURSE PRACTITIONER

## 2021-06-29 ENCOUNTER — OFFICE VISIT (OUTPATIENT)
Dept: INTERNAL MEDICINE | Facility: CLINIC | Age: 66
End: 2021-06-29

## 2021-06-29 VITALS
BODY MASS INDEX: 30.48 KG/M2 | SYSTOLIC BLOOD PRESSURE: 122 MMHG | DIASTOLIC BLOOD PRESSURE: 88 MMHG | HEART RATE: 94 BPM | WEIGHT: 172 LBS | OXYGEN SATURATION: 97 % | RESPIRATION RATE: 16 BRPM | TEMPERATURE: 97.8 F | HEIGHT: 63 IN
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
## 2021-06-29 DIAGNOSIS — M79.604 ACUTE LEG PAIN, RIGHT: ICD-10-CM
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
## 2021-06-29 PROCEDURE — 73590 X-RAY EXAM OF LOWER LEG: CPT

## 2021-06-29 PROCEDURE — 99213 OFFICE O/P EST LOW 20 MIN: CPT | Performed by: NURSE PRACTITIONER

## 2021-06-29 NOTE — PROGRESS NOTES
Chief Complaint   Patient presents with   • Fall     19th, bilateral leg pain and swelling in right ankle/right foot   • Leg Injury       History:  Yun Blackwell is a 65 y.o. female who presents today for follow-up for evaluation of the above:    HPI   Patient presents today with c/o right leg pain after a fall.   On 06/19/2021 patient was at a yard sale and tripped over a weight lifting bench and injured her right leg. She did not seek medical help until today. She did have small abrasions on right lower leg and left knee. These are healing well.  She is ambulating with difficulty and her right lower leg is painful with bending of the right knee.  Redness and swelling is improving.   Using ice and Tylenol.           ROS:  Review of Systems   Constitutional: Negative for fatigue and unexpected weight change.   HENT: Negative.    Eyes: Negative.    Respiratory: Negative.    Cardiovascular: Negative.    Gastrointestinal: Negative for abdominal pain, constipation and diarrhea.   Endocrine: Negative.    Genitourinary: Negative for difficulty urinating, dyspareunia, genital sores, menstrual problem, pelvic pain, vaginal bleeding, vaginal discharge and vaginal pain.   Musculoskeletal: Negative.    Skin: Negative.    Neurological: Negative.    Psychiatric/Behavioral: Negative.        Ms. Blackwell  reports that she has never smoked. She has never used smokeless tobacco. She reports that she does not drink alcohol and does not use drugs.      Current Outpatient Medications:   •  apixaban (ELIQUIS) 5 MG tablet tablet, Take 1 tablet by mouth Every 12 (Twelve) Hours., Disp: 60 tablet, Rfl: 5  •  dicyclomine (BENTYL) 20 MG tablet, Take 20 mg by mouth Every 6 (Six) Hours. Takes 1/2 tablet 4 times daily, Disp: , Rfl:   •  dilTIAZem (CARDIZEM) 30 MG tablet, Take 30 mg by mouth 2 (two) times a day., Disp: , Rfl:   •  montelukast (SINGULAIR) 10 MG tablet, Take 1 tablet by mouth Every Night., Disp: 30 tablet, Rfl: 5  •  omeprazole  "(priLOSEC) 20 MG capsule, Take 1 capsule by mouth Daily., Disp: 30 capsule, Rfl: 5  •  tamoxifen (NOLVADEX) 20 MG chemo tablet, Take 1 tablet by mouth daily, Disp: 90 tablet, Rfl: 3  •  tiZANidine (ZANAFLEX) 4 MG tablet, Take one-half to 1 tablet by mouth at bedtime., Disp: 30 tablet, Rfl: 2  •  vitamin B-12 (CYANOCOBALAMIN) 1000 MCG tablet, Take 1 tablet by mouth Daily., Disp: 90 tablet, Rfl: 3      OBJECTIVE:  /88 (BP Location: Right arm, Patient Position: Sitting, Cuff Size: Adult)   Pulse 94   Temp 97.8 °F (36.6 °C) (Infrared)   Resp 16   Ht 160 cm (63\")   Wt 78 kg (172 lb)   SpO2 97%   BMI 30.47 kg/m²    Physical Exam  Constitutional:       General: She is not in acute distress.  Cardiovascular:      Rate and Rhythm: Normal rate and regular rhythm.   Pulmonary:      Effort: Pulmonary effort is normal. No respiratory distress.   Musculoskeletal:      Right lower leg: Swelling and laceration present. Edema present.        Legs:    Neurological:      Mental Status: She is oriented to person, place, and time.   Psychiatric:         Behavior: Behavior normal.         Assessment/Plan    Diagnoses and all orders for this visit:    1. Fall, initial encounter (Primary)  -     XR tibia fibula 2 vw right; Future    2. Acute leg pain, right  -     XR tibia fibula 2 vw right; Future    imaging to further evaluate. Continue RICE therapy and add ace wrap to her right lower extremity.   Discussed could take 4-6 weeks for a sprain to fully resolve but we will rule out fracture given significant tenderness to touch and ambulation.         An After Visit Summary was printed and given to the patient at discharge.  Return for Next scheduled follow up. Sooner if problems arise.          Saritha Zuñiga APRN. 6/29/2021   Electronically Signed  "

## 2021-06-30 ENCOUNTER — TELEPHONE (OUTPATIENT)
Dept: INTERNAL MEDICINE | Facility: CLINIC | Age: 66
End: 2021-06-30

## 2021-06-30 NOTE — TELEPHONE ENCOUNTER
Caller: Yun Blackwell    Relationship to patient: Self    Best call back number: [phone] (H)    Patient is needing: STATES SHE HAS MISSED A CALL FROM KASHIF NUNES AND ANOTHER MISSED CALL FROM SMITH  YESTERDAY 06/29/21     STATES THE CALL WAS FOR HER X RAY RESULTS     I WAS UNABLE TO WARM TRANSFER-BUSY SIGNAL

## 2021-07-14 ENCOUNTER — NURSE TRIAGE (OUTPATIENT)
Dept: CALL CENTER | Facility: HOSPITAL | Age: 66
End: 2021-07-14

## 2021-07-15 ENCOUNTER — HOSPITAL ENCOUNTER (OUTPATIENT)
Dept: GENERAL RADIOLOGY | Facility: HOSPITAL | Age: 66
Discharge: HOME | End: 2021-07-15

## 2021-07-15 ENCOUNTER — OFFICE VISIT (OUTPATIENT)
Dept: INTERNAL MEDICINE | Facility: CLINIC | Age: 66
End: 2021-07-15

## 2021-07-15 ENCOUNTER — LAB (OUTPATIENT)
Dept: LAB | Facility: HOSPITAL | Age: 66
End: 2021-07-15

## 2021-07-15 VITALS
BODY MASS INDEX: 30.21 KG/M2 | OXYGEN SATURATION: 98 % | DIASTOLIC BLOOD PRESSURE: 72 MMHG | TEMPERATURE: 99 F | SYSTOLIC BLOOD PRESSURE: 122 MMHG | HEIGHT: 63 IN | HEART RATE: 110 BPM | WEIGHT: 170.5 LBS

## 2021-07-15 DIAGNOSIS — B34.9 VIRAL SYNDROME: ICD-10-CM

## 2021-07-15 DIAGNOSIS — R10.9 LEFT FLANK PAIN: ICD-10-CM

## 2021-07-15 DIAGNOSIS — R10.9 LEFT FLANK PAIN: Primary | ICD-10-CM

## 2021-07-15 PROBLEM — R07.2 PRECORDIAL PAIN: Status: ACTIVE | Noted: 2021-07-15

## 2021-07-15 PROBLEM — N60.99 ATYPICAL DUCTAL HYPERPLASIA OF BREAST: Status: ACTIVE | Noted: 2019-02-14

## 2021-07-15 PROBLEM — R94.39 ABNORMAL NUCLEAR STRESS TEST: Status: ACTIVE | Noted: 2021-07-15

## 2021-07-15 PROBLEM — D05.12 DUCTAL CARCINOMA IN SITU (DCIS) OF LEFT BREAST: Status: ACTIVE | Noted: 2019-12-26

## 2021-07-15 LAB
BACTERIA UR QL AUTO: ABNORMAL /HPF
BASOPHILS # BLD AUTO: 0.05 10*3/MM3 (ref 0–0.2)
BASOPHILS NFR BLD AUTO: 0.7 % (ref 0–1.5)
BILIRUB UR QL STRIP: NEGATIVE
CLARITY UR: CLEAR
COLOR UR: ABNORMAL
DEPRECATED RDW RBC AUTO: 44.3 FL (ref 37–54)
EOSINOPHIL # BLD AUTO: 0.08 10*3/MM3 (ref 0–0.4)
EOSINOPHIL NFR BLD AUTO: 1.1 % (ref 0.3–6.2)
ERYTHROCYTE [DISTWIDTH] IN BLOOD BY AUTOMATED COUNT: 12.5 % (ref 12.3–15.4)
GLUCOSE UR STRIP-MCNC: NEGATIVE MG/DL
HCT VFR BLD AUTO: 44.4 % (ref 34–46.6)
HGB BLD-MCNC: 13.7 G/DL (ref 12–15.9)
HGB UR QL STRIP.AUTO: ABNORMAL
HYALINE CASTS UR QL AUTO: ABNORMAL /LPF
IMM GRANULOCYTES # BLD AUTO: 0.03 10*3/MM3 (ref 0–0.05)
IMM GRANULOCYTES NFR BLD AUTO: 0.4 % (ref 0–0.5)
KETONES UR QL STRIP: ABNORMAL
LEUKOCYTE ESTERASE UR QL STRIP.AUTO: ABNORMAL
LYMPHOCYTES # BLD AUTO: 1.22 10*3/MM3 (ref 0.7–3.1)
LYMPHOCYTES NFR BLD AUTO: 17.1 % (ref 19.6–45.3)
MCH RBC QN AUTO: 29.7 PG (ref 26.6–33)
MCHC RBC AUTO-ENTMCNC: 30.9 G/DL (ref 31.5–35.7)
MCV RBC AUTO: 96.3 FL (ref 79–97)
MONOCYTES # BLD AUTO: 0.76 10*3/MM3 (ref 0.1–0.9)
MONOCYTES NFR BLD AUTO: 10.6 % (ref 5–12)
NEUTROPHILS NFR BLD AUTO: 5.01 10*3/MM3 (ref 1.7–7)
NEUTROPHILS NFR BLD AUTO: 70.1 % (ref 42.7–76)
NITRITE UR QL STRIP: POSITIVE
NRBC BLD AUTO-RTO: 0 /100 WBC (ref 0–0.2)
PH UR STRIP.AUTO: 6.5 [PH] (ref 5–8)
PLATELET # BLD AUTO: 175 10*3/MM3 (ref 140–450)
PMV BLD AUTO: 11.9 FL (ref 6–12)
PROT UR QL STRIP: ABNORMAL
RBC # BLD AUTO: 4.61 10*6/MM3 (ref 3.77–5.28)
RBC # UR: ABNORMAL /HPF
REF LAB TEST METHOD: ABNORMAL
SP GR UR STRIP: 1.02 (ref 1–1.03)
SQUAMOUS #/AREA URNS HPF: ABNORMAL /HPF
UROBILINOGEN UR QL STRIP: ABNORMAL
WBC # BLD AUTO: 7.15 10*3/MM3 (ref 3.4–10.8)
WBC UR QL AUTO: ABNORMAL /HPF

## 2021-07-15 PROCEDURE — 74018 RADEX ABDOMEN 1 VIEW: CPT

## 2021-07-15 PROCEDURE — 87086 URINE CULTURE/COLONY COUNT: CPT

## 2021-07-15 PROCEDURE — 36415 COLL VENOUS BLD VENIPUNCTURE: CPT

## 2021-07-15 PROCEDURE — 87186 SC STD MICRODIL/AGAR DIL: CPT

## 2021-07-15 PROCEDURE — 81001 URINALYSIS AUTO W/SCOPE: CPT

## 2021-07-15 PROCEDURE — 87635 SARS-COV-2 COVID-19 AMP PRB: CPT | Performed by: NURSE PRACTITIONER

## 2021-07-15 PROCEDURE — 85025 COMPLETE CBC W/AUTO DIFF WBC: CPT

## 2021-07-15 PROCEDURE — 99213 OFFICE O/P EST LOW 20 MIN: CPT | Performed by: NURSE PRACTITIONER

## 2021-07-15 NOTE — TELEPHONE ENCOUNTER
"Caller states hip pain on left side rates five and as high as ten. Caller states worse with movement. Caller states was at [employer] and having sneezing and cough. Caller states burning eyes and headache last night. Caller states had fever last night but unable to give reading. Caller advised per guideline. Caller reluctant to be seen in ER but states she knows her body and will go if needed.  Dr. Healy was called and aware of guideline advice. MD states if pt does not go to ER tonight have her call office first thing in the morning.         Reason for Disposition  • [1] SEVERE pain (e.g., excruciating, unable to do any normal activities) AND [2] fever    Additional Information  • Negative: Looks like a broken bone or dislocated joint (e.g., crooked or deformed)  • Negative: Sounds like a life-threatening emergency to the triager  • Negative: Followed a hip injury  • Negative: Leg pain is main symptom  • Negative: Back pain radiating (shooting) into hip    Answer Assessment - Initial Assessment Questions  1. LOCATION and RADIATION: \"Where is the pain located?\"       Left hip   2. QUALITY: \"What does the pain feel like?\"  (e.g., sharp, dull, aching, burning)      Sharp   3. SEVERITY: \"How bad is the pain?\" \"What does it keep you from doing?\"   (Scale 1-10; or mild, moderate, severe)    -  MILD (1-3): doesn't interfere with normal activities     -  MODERATE (4-7): interferes with normal activities (e.g., work or school) or awakens from sleep, limping     -  SEVERE (8-10): excruciating pain, unable to do any normal activities, unable to walk      5 and sometimes ten   4. ONSET: \"When did the pain start?\" \"Does it come and go, or is it there all the time?\"      Last night   5. WORK OR EXERCISE: \"Has there been any recent work or exercise that involved this part of the body?\"       Denies   6. CAUSE: \"What do you think is causing the hip pain?\"       Not sure   7. AGGRAVATING FACTORS: \"What makes the hip pain worse?\" " "(e.g., walking, climbing stairs, running)      Movement makes worse   8. OTHER SYMPTOMS: \"Do you have any other symptoms?\" (e.g., back pain, pain shooting down leg,  fever, rash)       Had fever last night. Denies pain shooting down leg    Protocols used: HIP PAIN-ADULT-      "

## 2021-07-15 NOTE — PROGRESS NOTES
Chief Complaint   Patient presents with   • Dizziness     c/o   • Flank Pain     c/o   • Fever     c/o         History:  Yun Blackwell is a 65 y.o. female who presents today for evaluation of the above problems.          Started 2 nights ago, pain left flank, chills.  No burning with urination, dark urine.  Feels feverish.    Cough and congestion too. Taking Tylenol and IB regularly. Drainage started 3 days ago.  Has been at family reunion and several were sick.  Body aches all over, exhausted. Has never had Covid vaccine and does not want to get it.      ROS:  Review of Systems   As above    Allergies   Allergen Reactions   • Morphine And Related Dizziness     Gets very sick    • Percocet [Oxycodone-Acetaminophen] Dizziness     nausea     Past Medical History:   Diagnosis Date   • A-fib (CMS/HCC)    • Abnormal ECG     tachycardia, a fib   • Acid reflux    • Anemia    • Arthritis    • Cancer (CMS/HCC)    • Diabetes mellitus (CMS/HCC)    • Hyperkalemia    • Hyperlipidemia    • Hypertension    • Hyponatremia    • IBS (irritable bowel syndrome)    • Sleep apnea     USES CPAP   • Vaginal vault prolapse      Past Surgical History:   Procedure Laterality Date   • CARDIAC CATHETERIZATION     • CARDIAC CATHETERIZATION N/A 2/5/2021    Procedure: Right Heart Cath;  Surgeon: Van Marcelino MD;  Location: [facility] CATH INVASIVE LOCATION;  Service: Cardiology;  Laterality: N/A;   • COLON SURGERY     • COLONOSCOPY  09/21/2015    TRANSVERSE COLON ADENOMATOUS POLYP, HEMORRHOIDS, RECALL 5 YEARS, DR LAMAS   • COLONOSCOPY N/A 11/13/2020    Procedure: COLONOSCOPY WITH ANESTHESIA;  Surgeon: Joe Lamas MD;  Location: [facility] ENDOSCOPY;  Service: Gastroenterology;  Laterality: N/A;  pre op: screening  post op:divdrticulosis, polyp  PCP: ANGEL Healy MD   • COLOSTOMY     • COLPOPEXY VAGINAL      2014   • ENDOSCOPY N/A 11/3/2016    LA GRADE B ESOPHAGITIS WITH NO BLEEDING UPPER THIRD OF ESOPHAGUS, GERI-[place] TEAR GEJ,  BILIOUS BLOOD TINGED COFFEE GROUND GATRIC FLUIDDR ADAMS   • EXPLORATORY LAPAROTOMY N/A 10/14/2016    Procedure: LAPAROTOMY EXPLORATORY, LYSIS OF ADHESIONS, IRRIAGATION OF ABDOMEN, POSSIBLE BOWEL RESECTION;  Surgeon: Bacilio Marcial MD;  Location: [facility] OR;  Service:    • HYSTERECTOMY     • REVISION / TAKEDOWN COLOSTOMY     • SACROCOLPOPEXY N/A 9/21/2016    Procedure: SACROCOLPOPEXY LAPAROSCOPIC WITH DAVINCI SI ROBOT, CONVERTED TO OPEN SACROCOLPOPEXY, RIGHT SALPINGO- OOPHERECTOMY, CYSTO;  Surgeon: Maribell Beth MD;  Location: [facility] OR;  Service:      Family History   Problem Relation Age of Onset   • Hypertension Mother    • No Known Problems Father    • Colon cancer Neg Hx    • Colon polyps Neg Hx      Yun  reports that she has never smoked. She has never used smokeless tobacco. She reports that she does not drink alcohol and does not use drugs.    I have reviewed and updated the above documentation (if necessary) including but not limited to chief complaint, ROS, PFSH, allergies and medications        Current Outpatient Medications:   •  apixaban (ELIQUIS) 5 MG tablet tablet, Take 1 tablet by mouth Every 12 (Twelve) Hours., Disp: 60 tablet, Rfl: 5  •  dicyclomine (BENTYL) 20 MG tablet, Take 20 mg by mouth Every 6 (Six) Hours. Takes 1/2 tablet 4 times daily, Disp: , Rfl:   •  dilTIAZem (CARDIZEM) 30 MG tablet, Take 30 mg by mouth 2 (two) times a day., Disp: , Rfl:   •  montelukast (SINGULAIR) 10 MG tablet, Take 1 tablet by mouth Every Night., Disp: 30 tablet, Rfl: 5  •  omeprazole (priLOSEC) 20 MG capsule, Take 1 capsule by mouth Daily., Disp: 30 capsule, Rfl: 5  •  tamoxifen (NOLVADEX) 20 MG chemo tablet, Take 1 tablet by mouth daily, Disp: 90 tablet, Rfl: 3  •  tiZANidine (ZANAFLEX) 4 MG tablet, Take one-half to 1 tablet by mouth at bedtime., Disp: 30 tablet, Rfl: 2  •  vitamin B-12 (CYANOCOBALAMIN) 1000 MCG tablet, Take 1 tablet by mouth Daily., Disp: 90 tablet, Rfl: 3    PHQ-9 Depression Screening  Little  "interest or pleasure in doing things?     Feeling down, depressed, or hopeless?     Trouble falling or staying asleep, or sleeping too much?     Feeling tired or having little energy?     Poor appetite or overeating?     Feeling bad about yourself - or that you are a failure or have let yourself or your family down?     Trouble concentrating on things, such as reading the newspaper or watching television?     Moving or speaking so slowly that other people could have noticed? Or the opposite - being so fidgety or restless that you have been moving around a lot more than usual?     Thoughts that you would be better off dead, or of hurting yourself in some way?     PHQ-9 Total Score     If you checked off any problems, how difficult have these problems made it for you to do your work, take care of things at home, or get along with other people?       PHQ-9 Total Score:      OBJECTIVE:  Visit Vitals  /72 (BP Location: Right arm, Patient Position: Sitting, Cuff Size: Adult)   Pulse 110   Temp 99 °F (37.2 °C) (Oral)   Ht 160 cm (63\")   Wt 77.3 kg (170 lb 8 oz)   SpO2 98%   BMI 30.20 kg/m²      Physical Exam  Vitals and nursing note (temp 99.0 on Tylenol and IB) reviewed.   Constitutional:       General: She is not in acute distress.     Appearance: Normal appearance. She is not ill-appearing, toxic-appearing or diaphoretic.      Comments: Appears tired   HENT:      Head: Normocephalic and atraumatic.      Right Ear: Tympanic membrane, ear canal and external ear normal. There is no impacted cerumen.      Left Ear: Tympanic membrane, ear canal and external ear normal. There is no impacted cerumen.      Nose: Nose normal. No congestion or rhinorrhea.      Mouth/Throat:      Mouth: Mucous membranes are moist.      Pharynx: No oropharyngeal exudate or posterior oropharyngeal erythema.   Eyes:      General: No scleral icterus.        Right eye: No discharge.         Left eye: No discharge.      Extraocular Movements: " Extraocular movements intact.      Conjunctiva/sclera: Conjunctivae normal.      Pupils: Pupils are equal, round, and reactive to light.   Cardiovascular:      Rate and Rhythm: Normal rate and regular rhythm.      Heart sounds: Normal heart sounds. No murmur heard.   No friction rub. No gallop.    Pulmonary:      Effort: Pulmonary effort is normal. No respiratory distress.      Breath sounds: Normal breath sounds. No stridor. No wheezing, rhonchi or rales.   Abdominal:      Tenderness: There is abdominal tenderness (left mid-abdominal discomfort, vague). There is left CVA tenderness.   Musculoskeletal:         General: Tenderness (left SI joint region tender to palpation) present. No swelling, deformity or signs of injury. Normal range of motion.      Cervical back: Normal range of motion and neck supple. No rigidity or tenderness.      Right lower leg: No edema.      Left lower leg: No edema.   Lymphadenopathy:      Cervical: No cervical adenopathy.   Skin:     General: Skin is warm and dry.      Coloration: Skin is not jaundiced or pale.      Findings: No bruising, erythema, lesion or rash (no rash in flank area/low back/abdomen).   Neurological:      Mental Status: She is alert and oriented to person, place, and time.   Psychiatric:         Mood and Affect: Mood normal.         Behavior: Behavior normal.         Thought Content: Thought content normal.         Judgment: Judgment normal.         [facility]    Assessment/Plan    Diagnoses and all orders for this visit:    1. Left flank pain (Primary)  -     XR Abdomen KUB; Future  -     Urinalysis With Culture If Indicated -; Future    2. Viral syndrome  -     Cancel: COVID PRE-OP / PRE-PROCEDURE SCREENING ORDER (NO ISOLATION) - Swab, Nasal Cavity; Future  -     CBC w AUTO Differential; Future  -     COVID PRE-OP / PRE-PROCEDURE SCREENING ORDER (NO ISOLATION) - Swab, Nasal Cavity; Future      Her primary complaint was acute onset of low-back/flank pain and dark urine.  I  want to assess for renal stone with KUB and UA; however, as we talked, her complaints of cough, aches, and fatigue were more dominant in the visit today. I am concerned about he possibility of Covid, especially with the history of family reunion and several sick there.  Will check for Covid as well today.    I'd like to have the CBC for reference since she is having the respiratory symptoms as well as some abdominal discomfort and flank pain.      Education materials and an After Visit Summary were printed and given to the patient at discharge.  Return for Next scheduled follow up.         Jenny Canchola, MARÍA   10:04 CDT  7/15/2021

## 2021-07-15 NOTE — PROGRESS NOTES
Spoke with patient regarding results. Noted understanding. She is not having any nausea or vomiting.

## 2021-07-15 NOTE — PATIENT INSTRUCTIONS
Viral Illness, Adult  Viruses are tiny germs that can get into a person's body and cause illness. There are many different types of viruses, and they cause many types of illness. Viral illnesses can range from mild to severe. They can affect various parts of the body.  Common illnesses that are caused by a virus include colds and the flu (influenza). Viral illnesses also include serious conditions, such as HIV (human immunodeficiency virus) infection and AIDS (acquired immunodeficiency syndrome). A few viruses have been linked to certain cancers.  What are the causes?  Many types of viruses can cause illness. Viruses invade cells in your body, multiply, and cause the infected cells to work abnormally or die. When these cells die, they release more of the virus. When this happens, you develop symptoms of the illness, and the virus continues to spread to other cells. If the virus takes over the function of the cell, it can cause the cell to divide and grow out of control. This happens when a virus causes cancer.  Different viruses get into the body in different ways. You can get a virus by:  · Swallowing food or water that has come in contact with the virus (is contaminated).  · Breathing in droplets that have been coughed or sneezed into the air by an infected person.  · Touching a surface that has been contaminated with the virus and then touching your eyes, nose, or mouth.  · Being bitten by an insect or animal that carries the virus.  · Having sexual contact with a person who is infected with the virus.  · Being exposed to blood or fluids that contain the virus, either through an open cut or during a transfusion.  If a virus enters your body, your body's defense system (immune system) will try to fight the virus. You may be at higher risk for a viral illness if your immune system is weak.  What are the signs or symptoms?  You may have these symptoms, depending on the type of virus and the location of the cells  that it invades:  · Cold and flu viruses:  ? Fever.  ? Headache.  ? Sore throat.  ? Muscle aches.  ? Stuffy nose (nasal congestion).  ? Cough.  · Digestive system (gastrointestinal) viruses:  ? Fever.  ? Pain in the abdomen.  ? Nausea.  ? Diarrhea.  · Liver viruses (hepatitis):  ? Loss of appetite.  ? Tiredness.  ? Skin or the white parts of your eyes turning yellow (jaundice).  · Brain and spinal cord viruses:  ? Fever.  ? Headache.  ? Stiff neck.  ? Nausea and vomiting.  ? Confusion or sleepiness.  · Skin viruses:  ? Warts.  ? Itching.  ? Rash.  · Sexually transmitted viruses:  ? Discharge.  ? Swelling.  ? Redness.  ? Rash.  How is this diagnosed?  This condition may be diagnosed based on one or more of the following:  · Symptoms.  · Medical history.  · Physical exam.  · Blood test, sample of mucus from your lungs (sputum sample), stool sample, or a swab of body fluids or a skin sore (lesion).  How is this treated?  Viruses can be hard to treat because they live within cells. Antibiotic medicines do not treat viruses because these medicines do not get inside cells. Treatment for a viral illness may include:  · Resting and drinking plenty of fluids.  · Medicines to relieve symptoms. These can include over-the-counter medicine for pain and fever, medicines for cough or congestion, and medicines to relieve diarrhea.  · Antiviral medicines. These medicines are available only for certain types of viruses. They may help reduce flu symptoms if taken early in the infection. There are also many antiviral medicines for hepatitis and for HIV and AIDS.  Some viral illnesses can be prevented with vaccinations. A common example is the flu shot.  Follow these instructions at home:  Medicines  · Take over-the-counter and prescription medicines only as told by your health care provider.  · If you were prescribed an antiviral medicine, take it as told by your health care provider. Do not stop taking the antiviral even if you start  to feel better.  · Be aware of when antibiotics are needed and when they are not needed. Antibiotics do not treat viruses. You may get an antibiotic if your health care provider thinks that you may have, or are at risk for, a bacterial infection and you have a viral infection.  ? Do not ask for an antibiotic prescription if you have been diagnosed with a viral illness. Antibiotics will not make your illness go away faster.  ? Frequently taking antibiotics when they are not needed can lead to antibiotic resistance. When this develops, the medicine no longer works against the bacteria that it normally fights.  General instructions    · Drink enough fluids to keep your urine pale yellow.  · Rest as much as possible.  · Return to your normal activities as told by your health care provider. Ask your health care provider what activities are safe for you.  · Keep all follow-up visits as told by your health care provider. This is important.  How is this prevented?  To reduce your risk of viral illness:  · Wash your hands often with soap and water for at least 20 seconds. If soap and water are not available, use hand .  · Avoid touching your nose, eyes, and mouth, especially if you have not washed your hands recently.  · If anyone in your household has a viral infection, clean all household surfaces that may have been in contact with the virus. Use soap and hot water. You may also use bleach that you have added water to (diluted).  · Stay away from people who are sick with symptoms of a viral infection.  · Do not share items such as toothbrushes and water bottles with other people.  · Keep your vaccinations up to date. This includes getting a yearly flu shot.  · Eat a healthy diet and get plenty of rest.  Contact a health care provider if:  · You have symptoms of a viral illness that do not go away.  · Your symptoms come back after going away.  · Your symptoms get worse.  Get help right away if you have:  · Trouble  breathing.  · A severe headache or a stiff neck.  · Severe vomiting or pain in your abdomen.  These symptoms may represent a serious problem that is an emergency. Do not wait to see if the symptoms will go away. Get medical help right away. Call your local emergency services (911 in the [place] Do not drive yourself to the hospital.  Summary  · Viruses are types of germs that can get into a person's body and cause illness. Viral illnesses can range from mild to severe. They can affect various parts of the body.  · Viruses can be hard to treat. There are medicines to relieve symptoms, and there are some antiviral medicines.  · If you were prescribed an antiviral medicine, take it as told by your health care provider. Do not stop taking the antiviral even if you start to feel better.  · Contact a health care provider if you have symptoms of a viral illness that do not go away.  This information is not intended to replace advice given to you by your health care provider. Make sure you discuss any questions you have with your health care provider.  Document Revised: 10/27/2020 Document Reviewed: 10/27/2020  Elsevier Patient Education © 2021 [employer]

## 2021-07-16 RX ORDER — CEFDINIR 300 MG/1
300 CAPSULE ORAL 2 TIMES DAILY
Qty: 10 CAPSULE | Refills: 0 | Status: SHIPPED | OUTPATIENT
Start: 2021-07-16 | End: 2021-10-12

## 2021-07-17 LAB — BACTERIA SPEC AEROBE CULT: ABNORMAL

## 2021-08-17 DIAGNOSIS — I48.20 CHRONIC ATRIAL FIBRILLATION (HCC): ICD-10-CM

## 2021-09-20 RX ORDER — MONTELUKAST SODIUM 10 MG/1
10 TABLET ORAL NIGHTLY
Qty: 30 TABLET | Refills: 5 | Status: SHIPPED | OUTPATIENT
Start: 2021-09-20 | End: 2022-03-14 | Stop reason: SDUPTHER

## 2021-09-29 RX ORDER — OMEPRAZOLE 20 MG/1
20 CAPSULE, DELAYED RELEASE ORAL DAILY
Qty: 30 CAPSULE | Refills: 5 | Status: SHIPPED | OUTPATIENT
Start: 2021-09-29 | End: 2022-02-04 | Stop reason: SDUPTHER

## 2021-10-12 ENCOUNTER — OFFICE VISIT (OUTPATIENT)
Dept: INTERNAL MEDICINE | Facility: CLINIC | Age: 66
End: 2021-10-12

## 2021-10-12 VITALS
HEART RATE: 74 BPM | WEIGHT: 168 LBS | RESPIRATION RATE: 16 BRPM | OXYGEN SATURATION: 98 % | SYSTOLIC BLOOD PRESSURE: 123 MMHG | TEMPERATURE: 98.6 F | HEIGHT: 63 IN | BODY MASS INDEX: 29.77 KG/M2 | DIASTOLIC BLOOD PRESSURE: 82 MMHG

## 2021-10-12 DIAGNOSIS — E11.9 TYPE 2 DIABETES MELLITUS WITHOUT COMPLICATION, WITHOUT LONG-TERM CURRENT USE OF INSULIN (HCC): Primary | ICD-10-CM

## 2021-10-12 DIAGNOSIS — Z00.00 HEALTHCARE MAINTENANCE: ICD-10-CM

## 2021-10-12 DIAGNOSIS — E53.8 B12 DEFICIENCY: ICD-10-CM

## 2021-10-12 DIAGNOSIS — Z28.21 COVID-19 VACCINE DOSE DECLINED: ICD-10-CM

## 2021-10-12 PROCEDURE — 99214 OFFICE O/P EST MOD 30 MIN: CPT | Performed by: INTERNAL MEDICINE

## 2021-10-12 RX ORDER — LANOLIN ALCOHOL/MO/W.PET/CERES
1000 CREAM (GRAM) TOPICAL DAILY
Qty: 90 TABLET | Refills: 3 | Status: CANCELLED | OUTPATIENT
Start: 2021-10-12

## 2021-10-12 RX ORDER — CYANOCOBALAMIN 1000 UG/ML
1000 INJECTION, SOLUTION INTRAMUSCULAR; SUBCUTANEOUS
Qty: 1 ML | Refills: 5 | Status: SHIPPED | OUTPATIENT
Start: 2021-10-12 | End: 2022-04-11 | Stop reason: SDUPTHER

## 2021-10-12 NOTE — PROGRESS NOTES
Chief Complaint   Patient presents with   • Follow-up   • Pulmonary Hypertension         History:  Yun Blackwell is a 65 y.o. female who presents today for evaluation of the above problems.      She presents for follow-up.  Her only complaint is with B12 tablets instead of the injection.  She does not feel like they are as effective and would like to try the injections again.    She has diet-controlled diabetes with A1c of 5.4    Has not had COVID-19 vaccine and does not want to get the vaccine   HPI      ROS:  Review of Systems   Constitutional: Positive for fatigue.   Respiratory: Negative for cough and shortness of breath.    Cardiovascular: Negative for chest pain, palpitations and leg swelling.   Musculoskeletal: Positive for arthralgias and myalgias.         Current Outpatient Medications:   •  apixaban (Eliquis) 5 MG tablet tablet, Take 1 tablet by mouth Every 12 (Twelve) Hours., Disp: 60 tablet, Rfl: 5  •  dicyclomine (BENTYL) 20 MG tablet, Take 20 mg by mouth Every 6 (Six) Hours. Takes 1/2 tablet 4 times daily, Disp: , Rfl:   •  dilTIAZem (CARDIZEM) 30 MG tablet, Take 30 mg by mouth 2 (two) times a day., Disp: , Rfl:   •  montelukast (SINGULAIR) 10 MG tablet, Take 1 tablet by mouth Every Night., Disp: 30 tablet, Rfl: 5  •  omeprazole (priLOSEC) 20 MG capsule, Take 1 capsule by mouth Daily., Disp: 30 capsule, Rfl: 5  •  tamoxifen (NOLVADEX) 20 MG chemo tablet, Take 1 tablet by mouth daily, Disp: 90 tablet, Rfl: 3  •  tiZANidine (ZANAFLEX) 4 MG tablet, Take one-half to 1 tablet by mouth at bedtime., Disp: 30 tablet, Rfl: 2  •  cyanocobalamin 1000 MCG/ML injection, Inject 1 mL Every 28 (Twenty-Eight) Days., Disp: 1 mL, Rfl: 5    Lab Results   Component Value Date    GLUCOSE 115 (H) 02/05/2021    BUN 17 02/05/2021    CREATININE 0.45 (L) 02/05/2021    EGFRIFNONA 139 02/05/2021    EGFRIFAFRI >59 11/10/2020    BCR 37.8 (H) 02/05/2021    K 3.7 02/05/2021    CO2 27.0 02/05/2021    CALCIUM 9.1 02/05/2021    ALBUMIN  3.9 11/10/2020    AST 19 11/10/2020    ALT 18 11/10/2020       WBC   Date Value Ref Range Status   07/15/2021 7.15 3.40 - 10.80 10*3/mm3 Final   06/14/2021 7.30 3.98 - 10.04 K/uL Final     RBC   Date Value Ref Range Status   07/15/2021 4.61 3.77 - 5.28 10*6/mm3 Final   06/14/2021 4.38 3.93 - 5.22 M/uL Final     Hemoglobin   Date Value Ref Range Status   07/15/2021 13.7 12.0 - 15.9 g/dL Final   06/14/2021 13.4 11.2 - 15.7 g/dL Final     Hematocrit   Date Value Ref Range Status   07/15/2021 44.4 34.0 - 46.6 % Final   06/14/2021 41.6 34.1 - 44.9 % Final     MCV   Date Value Ref Range Status   07/15/2021 96.3 79.0 - 97.0 fL Final   06/14/2021 95.0 (H) 79.4 - 94.8 fL Final     MCH   Date Value Ref Range Status   07/15/2021 29.7 26.6 - 33.0 pg Final   06/14/2021 30.6 25.6 - 32.2 pg Final     MCHC   Date Value Ref Range Status   07/15/2021 30.9 (L) 31.5 - 35.7 g/dL Final   06/14/2021 32.2 (L) 32.3 - 35.5 g/dL Final     RDW   Date Value Ref Range Status   07/15/2021 12.5 12.3 - 15.4 % Final   06/14/2021 13.2 11.7 - 14.4 % Final     RDW-SD   Date Value Ref Range Status   07/15/2021 44.3 37.0 - 54.0 fl Final     MPV   Date Value Ref Range Status   07/15/2021 11.9 6.0 - 12.0 fL Final   06/14/2021 11.7 (H) 7.4 - 10.4 fL Final     Platelets   Date Value Ref Range Status   07/15/2021 175 140 - 450 10*3/mm3 Final   06/14/2021 142 (L) 182 - 369 K/uL Final     Neutrophil Rel %   Date Value Ref Range Status   06/14/2021 66.2 34.0 - 71.1 % Final     Neutrophil %   Date Value Ref Range Status   07/15/2021 70.1 42.7 - 76.0 % Final     Lymphocyte Rel %   Date Value Ref Range Status   06/14/2021 23.0 19.3 - 53.1 % Final     Lymphocyte %   Date Value Ref Range Status   07/15/2021 17.1 (L) 19.6 - 45.3 % Final     Monocyte Rel %   Date Value Ref Range Status   06/14/2021 9.3 4.7 - 12.5 % Final     Monocyte %   Date Value Ref Range Status   07/15/2021 10.6 5.0 - 12.0 % Final     Eosinophil Rel %   Date Value Ref Range Status   06/14/2021 1.2  "0.7 - 7.0 % Final     Eosinophil %   Date Value Ref Range Status   07/15/2021 1.1 0.3 - 6.2 % Final     Basophil Rel %   Date Value Ref Range Status   06/14/2021 0.3 0.1 - 1.2 % Final     Basophil %   Date Value Ref Range Status   07/15/2021 0.7 0.0 - 1.5 % Final     Immature Grans %   Date Value Ref Range Status   07/15/2021 0.4 0.0 - 0.5 % Final     Neutrophils Absolute   Date Value Ref Range Status   06/14/2021 4.83 1.56 - 6.13 K/uL Final     Neutrophils, Absolute   Date Value Ref Range Status   07/15/2021 5.01 1.70 - 7.00 10*3/mm3 Final     Lymphocytes Absolute   Date Value Ref Range Status   06/14/2021 1.68 1.18 - 3.74 K/uL Final     Lymphocytes, Absolute   Date Value Ref Range Status   07/15/2021 1.22 0.70 - 3.10 10*3/mm3 Final     Monocytes Absolute   Date Value Ref Range Status   06/14/2021 0.68 0.24 - 0.82 K/uL Final     Monocytes, Absolute   Date Value Ref Range Status   07/15/2021 0.76 0.10 - 0.90 10*3/mm3 Final     Eosinophils Absolute   Date Value Ref Range Status   06/14/2021 0.09 0.04 - 0.54 K/uL Final     Eosinophils, Absolute   Date Value Ref Range Status   07/15/2021 0.08 0.00 - 0.40 10*3/mm3 Final     Basophils Absolute   Date Value Ref Range Status   06/14/2021 0.02 0.01 - 0.08 K/uL Final     Basophils, Absolute   Date Value Ref Range Status   07/15/2021 0.05 0.00 - 0.20 10*3/mm3 Final     Immature Grans, Absolute   Date Value Ref Range Status   07/15/2021 0.03 0.00 - 0.05 10*3/mm3 Final   11/10/2020 0.0 K/uL Final     nRBC   Date Value Ref Range Status   07/15/2021 0.0 0.0 - 0.2 /100 WBC Final         OBJECTIVE:  Visit Vitals  /82 (BP Location: Left arm, Patient Position: Sitting, Cuff Size: Adult)   Pulse 74   Temp 98.6 °F (37 °C) (Oral)   Resp 16   Ht 160 cm (62.99\")   Wt 76.2 kg (168 lb)   SpO2 98%   BMI 29.77 kg/m²      Physical Exam  Vitals reviewed.   Constitutional:       General: She is not in acute distress.     Appearance: She is well-developed. She is not diaphoretic.   HENT:    "   Head: Normocephalic and atraumatic.   Eyes:      Pupils: Pupils are equal, round, and reactive to light.   Neck:      Thyroid: No thyromegaly.      Trachea: Phonation normal.   Pulmonary:      Effort: No respiratory distress.   Skin:     Coloration: Skin is not pale.      Findings: No erythema.   Neurological:      Mental Status: She is alert.   Psychiatric:         Behavior: Behavior normal.         Thought Content: Thought content normal.         Judgment: Judgment normal.         Assessment/Plan    Diagnoses and all orders for this visit:    1. Type 2 diabetes mellitus without complication, without long-term current use of insulin (HCC) (Primary)  -     Hemoglobin A1c; Future  -     Lipid Panel; Future  -     Microalbumin / Creatinine Urine Ratio - Urine, Clean Catch; Future    2. B12 deficiency  -     cyanocobalamin 1000 MCG/ML injection; Inject 1 mL Every 28 (Twenty-Eight) Days.  Dispense: 1 mL; Refill: 5  -     Vitamin B12; Future    3. Healthcare maintenance  -     CBC (No Diff); Future  -     Comprehensive Metabolic Panel; Future  -     Hemoglobin A1c; Future  -     Lipid Panel; Future    4. COVID-19 vaccine dose declined      She has diet-controlled type 2 diabetes without complication.  We will check an A1c, lipid panel and microalbumin/creatinine ratio couple days prior to her next visit.    She would like to switch back to B12 injections.  I do show that this is not covered by her insurance.  She will let us know if it is not covered and then we can switch back to oral if necessary.    We will get the above blood work couple days prior to her next visit which will be annual Medicare wellness visit    Counseled on the COVID-19 vaccine.  She is not interested in getting the vaccine    Doesn't want covid vaccine      Return in about 6 months (around 4/12/2022) for Medicare Wellness.      DANIELLE Healy MD  09:19 CDT  10/12/2021

## 2021-10-20 ENCOUNTER — OFFICE VISIT (OUTPATIENT)
Dept: CARDIOLOGY | Facility: CLINIC | Age: 66
End: 2021-10-20

## 2021-10-20 VITALS
BODY MASS INDEX: 29.41 KG/M2 | OXYGEN SATURATION: 98 % | DIASTOLIC BLOOD PRESSURE: 80 MMHG | HEIGHT: 63 IN | HEART RATE: 93 BPM | WEIGHT: 166 LBS | SYSTOLIC BLOOD PRESSURE: 120 MMHG

## 2021-10-20 DIAGNOSIS — Q21.11 ASD SECUNDUM: ICD-10-CM

## 2021-10-20 DIAGNOSIS — I51.7 RIGHT VENTRICULAR ENLARGEMENT: ICD-10-CM

## 2021-10-20 DIAGNOSIS — I48.21 PERMANENT ATRIAL FIBRILLATION (HCC): Primary | ICD-10-CM

## 2021-10-20 DIAGNOSIS — I51.7 RIGHT ATRIAL ENLARGEMENT: ICD-10-CM

## 2021-10-20 PROBLEM — R94.39 ABNORMAL NUCLEAR STRESS TEST: Status: RESOLVED | Noted: 2021-07-15 | Resolved: 2021-10-20

## 2021-10-20 PROCEDURE — 99214 OFFICE O/P EST MOD 30 MIN: CPT | Performed by: INTERNAL MEDICINE

## 2021-10-20 PROCEDURE — 93000 ELECTROCARDIOGRAM COMPLETE: CPT | Performed by: INTERNAL MEDICINE

## 2021-10-20 RX ORDER — LANOLIN ALCOHOL/MO/W.PET/CERES
1000 CREAM (GRAM) TOPICAL DAILY
COMMUNITY
End: 2021-10-26

## 2021-10-20 NOTE — PROGRESS NOTES
Subjective:     Encounter Date:10/20/2021      Patient ID: Yun Blackwell is a 65 y.o. female.    Chief Complaint: Follow-up for permanent atrial fibrillation and ASD  History of Present Illness  Ms. Blackwell returns today for routine follow-up. At her last visit here in 04/2021, she was noted that transitioning from warfarin to Eliquis resolved some palpitations she had been complaining of. At that time, she was walking more in her neighborhood, and was not having any exertional symptoms including shortness of breath or chest pain. Abbey advised that she continue Cardizem for rate control, and Eliquis for stroke prophylaxis. We also followed her for an ASD, but a previous right heart catheterization that I performed showed no oxygenation step-up, and, therefore, I felt as though her right ventricular enlargement was not secondary to left-to-right shunting, and she would not be benefited in any way from ASD closure.    Ms. Blackwell reports that she is doing well since her last visit. She states that she has not had any palpitations since starting the Eliquis. She denies any bleeding troubles or tendencies on the Eliquis. She states that she has had some shortness of breath with going up stairs. She states that she takes the Eliquis twice daily. She reports that she takes the Eliquis at 5:00 AM or later, and sometimes she forgets to take her evening dose.      The following portions of the patient's history were reviewed and updated as appropriate: allergies, current medications, past family history, past medical history, past social history, past surgical history and problem list.    Review of Systems   Constitutional: Negative for malaise/fatigue.   Cardiovascular: Negative for chest pain, claudication, dyspnea on exertion, leg swelling, near-syncope, orthopnea, palpitations, paroxysmal nocturnal dyspnea and syncope.   Respiratory: Negative for shortness of breath.    Hematologic/Lymphatic: Does not bruise/bleed  easily.           Current Outpatient Medications:   •  apixaban (Eliquis) 5 MG tablet tablet, Take 1 tablet by mouth Every 12 (Twelve) Hours., Disp: 60 tablet, Rfl: 5  •  cyanocobalamin 1000 MCG/ML injection, Inject 1 mL Every 28 (Twenty-Eight) Days., Disp: 1 mL, Rfl: 5  •  dicyclomine (BENTYL) 20 MG tablet, Take 20 mg by mouth Every 6 (Six) Hours. Takes 1/2 tablet 4 times daily, Disp: , Rfl:   •  dilTIAZem (CARDIZEM) 30 MG tablet, Take 30 mg by mouth 2 (two) times a day., Disp: , Rfl:   •  montelukast (SINGULAIR) 10 MG tablet, Take 1 tablet by mouth Every Night., Disp: 30 tablet, Rfl: 5  •  omeprazole (priLOSEC) 20 MG capsule, Take 1 capsule by mouth Daily., Disp: 30 capsule, Rfl: 5  •  tamoxifen (NOLVADEX) 20 MG chemo tablet, Take 1 tablet by mouth daily, Disp: 90 tablet, Rfl: 3  •  tiZANidine (ZANAFLEX) 4 MG tablet, Take one-half to 1 tablet by mouth at bedtime. (Patient taking differently: 4 mg. Takes whole tablet daily), Disp: 30 tablet, Rfl: 2  •  vitamin B-12 (CYANOCOBALAMIN) 1000 MCG tablet, Take 1,000 mcg by mouth Daily., Disp: , Rfl:        Objective:      Vitals:    10/20/21 1555   BP: 120/80   Pulse: 93   SpO2: 98%     Vitals and nursing note reviewed.   Constitutional:       General: Not in acute distress.     Appearance: Not in distress.   Neck:      Vascular: No JVD or JVR. JVD normal.   Pulmonary:      Effort: Pulmonary effort is normal.      Breath sounds: Normal breath sounds.   Cardiovascular:      Tachycardia present. Irregularly irregular rhythm. S2. Fixed split S2.       Murmurs: There is no murmur.      No gallop. No rub.   Pulses:     Intact distal pulses.   Skin:     General: Skin is warm and dry.   Neurological:      Mental Status: Alert, oriented to person, place, and time and oriented to person, place and time.         Lab Review:         ECG 12 Lead    Date/Time: 10/20/2021 4:02 PM  Performed by: Van Marcelino MD  Authorized by: Van Marcelino MD   Comparison: compared with previous  "ECG from 4/13/2021  Similar to previous ECG  Rhythm: atrial fibrillation  BPM: 93  Conduction: right bundle branch block  QRS axis: left    Clinical impression: abnormal EKG          I reviewed findings from the right heart catheterization performed 2/5/21:  Right heart catheterization:  Hemodynamics (in mmHg)  RA: 9  RV: 28/8  PA: 28/12, m17  PCW: 14     Oximetry (all %)  High SVC: 69  Low SVC: 70  High IVC: 72  Low IVC: 78  High RA: 68  Low RA: 68  RV: 68  PA: 68  PCW: 70 (confirmed both by fluoroscopy and waveform)     Ao (by pulse oximetry): 98%     Cardiac output and index via assumed Flex method: 4.4 L/min, 2.5 L/min/m2     TPG: 3  PVR: <1 Wood unit     Estimated Blood Loss: 5mL  Specimens: None  Complications: None     Impression:  1. No evidence of pulmonary hypertension; normal mean PA pressure and normal pulmonary vascular resistence  2. No evidence of left-to-right shunt (ie no oxygen \"step up\")  3. Normal cardiac output     Plan: resume Eliquis tonight; office f/u.  At this point, and based on this data, does not seem as though patient would benefit from ASD closure and that her right ventricle and right atrium are severely dilated for some other reason.      Assessment/Plan:     Problem List Items Addressed This Visit        Cardiac and Vasculature    Permanent atrial fibrillation (HCC) - Primary    Right ventricular enlargement    Right atrial enlargement    ASD secundum    Overview     Added automatically from request for surgery [id number]             Recommendations/plans:  1. Permanent atrial fibrillation: Remains asymptomatic, and generally well controlled from a rate perspective.  - Continue Eliquis 5 mg by mouth twice daily for CVA prophylaxis, and rate control medications (diltiazem 30 mg twice daily).    2. Secundum type ASD: Stable. Previous catheterization revealed no oxygen step-up, so despite the fact that her right atrium and right ventricle are enlarged, it does not appear to be secondary " to this, and since she is not dyspneic, there is no indication for closure at this time. We will continue to monitor.    Follow up in 6 months or sooner with new or worsening symptoms.      Transcribed from ambient dictation for Van Marcelino MD by Nicky Angel.  10/20/21   17:00 CDT    I Van Marcelino MD have personally performed the services described in this document as scribed by the above individual, and it is both accurate and complete.   I have edited each component as needed.    Van Marcelino MD  10/20/2021  22:40 CDT

## 2021-10-26 ENCOUNTER — CLINICAL SUPPORT (OUTPATIENT)
Dept: INTERNAL MEDICINE | Facility: CLINIC | Age: 66
End: 2021-10-26

## 2021-10-26 DIAGNOSIS — E53.8 B12 DEFICIENCY: Primary | ICD-10-CM

## 2021-10-26 PROCEDURE — 99211 OFF/OP EST MAY X REQ PHY/QHP: CPT | Performed by: INTERNAL MEDICINE

## 2021-10-26 RX ORDER — CYANOCOBALAMIN 1000 UG/ML
1000 INJECTION, SOLUTION INTRAMUSCULAR; SUBCUTANEOUS
Status: SHIPPED | OUTPATIENT
Start: 2021-10-26

## 2021-10-26 RX ADMIN — CYANOCOBALAMIN 1000 MCG: 1000 INJECTION, SOLUTION INTRAMUSCULAR; SUBCUTANEOUS at 16:12

## 2021-11-29 ENCOUNTER — CLINICAL SUPPORT (OUTPATIENT)
Dept: INTERNAL MEDICINE | Facility: CLINIC | Age: 66
End: 2021-11-29

## 2021-11-29 DIAGNOSIS — E53.8 B12 DEFICIENCY: Primary | ICD-10-CM

## 2021-11-29 PROCEDURE — 96372 THER/PROPH/DIAG INJ SC/IM: CPT | Performed by: INTERNAL MEDICINE

## 2021-11-29 RX ADMIN — CYANOCOBALAMIN 1000 MCG: 1000 INJECTION, SOLUTION INTRAMUSCULAR; SUBCUTANEOUS at 09:16

## 2021-11-29 NOTE — PROGRESS NOTES
Patient came in for her patient supplied Vitamin B-12 injection. Patient tolerated the injection well in her left deltoid. No complications or concerns. Appointment was schedule for issue she asked me about, and the next appointment for her injection was scheduled as well.

## 2021-12-03 ENCOUNTER — OFFICE VISIT (OUTPATIENT)
Dept: INTERNAL MEDICINE | Facility: CLINIC | Age: 66
End: 2021-12-03

## 2021-12-03 VITALS
HEART RATE: 101 BPM | BODY MASS INDEX: 28.88 KG/M2 | HEIGHT: 63 IN | DIASTOLIC BLOOD PRESSURE: 85 MMHG | TEMPERATURE: 98.4 F | RESPIRATION RATE: 16 BRPM | OXYGEN SATURATION: 96 % | SYSTOLIC BLOOD PRESSURE: 132 MMHG | WEIGHT: 163 LBS

## 2021-12-03 DIAGNOSIS — Z99.89 OSA ON CPAP: ICD-10-CM

## 2021-12-03 DIAGNOSIS — D22.62 NEVUS OF LEFT UPPER ARM: Primary | ICD-10-CM

## 2021-12-03 DIAGNOSIS — G47.33 OSA ON CPAP: ICD-10-CM

## 2021-12-03 PROCEDURE — 99212 OFFICE O/P EST SF 10 MIN: CPT | Performed by: INTERNAL MEDICINE

## 2021-12-03 NOTE — PATIENT INSTRUCTIONS
-Keep an eye on your mole and let me know if it changes at all. Keep follow ups with Dr. Rodriguez

## 2021-12-03 NOTE — PROGRESS NOTES
Chief Complaint   Patient presents with   • Office Visit   • Mole     Left arm         History:  Yun Blackwell is a 66 y.o. female who presents today for evaluation of the above problems.      Presents for acute visit.  She believes someone noticed a mole on her left upper arm when she received the B12 injection.  They asked her to see us for evaluation.  The patient states this mole has been there for a while.  She recently had a full body examination with Dr. Rodriguez and he did not mention the mole on the left upper extremity.  According to Yun she did not have any concerning lesions.  She states this mole was present at the time she had a visit with Dr. Rodriguez.      HPI      ROS:  Review of Systems      Current Outpatient Medications:   •  apixaban (Eliquis) 5 MG tablet tablet, Take 1 tablet by mouth Every 12 (Twelve) Hours., Disp: 60 tablet, Rfl: 5  •  cyanocobalamin 1000 MCG/ML injection, Inject 1 mL Every 28 (Twenty-Eight) Days., Disp: 1 mL, Rfl: 5  •  dilTIAZem (CARDIZEM) 30 MG tablet, Take 30 mg by mouth 2 (two) times a day., Disp: , Rfl:   •  montelukast (SINGULAIR) 10 MG tablet, Take 1 tablet by mouth Every Night., Disp: 30 tablet, Rfl: 5  •  omeprazole (priLOSEC) 20 MG capsule, Take 1 capsule by mouth Daily., Disp: 30 capsule, Rfl: 5  •  tamoxifen (NOLVADEX) 20 MG chemo tablet, Take 1 tablet by mouth daily, Disp: 90 tablet, Rfl: 3  •  tiZANidine (ZANAFLEX) 4 MG tablet, Take one-half to 1 tablet by mouth at bedtime. (Patient taking differently: 4 mg. Takes whole tablet daily), Disp: 30 tablet, Rfl: 2  •  dicyclomine (BENTYL) 20 MG tablet, Take 20 mg by mouth Every 6 (Six) Hours. Takes 1/2 tablet 4 times daily, Disp: , Rfl:     Current Facility-Administered Medications:   •  cyanocobalamin injection 1,000 mcg, 1,000 mcg, Intramuscular, Q28 Days, ANGEL Healy MD, 1,000 mcg at 11/29/21 0916    Lab Results   Component Value Date    GLUCOSE 115 (H) 02/05/2021    BUN 17 02/05/2021    CREATININE 0.45 (L)  02/05/2021    EGFRIFNONA 139 02/05/2021    EGFRIFAFRI >59 11/10/2020    BCR 37.8 (H) 02/05/2021    K 3.7 02/05/2021    CO2 27.0 02/05/2021    CALCIUM 9.1 02/05/2021    ALBUMIN 3.9 11/10/2020    AST 19 11/10/2020    ALT 18 11/10/2020       WBC   Date Value Ref Range Status   07/15/2021 7.15 3.40 - 10.80 10*3/mm3 Final   06/14/2021 7.30 3.98 - 10.04 K/uL Final     RBC   Date Value Ref Range Status   07/15/2021 4.61 3.77 - 5.28 10*6/mm3 Final   06/14/2021 4.38 3.93 - 5.22 M/uL Final     Hemoglobin   Date Value Ref Range Status   07/15/2021 13.7 12.0 - 15.9 g/dL Final   06/14/2021 13.4 11.2 - 15.7 g/dL Final     Hematocrit   Date Value Ref Range Status   07/15/2021 44.4 34.0 - 46.6 % Final   06/14/2021 41.6 34.1 - 44.9 % Final     MCV   Date Value Ref Range Status   07/15/2021 96.3 79.0 - 97.0 fL Final   06/14/2021 95.0 (H) 79.4 - 94.8 fL Final     MCH   Date Value Ref Range Status   07/15/2021 29.7 26.6 - 33.0 pg Final   06/14/2021 30.6 25.6 - 32.2 pg Final     MCHC   Date Value Ref Range Status   07/15/2021 30.9 (L) 31.5 - 35.7 g/dL Final   06/14/2021 32.2 (L) 32.3 - 35.5 g/dL Final     RDW   Date Value Ref Range Status   07/15/2021 12.5 12.3 - 15.4 % Final   06/14/2021 13.2 11.7 - 14.4 % Final     RDW-SD   Date Value Ref Range Status   07/15/2021 44.3 37.0 - 54.0 fl Final     MPV   Date Value Ref Range Status   07/15/2021 11.9 6.0 - 12.0 fL Final   06/14/2021 11.7 (H) 7.4 - 10.4 fL Final     Platelets   Date Value Ref Range Status   07/15/2021 175 140 - 450 10*3/mm3 Final   06/14/2021 142 (L) 182 - 369 K/uL Final     Neutrophil Rel %   Date Value Ref Range Status   06/14/2021 66.2 34.0 - 71.1 % Final     Neutrophil %   Date Value Ref Range Status   07/15/2021 70.1 42.7 - 76.0 % Final     Lymphocyte Rel %   Date Value Ref Range Status   06/14/2021 23.0 19.3 - 53.1 % Final     Lymphocyte %   Date Value Ref Range Status   07/15/2021 17.1 (L) 19.6 - 45.3 % Final     Monocyte Rel %   Date Value Ref Range Status  "  06/14/2021 9.3 4.7 - 12.5 % Final     Monocyte %   Date Value Ref Range Status   07/15/2021 10.6 5.0 - 12.0 % Final     Eosinophil Rel %   Date Value Ref Range Status   06/14/2021 1.2 0.7 - 7.0 % Final     Eosinophil %   Date Value Ref Range Status   07/15/2021 1.1 0.3 - 6.2 % Final     Basophil Rel %   Date Value Ref Range Status   06/14/2021 0.3 0.1 - 1.2 % Final     Basophil %   Date Value Ref Range Status   07/15/2021 0.7 0.0 - 1.5 % Final     Immature Grans %   Date Value Ref Range Status   07/15/2021 0.4 0.0 - 0.5 % Final     Neutrophils Absolute   Date Value Ref Range Status   06/14/2021 4.83 1.56 - 6.13 K/uL Final     Neutrophils, Absolute   Date Value Ref Range Status   07/15/2021 5.01 1.70 - 7.00 10*3/mm3 Final     Lymphocytes Absolute   Date Value Ref Range Status   06/14/2021 1.68 1.18 - 3.74 K/uL Final     Lymphocytes, Absolute   Date Value Ref Range Status   07/15/2021 1.22 0.70 - 3.10 10*3/mm3 Final     Monocytes Absolute   Date Value Ref Range Status   06/14/2021 0.68 0.24 - 0.82 K/uL Final     Monocytes, Absolute   Date Value Ref Range Status   07/15/2021 0.76 0.10 - 0.90 10*3/mm3 Final     Eosinophils Absolute   Date Value Ref Range Status   06/14/2021 0.09 0.04 - 0.54 K/uL Final     Eosinophils, Absolute   Date Value Ref Range Status   07/15/2021 0.08 0.00 - 0.40 10*3/mm3 Final     Basophils Absolute   Date Value Ref Range Status   06/14/2021 0.02 0.01 - 0.08 K/uL Final     Basophils, Absolute   Date Value Ref Range Status   07/15/2021 0.05 0.00 - 0.20 10*3/mm3 Final     Immature Grans, Absolute   Date Value Ref Range Status   07/15/2021 0.03 0.00 - 0.05 10*3/mm3 Final   11/10/2020 0.0 K/uL Final     nRBC   Date Value Ref Range Status   07/15/2021 0.0 0.0 - 0.2 /100 WBC Final         OBJECTIVE:  Visit Vitals  /85 (BP Location: Left arm, Patient Position: Sitting, Cuff Size: Adult)   Pulse 101   Temp 98.4 °F (36.9 °C) (Oral)   Resp 16   Ht 160 cm (62.99\")   Wt 73.9 kg (163 lb)   SpO2 96% "   BMI 28.88 kg/m²      Physical Exam  Constitutional:       General: She is not in acute distress.     Appearance: She is well-developed. She is not diaphoretic.   HENT:      Head: Normocephalic and atraumatic.   Eyes:      Pupils: Pupils are equal, round, and reactive to light.   Neck:      Thyroid: No thyromegaly.      Trachea: Phonation normal.   Pulmonary:      Effort: No respiratory distress.   Skin:     Coloration: Skin is not pale.      Findings: No erythema.      Comments: Small less than a pencil eraser size symmetric, smooth, regular brown nevus on the left upper extremity without concerning features   Neurological:      Mental Status: She is alert.   Psychiatric:         Behavior: Behavior normal.         Thought Content: Thought content normal.         Judgment: Judgment normal.         Assessment/Plan    Diagnoses and all orders for this visit:    1. Nevus of left upper arm (Primary)    2. LEROY on CPAP  -     Ambulatory Referral to Neurology      She has a benign-appearing nevus of the left upper extremity.  She will keep a close eye on this and notify us of any changes.  Keep your yearly appointments with Dr. Rodriguez, or sooner if she notices change in the mole.    She has obstructive sleep apnea and is on CPAP.  This was evaluated previously by Dr. Jorgito Healy years ago.  I am going to send referral back over to him for evaluation as she is in need of another order.    Keep next follow-up or sooner if needed.    Return for Next scheduled follow up.      DANIELLE Healy MD  08:59 CST  12/3/2021

## 2021-12-08 ENCOUNTER — HOSPITAL ENCOUNTER (OUTPATIENT)
Dept: WOMENS IMAGING | Age: 66
Discharge: HOME | End: 2021-12-08
Payer: MEDICARE

## 2021-12-08 ENCOUNTER — OFFICE VISIT (OUTPATIENT)
Dept: SURGERY | Age: 66
End: 2021-12-08
Payer: MEDICARE

## 2021-12-08 VITALS
DIASTOLIC BLOOD PRESSURE: 79 MMHG | HEIGHT: 63 IN | BODY MASS INDEX: 29.77 KG/M2 | WEIGHT: 168 LBS | SYSTOLIC BLOOD PRESSURE: 132 MMHG | HEART RATE: 80 BPM | TEMPERATURE: 98.5 F

## 2021-12-08 DIAGNOSIS — Z15.09 GENETIC SUSCEPTIBILITY TO OTHER MALIGNANT NEOPLASM: ICD-10-CM

## 2021-12-08 DIAGNOSIS — Z85.3 PERSONAL HISTORY OF MALIGNANT NEOPLASM OF BREAST: Primary | ICD-10-CM

## 2021-12-08 DIAGNOSIS — Z85.3 PERSONAL HISTORY OF MALIGNANT NEOPLASM OF BREAST: ICD-10-CM

## 2021-12-08 DIAGNOSIS — R92.0 MICROCALCIFICATIONS OF THE BREAST: ICD-10-CM

## 2021-12-08 PROCEDURE — G8484 FLU IMMUNIZE NO ADMIN: HCPCS | Performed by: PHYSICIAN ASSISTANT

## 2021-12-08 PROCEDURE — 4040F PNEUMOC VAC/ADMIN/RCVD: CPT | Performed by: PHYSICIAN ASSISTANT

## 2021-12-08 PROCEDURE — 3017F COLORECTAL CA SCREEN DOC REV: CPT | Performed by: PHYSICIAN ASSISTANT

## 2021-12-08 PROCEDURE — G8427 DOCREV CUR MEDS BY ELIG CLIN: HCPCS | Performed by: PHYSICIAN ASSISTANT

## 2021-12-08 PROCEDURE — G0279 TOMOSYNTHESIS, MAMMO: HCPCS

## 2021-12-08 PROCEDURE — 99213 OFFICE O/P EST LOW 20 MIN: CPT | Performed by: PHYSICIAN ASSISTANT

## 2021-12-08 PROCEDURE — 1036F TOBACCO NON-USER: CPT | Performed by: PHYSICIAN ASSISTANT

## 2021-12-08 PROCEDURE — G8417 CALC BMI ABV UP PARAM F/U: HCPCS | Performed by: PHYSICIAN ASSISTANT

## 2021-12-08 PROCEDURE — 1123F ACP DISCUSS/DSCN MKR DOCD: CPT | Performed by: PHYSICIAN ASSISTANT

## 2021-12-08 PROCEDURE — G8399 PT W/DXA RESULTS DOCUMENT: HCPCS | Performed by: PHYSICIAN ASSISTANT

## 2021-12-08 PROCEDURE — 1090F PRES/ABSN URINE INCON ASSESS: CPT | Performed by: PHYSICIAN ASSISTANT

## 2021-12-08 NOTE — PROGRESS NOTES
HISTORY OF PRESENT ILLNESS:    Ms. 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 ER is positive at 100%. TX is positive at 98%. Prelude DX demonstrates a reduction of 15% all recurrences to 7%. Reduction in invasive cancer recurrences goes from 9% down to 5%. She is now status post Left lumpectomy with IORT on 1/31/2020    PATHOLOGY REVEALS:  FINAL DIAGNOSIS:    A.  Breast, left 3 o'clock, wire localized lumpectomy:  Ductal carcinoma in situ, micropapillary type  Ductal carcinoma in situ measures 7 mm in greatest linear dimension  Previous biopsy site  Margins of resection are negative  Ductal carcinoma in situ measures 5 mm from the anterior margin of  resection  Intraductal papillomatosis  Usual ductal hyperplasia    B.  Breast, left, additional cranial lateral margin:  Negative for ductal carcinoma in situ    C.  Breast, left, additional caudal margin:  Fat necrosis  Fibrocystic changes  Negative for ductal carcinoma in situ    D.  Breast, left, additional medial margin:  Negative for ductal carcinoma in situ      EXAM: [facility] DIGITAL DIAGNOSTIC W OR WO CAD BILATERAL   HISTORY: Annual breast cancer screening. Personal history of breast   cancer. COMPARISON: 12/2/2020, 12/2/2019, 11/20/2018   FINDINGS:    Computer aided detection technology was utilized. 3-D tomosynthesis   views were obtained. Category B: The breast tissue has scattered areas   of fibroglandular density. Stable lumpectomy changes in the LEFT breast upper-outer quadrant. There are some new amorphous calcifications in the LEFT breast   anterolateral to the lumpectomy site. No other new calcifications. No   suspicious mass or architectural distortion.       Impression   New focus of amorphous calcifications in the LEFT breast upper outer   quadrant anterolateral to the lumpectomy bed.  These may represent an   area of evolving fat necrosis, although recurrent DCIS is also within   the differential. At minimum, recommend 6 month follow-up diagnostic   LEFT breast mammogram with magnification views. Alternatively,   stereotactic biopsy could be performed. BI-RADS CATEGORY 4: SUSPICIOUS ABNORMALITY. BIOPSY SHOULD BE   CONSIDERED. 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 This increases her risk for developing melanoma and pancreatic cancer. She has a skin lesion on her left upper arm that she was told by her primary care provider was normal.  I have advised her to keep an eye on it and if it grows larger we should excise it. PHYSICAL EXAM:  BREAST EXAM:  The left lumpectomy site is well healed. There are fibrocystic changes throughout both breasts. There are no dominant masses, skin dimpling or nipple retraction. There is no axillary lymphadenopathy bilaterally. IMPRESSION:  Doing well s/p Left lumpectomy with IORT 1/31/2020    PLAN:  This will require stereotactic guided mammotome biopsy, which will be done under local anesthesia. Further procedures will be done as indicated. She is past due for MRCP of her pancreas. We will get this scheduled. CONSENT:  The risks, benefits and options of stereotactic biopsy were discussed with her including but not limited to bleeding, infection, hematoma, missing the lesion, and scarring. She expresses good understanding and is agreeable to proceed. 20 minutes spent, which includes face to face with patient, record review, evaluation, planning, and education as well as coordination of her care.

## 2021-12-13 ENCOUNTER — TELEPHONE (OUTPATIENT)
Dept: NEUROLOGY | Age: 66
End: 2021-12-13

## 2021-12-15 DIAGNOSIS — Z11.59 SCREENING FOR VIRAL DISEASE: Primary | ICD-10-CM

## 2021-12-15 LAB — SARS-COV-2, PCR: NOT DETECTED

## 2021-12-16 ENCOUNTER — TELEPHONE (OUTPATIENT)
Dept: INTERNAL MEDICINE | Facility: CLINIC | Age: 66
End: 2021-12-16

## 2021-12-16 ENCOUNTER — HOSPITAL ENCOUNTER (OUTPATIENT)
Dept: WOMENS IMAGING | Age: 66
Discharge: HOME | End: 2021-12-16
Payer: MEDICARE

## 2021-12-16 DIAGNOSIS — R30.0 DYSURIA: Primary | ICD-10-CM

## 2021-12-16 DIAGNOSIS — Z85.3 PERSONAL HISTORY OF MALIGNANT NEOPLASM OF BREAST: ICD-10-CM

## 2021-12-16 DIAGNOSIS — R92.0 MICROCALCIFICATIONS OF THE BREAST: ICD-10-CM

## 2021-12-16 PROCEDURE — 2709999900 MAM STEREO BREAST BX W LOC DEVICE 1ST LESION LEFT

## 2021-12-16 PROCEDURE — 77065 DX MAMMO INCL CAD UNI: CPT

## 2021-12-16 PROCEDURE — 88305 TISSUE EXAM BY PATHOLOGIST: CPT

## 2021-12-16 NOTE — TELEPHONE ENCOUNTER
Caller: Yun Blackwell    Relationship: Self    Best call back number: [phone]    What medication are you requesting:     Something to treat    What are your current symptoms:     Left side pain, foul smelling urine    How long have you been experiencing symptoms:     1 day    Have you had these symptoms before:    [x] Yes  [] No    Have you been treated for these symptoms before:   [x] Yes  [] No    If a prescription is needed, what is your preferred pharmacy and phone number:      [facility]     Additional notes:    She thinks she may have some sort of infection. She states it hurts for her to get up after sitting.

## 2021-12-17 ENCOUNTER — TELEPHONE (OUTPATIENT)
Dept: INTERNAL MEDICINE | Facility: CLINIC | Age: 66
End: 2021-12-17

## 2021-12-17 ENCOUNTER — LAB (OUTPATIENT)
Dept: LAB | Facility: HOSPITAL | Age: 66
End: 2021-12-17

## 2021-12-17 DIAGNOSIS — E11.9 TYPE 2 DIABETES MELLITUS WITHOUT COMPLICATION, WITHOUT LONG-TERM CURRENT USE OF INSULIN (HCC): ICD-10-CM

## 2021-12-17 DIAGNOSIS — E53.8 B12 DEFICIENCY: ICD-10-CM

## 2021-12-17 DIAGNOSIS — Z00.00 HEALTHCARE MAINTENANCE: ICD-10-CM

## 2021-12-17 DIAGNOSIS — R30.0 DYSURIA: ICD-10-CM

## 2021-12-17 DIAGNOSIS — N30.01 ACUTE CYSTITIS WITH HEMATURIA: Primary | ICD-10-CM

## 2021-12-17 LAB
ALBUMIN SERPL-MCNC: 4.1 G/DL (ref 3.5–5.2)
ALBUMIN/GLOB SERPL: 1.2 G/DL
ALP SERPL-CCNC: 81 U/L (ref 39–117)
ALT SERPL W P-5'-P-CCNC: 17 U/L (ref 1–33)
ANION GAP SERPL CALCULATED.3IONS-SCNC: 10 MMOL/L (ref 5–15)
AST SERPL-CCNC: 24 U/L (ref 1–32)
BACTERIA UR QL AUTO: ABNORMAL /HPF
BILIRUB SERPL-MCNC: 1.1 MG/DL (ref 0–1.2)
BILIRUB UR QL STRIP: ABNORMAL
BUN SERPL-MCNC: 20 MG/DL (ref 8–23)
BUN/CREAT SERPL: 32.8 (ref 7–25)
CALCIUM SPEC-SCNC: 9.1 MG/DL (ref 8.6–10.5)
CHLORIDE SERPL-SCNC: 108 MMOL/L (ref 98–107)
CHOLEST SERPL-MCNC: 115 MG/DL (ref 0–200)
CLARITY UR: ABNORMAL
CO2 SERPL-SCNC: 24 MMOL/L (ref 22–29)
COLOR UR: ABNORMAL
CREAT SERPL-MCNC: 0.61 MG/DL (ref 0.57–1)
DEPRECATED RDW RBC AUTO: 45.7 FL (ref 37–54)
ERYTHROCYTE [DISTWIDTH] IN BLOOD BY AUTOMATED COUNT: 13.2 % (ref 12.3–15.4)
GFR SERPL CREATININE-BSD FRML MDRD: 118 ML/MIN/1.73
GFR SERPL CREATININE-BSD FRML MDRD: 98 ML/MIN/1.73
GLOBULIN UR ELPH-MCNC: 3.3 GM/DL
GLUCOSE SERPL-MCNC: 116 MG/DL (ref 65–99)
GLUCOSE UR STRIP-MCNC: NEGATIVE MG/DL
HBA1C MFR BLD: 5.3 % (ref 4.8–5.6)
HCT VFR BLD AUTO: 40.3 % (ref 34–46.6)
HDLC SERPL-MCNC: 55 MG/DL (ref 40–60)
HGB BLD-MCNC: 12.3 G/DL (ref 12–15.9)
HGB UR QL STRIP.AUTO: ABNORMAL
HYALINE CASTS UR QL AUTO: ABNORMAL /LPF
KETONES UR QL STRIP: ABNORMAL
LDLC SERPL CALC-MCNC: 41 MG/DL (ref 0–100)
LDLC/HDLC SERPL: 0.71 {RATIO}
LEUKOCYTE ESTERASE UR QL STRIP.AUTO: ABNORMAL
MCH RBC QN AUTO: 29.1 PG (ref 26.6–33)
MCHC RBC AUTO-ENTMCNC: 30.5 G/DL (ref 31.5–35.7)
MCV RBC AUTO: 95.3 FL (ref 79–97)
NITRITE UR QL STRIP: POSITIVE
PH UR STRIP.AUTO: <=5 [PH] (ref 5–8)
PLATELET # BLD AUTO: 169 10*3/MM3 (ref 140–450)
PMV BLD AUTO: 11.3 FL (ref 6–12)
POTASSIUM SERPL-SCNC: 3.9 MMOL/L (ref 3.5–5.2)
PROT SERPL-MCNC: 7.4 G/DL (ref 6–8.5)
PROT UR QL STRIP: ABNORMAL
RBC # BLD AUTO: 4.23 10*6/MM3 (ref 3.77–5.28)
RBC # UR STRIP: ABNORMAL /HPF
REF LAB TEST METHOD: ABNORMAL
SODIUM SERPL-SCNC: 142 MMOL/L (ref 136–145)
SP GR UR STRIP: >=1.03 (ref 1–1.03)
SQUAMOUS #/AREA URNS HPF: ABNORMAL /HPF
TRIGL SERPL-MCNC: 106 MG/DL (ref 0–150)
UROBILINOGEN UR QL STRIP: ABNORMAL
VLDLC SERPL-MCNC: 19 MG/DL (ref 5–40)
WBC # UR STRIP: ABNORMAL /HPF
WBC NRBC COR # BLD: 8.34 10*3/MM3 (ref 3.4–10.8)

## 2021-12-17 PROCEDURE — 87086 URINE CULTURE/COLONY COUNT: CPT

## 2021-12-17 PROCEDURE — 82570 ASSAY OF URINE CREATININE: CPT

## 2021-12-17 PROCEDURE — 82043 UR ALBUMIN QUANTITATIVE: CPT

## 2021-12-17 PROCEDURE — 85027 COMPLETE CBC AUTOMATED: CPT

## 2021-12-17 PROCEDURE — 87186 SC STD MICRODIL/AGAR DIL: CPT

## 2021-12-17 PROCEDURE — 80061 LIPID PANEL: CPT

## 2021-12-17 PROCEDURE — 83036 HEMOGLOBIN GLYCOSYLATED A1C: CPT

## 2021-12-17 PROCEDURE — 82607 VITAMIN B-12: CPT

## 2021-12-17 PROCEDURE — 87077 CULTURE AEROBIC IDENTIFY: CPT

## 2021-12-17 PROCEDURE — 36415 COLL VENOUS BLD VENIPUNCTURE: CPT

## 2021-12-17 PROCEDURE — 80053 COMPREHEN METABOLIC PANEL: CPT

## 2021-12-17 PROCEDURE — 81001 URINALYSIS AUTO W/SCOPE: CPT

## 2021-12-17 RX ORDER — CEFDINIR 300 MG/1
300 CAPSULE ORAL 2 TIMES DAILY
Qty: 14 CAPSULE | Refills: 0 | Status: SHIPPED | OUTPATIENT
Start: 2021-12-17 | End: 2022-04-14

## 2021-12-17 NOTE — TELEPHONE ENCOUNTER
PATIENT HAS CALLED, PER DR JENKINS, HE MAY NOT GET RESULTS TILL Monday AND SHE IS URGED TO GO TO URGENT CARE IF SHE IS NOT FEELING BETTER.   PATIENT VOICED UNDERSTANDING AND STATED THAT SHE WOULD PROBABLY WAIT TILL Monday AND TAKE TYLENOL OVER THE WEEKEND.

## 2021-12-17 NOTE — TELEPHONE ENCOUNTER
PATIENT HAS CALLED, SHE STATED THAT SHE HAS HAD PAIN ON HER LEFT SIDE BY HER UPPER HIP.  SHE STATED THAT SHE HAS BEEN POPPING TYLENOL SINCE 12/16/2021 AND THE PAIN HAS NOT IMPROVED.   PLEASE SEE NOTE FROM 12/16/2021  PLEASE ADVISE.

## 2021-12-18 LAB
ALBUMIN UR-MCNC: 7.4 MG/DL
CREAT UR-MCNC: 135.5 MG/DL
MICROALBUMIN/CREAT UR: 54.6 MG/G
VIT B12 BLD-MCNC: 571 PG/ML (ref 211–946)

## 2021-12-20 LAB — BACTERIA SPEC AEROBE CULT: ABNORMAL

## 2021-12-21 ENCOUNTER — CLINICAL SUPPORT (OUTPATIENT)
Dept: INTERNAL MEDICINE | Facility: CLINIC | Age: 66
End: 2021-12-21

## 2021-12-21 DIAGNOSIS — E53.8 B12 DEFICIENCY: Primary | ICD-10-CM

## 2021-12-21 PROCEDURE — 96372 THER/PROPH/DIAG INJ SC/IM: CPT | Performed by: INTERNAL MEDICINE

## 2021-12-21 RX ADMIN — CYANOCOBALAMIN 1000 MCG: 1000 INJECTION, SOLUTION INTRAMUSCULAR; SUBCUTANEOUS at 08:40

## 2021-12-21 NOTE — PROGRESS NOTES
Injection   Vitamin B 12 Injection performed in Right Deltoid by Rochelle Nevarez MA. Patient tolerated the procedure well without complications.  12/21/21   Rochelle Nevarez MA

## 2021-12-27 ENCOUNTER — HOSPITAL ENCOUNTER (OUTPATIENT)
Dept: MRI IMAGING | Age: 66
Discharge: HOME | End: 2021-12-27
Payer: MEDICARE

## 2021-12-27 DIAGNOSIS — Z15.09 GENETIC SUSCEPTIBILITY TO OTHER MALIGNANT NEOPLASM: ICD-10-CM

## 2021-12-27 PROCEDURE — 6360000004 HC RX CONTRAST MEDICATION: Performed by: PHYSICIAN ASSISTANT

## 2021-12-27 PROCEDURE — A9577 INJ MULTIHANCE: HCPCS | Performed by: PHYSICIAN ASSISTANT

## 2021-12-27 PROCEDURE — 82565 ASSAY OF CREATININE: CPT

## 2021-12-27 PROCEDURE — 74183 MRI ABD W/O CNTR FLWD CNTR: CPT

## 2021-12-27 RX ADMIN — GADOBENATE DIMEGLUMINE 18 ML: 529 INJECTION, SOLUTION INTRAVENOUS at 10:08

## 2021-12-29 ENCOUNTER — OFFICE VISIT (OUTPATIENT)
Dept: SURGERY | Age: 66
End: 2021-12-29
Payer: MEDICARE

## 2021-12-29 VITALS
HEART RATE: 102 BPM | WEIGHT: 166 LBS | BODY MASS INDEX: 29.41 KG/M2 | TEMPERATURE: 98.4 F | OXYGEN SATURATION: 98 % | HEIGHT: 63 IN | DIASTOLIC BLOOD PRESSURE: 81 MMHG | SYSTOLIC BLOOD PRESSURE: 137 MMHG

## 2021-12-29 DIAGNOSIS — R30.0 DYSURIA: ICD-10-CM

## 2021-12-29 DIAGNOSIS — R30.0 DYSURIA: Primary | ICD-10-CM

## 2021-12-29 DIAGNOSIS — Z15.09 GENETIC SUSCEPTIBILITY TO OTHER MALIGNANT NEOPLASM: ICD-10-CM

## 2021-12-29 DIAGNOSIS — Z85.3 PERSONAL HISTORY OF MALIGNANT NEOPLASM OF BREAST: ICD-10-CM

## 2021-12-29 DIAGNOSIS — R92.0 MICROCALCIFICATIONS OF THE BREAST: ICD-10-CM

## 2021-12-29 LAB
BACTERIA: ABNORMAL /HPF
BILIRUBIN URINE: NEGATIVE
BLOOD, URINE: NEGATIVE
CLARITY: ABNORMAL
COLOR: YELLOW
EPITHELIAL CELLS, UA: ABNORMAL /HPF
GLUCOSE URINE: NEGATIVE MG/DL
KETONES, URINE: ABNORMAL MG/DL
LEUKOCYTE ESTERASE, URINE: ABNORMAL
NITRITE, URINE: NEGATIVE
PH UA: 5 (ref 5–8)
PROTEIN UA: NEGATIVE MG/DL
RBC UA: ABNORMAL /HPF (ref 0–2)
SPECIFIC GRAVITY UA: 1.03 (ref 1–1.03)
UROBILINOGEN, URINE: 0.2 E.U./DL
WBC UA: ABNORMAL /HPF (ref 0–5)

## 2021-12-29 PROCEDURE — G8399 PT W/DXA RESULTS DOCUMENT: HCPCS | Performed by: PHYSICIAN ASSISTANT

## 2021-12-29 PROCEDURE — 3017F COLORECTAL CA SCREEN DOC REV: CPT | Performed by: PHYSICIAN ASSISTANT

## 2021-12-29 PROCEDURE — G8427 DOCREV CUR MEDS BY ELIG CLIN: HCPCS | Performed by: PHYSICIAN ASSISTANT

## 2021-12-29 PROCEDURE — 4040F PNEUMOC VAC/ADMIN/RCVD: CPT | Performed by: PHYSICIAN ASSISTANT

## 2021-12-29 PROCEDURE — 1036F TOBACCO NON-USER: CPT | Performed by: PHYSICIAN ASSISTANT

## 2021-12-29 PROCEDURE — 99213 OFFICE O/P EST LOW 20 MIN: CPT | Performed by: PHYSICIAN ASSISTANT

## 2021-12-29 PROCEDURE — G8484 FLU IMMUNIZE NO ADMIN: HCPCS | Performed by: PHYSICIAN ASSISTANT

## 2021-12-29 PROCEDURE — 1090F PRES/ABSN URINE INCON ASSESS: CPT | Performed by: PHYSICIAN ASSISTANT

## 2021-12-29 PROCEDURE — 1123F ACP DISCUSS/DSCN MKR DOCD: CPT | Performed by: PHYSICIAN ASSISTANT

## 2021-12-29 PROCEDURE — G8417 CALC BMI ABV UP PARAM F/U: HCPCS | Performed by: PHYSICIAN ASSISTANT

## 2021-12-29 RX ORDER — PHENAZOPYRIDINE HYDROCHLORIDE 200 MG/1
200 TABLET, FILM COATED ORAL 3 TIMES DAILY PRN
Qty: 9 TABLET | Refills: 0 | Status: SHIPPED | OUTPATIENT
Start: 2021-12-29 | End: 2022-01-01

## 2021-12-29 RX ORDER — NITROFURANTOIN 25; 75 MG/1; MG/1
100 CAPSULE ORAL 2 TIMES DAILY
Qty: 14 CAPSULE | Refills: 0 | Status: SHIPPED | OUTPATIENT
Start: 2021-12-29 | End: 2022-01-05

## 2021-12-30 ENCOUNTER — PRE-ADMISSION TESTING (OUTPATIENT)
Dept: PREADMISSION TESTING | Facility: HOSPITAL | Age: 66
End: 2021-12-30

## 2021-12-30 ENCOUNTER — TELEPHONE (OUTPATIENT)
Dept: NEUROLOGY | Age: 66
End: 2021-12-30

## 2021-12-30 ENCOUNTER — TRANSCRIBE ORDERS (OUTPATIENT)
Dept: LAB | Facility: HOSPITAL | Age: 66
End: 2021-12-30

## 2021-12-30 VITALS
WEIGHT: 170.64 LBS | OXYGEN SATURATION: 98 % | RESPIRATION RATE: 20 BRPM | HEART RATE: 108 BPM | BODY MASS INDEX: 30.23 KG/M2 | DIASTOLIC BLOOD PRESSURE: 89 MMHG | HEIGHT: 63 IN | SYSTOLIC BLOOD PRESSURE: 142 MMHG

## 2021-12-30 DIAGNOSIS — Z11.59 SCREENING FOR VIRAL DISEASE: Primary | ICD-10-CM

## 2021-12-30 LAB
ANION GAP SERPL CALCULATED.3IONS-SCNC: 9 MMOL/L (ref 5–15)
BUN SERPL-MCNC: 21 MG/DL (ref 8–23)
BUN/CREAT SERPL: 26.3 (ref 7–25)
CALCIUM SPEC-SCNC: 9.6 MG/DL (ref 8.6–10.5)
CHLORIDE SERPL-SCNC: 105 MMOL/L (ref 98–107)
CO2 SERPL-SCNC: 28 MMOL/L (ref 22–29)
CREAT SERPL-MCNC: 0.8 MG/DL (ref 0.57–1)
DEPRECATED RDW RBC AUTO: 45.5 FL (ref 37–54)
ERYTHROCYTE [DISTWIDTH] IN BLOOD BY AUTOMATED COUNT: 12.9 % (ref 12.3–15.4)
GFR SERPL CREATININE-BSD FRML MDRD: 71 ML/MIN/1.73
GFR SERPL CREATININE-BSD FRML MDRD: 86 ML/MIN/1.73
GLUCOSE SERPL-MCNC: 124 MG/DL (ref 65–99)
HCT VFR BLD AUTO: 43.4 % (ref 34–46.6)
HGB BLD-MCNC: 13.1 G/DL (ref 12–15.9)
MCH RBC QN AUTO: 28.9 PG (ref 26.6–33)
MCHC RBC AUTO-ENTMCNC: 30.2 G/DL (ref 31.5–35.7)
MCV RBC AUTO: 95.6 FL (ref 79–97)
PLATELET # BLD AUTO: 193 10*3/MM3 (ref 140–450)
PMV BLD AUTO: 11.2 FL (ref 6–12)
POTASSIUM SERPL-SCNC: 4.2 MMOL/L (ref 3.5–5.2)
RBC # BLD AUTO: 4.54 10*6/MM3 (ref 3.77–5.28)
SODIUM SERPL-SCNC: 142 MMOL/L (ref 136–145)
WBC NRBC COR # BLD: 7.29 10*3/MM3 (ref 3.4–10.8)

## 2021-12-30 PROCEDURE — 93005 ELECTROCARDIOGRAM TRACING: CPT

## 2021-12-30 PROCEDURE — 85027 COMPLETE CBC AUTOMATED: CPT

## 2021-12-30 PROCEDURE — 36415 COLL VENOUS BLD VENIPUNCTURE: CPT

## 2021-12-30 PROCEDURE — 80048 BASIC METABOLIC PNL TOTAL CA: CPT

## 2021-12-30 PROCEDURE — 93010 ELECTROCARDIOGRAM REPORT: CPT | Performed by: INTERNAL MEDICINE

## 2021-12-30 NOTE — TELEPHONE ENCOUNTER
[facility] called requesting status of their referral and to have Dr.William Britt nurse return their call. Please return call to update on status. The best time to call to accommodate their needs is Anytime    Thank you.

## 2021-12-30 NOTE — TELEPHONE ENCOUNTER
Spoke to Dr Lia Alcazar [facility] , she requests appointment for sleep, due to the patient is confused. Appointment given to 2-28-21 with Lb Foley. She is going to call the patient with the information.

## 2021-12-31 LAB
ORGANISM: ABNORMAL
URINE CULTURE, ROUTINE: ABNORMAL
URINE CULTURE, ROUTINE: ABNORMAL

## 2022-01-02 RX ORDER — SULFAMETHOXAZOLE AND TRIMETHOPRIM 800; 160 MG/1; MG/1
1 TABLET ORAL 2 TIMES DAILY
Qty: 14 TABLET | Refills: 0 | Status: SHIPPED | OUTPATIENT
Start: 2022-01-02 | End: 2022-01-09

## 2022-01-03 LAB
QT INTERVAL: 382 MS
QTC INTERVAL: 469 MS

## 2022-01-04 ENCOUNTER — LAB (OUTPATIENT)
Dept: LAB | Facility: HOSPITAL | Age: 67
End: 2022-01-04

## 2022-01-04 LAB — SARS-COV-2 ORF1AB RESP QL NAA+PROBE: NOT DETECTED

## 2022-01-04 PROCEDURE — C9803 HOPD COVID-19 SPEC COLLECT: HCPCS | Performed by: PODIATRIST

## 2022-01-04 PROCEDURE — U0004 COV-19 TEST NON-CDC HGH THRU: HCPCS | Performed by: PODIATRIST

## 2022-01-04 PROCEDURE — U0005 INFEC AGEN DETEC AMPLI PROBE: HCPCS | Performed by: PODIATRIST

## 2022-01-05 ENCOUNTER — ANESTHESIA EVENT (OUTPATIENT)
Dept: PERIOP | Facility: HOSPITAL | Age: 67
End: 2022-01-05

## 2022-01-06 ENCOUNTER — HOSPITAL ENCOUNTER (OUTPATIENT)
Facility: HOSPITAL | Age: 67
Setting detail: HOSPITAL OUTPATIENT SURGERY
Discharge: HOME | End: 2022-01-06
Attending: PODIATRIST | Admitting: PODIATRIST

## 2022-01-06 ENCOUNTER — ANESTHESIA (OUTPATIENT)
Dept: PERIOP | Facility: HOSPITAL | Age: 67
End: 2022-01-06

## 2022-01-06 ENCOUNTER — APPOINTMENT (OUTPATIENT)
Dept: GENERAL RADIOLOGY | Facility: HOSPITAL | Age: 67
End: 2022-01-06

## 2022-01-06 VITALS
RESPIRATION RATE: 15 BRPM | OXYGEN SATURATION: 98 % | TEMPERATURE: 98 F | SYSTOLIC BLOOD PRESSURE: 128 MMHG | DIASTOLIC BLOOD PRESSURE: 78 MMHG | HEART RATE: 77 BPM

## 2022-01-06 DIAGNOSIS — M20.12 HALLUX VALGUS (ACQUIRED), LEFT FOOT: Primary | ICD-10-CM

## 2022-01-06 LAB
GLUCOSE BLDC GLUCOMTR-MCNC: 106 MG/DL (ref 70–130)
GLUCOSE BLDC GLUCOMTR-MCNC: 120 MG/DL (ref 70–130)

## 2022-01-06 PROCEDURE — 73620 X-RAY EXAM OF FOOT: CPT

## 2022-01-06 PROCEDURE — C1713 ANCHOR/SCREW BN/BN,TIS/BN: HCPCS | Performed by: PODIATRIST

## 2022-01-06 PROCEDURE — 25010000002 PROPOFOL 10 MG/ML EMULSION: Performed by: NURSE ANESTHETIST, CERTIFIED REGISTERED

## 2022-01-06 PROCEDURE — 76000 FLUOROSCOPY <1 HR PHYS/QHP: CPT

## 2022-01-06 PROCEDURE — 25010000002 ROPIVACAINE PER 1 MG: Performed by: PODIATRIST

## 2022-01-06 PROCEDURE — 25010000002 ONDANSETRON PER 1 MG: Performed by: NURSE ANESTHETIST, CERTIFIED REGISTERED

## 2022-01-06 PROCEDURE — 25010000002 DEXAMETHASONE PER 1 MG: Performed by: NURSE ANESTHETIST, CERTIFIED REGISTERED

## 2022-01-06 PROCEDURE — 25010000002 FENTANYL CITRATE (PF) 100 MCG/2ML SOLUTION: Performed by: NURSE ANESTHETIST, CERTIFIED REGISTERED

## 2022-01-06 PROCEDURE — 25010000002 ONDANSETRON PER 1 MG: Performed by: ANESTHESIOLOGY

## 2022-01-06 PROCEDURE — 82962 GLUCOSE BLOOD TEST: CPT

## 2022-01-06 DEVICE — MINI MONSTER 4.0 X 26MM HEADED SCREW, SHORT THREAD
Type: IMPLANTABLE DEVICE | Site: TOE FIRST | Status: FUNCTIONAL
Brand: MONSTER SCREW SYSTEM

## 2022-01-06 DEVICE — OLIVE WIRE, SMOOTH, 1.4MM
Type: IMPLANTABLE DEVICE | Site: TOE FIRST | Status: FUNCTIONAL
Brand: BABY GORILLA/GORILLA PLATING SYSTEM

## 2022-01-06 DEVICE — K-WIRE, SINGLE ENDED TROCAR TIP, SMOOTH, 1.2 X 150MM
Type: IMPLANTABLE DEVICE | Site: TOE FIRST | Status: FUNCTIONAL
Brand: MONSTER SCREW SYSTEM

## 2022-01-06 DEVICE — MTP, 5 DEGREE, LONG PLATE, LEFT
Type: IMPLANTABLE DEVICE | Site: TOE FIRST | Status: FUNCTIONAL
Brand: GORILLA PLATING SYSTEM

## 2022-01-06 DEVICE — P28, K-WIRE, 1.6 X 150 MM, SINGLE TROCAR, SMOOTH, SS
Type: IMPLANTABLE DEVICE | Site: TOE FIRST | Status: FUNCTIONAL
Brand: MULTI SYSTEM

## 2022-01-06 DEVICE — 2.7 X 16 MM R3CON LOCKING PLATE SCREW
Type: IMPLANTABLE DEVICE | Site: TOE FIRST | Status: FUNCTIONAL
Brand: GORILLA PLATING SYSTEM

## 2022-01-06 DEVICE — 2.7 X 14 MM R3CON LOCKING PLATE SCREW
Type: IMPLANTABLE DEVICE | Site: TOE FIRST | Status: FUNCTIONAL
Brand: GORILLA PLATING SYSTEM

## 2022-01-06 DEVICE — 2.7 X 11 MM R3CON LOCKING PLATE SCREW
Type: IMPLANTABLE DEVICE | Site: TOE FIRST | Status: FUNCTIONAL
Brand: GORILLA PLATING SYSTEM

## 2022-01-06 RX ORDER — FENTANYL CITRATE 50 UG/ML
INJECTION, SOLUTION INTRAMUSCULAR; INTRAVENOUS AS NEEDED
Status: DISCONTINUED | OUTPATIENT
Start: 2022-01-06 | End: 2022-01-06 | Stop reason: SURG

## 2022-01-06 RX ORDER — ESMOLOL HYDROCHLORIDE 10 MG/ML
INJECTION INTRAVENOUS AS NEEDED
Status: DISCONTINUED | OUTPATIENT
Start: 2022-01-06 | End: 2022-01-06 | Stop reason: SURG

## 2022-01-06 RX ORDER — MAGNESIUM HYDROXIDE 1200 MG/15ML
LIQUID ORAL AS NEEDED
Status: DISCONTINUED | OUTPATIENT
Start: 2022-01-06 | End: 2022-01-06 | Stop reason: HOSPADM

## 2022-01-06 RX ORDER — SODIUM CHLORIDE 0.9 % (FLUSH) 0.9 %
3 SYRINGE (ML) INJECTION EVERY 12 HOURS SCHEDULED
Status: DISCONTINUED | OUTPATIENT
Start: 2022-01-06 | End: 2022-01-06 | Stop reason: HOSPADM

## 2022-01-06 RX ORDER — OXYCODONE AND ACETAMINOPHEN 10; 325 MG/1; MG/1
1 TABLET ORAL ONCE AS NEEDED
Status: DISCONTINUED | OUTPATIENT
Start: 2022-01-06 | End: 2022-01-06 | Stop reason: HOSPADM

## 2022-01-06 RX ORDER — LIDOCAINE HYDROCHLORIDE 40 MG/ML
SOLUTION TOPICAL AS NEEDED
Status: DISCONTINUED | OUTPATIENT
Start: 2022-01-06 | End: 2022-01-06 | Stop reason: SURG

## 2022-01-06 RX ORDER — MIDAZOLAM HYDROCHLORIDE 1 MG/ML
0.5 INJECTION INTRAMUSCULAR; INTRAVENOUS
Status: DISCONTINUED | OUTPATIENT
Start: 2022-01-06 | End: 2022-01-06 | Stop reason: HOSPADM

## 2022-01-06 RX ORDER — SODIUM CHLORIDE 0.9 % (FLUSH) 0.9 %
3-10 SYRINGE (ML) INJECTION AS NEEDED
Status: DISCONTINUED | OUTPATIENT
Start: 2022-01-06 | End: 2022-01-06 | Stop reason: HOSPADM

## 2022-01-06 RX ORDER — SODIUM CHLORIDE, SODIUM LACTATE, POTASSIUM CHLORIDE, CALCIUM CHLORIDE 600; 310; 30; 20 MG/100ML; MG/100ML; MG/100ML; MG/100ML
100 INJECTION, SOLUTION INTRAVENOUS CONTINUOUS
Status: DISCONTINUED | OUTPATIENT
Start: 2022-01-06 | End: 2022-01-06 | Stop reason: HOSPADM

## 2022-01-06 RX ORDER — LIDOCAINE HYDROCHLORIDE 10 MG/ML
0.5 INJECTION, SOLUTION EPIDURAL; INFILTRATION; INTRACAUDAL; PERINEURAL ONCE AS NEEDED
Status: DISCONTINUED | OUTPATIENT
Start: 2022-01-06 | End: 2022-01-06 | Stop reason: HOSPADM

## 2022-01-06 RX ORDER — ONDANSETRON 4 MG/1
4 TABLET, FILM COATED ORAL EVERY 4 HOURS
Qty: 42 TABLET | Refills: 0 | Status: SHIPPED | OUTPATIENT
Start: 2022-01-06 | End: 2022-04-14

## 2022-01-06 RX ORDER — HYDROCODONE BITARTRATE AND ACETAMINOPHEN 10; 325 MG/1; MG/1
1 TABLET ORAL EVERY 4 HOURS PRN
Qty: 42 TABLET | Refills: 0 | Status: SHIPPED | OUTPATIENT
Start: 2022-01-06 | End: 2022-02-05

## 2022-01-06 RX ORDER — SODIUM CHLORIDE 0.9 % (FLUSH) 0.9 %
10 SYRINGE (ML) INJECTION EVERY 12 HOURS SCHEDULED
Status: DISCONTINUED | OUTPATIENT
Start: 2022-01-06 | End: 2022-01-06 | Stop reason: HOSPADM

## 2022-01-06 RX ORDER — NEOSTIGMINE METHYLSULFATE 5 MG/5 ML
SYRINGE (ML) INTRAVENOUS AS NEEDED
Status: DISCONTINUED | OUTPATIENT
Start: 2022-01-06 | End: 2022-01-06 | Stop reason: SURG

## 2022-01-06 RX ORDER — FLUMAZENIL 0.1 MG/ML
0.2 INJECTION INTRAVENOUS AS NEEDED
Status: DISCONTINUED | OUTPATIENT
Start: 2022-01-06 | End: 2022-01-06 | Stop reason: HOSPADM

## 2022-01-06 RX ORDER — ONDANSETRON 2 MG/ML
INJECTION INTRAMUSCULAR; INTRAVENOUS AS NEEDED
Status: DISCONTINUED | OUTPATIENT
Start: 2022-01-06 | End: 2022-01-06 | Stop reason: SURG

## 2022-01-06 RX ORDER — OXYCODONE AND ACETAMINOPHEN 7.5; 325 MG/1; MG/1
2 TABLET ORAL EVERY 4 HOURS PRN
Status: DISCONTINUED | OUTPATIENT
Start: 2022-01-06 | End: 2022-01-06 | Stop reason: HOSPADM

## 2022-01-06 RX ORDER — ROPIVACAINE HYDROCHLORIDE 5 MG/ML
INJECTION, SOLUTION EPIDURAL; INFILTRATION; PERINEURAL AS NEEDED
Status: DISCONTINUED | OUTPATIENT
Start: 2022-01-06 | End: 2022-01-06 | Stop reason: HOSPADM

## 2022-01-06 RX ORDER — ONDANSETRON 2 MG/ML
4 INJECTION INTRAMUSCULAR; INTRAVENOUS ONCE AS NEEDED
Status: COMPLETED | OUTPATIENT
Start: 2022-01-06 | End: 2022-01-06

## 2022-01-06 RX ORDER — DEXAMETHASONE SODIUM PHOSPHATE 4 MG/ML
INJECTION, SOLUTION INTRA-ARTICULAR; INTRALESIONAL; INTRAMUSCULAR; INTRAVENOUS; SOFT TISSUE AS NEEDED
Status: DISCONTINUED | OUTPATIENT
Start: 2022-01-06 | End: 2022-01-06 | Stop reason: SURG

## 2022-01-06 RX ORDER — LABETALOL HYDROCHLORIDE 5 MG/ML
5 INJECTION, SOLUTION INTRAVENOUS
Status: DISCONTINUED | OUTPATIENT
Start: 2022-01-06 | End: 2022-01-06 | Stop reason: HOSPADM

## 2022-01-06 RX ORDER — ROCURONIUM BROMIDE 10 MG/ML
INJECTION, SOLUTION INTRAVENOUS AS NEEDED
Status: DISCONTINUED | OUTPATIENT
Start: 2022-01-06 | End: 2022-01-06 | Stop reason: SURG

## 2022-01-06 RX ORDER — PROPOFOL 10 MG/ML
VIAL (ML) INTRAVENOUS AS NEEDED
Status: DISCONTINUED | OUTPATIENT
Start: 2022-01-06 | End: 2022-01-06 | Stop reason: SURG

## 2022-01-06 RX ORDER — SODIUM CHLORIDE 0.9 % (FLUSH) 0.9 %
10 SYRINGE (ML) INJECTION AS NEEDED
Status: DISCONTINUED | OUTPATIENT
Start: 2022-01-06 | End: 2022-01-06 | Stop reason: HOSPADM

## 2022-01-06 RX ORDER — LIDOCAINE HYDROCHLORIDE 20 MG/ML
INJECTION, SOLUTION EPIDURAL; INFILTRATION; INTRACAUDAL; PERINEURAL AS NEEDED
Status: DISCONTINUED | OUTPATIENT
Start: 2022-01-06 | End: 2022-01-06 | Stop reason: SURG

## 2022-01-06 RX ORDER — IBUPROFEN 600 MG/1
600 TABLET ORAL ONCE AS NEEDED
Status: DISCONTINUED | OUTPATIENT
Start: 2022-01-06 | End: 2022-01-06 | Stop reason: HOSPADM

## 2022-01-06 RX ORDER — PHENYLEPHRINE HCL IN 0.9% NACL 1 MG/10 ML
SYRINGE (ML) INTRAVENOUS AS NEEDED
Status: DISCONTINUED | OUTPATIENT
Start: 2022-01-06 | End: 2022-01-06 | Stop reason: SURG

## 2022-01-06 RX ORDER — NALOXONE HCL 0.4 MG/ML
0.4 VIAL (ML) INJECTION AS NEEDED
Status: DISCONTINUED | OUTPATIENT
Start: 2022-01-06 | End: 2022-01-06 | Stop reason: HOSPADM

## 2022-01-06 RX ORDER — SODIUM CHLORIDE, SODIUM LACTATE, POTASSIUM CHLORIDE, CALCIUM CHLORIDE 600; 310; 30; 20 MG/100ML; MG/100ML; MG/100ML; MG/100ML
100 INJECTION, SOLUTION INTRAVENOUS CONTINUOUS PRN
Status: DISCONTINUED | OUTPATIENT
Start: 2022-01-06 | End: 2022-01-06 | Stop reason: HOSPADM

## 2022-01-06 RX ORDER — ACETAMINOPHEN 500 MG
1000 TABLET ORAL ONCE
Status: COMPLETED | OUTPATIENT
Start: 2022-01-06 | End: 2022-01-06

## 2022-01-06 RX ORDER — DOCUSATE SODIUM 100 MG/1
100 CAPSULE, LIQUID FILLED ORAL 2 TIMES DAILY
Qty: 60 CAPSULE | Refills: 0 | Status: SHIPPED | OUTPATIENT
Start: 2022-01-06 | End: 2022-04-14

## 2022-01-06 RX ORDER — FENTANYL CITRATE 50 UG/ML
25 INJECTION, SOLUTION INTRAMUSCULAR; INTRAVENOUS
Status: DISCONTINUED | OUTPATIENT
Start: 2022-01-06 | End: 2022-01-06 | Stop reason: HOSPADM

## 2022-01-06 RX ADMIN — Medication 3 MG: at 07:54

## 2022-01-06 RX ADMIN — SODIUM CHLORIDE, POTASSIUM CHLORIDE, SODIUM LACTATE AND CALCIUM CHLORIDE 100 ML/HR: 600; 310; 30; 20 INJECTION, SOLUTION INTRAVENOUS at 06:30

## 2022-01-06 RX ADMIN — SODIUM CHLORIDE, POTASSIUM CHLORIDE, SODIUM LACTATE AND CALCIUM CHLORIDE: 600; 310; 30; 20 INJECTION, SOLUTION INTRAVENOUS at 07:49

## 2022-01-06 RX ADMIN — CEFAZOLIN 1 G: 1 INJECTION, POWDER, FOR SOLUTION INTRAMUSCULAR; INTRAVENOUS; PARENTERAL at 07:13

## 2022-01-06 RX ADMIN — Medication 100 MCG: at 07:39

## 2022-01-06 RX ADMIN — LIDOCAINE HYDROCHLORIDE 80 MG: 20 INJECTION, SOLUTION EPIDURAL; INFILTRATION; INTRACAUDAL; PERINEURAL at 07:07

## 2022-01-06 RX ADMIN — ONDANSETRON 4 MG: 2 INJECTION INTRAMUSCULAR; INTRAVENOUS at 09:50

## 2022-01-06 RX ADMIN — ACETAMINOPHEN 1000 MG: 500 TABLET ORAL at 06:38

## 2022-01-06 RX ADMIN — GLYCOPYRROLATE 0.3 MG: 0.2 INJECTION, SOLUTION INTRAMUSCULAR; INTRAVENOUS at 07:54

## 2022-01-06 RX ADMIN — DEXAMETHASONE SODIUM PHOSPHATE 4 MG: 4 INJECTION, SOLUTION INTRA-ARTICULAR; INTRALESIONAL; INTRAMUSCULAR; INTRAVENOUS; SOFT TISSUE at 07:15

## 2022-01-06 RX ADMIN — Medication 100 MCG: at 07:17

## 2022-01-06 RX ADMIN — ROCURONIUM BROMIDE 30 MG: 10 INJECTION INTRAVENOUS at 07:07

## 2022-01-06 RX ADMIN — ESMOLOL HYDROCHLORIDE 30 MG: 10 INJECTION, SOLUTION INTRAVENOUS at 07:58

## 2022-01-06 RX ADMIN — LIDOCAINE HYDROCHLORIDE 1 EACH: 40 SOLUTION TOPICAL at 07:08

## 2022-01-06 RX ADMIN — Medication 100 MCG: at 07:42

## 2022-01-06 RX ADMIN — FENTANYL CITRATE 100 MCG: 50 INJECTION, SOLUTION INTRAMUSCULAR; INTRAVENOUS at 07:04

## 2022-01-06 RX ADMIN — Medication 150 MCG: at 07:30

## 2022-01-06 RX ADMIN — ONDANSETRON 4 MG: 2 INJECTION INTRAMUSCULAR; INTRAVENOUS at 07:42

## 2022-01-06 RX ADMIN — Medication 100 MCG: at 07:49

## 2022-01-06 RX ADMIN — PROPOFOL 130 MG: 10 INJECTION, EMULSION INTRAVENOUS at 07:07

## 2022-01-06 RX ADMIN — Medication 150 MCG: at 07:36

## 2022-01-06 RX ADMIN — Medication 100 MCG: at 07:14

## 2022-01-06 NOTE — ANESTHESIA PREPROCEDURE EVALUATION
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 No evidence of pulmonary hypertension; normal mean PA pressure and normal pulmonary vascular resistence  2. No evidence of left-to-right shunt (ie no oxygen \"step up\")  3. Normal cardiac output     Plan: resume Eliquis tonight; office f/u.  At this point, and based on this data, does not seem as though patient would benefit from ASD closure and that her right ventricle and right atrium are severely dilated for some other reason.     1/2021 DEANN  · The right ventricular cavity is dilated.  · Moderate tricuspid valve regurgitation is present.  · Estimated right ventricular systolic pressure from tricuspid regurgitation is mildly elevated (35-45 mmHg).  · Small-moderate sized multifenestrated secundum-type atrial septal defect (ASD).  · Severe biatrial enlargement.  · Normal LVEF.        Neuro/Psych  (-) seizures, TIA, CVA  GI/Hepatic/Renal/Endo    (+)  GERD,  diabetes mellitus type 2,     Musculoskeletal     Abdominal    Substance History      OB/GYN          Other   arthritis,    history of cancer (breast pre cancer s/p surgery) remission                    Anesthesia Plan    ASA 3     general     intravenous induction     Anesthetic plan, all risks, benefits, and alternatives have been provided, discussed and informed consent has been obtained with: patient.      "

## 2022-01-06 NOTE — ANESTHESIA PROCEDURE NOTES
Airway  Urgency: elective    Date/Time: 1/6/2022 7:09 AM  Airway not difficult    General Information and Staff    Patient location during procedure: OR  CRNA: Jorgito Erazo CRNA    Indications and Patient Condition  Indications for airway management: airway protection    Preoxygenated: yes  Mask difficulty assessment: 1 - vent by mask    Final Airway Details  Final airway type: endotracheal airway      Successful airway: ETT  Cuffed: yes   Successful intubation technique: direct laryngoscopy  Endotracheal tube insertion site: oral  Blade: Hutchinson  Blade size: 2  ETT size (mm): 7.0  Cormack-Lehane Classification: grade I - full view of glottis  Placement verified by: chest auscultation and capnometry   Cuff volume (mL): 6  Measured from: lips  ETT/EBT  to lips (cm): 21  Number of attempts at approach: 1  Assessment: lips, teeth, and gum same as pre-op and atraumatic intubation

## 2022-01-06 NOTE — ANESTHESIA POSTPROCEDURE EVALUATION
Patient: Yun Blackwell    Procedure Summary     Date: 01/06/22 Room / Location: [facility] OR [address] [place] [address] [facility] OR    Anesthesia Start: 0703 Anesthesia Stop: 0812    Procedure: FIRST METATARSOPHALANGEAL JOINT ARTHRODESIS WITH INTERNAL FIXATION - LEFT FOOT (Left Toes) Diagnosis:       Hallux valgus (acquired), left foot      Secondary osteoarthritis of foot, left      Pain, foot      (HALLUX VALGUS ACQUIRED LEFT FOOT, secondary arthritis foot)    Surgeons: Jorgito Red DPM Provider: Jorgito Erazo CRNA    Anesthesia Type: general ASA Status: 3          Anesthesia Type: general    Vitals  Vitals Value Taken Time   /82 01/06/22 0830   Temp 98 °F (36.7 °C) 01/06/22 0830   Pulse 89 01/06/22 0833   Resp 15 01/06/22 0830   SpO2 95 % 01/06/22 0833   Vitals shown include unvalidated device data.        Post Anesthesia Care and Evaluation    Patient location during evaluation: PACU  Patient participation: complete - patient participated  Level of consciousness: awake and alert  Pain management: adequate  Airway patency: patent  Anesthetic complications: No anesthetic complications  PONV Status: none  Cardiovascular status: acceptable and hemodynamically stable  Respiratory status: acceptable  Hydration status: acceptable    Comments: Blood pressure 140/67, pulse 92, temperature 98 °F (36.7 °C), temperature source Temporal, resp. rate 15, SpO2 99 %, not currently breastfeeding.    Patient discharged from PACU based upon Estrada score. Please see RN notes for further details

## 2022-01-06 NOTE — OP NOTE
TOE INTERPHALANGEAL JOINT/METATARSOPHALANGEAL JOINT FUSION  Procedure Note    Yun Blackwell  1/6/2022    Pre-op Diagnosis:   HALLUX VALGUS ACQUIRED LEFT FOOT, secondary arthritis foot    Post-op Diagnosis:     Post-Op Diagnosis Codes:     * Hallux valgus (acquired), left foot [M20.12]     * Secondary osteoarthritis of foot, left [M19.272]     * Pain, foot [M79.673]    Procedure/CPT® Codes:      Procedure(s):  FIRST METATARSOPHALANGEAL JOINT ARTHRODESIS WITH INTERNAL FIXATION - LEFT FOOT    Surgeon(s):  Jorgito Red DPM    Anesthesia: General    Staff:   Circulator: Steve Romano RN  Scrub Person: Ihsan Grijalva Stephen W     was responsible for performing the following activities: Retraction and their skilled assistance was necessary for the success of this case.    Indications for procedure:  Pain.  Difficulty finding wearing shoe gear.    Procedure details:  Patient brought the operating room placed under general anesthesia.  A proximal first ray block performed with 30 cc 0.5% Naropin plain.  Left leg prepped and draped in usual sterile fashion.  Following procedures were performed.    Procedure #1 first metatarsal phalangeal joint arthrodesis with internal fixation left foot    Attention was directed to the first metatarsal phalangeal joint where a curvilinear incision was made.  Was deepened with sharp and blunt dissection technique.  A linear capsular incision was made.  Joint was exposed.  There was significant articular cartilage degeneration at the base the phalanx as well as metatarsal sesamoid joint complex and medial first metatarsal head.  The medial eminence was resected with a sagittal saw.  The reamers and utilized to resect the remaining articular cartilage.  Wound was then irrigated.  Joint surfaces were fenestrated.  The joint was then reduced and temporally fixated.  A [place] 25 degree large plate was then placed dorsally was temporally fixated.  The guide was then  utilized place a guidewire from distal medial to proximal lateral.  A 26 mm lag screw was placed.  3 locking screws were placed distally.  A compression slot was placed in the nonlocking slot proximally.  3 additional locking screws were placed.  X-ray exam was performed.  Wound was irrigated closure performed in layers.  A well-padded compression bandage was applied.    Estimated Blood Loss: none    Specimens:                None      Drains: * No LDAs found *    Implants:   Implant Name Type Inv. Item Serial No.  Lot No. LRB No. Used Action   KWIRE SMOTH SGL/TROC [id number] NS - [id number] Implant KWIRE SMOTH SGL/TROC [id number] NS  PARAGON 28 NA Left 4 Implanted   KWIRE SMOTH SGL/TROC 1.3P683PO NS - [id number] Implant KWIRE SMOTH SGL/TROC 1.1P384EP NS  PARAGON 28 NA Left 2 Implanted   WR OLIVE SMOTH 1.4MM - [id number] Implant WR OLIVE SMOTH 1.4MM  PARAGON 28 NA Left 2 Implanted   SCRW RADHA MINI MONSTER SHT THRD 4X26MM - [id number] Implant SCRW RADHA MINI MONSTER SHT THRD 4X26MM  PARAGON 28 NA Left 1 Implanted   PLT MTP 5D LNG LT - [id number] Implant PLT MTP 5D LNG LT  PARAGON 28 NA Left 1 Implanted   SCRW PLT R3CON LK 2.7X16MM - [id number] Implant SCRW PLT R3CON LK 2.7X16MM  PARAGON 28 NA Left 3 Implanted   SCRW PLT R3CON LK 2.7X14MM - [id number] Implant SCRW PLT R3CON LK 2.7X14MM  PARAGON 28 NA Left 2 Implanted   SCRW PLT R3CON LK 2.7X11MM - [id number] Implant SCRW PLT R3CON LK 2.7X11MM  PARAGON 28 NA Left 1 Implanted        Complications: none    Follow up:   Limited weightbearing and the use of her offloading shoe with a walker at all times.  We did discuss the use of nausea medicine prior to her narcotic.  Return to clinic in 3 to 5 days for follow-up and reevaluation.    Jorgito Red DPM     Date: 1/6/2022  Time: 08:06 CST

## 2022-01-06 NOTE — DISCHARGE INSTRUCTIONS

## 2022-01-13 NOTE — PROGRESS NOTES
HISTORY OF PRESENT ILLNESS:    Ms. 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 ER is positive at 100%. OH is positive at 98%. Prelude DX demonstrates a reduction of 15% all recurrences to 7%. Reduction in invasive cancer recurrences goes from 9% down to 5%. She is now status post Left lumpectomy with IORT on 1/31/2020    PATHOLOGY REVEALS:  FINAL DIAGNOSIS:    A.  Breast, left 3 o'clock, wire localized lumpectomy:  Ductal carcinoma in situ, micropapillary type  Ductal carcinoma in situ measures 7 mm in greatest linear dimension  Previous biopsy site  Margins of resection are negative  Ductal carcinoma in situ measures 5 mm from the anterior margin of  resection  Intraductal papillomatosis  Usual ductal hyperplasia    B.  Breast, left, additional cranial lateral margin:  Negative for ductal carcinoma in situ    C.  Breast, left, additional caudal margin:  Fat necrosis  Fibrocystic changes  Negative for ductal carcinoma in situ    D.  Breast, left, additional medial margin:  Negative for ductal carcinoma in situ    She recently had a benign breast biopsy demonstrating benign microcalcifications in the left breast.      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 This increases her risk for developing melanoma and pancreatic cancer. Her recent MRCP was stable. She recently had a UTI and she states she took medications but it never really resolved and she is beginning to have dysuria again. PHYSICAL EXAM:  BREAST EXAM:  The biopsy site is well healed. IMPRESSION:  Doing well s/p Left lumpectomy with IORT 1/31/2020    PLAN:  I sent her for UA with culture if indicated. I will plan to see her in 6 months with a mammogram.  We will schedule MRCP for 1 year.       20 minutes spent, which includes face to face with patient, record review, evaluation, planning, and education as well as coordination of her care.

## 2022-01-14 ENCOUNTER — TELEPHONE (OUTPATIENT)
Dept: INTERNAL MEDICINE | Facility: CLINIC | Age: 67
End: 2022-01-14

## 2022-01-14 NOTE — TELEPHONE ENCOUNTER
Spoke with patient and I explained to her that it was a new year and she more than likely has not met her deductible since it is a new year. She stated that she should have met it already since she had surgery and I told her that I was not sure if there was different deductibles between medical and pharmacy. I advised that she call her pharmacy who her insurance is through a speak with them.

## 2022-01-14 NOTE — TELEPHONE ENCOUNTER
Caller: Yun Blackwell    Relationship to patient: Self    Best call back number: [phone]    Patient is needing to speak with someone in regards to eliquis. She states she cannot afford $97 dollars a month.  She would like to speak with Mary Manuel nurse. Please advise.

## 2022-01-18 ENCOUNTER — CLINICAL SUPPORT (OUTPATIENT)
Dept: INTERNAL MEDICINE | Facility: CLINIC | Age: 67
End: 2022-01-18

## 2022-01-18 DIAGNOSIS — E53.8 B12 DEFICIENCY: Primary | ICD-10-CM

## 2022-01-18 PROCEDURE — 96372 THER/PROPH/DIAG INJ SC/IM: CPT | Performed by: INTERNAL MEDICINE

## 2022-01-18 RX ADMIN — CYANOCOBALAMIN 1000 MCG: 1000 INJECTION, SOLUTION INTRAMUSCULAR; SUBCUTANEOUS at 08:08

## 2022-01-18 NOTE — PROGRESS NOTES
Injection  B-12 Injection performed in right deltoid by Rochelle Nevarez MA. Patient tolerated the procedure well without complications.  01/18/22   Rochelle Nevarez MA

## 2022-02-04 RX ORDER — OMEPRAZOLE 20 MG/1
20 CAPSULE, DELAYED RELEASE ORAL DAILY
Qty: 30 CAPSULE | Refills: 5 | Status: SHIPPED | OUTPATIENT
Start: 2022-02-04 | End: 2022-08-04 | Stop reason: SDUPTHER

## 2022-02-06 ENCOUNTER — NURSE TRIAGE (OUTPATIENT)
Dept: CALL CENTER | Facility: HOSPITAL | Age: 67
End: 2022-02-06

## 2022-02-06 NOTE — TELEPHONE ENCOUNTER
She is calling regarding these symptoms started on 02/04/22- She has had body aches, a fever ( unsure of the number). She is having body aches, cough, HA.  She sounds like she is hurting. She has been home bound does not think this is Covid. She needs to be evaluated, she sounds very weak. The last time she ate was on 02/04/22. She has no energy. She is urinating. She has been drinking water.  She recently had Hallux valgus surgery to the left foot. On 01/06/22 at  [facility]. Strongly encouraged the caller to go to Urgent Care to be evaluated, unsure what caller is going to do, very concerned.     Reason for Disposition  • Fever present > 3 days (72 hours)    Additional Information  • Negative: SEVERE difficulty breathing (e.g., struggling for each breath, speaks in single words)  • Negative: Difficult to awaken or acting confused (e.g., disoriented, slurred speech)  • Negative: Bluish (or gray) lips or face now  • Negative: Shock suspected (e.g., cold/pale/clammy skin, too weak to stand, low BP, rapid pulse)  • Negative: Sounds like a life-threatening emergency to the triager  • Negative: [1] COVID-19 exposure AND [2] no symptoms  • Negative: COVID-19 vaccine reaction suspected (e.g., fever, headache, muscle aches) occurring 1 to 3 days after getting vaccine  • Negative: COVID-19 vaccine, questions about  • Negative: [1] Lives with someone known to have influenza (flu test positive) AND [2] flu-like symptoms (e.g., cough, runny nose, sore throat, SOB; with or without fever)  • Negative: [1] Adult with possible COVID-19 symptoms AND [2] triager concerned about severity of symptoms or other causes  • Negative: COVID-19 and breastfeeding, questions about  • Negative: SEVERE or constant chest pain or pressure (Exception: mild central chest pain, present only when coughing)  • Negative: MODERATE difficulty breathing (e.g., speaks in phrases, SOB even at rest, pulse 100-120)  • Negative: [1] Headache AND [2] stiff neck  "(can't touch chin to chest)  • Negative: MILD difficulty breathing (e.g., minimal/no SOB at rest, SOB with walking, pulse <100)  • Negative: Chest pain or pressure  • Negative: Patient sounds very sick or weak to the triager  • Negative: Fever > 103 F (39.4 C)  • Negative: [1] Fever > 101 F (38.3 C) AND [2] age > 60 years  • Negative: [1] Fever > 100.0 F (37.8 C) AND [2] bedridden (e.g., nursing home patient, CVA, chronic illness, recovering from surgery)  • Negative: HIGH RISK for severe COVID complications (e.g., age > 64 years, obesity with BMI > 25, pregnant, chronic lung disease or other chronic medical condition)  (Exception: Already seen by PCP and no new or worsening symptoms.)  • Negative: [1] HIGH RISK patient AND [2] influenza is widespread in the community AND [3] ONE OR MORE respiratory symptoms: cough, sore throat, runny or stuffy nose  • Negative: [1] HIGH RISK patient AND [2] influenza exposure within the last 7 days AND [3] ONE OR MORE respiratory symptoms: cough, sore throat, runny or stuffy nose  • Negative: [1] COVID-19 infection suspected by caller or triager AND [2] mild symptoms (cough, fever, or others) AND [3] negative COVID-19 rapid test    Answer Assessment - Initial Assessment Questions  1. COVID-19 DIAGNOSIS: \"Who made your Coronavirus (COVID-19) diagnosis?\" \"Was it confirmed by a positive lab test?\" If not diagnosed by a HCP, ask \"Are there lots of cases (community spread) where you live?\" (See public health department website, if unsure)      No one   2. COVID-19 EXPOSURE: \"Was there any known exposure to COVID before the symptoms began?\" CDC Definition of close contact: within 6 feet (2 meters) for a total of 15 minutes or more over a 24-hour period.       Unknown   3. ONSET: \"When did the COVID-19 symptoms start?\"       02/04/22- body aches cough ha fever   4. WORST SYMPTOM: \"What is your worst symptom?\" (e.g., cough, fever, shortness of breath, muscle aches)      Body aches   5. " "COUGH: \"Do you have a cough?\" If Yes, ask: \"How bad is the cough?\"        Cough - yes it is some better.   6. FEVER: \"Do you have a fever?\" If Yes, ask: \"What is your temperature, how was it measured, and when did it start?\"      Unsure of what it is   7. RESPIRATORY STATUS: \"Describe your breathing?\" (e.g., shortness of breath, wheezing, unable to speak)       none  8. BETTER-SAME-WORSE: \"Are you getting better, staying the same or getting worse compared to yesterday?\"  If getting worse, ask, \"In what way?\"      Worse   9. HIGH RISK DISEASE: \"Do you have any chronic medical problems?\" (e.g., asthma, heart or lung disease, weak immune system, obesity, etc.)      Unknown   10. PREGNANCY: \"Is there any chance you are pregnant?\" \"When was your last menstrual period?\"        n  11. OTHER SYMPTOMS: \"Do you have any other symptoms?\"  (e.g., chills, fatigue, headache, loss of smell or taste, muscle pain, sore throat; new loss of smell or taste especially support the diagnosis of COVID-19)         Ha not been eating is drinking.    Protocols used: CORONAVIRUS (COVID-19) DIAGNOSED OR SUSPECTED-ADULT-AH      "

## 2022-02-08 NOTE — TELEPHONE ENCOUNTER
Spoke with patient regarding this message, she stated that she did not want to go to the ER because she said that she did not want to end up with a hospital bill. She stated that she was treating her symptoms like the flu. She said that she feels a whole lot better now and is not having any symptoms now. She was wanting to know if she should still get a COVID test or not since she is feeling better.

## 2022-02-09 DIAGNOSIS — J06.9 VIRAL URI: Primary | ICD-10-CM

## 2022-02-11 ENCOUNTER — TELEPHONE (OUTPATIENT)
Dept: INTERNAL MEDICINE | Facility: CLINIC | Age: 67
End: 2022-02-11

## 2022-02-11 ENCOUNTER — LAB (OUTPATIENT)
Dept: LAB | Facility: HOSPITAL | Age: 67
End: 2022-02-11

## 2022-02-11 DIAGNOSIS — J06.9 VIRAL URI: ICD-10-CM

## 2022-02-11 LAB — SARS-COV-2 ORF1AB RESP QL NAA+PROBE: DETECTED

## 2022-02-11 PROCEDURE — C9803 HOPD COVID-19 SPEC COLLECT: HCPCS

## 2022-02-11 PROCEDURE — U0004 COV-19 TEST NON-CDC HGH THRU: HCPCS

## 2022-02-11 PROCEDURE — U0005 INFEC AGEN DETEC AMPLI PROBE: HCPCS

## 2022-02-18 ENCOUNTER — CLINICAL SUPPORT (OUTPATIENT)
Dept: INTERNAL MEDICINE | Facility: CLINIC | Age: 67
End: 2022-02-18

## 2022-02-18 DIAGNOSIS — E53.8 B12 DEFICIENCY: Primary | ICD-10-CM

## 2022-02-18 PROCEDURE — 96372 THER/PROPH/DIAG INJ SC/IM: CPT | Performed by: INTERNAL MEDICINE

## 2022-02-18 RX ADMIN — CYANOCOBALAMIN 1000 MCG: 1000 INJECTION, SOLUTION INTRAMUSCULAR; SUBCUTANEOUS at 08:09

## 2022-02-18 NOTE — PROGRESS NOTES
Injection  Vitamin B-12 Injection performed in Left Deltoid by Rochelle Nevarez MA. Patient tolerated the procedure well without complications.  02/18/22   Rochelle Nevarez MA

## 2022-02-21 DIAGNOSIS — I48.20 CHRONIC ATRIAL FIBRILLATION: ICD-10-CM

## 2022-02-28 ENCOUNTER — OFFICE VISIT (OUTPATIENT)
Dept: NEUROLOGY | Age: 67
End: 2022-02-28
Payer: MEDICARE

## 2022-02-28 VITALS
HEART RATE: 96 BPM | OXYGEN SATURATION: 99 % | SYSTOLIC BLOOD PRESSURE: 119 MMHG | BODY MASS INDEX: 30.12 KG/M2 | DIASTOLIC BLOOD PRESSURE: 69 MMHG | HEIGHT: 63 IN | WEIGHT: 170 LBS

## 2022-02-28 DIAGNOSIS — Z99.89 CPAP (CONTINUOUS POSITIVE AIRWAY PRESSURE) DEPENDENCE: ICD-10-CM

## 2022-02-28 DIAGNOSIS — G47.33 OBSTRUCTIVE SLEEP APNEA: Primary | ICD-10-CM

## 2022-02-28 PROCEDURE — G8484 FLU IMMUNIZE NO ADMIN: HCPCS | Performed by: PHYSICIAN ASSISTANT

## 2022-02-28 PROCEDURE — 3017F COLORECTAL CA SCREEN DOC REV: CPT | Performed by: PHYSICIAN ASSISTANT

## 2022-02-28 PROCEDURE — 1123F ACP DISCUSS/DSCN MKR DOCD: CPT | Performed by: PHYSICIAN ASSISTANT

## 2022-02-28 PROCEDURE — G8417 CALC BMI ABV UP PARAM F/U: HCPCS | Performed by: PHYSICIAN ASSISTANT

## 2022-02-28 PROCEDURE — 1090F PRES/ABSN URINE INCON ASSESS: CPT | Performed by: PHYSICIAN ASSISTANT

## 2022-02-28 PROCEDURE — 99213 OFFICE O/P EST LOW 20 MIN: CPT | Performed by: PHYSICIAN ASSISTANT

## 2022-02-28 PROCEDURE — 1036F TOBACCO NON-USER: CPT | Performed by: PHYSICIAN ASSISTANT

## 2022-02-28 PROCEDURE — 4040F PNEUMOC VAC/ADMIN/RCVD: CPT | Performed by: PHYSICIAN ASSISTANT

## 2022-02-28 PROCEDURE — G8399 PT W/DXA RESULTS DOCUMENT: HCPCS | Performed by: PHYSICIAN ASSISTANT

## 2022-02-28 PROCEDURE — G8427 DOCREV CUR MEDS BY ELIG CLIN: HCPCS | Performed by: PHYSICIAN ASSISTANT

## 2022-02-28 NOTE — PROGRESS NOTES
[facility] Neurology and Sleep Medicine  [address] [place] [address] Drive, [address]  Brian Coronado  Phone [phone]  Fax [phone] SLEEP    Patient: Marcia Graham  :  1955  Age:  74 y.o. MRN:  [id number]  Today:             22    Provider:  Bernhard Boas, PA-C    Chief Complaint:  Chief Complaint   Patient presents with    Sleep Apnea     new patient has not been seen in over 5 years       History Source: History obtained from chart review and the patient. PCP: Salena Campbell MD     Referring Provider: Petra Gates MD    HISTORY OF PRESENT ILLNESS:   Marcia Graham is a 74y.o. year old female with a history of GILBERTO who was referred for sleep apnea management. She was diagnosed with GILBERTO years ago at [facility] by  Dr. Linsey Andrade. The PSG, 2004 revealed an RDI of 27.3. She is prescribed CPAP. She has had the CPAP >10 years. She does not want a new sleep study or a new device. She needs new supplies. She hasn't been using the CPAP because she hasn't had the supplies replaced. Since she has not been using the CPAP she has had difficulty maintaining sleep. She reports that it is a ResMed device. She typically uses CPAP nightly. The previous GILBERTO symptoms are controlled with CPAP use.        PAST MEDICAL HITORY:    Medical History:  Past Medical History:   Diagnosis Date    A-fib [facility])     se[facility] dr. David Sanches [facility] [place] but has [facility]    Acid reflux     [facility]     Breast mass     Cancer ([facility] [address] [facility]     Breast Cancer in [place]    CPAP (continuous positive airway pressure) dependence     Diabetes ([facility]     type 2 (currently diet-controlled)    Hyperlipidemia     elevated triglycerides, but low cholesterol level    Hypertension     Prolonged emergence from general anesthesia     Sleep apnea     CPAP       Surgical History:  Past Surgical History:   Procedure Laterality Date    BREAST BIOPSY Left 2019    EXCISION LEFT BREAST MASS WITH INTRAOP ULTRASOUND GUIDED NEEDLE LOCALIZATION performed by Jose Bryant MD at [address] LUMPECTOMY Left 1/31/2020    PREOP ULTRASOUND WITH LEFT LUMPECTOMY, NO SENTINEL NODE BIOPSY, INTRAOP ULTRASOUND GUIDED NEEDLE LOCALIZATION, BIOZORB, FLAPS, PECTORAL BLOCK, IORT performed by Jose Bryant MD at [facility] 13      after vaginal prolapse repair/damaged    HYSTERECTOMY      HYSTERECTOMY, VAGINAL      HODA STEROTACTIC LOC BREAST BIOPSY LEFT Left 12/16/2021    HODA STEROTACTIC LOC BREAST BIOPSY LEFT 12/16/2021 [facility] [address]    PLANTAR FASCIA SURGERY Bilateral 7/19/2016    PLANTAR FASCIA INJECTION FEET performed by Yohan Officer, MAR at [place] [address]  2013       Current Medications:  Current Outpatient Medications   Medication Sig Dispense Refill    tamoxifen (NOLVADEX) 20 MG tablet Take 1 tablet by mouth daily 90 tablet 3    vitamin B-12 (CYANOCOBALAMIN) 1000 MCG tablet Take 1,000 mcg by mouth daily      montelukast (SINGULAIR) 10 MG tablet Take 1 tablet by mouth nightly 30 tablet 0    diltiazem (CARDIZEM) 30 MG tablet Take 30 mg by mouth 2 times daily      omeprazole (PRILOSEC) 20 MG capsule Take 20 mg by mouth daily      apixaban (ELIQUIS) 5 MG TABS tablet Take by mouth 2 times daily (Patient not taking: Reported on 2/28/2022)      tiZANidine (ZANAFLEX) 4 MG tablet Take 4 mg by mouth nightly as needed  (Patient not taking: Reported on 2/28/2022)      loperamide (IMODIUM) 2 MG capsule Take 2 mg by mouth 4 times daily as needed for Diarrhea  (Patient not taking: Reported on 2/28/2022)      dicyclomine (BENTYL) 20 MG tablet Take 20 mg by mouth 4 times daily as needed (Patient not taking: Reported on 12/8/2021)      psyllium (METAMUCIL) 0.52 g capsule Take 0.52 g by mouth 2 times daily  (Patient not taking: Reported on 2/28/2022)      ferrous sulfate 325 (65 FE) MG tablet Take 325 mg by mouth daily (with breakfast) (Patient not taking: Reported on 12/8/2021)      Multiple Vitamins-Minerals (THERAPEUTIC MULTIVITAMIN-MINERALS) tablet Take 1 tablet by mouth daily       No current facility-administered medications for this visit. Allergies:  Morphine and Oxycodone    SOCIAL HISTORY:   Social History     Substance and Sexual Activity   Alcohol Use No     Social History     Substance and Sexual Activity   Drug Use No     Social History     Tobacco Use   Smoking Status Never Smoker   Smokeless Tobacco Never Used       FAMILY HISTORY:   Family History   Problem Relation Age of Onset    Diabetes Mother     Diabetes Father     Lung Cancer Father     Breast Cancer Sister     Brain Cancer Brother     Diabetes Sister     Diabetes Sister     Diabetes Sister     Diabetes Sister     Diabetes Brother     Diabetes Brother     Diabetes Brother     Uterine Cancer Maternal Aunt     Uterine Cancer Niece         Age Unknown    ADHD Niece     Breast Cancer Niece        REVIEW OF SYSTEMS     Constitutional: []? Fever []? Sweats []? Chills []? Recent Injury   [x]? Denies all unless marked  HENT:[]? Headache  []? Head Injury  []? Sore Throat  []? Ear Pain  []? Dizziness []? Hearing Loss   [x]? Denies all unless marked  Musculoskeletal: []? Arthralgia  []? Myalgias []? Muscle cramps  []? Muscle twitches   [x]? Denies all unless marked   Spine:  []? Neck pain  []? Back pain  []? Sciaticia  [x]? Denies all unless marked  Neurological:[]? Visual Disturbance []? Double Vision []? Slurred Speech []? Trouble swallowing  []? Vertigo []? Tingling []? Numbness []? Weakness []? Loss of Balance   []? Loss of Consciousness []? Memory Loss []? Seizures  [x]? Denies all unless marked  Psychiatric/Behavioral:[]? Depression []? Anxiety  [x]? Denies all unless marked  Sleep: []? Insomnia []? Sleep Disturbance []? Snoring []? Restless Legs []? Daytime Sleepiness [x]? Sleep Apnea  []? Denies all unless marked    The MA has completed the ROS with the patient.  I have reviewed it in its' entirety with the patient and agree with the documentation. PHYSICAL EXAM  /69   Pulse 96   Ht 5' 3\" (1.6 m)   Wt 170 lb (77.1 kg)   SpO2 99%   Breastfeeding No   BMI 30.11 kg/m²      Constitutional -  Alert in NAD, well developed, pleasant and cooperative with exam  HEENT- Conjunctiva normal.  No scars, masses, or lesions over external nose or ears, hearing intact, no neck masses noted, no jugular vein distension, no bruit  Cardiac- Irregularly  irregular rhythm  Pulmonary- Clear to auscultation, good expansion, normal effort without use of accessory muscles  Musculoskeletal - No significant wasting of muscles noted, no bony deformities; left post-op boot present  Extremities - No clubbing, cyanosis or edema  Skin - Warm, dry, and intact.   No rash, erythema, or pallor  Psychiatric - Mood, affect, and behavior appear normal      Neurological exam  Awake, alert, fluent oriented x 3 appropriate affect  Attention and concentration appear appropriate  Recent and remote memory appears unremarkable  Speech normal without dysarthria  No clear issues with language of fund of knowledge    Cranial Nerve Exam   CN II- Visual fields grossly unremarkable  CN III, IV,VI-EOMI, No nystagmus, conjugate eye movements, no ptosis  CN VII-No facial assymetry  CN VIII-Hearing-Intact to finger rub  CN IX and X- No palate asymmetry  CN XI-Shoulder shrug intact  CN XII-Tongue midline with no fasciculations or fibrillations    Motor Exam  V/V throughout upper and lower extremities bilaterally, no cogwheeling, normal tone    Reflexes   2+ biceps bilaterally  2+ brachioradialis  2+patella  2+ ankle jerks    Tremors  No tremors in hands or head noted    Coordination  Finger to nose unremarkable  STANLEY unremarkable    Gait  Antalgic  No ataxia    I reviewed the following studies:       []  :  Clinical laboratory test results     []  :  Radiology reports                    [x]  :  Review and summarization of medical records-referring, Dr. Ubaldo Greenwood records     [x]     Request for medical records       []  :  Reviewed previous/recent polysomnogram report(s)      [x]  :  [place] Sleepiness Scale: 4    [place] Sleepiness Scale    0 = would never doze  1 = slight chance of dozing  2 = moderate chance of dozing  3 = high chance of dozing    Situation Chance of Dozing (0-3) (With CPAP)    Sitting and reading       0    Watching TV        1    Sitting, inactive in a public place (e.g. a theatre or a meeting) 0    As a passenger in a car for an hour without a break   1    Lying down to rest in the afternoon when circumstances permit 2    Sitting and talking to someone     0    Sitting quietly after a lunch without alcohol    0    In a car, while stopped for a few minutes in the traffic  0    Total         4       IMPRESSION:    ICD-10-CM    1. Obstructive sleep apnea  G47.33    2. CPAP (continuous positive airway pressure) dependence  Z99.89         PLAN:  1. No orders of the defined types were placed in this encounter. 2.  Patient advised of the etiology,  pathophysiology, diagnosis, treatment options, and risks of untreated GILBERTO. Risks may include, but are not limited to  hypertension, coronary artery disease, atrial fibrillation, CHF, diabetes, stroke, weight gain, impaired cognition, daytime somnolence,  and motor vehicle accidents. Advised to abstain from driving or operating heavy machinery when drowsy and the use of respiratory suppressants. Counseled on multimodal approach to treatment of sleep apnea to include but not limited to diet, exercise, sleep hygiene, and compliance with pap therapy. 3.  We had a discussion about the clinical issues and went over the various important aspects to consider. Treatment plan and potential diagnostic studies were discussed. She declines to have a repeat sleep study. She wants to continue the use of her current CPAP. All questions were answered.  Pt voiced understanding and agrees with treatment plan. 4. The following educational material has been included in this visit after visit summary for your review:  GILBERTO/PAP guidelines/sleep studies/CPAP-discussed with pt.  5.  [facility]  6. Continue PAP therapy. The patient voices understanding and recognizes the need for adherence to the prescribed therapy. 7.  Order-supplies-[facility]  8.   Follow up PRN    Replinishible PAP Supplies, 1 year supply  Item HPCPS Code Frequency   Mask of choice  or  1 per 3 months   Nasal Mask cushion/pillows  or  2 per 30 days   Full Face Mask Interface  1 per 30 days   Headgear  1 per 6 months   Tubing, length of choice  or  1 per 3 months   Water Chamber  1 per 6 months   Chinstrap  1 per 6 months   Disposable Filters  2 per 30 days   Reusable Filters  1 per 6 months     Diagnoses:  Obstructive sleep apnea (G47.33)  Length of Need: Lifetime, 99    Ordering Provider: Lise Sebastian PA-C  NPI:  [id number]

## 2022-02-28 NOTE — PATIENT INSTRUCTIONS

## 2022-02-28 NOTE — LETTER
[facility]  [address] [place] [address] Drive, [address] [place] [address]  Phone [phone]  Fax [phone]             Re:  Carly Bowling    20  :  1955  Address: [address]  [id number]       Replinishible PAP Supplies, 1 year supply  Item HPCPS Code Frequency   Mask of choice  or  1 per 3 months   Nasal Mask cushion/pillows  or  2 per 30 days   Full Face Mask Interface  1 per 30 days   Headgear  1 per 6 months   Tubing, length of choice  or  1 per 3 months   Water Chamber  1 per 6 months   Chinstrap  1 per 6 months   Disposable Filters  2 per 30 days   Reusable Filters  1 per 6 months     Diagnoses:  Obstructive sleep apnea (G47.33)  Length of Need: Lifetime, 99    Ordering Provider: Concepcion Odom PA-C  NPI:  [id number]        Signature: [unfilled]        Date: 2022      Electronically Signed by Concepcion Odom PA-C  on 2022 at 9:06 AM

## 2022-03-12 ENCOUNTER — NURSE TRIAGE (OUTPATIENT)
Dept: CALL CENTER | Facility: HOSPITAL | Age: 67
End: 2022-03-12

## 2022-03-12 NOTE — TELEPHONE ENCOUNTER
Reviewed guideline with caller, advises she call POISON control about taking a double dose of her medications. Went thru low BP guideline which advises she go to ED for her BP of 66/51 Advised she drink at least 8 ounces of water, elevate her legs and recheck her BP in 15 minutes. States her son is on his way to her, may take him 20 minutes. Advised if she begins to feel dizzy or lightheaded to call EMS. Caller agrees to follow care advice.     Reason for Disposition  • [1] DOUBLE DOSE (an extra dose or lesser amount) of prescription drug AND [2] any symptoms (e.g., dizziness, nausea, pain, sleepiness)  • [1] Systolic BP < 80 AND [2] NOT dizzy, lightheaded or weak    Additional Information  • Negative: [1] Intentional drug overdose AND [2] suicidal thoughts or ideas  • Negative: Drug overdose and triager unable to answer question  • Negative: Caller requesting information unrelated to medicine  • Negative: Caller requesting information about COVID-19 Vaccine  • Negative: Caller requesting information about Emergency Contraception  • Negative: Caller requesting information about Combined Birth Control Pills  • Negative: Caller requesting information about Progestin Birth Control Pills  • Negative: Caller requesting information about Post-Op pain or medicines  • Negative: Caller requesting a prescription antibiotic (such as Penicillin) for Strep throat and has a positive culture result  • Negative: Caller requesting a prescription anti-viral med (such as Tamiflu) and has influenza (flu)  symptoms  • Negative: Immunization reaction suspected  • Negative: Rash while taking a medicine or within 3 days of stopping it  • Negative: [1] Asthma and [2] having symptoms of asthma (cough, wheezing, etc.)  • Negative: [1] Symptom of illness (e.g., headache, abdominal pain, earache, vomiting) AND [2] more than mild  • Negative: Breastfeeding questions about mother's medicines and diet  • Negative: MORE THAN A DOUBLE DOSE of a  "prescription or over-the-counter (OTC) drug  • Negative: Started suddenly after an allergic medicine, an allergic food, or bee sting  • Negative: Shock suspected (e.g., cold/pale/clammy skin, too weak to stand, low BP, rapid pulse)  • Negative: Difficult to awaken or acting confused (e.g., disoriented, slurred speech)  • Negative: Fainted  • Negative: [1] Systolic BP < 90 AND [2] dizzy, lightheaded, or weak  • Negative: Chest pain  • Negative: Bleeding (e.g., vomiting blood, rectal bleeding or tarry stools, severe vaginal bleeding)(Exception: fainted from sight of small amount of blood; small cut or abrasion)  • Negative: Extra heart beats or heart is beating fast  (i.e., \"palpitations\")  • Negative: Sounds like a life-threatening emergency to the triager    Answer Assessment - Initial Assessment Questions  1. NAME of MEDICATION: \"What medicine are you calling about?\"      Took 2 doses of her morning medications today by mistake. Prilosec, Diltiazem, Singulair, Novadex and Eliqis.   2. QUESTION: \"What is your question?\" (e.g., medication refill, side effect)      What should I do?  3. PRESCRIBING HCP: \"Who prescribed it?\" Reason: if prescribed by specialist, call should be referred to that group.      *No Answer*  4. SYMPTOMS: \"Do you have any symptoms?\"      *No Answer*  5. SEVERITY: If symptoms are present, ask \"Are they mild, moderate or severe?\"      *No Answer*  6. PREGNANCY:  \"Is there any chance that you are pregnant?\" \"When was your last menstrual period?\"      *No Answer*    Answer Assessment - Initial Assessment Questions  1. BLOOD PRESSURE: \"What is the blood pressure?\" \"Did you take at least two measurements 5 minutes apart?\"      66/51  2. ONSET: \"When did you take your blood pressure?\"     10:32  3. HOW: \"How did you obtain the blood pressure?\" (e.g., visiting nurse, automatic home BP monitor)      Automatic home BP monitor   4. HISTORY: \"Do you have a history of low blood pressure?\" \"What is your " "blood pressure normally?\"      no  5. MEDICATIONS: \"Are you taking any medications for blood pressure?\" If Yes, ask: \"Have they been changed recently?\"      Took double dose of AM medications   6. PULSE RATE: \"Do you know what your pulse rate is?\"       GA 77  7. OTHER SYMPTOMS: \"Have you been sick recently?\" \"Have you had a recent injury?\"      no  8. PREGNANCY: \"Is there any chance you are pregnant?\" \"When was your last menstrual period?\"      na    Protocols used: MEDICATION QUESTION CALL-ADULT-AH, BLOOD PRESSURE - LOW-ADULT-AH      "

## 2022-03-12 NOTE — TELEPHONE ENCOUNTER
Caller called back, bp was incorrect earlier. bp is now 162/62, which its normal range. bp cuff maybe needed batteries. No symptoms

## 2022-03-14 RX ORDER — MONTELUKAST SODIUM 10 MG/1
10 TABLET ORAL NIGHTLY
Qty: 30 TABLET | Refills: 5 | Status: SHIPPED | OUTPATIENT
Start: 2022-03-14 | End: 2022-09-01 | Stop reason: SDUPTHER

## 2022-03-14 NOTE — TELEPHONE ENCOUNTER
PATIENT HAS BEEN CALLED,  SHE STATED THAT SHE USED HER NEIGHBORS BP MONITOR.  SHE STATED THAT IT WAS NOT COMPLETELY CHARGED AND SHE WAS GETTING LOW READINGS.    WHEN IT WAS FULLY CHARGED IT WAS OK.  SHE HAS TAKEN HER MEDICATIONS AND IS FINE.  147/77

## 2022-03-15 NOTE — TELEPHONE ENCOUNTER
Patient needing a refill. I have spoke with her and she verified the dosing and frequency of medication.

## 2022-03-30 ENCOUNTER — CLINICAL SUPPORT (OUTPATIENT)
Dept: INTERNAL MEDICINE | Facility: CLINIC | Age: 67
End: 2022-03-30

## 2022-03-30 DIAGNOSIS — E53.8 B12 DEFICIENCY: Primary | ICD-10-CM

## 2022-03-30 PROCEDURE — 96372 THER/PROPH/DIAG INJ SC/IM: CPT | Performed by: INTERNAL MEDICINE

## 2022-03-30 RX ADMIN — CYANOCOBALAMIN 1000 MCG: 1000 INJECTION, SOLUTION INTRAMUSCULAR; SUBCUTANEOUS at 08:56

## 2022-03-30 NOTE — PROGRESS NOTES
Injection  Vitamin B 12 Injection performed in Left Deltoid by Rochelle Nevarez MA. Patient tolerated the procedure well without complications.  03/30/22   Rochelle Nevarez MA

## 2022-04-11 DIAGNOSIS — E53.8 B12 DEFICIENCY: ICD-10-CM

## 2022-04-11 RX ORDER — CYANOCOBALAMIN 1000 UG/ML
1000 INJECTION, SOLUTION INTRAMUSCULAR; SUBCUTANEOUS
Qty: 1 ML | Refills: 5 | Status: SHIPPED | OUTPATIENT
Start: 2022-04-11 | End: 2022-09-01 | Stop reason: SDUPTHER

## 2022-04-14 ENCOUNTER — OFFICE VISIT (OUTPATIENT)
Dept: INTERNAL MEDICINE | Facility: CLINIC | Age: 67
End: 2022-04-14

## 2022-04-14 VITALS
RESPIRATION RATE: 15 BRPM | BODY MASS INDEX: 28.69 KG/M2 | HEART RATE: 106 BPM | TEMPERATURE: 98.1 F | HEIGHT: 63 IN | WEIGHT: 161.9 LBS | OXYGEN SATURATION: 98 % | SYSTOLIC BLOOD PRESSURE: 116 MMHG | DIASTOLIC BLOOD PRESSURE: 70 MMHG

## 2022-04-14 DIAGNOSIS — Z00.00 MEDICARE ANNUAL WELLNESS VISIT, SUBSEQUENT: Primary | ICD-10-CM

## 2022-04-14 DIAGNOSIS — I48.20 CHRONIC ATRIAL FIBRILLATION: ICD-10-CM

## 2022-04-14 DIAGNOSIS — R73.03 PREDIABETES: ICD-10-CM

## 2022-04-14 DIAGNOSIS — Z13.31 DEPRESSION SCREEN: ICD-10-CM

## 2022-04-14 PROCEDURE — 1159F MED LIST DOCD IN RCRD: CPT | Performed by: INTERNAL MEDICINE

## 2022-04-14 PROCEDURE — 1170F FXNL STATUS ASSESSED: CPT | Performed by: INTERNAL MEDICINE

## 2022-04-14 PROCEDURE — 1125F AMNT PAIN NOTED PAIN PRSNT: CPT | Performed by: INTERNAL MEDICINE

## 2022-04-14 PROCEDURE — G0439 PPPS, SUBSEQ VISIT: HCPCS | Performed by: INTERNAL MEDICINE

## 2022-04-14 NOTE — PROGRESS NOTES
The ABCs of the Annual Wellness Visit  Subsequent Medicare Wellness Visit    Chief Complaint   Patient presents with   • Medicare Wellness-subsequent      Subjective    History of Present Illness:  Yun Blackwell is a 66 y.o. female who presents for a Subsequent Medicare Wellness Visit.    Overall, she has been doing very well.  She has leg cramps occurring occasionally.  However, last Sunday she developed a severe left foot cramp which hurt so bad that she had to call to get her muscle relaxers.  She has not had any recurrence since then    She did have COVID-19 (11/2022.  She had mild flulike symptoms.    She does see Dr. Red for her left foot.    Physically and mentally feels great     The following portions of the patient's history were reviewed and   updated as appropriate: allergies, current medications, past family history, past medical history, past social history, past surgical history and problem list.    Compared to one year ago, the patient feels her physical   health is better.    Compared to one year ago, the patient feels her mental   health is better.    Recent Hospitalizations:  She was not admitted to the hospital during the last year.       Current Medical Providers:  Patient Care Team:  ANGEL Healy MD as PCP - General (Internal Medicine)  Van Marcelino MD as Cardiologist (Cardiology)  ANGEL Healy MD as Referring Physician (Internal Medicine)    Outpatient Medications Prior to Visit   Medication Sig Dispense Refill   • apixaban (Eliquis) 5 MG tablet tablet Take 1 tablet by mouth Every 12 (Twelve) Hours. 60 tablet 5   • cyanocobalamin 1000 MCG/ML injection Inject 1 mL Every 28 (Twenty-Eight) Days. 1 mL 5   • dicyclomine (BENTYL) 20 MG tablet Take 20 mg by mouth Every 6 (Six) Hours.     • dilTIAZem (CARDIZEM) 30 MG tablet Take 1 tablet by mouth 2 (Two) Times a Day. 60 tablet 5   • montelukast (SINGULAIR) 10 MG tablet Take 1 tablet by mouth every night 30 tablet 5   • omeprazole  (priLOSEC) 20 MG capsule Take 1 capsule by mouth Daily. 30 capsule 5   • tamoxifen (NOLVADEX) 20 MG chemo tablet Take 1 tablet by mouth daily 90 tablet 3   • tiZANidine (ZANAFLEX) 4 MG tablet Take one-half to 1 tablet by mouth at bedtime. (Patient taking differently: 4 mg. Takes whole tablet daily) 30 tablet 2   • cefdinir (OMNICEF) 300 MG capsule Take 1 capsule by mouth 2 (Two) Times a Day. 14 capsule 0   • docusate sodium (COLACE) 100 MG capsule Take 1 capsule by mouth 2 (Two) Times a Day. 60 capsule 0   • nitrofurantoin, macrocrystal-monohydrate, (MACROBID) 100 MG capsule Take 1 capsule by mouth 2 times daily for 7 days 14 capsule 0   • ondansetron (Zofran) 4 MG tablet Take 1 tablet by mouth Every 4 (Four) Hours. Take 15 minutes prior to pain medication. 42 tablet 0   • phenazopyridine (PYRIDIUM) 200 MG tablet Take 1 tablet by mouth 3 times daily as needed for Pain 9 tablet 0   • sulfamethoxazole-trimethoprim (BACTRIM DS,SEPTRA DS) 800-160 MG per tablet Take 1 tablet by mouth 2 times daily for 7 days 14 tablet 0     Facility-Administered Medications Prior to Visit   Medication Dose Route Frequency Provider Last Rate Last Admin   • cyanocobalamin injection 1,000 mcg  1,000 mcg Intramuscular Q28 Days ANGEL Healy MD   1,000 mcg at 03/30/22 0856       No opioid medication identified on active medication list. I have reviewed chart for other potential  high risk medication/s and harmful drug interactions in the elderly.          Aspirin is not on active medication list.  Aspirin use is contraindicated for this patient due to: current use of Eliquis.  .    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  ACP discussion was held with the patient during this visit. Patient has an advance directive (not in EMR), copy requested.    Review of Systems   Constitutional: Negative.    HENT: Negative.    Respiratory: Negative.    Cardiovascular: Negative.    Gastrointestinal: Negative.    Genitourinary: Negative.    Musculoskeletal: Positive for arthralgias (Left foot).   Neurological: Negative.    Hematological: Negative.    Psychiatric/Behavioral: Negative.         Objective    Vitals:    04/14/22 0903   BP: 116/70   BP Location: Left arm   Patient Position: Sitting   Cuff Size: Adult   Pulse: 106   Resp: 15   Temp: 98.1 °F (36.7 °C)   TempSrc: Oral   SpO2: 98%   Weight: 73.4 kg (161 lb 14.4 oz)   Height: 160 cm (62.99\")     BMI Readings from Last 1 Encounters:   04/14/22 28.69 kg/m²   BMI is above normal parameters. Recommendations include: educational material and exercise counseling    Does the patient have evidence of cognitive impairment? No    Physical Exam  Vitals and nursing note reviewed.   Constitutional:       General: She is not in acute distress.     Appearance: Normal appearance. She is well-developed. She is not ill-appearing.      Comments: Pleasant   HENT:      Head: Normocephalic and atraumatic.      Left Ear: Tympanic membrane, ear canal and external ear normal. There is no impacted cerumen.      Mouth/Throat:      Pharynx: No oropharyngeal exudate.   Eyes:      General: No scleral icterus.     Conjunctiva/sclera: Conjunctivae normal.      Pupils: Pupils are equal, round, and reactive to light.   Neck:      Thyroid: No " thyromegaly.      Vascular: No JVD.   Cardiovascular:      Rate and Rhythm: Normal rate. Rhythm irregularly irregular.      Heart sounds: Normal heart sounds. No murmur heard.    No friction rub. No gallop.   Pulmonary:      Effort: Pulmonary effort is normal. No respiratory distress.      Breath sounds: Normal breath sounds. No stridor. No wheezing, rhonchi or rales.   Abdominal:      General: Bowel sounds are normal. There is no distension.      Palpations: Abdomen is soft.      Tenderness: There is no abdominal tenderness.   Musculoskeletal:      Right lower leg: No edema.      Left lower leg: No edema.   Skin:     General: Skin is warm and dry.      Coloration: Skin is not jaundiced.   Neurological:      Mental Status: She is alert. Mental status is at baseline.      Cranial Nerves: No cranial nerve deficit.      Deep Tendon Reflexes:      Reflex Scores:       Patellar reflexes are 0 on the right side and 0 on the left side.  Psychiatric:         Mood and Affect: Mood normal.         Behavior: Behavior normal.         Thought Content: Thought content normal.         Judgment: Judgment normal.                 HEALTH RISK ASSESSMENT    Smoking Status:  Social History     Tobacco Use   Smoking Status Never Smoker   Smokeless Tobacco Never Used     Alcohol Consumption:  Social History     Substance and Sexual Activity   Alcohol Use No     Fall Risk Screen:    STEADI Fall Risk Assessment was completed, and patient is at LOW risk for falls.Assessment completed on:4/14/2022    Depression Screening:  PHQ-2/PHQ-9 Depression Screening 4/14/2022   Retired PHQ-9 Total Score -   Retired Total Score -   Little Interest or Pleasure in Doing Things 0-->not at all   Feeling Down, Depressed or Hopeless 0-->not at all   PHQ-9: Brief Depression Severity Measure Score 0       Health Habits and Functional and Cognitive Screening:  Functional & Cognitive Status 4/14/2022   Do you have difficulty preparing food and eating? No   Do you  have difficulty bathing yourself, getting dressed or grooming yourself? No   Do you have difficulty using the toilet? No   Do you have difficulty moving around from place to place? No   Do you have trouble with steps or getting out of a bed or a chair? No   Current Diet Well Balanced Diet   Dental Exam Up to date   Eye Exam Up to date   Exercise (times per week) 5 times per week   Current Exercises Include Walking;House Cleaning;Gardening   Current Exercise Activities Include -   Do you need help using the phone?  No   Are you deaf or do you have serious difficulty hearing?  No   Do you need help with transportation? No   Do you need help shopping? No   Do you need help preparing meals?  No   Do you need help with housework?  No   Do you need help with laundry? No   Do you need help taking your medications? No   Do you need help managing money? No   Do you ever drive or ride in a car without wearing a seat belt? No   Have you felt unusual stress, anger or loneliness in the last month? No   Who do you live with? Alone   If you need help, do you have trouble finding someone available to you? No   Have you been bothered in the last four weeks by sexual problems? No   Do you have difficulty concentrating, remembering or making decisions? No       Age-appropriate Screening Schedule:  Refer to the list below for future screening recommendations based on patient's age, sex and/or medical conditions. Orders for these recommended tests are listed in the plan section. The patient has been provided with a written plan.    Health Maintenance   Topic Date Due   • TDAP/TD VACCINES (1 - Tdap) Never done   • ZOSTER VACCINE (1 of 2) Never done   • PAP SMEAR  Never done   • INFLUENZA VACCINE  08/01/2022   • MAMMOGRAM  12/16/2022   • LIPID PANEL  12/17/2022   • URINE MICROALBUMIN  12/17/2022   • DXA SCAN  04/22/2023   • DIABETIC FOOT EXAM  Discontinued   • HEMOGLOBIN A1C  Discontinued   • DIABETIC EYE EXAM  Discontinued               Assessment/Plan   CMS Preventative Services Quick Reference  Risk Factors Identified During Encounter  Cardiovascular Disease  Immunizations Discussed/Encouraged (specific Immunizations; COVID19  Obesity/Overweight   The above risks/problems have been discussed with the patient.  Follow up actions/plans if indicated are seen below in the Assessment/Plan Section.  Pertinent information has been shared with the patient in the After Visit Summary.    Diagnoses and all orders for this visit:    1. Medicare annual wellness visit, subsequent (Primary)  -     CBC (No Diff); Future  -     Comprehensive Metabolic Panel; Future  -     Hemoglobin A1c; Future  -     Lipid Panel; Future    2. Chronic atrial fibrillation (HCC)    3. Depression screen    4. Prediabetes  -     Hemoglobin A1c; Future        Follow Up:   Return in about 6 months (around 10/14/2022) for Recheck.     An After Visit Summary and PPPS were made available to the patient.

## 2022-04-27 ENCOUNTER — LAB (OUTPATIENT)
Dept: LAB | Facility: HOSPITAL | Age: 67
End: 2022-04-27

## 2022-04-27 ENCOUNTER — CLINICAL SUPPORT (OUTPATIENT)
Dept: INTERNAL MEDICINE | Facility: CLINIC | Age: 67
End: 2022-04-27

## 2022-04-27 DIAGNOSIS — R73.03 PREDIABETES: ICD-10-CM

## 2022-04-27 DIAGNOSIS — Z00.00 MEDICARE ANNUAL WELLNESS VISIT, SUBSEQUENT: ICD-10-CM

## 2022-04-27 DIAGNOSIS — E53.8 B12 DEFICIENCY: Primary | ICD-10-CM

## 2022-04-27 LAB
ALBUMIN SERPL-MCNC: 4.1 G/DL (ref 3.5–5.2)
ALBUMIN/GLOB SERPL: 1.4 G/DL
ALP SERPL-CCNC: 93 U/L (ref 39–117)
ALT SERPL W P-5'-P-CCNC: 13 U/L (ref 1–33)
ANION GAP SERPL CALCULATED.3IONS-SCNC: 12.4 MMOL/L (ref 5–15)
AST SERPL-CCNC: 22 U/L (ref 1–32)
BILIRUB SERPL-MCNC: 0.8 MG/DL (ref 0–1.2)
BUN SERPL-MCNC: 15 MG/DL (ref 8–23)
BUN/CREAT SERPL: 28.3 (ref 7–25)
CALCIUM SPEC-SCNC: 9.2 MG/DL (ref 8.6–10.5)
CHLORIDE SERPL-SCNC: 106 MMOL/L (ref 98–107)
CHOLEST SERPL-MCNC: 109 MG/DL (ref 0–200)
CO2 SERPL-SCNC: 23.6 MMOL/L (ref 22–29)
CREAT SERPL-MCNC: 0.53 MG/DL (ref 0.57–1)
DEPRECATED RDW RBC AUTO: 40 FL (ref 37–54)
EGFRCR SERPLBLD CKD-EPI 2021: 100.9 ML/MIN/1.73
ERYTHROCYTE [DISTWIDTH] IN BLOOD BY AUTOMATED COUNT: 12 % (ref 12.3–15.4)
GLOBULIN UR ELPH-MCNC: 3 GM/DL
GLUCOSE SERPL-MCNC: 103 MG/DL (ref 65–99)
HBA1C MFR BLD: 4.8 % (ref 4.8–5.6)
HCT VFR BLD AUTO: 39.5 % (ref 34–46.6)
HDLC SERPL-MCNC: 60 MG/DL (ref 40–60)
HGB BLD-MCNC: 12.9 G/DL (ref 12–15.9)
LDLC SERPL CALC-MCNC: 31 MG/DL (ref 0–100)
LDLC/HDLC SERPL: 0.5 {RATIO}
MCH RBC QN AUTO: 30.1 PG (ref 26.6–33)
MCHC RBC AUTO-ENTMCNC: 32.7 G/DL (ref 31.5–35.7)
MCV RBC AUTO: 92.3 FL (ref 79–97)
PLATELET # BLD AUTO: 185 10*3/MM3 (ref 140–450)
PMV BLD AUTO: 12.3 FL (ref 6–12)
POTASSIUM SERPL-SCNC: 4.6 MMOL/L (ref 3.5–5.2)
PROT SERPL-MCNC: 7.1 G/DL (ref 6–8.5)
RBC # BLD AUTO: 4.28 10*6/MM3 (ref 3.77–5.28)
SODIUM SERPL-SCNC: 142 MMOL/L (ref 136–145)
TRIGL SERPL-MCNC: 95 MG/DL (ref 0–150)
VLDLC SERPL-MCNC: 18 MG/DL (ref 5–40)
WBC NRBC COR # BLD: 7.17 10*3/MM3 (ref 3.4–10.8)

## 2022-04-27 PROCEDURE — 80053 COMPREHEN METABOLIC PANEL: CPT

## 2022-04-27 PROCEDURE — 96372 THER/PROPH/DIAG INJ SC/IM: CPT | Performed by: INTERNAL MEDICINE

## 2022-04-27 PROCEDURE — 80061 LIPID PANEL: CPT

## 2022-04-27 PROCEDURE — 83036 HEMOGLOBIN GLYCOSYLATED A1C: CPT

## 2022-04-27 PROCEDURE — 36415 COLL VENOUS BLD VENIPUNCTURE: CPT

## 2022-04-27 PROCEDURE — 85027 COMPLETE CBC AUTOMATED: CPT

## 2022-04-27 RX ADMIN — CYANOCOBALAMIN 1000 MCG: 1000 INJECTION, SOLUTION INTRAMUSCULAR; SUBCUTANEOUS at 09:03

## 2022-04-27 NOTE — PROGRESS NOTES
Injection  Vitamin B-12 Injection performed in Right Deltoid by Rochelle Nevarez MA. Patient tolerated the procedure well without complications.  04/27/22   Rochelle Nevarez MA

## 2022-05-10 ENCOUNTER — OFFICE VISIT (OUTPATIENT)
Dept: INTERNAL MEDICINE | Facility: CLINIC | Age: 67
End: 2022-05-10

## 2022-05-10 VITALS
RESPIRATION RATE: 15 BRPM | HEIGHT: 63 IN | TEMPERATURE: 98.4 F | HEART RATE: 97 BPM | SYSTOLIC BLOOD PRESSURE: 132 MMHG | OXYGEN SATURATION: 97 % | BODY MASS INDEX: 29.15 KG/M2 | DIASTOLIC BLOOD PRESSURE: 80 MMHG | WEIGHT: 164.5 LBS

## 2022-05-10 DIAGNOSIS — M20.12 HALLUX VALGUS (ACQUIRED), LEFT FOOT: Primary | ICD-10-CM

## 2022-05-10 PROCEDURE — 99212 OFFICE O/P EST SF 10 MIN: CPT | Performed by: INTERNAL MEDICINE

## 2022-05-10 NOTE — PROGRESS NOTES
Chief Complaint   Patient presents with   • Office Visit   • 2nd Opinion         History:  Yun Blackwell is a 66 y.o. female who presents today for evaluation of the above problems.      Needs referral to Dr. Zapata for a second opinion    She has been seeing Dr. Red for hallux valgus.  On January 6 she had a first metatarsal phalangeal joint arthrodesis with internal fixation.  Since then she has had swelling and continued pain in the left foot.  She does not feel like she is getting answered from Dr. Red and would like to talk with Dr. Zapata.        HPI      ROS  Review of Systems    As above    Current Outpatient Medications:   •  apixaban (Eliquis) 5 MG tablet tablet, Take 1 tablet by mouth Every 12 (Twelve) Hours., Disp: 60 tablet, Rfl: 5  •  cyanocobalamin 1000 MCG/ML injection, Inject 1 mL Every 28 (Twenty-Eight) Days., Disp: 1 mL, Rfl: 5  •  dicyclomine (BENTYL) 20 MG tablet, Take 20 mg by mouth Every 6 (Six) Hours., Disp: , Rfl:   •  dilTIAZem (CARDIZEM) 30 MG tablet, Take 1 tablet by mouth 2 (Two) Times a Day., Disp: 60 tablet, Rfl: 5  •  montelukast (SINGULAIR) 10 MG tablet, Take 1 tablet by mouth every night, Disp: 30 tablet, Rfl: 5  •  omeprazole (priLOSEC) 20 MG capsule, Take 1 capsule by mouth Daily., Disp: 30 capsule, Rfl: 5  •  tamoxifen (NOLVADEX) 20 MG chemo tablet, Take 1 tablet by mouth daily, Disp: 90 tablet, Rfl: 3  •  tiZANidine (ZANAFLEX) 4 MG tablet, Take one-half to 1 tablet by mouth at bedtime. (Patient taking differently: 4 mg. Takes whole tablet daily), Disp: 30 tablet, Rfl: 2    Current Facility-Administered Medications:   •  cyanocobalamin injection 1,000 mcg, 1,000 mcg, Intramuscular, Q28 Days, ANGEL Healy MD, 1,000 mcg at 04/27/22 0903    Lab Results   Component Value Date    GLUCOSE 103 (H) 04/27/2022    BUN 15 04/27/2022    CREATININE 0.53 (L) 04/27/2022    EGFRIFNONA 71 12/30/2021    EGFRIFAFRI 86 12/30/2021    BCR 28.3 (H) 04/27/2022    K 4.6 04/27/2022    CO2 23.6  04/27/2022    CALCIUM 9.2 04/27/2022    ALBUMIN 4.10 04/27/2022    AST 22 04/27/2022    ALT 13 04/27/2022       WBC   Date Value Ref Range Status   04/27/2022 7.17 3.40 - 10.80 10*3/mm3 Final   06/14/2021 7.30 3.98 - 10.04 K/uL Final     RBC   Date Value Ref Range Status   04/27/2022 4.28 3.77 - 5.28 10*6/mm3 Final   06/14/2021 4.38 3.93 - 5.22 M/uL Final     Hemoglobin   Date Value Ref Range Status   04/27/2022 12.9 12.0 - 15.9 g/dL Final   06/14/2021 13.4 11.2 - 15.7 g/dL Final     Hematocrit   Date Value Ref Range Status   04/27/2022 39.5 34.0 - 46.6 % Final   06/14/2021 41.6 34.1 - 44.9 % Final     MCV   Date Value Ref Range Status   04/27/2022 92.3 79.0 - 97.0 fL Final   06/14/2021 95.0 (H) 79.4 - 94.8 fL Final     MCH   Date Value Ref Range Status   04/27/2022 30.1 26.6 - 33.0 pg Final   06/14/2021 30.6 25.6 - 32.2 pg Final     MCHC   Date Value Ref Range Status   04/27/2022 32.7 31.5 - 35.7 g/dL Final   06/14/2021 32.2 (L) 32.3 - 35.5 g/dL Final     RDW   Date Value Ref Range Status   04/27/2022 12.0 (L) 12.3 - 15.4 % Final   06/14/2021 13.2 11.7 - 14.4 % Final     RDW-SD   Date Value Ref Range Status   04/27/2022 40.0 37.0 - 54.0 fl Final     MPV   Date Value Ref Range Status   04/27/2022 12.3 (H) 6.0 - 12.0 fL Final   06/14/2021 11.7 (H) 7.4 - 10.4 fL Final     Platelets   Date Value Ref Range Status   04/27/2022 185 140 - 450 10*3/mm3 Final   06/14/2021 142 (L) 182 - 369 K/uL Final     Neutrophil Rel %   Date Value Ref Range Status   06/14/2021 66.2 34.0 - 71.1 % Final     Neutrophil %   Date Value Ref Range Status   07/15/2021 70.1 42.7 - 76.0 % Final     Lymphocyte Rel %   Date Value Ref Range Status   06/14/2021 23.0 19.3 - 53.1 % Final     Lymphocyte %   Date Value Ref Range Status   07/15/2021 17.1 (L) 19.6 - 45.3 % Final     Monocyte Rel %   Date Value Ref Range Status   06/14/2021 9.3 4.7 - 12.5 % Final     Monocyte %   Date Value Ref Range Status   07/15/2021 10.6 5.0 - 12.0 % Final     Eosinophil  "Rel %   Date Value Ref Range Status   06/14/2021 1.2 0.7 - 7.0 % Final     Eosinophil %   Date Value Ref Range Status   07/15/2021 1.1 0.3 - 6.2 % Final     Basophil Rel %   Date Value Ref Range Status   06/14/2021 0.3 0.1 - 1.2 % Final     Basophil %   Date Value Ref Range Status   07/15/2021 0.7 0.0 - 1.5 % Final     Immature Grans %   Date Value Ref Range Status   07/15/2021 0.4 0.0 - 0.5 % Final     Neutrophils Absolute   Date Value Ref Range Status   06/14/2021 4.83 1.56 - 6.13 K/uL Final     Neutrophils, Absolute   Date Value Ref Range Status   07/15/2021 5.01 1.70 - 7.00 10*3/mm3 Final     Lymphocytes Absolute   Date Value Ref Range Status   06/14/2021 1.68 1.18 - 3.74 K/uL Final     Lymphocytes, Absolute   Date Value Ref Range Status   07/15/2021 1.22 0.70 - 3.10 10*3/mm3 Final     Monocytes Absolute   Date Value Ref Range Status   06/14/2021 0.68 0.24 - 0.82 K/uL Final     Monocytes, Absolute   Date Value Ref Range Status   07/15/2021 0.76 0.10 - 0.90 10*3/mm3 Final     Eosinophils Absolute   Date Value Ref Range Status   06/14/2021 0.09 0.04 - 0.54 K/uL Final     Eosinophils, Absolute   Date Value Ref Range Status   07/15/2021 0.08 0.00 - 0.40 10*3/mm3 Final     Basophils Absolute   Date Value Ref Range Status   06/14/2021 0.02 0.01 - 0.08 K/uL Final     Basophils, Absolute   Date Value Ref Range Status   07/15/2021 0.05 0.00 - 0.20 10*3/mm3 Final     Immature Grans, Absolute   Date Value Ref Range Status   07/15/2021 0.03 0.00 - 0.05 10*3/mm3 Final   11/10/2020 0.0 K/uL Final     nRBC   Date Value Ref Range Status   07/15/2021 0.0 0.0 - 0.2 /100 WBC Final         OBJECTIVE:  Visit Vitals  /80 (BP Location: Left arm, Patient Position: Sitting, Cuff Size: Adult)   Pulse 97   Temp 98.4 °F (36.9 °C) (Oral)   Resp 15   Ht 160 cm (62.99\")   Wt 74.6 kg (164 lb 8 oz)   SpO2 97%   BMI 29.15 kg/m²      Physical Exam  Constitutional:       General: She is not in acute distress.     Appearance: She is " well-developed. She is not diaphoretic.   HENT:      Head: Normocephalic and atraumatic.   Eyes:      Pupils: Pupils are equal, round, and reactive to light.   Neck:      Thyroid: No thyromegaly.      Trachea: Phonation normal.   Pulmonary:      Effort: No respiratory distress.   Skin:     Coloration: Skin is not pale.      Findings: No erythema.   Neurological:      Mental Status: She is alert.   Psychiatric:         Behavior: Behavior normal.         Thought Content: Thought content normal.         Judgment: Judgment normal.         Assessment/Plan    Diagnoses and all orders for this visit:    1. Hallux valgus (acquired), left foot (Primary)  -     Ambulatory Referral to Podiatry    Refer to Dr. Zapata.    Keep next follow-up, or follow-up sooner if indicated      Return for Next scheduled follow up.      DANIELLE Healy MD  10:57 CDT  5/10/2022

## 2022-05-12 ENCOUNTER — OFFICE VISIT (OUTPATIENT)
Dept: CARDIOLOGY | Facility: CLINIC | Age: 67
End: 2022-05-12

## 2022-05-12 VITALS
DIASTOLIC BLOOD PRESSURE: 84 MMHG | OXYGEN SATURATION: 96 % | BODY MASS INDEX: 28.95 KG/M2 | WEIGHT: 163.4 LBS | HEIGHT: 63 IN | HEART RATE: 95 BPM | SYSTOLIC BLOOD PRESSURE: 122 MMHG

## 2022-05-12 DIAGNOSIS — Q21.11 ASD SECUNDUM: ICD-10-CM

## 2022-05-12 DIAGNOSIS — I48.21 PERMANENT ATRIAL FIBRILLATION: Primary | ICD-10-CM

## 2022-05-12 DIAGNOSIS — I51.7 RIGHT ATRIAL ENLARGEMENT: ICD-10-CM

## 2022-05-12 DIAGNOSIS — I51.7 RIGHT VENTRICULAR ENLARGEMENT: ICD-10-CM

## 2022-05-12 PROCEDURE — 93000 ELECTROCARDIOGRAM COMPLETE: CPT | Performed by: NURSE PRACTITIONER

## 2022-05-12 PROCEDURE — 99214 OFFICE O/P EST MOD 30 MIN: CPT | Performed by: NURSE PRACTITIONER

## 2022-05-12 NOTE — PROGRESS NOTES
"     Subjective:     Encounter Date:  5/12/2022      Patient ID: Yun Blackwell is a 66 y.o. female.    Chief Complaint: Follow-up for permanent atrial fibrillation and ASD    History of Present Illness     Ms. Blackwell returns today for routine follow-up. At her last visit here in 04/2021, she was noted that transitioning from warfarin to Eliquis resolved some palpitations she had been complaining of. At that time, she was walking more in her neighborhood, and was not having any exertional symptoms including shortness of breath or chest pain. I advised that she continue Cardizem for rate control, and Eliquis for stroke prophylaxis. We also followed her for an ASD, but a previous right heart catheterization that Dr. Marcelino performed showed no oxygenation step-up, and, therefore, he felt as though her right ventricular enlargement was not secondary to left-to-right shunting, and she would not be benefited in any way from ASD closure.    She saw Dr. Marcelino in October 2021 and was doing well.  She reported she had not had any palpitations since starting Eliquis.  She also denied any bleeding since starting the Eliquis.  She reported some shortness of breath when going up stairs.  No changes were made at that visit.    Today the patient states she continues to do well from a cardiac perspective.  Her main complaint is her \"left foot is not healing\" after a surgery.  She has some mild associated swelling of that foot.  She denies any chest pain, palpitations, shortness of breath.  Blood pressure is well controlled.  She reports compliance with her Eliquis and diltiazem.  She states she is working on getting Eliquis for free through a program that a family member introduced her to.  She denies any bleeding issues.    The following portions of the patient's history were reviewed and updated as appropriate: allergies, current medications, past family history, past medical history, past social history, past surgical history and " problem list.    Review of Systems   Constitutional: Negative for malaise/fatigue.   Cardiovascular: Negative for claudication, dyspnea on exertion, leg swelling, near-syncope, orthopnea and paroxysmal nocturnal dyspnea.   Hematologic/Lymphatic: Does not bruise/bleed easily.           Current Outpatient Medications:   •  apixaban (Eliquis) 5 MG tablet tablet, Take 1 tablet by mouth Every 12 (Twelve) Hours., Disp: 60 tablet, Rfl: 5  •  cyanocobalamin 1000 MCG/ML injection, Inject 1 mL Every 28 (Twenty-Eight) Days., Disp: 1 mL, Rfl: 5  •  dilTIAZem (CARDIZEM) 30 MG tablet, Take 1 tablet by mouth 2 (Two) Times a Day., Disp: 60 tablet, Rfl: 5  •  montelukast (SINGULAIR) 10 MG tablet, Take 1 tablet by mouth every night, Disp: 30 tablet, Rfl: 5  •  omeprazole (priLOSEC) 20 MG capsule, Take 1 capsule by mouth Daily., Disp: 30 capsule, Rfl: 5  •  tamoxifen (NOLVADEX) 20 MG chemo tablet, Take 1 tablet by mouth daily, Disp: 90 tablet, Rfl: 3  •  tiZANidine (ZANAFLEX) 4 MG tablet, Take one-half to 1 tablet by mouth at bedtime. (Patient taking differently: 4 mg. Takes whole tablet daily), Disp: 30 tablet, Rfl: 2    Current Facility-Administered Medications:   •  cyanocobalamin injection 1,000 mcg, 1,000 mcg, Intramuscular, Q28 Days, ANGEL Healy MD, 1,000 mcg at 04/27/22 0903       Objective:      Vitals:    05/12/22 0804   BP: 122/84   Pulse: 95   SpO2: 96%   Weight: 163 pounds    Vitals and nursing note reviewed.   Constitutional:       General: Not in acute distress.     Appearance: Not in distress.   Neck:      Vascular: No JVD or JVR. JVD normal.   Pulmonary:      Effort: Pulmonary effort is normal.      Breath sounds: Normal breath sounds.   Cardiovascular:      Normal rate. Irregularly irregular rhythm.      Murmurs: There is no murmur.   Edema:     Peripheral edema (mild, left foot ) present.  Skin:     General: Skin is warm and dry.   Neurological:      Mental Status: Alert, oriented to person, place, and time  "and oriented to person, place and time.         Lab Review:   Lab Results   Component Value Date    GLUCOSE 103 (H) 04/27/2022    BUN 15 04/27/2022    CREATININE 0.53 (L) 04/27/2022    EGFRIFNONA 71 12/30/2021    EGFRIFAFRI 86 12/30/2021    BCR 28.3 (H) 04/27/2022    K 4.6 04/27/2022    CO2 23.6 04/27/2022    CALCIUM 9.2 04/27/2022    ALBUMIN 4.10 04/27/2022    AST 22 04/27/2022    ALT 13 04/27/2022     Lab Results   Component Value Date    WBC 7.17 04/27/2022    HGB 12.9 04/27/2022    HCT 39.5 04/27/2022    MCV 92.3 04/27/2022     04/27/2022     No results found for: TSH        ECG 12 Lead    Date/Time: 5/12/2022 9:00 AM  Performed by: Abbey Watson APRN  Authorized by: Abbey Watson APRN   Comparison: compared with previous ECG from 12/30/2021  Similar to previous ECG  Rhythm: atrial fibrillation  BPM: 81  Conduction: right bundle branch block         right heart catheterization performed 2/5/21:  Right heart catheterization:  Hemodynamics (in mmHg)  RA: 9  RV: 28/8  PA: 28/12, m17  PCW: 14     Oximetry (all %)  High SVC: 69  Low SVC: 70  High IVC: 72  Low IVC: 78  High RA: 68  Low RA: 68  RV: 68  PA: 68  PCW: 70 (confirmed both by fluoroscopy and waveform)     Ao (by pulse oximetry): 98%     Cardiac output and index via assumed Flex method: 4.4 L/min, 2.5 L/min/m2     TPG: 3  PVR: <1 Wood unit     Estimated Blood Loss: 5mL  Specimens: None  Complications: None     Impression:  1. No evidence of pulmonary hypertension; normal mean PA pressure and normal pulmonary vascular resistence  2. No evidence of left-to-right shunt (ie no oxygen \"step up\")  3. Normal cardiac output     Plan: resume Eliquis tonight; office f/u.  At this point, and based on this data, does not seem as though patient would benefit from ASD closure and that her right ventricle and right atrium are severely dilated for some other reason.    1/2021 DEANN:    · The right ventricular cavity is dilated.  · Moderate tricuspid valve regurgitation " is present.  · Estimated right ventricular systolic pressure from tricuspid regurgitation is mildly elevated (35-45 mmHg).  · Small-moderate sized multifenestrated secundum-type atrial septal defect (ASD).  · Severe biatrial enlargement.  · Normal LVEF.        Assessment/Plan:     Problem List Items Addressed This Visit        Cardiac and Vasculature    Permanent atrial fibrillation (HCC) - Primary    Right ventricular enlargement    Right atrial enlargement    ASD secundum    Overview     Added automatically from request for surgery [id number]               Recommendations/plans:  1. Permanent atrial fibrillation: Established problem, stable.  Rate controlled and asymptomatic.  - Continue Eliquis 5 mg by mouth twice daily for CVA prophylaxis, and rate control medication (diltiazem 30 mg twice daily).    2. Secundum type ASD: Stable.  As previously outlined by Dr. Marcelino: Previous catheterization revealed no oxygen step-up, so despite the fact that her right atrium and right ventricle are enlarged, it does not appear to be secondary to this, and since she is not dyspneic, there is no indication for closure at this time. We will continue to monitor.    She continues to deny any significant shortness of breath.    Follow up in 6 months with Dr. Marcelino, or sooner with new or worsening symptoms.

## 2022-05-18 ENCOUNTER — TELEPHONE (OUTPATIENT)
Dept: NEUROLOGY | Age: 67
End: 2022-05-18

## 2022-05-18 NOTE — TELEPHONE ENCOUNTER
Called and left patient a VM to let her know to take the memory card from her DME sleep machine to [facility] for them to do a download report of the machine. Zee Bustillos will need that information for patient appointment.

## 2022-05-20 ENCOUNTER — OFFICE VISIT (OUTPATIENT)
Dept: NEUROLOGY | Age: 67
End: 2022-05-20
Payer: MEDICARE

## 2022-05-20 VITALS
WEIGHT: 170 LBS | SYSTOLIC BLOOD PRESSURE: 119 MMHG | BODY MASS INDEX: 30.12 KG/M2 | HEIGHT: 63 IN | DIASTOLIC BLOOD PRESSURE: 78 MMHG | OXYGEN SATURATION: 99 % | HEART RATE: 70 BPM

## 2022-05-20 DIAGNOSIS — G47.33 OBSTRUCTIVE SLEEP APNEA: Primary | ICD-10-CM

## 2022-05-20 DIAGNOSIS — Z99.89 CPAP (CONTINUOUS POSITIVE AIRWAY PRESSURE) DEPENDENCE: ICD-10-CM

## 2022-05-20 PROCEDURE — 1036F TOBACCO NON-USER: CPT | Performed by: PHYSICIAN ASSISTANT

## 2022-05-20 PROCEDURE — G8427 DOCREV CUR MEDS BY ELIG CLIN: HCPCS | Performed by: PHYSICIAN ASSISTANT

## 2022-05-20 PROCEDURE — 99213 OFFICE O/P EST LOW 20 MIN: CPT | Performed by: PHYSICIAN ASSISTANT

## 2022-05-20 PROCEDURE — G8417 CALC BMI ABV UP PARAM F/U: HCPCS | Performed by: PHYSICIAN ASSISTANT

## 2022-05-20 PROCEDURE — 1123F ACP DISCUSS/DSCN MKR DOCD: CPT | Performed by: PHYSICIAN ASSISTANT

## 2022-05-20 PROCEDURE — G8399 PT W/DXA RESULTS DOCUMENT: HCPCS | Performed by: PHYSICIAN ASSISTANT

## 2022-05-20 PROCEDURE — 1090F PRES/ABSN URINE INCON ASSESS: CPT | Performed by: PHYSICIAN ASSISTANT

## 2022-05-20 PROCEDURE — 3017F COLORECTAL CA SCREEN DOC REV: CPT | Performed by: PHYSICIAN ASSISTANT

## 2022-05-20 PROCEDURE — 4040F PNEUMOC VAC/ADMIN/RCVD: CPT | Performed by: PHYSICIAN ASSISTANT

## 2022-05-20 NOTE — LETTER
[facility] Neurology and Sleep Medicine  [address] [place] [address] [address], [address]  [place], [address]  Phone [phone]  Fax [phone]             Re:  Lee Ann Rosa    20  :  1955  Address: [place]  [id number]         REFERRAL  Referred to: DME provider of patient's choice  Lee Ann Allenl is referred for the following:    DME Equipment HPCPS Code Setting        Auto Adjusting CPAP device with flex or comparable pressure relief per comfort  8cm to 16cm   Heated Humidifier  Patient Choice     Diagnoses:  Obstructive sleep apnea (G47.33)  Length of Need: Lifetime, 99    Ordering Provider: Edgar Monroe PA-C  NPI:  [id number]        Signature:  [unfilled]        Date: 2022      Electronically Signed by Edgar Monroe PA-C  on 2022 at 9:37 AM

## 2022-05-20 NOTE — PROGRESS NOTES
[facility] Neurology and Sleep Medicine  [address] [place] [address] Drive, [address] [facility] [address]  Brian Coronado  Phone [phone]  Fax [phone]       [facility] Sleep Follow Up Encounter      Information:   Patient Name: Deirdre Correa  :   1955  Age:   74 y.o. MRN:   [id number]  Account #:  [de-identified]  Today:                22    Provider:  Lacie Farmer PA-C    Chief Complaint   Patient presents with    Sleep Apnea     follow up         Subjective:   Deirdre Correa is a 74 y.o. female  with a history of GILBERTO who comes in for a follow up. She was diagnosed with GILBERTO in . The PSG revealed an RDI of 27.3. She is prescribed CPAP therapy. She has had the CPAP >10 years. Flo Santiago provided documentation that indicated her CPAP is too old to read and do any repairs. They indicted that she is compliant and is in need of a new CPAP. She uses it nightly. She doesn't use it when she needs supplies. She does not want to have another PSG. She feels strongly that her GILBERTO symptoms are controlled. She denies snoring, witnessed apneas, and excessive daytime somnolence.      Objective:     Past Medical History:   Diagnosis Date    [facility])     sees dr. Jj Dubois in [place] but has seen [facility] cardiology    Acid reflux     [facility] [facility]     Breast mass     Cancer ([facility] [address] [facility]     Breast Cancer in [place]    CPAP (continuous positive airway pressure) dependence     Diabetes ([facility] [address]     type 2 (currently diet-controlled)    Hyperlipidemia     elevated triglycerides, but low cholesterol level    Hypertension     Prolonged emergence from general anesthesia     Sleep apnea     CPAP       Past Surgical History:   Procedure Laterality Date    BREAST BIOPSY Left 2019    EXCISION LEFT BREAST MASS WITH INTRAOP ULTRASOUND GUIDED NEEDLE LOCALIZATION performed by Hung Bhatti MD at [address] [facility] LUMPECTOMY Left 2020    PREOP ULTRASOUND WITH LEFT LUMPECTOMY, NO SENTINEL NODE BIOPSY, INTRAOP ULTRASOUND GUIDED NEEDLE LOCALIZATION, facility-administered medications for this visit. Allergies:  Morphine and Oxycodone    REVIEW OF SYSTEMS     Constitutional: []? Fever []? Sweats []? Chills []? Recent Injury   [x]? Denies all unless marked  HENT:[]? Headache  []? Head Injury  []? Sore Throat  []? Ear Pain  []? Dizziness []? Hearing Loss   [x]? Denies all unless marked  Musculoskeletal: []? Arthralgia  []? Myalgias []? Muscle cramps  []? Muscle twitches   [x]? Denies all unless marked   Spine:  []? Neck pain  []? Back pain  []? Sciaticia  [x]? Denies all unless marked  Neurological:[]? Visual Disturbance []? Double Vision []? Slurred Speech []? Trouble swallowing  []? Vertigo []? Tingling []? Numbness []? Weakness []? Loss of Balance   []? Loss of Consciousness []? Memory Loss []? Seizures  [x]? Denies all unless marked  Psychiatric/Behavioral:[]? Depression []? Anxiety  [x]? Denies all unless marked  Sleep: []? Insomnia []? Sleep Disturbance []? Snoring []? Restless Legs []? Daytime Sleepiness [x]? Sleep Apnea  []? Denies all unless marked    The MA has completed the ROS with the patient. I have reviewed it in its' entirety with the patient and agree with the documentation. PHYSICAL EXAM  /78   Pulse 70   Ht 5' 3\" (1.6 m)   Wt 170 lb (77.1 kg)   SpO2 99%   Breastfeeding No   BMI 30.11 kg/m²     Constitutional   Alert in NAD, well developed, pleasant and cooperative with exam  HEENT- Conjunctiva normal.  No scars, masses, or lesions over external nose or ears, hearing intact, no neck masses noted, no jugular vein distension, no bruit  Cardiac- Regular rate and rhythm  Pulmonary- Clear to auscultation, good expansion, normal effort without use of accessory muscles  Musculoskeletal  No significant wasting of muscles noted. No bony deformities  Extremities - No clubbing, cyanosis or edema  Skin  Warm, dry, and intact.   No rash, erythema, or pallor  Psychiatric  Mood, affect, and behavior appear normal      Neurological exam  Awake, alert, fluent oriented  appropriate affect  Attention and concentration appear appropriate  Recent and remote memory appears unremarkable  Speech normal without dysarthria  No clear issues with language of fund of knowledge    Cranial Nerve Exam     CN III, IV,VI-EOMI, No nystagmus, conjugate eye movements, no ptosis  CN VII-No facial assymetry    Motor Exam    Antigravity throughout upper and lower extremities bilaterally    Tremors and coordination    No tremors in hands or head noted     Gait    Normal base and speed  No ataxia    I reviewed the following studies:       []  :  Clinical laboratory test results     []  :  Radiology reports                    [x]  :  Review and summarization of medical records-compliance report/documentqtion from DME supplier     []     Request for medical records       []  :  Reviewed previous/recent polysomnogram report(s)      []  :  [place] Sleepiness Scale      Assessment:       ICD-10-CM    1. Obstructive sleep apnea  G47.33    2. CPAP (continuous positive airway pressure) dependence  Z99.89           [x]  :  Stable-GILBERTO/CPAP is out of date; she declines referral for repeat PSG     []  :  Improved                       []  :  Well controlled              []  :  Resolving     []  :  Resolved     []  :  Inadequately controlled     []  :  Worsening     []  :  Additional workup planned        Plan:     No orders of the defined types were placed in this encounter. 1.   Previously advised of the etiology,  pathophysiology, diagnosis, treatment options, and risks of untreated GILBERTO. Risks may include, but are not limited to  hypertension, coronary artery disease, atrial fibrillation, CHF, diabetes, stroke, weight gain, impaired cognition, daytime somnolence, and motor vehicle accidents. Advised to abstain from driving or operating heavy machinery when drowsy and the use of respiratory suppressants. Discussed diagnostic studies and potential treatment plan.    2.  Will evaluate for PAP clinical benefit and and compliance during a 30 day period within the preceding 90 days PRN. 3.  The following educational material has been included in this visit after visit summary for your review: GILBERTO/PAP guidelines-Discussed with the patient and all questions fully answered. 4.  Continue PAP  therapy. The patient voices understanding and recognizes the need for adherence to the prescribed therapy  5. Order-auto CPAP with a pressure range of 8cm to 16cm-[facility]  6.   Follow up per protocol

## 2022-05-20 NOTE — PATIENT INSTRUCTIONS
Patient education: Sleep apnea in adults       INTRODUCTION  Normally during sleep, air moves through the throat and in and out of the lungs at a regular rhythm. In a person with sleep apnea, air movement is periodically diminished or stopped. There are two types of sleep apnea: obstructive sleep apnea and central sleep apnea. In obstructive sleep apnea, breathing is abnormal because of narrowing or closure of the throat. In central sleep apnea, breathing is abnormal because of a change in the breathing control and rhythm. Sleep apnea is a serious condition that can affect a person's ability to safely perform normal daily activities and can affect long term health. Approximately 25 percent of adults are at risk for sleep apnea of some degree. Men are more commonly affected than women. Other risk factors include middle and older age, being overweight or obese, and having a small mouth and throat. This topic review focuses on the most common type of sleep apnea in adults, obstructive sleep apnea (GILBERTO). HOW SLEEP APNEA OCCURS  The throat is surrounded by muscles that control the airway for speaking, swallowing, and breathing. During sleep, these muscles are less active, and this causes the throat to narrow. In most people, this narrowing does not affect breathing. In others, it can cause snoring, sometimes with reduced or completely blocked airflow. A completely blocked airway without airflow is called an obstructive apnea. Partial obstruction with diminished airflow is called a hypopnea. A person may have apnea and hypopnea during sleep. Insufficient breathing due to apnea or hypopnea causes oxygen levels to fall and carbon dioxide to rise. Because the airway is blocked, breathing faster or harder does not help to improve oxygen levels until the airway is reopened. Typically, the obstruction requires the person to awaken to activate the upper airway muscles.  Once the airway is opened, the person then takes several deep breaths to catch up on breathing. As the person awakens, he or she may move briefly, snort or snore, and take a deep breath. Less frequently, a person may awaken completely with a sensation of gasping, smothering, or choking. If the person falls back to sleep quickly, he or she will not remember the event. Many people with sleep apnea are unaware of their abnormal breathing in sleep, and all patients underestimate how often their sleep is interrupted. Awakening from sleep causes sleep to be unrefreshing and causes fatigue and daytime sleepiness. Anatomic causes of obstructive sleep apnea   Most patients have GILBERTO because of a small upper airway. As the bones of the face and skull develop, some people develop a small lower face, a small mouth, and a tongue that seems too large for the mouth. These features are genetically determined, which explains why GILBERTO tends to cluster in families. Obesity is another major factor. Tonsil enlargement can be an important cause, especially in children. SLEEP APNEA SYMPTOMS  The main symptoms of GILBERTO are loud snoring, fatigue, and daytime sleepiness. However, some people have no symptoms. For example, if the person does not have a bed partner, he or she may not be aware of the snoring. Fatigue and sleepiness have many causes and are often attributed to overwork and increasing age. As a result, a person may be slow to recognize that they have a problem. A bed partner or spouse often prompts the patient to seek medical care. Other symptoms may include one or more of the following:  ?Restless sleep  ? Awakening with choking, gasping, or smothering  ? Morning headaches, dry mouth, or sore throat  ? Waking frequently to urinate  ? Awakening unrested, groggy  ? Low energy, difficulty concentrating, memory impairment    Risk factors  Certain factors increase the risk of sleep apnea.   ?Increasing age [de-identified] GILBERTO occurs at all ages, but it is more common in middle and older age adults. ?Male sex  GILBERTO is two times more common in men, especially in middle age. ?Obesity  The more obese a person is, the more likely he or she is to have GILBERTO. ? Sedation from medication or alcohol  This interferes with the ability to awaken from sleep and can lengthen periods of apnea (no breathing), with potentially dangerous consequences. ? Abnormality of the airway. SLEEP APNEA CONSEQUENCES  Complications of sleep apnea can include daytime sleepiness and difficulty concentrating. The consequence of this is an increased risk of accidents and errors in daily activities. Studies have shown that people with severe GILBERTO are more than twice as likely to be involved in a motor vehicle accident as people without these conditions. People with GILBERTO are encouraged to discuss options for driving, working, and performing other high-risk tasks with a healthcare provider. In addition, people with untreated GILBERTO may have an increased risk of cardiovascular problems such as high blood pressure, heart attack, abnormal heart rhythms, or stroke. This risk may be due to changes in the heart rate and blood pressure that occur during sleep. SLEEP APNEA DIAGNOSIS  The diagnosis of GILBERTO is best made by a knowledgeable sleep medicine specialist who has an understanding of the individual's health issues. The diagnosis is usually based upon the person's medical history, physical examination, and testing, including:  ? A complaint of snoring and ineffective sleep  ? Neck size (greater than 16 inches in men or 14 inches in women) is associated with an increased risk of sleep apnea  ? A small upper airway: difficulty seeing the throat because of a tongue that is large for the mouth  ? High blood pressure, especially if it is resistant to treatment  ? If a bed partner has observed the patient during episodes of stopped breathing (apnea), choking, or gasping during sleep, there is a strong possibility of sleep apnea.     Testing is usually performed in a sleep laboratory. A full sleep study is called a polysomnogram. The polysomnogram measures the breathing effort and airflow, blood oxygen level, heart rate and rhythm, duration of the various stages of sleep, body position, and movement of the arms/legs. Home monitoring devices are available that can perform a sleep study. This is a reasonable alternative to conventional testing in a sleep laboratory if the clinician strongly suspects moderate or severe sleep apnea and the patient does not have other illnesses or sleep disorders that may interfere with the results. SLEEP APNEA TREATMENT  Sleep apnea is best treated by a knowledgeable sleep medicine specialist. The goal of treatment is to maintain an open airway during sleep. Effective treatment will eliminate the symptoms of sleep disturbance; long-term health consequences are also reduced. Most treatments require nightly use. The challenge for the clinician and the patient is to select an effective therapy that is appropriate for the patient's problem and that is acceptable for long term use. Auto-titrating CPAP delivers an amount of PAP that varies during the night. The variation is dependent on event detection software algorithms, which will increase the pressure gradually in response to flow changes until adequate patency is detected. After a period of sustained upper airway patency, the delivered level of pressure gradually decreases until the algorithm identifies recurrent upper airway obstruction, at which point the delivered pressure again increases. The result is that the delivered pressure varies throughout the night, in an effort to provide the lowest pressure that is necessary to maintain upper airway patency. Continuous positive airway pressure (CPAP)  The most effective treatment for sleep apnea uses air pressure from a mechanical device to keep the upper airway open during sleep.  A CPAP (continuous positive airway pressure)  device uses an air-tight attachment to the nose, typically a mask, connected to a tube and a blower which generates the pressure. Devices that fit comfortably into the nasal opening, rather than over the nose, are also available. CPAP should be used any time the person sleeps (day or night). The CPAP device is usually used for the first time in the sleep lab, where a technician can adjust the pressure and select the best equipment to keep the airway open. Alternatively, an auto device with a self-adjusting pressure feature, provided with proper education and training, can get treatment started without another sleep test. While the treatment may seem uncomfortable, noisy, or bulky at first, most people accept the treatment after experiencing better sleep. However, difficulty with mask comfort and nasal congestion prevent up to 50 percent of people from using the treatment on a regular basis. Continued follow up with a healthcare provider helps to ensure that the treatment is effective and comfortable. Information from the CPAP machine is often used by physicians, therapists, and insurers to track the success of treatment. CPAP can be delivered with different features to improve comfort and solve problems that may come up during treatment. Changes in treatment may be needed if symptoms do not improve or if the persons condition changes, such as a gain or loss of weight. Adjust sleep position  Adjusting sleep position (to stay off the back) may help improve sleep quality in people who have GILBERTO when sleeping on the back. However, this is difficult to maintain throughout the night and is rarely an adequate solution. Weight loss  Weight loss may be helpful for obese or overweight patients. Weight loss may be accomplished with dietary changes, exercise, and/or surgical treatment.  However, it can be difficult to maintain weight loss; the five-year success of non-surgical weight loss is only 5 percent, meaning that 95 percent of people regain lost weight. Avoid alcohol and other sedatives  Alcohol can worsen sleepiness, potentially increasing the risk of accidents or injury. People with GILBERTO are often counseled to drink little to no alcohol, even during the daytime. Similarly, people who take anti-anxiety medications or sedatives to sleep should speak with their healthcare provider about the safety of these medications. People with GILBERTO must notify all healthcare providers, including surgeons, about their condition and the potential risks of being sedated. People with GILBERTO who are given anesthesia and/or pain medications require special management and close monitoring to reduce the risk of a blocked airway. Dental devices  A dental device, called an oral appliance or mandibular advancement device, can reposition the jaw (mandible), bringing the tongue and soft palate forward as well. This may relieve obstruction in some people. This treatment is excellent for reducing snoring, although the effect on GILBERTO is sometimes more limited. As a result, dental devices are best used for mild cases of GILBERTO when relief of snoring is the main goal. Failure to tolerate and accept CPAP is another indication for dental devices. While dental devices are not as effective as CPAP for GILBERTO, some patients prefer a dental device to CPAP. Side effects of dental devices are generally minor but may include changes to the bite with prolonged use. Surgical treatment  Surgery is an alternative therapy for patients who cannot tolerate or do not improve with nonsurgical treatments such as CPAP or oral devices. Surgery can also be used in combination with other nonsurgical treatments. Surgical procedures reshape structures in the upper airways or surgically reposition bone or soft tissue. Uvulopalatopharyngoplasty (UPPP) removes the uvula and excessive tissue in the throat, including the tonsils, if present.  Other procedures, such as maxillomandibular advancement (MMA), address both the upper and lower pharyngeal airway more globally. UPPP alone has limited success rates (less than 50 percent) and people can relapse (when GILBERTO symptoms return after surgery). As a result, this surgery is only recommended in a minority of people and should be considered with caution. MMA may have a higher success rate, particularly in people with abnormal jaw (maxilla and mandible) anatomy, but it is the most complicated procedure. A newer surgical approach, nerve stimulation to protrude the tongue, has promising success rates in very selected people. Tracheostomy creates a permanent opening in the neck. It is reserved for people with severe disease in whom less drastic measures have failed or are inappropriate. Although it is always successful in eliminating obstructive sleep apnea, tracheostomy requires significant lifestyle changes and carries some serious risks (eg, infection, bleeding, blockage). All surgical treatments require discussions about the goals of treatment, the expected outcomes, and potential complications. Hypoglossal nerve stimulator- \"Inspire\" device    PAP treatment failure:  Possible causes of treatment failure include nonadherence or suboptimal adherence, weight gain, an inappropriate level of prescribed positive pressure, or an additional disorder causing sleepiness (eg, narcolepsy) that may require alterations in the therapeutic regimen. A review of medications should also be undertaken since many drugs may lead to sleepiness. Inadequate sleep time may also negate the expected effects from treatment of GILBERTO. Also, pt's can have persistent hypersomnolence associated with sleep apnea even in the presence of adequate therapy and at those times Provigil or Nuvigil or other stimulants may be indicated.     Once the patient's positive airway pressure therapy has been optimized and symptoms resolved, a regimen of long-term follow-up should be established. Annual visits are reasonable, with more frequent visits in between if new issues arise. The purpose of long-term follow-up is to assess usage and monitor for recurrent GILBERTO, new side effects, air leakage, and fluctuations in body weight. WHERE TO GET MORE INFORMATION  Your healthcare provider is the best source of information for questions and concerns related to your medical problem. Organizations  [employer]  Provides information about sleep apnea to the public, publishes a newsletter, and serves as an advocate for people with the disorder. Syl, [address]   Ayana@[employer]. org   [employer] org   Tel: [phone]   Fax: [facility] that works to [employer] and safety by promoting public understanding of sleep and sleep disorders. Supports sleep-related education, research, and advocacy; produces and distributes educational materials to the public and healthcare professionals; and offers postdoctoral fellowships and grants for sleep researchers. [address]   Zonia@[email or web address]. org   [email or web address]   Tel: [phone]   Fax: [phone]    Important information:  Medicare/private insurance CPAP/BiPAP/APAP requirements:  Medicare/private insurance has specific requirements for PAP compliance that must be met during the first 90 days of use to continue coverage for CPAP/BiPAP/APAP  from day 91 and beyond. The policy requires that patients use a PAP device 4 hours per 24 hour period, at least 70% of the time over a 30 day period. This data must be downloaded as a report direct from the PAP devices. This is called a compliance download. Your PAP supplier will assist you in this matter.      Note:  Where applicable, we will utilize PAP device efficiency reports, additional testing, and face-to-face  clinical evaluation subsequent to any treatment, changes in treatment, and continued treatment. Caution:  Please abstain from driving or engaging in other activities which may be hazardous in the presence of diminished alertness or daytime drowsiness. And avoid the use of sedatives or alcohol, which can worsen sleep apnea and daytime drowsiness. Mask suggestions:  -     Resmed Airfit N20 (Nasal) or F20 (Full face mask). They conform to your face, thus decreasing the potential for mask leakage. You might like the AirTouch F20(full face mask). It has a \"memory foam\" like cushion. The AirFit F30 is a smaller style full face mask designed to sit low on and cover less of your face for fewer facial marks. AirFit N30i has a top of the head tube with a nasal mask. AirFit P10 reported to be the most comfortable nasal pillow mask. Resmed Mirage FX reported to be the most comfortable nasal mask. Resmed Mirage Dakota reported to be the most comfortable hybrid mask. AirTouch N20-memory foam nasal mask. Respironics: You might also like to try a nasal mask called a Dreamwear nasal mask or the Dreamwear nasal pillow. Another suggestion is the [facility], it is a minimal contact full face mask. The Virlinda Listen incredible under the nose design makes it the only full face mask that won't cause red marks on the bridge of your nose when compared to other full face masks. The Dreamwear full face mask has a  soft feel, unique in-frame air-flow, and innovative air tube connection at the top of the head for the ultimate in sleep comfort. Comfort Gel Blue. Dreamwear gel pillows. Yusuf [employer] Chris: Brevida nasal pillow mask and Simplus FFM    The use of a memory foam CPAP pillow supports the head and neck throughout the night.

## 2022-05-26 ENCOUNTER — CLINICAL SUPPORT (OUTPATIENT)
Dept: INTERNAL MEDICINE | Facility: CLINIC | Age: 67
End: 2022-05-26

## 2022-05-26 DIAGNOSIS — E53.8 B12 DEFICIENCY: Primary | ICD-10-CM

## 2022-05-26 PROCEDURE — 96372 THER/PROPH/DIAG INJ SC/IM: CPT | Performed by: INTERNAL MEDICINE

## 2022-05-26 RX ADMIN — CYANOCOBALAMIN 1000 MCG: 1000 INJECTION, SOLUTION INTRAMUSCULAR; SUBCUTANEOUS at 08:30

## 2022-05-26 NOTE — PROGRESS NOTES
Injection  Vitamin B-12 Injection performed in Left Deltoid by Rochelle Nevarez MA. Patient tolerated the procedure well without complications.  05/26/22   Rochelle Nevarez MA

## 2022-06-20 NOTE — PROGRESS NOTES
[facility] - PODIATRY    Today's Date: 06/21/2022     Patient Name: Yun Blackwell  MRN: [id number]  CSN: [id number]  PCP: ANGEL Healy MD  Referring Provider: ANGEL Healy MD    SUBJECTIVE     Chief Complaint   Patient presents with   • Establish Care     ANGEL Healy MD 05/10/2022 BUNION LEFT FOOT- pt states had surgery jan 6th left foot bunion and was told by dr red nothing more can be done, she would like a 2nd opinion with all new xrays. Needs right foot bunion done but not happy with care she has recivied at [facility] pt has stabbing pain on occasion with it- pt presents with post op scars left foot  bunion area, bunion on inside great toe left foot, callus inside great left toe   • Diabetes     116mg/dl BG this am     HPI: Yun Blackwell, a 66 y.o.female, comes to clinic as a(n) new patient complaining of foot pain and complaining of post operative pain. Patient has h/o AF, anemia, CA, DM, HLD, HTN, IBS, sleep apnea. Patient is NIDDM with last stated BG level of 116mg/dl. Patient presents with complaints of bunion deformity of the right foot and ongoing pain in the left foot secondary to bunion surgery performed in January of this year. Had arthrodesis of 1st MTPJ performed per Dr. Red and notes that she has had some ongoing shooting/stabbing pain that seems to be worse at night. Was advised to purchase firm insert for shoe and wear it throughout the day. Unsure if there has ever been discussion about non-union of the site. Denies pain today but notes occasional sharp/shooting pain at the previous surgical site. Relates previous treatment(s) including left 1st MTPJ arthodesis. Denies any constitutional symptoms. No other pedal complaints at this time.    Past Medical History:   Diagnosis Date   • A-fib (HCC)    • Abnormal ECG     tachycardia, a fib   • Acid reflux    • Anemia    • Arthritis    • Cancer (HCC)    • Diabetes mellitus (HCC)    • Hyperkalemia    • Hyperlipidemia     • Hypertension    • Hyponatremia    • IBS (irritable bowel syndrome)    • Sleep apnea     USES CPAP   • UTI (urinary tract infection)    • Vaginal vault prolapse      Past Surgical History:   Procedure Laterality Date   • BREAST BIOPSY Left     X2   • CARDIAC CATHETERIZATION     • CARDIAC CATHETERIZATION N/A 2/5/2021    Procedure: Right Heart Cath;  Surgeon: Van Marcelino MD;  Location: [facility] CATH INVASIVE LOCATION;  Service: Cardiology;  Laterality: N/A;   • COLON SURGERY     • COLONOSCOPY  09/21/2015    TRANSVERSE COLON ADENOMATOUS POLYP, HEMORRHOIDS, RECALL 5 YEARS, DR LAMAS   • COLONOSCOPY N/A 11/13/2020    Procedure: COLONOSCOPY WITH ANESTHESIA;  Surgeon: Joe Lamas MD;  Location: [facility] ENDOSCOPY;  Service: Gastroenterology;  Laterality: N/A;  pre op: screening  post op:divdrticulosis, polyp  PCP: ANGEL Healy MD   • COLOSTOMY     • COLPOPEXY VAGINAL      2014   • ENDOSCOPY N/A 11/3/2016    LA GRADE B ESOPHAGITIS WITH NO BLEEDING UPPER THIRD OF ESOPHAGUS, GERI-WADSWORTH TEAR GEJ, BILIOUS BLOOD TINGED COFFEE GROUND GATRIC FLUIDDR LAMAS   • EXPLORATORY LAPAROTOMY N/A 10/14/2016    Procedure: LAPAROTOMY EXPLORATORY, LYSIS OF ADHESIONS, IRRIAGATION OF ABDOMEN, POSSIBLE BOWEL RESECTION;  Surgeon: Bacilio Marcial MD;  Location: [facility] OR;  Service:    • HYSTERECTOMY     • REVISION / TAKEDOWN COLOSTOMY     • SACROCOLPOPEXY N/A 9/21/2016    Procedure: SACROCOLPOPEXY LAPAROSCOPIC WITH DAVINCI SI ROBOT, CONVERTED TO OPEN SACROCOLPOPEXY, RIGHT SALPINGO- OOPHERECTOMY, CYSTO;  Surgeon: Maribell Beth MD;  Location: [facility] OR;  Service:    • TOE FUSION Left 1/6/2022    Procedure: FIRST METATARSOPHALANGEAL JOINT ARTHRODESIS WITH INTERNAL FIXATION - LEFT FOOT;  Surgeon: Jorgito Red DPM;  Location:  [facility] OR;  Service: Podiatry;  Laterality: Left;     Family History   Problem Relation Age of Onset   • Hypertension Mother    • No Known Problems Father    • Colon cancer Neg Hx    • Colon polyps Neg  Hx      Social History     Socioeconomic History   • Marital status:    Tobacco Use   • Smoking status: Never Smoker   • Smokeless tobacco: Never Used   Vaping Use   • Vaping Use: Never used   Substance and Sexual Activity   • Alcohol use: No   • Drug use: No   • Sexual activity: Defer     Allergies   Allergen Reactions   • Morphine And Related Nausea And Vomiting and Dizziness   • Percocet [Oxycodone-Acetaminophen] Nausea And Vomiting and Dizziness     Current Outpatient Medications   Medication Sig Dispense Refill   • apixaban (Eliquis) 5 MG tablet tablet Take 1 tablet by mouth Every 12 (Twelve) Hours. 60 tablet 5   • cyanocobalamin 1000 MCG/ML injection Inject 1 mL Every 28 (Twenty-Eight) Days. 1 mL 5   • dilTIAZem (CARDIZEM) 30 MG tablet Take 1 tablet by mouth 2 (Two) Times a Day. 60 tablet 5   • montelukast (SINGULAIR) 10 MG tablet Take 1 tablet by mouth every night 30 tablet 5   • omeprazole (priLOSEC) 20 MG capsule Take 1 capsule by mouth Daily. 30 capsule 5   • tamoxifen (NOLVADEX) 20 MG chemo tablet Take 1 tablet by mouth daily 90 tablet 3   • tiZANidine (ZANAFLEX) 4 MG tablet Take one-half to 1 tablet by mouth at bedtime. (Patient taking differently: 4 mg. Takes whole tablet daily) 30 tablet 2     Current Facility-Administered Medications   Medication Dose Route Frequency Provider Last Rate Last Admin   • cyanocobalamin injection 1,000 mcg  1,000 mcg Intramuscular Q28 Days ANGEL Healy MD   1,000 mcg at 05/26/22 0830     Review of Systems   Constitutional: Negative for chills and fever.   HENT: Negative for congestion.    Respiratory: Negative for shortness of breath.    Cardiovascular: Negative for chest pain and leg swelling.   Gastrointestinal: Negative for constipation, diarrhea, nausea and vomiting.   Musculoskeletal: Positive for arthralgias.        Foot pain   Skin: Negative for wound.   Neurological: Negative for numbness.       OBJECTIVE     Vitals:    06/21/22 0948   BP: 130/82    Pulse: 97   SpO2: 96%       PHYSICAL EXAM  GEN:   Accompanied by none.     Foot/Ankle Exam:       General:   Appearance: appears stated age and healthy    Orientation: AAOx3    Affect: appropriate    Gait: unimpaired    Assistance: independent    Shoe Gear:  Sandals    VASCULAR      Right Foot Vascularity   Dorsalis pedis:  2+  Posterior tibial:  2+  Skin Temperature: warm    Edema Grading:  None  CFT:  3  Pedal Hair Growth:  Present  Varicosities: mild varicosities       Left Foot Vascularity   Dorsalis pedis:  2+  Posterior tibial:  2+  Skin Temperature: warm    Edema Grading:  None  CFT:  3  Pedal Hair Growth:  Present  Varicosities: mild varicosities        NEUROLOGIC     Right Foot Neurologic   Normal sensation    Light touch sensation:  Normal  Vibratory sensation:  Normal  Hot/Cold sensation: normal    Protective Sensation using [facility] Monofilament:  10     Left Foot Neurologic   Normal sensation    Light touch sensation:  Normal  Vibratory sensation:  Normal  Hot/cold sensation: normal    Protective Sensation using [facility] Monofilament:  10     MUSCULOSKELETAL      Right Foot Musculoskeletal   Ecchymosis:  None  Tenderness: great toe metatarsophalangeal joint    Arch:  Normal  Hammertoe:  Second toe, third toe, fourth toe and fifth toe  Hallux valgus: Yes (large medial eminence with dorsal spurring. Moderately trackbound. Minimal crepitus.)       Left Foot Musculoskeletal   Ecchymosis:  None  Tenderness: great toe metatarsophalangeal joint    Arch:  Normal  Hallux limitus: Yes (s/p arthodesis - possible micromotion remains)       MUSCLE STRENGTH     Right Foot Muscle Strength   Foot dorsiflexion:  5  Foot plantar flexion:  5  Foot inversion:  5  Foot eversion:  5     Left Foot Muscle Strength   Foot dorsiflexion:  5  Foot plantar flexion:  5  Foot inversion:  5  Foot eversion:  5     RANGE OF MOTION      Right Foot Range of Motion   Foot and ankle ROM within normal limits       Left Foot  Range of Motion   Foot and ankle ROM within normal limits       DERMATOLOGIC     Right Foot Dermatologic   Skin: skin intact    Nails: abnormally thick       Left Foot Dermatologic   Skin: skin intact    Nails: abnormally thick        RADIOLOGY/NUCLEAR:  No results found.    LABORATORY/CULTURE RESULTS:      PATHOLOGY RESULTS:       ASSESSMENT/PLAN     Diagnoses and all orders for this visit:    1. Foot pain, bilateral (Primary)  -     XR Foot 3+ View Bilateral; Future  -     CT FOOT LEFT WITHOUT CONTRAST; Future    2. Hallux valgus, right  -     XR Foot 3+ View Bilateral; Future    3. Chronic post-operative pain  -     XR Foot 3+ View Bilateral; Future  -     CT FOOT LEFT WITHOUT CONTRAST; Future    4. Venous insufficiency (chronic) (peripheral)    5. Anticoagulant long-term use    6. Controlled type 2 diabetes mellitus without complication, without long-term current use of insulin ([facility])      Comprehensive lower extremity examination and evaluation was performed.  Discussed findings and treatment plan including risks, benefits, and treatment options with patient in detail. Patient agreed with treatment plan.  There is concern for non-union of left 1st MTPJ. Discussed treatment options if non-union is present including remaining conservative with bone stimulator vs. revisional arthrodesis.   X-rays of both feet to establish baseline and assess for non-union of surgical site.  CT of left foot to further evaluate the left foot arthrodesis site for non-union.   Discussed potential need for arthrodesis of the right hallux valgus deformity in the future if surgical correction is desired.  Recommend elevation of lower extremities at rest and use of compression stockings.   An After Visit Summary was printed and given to the patient at discharge, including (if requested) any available informative/educational handouts regarding diagnosis, treatment, or medications. All questions were answered to patient/family satisfaction.  Should symptoms fail to improve or worsen they agree to call or return to clinic or to go to the Emergency Department. Discussed the importance of following up with any needed screening tests/labs/specialist appointments and any requested follow-up recommended by me today. Importance of maintaining follow-up discussed and patient accepts that missed appointments can delay diagnosis and potentially lead to worsening of conditions.  Return in about 1 month (around 7/21/2022)., or sooner if acute issues arise.    Lab Frequency Next Occurrence   Follow Anesthesia Guidelines / Standing Orders Once 09/29/2020   Obtain Informed Consent Once 10/04/2020       This document has been electronically signed by Frankie Zapata DPM on June 21, 2022 10:27 CDT

## 2022-06-21 ENCOUNTER — HOSPITAL ENCOUNTER (OUTPATIENT)
Dept: GENERAL RADIOLOGY | Facility: HOSPITAL | Age: 67
Discharge: HOME | End: 2022-06-21
Admitting: PODIATRIST

## 2022-06-21 ENCOUNTER — OFFICE VISIT (OUTPATIENT)
Dept: PODIATRY | Facility: CLINIC | Age: 67
End: 2022-06-21

## 2022-06-21 ENCOUNTER — PATIENT ROUNDING (BHMG ONLY) (OUTPATIENT)
Dept: PODIATRY | Facility: CLINIC | Age: 67
End: 2022-06-21

## 2022-06-21 VITALS
HEART RATE: 97 BPM | SYSTOLIC BLOOD PRESSURE: 130 MMHG | DIASTOLIC BLOOD PRESSURE: 82 MMHG | HEIGHT: 63 IN | BODY MASS INDEX: 29.23 KG/M2 | OXYGEN SATURATION: 96 % | WEIGHT: 165 LBS

## 2022-06-21 DIAGNOSIS — E11.9 CONTROLLED TYPE 2 DIABETES MELLITUS WITHOUT COMPLICATION, WITHOUT LONG-TERM CURRENT USE OF INSULIN: ICD-10-CM

## 2022-06-21 DIAGNOSIS — M79.671 FOOT PAIN, BILATERAL: Primary | ICD-10-CM

## 2022-06-21 DIAGNOSIS — M20.11 HALLUX VALGUS, RIGHT: ICD-10-CM

## 2022-06-21 DIAGNOSIS — M79.672 FOOT PAIN, BILATERAL: Primary | ICD-10-CM

## 2022-06-21 DIAGNOSIS — I87.2 VENOUS INSUFFICIENCY (CHRONIC) (PERIPHERAL): ICD-10-CM

## 2022-06-21 DIAGNOSIS — G89.28 CHRONIC POST-OPERATIVE PAIN: ICD-10-CM

## 2022-06-21 DIAGNOSIS — M96.89 NONUNION OF BONE AFTER OSTEOTOMY: ICD-10-CM

## 2022-06-21 DIAGNOSIS — M79.671 FOOT PAIN, BILATERAL: ICD-10-CM

## 2022-06-21 DIAGNOSIS — M79.672 FOOT PAIN, BILATERAL: ICD-10-CM

## 2022-06-21 DIAGNOSIS — Z79.01 ANTICOAGULANT LONG-TERM USE: ICD-10-CM

## 2022-06-21 PROCEDURE — 73630 X-RAY EXAM OF FOOT: CPT

## 2022-06-21 PROCEDURE — 99203 OFFICE O/P NEW LOW 30 MIN: CPT | Performed by: PODIATRIST

## 2022-06-23 ENCOUNTER — TELEPHONE (OUTPATIENT)
Dept: PODIATRY | Facility: CLINIC | Age: 67
End: 2022-06-23

## 2022-06-23 ENCOUNTER — CLINICAL SUPPORT (OUTPATIENT)
Dept: INTERNAL MEDICINE | Facility: CLINIC | Age: 67
End: 2022-06-23

## 2022-06-23 DIAGNOSIS — E53.8 B12 DEFICIENCY: Primary | ICD-10-CM

## 2022-06-23 PROCEDURE — 96372 THER/PROPH/DIAG INJ SC/IM: CPT | Performed by: INTERNAL MEDICINE

## 2022-06-23 RX ADMIN — CYANOCOBALAMIN 1000 MCG: 1000 INJECTION, SOLUTION INTRAMUSCULAR; SUBCUTANEOUS at 09:08

## 2022-06-23 NOTE — TELEPHONE ENCOUNTER
CALLED PT TO LET HER KNOW ABOUT HER XRAYS AND LEFT VOICEMAIL STATING THAT  Left first MTP arthrodesis. Mild hallux valgus deformity of the right  first MTP joint. Chronic arthritic changes described above with no acute  osseous pathology. Calcaneal spurring. Pes planus deformity on the left.

## 2022-06-23 NOTE — PROGRESS NOTES
Injection  Vitamin B-12 Injection performed in Right Deltoid by Rochelle Nevarez MA. Patient tolerated the procedure well without complications.  06/23/22   Rochelle Nevarez MA

## 2022-06-27 ENCOUNTER — OFFICE VISIT (OUTPATIENT)
Dept: SURGERY | Age: 67
End: 2022-06-27
Payer: MEDICARE

## 2022-06-27 ENCOUNTER — HOSPITAL ENCOUNTER (OUTPATIENT)
Dept: WOMENS IMAGING | Age: 67
Discharge: HOME | End: 2022-06-27
Payer: MEDICARE

## 2022-06-27 VITALS
SYSTOLIC BLOOD PRESSURE: 114 MMHG | BODY MASS INDEX: 29.41 KG/M2 | WEIGHT: 166 LBS | OXYGEN SATURATION: 98 % | HEIGHT: 63 IN | HEART RATE: 97 BPM | DIASTOLIC BLOOD PRESSURE: 78 MMHG | TEMPERATURE: 97.3 F

## 2022-06-27 DIAGNOSIS — R92.0 MICROCALCIFICATIONS OF THE BREAST: ICD-10-CM

## 2022-06-27 DIAGNOSIS — Z85.3 PERSONAL HISTORY OF MALIGNANT NEOPLASM OF BREAST: Primary | ICD-10-CM

## 2022-06-27 DIAGNOSIS — Z15.09 GENETIC SUSCEPTIBILITY TO OTHER MALIGNANT NEOPLASM: ICD-10-CM

## 2022-06-27 PROCEDURE — 1036F TOBACCO NON-USER: CPT | Performed by: PHYSICIAN ASSISTANT

## 2022-06-27 PROCEDURE — G8417 CALC BMI ABV UP PARAM F/U: HCPCS | Performed by: PHYSICIAN ASSISTANT

## 2022-06-27 PROCEDURE — G8427 DOCREV CUR MEDS BY ELIG CLIN: HCPCS | Performed by: PHYSICIAN ASSISTANT

## 2022-06-27 PROCEDURE — 1090F PRES/ABSN URINE INCON ASSESS: CPT | Performed by: PHYSICIAN ASSISTANT

## 2022-06-27 PROCEDURE — G8399 PT W/DXA RESULTS DOCUMENT: HCPCS | Performed by: PHYSICIAN ASSISTANT

## 2022-06-27 PROCEDURE — G0279 TOMOSYNTHESIS, MAMMO: HCPCS

## 2022-06-27 PROCEDURE — 99213 OFFICE O/P EST LOW 20 MIN: CPT | Performed by: PHYSICIAN ASSISTANT

## 2022-06-27 PROCEDURE — 3017F COLORECTAL CA SCREEN DOC REV: CPT | Performed by: PHYSICIAN ASSISTANT

## 2022-06-27 PROCEDURE — 1123F ACP DISCUSS/DSCN MKR DOCD: CPT | Performed by: PHYSICIAN ASSISTANT

## 2022-06-27 NOTE — PROGRESS NOTES
HISTORY OF PRESENT ILLNESS:    Ms. 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 ER is positive at 100%. MD is positive at 98%. Prelude DX demonstrates a reduction of 15% all recurrences to 7%. Reduction in invasive cancer recurrences goes from 9% down to 5%. She is status post Left lumpectomy with IORT on 1/31/2020    PATHOLOGY REVEALS:  FINAL DIAGNOSIS:    A.  Breast, left 3 o'clock, wire localized lumpectomy:  Ductal carcinoma in situ, micropapillary type  Ductal carcinoma in situ measures 7 mm in greatest linear dimension  Previous biopsy site  Margins of resection are negative  Ductal carcinoma in situ measures 5 mm from the anterior margin of  resection  Intraductal papillomatosis  Usual ductal hyperplasia    B.  Breast, left, additional cranial lateral margin:  Negative for ductal carcinoma in situ    C.  Breast, left, additional caudal margin:  Fat necrosis  Fibrocystic changes  Negative for ductal carcinoma in situ    D.  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 This increases her risk for developing melanoma and pancreatic cancer. EXAMINATION: [facility] DIGITAL DIAGNOSTIC UNILATERAL LEFT 6/28/2022 5:23 PM   HISTORY: Possible history of breast cancer. Status post stereotactic   biopsy left breast 12/16/2021. COMPARISON: 12/16/2021 (benign stereotactic biopsy), 12/8/2021,   12/2/2020 (lumpectomy), 12/2/2019, 11/28/2018, and 11/27/2017. TECHNIQUE:   2-D and 3-D CC and MLO projections of the left breast.   Computer aided detection technology was utilized. FINDINGS:   Category B: The breast tissue has scattered areas of fibroglandular   density. Stable lumpectomy changes upper-outer quadrant.  Redemonstration of   adjacent target marker associated with benign stereotactic biopsy. No   mass lesion or malignant microcalcifications.       Impression   No mammographic evidence of malignancy. Postlumpectomy and post   stereotactic biopsy changes left upper outer quadrant. Recommend   annual follow-up. BI-RADS class II-benign. PHYSICAL EXAM:  BREAST EXAM:  The left lumpectomy site is well healed. There are fibrocystic changes throughout both breasts. There are no dominant masses, skin dimpling or nipple retraction. There is no axillary lymphadenopathy bilaterally. IMPRESSION:  Doing well s/p Left lumpectomy with IORT 1/31/2020    PLAN:  She will be due for MRCP in 12/2022. I will plan to see her back in 1 year with bilateral mammograms. She will call sooner with any concerns.

## 2022-06-29 ENCOUNTER — TELEPHONE (OUTPATIENT)
Dept: VASCULAR SURGERY | Facility: CLINIC | Age: 67
End: 2022-06-29

## 2022-06-29 ENCOUNTER — TELEPHONE (OUTPATIENT)
Dept: PODIATRY | Facility: CLINIC | Age: 67
End: 2022-06-29

## 2022-06-29 NOTE — TELEPHONE ENCOUNTER
[facility] called and stated that the patient was on their backline and was wanting to know if she could get a copy of her xray results from 6/21/22.  I said that if that was all she needed, I would print it out now and they would be waiting.She said that she would let her know.  I printed this out and left it with Dada.

## 2022-06-29 NOTE — TELEPHONE ENCOUNTER
Caller: Yun Blackwell    Relationship: Self    Best call back number: [phone]    Caller requesting test results: SELF    What test was performed: XRAYS BILATERAL FOOT    When was the test performed: 6-21-22    Where was the test performed: [facility]    Additional notes: PATIENT CALLED BACK WANTING TO DISCUSS RESULTS OF XRAYS- PLEASE CALL AND ADVISE PATIENT WHEN AVAILABLE.     MAY LEAVE MESSAGE IF THERE IS NO ANSWER. TY!

## 2022-07-20 ENCOUNTER — CLINICAL SUPPORT (OUTPATIENT)
Dept: INTERNAL MEDICINE | Facility: CLINIC | Age: 67
End: 2022-07-20

## 2022-07-20 DIAGNOSIS — E53.8 B12 DEFICIENCY: Primary | ICD-10-CM

## 2022-07-20 PROCEDURE — 96372 THER/PROPH/DIAG INJ SC/IM: CPT | Performed by: INTERNAL MEDICINE

## 2022-07-20 RX ADMIN — CYANOCOBALAMIN 1000 MCG: 1000 INJECTION, SOLUTION INTRAMUSCULAR; SUBCUTANEOUS at 09:49

## 2022-07-20 NOTE — PROGRESS NOTES
Injection  Vitamin B-12 Injection performed in LEFT DELTOID by Rochelle Nevarez MA. Patient tolerated the procedure well without complications.  07/20/22   Rochelle Nevarez MA

## 2022-07-25 ENCOUNTER — APPOINTMENT (OUTPATIENT)
Dept: CT IMAGING | Facility: HOSPITAL | Age: 67
End: 2022-07-25

## 2022-07-25 ENCOUNTER — HOSPITAL ENCOUNTER (OUTPATIENT)
Dept: CT IMAGING | Facility: HOSPITAL | Age: 67
Discharge: HOME | End: 2022-07-25
Admitting: PODIATRIST

## 2022-07-25 DIAGNOSIS — M79.671 FOOT PAIN, BILATERAL: ICD-10-CM

## 2022-07-25 DIAGNOSIS — G89.28 CHRONIC POST-OPERATIVE PAIN: ICD-10-CM

## 2022-07-25 DIAGNOSIS — M79.672 FOOT PAIN, BILATERAL: ICD-10-CM

## 2022-07-25 PROCEDURE — 73700 CT LOWER EXTREMITY W/O DYE: CPT

## 2022-07-25 NOTE — PROGRESS NOTES
[facility] - PODIATRY    Today's Date: 07/26/2022     Patient Name: Yun Blackwell  MRN: [id number]  CSN: [id number]  PCP: ANGEL Healy MD  Referring Provider: No ref. provider found    SUBJECTIVE     Chief Complaint   Patient presents with   • Follow-up     ANGEL Healy MD pcp 06/28/2022 Return in about 1 month for Follow-up with Dr. Zapata. - pt states having pain in right foot, and did imaging/xrays - pt denies pain, right foot pain 4/10 - pt presents 1 month follow up with right foot pain, no other visible siting, and pt did get mri/xray done    • Diabetes     108mg/dl yesterday      HPI: Yun Blackwell, a 66 y.o.female, comes to clinic as a(n) established patient for follow-up treatment of left foot pain. Patient has h/o AF, anemia, CA, DM, HLD, HTN, IBS, sleep apnea. Patient is NIDDM with last stated BG level of 108mg/dl. Patient presents with complaints of bunion deformity of the right foot and ongoing pain in the left foot secondary to bunion surgery performed in January of this year. Had arthrodesis of 1st MTPJ performed per Dr. Red and notes that she has had some ongoing shooting/stabbing pain that seems to be worse at night. Had CT prior to appt today. Admits pain at 4/10 level and described as aching and throbbing. Relates previous treatment(s) including left 1st MTPJ arthodesis. Denies any constitutional symptoms. No other pedal complaints at this time.    Past Medical History:   Diagnosis Date   • A-fib (HCC)    • Abnormal ECG     tachycardia, a fib   • Acid reflux    • Anemia    • Arthritis    • Cancer (HCC)    • Diabetes mellitus (HCC)    • Hyperkalemia    • Hyperlipidemia    • Hypertension    • Hyponatremia    • IBS (irritable bowel syndrome)    • Sleep apnea     USES CPAP   • UTI (urinary tract infection)    • Vaginal vault prolapse      Past Surgical History:   Procedure Laterality Date   • BREAST BIOPSY Left     X2   • CARDIAC CATHETERIZATION     • CARDIAC  CATHETERIZATION N/A 2/5/2021    Procedure: Right Heart Cath;  Surgeon: Van Marcelino MD;  Location: [facility] CATH INVASIVE LOCATION;  Service: Cardiology;  Laterality: N/A;   • COLON SURGERY     • COLONOSCOPY  09/21/2015    TRANSVERSE COLON ADENOMATOUS POLYP, HEMORRHOIDS, RECALL 5 YEARS, DR LAMAS   • COLONOSCOPY N/A 11/13/2020    Procedure: COLONOSCOPY WITH ANESTHESIA;  Surgeon: Joe Lamas MD;  Location: [facility] ENDOSCOPY;  Service: Gastroenterology;  Laterality: N/A;  pre op: screening  post op:divdrticulosis, polyp  PCP: ANGEL Healy MD   • COLOSTOMY     • COLPOPEXY VAGINAL      2014   • ENDOSCOPY N/A 11/3/2016    LA GRADE B ESOPHAGITIS WITH NO BLEEDING UPPER THIRD OF ESOPHAGUS, GERI-[place]AD[place]ORTH TEAR GEJ, BILIOUS BLOOD TINGED COFFEE GROUND GATRIC FLUIDDR LAMAS   • EXPLORATORY LAPAROTOMY N/A 10/14/2016    Procedure: LAPAROTOMY EXPLORATORY, LYSIS OF ADHESIONS, IRRIAGATION OF ABDOMEN, POSSIBLE BOWEL RESECTION;  Surgeon: Bacilio Marcial MD;  Location: [facility] OR;  Service:    • HYSTERECTOMY     • REVISION / TAKEDOWN COLOSTOMY     • SACROCOLPOPEXY N/A 9/21/2016    Procedure: SACROCOLPOPEXY LAPAROSCOPIC WITH BoxINCI SI ROBOT, CONVERTED TO OPEN SACROCOLPOPEXY, RIGHT SALPINGO- OOPHERECTOMY, CYSTO;  Surgeon: Maribell Beth MD;  Location: [facility] OR;  Service:    • TOE FUSION Left 1/6/2022    Procedure: FIRST METATARSOPHALANGEAL JOINT ARTHRODESIS WITH INTERNAL FIXATION - LEFT FOOT;  Surgeon: Jorgito Red DPM;  Location: [facility] OR;  Service: Podiatry;  Laterality: Left;     Family History   Problem Relation Age of Onset   • Hypertension Mother    • No Known Problems Father    • Colon cancer Neg Hx    • Colon polyps Neg Hx      Social History     Socioeconomic History   • Marital status:    Tobacco Use   • Smoking status: Never Smoker   • Smokeless tobacco: Never Used   Vaping Use   • Vaping Use: Never used   Substance and Sexual Activity   • Alcohol use: No   • Drug use: No   • Sexual activity: Defer      Allergies   Allergen Reactions   • Morphine And Related Nausea And Vomiting and Dizziness   • Percocet [Oxycodone-Acetaminophen] Nausea And Vomiting and Dizziness     Current Outpatient Medications   Medication Sig Dispense Refill   • apixaban (Eliquis) 5 MG tablet tablet Take 1 tablet by mouth Every 12 (Twelve) Hours. 60 tablet 5   • cyanocobalamin 1000 MCG/ML injection Inject 1 mL Every 28 (Twenty-Eight) Days. 1 mL 5   • dilTIAZem (CARDIZEM) 30 MG tablet Take 1 tablet by mouth 2 (Two) Times a Day. 60 tablet 5   • montelukast (SINGULAIR) 10 MG tablet Take 1 tablet by mouth every night 30 tablet 5   • ofloxacin (OCUFLOX) 0.3 % ophthalmic solution Instill 1 drop into affected eye three times a day starting the day before surgery 5 mL 1   • omeprazole (priLOSEC) 20 MG capsule Take 1 capsule by mouth Daily. 30 capsule 5   • prednisoLONE acetate (PRED FORTE) 1 % ophthalmic suspension Instill 1 drop into affected eye four times a day after surgery as directed 10 mL 1   • tamoxifen (NOLVADEX) 20 MG chemo tablet Take 1 tablet by mouth daily 90 tablet 3   • tiZANidine (ZANAFLEX) 4 MG tablet Take one-half to 1 tablet by mouth at bedtime. (Patient taking differently: 4 mg. Takes whole tablet daily) 30 tablet 2     Current Facility-Administered Medications   Medication Dose Route Frequency Provider Last Rate Last Admin   • cyanocobalamin injection 1,000 mcg  1,000 mcg Intramuscular Q28 Days ANGEL Healy MD   1,000 mcg at 07/20/22 0949     Review of Systems   Constitutional: Negative for chills and fever.   HENT: Negative for congestion.    Respiratory: Negative for shortness of breath.    Cardiovascular: Negative for chest pain and leg swelling.   Gastrointestinal: Negative for constipation, diarrhea, nausea and vomiting.   Musculoskeletal: Positive for arthralgias.        Foot pain   Skin: Negative for wound.   Neurological: Negative for numbness.       OBJECTIVE     Vitals:    07/26/22 0835   BP: 125/80   Pulse:  107   SpO2: 97%       PHYSICAL EXAM  GEN:   Accompanied by none.     Foot/Ankle Exam:       General:   Appearance: appears stated age and healthy    Orientation: AAOx3    Affect: appropriate    Gait: antalgic    Assistance: independent    Shoe Gear:  Sandals    VASCULAR      Right Foot Vascularity   Dorsalis pedis:  2+  Posterior tibial:  2+  Skin Temperature: warm    Edema Grading:  None  CFT:  3  Pedal Hair Growth:  Present  Varicosities: mild varicosities       Left Foot Vascularity   Dorsalis pedis:  2+  Posterior tibial:  2+  Skin Temperature: warm    Edema Grading:  None  CFT:  3  Pedal Hair Growth:  Present  Varicosities: mild varicosities        NEUROLOGIC     Right Foot Neurologic   Normal sensation    Light touch sensation:  Normal  Vibratory sensation:  Normal  Hot/Cold sensation: normal    Protective Sensation using Girdwood-Yennifer Monofilament:  10     Left Foot Neurologic   Normal sensation    Light touch sensation:  Normal  Vibratory sensation:  Normal  Hot/cold sensation: normal    Protective Sensation using Girdwood-Yennifer Monofilament:  10     MUSCULOSKELETAL      Right Foot Musculoskeletal   Ecchymosis:  None  Tenderness: great toe metatarsophalangeal joint    Arch:  Normal  Hammertoe:  Second toe, third toe, fourth toe and fifth toe  Hallux valgus: Yes (large medial eminence with dorsal spurring. Moderately trackbound. Minimal crepitus.)       Left Foot Musculoskeletal   Ecchymosis:  None  Tenderness: great toe metatarsophalangeal joint    Arch:  Normal  Hallux limitus: Yes (s/p arthodesis - possible micromotion remains)       MUSCLE STRENGTH     Right Foot Muscle Strength   Foot dorsiflexion:  5  Foot plantar flexion:  5  Foot inversion:  5  Foot eversion:  5     Left Foot Muscle Strength   Foot dorsiflexion:  5  Foot plantar flexion:  5  Foot inversion:  5  Foot eversion:  5     RANGE OF MOTION      Right Foot Range of Motion   Foot and ankle ROM within normal limits       Left Foot Range of  Motion   Foot and ankle ROM within normal limits       DERMATOLOGIC     Right Foot Dermatologic   Skin: skin intact    Nails: abnormally thick       Left Foot Dermatologic   Skin: skin intact    Nails: abnormally thick       Image:       RADIOLOGY/NUCLEAR:  CT foot left wo contrast    Result Date: 7/25/2022  Narrative: EXAMINATION: CT left foot without contrast 07/25/2022  DOSE: 1:30 mGycm. All CT scans are performed using dose optimization techniques as appropriate to the performed exam and including at least one of the following: Automated exposure control, adjustment of the mA and/or kV according to size, and the use of the iterative reconstruction technique..  HISTORY: Arthritis. First metatarsal phalangeal joint arthrodesis.  FINDINGS: Multiple contiguous axial images are obtained through the left foot per protocol without intravenous contrast-enhancement with reformatted images obtained in sagittal coronal projections from the original data set.  There is a pace planus deformity of the foot. There is a large plantar spur of the calcaneus. There is also a mild enthesophyte at the Achilles insertion.  The patient has undergone arthrodesis of the first metatarsophalangeal joint. Portions of the MTP joint remain patent but there does appear to be some early bony fusion along the more medial aspect of the MTP joint. On sagittal reconstructions I would question whether there may be a fracture line through the plantar aspect of the base of the proximal phalanx adjacent to one of the vertically oriented screws. This is only seen well in one image. This is noted on image 43 of sequence 6. The patient's undergone bunionectomy and osteotomy of the first metatarsal. There is spurring of the sesamoids. Mild arthritic changes are noted in the interphalangeal joints of the second through fifth digits. The midfoot is intact. No acute fracture lines are present.      Impression: 1.. Status post arthrodesis of the first MTP  joint. Although the joint remains defined there are portions of the MTP joint which I feel are partially fused. 2. Questionable nondisplaced fracture through the plantar base of the proximal phalanx of the first digit seen only on sagittal reconstructions. No additional fractures are present. 3. Calcaneal spurring. This report was finalized on 07/25/2022 16:42 by Dr. Wisam Rowland MD.    [facility] Digital Diagnostic Unilateral Left    Result Date: 6/28/2022  Narrative: EXAMINATION: [facility] DIGITAL DIAGNOSTIC UNILATERAL LEFT 6/28/2022 5:23 PM HISTORY: Possible history of breast cancer. Status post stereotactic biopsy left breast 12/16/2021. COMPARISON: 12/16/2021 (benign stereotactic biopsy), 12/8/2021, 12/2/2020 (lumpectomy), 12/2/2019, 11/28/2018, and 11/27/2017. TECHNIQUE: 2-D and 3-D CC and MLO projections of the left breast. Computer aided detection technology was utilized. FINDINGS: Category B: The breast tissue has scattered areas of fibroglandular density. Stable lumpectomy changes upper-outer quadrant. Redemonstration of adjacent target marker associated with benign stereotactic biopsy. No mass lesion or malignant microcalcifications.    Impression: No mammographic evidence of malignancy. Postlumpectomy and post stereotactic biopsy changes left upper outer quadrant. Recommend annual follow-up. BI-RADS class II-benign. 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 Foot pain, left    3. Anticoagulant long-term use    4. Controlled type 2 diabetes mellitus without complication, without long-term current use of insulin (HCC)    5. Hallux valgus, right    6. Chronic post-operative pain    7. Venous insufficiency (chronic) (peripheral)      Comprehensive lower extremity examination and evaluation was performed.  Discussed findings and treatment plan including risks,  benefits, and treatment options with patient in detail. Patient agreed with treatment plan.  Reviewed CT with patient consistent with non-union of 1st MTPJ.    Given options of revisional surgery or attempt bone stimulator and immobilization in CAM.   Rx: Bone Stimulator  Dispensed CAM.  An After Visit Summary was printed and given to the patient at discharge, including (if requested) any available informative/educational handouts regarding diagnosis, treatment, or medications. All questions were answered to patient/family satisfaction. Should symptoms fail to improve or worsen they agree to call or return to clinic or to go to the Emergency Department. Discussed the importance of following up with any needed screening tests/labs/specialist appointments and any requested follow-up recommended by me today. Importance of maintaining follow-up discussed and patient accepts that missed appointments can delay diagnosis and potentially lead to worsening of conditions.  Return in about 2 months (around 9/26/2022) for Recheck., or sooner if acute issues arise.    Lab Frequency Next Occurrence   Follow Anesthesia Guidelines / Standing Orders Once 09/29/2020   Obtain Informed Consent Once 10/04/2020       This document has been electronically signed by Frankie Zapata DPM on July 26, 2022 09:06 CDT

## 2022-07-26 ENCOUNTER — TELEPHONE (OUTPATIENT)
Dept: CARDIOLOGY | Facility: CLINIC | Age: 67
End: 2022-07-26

## 2022-07-26 ENCOUNTER — OFFICE VISIT (OUTPATIENT)
Dept: PODIATRY | Facility: CLINIC | Age: 67
End: 2022-07-26

## 2022-07-26 VITALS
BODY MASS INDEX: 31.28 KG/M2 | HEIGHT: 62 IN | SYSTOLIC BLOOD PRESSURE: 125 MMHG | DIASTOLIC BLOOD PRESSURE: 80 MMHG | WEIGHT: 170 LBS | HEART RATE: 107 BPM | OXYGEN SATURATION: 97 %

## 2022-07-26 DIAGNOSIS — M96.0 NONUNION AFTER ARTHRODESIS: Primary | ICD-10-CM

## 2022-07-26 DIAGNOSIS — E11.9 CONTROLLED TYPE 2 DIABETES MELLITUS WITHOUT COMPLICATION, WITHOUT LONG-TERM CURRENT USE OF INSULIN: ICD-10-CM

## 2022-07-26 DIAGNOSIS — Z79.01 ANTICOAGULANT LONG-TERM USE: ICD-10-CM

## 2022-07-26 DIAGNOSIS — I87.2 VENOUS INSUFFICIENCY (CHRONIC) (PERIPHERAL): ICD-10-CM

## 2022-07-26 DIAGNOSIS — M20.11 HALLUX VALGUS, RIGHT: ICD-10-CM

## 2022-07-26 DIAGNOSIS — M79.672 FOOT PAIN, LEFT: ICD-10-CM

## 2022-07-26 DIAGNOSIS — G89.28 CHRONIC POST-OPERATIVE PAIN: ICD-10-CM

## 2022-07-26 PROCEDURE — 99213 OFFICE O/P EST LOW 20 MIN: CPT | Performed by: PODIATRIST

## 2022-07-26 NOTE — TELEPHONE ENCOUNTER
The patient is scheduled on 8/17 for cataract surgery with [facility].  They are requesting a cardiac risk assessment.  Please advise.  Thank you!

## 2022-07-26 NOTE — TELEPHONE ENCOUNTER
Low risk for major adverse cardiovascular event with cataract surgery.  May proceed without further delay from a cardiac standpoint so long as she is not having signs or symptoms consistent with an acute cardiac problem.  Can hold Eliquis 48 hours prior to procedure and resume when felt safe to do so afterward, if the provider feels this needs to be held for the procedure.

## 2022-08-04 RX ORDER — OMEPRAZOLE 20 MG/1
20 CAPSULE, DELAYED RELEASE ORAL DAILY
Qty: 30 CAPSULE | Refills: 11 | Status: SHIPPED | OUTPATIENT
Start: 2022-08-04

## 2022-08-22 DIAGNOSIS — I48.20 CHRONIC ATRIAL FIBRILLATION: ICD-10-CM

## 2022-08-25 ENCOUNTER — CLINICAL SUPPORT (OUTPATIENT)
Dept: INTERNAL MEDICINE | Facility: CLINIC | Age: 67
End: 2022-08-25

## 2022-08-25 ENCOUNTER — TELEPHONE (OUTPATIENT)
Dept: CARDIOLOGY | Facility: CLINIC | Age: 67
End: 2022-08-25

## 2022-08-25 ENCOUNTER — TELEPHONE (OUTPATIENT)
Dept: NEUROLOGY | Age: 67
End: 2022-08-25

## 2022-08-25 DIAGNOSIS — E53.8 B12 DEFICIENCY: Primary | ICD-10-CM

## 2022-08-25 PROCEDURE — 96372 THER/PROPH/DIAG INJ SC/IM: CPT | Performed by: INTERNAL MEDICINE

## 2022-08-25 RX ADMIN — CYANOCOBALAMIN 1000 MCG: 1000 INJECTION, SOLUTION INTRAMUSCULAR; SUBCUTANEOUS at 16:16

## 2022-08-25 NOTE — TELEPHONE ENCOUNTER
CALL BACK NUMBER: [phone]    PT CALLED TO ASK ABOUT ELIQUIS SAMPLE MEDICATIONS SINCE IT IS TOO HIGH TO PAY FOR.     LEAVE VOICEMAIL IF NEED BE

## 2022-08-25 NOTE — TELEPHONE ENCOUNTER
Pt would like to know if samples of eliquis are available. Please call pt to advise. Call patient and had to leave a voicemail to let her know we didn't have samples of eliquis and that she may need to call her PCP to see if they had any.

## 2022-08-25 NOTE — PROGRESS NOTES
Injection  Vitamin B-12 Injection performed in Left Deltoid by Rochelle Nevarez MA. Patient tolerated the procedure well without complications.  08/25/22   Rochelle Nevarez MA

## 2022-08-29 ENCOUNTER — TELEPHONE (OUTPATIENT)
Dept: PODIATRY | Facility: CLINIC | Age: 67
End: 2022-08-29

## 2022-08-29 NOTE — TELEPHONE ENCOUNTER
Pt called and has an ingrown nail that is hurting so bad she cant ware shoes. Her follow up isnt till end of sept. You have 30 tomorrow with a blocked time of 3:30 tomorrow with a pt following at 3:45. Would it be ok to add her at 3:30?

## 2022-08-30 ENCOUNTER — OFFICE VISIT (OUTPATIENT)
Dept: PODIATRY | Facility: CLINIC | Age: 67
End: 2022-08-30

## 2022-08-30 VITALS
SYSTOLIC BLOOD PRESSURE: 130 MMHG | DIASTOLIC BLOOD PRESSURE: 80 MMHG | HEIGHT: 62 IN | BODY MASS INDEX: 31.28 KG/M2 | WEIGHT: 170 LBS

## 2022-08-30 DIAGNOSIS — Z79.01 ANTICOAGULANT LONG-TERM USE: ICD-10-CM

## 2022-08-30 DIAGNOSIS — M79.672 FOOT PAIN, BILATERAL: ICD-10-CM

## 2022-08-30 DIAGNOSIS — M79.671 FOOT PAIN, BILATERAL: ICD-10-CM

## 2022-08-30 DIAGNOSIS — L60.0 INGROWN TOENAIL: Primary | ICD-10-CM

## 2022-08-30 DIAGNOSIS — R73.03 PREDIABETES: ICD-10-CM

## 2022-08-30 PROCEDURE — 11720 DEBRIDE NAIL 1-5: CPT | Performed by: PODIATRIST

## 2022-08-30 PROCEDURE — 99213 OFFICE O/P EST LOW 20 MIN: CPT | Performed by: PODIATRIST

## 2022-08-30 NOTE — PROGRESS NOTES
[facility] - PODIATRY    Today's Date: 08/30/2022     Patient Name: Yun Blackwell  MRN: [id number]  CSN: [id number]  PCP: ANGEL Healy MD  Referring Provider: No ref. provider found    SUBJECTIVE     Chief Complaint   Patient presents with   • Follow-up     ANGEL Healy MD pcp 06/28/2022 ingrown nail / per Benny -pt states feels like left and right hallux ingrow nail, has been soaking in epsom salt - pt pain 2/10 - pt presents possible ingrown nail of left and right hallux    • Diabetes     116mg/dl this am      HPI: Yun Blackwell, a 66 y.o.female, comes to clinic as a(n) established patient complaining of ingrown toenail. Patient has h/o AF, anemia, CA, DM, HLD, HTN, IBS, sleep apnea. Patient is NIDDM with last stated BG level of 116mg/dl.  Patient relates that for the past couple weeks, she has had ingrown toenails to both great toes.  They are thickened and irregular as well as pressing down within the borders of each toe.  Denies any significant redness or drainage.  She remains in a cam boot to her left foot and is awaiting approval for a bone stimulator. Admits pain at 2/10 level and described as aching and throbbing. Relates previous treatment(s) including Epson salt soaks. Denies any constitutional symptoms. No other pedal complaints at this time.    Past Medical History:   Diagnosis Date   • A-fib (HCC)    • Abnormal ECG     tachycardia, a fib   • Acid reflux    • Anemia    • Arthritis    • Cancer (HCC)    • Diabetes mellitus (HCC)    • Hyperkalemia    • Hyperlipidemia    • Hypertension    • Hyponatremia    • IBS (irritable bowel syndrome)    • Sleep apnea     USES CPAP   • UTI (urinary tract infection)    • Vaginal vault prolapse      Past Surgical History:   Procedure Laterality Date   • BREAST BIOPSY Left     X2   • CARDIAC CATHETERIZATION     • CARDIAC CATHETERIZATION N/A 2/5/2021    Procedure: Right Heart Cath;  Surgeon: Van Marcelino MD;  Location:  [facility] CATH  INVASIVE LOCATION;  Service: Cardiology;  Laterality: N/A;   • COLON SURGERY     • COLONOSCOPY  09/21/2015    TRANSVERSE COLON ADENOMATOUS POLYP, HEMORRHOIDS, RECALL 5 YEARS, DR LAMAS   • COLONOSCOPY N/A 11/13/2020    Procedure: COLONOSCOPY WITH ANESTHESIA;  Surgeon: Joe Lamas MD;  Location:  [facility] ENDOSCOPY;  Service: Gastroenterology;  Laterality: N/A;  pre op: screening  post op:divdrticulosis, polyp  PCP: ANGEL Healy MD   • COLOSTOMY     • COLPOPEXY VAGINAL      2014   • ENDOSCOPY N/A 11/3/2016    LA GRADE B ESOPHAGITIS WITH NO BLEEDING UPPER THIRD OF ESOPHAGUS, GERI-WADSWORTH TEAR GEJ, BILIOUS BLOOD TINGED COFFEE GROUND GATRIC FLUIDDR LAMAS   • EXPLORATORY LAPAROTOMY N/A 10/14/2016    Procedure: LAPAROTOMY EXPLORATORY, LYSIS OF ADHESIONS, IRRIAGATION OF ABDOMEN, POSSIBLE BOWEL RESECTION;  Surgeon: Bacilio Marcial MD;  Location:  [facility] OR;  Service:    • HYSTERECTOMY     • REVISION / TAKEDOWN COLOSTOMY     • SACROCOLPOPEXY N/A 9/21/2016    Procedure: SACROCOLPOPEXY LAPAROSCOPIC WITH [employer] SI ROBOT, CONVERTED TO OPEN SACROCOLPOPEXY, RIGHT SALPINGO- OOPHERECTOMY, CYSTO;  Surgeon: Maribell Beth MD;  Location:  [facility] OR;  Service:    • TOE FUSION Left 1/6/2022    Procedure: FIRST METATARSOPHALANGEAL JOINT ARTHRODESIS WITH INTERNAL FIXATION - LEFT FOOT;  Surgeon: Jorgito Red DPM;  Location:  [facility] OR;  Service: Podiatry;  Laterality: Left;     Family History   Problem Relation Age of Onset   • Hypertension Mother    • No Known Problems Father    • Colon cancer Neg Hx    • Colon polyps Neg Hx      Social History     Socioeconomic History   • Marital status:    Tobacco Use   • Smoking status: Never Smoker   • Smokeless tobacco: Never Used   Vaping Use   • Vaping Use: Never used   Substance and Sexual Activity   • Alcohol use: No   • Drug use: No   • Sexual activity: Defer     Allergies   Allergen Reactions   • Morphine And Related Nausea And Vomiting and Dizziness   • Percocet  [Oxycodone-Acetaminophen] Nausea And Vomiting and Dizziness     Current Outpatient Medications   Medication Sig Dispense Refill   • apixaban (ELIQUIS) 5 MG tablet tablet Take 1 tablet by mouth Every 12 (Twelve) Hours. 60 tablet 5   • ofloxacin (OCUFLOX) 0.3 % ophthalmic solution Instill 1 drop into affected eye three times a day starting the day before surgery 5 mL 1   • omeprazole (priLOSEC) 20 MG capsule Take 1 capsule by mouth Daily. 30 capsule 11   • prednisoLONE acetate (PRED FORTE) 1 % ophthalmic suspension Instill 1 drop into affected eye four times a day after surgery as directed 10 mL 1   • tamoxifen (NOLVADEX) 20 MG chemo tablet Take 1 tablet by mouth daily 90 tablet 3   • tiZANidine (ZANAFLEX) 4 MG tablet Take one-half to 1 tablet by mouth at bedtime. (Patient taking differently: 4 mg. Takes whole tablet daily) 30 tablet 2   • cyanocobalamin 1000 MCG/ML injection Inject 1 mL Every 28 (Twenty-Eight) Days. 1 mL 5   • dilTIAZem (CARDIZEM) 30 MG tablet Take 1 tablet by mouth 2 (Two) Times a Day. 60 tablet 5   • montelukast (SINGULAIR) 10 MG tablet Take 1 tablet by mouth every night 30 tablet 5     Current Facility-Administered Medications   Medication Dose Route Frequency Provider Last Rate Last Admin   • cyanocobalamin injection 1,000 mcg  1,000 mcg Intramuscular Q28 Days ANGEL Healy MD   1,000 mcg at 08/25/22 1616     Review of Systems   Constitutional: Negative for chills and fever.   HENT: Negative for congestion.    Respiratory: Negative for shortness of breath.    Cardiovascular: Negative for chest pain and leg swelling.   Gastrointestinal: Negative for constipation, diarrhea, nausea and vomiting.   Musculoskeletal: Positive for arthralgias.        Foot pain   Skin: Negative for wound.   Neurological: Negative for numbness.       OBJECTIVE     Vitals:    08/30/22 1526   BP: 130/80       PHYSICAL EXAM  GEN:   Accompanied by none.     Foot/Ankle Exam:       General:   Appearance: appears stated age  and healthy    Orientation: AAOx3    Affect: appropriate    Gait: antalgic    Assistance: independent    Shoe Gear:  CAM boot    VASCULAR      Right Foot Vascularity   Dorsalis pedis:  2+  Posterior tibial:  2+  Skin Temperature: warm    Edema Grading:  None  CFT:  3  Pedal Hair Growth:  Present  Varicosities: mild varicosities       Left Foot Vascularity   Dorsalis pedis:  2+  Posterior tibial:  2+  Skin Temperature: warm    Edema Grading:  None  CFT:  3  Pedal Hair Growth:  Present  Varicosities: mild varicosities        NEUROLOGIC     Right Foot Neurologic   Normal sensation    Light touch sensation:  Normal  Vibratory sensation:  Normal  Hot/Cold sensation: normal    Protective Sensation using Bighorn-Yennifer Monofilament:  10     Left Foot Neurologic   Normal sensation    Light touch sensation:  Normal  Vibratory sensation:  Normal  Hot/cold sensation: normal    Protective Sensation using Bighorn-Yennifer Monofilament:  10     MUSCULOSKELETAL      Right Foot Musculoskeletal   Ecchymosis:  None  Tenderness: great toe metatarsophalangeal joint and toe 1    Arch:  Normal  Hammertoe:  Second toe, third toe, fourth toe and fifth toe  Hallux valgus: Yes (large medial eminence with dorsal spurring. Moderately trackbound. Minimal crepitus.)       Left Foot Musculoskeletal   Ecchymosis:  None  Tenderness: great toe metatarsophalangeal joint and toe 1    Arch:  Normal  Hallux limitus: Yes (s/p arthodesis - possible micromotion remains)       MUSCLE STRENGTH     Right Foot Muscle Strength   Foot dorsiflexion:  5  Foot plantar flexion:  5  Foot inversion:  5  Foot eversion:  5     Left Foot Muscle Strength   Foot dorsiflexion:  5  Foot plantar flexion:  5  Foot inversion:  5  Foot eversion:  5     RANGE OF MOTION      Right Foot Range of Motion   Foot and ankle ROM within normal limits       Left Foot Range of Motion   Foot and ankle ROM within normal limits       DERMATOLOGIC     Right Foot Dermatologic   Skin: skin  intact    Nails: abnormally thick and ingrown toenail (hallux)       Left Foot Dermatologic   Skin: skin intact    Nails: abnormally thick and ingrown toenail (hallux)       Image:       RADIOLOGY/NUCLEAR:  No results found.    LABORATORY/CULTURE RESULTS:      PATHOLOGY RESULTS:       ASSESSMENT/PLAN     Diagnoses and all orders for this visit:    1. Ingrown toenail (Primary)    2. Foot pain, bilateral    3. Prediabetes    4. Anticoagulant long-term use      Comprehensive lower extremity examination and evaluation was performed.  Discussed findings and treatment plan including risks, benefits, and treatment options with patient in detail. Patient agreed with treatment plan.  After verbal consent obtained, nail(s) x2 debrided of offending borders with nail nipper without incidence  Patient may maintain nails and calluses at home utilizing emery board or pumice stone between visits as needed   If ingrown toenails return, patient may need PNA versus TNA.  An After Visit Summary was printed and given to the patient at discharge, including (if requested) any available informative/educational handouts regarding diagnosis, treatment, or medications. All questions were answered to patient/family satisfaction. Should symptoms fail to improve or worsen they agree to call or return to clinic or to go to the Emergency Department. Discussed the importance of following up with any needed screening tests/labs/specialist appointments and any requested follow-up recommended by me today. Importance of maintaining follow-up discussed and patient accepts that missed appointments can delay diagnosis and potentially lead to worsening of conditions.  Return for Next scheduled follow up., or sooner if acute issues arise.    Lab Frequency Next Occurrence   Follow Anesthesia Guidelines / Standing Orders Once 09/29/2020   Obtain Informed Consent Once 10/04/2020       This document has been electronically signed by Frankie Zapata DPM on  September 6, 2022 11:52 CDT

## 2022-09-01 DIAGNOSIS — E53.8 B12 DEFICIENCY: ICD-10-CM

## 2022-09-01 DIAGNOSIS — D05.12 DUCTAL CARCINOMA IN SITU (DCIS) OF LEFT BREAST: ICD-10-CM

## 2022-09-01 RX ORDER — TAMOXIFEN CITRATE 20 MG/1
20 TABLET ORAL DAILY
Qty: 90 TABLET | Refills: 3 | OUTPATIENT
Start: 2022-09-01

## 2022-09-01 RX ORDER — MONTELUKAST SODIUM 10 MG/1
10 TABLET ORAL NIGHTLY
Qty: 30 TABLET | Refills: 5 | Status: SHIPPED | OUTPATIENT
Start: 2022-09-01 | End: 2023-02-24

## 2022-09-01 RX ORDER — CYANOCOBALAMIN 1000 UG/ML
1000 INJECTION, SOLUTION INTRAMUSCULAR; SUBCUTANEOUS
Qty: 1 ML | Refills: 5 | Status: SHIPPED | OUTPATIENT
Start: 2022-09-01

## 2022-09-09 ENCOUNTER — TELEPHONE (OUTPATIENT)
Dept: SURGERY | Age: 67
End: 2022-09-09

## 2022-09-09 ENCOUNTER — TELEPHONE (OUTPATIENT)
Dept: HEMATOLOGY | Age: 67
End: 2022-09-09

## 2022-09-09 DIAGNOSIS — D05.12 DUCTAL CARCINOMA IN SITU (DCIS) OF LEFT BREAST: ICD-10-CM

## 2022-09-09 DIAGNOSIS — Z85.3 PERSONAL HISTORY OF MALIGNANT NEOPLASM OF BREAST: Primary | ICD-10-CM

## 2022-09-09 RX ORDER — TAMOXIFEN CITRATE 20 MG/1
20 TABLET ORAL DAILY
Qty: 30 TABLET | Refills: 0 | Status: SHIPPED | OUTPATIENT
Start: 2022-09-09 | End: 2022-10-13 | Stop reason: SDUPTHER

## 2022-09-09 NOTE — TELEPHONE ENCOUNTER
9/9/22 Patient called and stated she went to [facility] to  Chemo Pills and they were not filled. She is needing her pills refilled. Call back # [phone].   michael

## 2022-09-17 ENCOUNTER — HOSPITAL ENCOUNTER (EMERGENCY)
Facility: HOSPITAL | Age: 67
Discharge: HOME | End: 2022-09-17
Attending: STUDENT IN AN ORGANIZED HEALTH CARE EDUCATION/TRAINING PROGRAM | Admitting: STUDENT IN AN ORGANIZED HEALTH CARE EDUCATION/TRAINING PROGRAM

## 2022-09-17 VITALS
HEART RATE: 88 BPM | BODY MASS INDEX: 29.81 KG/M2 | HEIGHT: 62 IN | WEIGHT: 162 LBS | SYSTOLIC BLOOD PRESSURE: 124 MMHG | RESPIRATION RATE: 18 BRPM | OXYGEN SATURATION: 96 % | DIASTOLIC BLOOD PRESSURE: 68 MMHG | TEMPERATURE: 98.1 F

## 2022-09-17 DIAGNOSIS — H57.11 PAIN OF RIGHT EYE: Primary | ICD-10-CM

## 2022-09-17 PROCEDURE — 99283 EMERGENCY DEPT VISIT LOW MDM: CPT

## 2022-09-17 RX ORDER — BROMFENAC SODIUM 0.7 MG/ML
1 SOLUTION/ DROPS OPHTHALMIC DAILY
COMMUNITY
End: 2022-12-28

## 2022-09-17 RX ORDER — TETRACAINE HYDROCHLORIDE 5 MG/ML
2 SOLUTION OPHTHALMIC ONCE
Status: DISCONTINUED | OUTPATIENT
Start: 2022-09-17 | End: 2022-09-17 | Stop reason: HOSPADM

## 2022-09-17 RX ADMIN — FLUORESCEIN SODIUM 1 STRIP: 1 STRIP OPHTHALMIC at 16:50

## 2022-09-17 NOTE — DISCHARGE INSTRUCTIONS
Please come back for worsening vision in the right eye.  Use your bromfenac drops and your ofloxacin drops for pain and concern of infection of the right eye.  Come back if your pain is not controlled in the right eye or if you develop any other complications such as visual loss, constriction of your visual field, or any other emergencies.  See her ophthalmologist on Monday.

## 2022-09-17 NOTE — ED PROVIDER NOTES
Subjective   History of Present Illness   Patient presents with eye pain and redness.  Present in the right eye.  Started this afternoon.  She can identify 2 possible triggers; being outside in the sunlight, and she put some pain cream on her hand and then took a nap with her hand on her head and wonders if it could have gotten in her eye.  She has a history of cataract surgery in that eye over a month ago.  She wears corrective glasses, no contact lenses, not wearing her glasses right now.  No globe trauma.  No scratching her eye.  No visual blurriness or change.  No severe headache, no weakness.  No fevers.    Review of Systems   Constitutional: Negative for chills and fever.   HENT: Negative for congestion and sore throat.    Eyes: Positive for pain and redness.   Respiratory: Negative for cough and shortness of breath.    Cardiovascular: Negative for chest pain and palpitations.   Gastrointestinal: Negative for abdominal pain and vomiting.   Genitourinary: Negative for difficulty urinating and dysuria.   Musculoskeletal: Negative for gait problem and joint swelling.   Skin: Negative for rash and wound.   Neurological: Negative for syncope and light-headedness.       Past Medical History:   Diagnosis Date   • A-fib (HCC)    • Abnormal ECG     tachycardia, a fib   • Acid reflux    • Anemia    • Arthritis    • Cancer (HCC)    • Diabetes mellitus (HCC)    • Hyperkalemia    • Hyperlipidemia    • Hypertension    • Hyponatremia    • IBS (irritable bowel syndrome)    • Sleep apnea     USES CPAP   • UTI (urinary tract infection)    • Vaginal vault prolapse        Allergies   Allergen Reactions   • Morphine And Related Nausea And Vomiting and Dizziness   • Percocet [Oxycodone-Acetaminophen] Nausea And Vomiting and Dizziness       Past Surgical History:   Procedure Laterality Date   • BREAST BIOPSY Left     X2   • CARDIAC CATHETERIZATION     • CARDIAC CATHETERIZATION N/A 2/5/2021    Procedure: Right Heart Cath;  Surgeon:  Van Marcelino MD;  Location: [facility] CATH INVASIVE LOCATION;  Service: Cardiology;  Laterality: N/A;   • COLON SURGERY     • COLONOSCOPY  09/21/2015    TRANSVERSE COLON ADENOMATOUS POLYP, HEMORRHOIDS, RECALL 5 YEARS, DR LAMAS   • COLONOSCOPY N/A 11/13/2020    Procedure: COLONOSCOPY WITH ANESTHESIA;  Surgeon: Joe Lamas MD;  Location: [facility] ENDOSCOPY;  Service: Gastroenterology;  Laterality: N/A;  pre op: screening  post op:divdrticulosis, polyp  PCP: ANGEL Healy MD   • COLOSTOMY     • COLPOPEXY VAGINAL      2014   • ENDOSCOPY N/A 11/3/2016    LA GRADE B ESOPHAGITIS WITH NO BLEEDING UPPER THIRD OF ESOPHAGUS, GERI-WADSWORTH TEAR GEJ, BILIOUS BLOOD TINGED COFFEE GROUND GATRIC FLUIDDR LAMAS   • EXPLORATORY LAPAROTOMY N/A 10/14/2016    Procedure: LAPAROTOMY EXPLORATORY, LYSIS OF ADHESIONS, IRRIAGATION OF ABDOMEN, POSSIBLE BOWEL RESECTION;  Surgeon: Bacilio Marcial MD;  Location: [facility] OR;  Service:    • HYSTERECTOMY     • REVISION / TAKEDOWN COLOSTOMY     • SACROCOLPOPEXY N/A 9/21/2016    Procedure: SACROCOLPOPEXY LAPAROSCOPIC WITH [employer] SI ROBOT, CONVERTED TO OPEN SACROCOLPOPEXY, RIGHT SALPINGO- OOPHERECTOMY, CYSTO;  Surgeon: Maribell Beth MD;  Location: [facility] OR;  Service:    • TOE FUSION Left 1/6/2022    Procedure: FIRST METATARSOPHALANGEAL JOINT ARTHRODESIS WITH INTERNAL FIXATION - LEFT FOOT;  Surgeon: Jorgito Red DPM;  Location: [facility] OR;  Service: Podiatry;  Laterality: Left;       Family History   Problem Relation Age of Onset   • Hypertension Mother    • No Known Problems Father    • Colon cancer Neg Hx    • Colon polyps Neg Hx        Social History     Socioeconomic History   • Marital status:    Tobacco Use   • Smoking status: Never Smoker   • Smokeless tobacco: Never Used   Vaping Use   • Vaping Use: Never used   Substance and Sexual Activity   • Alcohol use: No   • Drug use: No   • Sexual activity: Defer           Objective   Physical Exam  Vitals reviewed.    Constitutional:       General: She is not in acute distress.  HENT:      Head: Normocephalic and atraumatic.   Eyes:      Extraocular Movements: Extraocular movements intact.      Comments: IOP in R eye 21. Visual acuity 20/25 bilaterally noncorrected. No visual field deficit. EOMI. Perilimbic injection on the R. No consensual photophobia. No hypopyon, no hyphema. Fluorescein staining does not reveal abrasion. PH 7.   Cardiovascular:      Pulses: Normal pulses.      Heart sounds: Normal heart sounds.   Pulmonary:      Effort: Pulmonary effort is normal. No respiratory distress.   Abdominal:      General: Abdomen is flat. There is no distension.   Musculoskeletal:      Cervical back: Normal range of motion and neck supple.   Skin:     General: Skin is warm and dry.   Neurological:      General: No focal deficit present.      Mental Status: She is alert. Mental status is at baseline.   Psychiatric:         Behavior: Behavior normal.         Thought Content: Thought content normal.         Procedures           ED Course                                           LARS Blackwell is a 66 y.o. female with PMH above who presents to the Emergency Department with eye problem.  There is not evidence of emergent eye infection, significant globe trauma, acute angle closure glaucoma, or visual acuity threat with a process such as retinal detachment or CRV O or CRA O.  She will need close outpatient ophthalmologic follow-up.  Discussed use of ofloxacin drops and bromfenac drops and follow-up with her ophthalmologist as soon as possible in the outpatient setting; she agrees to get in touch as soon as they are open for an appointment.  We discussed return precautions in the event that she has decrease in her visual acuity or change in her symptoms such as visual field cuts or uncontrolled pain.    Final diagnosis: eye redness    All questions answered. Patient/family was understanding and in agreement with today's assessment  and plan. The patient was monitored during their stay in the ED and dispositioned without acute event.    Electronically signed by:  Naga Carter MD 9/18/2022 07:15 CDT      Note: Dragon medical dictation software was used in the creation of this note.        Final diagnoses:   Pain of right eye       ED Disposition  ED Disposition     ED Disposition   Discharge    Condition   Stable    Comment   --             [facility] [address] [id number] [phone]             Medication List      Changed    prednisoLONE acetate 1 % ophthalmic suspension  Commonly known as: PRED FORTE  Instill 1 drop into affected eye four times a day after surgery as directed  What changed:   · how much to take  · when to take this     tiZANidine 4 MG tablet  Commonly known as: ZANAFLEX  Take one-half to 1 tablet by mouth at bedtime.  What changed:   · how much to take  · additional instructions             Naga Carter MD  09/18/22 0841

## 2022-09-21 ENCOUNTER — CLINICAL SUPPORT (OUTPATIENT)
Dept: INTERNAL MEDICINE | Facility: CLINIC | Age: 67
End: 2022-09-21

## 2022-09-21 DIAGNOSIS — E53.8 B12 DEFICIENCY: Primary | ICD-10-CM

## 2022-09-21 PROCEDURE — 96372 THER/PROPH/DIAG INJ SC/IM: CPT | Performed by: INTERNAL MEDICINE

## 2022-09-21 NOTE — PROGRESS NOTES
Injection  Vitamin B-12 Injection (PATIENT SUPPLIED) performed in Left Deltoid by Rochelle Nevarez MA. Patient tolerated the procedure well without complications.  09/21/22   Rochelle Nevarez MA

## 2022-09-26 NOTE — PROGRESS NOTES
[facility] - PODIATRY    Today's Date: 09/27/2022     Patient Name: Yun Blackwell  MRN: [id number]  CSN: [id number]  PCP: ANGEL Healy MD  Referring Provider: No ref. provider found    SUBJECTIVE     Chief Complaint   Patient presents with   • Follow-up     ANGEL Healy MD pcp 06/28/2022 2 MO FU RECHECK- pt states doing good, still having shooting pains at times- pt pain 5/10 at worst shoooting type pains- pt presents in walking boot, healing surgical scars top of left foot   • Diabetes     109mg/dl BG this am     HPI: Yun Blackwell, a 66 y.o.female, comes to clinic as a(n) established patient for follow-up treatment of non-union of arthrodesis of left foot. Patient has h/o AF, anemia, CA, DM, HLD, HTN, IBS, sleep apnea. Patient is NIDDM with last stated BG level of 109mg/dl.  States that she has been using bone stimulator as directed. States that she has some shooting pains in the foot at time. Has continued use of CAM. Admits pain at 5/10 level and described as aching and throbbing. Relates previous treatment(s) including surgery, bone stimulator. Denies any constitutional symptoms. No other pedal complaints at this time.    Past Medical History:   Diagnosis Date   • A-fib (HCC)    • Abnormal ECG     tachycardia, a fib   • Acid reflux    • Anemia    • Arthritis    • Cancer (HCC)    • Diabetes mellitus (HCC)    • Hyperkalemia    • Hyperlipidemia    • Hypertension    • Hyponatremia    • IBS (irritable bowel syndrome)    • Sleep apnea     USES CPAP   • UTI (urinary tract infection)    • Vaginal vault prolapse      Past Surgical History:   Procedure Laterality Date   • BREAST BIOPSY Left     X2   • CARDIAC CATHETERIZATION     • CARDIAC CATHETERIZATION N/A 2/5/2021    Procedure: Right Heart Cath;  Surgeon: Van Marcelino MD;  Location: [facility] CATH INVASIVE LOCATION;  Service: Cardiology;  Laterality: N/A;   • COLON SURGERY     • COLONOSCOPY  09/21/2015    TRANSVERSE COLON ADENOMATOUS  POLYP, HEMORRHOIDS, RECALL 5 YEARS, DR LAMAS   • COLONOSCOPY N/A 11/13/2020    Procedure: COLONOSCOPY WITH ANESTHESIA;  Surgeon: Joe Lamas MD;  Location: [facility] ENDOSCOPY;  Service: Gastroenterology;  Laterality: N/A;  pre op: screening  post op:divdrticulosis, polyp  PCP: ANGEL Healy MD   • COLOSTOMY     • COLPOPEXY VAGINAL      2014   • ENDOSCOPY N/A 11/3/2016    LA GRADE B ESOPHAGITIS WITH NO BLEEDING UPPER THIRD OF ESOPHAGUS, GERI-[place]TH TEAR GEJ, BILIOUS BLOOD TINGED COFFEE GROUND GATRIC FLUIDDR LAMAS   • EXPLORATORY LAPAROTOMY N/A 10/14/2016    Procedure: LAPAROTOMY EXPLORATORY, LYSIS OF ADHESIONS, IRRIAGATION OF ABDOMEN, POSSIBLE BOWEL RESECTION;  Surgeon: Bacilio Marcial MD;  Location:  [facility] OR;  Service:    • HYSTERECTOMY     • REVISION / TAKEDOWN COLOSTOMY     • SACROCOLPOPEXY N/A 9/21/2016    Procedure: SACROCOLPOPEXY LAPAROSCOPIC WITH FlomioI SI ROBOT, CONVERTED TO OPEN SACROCOLPOPEXY, RIGHT SALPINGO- OOPHERECTOMY, CYSTO;  Surgeon: Maribell Beth MD;  Location: [facility] OR;  Service:    • TOE FUSION Left 1/6/2022    Procedure: FIRST METATARSOPHALANGEAL JOINT ARTHRODESIS WITH INTERNAL FIXATION - LEFT FOOT;  Surgeon: Jorgito Red DPM;  Location:  [facility] OR;  Service: Podiatry;  Laterality: Left;     Family History   Problem Relation Age of Onset   • Hypertension Mother    • No Known Problems Father    • Colon cancer Neg Hx    • Colon polyps Neg Hx      Social History     Socioeconomic History   • Marital status:    Tobacco Use   • Smoking status: Never Smoker   • Smokeless tobacco: Never Used   Vaping Use   • Vaping Use: Never used   Substance and Sexual Activity   • Alcohol use: No   • Drug use: No   • Sexual activity: Defer     Allergies   Allergen Reactions   • Morphine And Related Nausea And Vomiting and Dizziness   • Percocet [Oxycodone-Acetaminophen] Nausea And Vomiting and Dizziness     Current Outpatient Medications   Medication Sig Dispense Refill   • apixaban  (ELIQUIS) 5 MG tablet tablet Take 1 tablet by mouth Every 12 (Twelve) Hours. 60 tablet 5   • Bromfenac Sodium (Prolensa) 0.07 % solution Apply 1 drop to eye(s) as directed by provider Daily.     • cyanocobalamin 1000 MCG/ML injection Inject 1 mL Every 28 (Twenty-Eight) Days. 1 mL 5   • dilTIAZem (CARDIZEM) 30 MG tablet Take 1 tablet by mouth 2 (Two) Times a Day. 60 tablet 5   • montelukast (SINGULAIR) 10 MG tablet Take 1 tablet by mouth every night 30 tablet 5   • ofloxacin (OCUFLOX) 0.3 % ophthalmic solution Instill 1 drop into affected eye three times a day starting the day before surgery 5 mL 1   • omeprazole (priLOSEC) 20 MG capsule Take 1 capsule by mouth Daily. 30 capsule 11   • prednisoLONE acetate (PRED FORTE) 1 % ophthalmic suspension Instill 1 drop into affected eye four times a day after surgery as directed (Patient taking differently: 1 drop 3 (Three) Times a Day.) 10 mL 1   • tamoxifen (NOLVADEX) 20 MG chemo tablet Take 1 tablet by mouth Daily. 30 tablet 0   • tiZANidine (ZANAFLEX) 4 MG tablet Take one-half to 1 tablet by mouth at bedtime. (Patient taking differently: 4 mg. Takes whole tablet daily) 30 tablet 2     Current Facility-Administered Medications   Medication Dose Route Frequency Provider Last Rate Last Admin   • cyanocobalamin injection 1,000 mcg  1,000 mcg Intramuscular Q28 Days ANGEL Healy MD   1,000 mcg at 08/25/22 1616     Review of Systems   Constitutional: Negative for chills and fever.   HENT: Negative for congestion.    Respiratory: Negative for shortness of breath.    Cardiovascular: Negative for chest pain and leg swelling.   Gastrointestinal: Negative for constipation, diarrhea, nausea and vomiting.   Musculoskeletal: Positive for arthralgias.        Foot pain   Skin: Negative for wound.   Neurological: Negative for numbness.       OBJECTIVE     Vitals:    09/27/22 0959   BP: 122/70   Pulse: 86   SpO2: 98%       PHYSICAL EXAM  GEN:   Accompanied by none.     Foot/Ankle Exam:        General:   Appearance: appears stated age and healthy    Orientation: AAOx3    Affect: appropriate    Gait: antalgic    Assistance: independent    Shoe Gear:  CAM boot and sandals    VASCULAR      Right Foot Vascularity   Dorsalis pedis:  2+  Posterior tibial:  2+  Skin Temperature: warm    Edema Grading:  None  CFT:  3  Pedal Hair Growth:  Present  Varicosities: mild varicosities       Left Foot Vascularity   Dorsalis pedis:  2+  Posterior tibial:  2+  Skin Temperature: warm    Edema Grading:  None  CFT:  3  Pedal Hair Growth:  Present  Varicosities: mild varicosities        NEUROLOGIC     Right Foot Neurologic   Normal sensation    Light touch sensation:  Normal  Vibratory sensation:  Normal  Hot/Cold sensation: normal    Protective Sensation using [employer] [place] Monofilament:  10     Left Foot Neurologic   Normal sensation    Light touch sensation:  Normal  Vibratory sensation:  Normal  Hot/cold sensation: normal    Protective Sensation using [employer] [place] Monofilament:  10     MUSCULOSKELETAL      Right Foot Musculoskeletal   Ecchymosis:  None  Tenderness: great toe metatarsophalangeal joint and toe 1    Arch:  Normal  Hammertoe:  Second toe, third toe, fourth toe and fifth toe  Hallux valgus: Yes (large medial eminence with dorsal spurring. Moderately trackbound. Minimal crepitus.)       Left Foot Musculoskeletal   Ecchymosis:  None  Tenderness: great toe metatarsophalangeal joint and toe 1    Arch:  Normal  Hallux limitus: Yes (s/p arthodesis - possible micromotion remains)       MUSCLE STRENGTH     Right Foot Muscle Strength   Foot dorsiflexion:  5  Foot plantar flexion:  5  Foot inversion:  5  Foot eversion:  5     Left Foot Muscle Strength   Foot dorsiflexion:  5  Foot plantar flexion:  5  Foot inversion:  5  Foot eversion:  5     RANGE OF MOTION      Right Foot Range of Motion   Foot and ankle ROM within normal limits       Left Foot Range of Motion   Foot and ankle ROM within normal limits        DERMATOLOGIC     Right Foot Dermatologic   Skin: skin intact    Nails: abnormally thick       Left Foot Dermatologic   Skin: skin intact    Nails: abnormally thick       Image:       RADIOLOGY/NUCLEAR:  No results found.    LABORATORY/CULTURE RESULTS:      PATHOLOGY RESULTS:       ASSESSMENT/PLAN     Diagnoses and all orders for this visit:    1. 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 Chronic post-operative pain  -     XR Foot 3+ View Left; Future    3. Controlled type 2 diabetes mellitus without complication, without long-term current use of insulin (HCC)      Comprehensive lower extremity examination and evaluation was performed.  Discussed findings and treatment plan including risks, benefits, and treatment options with patient in detail. Patient agreed with treatment plan.  Patient may maintain nails and calluses at home utilizing emery board or pumice stone between visits as needed   Continue use of bone stimulator as directed.   New x-rays prior to next appointment to assess for bone healing.    An After Visit Summary was printed and given to the patient at discharge, including (if requested) any available informative/educational handouts regarding diagnosis, treatment, or medications. All questions were answered to patient/family satisfaction. Should symptoms fail to improve or worsen they agree to call or return to clinic or to go to the Emergency Department. Discussed the importance of following up with any needed screening tests/labs/specialist appointments and any requested follow-up recommended by me today. Importance of maintaining follow-up discussed and patient accepts that missed appointments can delay diagnosis and potentially lead to worsening of conditions.  Return in about 2 months (around 11/27/2022) for Follow-up with Leonardo Carvalho., or sooner if acute issues arise.    Lab Frequency Next Occurrence   Follow Anesthesia Guidelines / Standing  Orders Once 09/29/2020   Obtain Informed Consent Once 10/04/2020       This document has been electronically signed by Frankie Zapata DPM on September 27, 2022 10:27 CDT

## 2022-09-27 ENCOUNTER — OFFICE VISIT (OUTPATIENT)
Dept: PODIATRY | Facility: CLINIC | Age: 67
End: 2022-09-27

## 2022-09-27 VITALS
WEIGHT: 160 LBS | HEIGHT: 62 IN | BODY MASS INDEX: 29.44 KG/M2 | SYSTOLIC BLOOD PRESSURE: 122 MMHG | DIASTOLIC BLOOD PRESSURE: 70 MMHG | HEART RATE: 86 BPM | OXYGEN SATURATION: 98 %

## 2022-09-27 DIAGNOSIS — M96.0 NONUNION AFTER ARTHRODESIS: Primary | ICD-10-CM

## 2022-09-27 DIAGNOSIS — G89.28 CHRONIC POST-OPERATIVE PAIN: ICD-10-CM

## 2022-09-27 DIAGNOSIS — E11.9 CONTROLLED TYPE 2 DIABETES MELLITUS WITHOUT COMPLICATION, WITHOUT LONG-TERM CURRENT USE OF INSULIN: ICD-10-CM

## 2022-09-27 PROCEDURE — 99213 OFFICE O/P EST LOW 20 MIN: CPT | Performed by: PODIATRIST

## 2022-10-07 RX ORDER — TIZANIDINE 4 MG/1
4 TABLET ORAL AS NEEDED
Qty: 30 TABLET | Refills: 2 | Status: SHIPPED | OUTPATIENT
Start: 2022-10-07 | End: 2022-10-07 | Stop reason: SDUPTHER

## 2022-10-07 RX ORDER — TIZANIDINE 4 MG/1
4 TABLET ORAL DAILY PRN
Qty: 30 TABLET | Refills: 2 | Status: SHIPPED | OUTPATIENT
Start: 2022-10-07

## 2022-10-07 NOTE — TELEPHONE ENCOUNTER
Patient requesting a refill on this medication. She stated that she takes it when she has muscle cramps in her legs and it helps her almost instantly.

## 2022-10-10 DIAGNOSIS — D05.12 DUCTAL CARCINOMA IN SITU (DCIS) OF LEFT BREAST: ICD-10-CM

## 2022-10-10 RX ORDER — TAMOXIFEN CITRATE 20 MG/1
20 TABLET ORAL DAILY
Qty: 30 TABLET | Refills: 2 | OUTPATIENT
Start: 2022-10-10

## 2022-10-10 NOTE — TELEPHONE ENCOUNTER
Patient has not been seen in clinic in over 16 months (6/2021), she is aware that she needs to keep her appointment on 10/13/2022 before any refills of her tamoxifen can be provided.

## 2022-10-12 DIAGNOSIS — D05.12 DUCTAL CARCINOMA IN SITU (DCIS) OF LEFT BREAST: Primary | ICD-10-CM

## 2022-10-12 DIAGNOSIS — D50.9 IRON DEFICIENCY ANEMIA, UNSPECIFIED IRON DEFICIENCY ANEMIA TYPE: ICD-10-CM

## 2022-10-12 NOTE — PROGRESS NOTES
Progress Note      Pt Name: Richard Salgado  YOB: 1955  MRN: [id number]    Date of evaluation: 10/13/2022  History Obtained From:  patient, electronic medical record    CHIEF COMPLAINT:    Chief Complaint   Patient presents with    Follow-up     Ductal carcinoma in situ (DCIS) of left breast     HISTORY OF PRESENT ILLNESS:    Richard Salgado is a 74 y.o.  female who is currently being followed for DCIS of the left breast, stage 0 diagnosed December 2020 and iron and B12 deficiency. Current recommendation is for preventive endocrine therapy with tamoxifen 20 mg p.o. daily for anticipated 5 years with completing dosing in January 2025 and oral iron replacement. Flaquita Melendez returns today in scheduled follow-up for evaluation, side effect monitoring, lab monitoring and further treatment recommendations. She reports compliant with tamoxifen. Currently receiving vitamin B12 injections under the direction of Dr. Emma Grant. She indicated no longer taking oral iron supplement due to severe constipation. Today's clinic visit to include physical assessment, review of systems, any radiograph or lab findings that were available and plan of care are documented below. ONCOLOGIC HISTORY:     Diagnosis  DCIS left breast, December 2020  Stage 0  Grade 2  %, AL 98%  [employer] 84 genes-  CDKN2A ([id number]) mutation (mutation associated with autosomal dominant melanoma-pancreatic cancer and melanoma neural system tumor  Iron deficiency anemia      Treatment summary  1/31/2020- left lumpectomy  January 2020-tamoxifen x5 years  Oral iron supplement     Dilma was referred by Dr. Ismael Bello for a diagnosis of DCIS of left breast.  This was a screening detected lesion. She had a sister and a niece with breast cancer. She denied any hormone replacement therapy. She had genetic assessment with a 84 gene panel that was unremarkable for a deleterious mutation but with findings of a VUS.    12/4/2018-core needle biopsy consistent with intermediate grade DCIS measuring 4 mm. %, % 1. DCISRT high risk  2019-MRI breast bilateral showed postbiopsy change in the left breast with a small hematoma. Abnormal enhancement extending into a linear configuration. No MRI evidence of right breast malignancy. No suspicious lymphadenopathy. 2020-left breast DCIS micropapillary measuring 7 mm. Grade 2. Clear margins. Usual ductal hyperplasia. 2020-IORT 2000 Gy  2020-seen in initial consultation by Dr. Farida Gonsales   2021 Bilateral mammogram- New focus of amorphous calcifications in the LEFT breast upper outer quadrant anterolateral to the lumpectomy bed. These may represent an area of evolving fat necrosis, although recurrent DCIS is also within the differential. At minimum, recommend 6 month follow-up diagnostic LEFT breast mammogram with magnification views  2021 Left breast biopsy- No malignancy identified. Coarse microcalcifications, dense fibroconnective tissue, and organizing fat necrosis. 2021 MRI abdomen with and without contrast- No evidence of solid neoplasm in the abdomen. No change in 1.3 cm pancreatic tail cyst without solid components or enhancement. Recommend continued imaging follow-up with repeat study in 2 years based on ACR white paper criteria. Cholelithiasis without evidence of cholecystitis. Mild diffuse hepatic steatosis. 2022 Left breast mammogram- no mammographic evidence of malignancy      GENETIC TESTIN2020 - [employer] 84 genes-  CDKN2A ([id number]) mutation (mutation associated with autosomal dominant melanoma-pancreatic cancer and melanoma neural system tumor. Family history of carcinoma consists of breast, uterine and lung.       Age-appropriate health screenin2020- Colonoscopy completed by Dr. Rashid Torres with removal of a biopsy from large intestinal polyp with pathology revealing no high grade dysplasia  2021 Dexa mouth daily      tiZANidine (ZANAFLEX) 4 MG tablet Take 4 mg by mouth nightly as needed       montelukast (SINGULAIR) 10 MG tablet Take 1 tablet by mouth nightly 30 tablet 0    diltiazem (CARDIZEM) 30 MG tablet Take 30 mg by mouth 2 times daily      Multiple Vitamins-Minerals (THERAPEUTIC MULTIVITAMIN-MINERALS) tablet Take 1 tablet by mouth daily       No current facility-administered medications for this visit. Allergies: Allergies   Allergen Reactions    Morphine Nausea And Vomiting    Oxycodone Nausea And Vomiting       Social History:    Social History     Tobacco Use    Smoking status: Never    Smokeless tobacco: Never   Vaping Use    Vaping Use: Never used   Substance Use Topics    Alcohol use: No    Drug use: No       Family History:   Family History   Problem Relation Age of Onset    Diabetes Mother     Diabetes Father     Lung Cancer Father     Breast Cancer Sister     Brain Cancer Brother     Diabetes Sister     Diabetes Sister     Diabetes Sister     Diabetes Sister     Diabetes Brother     Diabetes Brother     Diabetes Brother     Uterine Cancer Maternal Aunt     Uterine Cancer Niece         Age Unknown    ADHD Niece     Breast Cancer Niece        Vitals:  Vitals:    10/13/22 1115   BP: 120/80   Pulse: 86   SpO2: 98%   Weight: 163 lb (73.9 kg)   Height: 5' 3\" (1.6 m)        Subjective   REVIEW OF SYSTEMS:   Review of Systems   Constitutional:  Positive for fatigue. Negative for chills, diaphoresis and fever. HENT: Negative. Negative for congestion, ear pain, hearing loss, nosebleeds, sore throat and tinnitus. Eyes:  Positive for pain (cataract surgery) and redness (cataract surgery). Negative for discharge. Respiratory: Negative. Negative for cough, shortness of breath and wheezing. Cardiovascular: Negative. Negative for chest pain, palpitations and leg swelling. Gastrointestinal: Negative. Negative for abdominal pain, blood in stool, constipation, diarrhea, nausea and vomiting. Endocrine: Negative for polydipsia. Genitourinary:  Negative for dysuria, flank pain, frequency, hematuria and urgency. Musculoskeletal: Negative. Negative for back pain, myalgias and neck pain. Left foot pain-due to non-healing fracture   Skin: Negative. Negative for rash. Neurological: Negative. Negative for dizziness, tremors, seizures, weakness and headaches. Hematological:  Does not bruise/bleed easily. Psychiatric/Behavioral: Negative. The patient is not nervous/anxious. Objective   PHYSICAL EXAM:  Physical Exam  Vitals reviewed. Constitutional:       General: She is not in acute distress. Appearance: She is well-developed. HENT:      Head: Normocephalic and atraumatic. Mouth/Throat:      Pharynx: Uvula midline. Tonsils: No tonsillar exudate. Eyes:      General: Lids are normal.      Conjunctiva/sclera: Conjunctivae normal.      Pupils: Pupils are equal, round, and reactive to light. Neck:      Thyroid: No thyroid mass or thyromegaly. Vascular: No JVD. Trachea: Trachea normal. No tracheal deviation. Cardiovascular:      Rate and Rhythm: Normal rate and regular rhythm. Pulses: Normal pulses. Heart sounds: Normal heart sounds. Pulmonary:      Effort: Pulmonary effort is normal. No respiratory distress. Breath sounds: Normal breath sounds. No wheezing or rales. Chest:      Chest wall: No tenderness. Abdominal:      General: Bowel sounds are normal. There is no distension. Palpations: Abdomen is soft. There is no mass. Tenderness: There is no abdominal tenderness. There is no guarding. Musculoskeletal:         General: No tenderness or deformity. Cervical back: Normal range of motion and neck supple. Comments: Range of motion within normal limits x4 extremities   Skin:     General: Skin is warm. Findings: No bruising, erythema or rash.    Neurological:      Mental Status: She is alert and oriented to person, place, and time. Cranial Nerves: No cranial nerve deficit. Coordination: Coordination normal.   Psychiatric:         Behavior: Behavior normal.         Thought Content: Thought content normal.       Labs reviewed today:  Lab Results   Component Value Date    WBC 6.63 10/13/2022    HGB 12.9 10/13/2022    HCT 42.8 10/13/2022    .5 (H) 10/13/2022     (L) 10/13/2022     Lab Results   Component Value Date    NEUTROABS 4.24 10/13/2022       ASSESSMENT/PLAN:      1. Ductal carcinoma in situ (DCIS) of left breast, stage 0, diagnosed December 2020. Current recommendation is for preventive endocrine therapy with tamoxifen 20 mg daily for anticipated 5 years, through January 2025. Reports compliant with tamoxifen and tolerating without significant difficulty. No new breast complaints. 06/28/2022 Bilateral mammogram- no mammographic evidence of malignancy    Dilma was seen in scheduled follow-up by ARELY Mondragon on 06/27/2022, I reviewed the progress note from that office visit which documented bilateral breast exam with no new findings or concerns of recurrence. Note states fibrocystic changes through both breast.       Bilateral breast examination today revealed no dominant masses, no skin or nipple changes and no axillary adenopathy. No suspicious abnormality to suggest recurrent breast cancer.    -Continue tamoxifen 20 mg daily    I discussed the importance of compliance with tamoxifen. Decreased compliance with adjuvant endocrine therapy therapy has been linked to decreased survival. We discussed the various barriers and side effects of endocrine therapy and ways to improve compliance. She acknowledged understanding. I also discussed risk/benefit to include side effects of tamoxifen: Increased risk of uterine cancer, blood clots, stroke, hot flashes, mood changes, joint pain and others.     2. Adverse effect of tamoxifen, subsequent encounter  -Denies any hot flashes or other complaint related to the tamoxifen    3. Vitamin B 12 deficiency, currently receiving replacement with monthly B12 injection under the direction of Dr. Beatriz Huizar. Hemoglobin 12.9 with an MCV of 100.5 today. 4.Iron deficiency without anemia. Reports currently not taking iron supplement due to constipation. -CBC, CMP, ferritin, vitamin B12 and iron panel today    5. Care plan discussed with patient   Survivorship and health maintenance recommendations  Keep a healthy body weight. Dietary recommendations include limit energy intake from total fats and sugars; increase consumption of fruit and vegetables, as well as legumes, whole grains and nuts. Engage in regular physical activity (150 minutes spread through the week). Other recommendations include minimizing alcohol intake, avoid use of tobacco products. Practice sun safety: Utilize a sunscreen with an SPF of at least 27. Avoiding tanning beds. Ensure adequate amount of sleep. Follow-up with primary care regularly and for further recommendations regarding age-appropriate screening for cancer, wellbeing visit (preventive measures), follow-up and treatment for other medical comorbidities. I discussed all of the above findings included in the assessment and plan with the patient and the patient is in agreement to move forward with current recommendations/treatment. I have addressed all of their questions and concerns that were verbalized. FOLLOW UP:  Follow-up appointment given for 4 months  Continue to follow with other medical providers as recommended  Labs at next visit: CBC and consider iron substrates if warranted    EMR Dragon/Transcription disclaimer:   Much of this encounter note is an electronic transcription/translation of spoken language to printed text.  The electronic translation of spoken language may permit erroneous, or at times, nonsensical words or phrases to be inadvertently transcribed; although attempts have made to review the note for such errors, some may still exist.  Please excuse any unrecognized transcription errors and contact us if the error is unintelligible or needs documented correction. Also, portions of this note have been copied forward, however, changed to reflect the most current clinical status of this patient. Electronically signed by ROSLYN Jett on 10/15/2022 at 9:12 PM  Hannah BARR am pre-charting as a registered nurse for ROSLYN Valencia.

## 2022-10-13 ENCOUNTER — OFFICE VISIT (OUTPATIENT)
Dept: HEMATOLOGY | Age: 67
End: 2022-10-13
Payer: MEDICARE

## 2022-10-13 ENCOUNTER — HOSPITAL ENCOUNTER (OUTPATIENT)
Dept: INFUSION THERAPY | Age: 67
Discharge: HOME | End: 2022-10-13
Payer: MEDICARE

## 2022-10-13 VITALS
OXYGEN SATURATION: 98 % | BODY MASS INDEX: 28.88 KG/M2 | WEIGHT: 163 LBS | DIASTOLIC BLOOD PRESSURE: 80 MMHG | HEART RATE: 86 BPM | SYSTOLIC BLOOD PRESSURE: 120 MMHG | HEIGHT: 63 IN

## 2022-10-13 DIAGNOSIS — E53.8 VITAMIN B 12 DEFICIENCY: ICD-10-CM

## 2022-10-13 DIAGNOSIS — D50.9 IRON DEFICIENCY ANEMIA, UNSPECIFIED IRON DEFICIENCY ANEMIA TYPE: ICD-10-CM

## 2022-10-13 DIAGNOSIS — Z79.810: ICD-10-CM

## 2022-10-13 DIAGNOSIS — D05.12 DUCTAL CARCINOMA IN SITU (DCIS) OF LEFT BREAST: ICD-10-CM

## 2022-10-13 DIAGNOSIS — D05.12 DUCTAL CARCINOMA IN SITU (DCIS) OF LEFT BREAST: Primary | ICD-10-CM

## 2022-10-13 LAB
ALBUMIN SERPL-MCNC: 4.2 G/DL (ref 3.5–5.2)
ALP BLD-CCNC: 89 U/L (ref 35–104)
ALT SERPL-CCNC: 20 U/L (ref 9–52)
ANION GAP SERPL CALCULATED.3IONS-SCNC: 10 MMOL/L (ref 7–19)
AST SERPL-CCNC: 37 U/L (ref 14–36)
BASOPHILS ABSOLUTE: 0.04 K/UL (ref 0.01–0.08)
BASOPHILS RELATIVE PERCENT: 0.6 % (ref 0.1–1.2)
BILIRUB SERPL-MCNC: 1 MG/DL (ref 0.2–1.3)
BUN BLDV-MCNC: 19 MG/DL (ref 7–17)
CALCIUM SERPL-MCNC: 9.3 MG/DL (ref 8.4–10.2)
CHLORIDE BLD-SCNC: 105 MMOL/L (ref 98–111)
CO2: 28 MMOL/L (ref 22–29)
CREAT SERPL-MCNC: 0.6 MG/DL (ref 0.5–1)
EOSINOPHILS ABSOLUTE: 0.1 K/UL (ref 0.04–0.54)
EOSINOPHILS RELATIVE PERCENT: 1.5 % (ref 0.7–7)
FERRITIN: 663.9 NG/ML (ref 13–150)
FOLATE: 18.8 NG/ML (ref 4.8–37.3)
GFR NON-AFRICAN AMERICAN: >60
GLOBULIN: 3.1 G/DL
GLUCOSE BLD-MCNC: 117 MG/DL (ref 74–106)
HCT VFR BLD CALC: 42.8 % (ref 34.1–44.9)
HEMOGLOBIN: 12.9 G/DL (ref 11.2–15.7)
IRON SATURATION: 19 % (ref 14–50)
IRON: 48 UG/DL (ref 37–145)
LYMPHOCYTES ABSOLUTE: 1.66 K/UL (ref 1.18–3.74)
LYMPHOCYTES RELATIVE PERCENT: 25 % (ref 19.3–53.1)
MCH RBC QN AUTO: 30.3 PG (ref 25.6–32.2)
MCHC RBC AUTO-ENTMCNC: 30.1 G/DL (ref 32.3–35.5)
MCV RBC AUTO: 100.5 FL (ref 79.4–94.8)
MONOCYTES ABSOLUTE: 0.57 K/UL (ref 0.24–0.82)
MONOCYTES RELATIVE PERCENT: 8.6 % (ref 4.7–12.5)
NEUTROPHILS ABSOLUTE: 4.24 K/UL (ref 1.56–6.13)
NEUTROPHILS RELATIVE PERCENT: 64 % (ref 34–71.1)
PDW BLD-RTO: 13 % (ref 11.7–14.4)
PLATELET # BLD: 164 K/UL (ref 182–369)
PMV BLD AUTO: 11 FL (ref 7.4–10.4)
POTASSIUM SERPL-SCNC: 3.8 MMOL/L (ref 3.5–5.1)
RBC # BLD: 4.26 M/UL (ref 3.93–5.22)
SODIUM BLD-SCNC: 143 MMOL/L (ref 137–145)
TOTAL IRON BINDING CAPACITY: 256 UG/DL (ref 250–400)
TOTAL PROTEIN: 7.3 G/DL (ref 6.3–8.2)
VITAMIN B-12: 462 PG/ML (ref 211–946)
WBC # BLD: 6.63 K/UL (ref 3.98–10.04)

## 2022-10-13 PROCEDURE — G8399 PT W/DXA RESULTS DOCUMENT: HCPCS | Performed by: NURSE PRACTITIONER

## 2022-10-13 PROCEDURE — 1123F ACP DISCUSS/DSCN MKR DOCD: CPT | Performed by: NURSE PRACTITIONER

## 2022-10-13 PROCEDURE — G8484 FLU IMMUNIZE NO ADMIN: HCPCS | Performed by: NURSE PRACTITIONER

## 2022-10-13 PROCEDURE — 85025 COMPLETE CBC W/AUTO DIFF WBC: CPT

## 2022-10-13 PROCEDURE — 1036F TOBACCO NON-USER: CPT | Performed by: NURSE PRACTITIONER

## 2022-10-13 PROCEDURE — G8417 CALC BMI ABV UP PARAM F/U: HCPCS | Performed by: NURSE PRACTITIONER

## 2022-10-13 PROCEDURE — 1090F PRES/ABSN URINE INCON ASSESS: CPT | Performed by: NURSE PRACTITIONER

## 2022-10-13 PROCEDURE — 3017F COLORECTAL CA SCREEN DOC REV: CPT | Performed by: NURSE PRACTITIONER

## 2022-10-13 PROCEDURE — G8427 DOCREV CUR MEDS BY ELIG CLIN: HCPCS | Performed by: NURSE PRACTITIONER

## 2022-10-13 PROCEDURE — 99212 OFFICE O/P EST SF 10 MIN: CPT

## 2022-10-13 PROCEDURE — 36415 COLL VENOUS BLD VENIPUNCTURE: CPT

## 2022-10-13 PROCEDURE — 99213 OFFICE O/P EST LOW 20 MIN: CPT | Performed by: NURSE PRACTITIONER

## 2022-10-13 PROCEDURE — 80053 COMPREHEN METABOLIC PANEL: CPT

## 2022-10-13 RX ORDER — TAMOXIFEN CITRATE 20 MG/1
20 TABLET ORAL DAILY
Qty: 90 TABLET | Refills: 1 | Status: SHIPPED | OUTPATIENT
Start: 2022-10-13

## 2022-10-13 RX ORDER — OMEPRAZOLE 20 MG/1
20 CAPSULE, DELAYED RELEASE ORAL DAILY
COMMUNITY

## 2022-10-13 ASSESSMENT — ENCOUNTER SYMPTOMS
EYE REDNESS: 1
EYE PAIN: 1

## 2022-10-15 ASSESSMENT — ENCOUNTER SYMPTOMS
COUGH: 0
DIARRHEA: 0
VOMITING: 0
SHORTNESS OF BREATH: 0
CONSTIPATION: 0
BACK PAIN: 0
RESPIRATORY NEGATIVE: 1
EYE DISCHARGE: 0
SORE THROAT: 0
ABDOMINAL PAIN: 0
NAUSEA: 0
WHEEZING: 0
GASTROINTESTINAL NEGATIVE: 1
BLOOD IN STOOL: 0

## 2022-10-17 ENCOUNTER — OFFICE VISIT (OUTPATIENT)
Dept: INTERNAL MEDICINE | Facility: CLINIC | Age: 67
End: 2022-10-17

## 2022-10-17 VITALS
RESPIRATION RATE: 15 BRPM | DIASTOLIC BLOOD PRESSURE: 72 MMHG | OXYGEN SATURATION: 98 % | WEIGHT: 167 LBS | BODY MASS INDEX: 30.73 KG/M2 | TEMPERATURE: 98 F | SYSTOLIC BLOOD PRESSURE: 125 MMHG | HEART RATE: 102 BPM | HEIGHT: 62 IN

## 2022-10-17 DIAGNOSIS — E53.8 B12 DEFICIENCY: ICD-10-CM

## 2022-10-17 DIAGNOSIS — R73.03 PREDIABETES: ICD-10-CM

## 2022-10-17 DIAGNOSIS — Z00.00 HEALTHCARE MAINTENANCE: ICD-10-CM

## 2022-10-17 DIAGNOSIS — I48.20 CHRONIC ATRIAL FIBRILLATION: Primary | ICD-10-CM

## 2022-10-17 PROCEDURE — 99214 OFFICE O/P EST MOD 30 MIN: CPT | Performed by: INTERNAL MEDICINE

## 2022-10-17 RX ORDER — PREDNISOLONE ACETATE 10 MG/ML
1 SUSPENSION/ DROPS OPHTHALMIC 4 TIMES DAILY
COMMUNITY
End: 2022-12-28

## 2022-10-17 NOTE — PROGRESS NOTES
Chief Complaint   Patient presents with   • Hypertension   • Follow-up         History:  Yun Blackwell is a 66 y.o. female who presents today for evaluation of the above problems.      Yun presents today for a 6-month follow-up.    She continues to follow with Dr. Zapata for her left foot.  She has a bone stimulator and is hopeful she will not require surgery.    She recently had left cataract surgery with Dr. Ocampo.    She denies any chest pain palpitations or shortness of breath.  Doing well with her Eliquis.    Gets B12 injections for B12 deficiency.    HPI      ROS:  Review of Systems  See above    Current Outpatient Medications:   •  apixaban (ELIQUIS) 5 MG tablet tablet, Take 1 tablet by mouth Every 12 (Twelve) Hours., Disp: 60 tablet, Rfl: 5  •  Bromfenac Sodium (Prolensa) 0.07 % solution, Apply 1 drop to eye(s) as directed by provider Daily., Disp: , Rfl:   •  cyanocobalamin 1000 MCG/ML injection, Inject 1 mL Every 28 (Twenty-Eight) Days., Disp: 1 mL, Rfl: 5  •  dilTIAZem (CARDIZEM) 30 MG tablet, Take 1 tablet by mouth 2 (Two) Times a Day., Disp: 60 tablet, Rfl: 5  •  montelukast (SINGULAIR) 10 MG tablet, Take 1 tablet by mouth every night, Disp: 30 tablet, Rfl: 5  •  omeprazole (priLOSEC) 20 MG capsule, Take 1 capsule by mouth Daily., Disp: 30 capsule, Rfl: 11  •  prednisoLONE acetate (PRED FORTE) 1 % ophthalmic suspension, 1 drop 4 (Four) Times a Day., Disp: , Rfl:   •  tamoxifen (NOLVADEX) 20 MG chemo tablet, Take 1 tablet by mouth daily, Disp: 90 tablet, Rfl: 1  •  tiZANidine (ZANAFLEX) 4 MG tablet, Take 1 tablet by mouth once Daily As Needed for Muscle Spasms., Disp: 30 tablet, Rfl: 2  •  triamcinolone (KENALOG) 0.1 % ointment, Apply twice daily to affected area on left ankle area when and where rash is present, Disp: 453.6 g, Rfl: 3    Current Facility-Administered Medications:   •  cyanocobalamin injection 1,000 mcg, 1,000 mcg, Intramuscular, Q28 Days, ANGEL Healy MD, 1,000 mcg at 08/25/22  1616    Lab Results   Component Value Date    GLUCOSE 117 (H) 10/13/2022    BUN 19 (H) 10/13/2022    CREATININE 0.6 10/13/2022    EGFRIFNONA >60 10/13/2022    EGFRIFAFRI 86 12/30/2021    BCR 28.3 (H) 04/27/2022    K 3.8 10/13/2022    CO2 28 10/13/2022    CALCIUM 9.3 10/13/2022    ALBUMIN 4.2 10/13/2022    AST 37 (H) 10/13/2022    ALT 20 10/13/2022       WBC   Date Value Ref Range Status   10/13/2022 6.63 3.98 - 10.04 K/uL Final     RBC   Date Value Ref Range Status   10/13/2022 4.26 3.93 - 5.22 M/uL Final     Hemoglobin   Date Value Ref Range Status   10/13/2022 12.9 11.2 - 15.7 g/dL Final     Hematocrit   Date Value Ref Range Status   10/13/2022 42.8 34.1 - 44.9 % Final     MCV   Date Value Ref Range Status   10/13/2022 100.5 (H) 79.4 - 94.8 fL Final     MCH   Date Value Ref Range Status   10/13/2022 30.3 25.6 - 32.2 pg Final     MCHC   Date Value Ref Range Status   10/13/2022 30.1 (L) 32.3 - 35.5 g/dL Final     RDW   Date Value Ref Range Status   10/13/2022 13.0 11.7 - 14.4 % Final     RDW-SD   Date Value Ref Range Status   04/27/2022 40.0 37.0 - 54.0 fl Final     MPV   Date Value Ref Range Status   10/13/2022 11.0 (H) 7.4 - 10.4 fL Final     Platelets   Date Value Ref Range Status   10/13/2022 164 (L) 182 - 369 K/uL Final     Neutrophil Rel %   Date Value Ref Range Status   10/13/2022 64.0 34.0 - 71.1 % Final     Lymphocyte Rel %   Date Value Ref Range Status   10/13/2022 25.0 19.3 - 53.1 % Final     Monocyte Rel %   Date Value Ref Range Status   10/13/2022 8.6 4.7 - 12.5 % Final     Eosinophil Rel %   Date Value Ref Range Status   10/13/2022 1.5 0.7 - 7.0 % Final     Basophil Rel %   Date Value Ref Range Status   10/13/2022 0.6 0.1 - 1.2 % Final     Immature Grans %   Date Value Ref Range Status   07/15/2021 0.4 0.0 - 0.5 % Final     Neutrophils Absolute   Date Value Ref Range Status   10/13/2022 4.24 1.56 - 6.13 K/uL Final     Lymphocytes Absolute   Date Value Ref Range Status   10/13/2022 1.66 1.18 - 3.74 K/uL  "Final     Monocytes Absolute   Date Value Ref Range Status   10/13/2022 0.57 0.24 - 0.82 K/uL Final     Eosinophils Absolute   Date Value Ref Range Status   10/13/2022 0.10 0.04 - 0.54 K/uL Final     Basophils Absolute   Date Value Ref Range Status   10/13/2022 0.04 0.01 - 0.08 K/uL Final     Immature Grans, Absolute   Date Value Ref Range Status   07/15/2021 0.03 0.00 - 0.05 10*3/mm3 Final   11/10/2020 0.0 K/uL Final     nRBC   Date Value Ref Range Status   07/15/2021 0.0 0.0 - 0.2 /100 WBC Final         OBJECTIVE:  Visit Vitals  /72 (BP Location: Left arm, Patient Position: Sitting, Cuff Size: Adult)   Pulse 102   Temp 98 °F (36.7 °C) (Oral)   Resp 15   Ht 157.5 cm (62.01\")   Wt 75.8 kg (167 lb)   SpO2 98%   BMI 30.54 kg/m²      Physical Exam  Vitals and nursing note reviewed.   Constitutional:       General: She is not in acute distress.     Appearance: She is well-developed. She is not diaphoretic.      Comments: Pleasant   HENT:      Head: Normocephalic and atraumatic.   Eyes:      Pupils: Pupils are equal, round, and reactive to light.   Neck:      Thyroid: No thyromegaly.      Trachea: Phonation normal.   Pulmonary:      Effort: No respiratory distress.   Skin:     Coloration: Skin is not pale.      Findings: No erythema.   Neurological:      Mental Status: She is alert.   Psychiatric:         Behavior: Behavior normal.         Thought Content: Thought content normal.         Judgment: Judgment normal.         Assessment/Plan    Diagnoses and all orders for this visit:    1. Chronic atrial fibrillation (HCC) (Primary)    2. Prediabetes  -     Hemoglobin A1c; Future    3. Healthcare maintenance  -     Comprehensive Metabolic Panel; Future  -     Hemoglobin A1c; Future  -     Lipid Panel; Future  -     CBC (No Diff); Future    4. B12 deficiency  -     Vitamin B12; Future      Overall, Yun is doing well.  She does not need any blood work today but will get labs before her next visit.  Her labs from " October 13 were reviewed.  B12 was within normal limits.  Other labs were unremarkable.    She declines influenza vaccine, Prevnar 20, and COVID-19 vaccines.    Continue following up with Dr. Zapata as already scheduled    Follow-up in 6 months with labs a couple days prior.  Follow-up sooner if indicated    Return in about 6 months (around 4/17/2023) for Medicare Wellness.      DANIELLE Healy MD  08:59 CDT  10/17/2022

## 2022-10-20 ENCOUNTER — CLINICAL SUPPORT (OUTPATIENT)
Dept: INTERNAL MEDICINE | Facility: CLINIC | Age: 67
End: 2022-10-20

## 2022-10-20 DIAGNOSIS — E53.8 B12 DEFICIENCY: Primary | ICD-10-CM

## 2022-10-20 PROCEDURE — 96372 THER/PROPH/DIAG INJ SC/IM: CPT | Performed by: INTERNAL MEDICINE

## 2022-10-20 RX ADMIN — CYANOCOBALAMIN 1000 MCG: 1000 INJECTION, SOLUTION INTRAMUSCULAR; SUBCUTANEOUS at 08:46

## 2022-10-20 NOTE — PROGRESS NOTES
Injection  Vitamin B-12 Injection performed in Left Deltoid by Rochelle Nevarez MA. Patient tolerated the procedure well without complications.  10/20/22   Rochelle Nevarez MA     THIS INJECTION WAS PATIENT SUPPLIED

## 2022-11-04 ENCOUNTER — LAB (OUTPATIENT)
Dept: LAB | Facility: HOSPITAL | Age: 67
End: 2022-11-04

## 2022-11-04 DIAGNOSIS — R30.0 DYSURIA: Primary | ICD-10-CM

## 2022-11-04 DIAGNOSIS — R30.0 DYSURIA: ICD-10-CM

## 2022-11-04 DIAGNOSIS — N39.0 URINARY TRACT INFECTION WITHOUT HEMATURIA, SITE UNSPECIFIED: Primary | ICD-10-CM

## 2022-11-04 LAB
BACTERIA UR QL AUTO: ABNORMAL /HPF
BILIRUB UR QL STRIP: ABNORMAL
CLARITY UR: ABNORMAL
COLOR UR: ABNORMAL
GLUCOSE UR STRIP-MCNC: NEGATIVE MG/DL
HGB UR QL STRIP.AUTO: ABNORMAL
HYALINE CASTS UR QL AUTO: ABNORMAL /LPF
KETONES UR QL STRIP: ABNORMAL
LEUKOCYTE ESTERASE UR QL STRIP.AUTO: ABNORMAL
NITRITE UR QL STRIP: POSITIVE
PH UR STRIP.AUTO: 5.5 [PH] (ref 5–8)
PROT UR QL STRIP: ABNORMAL
RBC # UR STRIP: ABNORMAL /HPF
REF LAB TEST METHOD: ABNORMAL
SP GR UR STRIP: 1.03 (ref 1–1.03)
SQUAMOUS #/AREA URNS HPF: ABNORMAL /HPF
UROBILINOGEN UR QL STRIP: ABNORMAL
WBC # UR STRIP: ABNORMAL /HPF
WBC CLUMPS # UR AUTO: ABNORMAL /HPF

## 2022-11-04 PROCEDURE — 81001 URINALYSIS AUTO W/SCOPE: CPT

## 2022-11-04 PROCEDURE — 87077 CULTURE AEROBIC IDENTIFY: CPT

## 2022-11-04 PROCEDURE — 87186 SC STD MICRODIL/AGAR DIL: CPT

## 2022-11-04 PROCEDURE — 87086 URINE CULTURE/COLONY COUNT: CPT

## 2022-11-04 RX ORDER — NITROFURANTOIN 25; 75 MG/1; MG/1
100 CAPSULE ORAL 2 TIMES DAILY
Qty: 14 CAPSULE | Refills: 0 | Status: SHIPPED | OUTPATIENT
Start: 2022-11-04 | End: 2022-12-28

## 2022-11-04 RX ORDER — NITROFURANTOIN 25; 75 MG/1; MG/1
100 CAPSULE ORAL 2 TIMES DAILY
Qty: 14 CAPSULE | Refills: 0 | Status: SHIPPED | OUTPATIENT
Start: 2022-11-04 | End: 2022-11-04 | Stop reason: SDUPTHER

## 2022-11-06 LAB — BACTERIA SPEC AEROBE CULT: ABNORMAL

## 2022-11-17 ENCOUNTER — CLINICAL SUPPORT (OUTPATIENT)
Dept: INTERNAL MEDICINE | Facility: CLINIC | Age: 67
End: 2022-11-17

## 2022-11-17 DIAGNOSIS — E53.8 B12 DEFICIENCY: Primary | ICD-10-CM

## 2022-11-17 PROCEDURE — 96372 THER/PROPH/DIAG INJ SC/IM: CPT | Performed by: INTERNAL MEDICINE

## 2022-11-17 NOTE — PROGRESS NOTES
"    [facility] - PODIATRY    Today's Date: 11/29/2022     Patient Name: Yun Blackwell  MRN: [id number]  CSN: [id number]  PCP: ANGEL Healy MD  Referring Provider: No ref. provider found    SUBJECTIVE     Chief Complaint   Patient presents with   • Follow-up     ANGEL Healy MD-10/17/2022-2 months for Follow-up with Dr. Zapata, Recheck- pt states doing a little better, something working. 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 Patient has h/o AF, anemia, CA, DM, HLD, HTN, IBS, sleep apnea. Patient is NIDDM and unsure of last BG level. Has continued using bone stimulator as directed. Feels like pain has improved since last appointment. Has continued use of CAM. She reports that the area feels like it is \"full\" or \"stiff\". Did not get x-rays prior to appointment today. Reports pain only with weather changes. Relates previous treatment(s) including surgery, bone stimulator. Denies any constitutional symptoms. No other pedal complaints at this time.    Past Medical History:   Diagnosis Date   • A-fib (HCC)    • Abnormal ECG     tachycardia, a fib   • Acid reflux    • Anemia    • Arthritis    • Cancer (HCC)    • Diabetes mellitus (HCC)    • Hyperkalemia    • Hyperlipidemia    • Hypertension    • Hyponatremia    • IBS (irritable bowel syndrome)    • Sleep apnea     USES CPAP   • UTI (urinary tract infection)    • Vaginal vault prolapse      Past Surgical History:   Procedure Laterality Date   • BREAST BIOPSY Left     X2   • CARDIAC CATHETERIZATION     • CARDIAC CATHETERIZATION N/A 2/5/2021    Procedure: Right Heart Cath;  Surgeon: Van Marcelino MD;  Location: [facility] CATH INVASIVE LOCATION;  Service: Cardiology;  Laterality: N/A;   • COLON SURGERY     • COLONOSCOPY  09/21/2015    TRANSVERSE COLON ADENOMATOUS " POLYP, HEMORRHOIDS, RECALL 5 YEARS, DR LAMAS   • COLONOSCOPY N/A 11/13/2020    Procedure: COLONOSCOPY WITH ANESTHESIA;  Surgeon: Joe Lamas MD;  Location: [facility] ENDOSCOPY;  Service: Gastroenterology;  Laterality: N/A;  pre op: screening  post op:divdrticulosis, polyp  PCP: ANGEL Healy MD   • COLOSTOMY     • COLPOPEXY VAGINAL      2014   • ENDOSCOPY N/A 11/3/2016    LA GRADE B ESOPHAGITIS WITH NO BLEEDING UPPER THIRD OF ESOPHAGUS, GERI-[place]AD[place]ORTH TEAR GEJ, BILIOUS BLOOD TINGED COFFEE GROUND GATRIC FLUIDDR LAMAS   • EXPLORATORY LAPAROTOMY N/A 10/14/2016    Procedure: LAPAROTOMY EXPLORATORY, LYSIS OF ADHESIONS, IRRIAGATION OF ABDOMEN, POSSIBLE BOWEL RESECTION;  Surgeon: Bacilio Marcial MD;  Location:  [facility] OR;  Service:    • HYSTERECTOMY     • REVISION / TAKEDOWN COLOSTOMY     • SACROCOLPOPEXY N/A 9/21/2016    Procedure: SACROCOLPOPEXY LAPAROSCOPIC WITH Labels That TalkI SI ROBOT, CONVERTED TO OPEN SACROCOLPOPEXY, RIGHT SALPINGO- OOPHERECTOMY, CYSTO;  Surgeon: Maribell Beth MD;  Location: [facility] OR;  Service:    • TOE FUSION Left 1/6/2022    Procedure: FIRST METATARSOPHALANGEAL JOINT ARTHRODESIS WITH INTERNAL FIXATION - LEFT FOOT;  Surgeon: Jorgito Red DPM;  Location:  [facility] OR;  Service: Podiatry;  Laterality: Left;     Family History   Problem Relation Age of Onset   • Hypertension Mother    • No Known Problems Father    • Colon cancer Neg Hx    • Colon polyps Neg Hx      Social History     Socioeconomic History   • Marital status:    Tobacco Use   • Smoking status: Never   • Smokeless tobacco: Never   Vaping Use   • Vaping Use: Never used   Substance and Sexual Activity   • Alcohol use: No   • Drug use: No   • Sexual activity: Defer     Allergies   Allergen Reactions   • Morphine And Related Nausea And Vomiting and Dizziness   • Percocet [Oxycodone-Acetaminophen] Nausea And Vomiting and Dizziness     Current Outpatient Medications   Medication Sig Dispense Refill   • apixaban (ELIQUIS) 5 MG  tablet tablet Take 1 tablet by mouth Every 12 (Twelve) Hours. 60 tablet 5   • Bromfenac Sodium (Prolensa) 0.07 % solution Apply 1 drop to eye(s) as directed by provider Daily.     • cyanocobalamin 1000 MCG/ML injection Inject 1 mL Every 28 (Twenty-Eight) Days. 1 mL 5   • dilTIAZem (CARDIZEM) 30 MG tablet Take 1 tablet by mouth 2 (Two) Times a Day. 60 tablet 5   • fluocinonide (LIDEX) 0.05 % ointment Apply twice daily to rash when and where present 30 g 3   • montelukast (SINGULAIR) 10 MG tablet Take 1 tablet by mouth every night 30 tablet 5   • nitrofurantoin, macrocrystal-monohydrate, (Macrobid) 100 MG capsule Take 1 capsule by mouth 2 (Two) Times a Day. 14 capsule 0   • omeprazole (priLOSEC) 20 MG capsule Take 1 capsule by mouth Daily. 30 capsule 11   • prednisoLONE acetate (PRED FORTE) 1 % ophthalmic suspension 1 drop 4 (Four) Times a Day.     • tamoxifen (NOLVADEX) 20 MG chemo tablet Take 1 tablet by mouth daily 90 tablet 1   • tiZANidine (ZANAFLEX) 4 MG tablet Take 1 tablet by mouth once Daily As Needed for Muscle Spasms. 30 tablet 2   • triamcinolone (KENALOG) 0.1 % ointment Apply twice daily to affected area on left ankle area when and where rash is present 453.6 g 3     Current Facility-Administered Medications   Medication Dose Route Frequency Provider Last Rate Last Admin   • cyanocobalamin injection 1,000 mcg  1,000 mcg Intramuscular Q28 Days ANGEL Healy MD   1,000 mcg at 10/20/22 0846     Review of Systems   Constitutional: Negative for chills and fever.   HENT: Negative for congestion.    Respiratory: Negative for shortness of breath.    Cardiovascular: Negative for chest pain and leg swelling.   Gastrointestinal: Negative for constipation, diarrhea, nausea and vomiting.   Musculoskeletal: Positive for arthralgias.        Foot pain - improved   Skin: Negative for wound.   Neurological: Negative for numbness.       OBJECTIVE     Vitals:    11/29/22 1330   BP: 126/64   Pulse: 110   SpO2: 96%        PHYSICAL EXAM  GEN:   Accompanied by none.     Foot/Ankle Exam:       General:   Appearance: appears stated age and healthy    Orientation: AAOx3    Affect: appropriate    Gait: unimpaired    Assistance: independent    Shoe Gear:  CAM boot and casual shoes    VASCULAR      Right Foot Vascularity   Dorsalis pedis:  2+  Posterior tibial:  2+  Skin Temperature: warm    Edema Grading:  None  CFT:  3  Pedal Hair Growth:  Present  Varicosities: mild varicosities       Left Foot Vascularity   Dorsalis pedis:  2+  Posterior tibial:  2+  Skin Temperature: warm    Edema Grading:  None  CFT:  3  Pedal Hair Growth:  Present  Varicosities: mild varicosities        NEUROLOGIC     Right Foot Neurologic   Normal sensation    Light touch sensation:  Normal  Vibratory sensation:  Normal  Hot/Cold sensation: normal    Protective Sensation using [place] Monofilament:  10     Left Foot Neurologic   Normal sensation    Light touch sensation:  Normal  Vibratory sensation:  Normal  Hot/cold sensation: normal    Protective Sensation using [place] Monofilament:  10     MUSCULOSKELETAL      Right Foot Musculoskeletal   Ecchymosis:  None  Tenderness: great toe metatarsophalangeal joint and toe 1    Arch:  Normal  Hammertoe:  Second toe, third toe, fourth toe and fifth toe  Hallux valgus: Yes (large medial eminence with dorsal spurring. Moderately trackbound. Minimal crepitus.)       Left Foot Musculoskeletal   Ecchymosis:  None  Tenderness: none    Arch:  Normal  Hallux limitus: Yes (s/p arthodesis - No apparent micromotion)       MUSCLE STRENGTH     Right Foot Muscle Strength   Foot dorsiflexion:  5  Foot plantar flexion:  5  Foot inversion:  5  Foot eversion:  5     Left Foot Muscle Strength   Foot dorsiflexion:  5  Foot plantar flexion:  5  Foot inversion:  5  Foot eversion:  5     RANGE OF MOTION      Right Foot Range of Motion   Foot and ankle ROM within normal limits       Left Foot Range of Motion   Foot and  ankle ROM within normal limits       DERMATOLOGIC     Right Foot Dermatologic   Skin: skin intact    Nails: abnormally thick       Left Foot Dermatologic   Skin: skin intact    Nails: abnormally thick        RADIOLOGY/NUCLEAR:  No results found.    LABORATORY/CULTURE RESULTS:      PATHOLOGY RESULTS:       ASSESSMENT/PLAN     Diagnoses and all orders for this visit:    1. 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 Controlled type 2 diabetes mellitus without complication, without long-term current use of insulin (HCC)      Comprehensive lower extremity examination and evaluation was performed.  Discussed findings and treatment plan including risks, benefits, and treatment options with patient in detail. Patient agreed with treatment plan.  Clinically it appears patient has achieved arthrodesis of fusion site.   Patient to get update x-rays after leaving office.  May begin transition back to normal shoe gear to see how foot feels under normal conditions.    An After Visit Summary was printed and given to the patient at discharge, including (if requested) any available informative/educational handouts regarding diagnosis, treatment, or medications. All questions were answered to patient/family satisfaction. Should symptoms fail to improve or worsen they agree to call or return to clinic or to go to the Emergency Department. Discussed the importance of following up with any needed screening tests/labs/specialist appointments and any requested follow-up recommended by me today. Importance of maintaining follow-up discussed and patient accepts that missed appointments can delay diagnosis and potentially lead to worsening of conditions.  Return if symptoms worsen or fail to improve., or sooner if acute issues arise.    Lab Frequency Next Occurrence   Follow Anesthesia Guidelines / Standing Orders Once 09/29/2020   Obtain Informed Consent Once 10/04/2020       This document has been electronically signed by Frankie Zapata,  FAMILIA on November 29, 2022 13:49 CST

## 2022-11-17 NOTE — PROGRESS NOTES
Injection  Injection performed in Left Deltoid by Rochelle Nevarez MA. Patient tolerated the procedure well without complications.  11/17/22   Rochelle Nevarez MA     **This medication was patient supplied**

## 2022-11-28 ENCOUNTER — TELEPHONE (OUTPATIENT)
Dept: PODIATRY | Facility: CLINIC | Age: 67
End: 2022-11-28

## 2022-11-28 NOTE — TELEPHONE ENCOUNTER
Called patient regarding appt on 11/29/2022. Left message for patient to return call if any questions or concerns arise.

## 2022-11-29 ENCOUNTER — HOSPITAL ENCOUNTER (OUTPATIENT)
Dept: GENERAL RADIOLOGY | Facility: HOSPITAL | Age: 67
Discharge: HOME | End: 2022-11-29
Admitting: PODIATRIST

## 2022-11-29 ENCOUNTER — OFFICE VISIT (OUTPATIENT)
Dept: PODIATRY | Facility: CLINIC | Age: 67
End: 2022-11-29

## 2022-11-29 VITALS
OXYGEN SATURATION: 96 % | SYSTOLIC BLOOD PRESSURE: 126 MMHG | HEART RATE: 110 BPM | HEIGHT: 62 IN | WEIGHT: 156 LBS | DIASTOLIC BLOOD PRESSURE: 64 MMHG | BODY MASS INDEX: 28.71 KG/M2

## 2022-11-29 DIAGNOSIS — M96.0 NONUNION AFTER ARTHRODESIS: Primary | ICD-10-CM

## 2022-11-29 DIAGNOSIS — M96.0 NONUNION AFTER ARTHRODESIS: ICD-10-CM

## 2022-11-29 DIAGNOSIS — G89.28 CHRONIC POST-OPERATIVE PAIN: ICD-10-CM

## 2022-11-29 DIAGNOSIS — E11.9 CONTROLLED TYPE 2 DIABETES MELLITUS WITHOUT COMPLICATION, WITHOUT LONG-TERM CURRENT USE OF INSULIN: ICD-10-CM

## 2022-11-29 PROCEDURE — 73630 X-RAY EXAM OF FOOT: CPT

## 2022-11-29 PROCEDURE — 99213 OFFICE O/P EST LOW 20 MIN: CPT | Performed by: PODIATRIST

## 2022-12-08 ENCOUNTER — OFFICE VISIT (OUTPATIENT)
Dept: CARDIOLOGY | Facility: CLINIC | Age: 67
End: 2022-12-08

## 2022-12-08 VITALS
OXYGEN SATURATION: 98 % | DIASTOLIC BLOOD PRESSURE: 78 MMHG | WEIGHT: 156.2 LBS | HEART RATE: 95 BPM | BODY MASS INDEX: 28.74 KG/M2 | SYSTOLIC BLOOD PRESSURE: 112 MMHG | HEIGHT: 62 IN

## 2022-12-08 DIAGNOSIS — I51.7 RIGHT VENTRICULAR ENLARGEMENT: Primary | ICD-10-CM

## 2022-12-08 DIAGNOSIS — I48.21 PERMANENT ATRIAL FIBRILLATION: ICD-10-CM

## 2022-12-08 DIAGNOSIS — Q21.11 ASD SECUNDUM: ICD-10-CM

## 2022-12-08 DIAGNOSIS — I51.7 RIGHT ATRIAL ENLARGEMENT: ICD-10-CM

## 2022-12-08 PROCEDURE — 99214 OFFICE O/P EST MOD 30 MIN: CPT | Performed by: NURSE PRACTITIONER

## 2022-12-08 PROCEDURE — 93000 ELECTROCARDIOGRAM COMPLETE: CPT | Performed by: NURSE PRACTITIONER

## 2022-12-08 RX ORDER — DILTIAZEM HYDROCHLORIDE 120 MG/1
120 CAPSULE, COATED, EXTENDED RELEASE ORAL DAILY
Qty: 30 CAPSULE | Refills: 11 | Status: SHIPPED | OUTPATIENT
Start: 2022-12-08

## 2022-12-08 NOTE — PROGRESS NOTES
Subjective:     Encounter Date:  12/8/2022       Patient ID: Yun Blackwell is a 67 y.o. female.    Chief Complaint: Follow-up for permanent atrial fibrillation and ASD    History of Present Illness     Ms. Blackwell returns today for routine follow-up. At her last visit here in 04/2021, she was noted that transitioning from warfarin to Eliquis resolved some palpitations she had been complaining of. At that time, she was walking more in her neighborhood, and was not having any exertional symptoms including shortness of breath or chest pain. I advised that she continue Cardizem for rate control, and Eliquis for stroke prophylaxis. We also follow her for an ASD, but a previous right heart catheterization that Dr. Marcelino performed showed no oxygenation step-up, and, therefore, he felt as though her right ventricular enlargement was not secondary to left-to-right shunting, and she would not be benefited in any way from ASD closure.    She saw Dr. Marcelino in October 2021 and was doing well.  She reported she had not had any palpitations since starting Eliquis.  She also denied any bleeding since starting the Eliquis.  She reported some shortness of breath when going up stairs.  No changes were made at that visit.    I saw the patient in May 2022 and she was doing well.  Her main complaint was problems with her left foot not healing after a surgery, with some associated swelling.  She denied any chest pain, palpitations or shortness of breath.  Blood pressure was well controlled.  She denied any bleeding issues.  She reported compliance with her medications.  No changes were made at that visit.    Today the patient presents for follow-up and states she continues to feel very well.  She denies any chest discomfort, shortness of breath or palpitations whatsoever.  Blood pressure is well controlled.  She has been missing doses of Eliquis occasionally, primarily due to affordability due to [place] the [place].    The  following portions of the patient's history were reviewed and updated as appropriate: allergies, current medications, past family history, past medical history, past social history, past surgical history and problem list.    Review of Systems   Constitutional: Negative for malaise/fatigue.   Cardiovascular: Negative for chest pain, claudication, dyspnea on exertion, leg swelling, near-syncope, orthopnea, palpitations, paroxysmal nocturnal dyspnea and syncope.   Respiratory: Negative for cough and shortness of breath.    Hematologic/Lymphatic: Does not bruise/bleed easily.   Musculoskeletal: Negative for falls.   Gastrointestinal: Negative for bloating.   Neurological: Negative for dizziness, light-headedness and weakness.           Current Outpatient Medications:   •  apixaban (ELIQUIS) 5 MG tablet tablet, Take 1 tablet by mouth Every 12 (Twelve) Hours., Disp: 60 tablet, Rfl: 5  •  Bromfenac Sodium (Prolensa) 0.07 % solution, Apply 1 drop to eye(s) as directed by provider Daily., Disp: , Rfl:   •  cyanocobalamin 1000 MCG/ML injection, Inject 1 mL Every 28 (Twenty-Eight) Days., Disp: 1 mL, Rfl: 5  •  fluocinonide (LIDEX) 0.05 % ointment, Apply twice daily to rash when and where present, Disp: 30 g, Rfl: 3  •  montelukast (SINGULAIR) 10 MG tablet, Take 1 tablet by mouth every night, Disp: 30 tablet, Rfl: 5  •  nitrofurantoin, macrocrystal-monohydrate, (Macrobid) 100 MG capsule, Take 1 capsule by mouth 2 (Two) Times a Day., Disp: 14 capsule, Rfl: 0  •  omeprazole (priLOSEC) 20 MG capsule, Take 1 capsule by mouth Daily., Disp: 30 capsule, Rfl: 11  •  prednisoLONE acetate (PRED FORTE) 1 % ophthalmic suspension, 1 drop 4 (Four) Times a Day., Disp: , Rfl:   •  tamoxifen (NOLVADEX) 20 MG chemo tablet, Take 1 tablet by mouth daily, Disp: 90 tablet, Rfl: 1  •  tiZANidine (ZANAFLEX) 4 MG tablet, Take 1 tablet by mouth once Daily As Needed for Muscle Spasms., Disp: 30 tablet, Rfl: 2  •  triamcinolone (KENALOG) 0.1 % ointment,  Apply twice daily to affected area on left ankle area when and where rash is present, Disp: 453.6 g, Rfl: 3  •  dilTIAZem CD (Cardizem CD) 120 MG 24 hr capsule, Take 1 capsule by mouth Daily., Disp: 30 capsule, Rfl: 11    Current Facility-Administered Medications:   •  cyanocobalamin injection 1,000 mcg, 1,000 mcg, Intramuscular, Q28 Days, ANGEL Healy MD, 1,000 mcg at 10/20/22 0846       Objective:      Vitals:    12/08/22 1322   BP: 112/78   Pulse: 95   SpO2: 98%   Weight: 156 pounds    Vitals and nursing note reviewed.   Constitutional:       General: Not in acute distress.     Appearance: Not in distress.   Neck:      Vascular: No JVD or JVR. JVD normal.   Pulmonary:      Effort: Pulmonary effort is normal.      Breath sounds: Normal breath sounds.   Cardiovascular:      Tachycardia present. Irregularly irregular rhythm.      Murmurs: There is no murmur.   Edema:     Peripheral edema absent.   Skin:     General: Skin is warm and dry.   Neurological:      Mental Status: Alert, oriented to person, place, and time and oriented to person, place and time.         Lab Review:   Lab Results   Component Value Date    GLUCOSE 117 (H) 10/13/2022    BUN 19 (H) 10/13/2022    CREATININE 0.6 10/13/2022    EGFRIFNONA >60 10/13/2022    EGFRIFAFRI 86 12/30/2021    BCR 28.3 (H) 04/27/2022    K 3.8 10/13/2022    CO2 28 10/13/2022    CALCIUM 9.3 10/13/2022    ALBUMIN 4.2 10/13/2022    AST 37 (H) 10/13/2022    ALT 20 10/13/2022     Lab Results   Component Value Date    WBC 6.63 10/13/2022    HGB 12.9 10/13/2022    HCT 42.8 10/13/2022    .5 (H) 10/13/2022     (L) 10/13/2022     No results found for: TSH        ECG 12 Lead    Date/Time: 12/8/2022 2:14 PM  Performed by: Abbey Watson APRN  Authorized by: Abbey Watson APRN   Comparison: compared with previous ECG from 5/12/2022  Similar to previous ECG  Rhythm: atrial fibrillation  BPM: 107  Conduction: right bundle branch block         right heart catheterization  "performed 2/5/21:  Right heart catheterization:  Hemodynamics (in mmHg)  RA: 9  RV: 28/8  PA: 28/12, m17  PCW: 14     Oximetry (all %)  High SVC: 69  Low SVC: 70  High IVC: 72  Low IVC: 78  High RA: 68  Low RA: 68  RV: 68  PA: 68  PCW: 70 (confirmed both by fluoroscopy and waveform)     Ao (by pulse oximetry): 98%     Cardiac output and index via assumed Flex method: 4.4 L/min, 2.5 L/min/m2     TPG: 3  PVR: <1 Wood unit     Estimated Blood Loss: 5mL  Specimens: None  Complications: None     Impression:  1. No evidence of pulmonary hypertension; normal mean PA pressure and normal pulmonary vascular resistence  2. No evidence of left-to-right shunt (ie no oxygen \"step up\")  3. Normal cardiac output     Plan: resume Eliquis tonight; office f/u.  At this point, and based on this data, does not seem as though patient would benefit from ASD closure and that her right ventricle and right atrium are severely dilated for some other reason.    1/2021 DEANN:    · The right ventricular cavity is dilated.  · Moderate tricuspid valve regurgitation is present.  · Estimated right ventricular systolic pressure from tricuspid regurgitation is mildly elevated (35-45 mmHg).  · Small-moderate sized multifenestrated secundum-type atrial septal defect (ASD).  · Severe biatrial enlargement.  · Normal LVEF.        Assessment/Plan:     Problem List Items Addressed This Visit        Cardiac and Vasculature    Permanent atrial fibrillation (HCC)    Relevant Medications    dilTIAZem CD (Cardizem CD) 120 MG 24 hr capsule    Right ventricular enlargement - Primary    Relevant Medications    dilTIAZem CD (Cardizem CD) 120 MG 24 hr capsule    Right atrial enlargement    Relevant Medications    dilTIAZem CD (Cardizem CD) 120 MG 24 hr capsule    ASD secundum    Overview     Added automatically from request for surgery [id number]         Relevant Medications    dilTIAZem CD (Cardizem CD) 120 MG 24 hr capsule     Recommendations/plans:  1. Permanent atrial " fibrillation: Established problem, stable.  Asymptomatic.  Mildly tachycardic today but well-controlled heart rates overall.  She is currently taking short acting diltiazem 30 mg twice daily which is actually intended to be taken 4 times per day.  As she is mildly tachycardic and not getting the full benefit of the medication during a 24-hour period,  will switch her to Cardizem  mg once daily and discontinue the short acting diltiazem.  She will call prior to her next appointment if she has any side effects or develops any symptoms following that change.  Continue Eliquis 5 mg twice daily for stroke prophylaxis.  We provided her with samples of Eliquis, patient assistance forms, and information regarding [facility] [employer] to help with the cost of her medication today.    2. Secundum type ASD: Stable.  As previously outlined by Dr. Marcelino: Previous catheterization revealed no oxygen step-up, so despite the fact that her right atrium and right ventricle are enlarged, it does not appear to be secondary to this, and since she is not dyspneic, there is no indication for closure at this time. We will continue to monitor.    She continues to deny any significant shortness of breath.    Follow up in 6 months with Dr. Marcelino, or sooner with new or worsening symptoms.

## 2022-12-12 ENCOUNTER — TELEPHONE (OUTPATIENT)
Dept: VASCULAR SURGERY | Facility: CLINIC | Age: 67
End: 2022-12-12

## 2022-12-12 ENCOUNTER — TELEPHONE (OUTPATIENT)
Dept: PODIATRY | Facility: CLINIC | Age: 67
End: 2022-12-12

## 2022-12-12 NOTE — PROGRESS NOTES
[facility] - PODIATRY    Today's Date: 12/16/2022     Patient Name: Yun Blackwell  MRN: [id number]  CSN: [id number]  PCP: ANGEL Healy MD  Referring Provider: No ref. 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 Patient has h/o AF, anemia, CA, DM, HLD, HTN, IBS, sleep apnea. Patient is NIDDM and unsure of last BG level.  Relates that she has returned to regular shoes and has been increasing her activity.  Unfortunately, her pain has slowly increased since that time.  She feels that her conservative therapies have not worked and is now interested in surgical intervention.  Also relates deviation of her left second and third toes. Admits pain at 8/10 level and described as shooting, aching, throbbing and dull. Relates previous treatment(s) including surgery, bone stimulator. Denies any constitutional symptoms. No other pedal complaints at this time.    Past Medical History:   Diagnosis Date   • A-fib (HCC)    • Abnormal ECG     tachycardia, a fib   • Acid reflux    • Anemia    • Arthritis    • Cancer (HCC)    • Diabetes mellitus (HCC)    • Hyperkalemia    • Hyperlipidemia    • Hypertension    • Hyponatremia    • IBS (irritable bowel syndrome)    • Sleep apnea     USES CPAP   • UTI (urinary tract infection)    • Vaginal vault prolapse      Past Surgical History:   Procedure Laterality Date   • BREAST BIOPSY Left     X2   • CARDIAC CATHETERIZATION     • CARDIAC CATHETERIZATION N/A 2/5/2021    Procedure: Right Heart Cath;  Surgeon: Van Marcelino MD;  Location:  [facility] CATH [facility]ASIVE [facility];  Service: Cardiology;   Laterality: N/A;   • COLON SURGERY     • COLONOSCOPY  09/21/2015    TRANSVERSE COLON ADENOMATOUS POLYP, HEMORRHOIDS, RECALL 5 YEARS, DR LAMAS   • COLONOSCOPY N/A 11/13/2020    Procedure: COLONOSCOPY WITH ANESTHESIA;  Surgeon: Joe Lamas MD;  Location:  [facility] ENDOSCOPY;  Service: Gastroenterology;  Laterality: N/A;  pre op: screening  post op:divdrticulosis, polyp  PCP: ANGEL Healy MD   • COLOSTOMY     • COLPOPEXY VAGINAL      2014   • ENDOSCOPY N/A 11/3/2016    LA GRADE B ESOPHAGITIS WITH NO BLEEDING UPPER THIRD OF ESOPHAGUS, GERI-[place]ADSWORTH TEAR GEJ, BILIOUS BLOOD TINGED COFFEE GROUND GATRIC FLUIDDR LAMAS   • EXPLORATORY LAPAROTOMY N/A 10/14/2016    Procedure: LAPAROTOMY EXPLORATORY, LYSIS OF ADHESIONS, IRRIAGATION OF ABDOMEN, POSSIBLE BOWEL RESECTION;  Surgeon: Bacilio Marcial MD;  Location:  [facility] OR;  Service:    • HYSTERECTOMY     • REVISION / TAKEDOWN COLOSTOMY     • SACROCOLPOPEXY N/A 9/21/2016    Procedure: SACROCOLPOPEXY LAPAROSCOPIC WITH [employer] SI ROBOT, CONVERTED TO OPEN SACROCOLPOPEXY, RIGHT SALPINGO- OOPHERECTOMY, CYSTO;  Surgeon: Maribell Beth MD;  Location:  [facility] OR;  Service:    • TOE FUSION Left 1/6/2022    Procedure: FIRST METATARSOPHALANGEAL JOINT ARTHRODESIS WITH INTERNAL FIXATION - LEFT FOOT;  Surgeon: Jorgito Red DPM;  Location:  [facility] OR;  Service: Podiatry;  Laterality: Left;     Family History   Problem Relation Age of Onset   • Hypertension Mother    • No Known Problems Father    • Colon cancer Neg Hx    • Colon polyps Neg Hx      Social History     Socioeconomic History   • Marital status:    Tobacco Use   • Smoking status: Never   • Smokeless tobacco: Never   Vaping Use   • Vaping Use: Never used   Substance and Sexual Activity   • Alcohol use: No   • Drug use: No   • Sexual activity: Defer     Allergies   Allergen Reactions   • Morphine And Related Nausea And Vomiting and Dizziness   • Percocet [Oxycodone-Acetaminophen] Nausea And Vomiting and Dizziness      Current Outpatient Medications   Medication Sig Dispense Refill   • apixaban (ELIQUIS) 5 MG tablet tablet Take 1 tablet by mouth Every 12 (Twelve) Hours. 60 tablet 5   • Bromfenac Sodium (Prolensa) 0.07 % solution Apply 1 drop to eye(s) as directed by provider Daily.     • cyanocobalamin 1000 MCG/ML injection Inject 1 mL Every 28 (Twenty-Eight) Days. 1 mL 5   • dilTIAZem CD (Cardizem CD) 120 MG 24 hr capsule Take 1 capsule by mouth Daily. 30 capsule 11   • fluocinonide (LIDEX) 0.05 % ointment Apply twice daily to rash when and where present 30 g 3   • montelukast (SINGULAIR) 10 MG tablet Take 1 tablet by mouth every night 30 tablet 5   • nitrofurantoin, macrocrystal-monohydrate, (Macrobid) 100 MG capsule Take 1 capsule by mouth 2 (Two) Times a Day. 14 capsule 0   • omeprazole (priLOSEC) 20 MG capsule Take 1 capsule by mouth Daily. 30 capsule 11   • prednisoLONE acetate (PRED FORTE) 1 % ophthalmic suspension 1 drop 4 (Four) Times a Day.     • tamoxifen (NOLVADEX) 20 MG chemo tablet Take 1 tablet by mouth daily 90 tablet 1   • tiZANidine (ZANAFLEX) 4 MG tablet Take 1 tablet by mouth once Daily As Needed for Muscle Spasms. 30 tablet 2   • triamcinolone (KENALOG) 0.1 % ointment Apply twice daily to affected area on left ankle area when and where rash is present 453.6 g 3     Current Facility-Administered Medications   Medication Dose Route Frequency Provider Last Rate Last Admin   • cyanocobalamin injection 1,000 mcg  1,000 mcg Intramuscular Q28 Days ANGEL Healy MD   1,000 mcg at 10/20/22 0846     Review of Systems   Constitutional: Negative for chills and fever.   HENT: Negative for congestion.    Respiratory: Negative for shortness of breath.    Cardiovascular: Negative for chest pain and leg swelling.   Gastrointestinal: Negative for constipation, diarrhea, nausea and vomiting.   Musculoskeletal: Positive for arthralgias.        Foot pain - improved   Skin: Negative for wound.   Neurological: Negative for  numbness.       OBJECTIVE     Vitals:    12/16/22 1528   BP: 126/78   Pulse: 76   SpO2: 98%       PHYSICAL EXAM  GEN:   Accompanied by friend.    Physical Exam  Vitals reviewed.   Constitutional:       Appearance: Normal appearance. She is well-developed.   HENT:      Head: Normocephalic and atraumatic.      Right Ear: Tympanic membrane normal.      Left Ear: Tympanic membrane normal.      Nose: Nose normal.      Mouth/Throat:      Pharynx: Oropharynx is clear.   Eyes:      Extraocular Movements: Extraocular movements intact.      Pupils: Pupils are equal, round, and reactive to light.   Cardiovascular:      Rate and Rhythm: Normal rate and regular rhythm.      Pulses: Normal pulses.           Dorsalis pedis pulses are 2+ on the right side and 2+ on the left side.        Posterior tibial pulses are 2+ on the right side and 2+ on the left side.      Heart sounds: Normal heart sounds.   Pulmonary:      Effort: Pulmonary effort is normal.      Breath sounds: Normal breath sounds.   Abdominal:      General: Bowel sounds are normal.      Palpations: Abdomen is soft.   Musculoskeletal:      Cervical back: Normal range of motion and neck supple.      Right foot: Bunion (large medial eminence with dorsal spurring. Moderately trackbound. Minimal crepitus.) present.   Feet:      Right foot:      Protective Sensation: 10 sites sensed.      Skin integrity: Warmth present.      Toenail Condition: Right toenails are abnormally thick.      Left foot:      Protective Sensation: 10 sites sensed.      Skin integrity: Warmth present.      Toenail Condition: Left toenails are abnormally thick.   Neurological:      General: No focal deficit present.      Mental Status: She is alert and oriented to person, place, and time. Mental status is at baseline.   Psychiatric:         Mood and Affect: Mood normal.         Behavior: Behavior normal.         Thought Content: Thought content normal.         Judgment: Judgment normal.           Foot/Ankle Exam:       General:   Appearance: appears stated age and healthy    Orientation: AAOx3    Affect: appropriate    Gait: antalgic    Assistance: independent    Shoe Gear:  Casual shoes    VASCULAR      Right Foot Vascularity   Dorsalis pedis:  2+  Posterior tibial:  2+  Skin Temperature: warm    Edema Grading:  None  CFT:  3  Pedal Hair Growth:  Present  Varicosities: mild varicosities       Left Foot Vascularity   Dorsalis pedis:  2+  Posterior tibial:  2+  Skin Temperature: warm    Edema Grading:  None  CFT:  3  Pedal Hair Growth:  Present  Varicosities: mild varicosities        NEUROLOGIC     Right Foot Neurologic   Normal sensation    Light touch sensation:  Normal  Vibratory sensation:  Normal  Hot/Cold sensation: normal    Protective Sensation using Brimson-Yennifer Monofilament:  10     Left Foot Neurologic   Normal sensation    Light touch sensation:  Normal  Vibratory sensation:  Normal  Hot/cold sensation: normal    Protective Sensation using Brimson-Yennifer Monofilament:  10     MUSCULOSKELETAL      Right Foot Musculoskeletal   Ecchymosis:  None  Tenderness: great toe metatarsophalangeal joint and toe 1    Arch:  Normal  Hammertoe:  Second toe, third toe, fourth toe and fifth toe  Hallux valgus: Yes (large medial eminence with dorsal spurring. Moderately trackbound. Minimal crepitus.)       Left Foot Musculoskeletal   Ecchymosis:  None  Tenderness: great toe metatarsophalangeal joint    Arch:  Normal  Hammertoe:  Second toe and third toe (Medial deviation)  Hallux limitus: Yes (s/p arthodesis with nonunion)       MUSCLE STRENGTH     Right Foot Muscle Strength   Foot dorsiflexion:  5  Foot plantar flexion:  5  Foot inversion:  5  Foot eversion:  5     Left Foot Muscle Strength   Foot dorsiflexion:  5  Foot plantar flexion:  5  Foot inversion:  5  Foot eversion:  5     RANGE OF MOTION      Right Foot Range of Motion   Foot and ankle ROM within normal limits       Left Foot Range of Motion    Foot and ankle ROM within normal limits    first MTP active extension pain    first MTP active flexion pain       DERMATOLOGIC     Right Foot Dermatologic   Skin: skin intact    Nails: abnormally thick       Left Foot Dermatologic   Skin: skin intact    Nails: abnormally thick        RADIOLOGY/NUCLEAR:  XR Foot 3+ View Left    Result Date: 11/29/2022  Narrative: EXAMINATION: XR FOOT 3+ VW LEFT-  11/29/2022 2:15 PM CST  HISTORY: non-union post op, foot pain; G89.28-Other chronic postprocedural pain; M96.0-Pseudarthrosis after fusion or arthrodesis  Left foot, 3 views. AP, oblique, and lateral view.  Comparison is made with June 21, 2022.  Hardware fusion of the first MTP joint. Hardware is intact and hardware position is unchanged.  No acute fracture. Midfoot joints are normal.  Mild dorsal midfoot spurring.  Prominent calcaneal spurring.  Summary: 1. Unchanged appearance of the left foot. This report was finalized on 11/29/2022 14:30 by Dr. Jim Izaguirre MD.      LABORATORY/CULTURE RESULTS:      PATHOLOGY RESULTS:       ASSESSMENT/PLAN     Diagnoses and all orders for this visit:    1. 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 Hammertoe of left foot  -     Case Request; Standing  -     Instructions on coughing, deep breathing, and incentive spirometry.; Future  -     CBC & Differential; Future  -     Basic Metabolic Panel; Future  -     ECG 12 Lead; Future  -     XR chest 2 vw; Future  -     ceFAZolin (ANCEF) 2 g in sodium chloride 0.9 % 100 mL IVPB  -     Case Request    3. Foot pain, left    4. Pre-op exam    Other orders  -     Follow Anesthesia Guidelines / Protocol; Future  -     Obtain  Informed Consent; Future  -     Follow Anesthesia Guidelines / Protocol; Standing  -     Provide Instructions to Patient Regarding NPO Status; Future  -     Chlorhexidine Skin Prep - Educate and Review With Patient; Future  -     Verify NPO Status; Standing  -     Obtain Informed Consent (If Not Done Inpatient or PAT); Standing  -     Instructions on coughing, deep breathing, and incentive spirometry.; Standing  -     Notify Physician - Standard; Standing  -     Provide NPO Instructions to Patient      Comprehensive lower extremity examination and evaluation was performed.  Discussed findings and treatment plan including risks, benefits, and treatment options with patient in detail. Patient agreed with treatment plan.  Patient has had increased pain since returning to regular shoes and activities.  She wishes to forego additional conservative therapy and offer surgical intervention.   We the best course of action would be to proceed with a left foot revisional first metatarsal phalangeal joint arthrodesis with possible expansion graft, hardware removal, bone graft harvest, hammertoe repair 2 and 3.  Patient is in agreement.  All options, benefits, and risks associate with surgery, discussed with the patient including but not limited to: Standard risk of anesthesia, pain, bleeding infection, nonhealing/dehiscence, nonunion, malunion, deformity, reoccurrence, loss of limb or life.  Pre and postoperative courses were discussed in detail including minimum 3 weeks nonweightbearing status postoperatively.  No guarantees were inferred.  Patient will stop her blood thinner 3 days prior to surgery and restart the day after surgery.  An After Visit Summary was printed and given to the patient at discharge, including (if requested) any available informative/educational handouts regarding diagnosis, treatment, or medications. All questions were answered to patient/family satisfaction. Should symptoms fail to improve or worsen  they agree to call or return to clinic or to go to the Emergency Department. Discussed the importance of following up with any needed screening tests/labs/specialist appointments and any requested follow-up recommended by me today. Importance of maintaining follow-up discussed and patient accepts that missed appointments can delay diagnosis and potentially lead to worsening of conditions.  Return for Post-Op appointment., or sooner if acute issues arise.    Lab Frequency Next Occurrence   Follow Anesthesia Guidelines / Standing Orders Once 09/29/2020   Obtain Informed Consent Once 10/04/2020       This document has been electronically signed by Frankie Zapata DPM on December 16, 2022 17:15 CST

## 2022-12-12 NOTE — TELEPHONE ENCOUNTER
Caller: DIETER   Relationship to Patient: SELF  Phone Number: [phone]  Reason for Call: PATIENT STATES SHE WOULD LIKE TO SPEAK TO CLINICAL ABOUT HER TOE, STATES THAT ITS NOT DOING ANY BETTER DOES NOT KNOW WHAT SHE CAN DO IT HURTS TO BEND THE TOE    Prednisone Counseling:  I discussed with the patient the risks of prolonged use of prednisone including but not limited to weight gain, insomnia, osteoporosis, mood changes, diabetes, susceptibility to infection, glaucoma and high blood pressure.  In cases where prednisone use is prolonged, patients should be monitored with blood pressure checks, serum glucose levels and an eye exam.  Additionally, the patient may need to be placed on GI prophylaxis, PCP prophylaxis, and calcium and vitamin D supplementation and/or a bisphosphonate.  The patient verbalized understanding of the proper use and the possible adverse effects of prednisone.  All of the patient's questions and concerns were addressed.

## 2022-12-12 NOTE — TELEPHONE ENCOUNTER
We received a message from the answering service that the patient was having issues with swelling and would like to have a call back to discuss it.  Andrew stated that he had already spoken to the patient.

## 2022-12-15 ENCOUNTER — TELEPHONE (OUTPATIENT)
Dept: PODIATRY | Facility: CLINIC | Age: 67
End: 2022-12-15

## 2022-12-16 ENCOUNTER — OFFICE VISIT (OUTPATIENT)
Dept: PODIATRY | Facility: CLINIC | Age: 67
End: 2022-12-16

## 2022-12-16 VITALS
SYSTOLIC BLOOD PRESSURE: 126 MMHG | DIASTOLIC BLOOD PRESSURE: 78 MMHG | BODY MASS INDEX: 29.63 KG/M2 | OXYGEN SATURATION: 98 % | HEIGHT: 62 IN | WEIGHT: 161 LBS | HEART RATE: 76 BPM

## 2022-12-16 DIAGNOSIS — Z01.818 PRE-OP EXAM: ICD-10-CM

## 2022-12-16 DIAGNOSIS — M79.672 FOOT PAIN, LEFT: ICD-10-CM

## 2022-12-16 DIAGNOSIS — M20.42 HAMMERTOE OF LEFT FOOT: ICD-10-CM

## 2022-12-16 DIAGNOSIS — M96.0 NONUNION AFTER ARTHRODESIS: Primary | ICD-10-CM

## 2022-12-16 PROCEDURE — 99214 OFFICE O/P EST MOD 30 MIN: CPT | Performed by: PODIATRIST

## 2022-12-16 NOTE — H&P
[facility] - PODIATRY    Today's Date: 12/16/2022     Patient Name: Yun Blackwell  MRN: [id number]  CSN: [id number]  PCP: ANGEL Healy MD  Referring Provider: No ref. 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 Patient has h/o AF, anemia, CA, DM, HLD, HTN, IBS, sleep apnea. Patient is NIDDM and unsure of last BG level.  Relates that she has returned to regular shoes and has been increasing her activity.  Unfortunately, her pain has slowly increased since that time.  She feels that her conservative therapies have not worked and is now interested in surgical intervention.  Also relates deviation of her left second and third toes. Admits pain at 8/10 level and described as shooting, aching, throbbing and dull. Relates previous treatment(s) including surgery, bone stimulator. Denies any constitutional symptoms. No other pedal complaints at this time.    Past Medical History:   Diagnosis Date   • A-fib (HCC)    • Abnormal ECG     tachycardia, a fib   • Acid reflux    • Anemia    • Arthritis    • Cancer (HCC)    • Diabetes mellitus (HCC)    • Hyperkalemia    • Hyperlipidemia    • Hypertension    • Hyponatremia    • IBS (irritable bowel syndrome)    • Sleep apnea     USES CPAP   • UTI (urinary tract infection)    • Vaginal vault prolapse      Past Surgical History:   Procedure Laterality Date   • BREAST BIOPSY Left     X2   • CARDIAC CATHETERIZATION     • CARDIAC CATHETERIZATION N/A 2/5/2021    Procedure: Right Heart Cath;  Surgeon: Van Marcelino MD;  Location:  [facility];  Service: Cardiology;   Laterality: N/A;   • COLON SURGERY     • COLONOSCOPY  09/21/2015    TRANSVERSE COLON ADENOMATOUS POLYP, HEMORRHOIDS, RECALL 5 YEARS, DR LAMAS   • COLONOSCOPY N/A 11/13/2020    Procedure: COLONOSCOPY WITH ANESTHESIA;  Surgeon: Joe Lamas MD;  Location:  [facility] ENDOSCOPY;  Service: Gastroenterology;  Laterality: N/A;  pre op: screening  post op:divdrticulosis, polyp  PCP: ANGEL Healy MD   • COLOSTOMY     • COLPOPEXY VAGINAL      2014   • ENDOSCOPY N/A 11/3/2016    LA GRADE B ESOPHAGITIS WITH NO BLEEDING UPPER THIRD OF ESOPHAGUS, GERI-[place]TH TEAR GEJ, BILIOUS BLOOD TINGED COFFEE GROUND GATRIC FLUIDDR LAMAS   • EXPLORATORY LAPAROTOMY N/A 10/14/2016    Procedure: LAPAROTOMY EXPLORATORY, LYSIS OF ADHESIONS, IRRIAGATION OF ABDOMEN, POSSIBLE BOWEL RESECTION;  Surgeon: Bacilio Marcial MD;  Location:  [facility] OR;  Service:    • HYSTERECTOMY     • REVISION / TAKEDOWN COLOSTOMY     • SACROCOLPOPEXY N/A 9/21/2016    Procedure: SACROCOLPOPEXY LAPAROSCOPIC WITH Similar[employer] SystemsI SI ROBOT, CONVERTED TO OPEN SACROCOLPOPEXY, RIGHT SALPINGO- OOPHERECTOMY, CYSTO;  Surgeon: Maribell Beth MD;  Location:  [facility] OR;  Service:    • TOE FUSION Left 1/6/2022    Procedure: FIRST METATARSOPHALANGEAL JOINT ARTHRODESIS WITH INTERNAL FIXATION - LEFT FOOT;  Surgeon: Jorgito Red DPM;  Location:  [facility] OR;  Service: Podiatry;  Laterality: Left;     Family History   Problem Relation Age of Onset   • Hypertension Mother    • No Known Problems Father    • Colon cancer Neg Hx    • Colon polyps Neg Hx      Social History     Socioeconomic History   • Marital status:    Tobacco Use   • Smoking status: Never   • Smokeless tobacco: Never   Vaping Use   • Vaping Use: Never used   Substance and Sexual Activity   • Alcohol use: No   • Drug use: No   • Sexual activity: Defer     Allergies   Allergen Reactions   • Morphine And Related Nausea And Vomiting and Dizziness   • Percocet [Oxycodone-Acetaminophen] Nausea And Vomiting and Dizziness      Current Outpatient Medications   Medication Sig Dispense Refill   • apixaban (ELIQUIS) 5 MG tablet tablet Take 1 tablet by mouth Every 12 (Twelve) Hours. 60 tablet 5   • Bromfenac Sodium (Prolensa) 0.07 % solution Apply 1 drop to eye(s) as directed by provider Daily.     • cyanocobalamin 1000 MCG/ML injection Inject 1 mL Every 28 (Twenty-Eight) Days. 1 mL 5   • dilTIAZem CD (Cardizem CD) 120 MG 24 hr capsule Take 1 capsule by mouth Daily. 30 capsule 11   • fluocinonide (LIDEX) 0.05 % ointment Apply twice daily to rash when and where present 30 g 3   • montelukast (SINGULAIR) 10 MG tablet Take 1 tablet by mouth every night 30 tablet 5   • nitrofurantoin, macrocrystal-monohydrate, (Macrobid) 100 MG capsule Take 1 capsule by mouth 2 (Two) Times a Day. 14 capsule 0   • omeprazole (priLOSEC) 20 MG capsule Take 1 capsule by mouth Daily. 30 capsule 11   • prednisoLONE acetate (PRED FORTE) 1 % ophthalmic suspension 1 drop 4 (Four) Times a Day.     • tamoxifen (NOLVADEX) 20 MG chemo tablet Take 1 tablet by mouth daily 90 tablet 1   • tiZANidine (ZANAFLEX) 4 MG tablet Take 1 tablet by mouth once Daily As Needed for Muscle Spasms. 30 tablet 2   • triamcinolone (KENALOG) 0.1 % ointment Apply twice daily to affected area on left ankle area when and where rash is present 453.6 g 3     Current Facility-Administered Medications   Medication Dose Route Frequency Provider Last Rate Last Admin   • cyanocobalamin injection 1,000 mcg  1,000 mcg Intramuscular Q28 Days ANGEL Healy MD   1,000 mcg at 10/20/22 0846     Review of Systems   Constitutional: Negative for chills and fever.   HENT: Negative for congestion.    Respiratory: Negative for shortness of breath.    Cardiovascular: Negative for chest pain and leg swelling.   Gastrointestinal: Negative for constipation, diarrhea, nausea and vomiting.   Musculoskeletal: Positive for arthralgias.        Foot pain - improved   Skin: Negative for wound.   Neurological: Negative for  numbness.       OBJECTIVE     Vitals:    12/16/22 1528   BP: 126/78   Pulse: 76   SpO2: 98%       PHYSICAL EXAM  GEN:   Accompanied by friend.    Physical Exam  Vitals reviewed.   Constitutional:       Appearance: Normal appearance. She is well-developed.   HENT:      Head: Normocephalic and atraumatic.      Right Ear: Tympanic membrane normal.      Left Ear: Tympanic membrane normal.      Nose: Nose normal.      Mouth/Throat:      Pharynx: Oropharynx is clear.   Eyes:      Extraocular Movements: Extraocular movements intact.      Pupils: Pupils are equal, round, and reactive to light.   Cardiovascular:      Rate and Rhythm: Normal rate and regular rhythm.      Pulses: Normal pulses.           Dorsalis pedis pulses are 2+ on the right side and 2+ on the left side.        Posterior tibial pulses are 2+ on the right side and 2+ on the left side.      Heart sounds: Normal heart sounds.   Pulmonary:      Effort: Pulmonary effort is normal.      Breath sounds: Normal breath sounds.   Abdominal:      General: Bowel sounds are normal.      Palpations: Abdomen is soft.   Musculoskeletal:      Cervical back: Normal range of motion and neck supple.      Right foot: Bunion (large medial eminence with dorsal spurring. Moderately trackbound. Minimal crepitus.) present.   Feet:      Right foot:      Protective Sensation: 10 sites sensed.      Skin integrity: Warmth present.      Toenail Condition: Right toenails are abnormally thick.      Left foot:      Protective Sensation: 10 sites sensed.      Skin integrity: Warmth present.      Toenail Condition: Left toenails are abnormally thick.   Neurological:      General: No focal deficit present.      Mental Status: She is alert and oriented to person, place, and time. Mental status is at baseline.   Psychiatric:         Mood and Affect: Mood normal.         Behavior: Behavior normal.         Thought Content: Thought content normal.         Judgment: Judgment normal.           Foot/Ankle Exam:       General:   Appearance: appears stated age and healthy    Orientation: AAOx3    Affect: appropriate    Gait: antalgic    Assistance: independent    Shoe Gear:  Casual shoes    VASCULAR      Right Foot Vascularity   Dorsalis pedis:  2+  Posterior tibial:  2+  Skin Temperature: warm    Edema Grading:  None  CFT:  3  Pedal Hair Growth:  Present  Varicosities: mild varicosities       Left Foot Vascularity   Dorsalis pedis:  2+  Posterior tibial:  2+  Skin Temperature: warm    Edema Grading:  None  CFT:  3  Pedal Hair Growth:  Present  Varicosities: mild varicosities        NEUROLOGIC     Right Foot Neurologic   Normal sensation    Light touch sensation:  Normal  Vibratory sensation:  Normal  Hot/Cold sensation: normal    Protective Sensation using [place] Monofilament:  10     Left Foot Neurologic   Normal sensation    Light touch sensation:  Normal  Vibratory sensation:  Normal  Hot/cold sensation: normal    Protective Sensation using [place]ifer Monofilament:  10     MUSCULOSKELETAL      Right Foot Musculoskeletal   Ecchymosis:  None  Tenderness: great toe metatarsophalangeal joint and toe 1    Arch:  Normal  Hammertoe:  Second toe, third toe, fourth toe and fifth toe  Hallux valgus: Yes (large medial eminence with dorsal spurring. Moderately trackbound. Minimal crepitus.)       Left Foot Musculoskeletal   Ecchymosis:  None  Tenderness: great toe metatarsophalangeal joint    Arch:  Normal  Hammertoe:  Second toe and third toe (Medial deviation)  Hallux limitus: Yes (s/p arthodesis with nonunion)       MUSCLE STRENGTH     Right Foot Muscle Strength   Foot dorsiflexion:  5  Foot plantar flexion:  5  Foot inversion:  5  Foot eversion:  5     Left Foot Muscle Strength   Foot dorsiflexion:  5  Foot plantar flexion:  5  Foot inversion:  5  Foot eversion:  5     RANGE OF MOTION      Right Foot Range of Motion   Foot and ankle ROM within normal limits       Left Foot Range of Motion    Foot and ankle ROM within normal limits    first MTP active extension pain    first MTP active flexion pain       DERMATOLOGIC     Right Foot Dermatologic   Skin: skin intact    Nails: abnormally thick       Left Foot Dermatologic   Skin: skin intact    Nails: abnormally thick        RADIOLOGY/NUCLEAR:  XR Foot 3+ View Left    Result Date: 11/29/2022  Narrative: EXAMINATION: XR FOOT 3+ VW LEFT-  11/29/2022 2:15 PM CST  HISTORY: non-union post op, foot pain; G89.28-Other chronic postprocedural pain; M96.0-Pseudarthrosis after fusion or arthrodesis  Left foot, 3 views. AP, oblique, and lateral view.  Comparison is made with June 21, 2022.  Hardware fusion of the first MTP joint. Hardware is intact and hardware position is unchanged.  No acute fracture. Midfoot joints are normal.  Mild dorsal midfoot spurring.  Prominent calcaneal spurring.  Summary: 1. Unchanged appearance of the left foot. This report was finalized on 11/29/2022 14:30 by Dr. Jim Izaguirre MD.      LABORATORY/CULTURE RESULTS:      PATHOLOGY RESULTS:       ASSESSMENT/PLAN     Diagnoses and all orders for this visit:    1. 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 Hammertoe of left foot  -     Case Request; Standing  -     Instructions on coughing, deep breathing, and incentive spirometry.; Future  -     CBC & Differential; Future  -     Basic Metabolic Panel; Future  -     ECG 12 Lead; Future  -     XR chest 2 vw; Future  -     ceFAZolin (ANCEF) 2 g in sodium chloride 0.9 % 100 mL IVPB  -     Case Request    3. Foot pain, left    4. Pre-op exam    Other orders  -     Follow Anesthesia Guidelines / Protocol; Future  -     Obtain  Informed Consent; Future  -     Follow Anesthesia Guidelines / Protocol; Standing  -     Provide Instructions to Patient Regarding NPO Status; Future  -     Chlorhexidine Skin Prep - Educate and Review With Patient; Future  -     Verify NPO Status; Standing  -     Obtain Informed Consent (If Not Done Inpatient or PAT); Standing  -     Instructions on coughing, deep breathing, and incentive spirometry.; Standing  -     Notify Physician - Standard; Standing  -     Provide NPO Instructions to Patient      Comprehensive lower extremity examination and evaluation was performed.  Discussed findings and treatment plan including risks, benefits, and treatment options with patient in detail. Patient agreed with treatment plan.  Patient has had increased pain since returning to regular shoes and activities.  She wishes to forego additional conservative therapy and offer surgical intervention.   We the best course of action would be to proceed with a left foot revisional first metatarsal phalangeal joint arthrodesis with possible expansion graft, hardware removal, bone graft harvest, hammertoe repair 2 and 3.  Patient is in agreement.  All options, benefits, and risks associate with surgery, discussed with the patient including but not limited to: Standard risk of anesthesia, pain, bleeding infection, nonhealing/dehiscence, nonunion, malunion, deformity, reoccurrence, loss of limb or life.  Pre and postoperative courses were discussed in detail including minimum 3 weeks nonweightbearing status postoperatively.  No guarantees were inferred.  Patient will stop her blood thinner 3 days prior to surgery and restart the day after surgery.  An After Visit Summary was printed and given to the patient at discharge, including (if requested) any available informative/educational handouts regarding diagnosis, treatment, or medications. All questions were answered to patient/family satisfaction. Should symptoms fail to improve or worsen  they agree to call or return to clinic or to go to the Emergency Department. Discussed the importance of following up with any needed screening tests/labs/specialist appointments and any requested follow-up recommended by me today. Importance of maintaining follow-up discussed and patient accepts that missed appointments can delay diagnosis and potentially lead to worsening of conditions.  Return for Post-Op appointment., or sooner if acute issues arise.    Lab Frequency Next Occurrence   Follow Anesthesia Guidelines / Standing Orders Once 09/29/2020   Obtain Informed Consent Once 10/04/2020       This document has been electronically signed by Frankie Zapata DPM on December 16, 2022 17:20 CST

## 2022-12-16 NOTE — H&P (VIEW-ONLY)
[facility] - PODIATRY    Today's Date: 12/16/2022     Patient Name: Yun Blackwell  MRN: [id number]  CSN: [id number]  PCP: ANGEL Healy MD  Referring Provider: No ref. 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 Patient has h/o AF, anemia, CA, DM, HLD, HTN, IBS, sleep apnea. Patient is NIDDM and unsure of last BG level.  Relates that she has returned to regular shoes and has been increasing her activity.  Unfortunately, her pain has slowly increased since that time.  She feels that her conservative therapies have not worked and is now interested in surgical intervention.  Also relates deviation of her left second and third toes. Admits pain at 8/10 level and described as shooting, aching, throbbing and dull. Relates previous treatment(s) including surgery, bone stimulator. Denies any constitutional symptoms. No other pedal complaints at this time.    Past Medical History:   Diagnosis Date   • A-fib (HCC)    • Abnormal ECG     tachycardia, a fib   • Acid reflux    • Anemia    • Arthritis    • Cancer (HCC)    • Diabetes mellitus (HCC)    • Hyperkalemia    • Hyperlipidemia    • Hypertension    • Hyponatremia    • IBS (irritable bowel syndrome)    • Sleep apnea     USES CPAP   • UTI (urinary tract infection)    • Vaginal vault prolapse      Past Surgical History:   Procedure Laterality Date   • BREAST BIOPSY Left     X2   • CARDIAC CATHETERIZATION     • CARDIAC CATHETERIZATION N/A 2/5/2021    Procedure: Right Heart Cath;  Surgeon: Van Marcelino MD;  Location:  [facility] CATH [facility]AS[facility];  Service: Cardiology;   Laterality: N/A;   • COLON SURGERY     • COLONOSCOPY  09/21/2015    TRANSVERSE COLON ADENOMATOUS POLYP, HEMORRHOIDS, RECALL 5 YEARS, DR LAMAS   • COLONOSCOPY N/A 11/13/2020    Procedure: COLONOSCOPY WITH ANESTHESIA;  Surgeon: Joe Lamas MD;  Location:  [facility] ENDOSCOPY;  Service: Gastroenterology;  Laterality: N/A;  pre op: screening  post op:divdrticulosis, polyp  PCP: ANGEL Healy MD   • COLOSTOMY     • COLPOPEXY VAGINAL      2014   • ENDOSCOPY N/A 11/3/2016    LA GRADE B ESOPHAGITIS WITH NO BLEEDING UPPER THIRD OF ESOPHAGUS, GERI-[place]AD[place]ORTH TEAR GEJ, BILIOUS BLOOD TINGED COFFEE GROUND GATRIC FLUIDDR LAMAS   • EXPLORATORY LAPAROTOMY N/A 10/14/2016    Procedure: LAPAROTOMY EXPLORATORY, LYSIS OF ADHESIONS, IRRIAGATION OF ABDOMEN, POSSIBLE BOWEL RESECTION;  Surgeon: Bacilio Marcial MD;  Location:  [facility] OR;  Service:    • HYSTERECTOMY     • REVISION / TAKEDOWN COLOSTOMY     • SACROCOLPOPEXY N/A 9/21/2016    Procedure: SACROCOLPOPEXY LAPAROSCOPIC WITH SWEEPiOI SI ROBOT, CONVERTED TO OPEN SACROCOLPOPEXY, RIGHT SALPINGO- OOPHERECTOMY, CYSTO;  Surgeon: Maribell Beth MD;  Location:  [facility] OR;  Service:    • TOE FUSION Left 1/6/2022    Procedure: FIRST METATARSOPHALANGEAL JOINT ARTHRODESIS WITH INTERNAL FIXATION - LEFT FOOT;  Surgeon: Jorgito Red DPM;  Location:  [facility] OR;  Service: Podiatry;  Laterality: Left;     Family History   Problem Relation Age of Onset   • Hypertension Mother    • No Known Problems Father    • Colon cancer Neg Hx    • Colon polyps Neg Hx      Social History     Socioeconomic History   • Marital status:    Tobacco Use   • Smoking status: Never   • Smokeless tobacco: Never   Vaping Use   • Vaping Use: Never used   Substance and Sexual Activity   • Alcohol use: No   • Drug use: No   • Sexual activity: Defer     Allergies   Allergen Reactions   • Morphine And Related Nausea And Vomiting and Dizziness   • Percocet [Oxycodone-Acetaminophen] Nausea And Vomiting and Dizziness      Current Outpatient Medications   Medication Sig Dispense Refill   • apixaban (ELIQUIS) 5 MG tablet tablet Take 1 tablet by mouth Every 12 (Twelve) Hours. 60 tablet 5   • Bromfenac Sodium (Prolensa) 0.07 % solution Apply 1 drop to eye(s) as directed by provider Daily.     • cyanocobalamin 1000 MCG/ML injection Inject 1 mL Every 28 (Twenty-Eight) Days. 1 mL 5   • dilTIAZem CD (Cardizem CD) 120 MG 24 hr capsule Take 1 capsule by mouth Daily. 30 capsule 11   • fluocinonide (LIDEX) 0.05 % ointment Apply twice daily to rash when and where present 30 g 3   • montelukast (SINGULAIR) 10 MG tablet Take 1 tablet by mouth every night 30 tablet 5   • nitrofurantoin, macrocrystal-monohydrate, (Macrobid) 100 MG capsule Take 1 capsule by mouth 2 (Two) Times a Day. 14 capsule 0   • omeprazole (priLOSEC) 20 MG capsule Take 1 capsule by mouth Daily. 30 capsule 11   • prednisoLONE acetate (PRED FORTE) 1 % ophthalmic suspension 1 drop 4 (Four) Times a Day.     • tamoxifen (NOLVADEX) 20 MG chemo tablet Take 1 tablet by mouth daily 90 tablet 1   • tiZANidine (ZANAFLEX) 4 MG tablet Take 1 tablet by mouth once Daily As Needed for Muscle Spasms. 30 tablet 2   • triamcinolone (KENALOG) 0.1 % ointment Apply twice daily to affected area on left ankle area when and where rash is present 453.6 g 3     Current Facility-Administered Medications   Medication Dose Route Frequency Provider Last Rate Last Admin   • cyanocobalamin injection 1,000 mcg  1,000 mcg Intramuscular Q28 Days ANGEL Healy MD   1,000 mcg at 10/20/22 0846     Review of Systems   Constitutional: Negative for chills and fever.   HENT: Negative for congestion.    Respiratory: Negative for shortness of breath.    Cardiovascular: Negative for chest pain and leg swelling.   Gastrointestinal: Negative for constipation, diarrhea, nausea and vomiting.   Musculoskeletal: Positive for arthralgias.        Foot pain - improved   Skin: Negative for wound.   Neurological: Negative for  numbness.       OBJECTIVE     Vitals:    12/16/22 1528   BP: 126/78   Pulse: 76   SpO2: 98%       PHYSICAL EXAM  GEN:   Accompanied by friend.    Physical Exam  Vitals reviewed.   Constitutional:       Appearance: Normal appearance. She is well-developed.   HENT:      Head: Normocephalic and atraumatic.      Right Ear: Tympanic membrane normal.      Left Ear: Tympanic membrane normal.      Nose: Nose normal.      Mouth/Throat:      Pharynx: Oropharynx is clear.   Eyes:      Extraocular Movements: Extraocular movements intact.      Pupils: Pupils are equal, round, and reactive to light.   Cardiovascular:      Rate and Rhythm: Normal rate and regular rhythm.      Pulses: Normal pulses.           Dorsalis pedis pulses are 2+ on the right side and 2+ on the left side.        Posterior tibial pulses are 2+ on the right side and 2+ on the left side.      Heart sounds: Normal heart sounds.   Pulmonary:      Effort: Pulmonary effort is normal.      Breath sounds: Normal breath sounds.   Abdominal:      General: Bowel sounds are normal.      Palpations: Abdomen is soft.   Musculoskeletal:      Cervical back: Normal range of motion and neck supple.      Right foot: Bunion (large medial eminence with dorsal spurring. Moderately trackbound. Minimal crepitus.) present.   Feet:      Right foot:      Protective Sensation: 10 sites sensed.      Skin integrity: Warmth present.      Toenail Condition: Right toenails are abnormally thick.      Left foot:      Protective Sensation: 10 sites sensed.      Skin integrity: Warmth present.      Toenail Condition: Left toenails are abnormally thick.   Neurological:      General: No focal deficit present.      Mental Status: She is alert and oriented to person, place, and time. Mental status is at baseline.   Psychiatric:         Mood and Affect: Mood normal.         Behavior: Behavior normal.         Thought Content: Thought content normal.         Judgment: Judgment normal.           Foot/Ankle Exam:       General:   Appearance: appears stated age and healthy    Orientation: AAOx3    Affect: appropriate    Gait: antalgic    Assistance: independent    Shoe Gear:  Casual shoes    VASCULAR      Right Foot Vascularity   Dorsalis pedis:  2+  Posterior tibial:  2+  Skin Temperature: warm    Edema Grading:  None  CFT:  3  Pedal Hair Growth:  Present  Varicosities: mild varicosities       Left Foot Vascularity   Dorsalis pedis:  2+  Posterior tibial:  2+  Skin Temperature: warm    Edema Grading:  None  CFT:  3  Pedal Hair Growth:  Present  Varicosities: mild varicosities        NEUROLOGIC     Right Foot Neurologic   Normal sensation    Light touch sensation:  Normal  Vibratory sensation:  Normal  Hot/Cold sensation: normal    Protective Sensation using [place]-Yennifer Monofilament:  10     Left Foot Neurologic   Normal sensation    Light touch sensation:  Normal  Vibratory sensation:  Normal  Hot/cold sensation: normal    Protective Sensation using Waterford-Yennifer Monofilament:  10     MUSCULOSKELETAL      Right Foot Musculoskeletal   Ecchymosis:  None  Tenderness: great toe metatarsophalangeal joint and toe 1    Arch:  Normal  Hammertoe:  Second toe, third toe, fourth toe and fifth toe  Hallux valgus: Yes (large medial eminence with dorsal spurring. Moderately trackbound. Minimal crepitus.)       Left Foot Musculoskeletal   Ecchymosis:  None  Tenderness: great toe metatarsophalangeal joint    Arch:  Normal  Hammertoe:  Second toe and third toe (Medial deviation)  Hallux limitus: Yes (s/p arthodesis with nonunion)       MUSCLE STRENGTH     Right Foot Muscle Strength   Foot dorsiflexion:  5  Foot plantar flexion:  5  Foot inversion:  5  Foot eversion:  5     Left Foot Muscle Strength   Foot dorsiflexion:  5  Foot plantar flexion:  5  Foot inversion:  5  Foot eversion:  5     RANGE OF MOTION      Right Foot Range of Motion   Foot and ankle ROM within normal limits       Left Foot Range of Motion    Foot and ankle ROM within normal limits    first MTP active extension pain    first MTP active flexion pain       DERMATOLOGIC     Right Foot Dermatologic   Skin: skin intact    Nails: abnormally thick       Left Foot Dermatologic   Skin: skin intact    Nails: abnormally thick        RADIOLOGY/NUCLEAR:  XR Foot 3+ View Left    Result Date: 11/29/2022  Narrative: EXAMINATION: XR FOOT 3+ VW LEFT-  11/29/2022 2:15 PM CST  HISTORY: non-union post op, foot pain; G89.28-Other chronic postprocedural pain; M96.0-Pseudarthrosis after fusion or arthrodesis  Left foot, 3 views. AP, oblique, and lateral view.  Comparison is made with June 21, 2022.  Hardware fusion of the first MTP joint. Hardware is intact and hardware position is unchanged.  No acute fracture. Midfoot joints are normal.  Mild dorsal midfoot spurring.  Prominent calcaneal spurring.  Summary: 1. Unchanged appearance of the left foot. This report was finalized on 11/29/2022 14:30 by Dr. Jim Izaguirre MD.      LABORATORY/CULTURE RESULTS:      PATHOLOGY RESULTS:       ASSESSMENT/PLAN     Diagnoses and all orders for this visit:    1. 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 Hammertoe of left foot  -     Case Request; Standing  -     Instructions on coughing, deep breathing, and incentive spirometry.; Future  -     CBC & Differential; Future  -     Basic Metabolic Panel; Future  -     ECG 12 Lead; Future  -     XR chest 2 vw; Future  -     ceFAZolin (ANCEF) 2 g in sodium chloride 0.9 % 100 mL IVPB  -     Case Request    3. Foot pain, left    4. Pre-op exam    Other orders  -     Follow Anesthesia Guidelines / Protocol; Future  -     Obtain  Informed Consent; Future  -     Follow Anesthesia Guidelines / Protocol; Standing  -     Provide Instructions to Patient Regarding NPO Status; Future  -     Chlorhexidine Skin Prep - Educate and Review With Patient; Future  -     Verify NPO Status; Standing  -     Obtain Informed Consent (If Not Done Inpatient or PAT); Standing  -     Instructions on coughing, deep breathing, and incentive spirometry.; Standing  -     Notify Physician - Standard; Standing  -     Provide NPO Instructions to Patient      Comprehensive lower extremity examination and evaluation was performed.  Discussed findings and treatment plan including risks, benefits, and treatment options with patient in detail. Patient agreed with treatment plan.  Patient has had increased pain since returning to regular shoes and activities.  She wishes to forego additional conservative therapy and offer surgical intervention.   We the best course of action would be to proceed with a left foot revisional first metatarsal phalangeal joint arthrodesis with possible expansion graft, hardware removal, bone graft harvest, hammertoe repair 2 and 3.  Patient is in agreement.  All options, benefits, and risks associate with surgery, discussed with the patient including but not limited to: Standard risk of anesthesia, pain, bleeding infection, nonhealing/dehiscence, nonunion, malunion, deformity, reoccurrence, loss of limb or life.  Pre and postoperative courses were discussed in detail including minimum 3 weeks nonweightbearing status postoperatively.  No guarantees were inferred.  Patient will stop her blood thinner 3 days prior to surgery and restart the day after surgery.  An After Visit Summary was printed and given to the patient at discharge, including (if requested) any available informative/educational handouts regarding diagnosis, treatment, or medications. All questions were answered to patient/family satisfaction. Should symptoms fail to improve or worsen  they agree to call or return to clinic or to go to the Emergency Department. Discussed the importance of following up with any needed screening tests/labs/specialist appointments and any requested follow-up recommended by me today. Importance of maintaining follow-up discussed and patient accepts that missed appointments can delay diagnosis and potentially lead to worsening of conditions.  Return for Post-Op appointment., or sooner if acute issues arise.    Lab Frequency Next Occurrence   Follow Anesthesia Guidelines / Standing Orders Once 09/29/2020   Obtain Informed Consent Once 10/04/2020       This document has been electronically signed by Frankie Zapata DPM on December 16, 2022 17:20 CST

## 2022-12-19 ENCOUNTER — TELEPHONE (OUTPATIENT)
Dept: PODIATRY | Facility: CLINIC | Age: 67
End: 2022-12-19

## 2022-12-19 NOTE — TELEPHONE ENCOUNTER
Called patient to go over surgery information. Went over instructions and directions for care. Pt verbalized understanding. I also have mailed the information.

## 2022-12-22 ENCOUNTER — CLINICAL SUPPORT (OUTPATIENT)
Dept: INTERNAL MEDICINE | Facility: CLINIC | Age: 67
End: 2022-12-22

## 2022-12-22 DIAGNOSIS — E53.8 B12 DEFICIENCY: Primary | ICD-10-CM

## 2022-12-22 PROCEDURE — 96372 THER/PROPH/DIAG INJ SC/IM: CPT | Performed by: INTERNAL MEDICINE

## 2022-12-22 RX ADMIN — CYANOCOBALAMIN 1000 MCG: 1000 INJECTION, SOLUTION INTRAMUSCULAR; SUBCUTANEOUS at 08:45

## 2022-12-22 NOTE — PROGRESS NOTES
Injection  Injection performed in left deltoid by Rochelle Nevarez MA. Patient tolerated the procedure well without complications.  12/22/22   Rochelle Nevarez MA       **PATIENT SUPPLIED**

## 2022-12-27 ENCOUNTER — DOCUMENTATION (OUTPATIENT)
Dept: CARDIOLOGY | Facility: CLINIC | Age: 67
End: 2022-12-27

## 2022-12-28 ENCOUNTER — HOSPITAL ENCOUNTER (OUTPATIENT)
Dept: GENERAL RADIOLOGY | Facility: HOSPITAL | Age: 67
Discharge: HOME | End: 2022-12-28

## 2022-12-28 ENCOUNTER — PRE-ADMISSION TESTING (OUTPATIENT)
Dept: PREADMISSION TESTING | Facility: HOSPITAL | Age: 67
End: 2022-12-28

## 2022-12-28 VITALS
DIASTOLIC BLOOD PRESSURE: 79 MMHG | WEIGHT: 161.16 LBS | OXYGEN SATURATION: 99 % | RESPIRATION RATE: 16 BRPM | BODY MASS INDEX: 28.55 KG/M2 | HEIGHT: 63 IN | SYSTOLIC BLOOD PRESSURE: 130 MMHG | HEART RATE: 80 BPM

## 2022-12-28 DIAGNOSIS — M96.0 NONUNION AFTER ARTHRODESIS: ICD-10-CM

## 2022-12-28 DIAGNOSIS — M20.42 HAMMERTOE OF LEFT FOOT: ICD-10-CM

## 2022-12-28 LAB
ANION GAP SERPL CALCULATED.3IONS-SCNC: 8 MMOL/L (ref 5–15)
BASOPHILS # BLD AUTO: 0.04 10*3/MM3 (ref 0–0.2)
BASOPHILS NFR BLD AUTO: 0.5 % (ref 0–1.5)
BUN SERPL-MCNC: 20 MG/DL (ref 8–23)
BUN/CREAT SERPL: 46.5 (ref 7–25)
CALCIUM SPEC-SCNC: 9 MG/DL (ref 8.6–10.5)
CHLORIDE SERPL-SCNC: 106 MMOL/L (ref 98–107)
CO2 SERPL-SCNC: 27 MMOL/L (ref 22–29)
CREAT SERPL-MCNC: 0.43 MG/DL (ref 0.57–1)
DEPRECATED RDW RBC AUTO: 46.3 FL (ref 37–54)
EGFRCR SERPLBLD CKD-EPI 2021: 105.4 ML/MIN/1.73
EOSINOPHIL # BLD AUTO: 0.1 10*3/MM3 (ref 0–0.4)
EOSINOPHIL NFR BLD AUTO: 1.2 % (ref 0.3–6.2)
ERYTHROCYTE [DISTWIDTH] IN BLOOD BY AUTOMATED COUNT: 13.3 % (ref 12.3–15.4)
GLUCOSE SERPL-MCNC: 107 MG/DL (ref 65–99)
HCT VFR BLD AUTO: 41.4 % (ref 34–46.6)
HGB BLD-MCNC: 13 G/DL (ref 12–15.9)
IMM GRANULOCYTES # BLD AUTO: 0.04 10*3/MM3 (ref 0–0.05)
IMM GRANULOCYTES NFR BLD AUTO: 0.5 % (ref 0–0.5)
LYMPHOCYTES # BLD AUTO: 1.97 10*3/MM3 (ref 0.7–3.1)
LYMPHOCYTES NFR BLD AUTO: 24.1 % (ref 19.6–45.3)
MCH RBC QN AUTO: 29.7 PG (ref 26.6–33)
MCHC RBC AUTO-ENTMCNC: 31.4 G/DL (ref 31.5–35.7)
MCV RBC AUTO: 94.5 FL (ref 79–97)
MONOCYTES # BLD AUTO: 0.68 10*3/MM3 (ref 0.1–0.9)
MONOCYTES NFR BLD AUTO: 8.3 % (ref 5–12)
NEUTROPHILS NFR BLD AUTO: 5.34 10*3/MM3 (ref 1.7–7)
NEUTROPHILS NFR BLD AUTO: 65.4 % (ref 42.7–76)
NRBC BLD AUTO-RTO: 0 /100 WBC (ref 0–0.2)
PLATELET # BLD AUTO: 173 10*3/MM3 (ref 140–450)
PMV BLD AUTO: 11.2 FL (ref 6–12)
POTASSIUM SERPL-SCNC: 3.7 MMOL/L (ref 3.5–5.2)
RBC # BLD AUTO: 4.38 10*6/MM3 (ref 3.77–5.28)
SODIUM SERPL-SCNC: 141 MMOL/L (ref 136–145)
WBC NRBC COR # BLD: 8.17 10*3/MM3 (ref 3.4–10.8)

## 2022-12-28 PROCEDURE — 80048 BASIC METABOLIC PNL TOTAL CA: CPT

## 2022-12-28 PROCEDURE — 93005 ELECTROCARDIOGRAM TRACING: CPT

## 2022-12-28 PROCEDURE — 71046 X-RAY EXAM CHEST 2 VIEWS: CPT

## 2022-12-28 PROCEDURE — 36415 COLL VENOUS BLD VENIPUNCTURE: CPT

## 2022-12-28 PROCEDURE — 93010 ELECTROCARDIOGRAM REPORT: CPT | Performed by: INTERNAL MEDICINE

## 2022-12-28 PROCEDURE — 85025 COMPLETE CBC W/AUTO DIFF WBC: CPT

## 2022-12-28 NOTE — DISCHARGE INSTRUCTIONS
Before you come to the hospital        Arrival time: AS DIRECTED BY OFFICE     YOU MAY TAKE THE FOLLOWING MEDICATION(S) THE MORNING OF SURGERY WITH A SIP OF WATER: ***           ALL OTHER HOME MEDICATION CHECK WITH YOUR PHYSICIAN (especially if   you are taking diabetes medicines or blood thinners)    Do not take any Erectile Dysfunction medications (EX: CIALIS, VIAGRA) 24 hours prior to surgery.      If you were given and instructed to use a germ- killing soap, use as directed the night before surgery and again the morning of surgery or as directed by your surgeon. (Use one-half of the bottle with each shower.)   See attached information for How to Use Chlorhexidine for Bathing if applicable.            Eating and drinking restrictions prior to scheduled arrival time    2 Hours before arrival time STOP   Drinking Clear liquids (water, apple juice-no pulp)     6 Hours before arrival time STOP   Milk or drinks that contain milk, full liquids    6 Hours before arrival time STOP   Light meals or foods, such as toast or cereal    8 Hours before arrival time STOP   Heavy foods, such as meat, fried foods, or fatty foods    (It is extremely important that you follow these guidelines to prevent delay or cancelation of your procedure)     Clear Liquids  Water and flavored water                                                                      Clear Fruit juices, such as cranberry juice and apple juice.  Black coffee (NO cream of any kind, including powdered).  Plain tea  Clear bouillon or broth.  Flavored gelatin.  Soda.  Gatorade or Powerade.  Full liquid examples  Juices that have pulp.  Frozen ice pops that contain fruit pieces.  Coffee with creamer  Milk.  Yogurt.                MANAGING PAIN AFTER SURGERY    We know you are probably wondering what your pain will be like after surgery.  Following surgery it is unrealistic to expect you will not have pain.   Pain is how our bodies let us know that something is wrong or  cautions us to be careful.  That said, our goal is to make your pain tolerable.    Methods we may use to treat your pain include (oral or IV medications, PCAs, epidurals, nerve blocks, etc.)   While some procedures require IV pain medications for a short time after surgery, transitioning to pain medications by mouth allows for better management of pain.   Your nurse will encourage you to take oral pain medications whenever possible.  IV medications work almost immediately, but only last a short while.  Taking medications by mouth allows for a more constant level of medication in your blood stream for a longer period of time.      Once your pain is out of control it is harder to get back under control.  It is important you are aware when your next dose of pain medication is due.  If you are admitted, your nurse may write the time of your next dose on the white board in your room to help you remember.      We are interested in your pain and encourage you to inform us about aggravating factors during your visit.   Many times a simple repositioning every few hours can make a big difference.    If your physician says it is okay, do not let your pain prevent you from getting out of bed. Be sure to call your nurse for assistance prior to getting up so you do not fall.      Before surgery, please decide your tolerable pain goal.  These faces help describe the pain ratings we use on a 0-10 scale.   Be prepared to tell us your goal and whether or not you take pain or anxiety medications at home.          Preparing for Surgery  Preparing for surgery is an important part of your care. It can make things go more smoothly and help you avoid complications. The steps leading up to surgery may vary among hospitals. Follow all instructions given to you by your health care providers. Ask questions if you do not understand something. Talk about any concerns that you have.  Here are some questions to consider asking before your  surgery:  If my surgery is not an emergency (is elective), when would be the best time to have the surgery?  What arrangements do I need to make for work, home, or school?  What will my recovery be like? How long will it be before I can return to normal activities?  Will I need to prepare my home? Will I need to arrange care for me or my children?  Should I expect to have pain after surgery? What are my pain management options? Are there nonmedical options that I can try for pain?  Tell a health care provider about:  Any allergies you have.  All medicines you are taking, including vitamins, herbs, eye drops, creams, and over-the-counter medicines.  Any problems you or family members have had with anesthetic medicines.  Any blood disorders you have.  Any surgeries you have had.  Any medical conditions you have.  Whether you are pregnant or may be pregnant.  What are the risks?  The risks and complications of surgery depend on the specific procedure that you have. Discuss all the risks with your health care providers before your surgery. Ask about common surgical complications, which may include:  Infection.  Bleeding or a need for blood replacement (transfusion).  Allergic reactions to medicines.  Damage to surrounding nerves, tissues, or structures.  A blood clot.  Scarring.  Failure of the surgery to correct the problem.  Follow these instructions before the procedure:  Several days or weeks before your procedure  You may have a physical exam by your primary health care provider to make sure it is safe for you to have surgery.  You may have testing. This may include a chest X-ray, blood and urine tests, electrocardiogram (ECG), or other testing.  Ask your health care provider about:  Changing or stopping your regular medicines. This is especially important if you are taking diabetes medicines or blood thinners.  Taking medicines such as aspirin and ibuprofen. These medicines can thin your blood. Do not take these  medicines unless your health care provider tells you to take them.  Taking over-the-counter medicines, vitamins, herbs, and supplements.  Do not use any products that contain nicotine or tobacco, such as cigarettes and e-cigarettes. If you need help quitting, ask your health care provider.  Avoid alcohol.  Ask your health care provider if there are exercises you can do to prepare for surgery.  Eat a healthy diet.   Plan to have someone take you home from the hospital or clinic.  Plan to have a responsible adult care for you for at least 24 hours after you leave the hospital or clinic. This is important.  The day before your procedure  You may be given antibiotic medicine to take by mouth to help prevent infection. Take it as told by your health care provider.  You may be asked to shower with a germ-killing soap.  Follow instructions from your health care provider about eating and drinking restrictions. This includes gum, mints and hard candy.  Pack comfortable clothes according to your procedure.   The day of your procedure  You may need to take another shower with a germ-killing soap before you leave home in the morning.  With a small sip of water, take only the medicines that you are told to take.  Remove all jewelry including rings.   Leave anything you consider valuable at home except hearing aids if needed.  You do not need to bring your home medications into the hospital.   Do not wear any makeup, nail polish, powder, deodorant, lotion, hair accessories, or anything on your skin or body except your clothes.  If you will be staying in the hospital, bring a case to hold your glasses, contacts, or dentures. You may also want to bring your robe and non-skid footwear.       (Do not use denture adhesives since you will be asked to remove them during  surgery).   If you wear oxygen at home, bring it with you the day of surgery.  If instructed by your health care provider, bring your sleep apnea device with you on the  day of your surgery (if this applies to you).  You may want to leave your suitcase and sleep apnea device in the car until after surgery.   Arrive at the hospital as scheduled.  Bring a friend or family member with you who can help to answer questions and be present while you meet with your health care provider.  At the hospital  When you arrive at the hospital:  Go to registration located at the main entrance of the hospital. You will be registered and given a beeper and a sticker sheet. Take the stickers to the Outpatient nurses desk and place in the black tray. This is to notify staff that you have arrived. Then return to the lobby to wait.   When your beeper lights up and vibrates proceed through the double doors, under the stairs, and a member of the Outpatient Surgery staff will escort you to your preoperative room.  You may have to wear compression sleeves. These help to prevent blood clots and reduce swelling in your legs.  An IV may be inserted into one of your veins.              In the operating room, you may be given one or more of the following:        A medicine to help you relax (sedative).        A medicine to numb the area (local anesthetic).        A medicine to make you fall asleep (general anesthetic).        A medicine that is injected into an area of your body to numb everything below the                      injection site (regional anesthetic).  You may be given an antibiotic through your IV to help prevent infection.  Your surgical site will be marked or identified.    Contact a health care provider if you:  Develop a fever of more than 100.4°F (38°C) or other feelings of illness during the 48 hours before your surgery.  Have symptoms that get worse.  Have questions or concerns about your surgery.  Summary  Preparing for surgery can make the procedure go more smoothly and lower your risk of complications.  Before surgery, make a list of questions and concerns to discuss with your surgeon.  Ask about the risks and possible complications.  In the days or weeks before your surgery, follow all instructions from your health care provider. You may need to stop smoking, avoid alcohol, follow eating restrictions, and change or stop your regular medicines.  Contact your surgeon if you develop a fever or other signs of illness during the few days before your surgery.  This information is not intended to replace advice given to you by your health care provider. Make sure you discuss any questions you have with your health care provider.  Document Revised: 12/21/2018 Document Reviewed: 10/23/2018  Elsevier Patient Education © 2021 [employer]

## 2022-12-29 ENCOUNTER — TELEPHONE (OUTPATIENT)
Dept: PODIATRY | Facility: CLINIC | Age: 67
End: 2022-12-29

## 2022-12-29 LAB
QT INTERVAL: 420 MS
QTC INTERVAL: 481 MS

## 2022-12-29 NOTE — TELEPHONE ENCOUNTER
Patient called to confirm which medications she should take the day of surgery and which she should hold. I told the patient to only take the medications that are medically necessary and hold those that are not. She is to hold her eliquis for 48hrs

## 2023-01-04 ENCOUNTER — ANESTHESIA (OUTPATIENT)
Dept: PERIOP | Facility: HOSPITAL | Age: 68
End: 2023-01-04
Payer: MEDICARE

## 2023-01-04 ENCOUNTER — APPOINTMENT (OUTPATIENT)
Dept: GENERAL RADIOLOGY | Facility: HOSPITAL | Age: 68
End: 2023-01-04
Payer: MEDICARE

## 2023-01-04 ENCOUNTER — ANESTHESIA EVENT (OUTPATIENT)
Dept: PERIOP | Facility: HOSPITAL | Age: 68
End: 2023-01-04
Payer: MEDICARE

## 2023-01-04 ENCOUNTER — HOSPITAL ENCOUNTER (OUTPATIENT)
Facility: HOSPITAL | Age: 68
Setting detail: HOSPITAL OUTPATIENT SURGERY
Discharge: HOME | End: 2023-01-04
Attending: PODIATRIST | Admitting: PODIATRIST
Payer: MEDICARE

## 2023-01-04 VITALS
SYSTOLIC BLOOD PRESSURE: 122 MMHG | OXYGEN SATURATION: 97 % | RESPIRATION RATE: 16 BRPM | DIASTOLIC BLOOD PRESSURE: 85 MMHG | HEART RATE: 95 BPM | TEMPERATURE: 97.2 F

## 2023-01-04 DIAGNOSIS — M96.0 NONUNION AFTER ARTHRODESIS: ICD-10-CM

## 2023-01-04 DIAGNOSIS — M20.42 HAMMERTOE OF LEFT FOOT: ICD-10-CM

## 2023-01-04 LAB
GLUCOSE BLDC GLUCOMTR-MCNC: 103 MG/DL (ref 70–130)
GLUCOSE BLDC GLUCOMTR-MCNC: 126 MG/DL (ref 70–130)

## 2023-01-04 PROCEDURE — 25010000002 PROPOFOL 10 MG/ML EMULSION: Performed by: NURSE ANESTHETIST, CERTIFIED REGISTERED

## 2023-01-04 PROCEDURE — C1713 ANCHOR/SCREW BN/BN,TIS/BN: HCPCS | Performed by: PODIATRIST

## 2023-01-04 PROCEDURE — 28285 REPAIR OF HAMMERTOE: CPT | Performed by: PODIATRIST

## 2023-01-04 PROCEDURE — 25010000002 FENTANYL CITRATE (PF) 50 MCG/ML SOLUTION: Performed by: ANESTHESIOLOGY

## 2023-01-04 PROCEDURE — 25010000002 DEXAMETHASONE PER 1 MG: Performed by: ANESTHESIOLOGY

## 2023-01-04 PROCEDURE — 25010000002 DROPERIDOL PER 5 MG: Performed by: ANESTHESIOLOGY

## 2023-01-04 PROCEDURE — 20900 REMOVAL OF BONE FOR GRAFT: CPT | Performed by: PODIATRIST

## 2023-01-04 PROCEDURE — 82962 GLUCOSE BLOOD TEST: CPT

## 2023-01-04 PROCEDURE — 73630 X-RAY EXAM OF FOOT: CPT

## 2023-01-04 PROCEDURE — 28750 FUSION OF BIG TOE JOINT: CPT | Performed by: PODIATRIST

## 2023-01-04 PROCEDURE — 25010000002 FENTANYL CITRATE (PF) 100 MCG/2ML SOLUTION: Performed by: NURSE ANESTHETIST, CERTIFIED REGISTERED

## 2023-01-04 PROCEDURE — 28272 RELEASE OF TOE JOINT EACH: CPT | Performed by: PODIATRIST

## 2023-01-04 PROCEDURE — 25010000002 ONDANSETRON PER 1 MG: Performed by: NURSE ANESTHETIST, CERTIFIED REGISTERED

## 2023-01-04 PROCEDURE — 25010000002 CEFAZOLIN PER 500 MG: Performed by: PODIATRIST

## 2023-01-04 DEVICE — 2.7 X 18 MM R3CON LOCKING PLATE SCREW
Type: IMPLANTABLE DEVICE | Site: TOE FIRST | Status: FUNCTIONAL
Brand: GORILLA PLATING SYSTEM

## 2023-01-04 DEVICE — MINI MONSTER HEADLESS, SHORT THREAD, 2.0 X 28MM
Type: IMPLANTABLE DEVICE | Site: TOE FIRST | Status: FUNCTIONAL
Brand: MONSTER SCREW SYSTEM

## 2023-01-04 DEVICE — 2.7 X 20 MM R3CON LOCKING PLATE SCREW
Type: IMPLANTABLE DEVICE | Site: TOE FIRST | Status: FUNCTIONAL
Brand: GORILLA PLATING SYSTEM

## 2023-01-04 DEVICE — 2.7 X 15 MM R3CON LOCKING PLATE SCREW
Type: IMPLANTABLE DEVICE | Site: TOE FIRST | Status: FUNCTIONAL
Brand: GORILLA PLATING SYSTEM

## 2023-01-04 DEVICE — IMPLANTABLE DEVICE: Type: IMPLANTABLE DEVICE | Site: TOE FIRST | Status: FUNCTIONAL

## 2023-01-04 DEVICE — 2.7 X 12 MM R3CON LOCKING PLATE SCREW
Type: IMPLANTABLE DEVICE | Site: TOE FIRST | Status: FUNCTIONAL
Brand: GORILLA PLATING SYSTEM

## 2023-01-04 DEVICE — 2.7 X 14 MM R3CON LOCKING PLATE SCREW
Type: IMPLANTABLE DEVICE | Site: TOE FIRST | Status: FUNCTIONAL
Brand: GORILLA PLATING SYSTEM

## 2023-01-04 DEVICE — MTP, 5 DEGREE, REVISION PLATE, MEDIUM, LEFT
Type: IMPLANTABLE DEVICE | Site: TOE FIRST | Status: FUNCTIONAL
Brand: GORILLA PLATING SYSTEM

## 2023-01-04 DEVICE — 3.5 X 16 MM R3CON NON-LOCKING PLATE SCREW
Type: IMPLANTABLE DEVICE | Site: TOE FIRST | Status: FUNCTIONAL
Brand: GORILLA PLATING SYSTEM

## 2023-01-04 DEVICE — 2.7 X 16 MM R3CON LOCKING PLATE SCREW
Type: IMPLANTABLE DEVICE | Site: TOE FIRST | Status: FUNCTIONAL
Brand: GORILLA PLATING SYSTEM

## 2023-01-04 DEVICE — MINI MONSTER HEADLESS, SHORT THREAD, 2.0 X 24MM
Type: IMPLANTABLE DEVICE | Site: TOE FIRST | Status: FUNCTIONAL
Brand: MONSTER SCREW SYSTEM

## 2023-01-04 RX ORDER — SODIUM CHLORIDE, SODIUM LACTATE, POTASSIUM CHLORIDE, CALCIUM CHLORIDE 600; 310; 30; 20 MG/100ML; MG/100ML; MG/100ML; MG/100ML
100 INJECTION, SOLUTION INTRAVENOUS CONTINUOUS
Status: DISCONTINUED | OUTPATIENT
Start: 2023-01-04 | End: 2023-01-04 | Stop reason: HOSPADM

## 2023-01-04 RX ORDER — SODIUM CHLORIDE, SODIUM LACTATE, POTASSIUM CHLORIDE, CALCIUM CHLORIDE 600; 310; 30; 20 MG/100ML; MG/100ML; MG/100ML; MG/100ML
1000 INJECTION, SOLUTION INTRAVENOUS CONTINUOUS
Status: DISCONTINUED | OUTPATIENT
Start: 2023-01-04 | End: 2023-01-04 | Stop reason: HOSPADM

## 2023-01-04 RX ORDER — MAGNESIUM HYDROXIDE 1200 MG/15ML
LIQUID ORAL AS NEEDED
Status: DISCONTINUED | OUTPATIENT
Start: 2023-01-04 | End: 2023-01-04 | Stop reason: HOSPADM

## 2023-01-04 RX ORDER — LIDOCAINE HYDROCHLORIDE 10 MG/ML
0.5 INJECTION, SOLUTION EPIDURAL; INFILTRATION; INTRACAUDAL; PERINEURAL ONCE AS NEEDED
Status: DISCONTINUED | OUTPATIENT
Start: 2023-01-04 | End: 2023-01-04 | Stop reason: HOSPADM

## 2023-01-04 RX ORDER — SODIUM CHLORIDE 0.9 % (FLUSH) 0.9 %
3 SYRINGE (ML) INJECTION AS NEEDED
Status: DISCONTINUED | OUTPATIENT
Start: 2023-01-04 | End: 2023-01-04 | Stop reason: HOSPADM

## 2023-01-04 RX ORDER — SODIUM CHLORIDE 9 MG/ML
40 INJECTION, SOLUTION INTRAVENOUS AS NEEDED
Status: DISCONTINUED | OUTPATIENT
Start: 2023-01-04 | End: 2023-01-04 | Stop reason: HOSPADM

## 2023-01-04 RX ORDER — DROPERIDOL 2.5 MG/ML
0.62 INJECTION, SOLUTION INTRAMUSCULAR; INTRAVENOUS ONCE AS NEEDED
Status: COMPLETED | OUTPATIENT
Start: 2023-01-04 | End: 2023-01-04

## 2023-01-04 RX ORDER — LABETALOL HYDROCHLORIDE 5 MG/ML
5 INJECTION, SOLUTION INTRAVENOUS
Status: DISCONTINUED | OUTPATIENT
Start: 2023-01-04 | End: 2023-01-04 | Stop reason: HOSPADM

## 2023-01-04 RX ORDER — FENTANYL CITRATE 50 UG/ML
25 INJECTION, SOLUTION INTRAMUSCULAR; INTRAVENOUS
Status: DISCONTINUED | OUTPATIENT
Start: 2023-01-04 | End: 2023-01-04 | Stop reason: HOSPADM

## 2023-01-04 RX ORDER — ACETAMINOPHEN 500 MG
1000 TABLET ORAL ONCE
Status: COMPLETED | OUTPATIENT
Start: 2023-01-04 | End: 2023-01-04

## 2023-01-04 RX ORDER — SODIUM CHLORIDE 0.9 % (FLUSH) 0.9 %
3 SYRINGE (ML) INJECTION EVERY 12 HOURS SCHEDULED
Status: DISCONTINUED | OUTPATIENT
Start: 2023-01-04 | End: 2023-01-04 | Stop reason: HOSPADM

## 2023-01-04 RX ORDER — NEOSTIGMINE METHYLSULFATE 5 MG/5 ML
SYRINGE (ML) INTRAVENOUS AS NEEDED
Status: DISCONTINUED | OUTPATIENT
Start: 2023-01-04 | End: 2023-01-04 | Stop reason: SURG

## 2023-01-04 RX ORDER — FLUMAZENIL 0.1 MG/ML
0.2 INJECTION INTRAVENOUS AS NEEDED
Status: DISCONTINUED | OUTPATIENT
Start: 2023-01-04 | End: 2023-01-04 | Stop reason: HOSPADM

## 2023-01-04 RX ORDER — OXYCODONE AND ACETAMINOPHEN 10; 325 MG/1; MG/1
1 TABLET ORAL ONCE AS NEEDED
Status: DISCONTINUED | OUTPATIENT
Start: 2023-01-04 | End: 2023-01-04 | Stop reason: HOSPADM

## 2023-01-04 RX ORDER — ROCURONIUM BROMIDE 10 MG/ML
INJECTION, SOLUTION INTRAVENOUS AS NEEDED
Status: DISCONTINUED | OUTPATIENT
Start: 2023-01-04 | End: 2023-01-04 | Stop reason: SURG

## 2023-01-04 RX ORDER — BUPIVACAINE HYDROCHLORIDE 5 MG/ML
INJECTION, SOLUTION PERINEURAL AS NEEDED
Status: DISCONTINUED | OUTPATIENT
Start: 2023-01-04 | End: 2023-01-04 | Stop reason: HOSPADM

## 2023-01-04 RX ORDER — FENTANYL CITRATE 50 UG/ML
INJECTION, SOLUTION INTRAMUSCULAR; INTRAVENOUS AS NEEDED
Status: DISCONTINUED | OUTPATIENT
Start: 2023-01-04 | End: 2023-01-04 | Stop reason: SURG

## 2023-01-04 RX ORDER — PROPOFOL 10 MG/ML
VIAL (ML) INTRAVENOUS AS NEEDED
Status: DISCONTINUED | OUTPATIENT
Start: 2023-01-04 | End: 2023-01-04 | Stop reason: SURG

## 2023-01-04 RX ORDER — OXYCODONE AND ACETAMINOPHEN 7.5; 325 MG/1; MG/1
2 TABLET ORAL EVERY 4 HOURS PRN
Status: DISCONTINUED | OUTPATIENT
Start: 2023-01-04 | End: 2023-01-04 | Stop reason: HOSPADM

## 2023-01-04 RX ORDER — LIDOCAINE HYDROCHLORIDE 20 MG/ML
INJECTION, SOLUTION EPIDURAL; INFILTRATION; INTRACAUDAL; PERINEURAL AS NEEDED
Status: DISCONTINUED | OUTPATIENT
Start: 2023-01-04 | End: 2023-01-04 | Stop reason: SURG

## 2023-01-04 RX ORDER — BUPIVACAINE HCL/0.9 % NACL/PF 0.1 %
2 PLASTIC BAG, INJECTION (ML) EPIDURAL ONCE
Status: COMPLETED | OUTPATIENT
Start: 2023-01-04 | End: 2023-01-04

## 2023-01-04 RX ORDER — ONDANSETRON 2 MG/ML
INJECTION INTRAMUSCULAR; INTRAVENOUS AS NEEDED
Status: DISCONTINUED | OUTPATIENT
Start: 2023-01-04 | End: 2023-01-04 | Stop reason: SURG

## 2023-01-04 RX ORDER — DOCUSATE SODIUM 100 MG/1
100 CAPSULE, LIQUID FILLED ORAL DAILY
Qty: 7 CAPSULE | Refills: 0 | Status: SHIPPED | OUTPATIENT
Start: 2023-01-04

## 2023-01-04 RX ORDER — DEXAMETHASONE SODIUM PHOSPHATE 4 MG/ML
4 INJECTION, SOLUTION INTRA-ARTICULAR; INTRALESIONAL; INTRAMUSCULAR; INTRAVENOUS; SOFT TISSUE ONCE AS NEEDED
Status: COMPLETED | OUTPATIENT
Start: 2023-01-04 | End: 2023-01-04

## 2023-01-04 RX ORDER — SODIUM CHLORIDE 0.9 % (FLUSH) 0.9 %
3-10 SYRINGE (ML) INJECTION AS NEEDED
Status: DISCONTINUED | OUTPATIENT
Start: 2023-01-04 | End: 2023-01-04 | Stop reason: HOSPADM

## 2023-01-04 RX ORDER — PROMETHAZINE HYDROCHLORIDE 12.5 MG/1
12.5 TABLET ORAL EVERY 8 HOURS PRN
Qty: 21 TABLET | Refills: 0 | Status: SHIPPED | OUTPATIENT
Start: 2023-01-04

## 2023-01-04 RX ORDER — NALOXONE HCL 0.4 MG/ML
0.4 VIAL (ML) INJECTION AS NEEDED
Status: DISCONTINUED | OUTPATIENT
Start: 2023-01-04 | End: 2023-01-04 | Stop reason: HOSPADM

## 2023-01-04 RX ORDER — ONDANSETRON 2 MG/ML
4 INJECTION INTRAMUSCULAR; INTRAVENOUS ONCE AS NEEDED
Status: DISCONTINUED | OUTPATIENT
Start: 2023-01-04 | End: 2023-01-04 | Stop reason: HOSPADM

## 2023-01-04 RX ORDER — OXYCODONE AND ACETAMINOPHEN 7.5; 325 MG/1; MG/1
1 TABLET ORAL EVERY 6 HOURS PRN
Qty: 28 TABLET | Refills: 0 | Status: SHIPPED | OUTPATIENT
Start: 2023-01-04

## 2023-01-04 RX ADMIN — ACETAMINOPHEN 1000 MG: 500 TABLET ORAL at 12:26

## 2023-01-04 RX ADMIN — LIDOCAINE HYDROCHLORIDE 100 MG: 20 INJECTION, SOLUTION EPIDURAL; INFILTRATION; INTRACAUDAL; PERINEURAL at 14:09

## 2023-01-04 RX ADMIN — PROPOFOL INJECTABLE EMULSION 170 MG: 10 INJECTION, EMULSION INTRAVENOUS at 14:09

## 2023-01-04 RX ADMIN — FENTANYL CITRATE 100 MCG: 50 INJECTION, SOLUTION INTRAMUSCULAR; INTRAVENOUS at 14:09

## 2023-01-04 RX ADMIN — FENTANYL CITRATE 50 MCG: 50 INJECTION, SOLUTION INTRAMUSCULAR; INTRAVENOUS at 15:45

## 2023-01-04 RX ADMIN — FENTANYL CITRATE 50 MCG: 50 INJECTION, SOLUTION INTRAMUSCULAR; INTRAVENOUS at 16:00

## 2023-01-04 RX ADMIN — GLYCOPYRROLATE 0.4 MG: 0.2 INJECTION INTRAMUSCULAR; INTRAVENOUS at 15:58

## 2023-01-04 RX ADMIN — ROCURONIUM BROMIDE 30 MG: 10 INJECTION INTRAVENOUS at 14:09

## 2023-01-04 RX ADMIN — PROPOFOL 150 MCG/KG/MIN: 10 INJECTION, EMULSION INTRAVENOUS at 14:11

## 2023-01-04 RX ADMIN — PROPOFOL 175 MCG/KG/MIN: 10 INJECTION, EMULSION INTRAVENOUS at 15:31

## 2023-01-04 RX ADMIN — DEXAMETHASONE SODIUM PHOSPHATE 4 MG: 4 INJECTION INTRA-ARTICULAR; INTRALESIONAL; INTRAMUSCULAR; INTRAVENOUS; SOFT TISSUE at 12:27

## 2023-01-04 RX ADMIN — DROPERIDOL 0.62 MG: 2.5 INJECTION, SOLUTION INTRAMUSCULAR; INTRAVENOUS at 17:12

## 2023-01-04 RX ADMIN — FENTANYL CITRATE 25 MCG: 50 INJECTION INTRAMUSCULAR; INTRAVENOUS at 17:18

## 2023-01-04 RX ADMIN — Medication 2 G: at 14:14

## 2023-01-04 RX ADMIN — SODIUM CHLORIDE, POTASSIUM CHLORIDE, SODIUM LACTATE AND CALCIUM CHLORIDE 1000 ML: 600; 310; 30; 20 INJECTION, SOLUTION INTRAVENOUS at 11:01

## 2023-01-04 RX ADMIN — ONDANSETRON 4 MG: 2 INJECTION INTRAMUSCULAR; INTRAVENOUS at 16:05

## 2023-01-04 RX ADMIN — Medication 3 MG: at 15:58

## 2023-01-04 NOTE — BRIEF OP NOTE
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 Revisional 1st Metatarsophalangeal Joint Arthrodesis with Hardware Removal and Expansion Graft - Left Foot  2. Harvesting of Bone Graft/Bone Marrow Aspirate - Left Foot  3. 2nd Proximal Interphalangeal Joint Arthrodesis - Left Foot  4. 3rd Proximal Interphalangeal Joint Arthrodesis - Left Foot        Surgeon(s):  Frankie Zapata DPM    Anesthesia: General    Staff:   Circulator: Maci Johnson RN; Phuong Haddad RN; Juan Ramon Pool RN  Scrub Person: Wyatt Jara         Estimated Blood Loss: 100ml    Urine Voided: * No values recorded between 1/4/2023  2:04 PM and 1/4/2023  4:15 PM *    Specimens:                None          Drains: * No LDAs found *    Findings: Consistent with pre-operative diagnoses.         Complications: None         Frankie Zapata DPM     Date: 1/4/2023  Time: 16:19 CST

## 2023-01-04 NOTE — OP NOTE
POSTOPERATIVE NOTE  Patient: Yun Blackwell  MRN: [id number]    YOB: 1955  Age: 67 y.o.  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 Same as pre-operative    Operative Procedures:  1. Revisional 1st Metatarsophalangeal Joint Arthrodesis with Hardware Removal and Expansion Graft - Left Foot  2. Harvesting of Bone Graft/Bone Marrow Aspirate - Left Foot  3. 2nd Proximal Interphalangeal Joint Arthrodesis - Left Foot  4. 3rd Proximal Interphalangeal Joint Arthrodesis - Left Foot    Surgeon: Surgeon(s) and Role:     * Frankie Zapata DPM - Primary    Anesthesia: General     Hemostasis: Anatomic Dissection, Thigh Tourniquet, Electric cauterization    Estimated Blood Loss: 100ml    Pathology:   None    Materials:   Implant Name Type Inv. Item Serial No.  Lot No. LRB No. Used Action   PRESERVE Bone Wedge    PARAGON 28 [id number] Left 1 Implanted    Screw Cannulated 2.0x28    PARAGON 28  Left 1 Implanted   Screw Cannulated 2.0x24    PARAGON 28  Left 1 Implanted   PLT MTP 5D REV MD LT - [id number] Implant PLT MTP 5D REV MD LT  PARAGON 28  Left 1 Implanted   SCRW PLT RECON LK 2.7X12MM - [id number] Implant SCRW PLT RECON LK 2.7X12MM  PARAGON 28  Left 1 Implanted   SCRW PLT R3CON LK 2.7X18MM - [id number] Implant SCRW PLT R3CON LK 2.7X18MM  PARAGON 28  Left 1 Implanted   SCRW PLT RECON NL 3.5X16MM - [id number] Implant SCRW PLT RECON NL 3.5X16MM  PARAGON 28  Left 1 Implanted   SCRW PLT R3CON LK 2.7X14MM - [id number] Implant SCRW PLT R3CON LK 2.7X14MM  PARAGON 28  Left 2 Implanted   SCRW PLT R3CON LK 2.7X15MM - [id number] Implant SCRW PLT R3CON LK 2.7X15MM  PARAGON 28  Left 1 Implanted   SCRW PLT R3CON LK 2.7X16MM - [id number] Implant SCRW PLT R3CON LK 2.7X16MM  PARAGON 28  Left 1 Implanted   SCRW PLT RECON LK  2.7X20MM - [id number] Implant SCRW PLT RECON LK 2.7X20MM  PARAGON 28  Left 1 Implanted       Injectables: 20mL 0.5% Marcaine Plain    Fluids: See anesthesia log.    Drains: None    Complications: None    Postoperative Condition: Stable. Patient tolerated procedure and anesthesia well. Patient left the operating room with vital signs stable and vascular status intact.     Operative Findings: Consistent with preoperative diagnosis.    Indications for Procedure: This 67 y.o. patient presents with chronic pain due to nonunion of her first MTPJ arthrodesis and hammertoes of the second and third of the left foot.  Patient states that they have failed conservative therapy including immobilization and bone stimulation, and opts for surgical correction at this time. The patient has been NPO for greater than 8 hours. The patient is ready for surgical intervention.    DESCRIPTION OF PROCEDURE  Under mild sedation, patient was brought into the operating room and placed on the operating room table in supine position. Preoperative antibiotic was given. A pneumatic tourniquet was placed about the patient's left thigh. The patient was placed under General anesthesia, then local block was performed at the surgical site using the above mentioned local anesthesia. The foot and ankle were then scrubbed, prepped and draped in the usual aseptic manner. Using an Esmarch, the foot and ankle were exsanguinated and tourniquet was inflated.    Attention was directed to the lateral calcaneus where a 1 cm incision was made.  Dissection was continued bluntly down to the level of bone.  Next a bone aspiration kit was utilized to acquire 10 cc of bone marrow aspirate.  This was placed on the back table.  Next utilizing a bone harvesting device, roughly 2 cc of cancellous bone were required from the calcaneus.  The wound was then flushed with copious amounts sterile saline.  Subcutaneous tissues reapproximated utilizing 4-0 Vicryl.  Skin was  reapproximated coapted utilizing 4-0 nylon in horizontal mattress suturing technique.    Attention was then directed left first ray where a linear longitudinal incision was made over the first MTPJ along the same course of the previous incision.  The incision was deepened through the subcutaneous tissues using sharp and blunt dissection.  Care is taken to identify and retract all vital neural and vascular structures.  All bleeders were ligated and cauterized as necessary.  Next a linear capsulotomy was performed thus exposing the previous hardware.  Utilizing screwdriver, the previous screws and plate were removed.  The previous fusion site was noted to be partially fused at the more dorsal aspect but had a nonunion to the more plantar aspect.  A new osteotomy was made to separate the proximal phalanx and first metatarsal.  Next utilizing reamers, the head of the first metatarsal and base of proximal phalanx were fashioned into a cup and cone.  Next temporary sizers were utilized to pick out the proper size graft.  The graft was then soaked in bone marrow aspirate and placed within the fusion site.  The acquired autograft was also packed and to fill voids.  Temporary fixation was placed.  Next utilizing standard AO principles and techniques, a [place] 28 revision MTPJ plate was placed along the dorsal aspect with the above locking and nonlocking screws.  Stable fixation was obtained.  Fluoroscopy was utilized to confirm placement.  All rough edges were smoothed.  The wound was then flushed with copious amounts of sterile saline.  The remaining aspect of BMA was injected around the fusion site.  Capsule and deep tissues were reapproximated utilizing 3-0 Vicryl.  Subcutaneous tissues reapproximated utilizing 4-0 Vicryl.  And skin was reapproximated coapted utilizing 4-0 nylon and horizontal mattress suturing technique.    Attention was then directed to the dorsal aspect of the second PIPJ where a incision was made.   The incision was deepened through the subcutaneous tissues using sharp and blunt dissection.  Care was taken to identify and retract all vital neural and vascular structures.  All bleeders were ligated and cauterized necessary.  Next a tenotomy and capsulotomy was performed over the joint.  Soft tissue was freed from the head of the proximal phalanx and base of middle phalanx.  Utilizing a bone saw, the head of the proximal phalanx and base of the middle phalanx were resected and passed from the operative field.  Next a K wire was drilled from the middle phalanx at the distal aspect of the digit and then retrograded proximally.  A stab incision was made to the distal aspect of the toe and then a partially-threaded headless screw was placed across the fusion site.  The K wire was then drilled further across the MTPJ and into the metatarsal head.  Good stable fixation was obtained and alignment was confirmed utilizing fluoroscopy.  The K wire was cut, bent, and Jurgan ball applied.  The wound was flushed with copious amounts of sterile saline.  The extensor tendon was reapproximated utilizing 4-0 Vicryl.  Subcutaneous tissues reapproximated utilizing 4-0 Vicryl.  Skin was reapproximated and coapted utilizing 4-0 nylon and horizontal mattress suturing technique.    Attention was then directed to the dorsal aspect of the third PIPJ where a incision was made.  The incision was deepened through the subcutaneous tissues using sharp and blunt dissection.  Care was taken to identify and retract all vital neural and vascular structures.  All bleeders were ligated and cauterized necessary.  Next a tenotomy and capsulotomy was performed over the joint.  Soft tissue was freed from the head of the proximal phalanx and base of middle phalanx.  Utilizing a bone saw, the head of the proximal phalanx and base of the middle phalanx were resected and passed from the operative field.  Next a K wire was drilled from the middle phalanx at  the distal aspect of the digit and then retrograded proximally.  A stab incision was made to the distal aspect of the toe and then a partially-threaded headless screw was placed across the fusion site.  The K wire was then drilled further across the MTPJ and into the metatarsal head.  Good stable fixation was obtained and alignment was confirmed utilizing fluoroscopy.  The K wire was cut, bent, and Jurgan ball applied.  The wound was flushed with copious amounts of sterile saline.  The extensor tendon was reapproximated utilizing 4-0 Vicryl.  Subcutaneous tissues reapproximated utilizing 4-0 Vicryl.  Skin was reapproximated and coapted utilizing 4-0 nylon and horizontal mattress suturing technique.    The tourniquet was deflated during closure.  Hemostasis had been obtained.  Capillary fill was noted to all toes.    A bandage consisting of Adaptic, 4 x 4's, Kerlix, and Coban was applied.    The patient tolerated the procedures and anesthesia well.  She was transferred to recovery room with vital signs stable and vascular status intact to the left lower extremity.  After period of postoperative monitoring, the patient will be discharged to home with oral and written postoperative instructions.  She will follow-up in office within 1 week.  She is to be nonweightbearing to the surgical foot.

## 2023-01-04 NOTE — ANESTHESIA PREPROCEDURE EVALUATION
Anesthesia Evaluation     Patient summary reviewed   history of anesthetic complications: PONV  NPO Solid Status: > 8 hours             Airway   Mallampati: I  TM distance: >3 FB  Neck ROM: full  No difficulty expected  Dental      Pulmonary    (+) sleep apnea on CPAP,   (-) asthma, not a smoker  Cardiovascular   Exercise tolerance: excellent (>7 METS)    ECG reviewed    (+) hypertension, dysrhythmias Atrial Fib, hyperlipidemia,   (-) past MI      Neuro/Psych  (-) seizures, TIA, CVA  GI/Hepatic/Renal/Endo    (+)  GERD,  diabetes mellitus (diet controlled),   (-) liver disease    Musculoskeletal     Abdominal    Substance History      OB/GYN          Other      history of cancer remission                    Anesthesia Plan    ASA 3     general   total IV anesthesia  intravenous induction     Anesthetic plan, risks, benefits, and alternatives have been provided, discussed and informed consent has been obtained with: patient.        CODE STATUS:

## 2023-01-04 NOTE — ANESTHESIA PROCEDURE NOTES
Airway  Urgency: elective    Date/Time: 1/4/2023 2:10 PM  Airway not difficult    General Information and Staff    Patient location during procedure: OR  CRNA/CAA: Raza Stone CRNA    Indications and Patient Condition  Indications for airway management: airway protection    Preoxygenated: yes  Mask difficulty assessment: 1 - vent by mask    Final Airway Details  Final airway type: endotracheal airway      Successful airway: ETT  Cuffed: yes   Successful intubation technique: direct laryngoscopy  Endotracheal tube insertion site: oral  Blade: Maicol  Blade size: 3.5  ETT size (mm): 7.5  Cormack-Lehane Classification: grade I - full view of glottis  Placement verified by: chest auscultation and capnometry   Cuff volume (mL): 6  Measured from: lips  ETT/EBT  to lips (cm): 21  Number of attempts at approach: 1  Assessment: lips, teeth, and gum same as pre-op and atraumatic intubation

## 2023-01-05 ENCOUNTER — TELEPHONE (OUTPATIENT)
Dept: PODIATRY | Facility: CLINIC | Age: 68
End: 2023-01-05
Payer: COMMERCIAL

## 2023-01-05 NOTE — PROGRESS NOTES
[facility] - PODIATRY    Today's Date: 01/10/2023     Patient Name: Yun Blackwell  MRN: [id number]  CSN: [id number]  PCP: ANGEL Healy MD  Referring Provider: No ref. provider found    SUBJECTIVE     Chief Complaint   Patient presents with   • Follow-up     ANGEL Healy MD -10/17/2022-one week post op-pt states she is here today for a one week post op.-pt denies pain   • Diabetes     Didn't check      HPI: Yun Blackwell, a 67 y.o.female, comes to clinic as a(n) established patient for post-op appt 1 weeks s/p left foot revisional 1st MTPJ AD, 2-3 HT repair. Patient has h/o AF, anemia, CA, DM, HLD, HTN, IBS, sleep apnea. Patient is NIDDM and unsure of last BG level.  She has kept the bandage c/d/i.  She has remained nonweightbearing as directed.  Denies pain. She has taken medication as prescribed. Denies any constitutional symptoms. No other pedal complaints at this time.    Past Medical History:   Diagnosis Date   • A-fib (HCC)    • Abnormal ECG     tachycardia, a fib   • Acid reflux    • Anemia    • Arthritis    • Cancer (HCC)    • Diabetes mellitus (HCC)    • Hyperkalemia    • Hyperlipidemia    • Hypertension    • Hyponatremia    • IBS (irritable bowel syndrome)    • PONV (postoperative nausea and vomiting)    • Sleep apnea     USES CPAP   • UTI (urinary tract infection)    • Vaginal vault prolapse      Past Surgical History:   Procedure Laterality Date   • BREAST BIOPSY Left     X2   • CARDIAC CATHETERIZATION     • CARDIAC CATHETERIZATION N/A 02/05/2021    Procedure: Right Heart Cath;  Surgeon: Van Marcelino MD;  Location: [facility] CATH INVASIVE LOCATION;  Service: Cardiology;  Laterality: N/A;   • CATARACT EXTRACTION, BILATERAL     • COLON SURGERY     • COLONOSCOPY  09/21/2015    TRANSVERSE COLON ADENOMATOUS POLYP, HEMORRHOIDS, RECALL 5 YEARS, DR LAMAS   • COLONOSCOPY N/A 11/13/2020    Procedure: COLONOSCOPY WITH ANESTHESIA;  Surgeon: Joe Lamas MD;  Location: [facility]  ENDOSCOPY;  Service: Gastroenterology;  Laterality: N/A;  pre op: screening  post op:divdrticulosis, polyp  PCP: ANGEL Healy MD   • COLOSTOMY     • COLPOPEXY VAGINAL      2014   • ENDOSCOPY N/A 11/03/2016    LA GRADE B ESOPHAGITIS WITH NO BLEEDING UPPER THIRD OF ESOPHAGUS, GERI-[place]ADSWORTH TEAR GEJ, BILIOUS BLOOD TINGED COFFEE GROUND GATRIC FLUIDDR ADAMS   • EXPLORATORY LAPAROTOMY N/A 10/14/2016    Procedure: LAPAROTOMY EXPLORATORY, LYSIS OF ADHESIONS, IRRIAGATION OF ABDOMEN, POSSIBLE BOWEL RESECTION;  Surgeon: Bacilio Marcial MD;  Location:  [facility] OR;  Service:    • HYSTERECTOMY     • REVISION / TAKEDOWN COLOSTOMY     • SACROCOLPOPEXY N/A 09/21/2016    Procedure: SACROCOLPOPEXY LAPAROSCOPIC WITH LavaboomI SI ROBOT, CONVERTED TO OPEN SACROCOLPOPEXY, RIGHT SALPINGO- OOPHERECTOMY, CYSTO;  Surgeon: Maribell Beth MD;  Location:  [facility] OR;  Service:    • TOE FUSION Left 01/06/2022    Procedure: FIRST METATARSOPHALANGEAL JOINT ARTHRODESIS WITH INTERNAL FIXATION - LEFT FOOT;  Surgeon: Jorgito Red DPM;  Location:  [facility] OR;  Service: Podiatry;  Laterality: Left;     Family History   Problem Relation Age of Onset   • Hypertension Mother    • No Known Problems Father    • Colon cancer Neg Hx    • Colon polyps Neg Hx      Social History     Socioeconomic History   • Marital status:    Tobacco Use   • Smoking status: Never     Passive exposure: Never   • Smokeless tobacco: Never   Vaping Use   • Vaping Use: Never used   Substance and Sexual Activity   • Alcohol use: No   • Drug use: No   • Sexual activity: Defer     Allergies   Allergen Reactions   • Morphine And Related Nausea And Vomiting and Dizziness   • Percocet [Oxycodone-Acetaminophen] Nausea And Vomiting and Dizziness     Current Outpatient Medications   Medication Sig Dispense Refill   • apixaban (ELIQUIS) 5 MG tablet tablet Take 1 tablet by mouth Every 12 (Twelve) Hours. 60 tablet 5   • cyanocobalamin 1000 MCG/ML injection Inject 1 mL Every 28  (Twenty-Eight) Days. 1 mL 5   • dilTIAZem CD (Cardizem CD) 120 MG 24 hr capsule Take 1 capsule by mouth Daily. 30 capsule 11   • docusate sodium (COLACE) 100 MG capsule Take 1 capsule by mouth Daily. 7 capsule 0   • montelukast (SINGULAIR) 10 MG tablet Take 1 tablet by mouth every night 30 tablet 5   • omeprazole (priLOSEC) 20 MG capsule Take 1 capsule by mouth Daily. 30 capsule 11   • oxyCODONE-acetaminophen (PERCOCET) 7.5-325 MG per tablet Take 1 tablet by mouth Every 6 (Six) Hours As Needed (Pain). 28 tablet 0   • promethazine (PHENERGAN) 12.5 MG tablet Take 1 tablet by mouth Every 8 (Eight) Hours As Needed for Nausea or Vomiting. 21 tablet 0   • tamoxifen (NOLVADEX) 20 MG chemo tablet Take 1 tablet by mouth daily 90 tablet 1   • tiZANidine (ZANAFLEX) 4 MG tablet Take 1 tablet by mouth once Daily As Needed for Muscle Spasms. 30 tablet 2   • HYDROcodone-acetaminophen (NORCO) 7.5-325 MG per tablet Take 1 tablet by mouth Every 6 (Six) Hours As Needed for Moderate Pain. 28 tablet 0     Current Facility-Administered Medications   Medication Dose Route Frequency Provider Last Rate Last Admin   • cyanocobalamin injection 1,000 mcg  1,000 mcg Intramuscular Q28 Days ANGEL Healy MD   1,000 mcg at 12/22/22 0845     Review of Systems   Constitutional: Negative for chills and fever.   HENT: Negative for congestion.    Respiratory: Negative for shortness of breath.    Cardiovascular: Negative for chest pain and leg swelling.   Gastrointestinal: Negative for constipation, diarrhea, nausea and vomiting.   Musculoskeletal: Positive for arthralgias.   Skin: Negative for wound.   Neurological: Negative for numbness.       OBJECTIVE     Vitals:    01/10/23 0805   BP: 122/85   Pulse: 104   SpO2: 97%       PHYSICAL EXAM  GEN:   Accompanied by sister.     Foot/Ankle Exam:       General:   Appearance: appears stated age and healthy    Orientation: AAOx3    Affect: appropriate    Assistance: wheelchair    Shoe Gear:  CAM  boot    VASCULAR      Right Foot Vascularity   Dorsalis pedis:  2+  Posterior tibial:  2+  Skin Temperature: warm    Edema Grading:  None  CFT:  3  Pedal Hair Growth:  Present  Varicosities: mild varicosities       Left Foot Vascularity   Dorsalis pedis:  2+  Posterior tibial:  2+  Skin Temperature: warm    Edema Grading:  None  CFT:  3  Pedal Hair Growth:  Present  Varicosities: mild varicosities        NEUROLOGIC     Right Foot Neurologic   Normal sensation    Light touch sensation:  Normal  Vibratory sensation:  Normal  Hot/Cold sensation: normal    Protective Sensation using [place]ennifer Monofilament:  10     Left Foot Neurologic   Normal sensation    Light touch sensation:  Normal  Vibratory sensation:  Normal  Hot/cold sensation: normal    Protective Sensation using [place]-Yennifer Monofilament:  10     MUSCULOSKELETAL      Right Foot Musculoskeletal   Ecchymosis:  None  Tenderness: great toe metatarsophalangeal joint and toe 1    Arch:  Normal  Hammertoe:  Second toe, third toe, fourth toe and fifth toe  Hallux valgus: Yes (large medial eminence with dorsal spurring. Moderately trackbound. Minimal crepitus.)       Left Foot Musculoskeletal   Ecchymosis:  None  Tenderness comment:  Minimal to surgical sites  Arch:  Normal  Stiff great toe joint: s/p arthodesis.       MUSCLE STRENGTH     Right Foot Muscle Strength   Foot dorsiflexion:  5  Foot plantar flexion:  5  Foot inversion:  5  Foot eversion:  5     Left Foot Muscle Strength   Foot dorsiflexion:  5  Foot plantar flexion:  5  Foot inversion:  5  Foot eversion:  5     RANGE OF MOTION      Right Foot Range of Motion   Foot and ankle ROM within normal limits       Left Foot Range of Motion   Foot and ankle ROM within normal limits       DERMATOLOGIC     Right Foot Dermatologic   Skin: skin intact    Nails: abnormally thick       Left Foot Dermatologic   Skin: skin intact    Nails: abnormally thick        Left Foot Additional Comments: Incisions to left  foot with sutures intact. Healing well. Pins exiting 2-3 toes without pin tract infection.       RADIOLOGY/NUCLEAR:  XR chest 2 vw    Result Date: 12/28/2022  Narrative: EXAM/TECHNIQUE: XR CHEST 2 VW-  INDICATION: pre-op; M96.0-Pseudarthrosis after fusion or arthrodesis; M20.42-Other hammer toe(s) (acquired), left foot  COMPARISON: 12/05/2016  FINDINGS:  Cardiac silhouette is normal size. No pleural effusion, pneumothorax, or focal consolidation. Advanced degenerative change in the cervical spine. Surgical clips in the LEFT lower chest are noted.      Impression:  No acute findings. This report was finalized on 12/28/2022 15:54 by Dr. Andrew Gomez MD.    XR Foot 3+ View Left    Result Date: 1/4/2023  Narrative: XR FOOT 3+ VW LEFT- 1/4/2023 4:24 PM CST  HISTORY: post-op; M20.42-Other hammer toe(s) (acquired), left foot; M96.0-Pseudarthrosis after fusion or arthrodesis  COMPARISON: 11/29/2022  FINDINGS: Frontal, lateral and oblique radiographs of the left foot were provided for review.  Pre-existing bunionectomy, now status post revision of the first MTP joint arthrodesis. Additional second and third digit hammertoe repairs. No hardware complication identified. Bone donor site along the calcaneus. Plantar calcaneal spur.      Impression: 1. New postoperative changes to the left foot, as described above. This report was finalized on 01/04/2023 17:21 by Dr Baldev Beth, .      LABORATORY/CULTURE RESULTS:      PATHOLOGY RESULTS:       ASSESSMENT/PLAN     Diagnoses and all orders for this visit:    1. S/P foot surgery (Primary)    2. Post-op pain  -     HYDROcodone-acetaminophen (NORCO) 7.5-325 MG per tablet; Take 1 tablet by mouth Every 6 (Six) Hours As Needed for Moderate Pain.  Dispense: 28 tablet; Refill: 0      Comprehensive lower extremity examination and evaluation was performed.  Discussed findings and treatment plan including risks, benefits, and treatment options with patient in detail. Patient agreed with  treatment plan.  Reviewed postoperative x-rays with patient.   Bandage removed and surgical sites evaluated.  Overall healing well.  Reapplied similar bandage.  Remain nonweightbearing.  Continue use of cam boot for protection.  Additional pain medication given.  An After Visit Summary was printed and given to the patient at discharge, including (if requested) any available informative/educational handouts regarding diagnosis, treatment, or medications. All questions were answered to patient/family satisfaction. Should symptoms fail to improve or worsen they agree to call or return to clinic or to go to the Emergency Department. Discussed the importance of following up with any needed screening tests/labs/specialist appointments and any requested follow-up recommended by me today. Importance of maintaining follow-up discussed and patient accepts that missed appointments can delay diagnosis and potentially lead to worsening of conditions.  Return in about 2 weeks (around 1/24/2023) for Post-Op appointment., or sooner if acute issues arise.    Lab Frequency Next Occurrence   Follow Anesthesia Guidelines / Standing Orders Once 09/29/2020   Obtain Informed Consent Once 10/04/2020       This document has been electronically signed by Frankie Zapata DPM on January 10, 2023 17:27 CST

## 2023-01-05 NOTE — ANESTHESIA POSTPROCEDURE EVALUATION
Patient: Yun Blackwell    Procedure Summary     Date: 01/04/23 Room / Location: [facility] OR  /  [facility] OR    Anesthesia Start: 1404 Anesthesia Stop: 1618    Procedures:       Revisional 1st Metatarsophalangeal Joint Arthrodesis with Hardware Removal and Expansion Graft, Harvesting of Bone Graft/Bone Marrow Aspirate, Hammertoe Repair 2 and 3 - Left Foot (Left: Toes)      Hardware Removal (Left: Ankle)      Expansion Graft, Harvesting of Bone Graft/Bone Marrow Aspirate (Left: Foot)      Hammertoe Repair 2 and 3 - Left Foot (Left: Toes) Diagnosis:       Nonunion after arthrodesis      Hammertoe of left foot      (Nonunion after arthrodesis [M96.0])      (Hammertoe of left foot [M20.42])    Surgeons: Frankie Zapata DPM Provider: Raza Stone CRNA    Anesthesia Type: general ASA Status: 3          Anesthesia Type: general    Vitals  Vitals Value Taken Time   /67 01/04/23 1745   Temp 97.2 °F (36.2 °C) 01/04/23 1745   Pulse 87 01/04/23 1749   Resp 16 01/04/23 1745   SpO2 95 % 01/04/23 1749   Vitals shown include unvalidated device data.        Post Anesthesia Care and Evaluation    Patient location during evaluation: PACU  Patient participation: complete - patient participated  Level of consciousness: awake and alert  Pain management: adequate    Airway patency: patent  Anesthetic complications: No anesthetic complications    Cardiovascular status: acceptable  Respiratory status: acceptable  Hydration status: acceptable    Comments: Blood pressure 126/69, pulse 86, temperature 97.2 °F (36.2 °C), temperature source Temporal, resp. rate 16, SpO2 97 %, not currently breastfeeding.    Pt discharged from PACU based on cisco score >8  No anesthesia care post op

## 2023-01-06 ENCOUNTER — TELEPHONE (OUTPATIENT)
Dept: PODIATRY | Facility: CLINIC | Age: 68
End: 2023-01-06
Payer: COMMERCIAL

## 2023-01-06 NOTE — TELEPHONE ENCOUNTER
Caller: DIETER   Relationship to Patient:SELF   Phone Number: [phone]  Reason for Call:PATIENT CALLING ASKING QUESTIONS ABOUT WHEN SHE SHOULD USE HER KNEE SCOOTER VS THE CRUTCHES

## 2023-01-09 ENCOUNTER — TELEPHONE (OUTPATIENT)
Dept: PODIATRY | Facility: CLINIC | Age: 68
End: 2023-01-09
Payer: COMMERCIAL

## 2023-01-10 ENCOUNTER — OFFICE VISIT (OUTPATIENT)
Dept: PODIATRY | Facility: CLINIC | Age: 68
End: 2023-01-10
Payer: MEDICARE

## 2023-01-10 VITALS
HEART RATE: 104 BPM | SYSTOLIC BLOOD PRESSURE: 122 MMHG | HEIGHT: 63 IN | DIASTOLIC BLOOD PRESSURE: 85 MMHG | OXYGEN SATURATION: 97 % | WEIGHT: 161 LBS | BODY MASS INDEX: 28.53 KG/M2

## 2023-01-10 DIAGNOSIS — G89.18 POST-OP PAIN: ICD-10-CM

## 2023-01-10 DIAGNOSIS — Z98.890 S/P FOOT SURGERY: Primary | ICD-10-CM

## 2023-01-10 PROCEDURE — 99024 POSTOP FOLLOW-UP VISIT: CPT | Performed by: PODIATRIST

## 2023-01-10 RX ORDER — HYDROCODONE BITARTRATE AND ACETAMINOPHEN 7.5; 325 MG/1; MG/1
1 TABLET ORAL EVERY 6 HOURS PRN
Qty: 28 TABLET | Refills: 0 | Status: SHIPPED | OUTPATIENT
Start: 2023-01-10

## 2023-01-12 ENCOUNTER — TELEPHONE (OUTPATIENT)
Dept: CARDIOLOGY | Facility: CLINIC | Age: 68
End: 2023-01-12
Payer: COMMERCIAL

## 2023-01-12 NOTE — TELEPHONE ENCOUNTER
Caller: DIETER    Relationship: SELF     Best call back number: [phone]    What is the best time to reach you: ANYTIME    Who are you requesting to speak with (clinical staff, provider,  specific staff member): ANY    What was the call regarding: PATIENT CALLING IN TO GET UPDATE ON ELIQUIS PROGRAM. PATIENT AND PROVIDER PAPERWORK IN CHART - PATIENT HASN'T HEARD FROM ANYONE AND WOULD LIKE UPDATE. PLEASE ADVISE. THANK YOU!    Do you require a callback: YES

## 2023-01-12 NOTE — PROGRESS NOTES
[facility] - PODIATRY    Today's Date: 01/25/2023     Patient Name: Yun Blackwell  MRN: [id number]  CSN: [id number]  PCP: ANGEL Healy MD  Referring Provider: No ref. provider found    SUBJECTIVE     Chief Complaint   Patient presents with   • Follow-up     ANGEL Healy MD-10/17/2022-2 week Fu 3 week post op per Benny- pt states doing good, hasnt had pain pill for over a week, some tylenol- pt pain      HPI: Yun Blackwell, a 67 y.o.female, comes to clinic as a(n) established patient for post-op appt 3 weeks s/p left foot revisional 1st MTPJ AD, 2-3 HT repair. Patient has h/o AF, anemia, CA, DM, HLD, HTN, IBS, sleep apnea. Patient is NIDDM and unsure of last BG level.  She has kept the bandage c/d/i.  Has remained nonweightbearing as directed.  Denies pain. She has taken medication as prescribed. Denies any constitutional symptoms. No other pedal complaints at this time.    Past Medical History:   Diagnosis Date   • A-fib (HCC)    • Abnormal ECG     tachycardia, a fib   • Acid reflux    • Anemia    • Arthritis    • Cancer (HCC)    • Diabetes mellitus (HCC)    • Hyperkalemia    • Hyperlipidemia    • Hypertension    • Hyponatremia    • IBS (irritable bowel syndrome)    • PONV (postoperative nausea and vomiting)    • Sleep apnea     USES CPAP   • UTI (urinary tract infection)    • Vaginal vault prolapse      Past Surgical History:   Procedure Laterality Date   • BREAST BIOPSY Left     X2   • CARDIAC CATHETERIZATION     • CARDIAC CATHETERIZATION N/A 02/05/2021    Procedure: Right Heart Cath;  Surgeon: Van Marcelino MD;  Location: [facility] CATH INVASIVE LOCATION;  Service: Cardiology;  Laterality: N/A;   • CATARACT EXTRACTION, BILATERAL     • COLON SURGERY     • COLONOSCOPY  09/21/2015    TRANSVERSE COLON ADENOMATOUS POLYP, HEMORRHOIDS, RECALL 5 YEARS, DR LAMAS   • COLONOSCOPY N/A 11/13/2020    Procedure: COLONOSCOPY WITH ANESTHESIA;  Surgeon: Joe Lamas MD;  Location: [facility]  ENDOSCOPY;  Service: Gastroenterology;  Laterality: N/A;  pre op: screening  post op:divdrticulosis, polyp  PCP: ANGEL Healy MD   • COLOSTOMY     • COLPOPEXY VAGINAL      2014   • ENDOSCOPY N/A 11/03/2016    LA GRADE B ESOPHAGITIS WITH NO BLEEDING UPPER THIRD OF ESOPHAGUS, GERI-[place] TEAR GEJ, BILIOUS BLOOD TINGED COFFEE GROUND GATRIC FLUIDDR ADAMS   • EXPLORATORY LAPAROTOMY N/A 10/14/2016    Procedure: LAPAROTOMY EXPLORATORY, LYSIS OF ADHESIONS, IRRIAGATION OF ABDOMEN, POSSIBLE BOWEL RESECTION;  Surgeon: Bacilio Marcial MD;  Location:  [facility] OR;  Service:    • FOOT NAVICULAR EXCISION OR BONE GRAFT Left 1/4/2023    Procedure: Expansion Graft, Harvesting of Bone Graft/Bone Marrow Aspirate;  Surgeon: Frankie Zapata DPM;  Location:  [facility] OR;  Service: Podiatry;  Laterality: Left;   • HAMMER TOE REPAIR Left 1/4/2023    Procedure: Hammertoe Repair 2 and 3 - Left Foot;  Surgeon: Frankie Zapata DPM;  Location:  [facility];  Service: Podiatry;  Laterality: Left;   • HARDWARE REMOVAL Left 1/4/2023    Procedure: Hardware Removal;  Surgeon: Frankie Zapata DPM;  Location:  [facility];  Service: Podiatry;  Laterality: Left;   • HYSTERECTOMY     • REVISION / TAKEDOWN COLOSTOMY     • SACROCOLPOPEXY N/A 09/21/2016    Procedure: SACROCOLPOPEXY LAPAROSCOPIC WITH [employer] SI ROBOT, CONVERTED TO OPEN SACROCOLPOPEXY, RIGHT SALPINGO- OOPHERECTOMY, CYSTO;  Surgeon: Maribell Beth MD;  Location:  [facility] OR;  Service:    • TOE FUSION Left 01/06/2022    Procedure: FIRST METATARSOPHALANGEAL JOINT ARTHRODESIS WITH INTERNAL FIXATION - LEFT FOOT;  Surgeon: Jorgito Red DPM;  Location:  [facility];  Service: Podiatry;  Laterality: Left;   • TOE FUSION Left 1/4/2023    Procedure: Revisional 1st Metatarsophalangeal Joint Arthrodesis with Hardware Removal and Expansion Graft, Harvesting of Bone Graft/Bone Marrow Aspirate, Hammertoe Repair 2 and 3 - Left Foot;  Surgeon: Frankie Zapata DPM;  Location:  [facility];  Service:  Podiatry;  Laterality: Left;     Family History   Problem Relation Age of Onset   • Hypertension Mother    • No Known Problems Father    • Colon cancer Neg Hx    • Colon polyps Neg Hx      Social History     Socioeconomic History   • Marital status:    Tobacco Use   • Smoking status: Never     Passive exposure: Never   • Smokeless tobacco: Never   Vaping Use   • Vaping Use: Never used   Substance and Sexual Activity   • Alcohol use: No   • Drug use: No   • Sexual activity: Defer     Allergies   Allergen Reactions   • Morphine And Related Nausea And Vomiting and Dizziness   • Percocet [Oxycodone-Acetaminophen] Nausea And Vomiting and Dizziness     Current Outpatient Medications   Medication Sig Dispense Refill   • apixaban (ELIQUIS) 5 MG tablet tablet Take 1 tablet by mouth Every 12 (Twelve) Hours. 60 tablet 5   • cyanocobalamin 1000 MCG/ML injection Inject 1 mL Every 28 (Twenty-Eight) Days. 1 mL 5   • dilTIAZem CD (Cardizem CD) 120 MG 24 hr capsule Take 1 capsule by mouth Daily. 30 capsule 11   • docusate sodium (COLACE) 100 MG capsule Take 1 capsule by mouth Daily. 7 capsule 0   • HYDROcodone-acetaminophen (NORCO) 7.5-325 MG per tablet Take 1 tablet by mouth Every 6 (Six) Hours As Needed for Moderate Pain. 28 tablet 0   • montelukast (SINGULAIR) 10 MG tablet Take 1 tablet by mouth every night 30 tablet 5   • omeprazole (priLOSEC) 20 MG capsule Take 1 capsule by mouth Daily. 30 capsule 11   • oxyCODONE-acetaminophen (PERCOCET) 7.5-325 MG per tablet Take 1 tablet by mouth Every 6 (Six) Hours As Needed (Pain). 28 tablet 0   • promethazine (PHENERGAN) 12.5 MG tablet Take 1 tablet by mouth Every 8 (Eight) Hours As Needed for Nausea or Vomiting. 21 tablet 0   • tamoxifen (NOLVADEX) 20 MG chemo tablet Take 1 tablet by mouth daily 90 tablet 1   • tiZANidine (ZANAFLEX) 4 MG tablet Take 1 tablet by mouth once Daily As Needed for Muscle Spasms. 30 tablet 2     Current Facility-Administered Medications   Medication Dose  Route Frequency Provider Last Rate Last Admin   • cyanocobalamin injection 1,000 mcg  1,000 mcg Intramuscular Q28 Days ANGEL Healy MD   1,000 mcg at 12/22/22 0845     Review of Systems   Constitutional: Negative for chills and fever.   HENT: Negative for congestion.    Respiratory: Negative for shortness of breath.    Cardiovascular: Negative for chest pain and leg swelling.   Gastrointestinal: Negative for constipation, diarrhea, nausea and vomiting.   Musculoskeletal: Positive for arthralgias and gait problem.   Skin: Negative for wound.   Neurological: Negative for numbness.       OBJECTIVE     Vitals:    01/25/23 1257   BP: 122/84   Pulse: 94   SpO2: 98%       PHYSICAL EXAM  GEN:   Accompanied by sister.     Foot/Ankle Exam:       General:   Appearance: appears stated age and healthy    Orientation: AAOx3    Affect: appropriate    Assistance: wheelchair    Shoe Gear:  CAM boot    VASCULAR      Right Foot Vascularity   Dorsalis pedis:  2+  Posterior tibial:  2+  Skin Temperature: warm    Edema Grading:  None  CFT:  3  Pedal Hair Growth:  Present  Varicosities: mild varicosities       Left Foot Vascularity   Dorsalis pedis:  2+  Posterior tibial:  2+  Skin Temperature: warm    Edema Grading:  None  CFT:  3  Pedal Hair Growth:  Present  Varicosities: mild varicosities        NEUROLOGIC     Right Foot Neurologic   Normal sensation    Light touch sensation:  Normal  Vibratory sensation:  Normal  Hot/Cold sensation: normal    Protective Sensation using [place] Monofilament:  10     Left Foot Neurologic   Normal sensation    Light touch sensation:  Normal  Vibratory sensation:  Normal  Hot/cold sensation: normal    Protective Sensation using [place]ifer Monofilament:  10     MUSCULOSKELETAL      Right Foot Musculoskeletal   Ecchymosis:  None  Tenderness: great toe metatarsophalangeal joint and toe 1    Arch:  Normal  Hammertoe:  Second toe, third toe, fourth toe and fifth toe  Hallux valgus: Yes  (large medial eminence with dorsal spurring. Moderately trackbound. Minimal crepitus.)       Left Foot Musculoskeletal   Ecchymosis:  None  Tenderness comment:  Minimal to surgical sites  Arch:  Normal  Stiff great toe joint: s/p arthodesis.       MUSCLE STRENGTH     Right Foot Muscle Strength   Foot dorsiflexion:  5  Foot plantar flexion:  5  Foot inversion:  5  Foot eversion:  5     Left Foot Muscle Strength   Foot dorsiflexion:  5  Foot plantar flexion:  5  Foot inversion:  5  Foot eversion:  5     RANGE OF MOTION      Right Foot Range of Motion   Foot and ankle ROM within normal limits       Left Foot Range of Motion   Foot and ankle ROM within normal limits       DERMATOLOGIC     Right Foot Dermatologic   Skin: skin intact    Nails: abnormally thick       Left Foot Dermatologic   Skin: skin intact    Nails: abnormally thick        Left Foot Additional Comments: Incisions to left foot with sutures intact. Sutures removed without issue, wounds fully coapted after removal. Pins intact without evidence of retraction or infection.      RADIOLOGY/NUCLEAR:  XR chest 2 vw    Result Date: 12/28/2022  Narrative: EXAM/TECHNIQUE: XR CHEST 2 VW-  INDICATION: pre-op; M96.0-Pseudarthrosis after fusion or arthrodesis; M20.42-Other hammer toe(s) (acquired), left foot  COMPARISON: 12/05/2016  FINDINGS:  Cardiac silhouette is normal size. No pleural effusion, pneumothorax, or focal consolidation. Advanced degenerative change in the cervical spine. Surgical clips in the LEFT lower chest are noted.      Impression:  No acute findings. This report was finalized on 12/28/2022 15:54 by Dr. Andrew Gomez MD.    XR Foot 3+ View Left    Result Date: 1/4/2023  Narrative: XR FOOT 3+ VW LEFT- 1/4/2023 4:24 PM CST  HISTORY: post-op; M20.42-Other hammer toe(s) (acquired), left foot; M96.0-Pseudarthrosis after fusion or arthrodesis  COMPARISON: 11/29/2022  FINDINGS: Frontal, lateral and oblique radiographs of the left foot were provided for  review.  Pre-existing bunionectomy, now status post revision of the first MTP joint arthrodesis. Additional second and third digit hammertoe repairs. No hardware complication identified. Bone donor site along the calcaneus. Plantar calcaneal spur.      Impression: 1. New postoperative changes to the left foot, as described above. This report was finalized on 01/04/2023 17:21 by Dr Baldev Beth, .      LABORATORY/CULTURE RESULTS:      PATHOLOGY RESULTS:       ASSESSMENT/PLAN     Diagnoses and all orders for this visit:    1. S/P foot surgery (Primary)  -     XR Foot 3+ View Left; Future      Comprehensive lower extremity examination and evaluation was performed.  Discussed findings and treatment plan including risks, benefits, and treatment options with patient in detail. Patient agreed with treatment plan.  Dressing removed and wound visualized. Sutures removed without difficutly. Wounds fully coapted.   May heel touch in CAM as needed  Okay to wash foot but avoid soaking foot.   X-rays prior to next appointment.   Plan on removal of pins at next appointment pending healing.   An After Visit Summary was printed and given to the patient at discharge, including (if requested) any available informative/educational handouts regarding diagnosis, treatment, or medications. All questions were answered to patient/family satisfaction. Should symptoms fail to improve or worsen they agree to call or return to clinic or to go to the Emergency Department. Discussed the importance of following up with any needed screening tests/labs/specialist appointments and any requested follow-up recommended by me today. Importance of maintaining follow-up discussed and patient accepts that missed appointments can delay diagnosis and potentially lead to worsening of conditions.  Return in about 3 weeks (around 2/15/2023) for Follow-up with Dr. Zapata., or sooner if acute issues arise.    Lab Frequency Next Occurrence   Follow Anesthesia  Guidelines / Standing Orders Once 09/29/2020   Obtain Informed Consent Once 10/04/2020       This document has been electronically signed by MARÍA Henry on January 25, 2023 13:14 CST

## 2023-01-12 NOTE — TELEPHONE ENCOUNTER
Caller: Yun Blackwell    Relationship: Self    Best call back number: [phone]    What is the best time to reach you: ANY     Who are you requesting to speak with (clinical staff, provider,  specific staff member): MITZY     Do you know the name of the person who called:     What was the call regarding: PT WOULD LIKE TO DISCUSS [employer] APPLICATION WITH MITZY.     Do you require a callback: YES

## 2023-01-24 ENCOUNTER — TELEPHONE (OUTPATIENT)
Dept: PODIATRY | Facility: CLINIC | Age: 68
End: 2023-01-24
Payer: COMMERCIAL

## 2023-01-25 ENCOUNTER — OFFICE VISIT (OUTPATIENT)
Dept: PODIATRY | Facility: CLINIC | Age: 68
End: 2023-01-25
Payer: MEDICARE

## 2023-01-25 VITALS
SYSTOLIC BLOOD PRESSURE: 122 MMHG | OXYGEN SATURATION: 98 % | WEIGHT: 161 LBS | HEIGHT: 63 IN | BODY MASS INDEX: 28.53 KG/M2 | DIASTOLIC BLOOD PRESSURE: 84 MMHG | HEART RATE: 94 BPM

## 2023-01-25 DIAGNOSIS — Z98.890 S/P FOOT SURGERY: Primary | ICD-10-CM

## 2023-01-25 PROCEDURE — 99024 POSTOP FOLLOW-UP VISIT: CPT | Performed by: NURSE PRACTITIONER

## 2023-01-27 ENCOUNTER — TELEPHONE (OUTPATIENT)
Dept: CARDIOLOGY | Facility: CLINIC | Age: 68
End: 2023-01-27
Payer: COMMERCIAL

## 2023-01-27 NOTE — TELEPHONE ENCOUNTER
Caller: Yun Blackwell    Relationship: Self    Best call back number: [phone]    What is the best time to reach you: ANYTIME    What was the call regarding: PATIENT IS CALLING IN WONDERING IF WE HAVE ANY ELIQUIS 5 MG SAMPLES. SHE STATED SHE IS DOWN TO HER LAST 6 PILLS     Do you require a callback: YES

## 2023-01-30 NOTE — TELEPHONE ENCOUNTER
Samples are provided for the patient.  I have reached out to her pharmacy to obtain a OOP expense list so I can resubmit her patient assistance application for Eliquis.

## 2023-02-01 ENCOUNTER — TELEPHONE (OUTPATIENT)
Dept: PODIATRY | Facility: CLINIC | Age: 68
End: 2023-02-01
Payer: COMMERCIAL

## 2023-02-01 NOTE — TELEPHONE ENCOUNTER
Caller: DIETER    Relationship: SELF    Best call back number:     What orders are you requesting (i.e. lab or imaging): XRAY OF LEFT FOOT    In what timeframe would the patient need to come in: BEFORE 2/6/23    Where will you receive your lab/imaging services: [facility]    Additional notes: PATIENT WAS TOLD NO REFERRAL WAS NEEDED BUT LAST TIME SHE WAS TOLD THAT AND GOT TO THE FACILITY THEY TOLD HER A REFERRAL WAS NEEDED- SHE JUST WANTS TO MAKE SURE THAT DOESN'T HAPPEN AGAIN

## 2023-02-02 NOTE — TELEPHONE ENCOUNTER
Called patient to let her know that the order for her xray is in and the patient verbalized understanding.

## 2023-02-06 ENCOUNTER — HOSPITAL ENCOUNTER (OUTPATIENT)
Dept: GENERAL RADIOLOGY | Facility: HOSPITAL | Age: 68
Discharge: HOME | End: 2023-02-06
Admitting: NURSE PRACTITIONER
Payer: MEDICARE

## 2023-02-06 DIAGNOSIS — Z98.890 S/P FOOT SURGERY: ICD-10-CM

## 2023-02-06 PROCEDURE — 73630 X-RAY EXAM OF FOOT: CPT

## 2023-02-08 NOTE — PROGRESS NOTES
[facility] - PODIATRY    Today's Date: 02/14/2023     Patient Name: Yun Blackwell  MRN: [id number]  CSN: [id number]  PCP: ANGEL Healy MD  Referring Provider: No ref. 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 Patient has h/o AF, anemia, CA, DM, HLD, HTN, IBS, sleep apnea. Patient is NIDDM and unsure of last BG level. Has remained in CAM and has been using compressigrip. Has remained nonweightbearing as directed with use of knee scooter. Had updated x-rays completed. Denies pain today. She has taken medication as prescribed. Denies any constitutional symptoms. No other pedal complaints at this time.    Past Medical History:   Diagnosis Date   • A-fib (HCC)    • Abnormal ECG     tachycardia, a fib   • Acid reflux    • Anemia    • Arthritis    • Cancer (HCC)    • Diabetes mellitus (HCC)    • Hyperkalemia    • Hyperlipidemia    • Hypertension    • Hyponatremia    • IBS (irritable bowel syndrome)    • PONV (postoperative nausea and vomiting)    • Sleep apnea     USES CPAP   • UTI (urinary tract infection)    • Vaginal vault prolapse      Past Surgical History:   Procedure Laterality Date   • BREAST BIOPSY Left     X2   • CARDIAC CATHETERIZATION     • CARDIAC CATHETERIZATION N/A 02/05/2021    Procedure: Right Heart Cath;  Surgeon: Van Marcelino MD;  Location:  [facility] CATH [facility]AS[facility];  Service: Cardiology;  Laterality: N/A;   • CATARACT EXTRACTION, BILATERAL     • COLON SURGERY     • COLONOSCOPY  09/21/2015    TRANSVERSE COLON ADENOMATOUS POLYP, HEMORRHOIDS, RECALL 5 YEARS, DR LAMAS   • COLONOSCOPY N/A 11/13/2020    Procedure: COLONOSCOPY WITH ANESTHESIA;  Surgeon: Joe Lamas  MD PRIYANKA;  Location: [facility] ENDOSCOPY;  Service: Gastroenterology;  Laterality: N/A;  pre op: screening  post op:divdrticulosis, polyp  PCP: ANGEL Healy MD   • COLOSTOMY     • COLPOPEXY VAGINAL      2014   • ENDOSCOPY N/A 11/03/2016    LA GRADE B ESOPHAGITIS WITH NO BLEEDING UPPER THIRD OF ESOPHAGUS, GERI-[place]AD[place]ORTH TEAR GEJ, BILIOUS BLOOD TINGED COFFEE GROUND GATRIC FLUIDDR ADAMS   • EXPLORATORY LAPAROTOMY N/A 10/14/2016    Procedure: LAPAROTOMY EXPLORATORY, LYSIS OF ADHESIONS, IRRIAGATION OF ABDOMEN, POSSIBLE BOWEL RESECTION;  Surgeon: Bacilio Marcial MD;  Location:  [facility] OR;  Service:    • FOOT NAVICULAR EXCISION OR BONE GRAFT Left 1/4/2023    Procedure: Expansion Graft, Harvesting of Bone Graft/Bone Marrow Aspirate;  Surgeon: Frankie Zapata DPM;  Location:  [facility] OR;  Service: Podiatry;  Laterality: Left;   • HAMMER TOE REPAIR Left 1/4/2023    Procedure: Hammertoe Repair 2 and 3 - Left Foot;  Surgeon: Frankie Zapata DPM;  Location:  [facility] OR;  Service: Podiatry;  Laterality: Left;   • HARDWARE REMOVAL Left 1/4/2023    Procedure: Hardware Removal;  Surgeon: Frankie Zapata DPM;  Location:  [facility] OR;  Service: Podiatry;  Laterality: Left;   • HYSTERECTOMY     • REVISION / TAKEDOWN COLOSTOMY     • SACROCOLPOPEXY N/A 09/21/2016    Procedure: SACROCOLPOPEXY LAPAROSCOPIC WITH MalwarebytesI SI ROBOT, CONVERTED TO OPEN SACROCOLPOPEXY, RIGHT SALPINGO- OOPHERECTOMY, CYSTO;  Surgeon: Maribell Beth MD;  Location:  [facility] OR;  Service:    • TOE FUSION Left 01/06/2022    Procedure: FIRST METATARSOPHALANGEAL JOINT ARTHRODESIS WITH INTERNAL FIXATION - LEFT FOOT;  Surgeon: Jorgito Red DPM;  Location:  [facility] OR;  Service: Podiatry;  Laterality: Left;   • TOE FUSION Left 1/4/2023    Procedure: Revisional 1st Metatarsophalangeal Joint Arthrodesis with Hardware Removal and Expansion Graft, Harvesting of Bone Graft/Bone Marrow Aspirate, Hammertoe Repair 2 and 3 - Left Foot;  Surgeon: Frankie Zapata DPM;   Location: [facility] OR;  Service: Podiatry;  Laterality: Left;     Family History   Problem Relation Age of Onset   • Hypertension Mother    • No Known Problems Father    • Colon cancer Neg Hx    • Colon polyps Neg Hx      Social History     Socioeconomic History   • Marital status:    Tobacco Use   • Smoking status: Never     Passive exposure: Never   • Smokeless tobacco: Never   Vaping Use   • Vaping Use: Never used   Substance and Sexual Activity   • Alcohol use: No   • Drug use: No   • Sexual activity: Defer     Allergies   Allergen Reactions   • Morphine And Related Nausea And Vomiting and Dizziness   • Percocet [Oxycodone-Acetaminophen] Nausea And Vomiting and Dizziness     Current Outpatient Medications   Medication Sig Dispense Refill   • apixaban (ELIQUIS) 5 MG tablet tablet Take 1 tablet by mouth Every 12 (Twelve) Hours. 60 tablet 5   • cyanocobalamin 1000 MCG/ML injection Inject 1 mL Every 28 (Twenty-Eight) Days. 1 mL 5   • dilTIAZem CD (Cardizem CD) 120 MG 24 hr capsule Take 1 capsule by mouth Daily. 30 capsule 11   • docusate sodium (COLACE) 100 MG capsule Take 1 capsule by mouth Daily. 7 capsule 0   • HYDROcodone-acetaminophen (NORCO) 7.5-325 MG per tablet Take 1 tablet by mouth Every 6 (Six) Hours As Needed for Moderate Pain. 28 tablet 0   • montelukast (SINGULAIR) 10 MG tablet Take 1 tablet by mouth every night 30 tablet 5   • omeprazole (priLOSEC) 20 MG capsule Take 1 capsule by mouth Daily. 30 capsule 11   • oxyCODONE-acetaminophen (PERCOCET) 7.5-325 MG per tablet Take 1 tablet by mouth Every 6 (Six) Hours As Needed (Pain). 28 tablet 0   • promethazine (PHENERGAN) 12.5 MG tablet Take 1 tablet by mouth Every 8 (Eight) Hours As Needed for Nausea or Vomiting. 21 tablet 0   • tamoxifen (NOLVADEX) 20 MG chemo tablet Take 1 tablet by mouth daily 90 tablet 1   • tiZANidine (ZANAFLEX) 4 MG tablet Take 1 tablet by mouth once Daily As Needed for Muscle Spasms. 30 tablet 2     Current  Facility-Administered Medications   Medication Dose Route Frequency Provider Last Rate Last Admin   • cyanocobalamin injection 1,000 mcg  1,000 mcg Intramuscular Q28 Days ANGEL Healy MD   1,000 mcg at 12/22/22 0845     Review of Systems   Constitutional: Negative for chills and fever.   HENT: Negative for congestion.    Respiratory: Negative for shortness of breath.    Cardiovascular: Negative for chest pain and leg swelling.   Gastrointestinal: Negative for constipation, diarrhea, nausea and vomiting.   Musculoskeletal: Positive for arthralgias and gait problem.   Skin: Negative for wound.   Neurological: Negative for numbness.       OBJECTIVE     Vitals:    02/14/23 1443   BP: 137/78   Temp: (!) 107 °F (41.7 °C)   SpO2: 97%       PHYSICAL EXAM  GEN:   Accompanied by sister.     Foot/Ankle Exam:       General:   Appearance: appears stated age and healthy    Orientation: AAOx3    Affect: appropriate    Assistance: knee scooter    Shoe Gear:  CAM boot    VASCULAR      Right Foot Vascularity   Dorsalis pedis:  2+  Posterior tibial:  2+  Skin Temperature: warm    Edema Grading:  None  CFT:  3  Pedal Hair Growth:  Present  Varicosities: mild varicosities       Left Foot Vascularity   Dorsalis pedis:  2+  Posterior tibial:  2+  Skin Temperature: warm    Edema Grading:  None  CFT:  3  Pedal Hair Growth:  Present  Varicosities: mild varicosities        NEUROLOGIC     Right Foot Neurologic   Normal sensation    Light touch sensation:  Normal  Vibratory sensation:  Normal  Hot/Cold sensation: normal    Protective Sensation using [place] Monofilament:  10     Left Foot Neurologic   Normal sensation    Light touch sensation:  Normal  Vibratory sensation:  Normal  Hot/cold sensation: normal    Protective Sensation using [place]ifer Monofilament:  10     MUSCULOSKELETAL      Right Foot Musculoskeletal   Ecchymosis:  None  Tenderness: great toe metatarsophalangeal joint and toe 1    Arch:  Normal  Hammertoe:   Second toe, third toe, fourth toe and fifth toe  Hallux valgus: Yes (large medial eminence with dorsal spurring. Moderately trackbound. Minimal crepitus.)       Left Foot Musculoskeletal   Ecchymosis:  None  Tenderness comment:  Minimal to surgical sites  Arch:  Normal  Stiff great toe joint: s/p arthodesis.       MUSCLE STRENGTH     Right Foot Muscle Strength   Foot dorsiflexion:  5  Foot plantar flexion:  5  Foot inversion:  5  Foot eversion:  5     Left Foot Muscle Strength   Foot dorsiflexion:  5  Foot plantar flexion:  5  Foot inversion:  5  Foot eversion:  5     RANGE OF MOTION      Right Foot Range of Motion   Foot and ankle ROM within normal limits       Left Foot Range of Motion   Foot and ankle ROM within normal limits       DERMATOLOGIC     Right Foot Dermatologic   Skin: skin intact    Nails: abnormally thick       Left Foot Dermatologic   Skin: skin intact    Nails: abnormally thick        Left Foot Additional Comments: Surgical sites remain fully coapted. Pins removed without issue      RADIOLOGY/NUCLEAR:  XR Foot 3+ View Left    Result Date: 2/6/2023  Narrative: EXAMINATION: XR FOOT 3+ VW LEFT-  2/6/2023 1:49 PM CST  HISTORY: postop; Z98.890-Other specified postprocedural states  Left foot, 3 views.  Comparison is made with 01/04/2023.  Unchanged appearance of hardware at the first, second, and third metatarsals and toes.  No occult fracture is seen.  Hardware is intact.  Normal midfoot joints.  Prominent calcaneal spurring.  Summary: 1. Stable postoperative changes at the first through third left metatarsals and toes. This report was finalized on 02/06/2023 14:21 by Dr. Jim Izaguirre MD.      LABORATORY/CULTURE RESULTS:      PATHOLOGY RESULTS:       ASSESSMENT/PLAN     Diagnoses and all orders for this visit:    1. S/P foot surgery (Primary)      Comprehensive lower extremity examination and evaluation was performed.  Discussed findings and treatment plan including risks, benefits, and treatment  options with patient in detail. Patient agreed with treatment plan.  Updated x-rays reviewed. Evidence of bony bridging at arthrodesis sites. Incorporation of graft noted at 1st MTPJ.  Dressing removed and wound visualized. Site remains fully coapted without issue. Pins removed without difficulty.   Okay to begin weight bearing in CAM boot in 2 weeks.   May continue to utilize bone stimulator to further assist with bony bridging.    An After Visit Summary was printed and given to the patient at discharge, including (if requested) any available informative/educational handouts regarding diagnosis, treatment, or medications. All questions were answered to patient/family satisfaction. Should symptoms fail to improve or worsen they agree to call or return to clinic or to go to the Emergency Department. Discussed the importance of following up with any needed screening tests/labs/specialist appointments and any requested follow-up recommended by me today. Importance of maintaining follow-up discussed and patient accepts that missed appointments can delay diagnosis and potentially lead to worsening of conditions.  Return in about 1 month (around 3/14/2023) for Post-Op appointment., or sooner if acute issues arise.    Lab Frequency Next Occurrence   Follow Anesthesia Guidelines / Standing Orders Once 09/29/2020   Obtain Informed Consent Once 10/04/2020       This document has been electronically signed by Frankie Zapata DPM on February 14, 2023 15:11 CST

## 2023-02-10 NOTE — PROGRESS NOTES
Progress Note      Pt Name: Mago Spangler  YOB: 1955  MRN: [id number]    Date of evaluation: 02/13/2023  History Obtained From:  patient, electronic medical record    CHIEF COMPLAINT:    Chief Complaint   Patient presents with    Follow-up     Ductal carcinoma in situ (DCIS) of left breast    Anemia     HISTORY OF PRESENT ILLNESS:    Mago Spangler is a 69 y.o.  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 Current recommendation is for preventive endocrine therapy with tamoxifen 20 mg p.o. daily for anticipated 5 years with completing dosing in January 2025. Experiences severe constipation oral iron replacement and currently not taking supplement, she has received parenteral B12 replacement in the past.  Hoda Segura returns today in scheduled follow-up for evaluation, side effect monitoring, lab monitoring and further treatment recommendations. She presents today. She is compliant with the tamoxifen and has no new breast complaints. She is currently in a left walking boot due to having bunion surgery on 1/4/2023. Today's clinic visit to include physical assessment, review of systems, any radiograph or lab findings that were available and plan of care are documented below. ONCOLOGIC HISTORY:     Diagnosis  DCIS left breast, December 2020  Stage 0  Grade 2  %, MI 98%  [employer] 84 genes-  CDKN2A ([id number]) mutation (mutation associated with autosomal dominant melanoma-pancreatic cancer and melanoma neural system tumor  Iron deficiency anemia      Treatment summary  1/31/2020- left lumpectomy  January 2020-tamoxifen x5 years  Difficulty with oral iron supplement due to severe constipation  Parenteral B12 replacement in the past.     Hoda Segura was referred by Dr. Sayda Seals for a diagnosis of DCIS of left breast.  This was a screening detected lesion.   She had a sister and a niece with breast cancer. She denied any hormone replacement therapy. She had genetic assessment with a 84 gene panel that was unremarkable for a deleterious mutation but with findings of a VUS. 2018-core needle biopsy consistent with intermediate grade DCIS measuring 4 mm. %, % 1. DCISRT high risk  2019-MRI breast bilateral showed postbiopsy change in the left breast with a small hematoma. Abnormal enhancement extending into a linear configuration. No MRI evidence of right breast malignancy. No suspicious lymphadenopathy. 2020-left breast DCIS micropapillary measuring 7 mm. Grade 2. Clear margins. Usual ductal hyperplasia. 2020-IORT 2000 Gy  2020-seen in initial consultation by Dr. Tiffanie Smith   2021 Bilateral mammogram- New focus of amorphous calcifications in the LEFT breast upper outer quadrant anterolateral to the lumpectomy bed. These may represent an area of evolving fat necrosis, although recurrent DCIS is also within the differential. At minimum, recommend 6 month follow-up diagnostic LEFT breast mammogram with magnification views  2021 Left breast biopsy- No malignancy identified. Coarse microcalcifications, dense fibroconnective tissue, and organizing fat necrosis. 2021 MRI abdomen with and without contrast- No evidence of solid neoplasm in the abdomen. No change in 1.3 cm pancreatic tail cyst without solid components or enhancement. Recommend continued imaging follow-up with repeat study in 2 years based on ACR white paper criteria. Cholelithiasis without evidence of cholecystitis. Mild diffuse hepatic steatosis. 2022 Left breast mammogram- no mammographic evidence of malignancy      GENETIC TESTIN2020 - [employer] 84 genes-  CDKN2A ([id number]) mutation (mutation associated with autosomal dominant melanoma-pancreatic cancer and melanoma neural system tumor.   Family history of carcinoma consists of breast, uterine and lung.      Age-appropriate health screenin2020- Colonoscopy completed by Dr. Syo Viveros with removal of a biopsy from large intestinal polyp with pathology revealing no high grade dysplasia  2021 Dexa Bone Density Scan at \[facility]\"" documented as normal with a femoral T-score of -0.7 and a lumbar T-score of 0.2.     Past Medical History:    Past Medical History:   Diagnosis Date    [facility])     [facility]    Acid reflux     Breast cancer ([facility] [address]     Breast mass     Cancer ([facility] [address]     Breast Cancer in [facility]    CPAP (continuous positive airway pressure) dependence     Diabetes ([facility] [address]     type 2 (currently diet-controlled)    Hyperlipidemia     elevated triglycerides, but low cholesterol level    Hypertension     Prolonged emergence from general anesthesia     Sleep apnea     CPAP       Past Surgical History:    Past Surgical History:   Procedure Laterality Date    BREAST BIOPSY Left 2019    EXCISION LEFT BREAST MASS WITH INTRAOP ULTRASOUND GUIDED NEEDLE LOCALIZATION performed by Tahira Alarcon MD at [address] [place] [address] LUMPECTOMY Left 2020    PREOP ULTRASOUND WITH LEFT LUMPECTOMY, NO SENTINEL NODE BIOPSY, INTRAOP ULTRASOUND GUIDED NEEDLE LOCALIZATION, BIOZORB, FLAPS, PECTORAL BLOCK, IORT performed by Tahira Alarcon MD at [address] [place] [address]      after vaginal prolapse repair/damaged    HYSTERECTOMY ([address] [place] [address])      HYSTERECTOMY, VAGINAL      [facility]ODA STEROTACTIC LOC BREAST BIOPSY LEFT Left 2021    HODA STEROTACTIC LOC BREAST BIOPSY LEFT 2021 [facility] [address] Bilateral 2016    PLANTAR FASCIA INJECTION FEET performed by Nabil Malin DPM at [place] [address]         Current Medications:    Current Outpatient Medications   Medication Sig Dispense Refill    dilTIAZem (CARDIZEM CD) 120 MG extended release capsule       omeprazole (PRILOSEC) 20 MG delayed release capsule Take 20 mg by mouth daily      tamoxifen (NOLVADEX) 20 MG tablet Take 1 tablet by mouth daily 90 tablet 1    apixaban (ELIQUIS) 5 MG TABS tablet Take by mouth 2 times daily       tiZANidine (ZANAFLEX) 4 MG tablet Take 4 mg by mouth nightly as needed       montelukast (SINGULAIR) 10 MG tablet Take 1 tablet by mouth nightly 30 tablet 0    Multiple Vitamins-Minerals (THERAPEUTIC MULTIVITAMIN-MINERALS) tablet Take 1 tablet by mouth daily       No current facility-administered medications for this visit. Allergies: Allergies   Allergen Reactions    Morphine Nausea And Vomiting    Oxycodone Nausea And Vomiting       Social History:    Social History     Tobacco Use    Smoking status: Never    Smokeless tobacco: Never   Vaping Use    Vaping Use: Never used   Substance Use Topics    Alcohol use: No    Drug use: No       Family History:   Family History   Problem Relation Age of Onset    Diabetes Mother     Diabetes Father     Lung Cancer Father     Breast Cancer Sister     Brain Cancer Brother     Diabetes Sister     Diabetes Sister     Diabetes Sister     Diabetes Sister     Diabetes Brother     Diabetes Brother     Diabetes Brother     Uterine Cancer Maternal Aunt     Uterine Cancer Niece         Age Unknown    ADHD Niece     Breast Cancer Niece        Vitals:  Vitals:    02/13/23 0921   BP: 118/82   Pulse: 91   SpO2: 96%   Weight: 154 lb (69.9 kg)   Height: 5' 3\" (1.6 m)        Subjective   REVIEW OF SYSTEMS:   Review of Systems   Constitutional:  Positive for fatigue. Negative for chills, diaphoresis and fever. HENT: Negative. Negative for congestion, ear pain, hearing loss, nosebleeds, sore throat and tinnitus. Eyes: Negative. Negative for pain, discharge and redness. Respiratory: Negative. Negative for cough, shortness of breath and wheezing. Cardiovascular: Negative. Negative for chest pain, palpitations and leg swelling. Gastrointestinal: Negative. Negative for abdominal pain, blood in stool, constipation, diarrhea, nausea and vomiting. Endocrine: Negative for polydipsia. Genitourinary:  Negative for dysuria, flank pain, frequency, hematuria and urgency. Musculoskeletal:  Positive for gait problem (uses walker for ambulation). Negative for back pain, myalgias and neck pain. Surgery left foot for bunion    Skin: Negative. Negative for rash. Neurological:  Negative for dizziness, tremors, seizures, weakness and headaches. Hematological:  Does not bruise/bleed easily. Psychiatric/Behavioral: Negative. The patient is not nervous/anxious. Objective   PHYSICAL EXAM:  Physical Exam  Vitals reviewed. Constitutional:       General: She is not in acute distress. Appearance: She is well-developed. HENT:      Head: Normocephalic and atraumatic. Mouth/Throat:      Pharynx: Uvula midline. Tonsils: No tonsillar exudate. Eyes:      General: Lids are normal.      Conjunctiva/sclera: Conjunctivae normal.      Pupils: Pupils are equal, round, and reactive to light. Neck:      Thyroid: No thyroid mass or thyromegaly. Vascular: No JVD. Trachea: Trachea normal. No tracheal deviation. Cardiovascular:      Rate and Rhythm: Normal rate and regular rhythm. Pulses: Normal pulses. Heart sounds: Normal heart sounds. Pulmonary:      Effort: Pulmonary effort is normal. No respiratory distress. Breath sounds: Normal breath sounds. No wheezing or rales. Chest:      Chest wall: No tenderness. Abdominal:      General: Bowel sounds are normal. There is no distension. Palpations: Abdomen is soft. There is no mass. Tenderness: There is no abdominal tenderness. There is no guarding. Musculoskeletal:         General: No tenderness or deformity. Cervical back: Normal range of motion and neck supple. Comments: Range of motion within normal limits x4 extremities   Skin:     General: Skin is warm. Findings: No bruising, erythema or rash. Neurological:      Mental Status: She is alert and oriented to person, place, and time. Cranial Nerves: No cranial nerve deficit. Coordination: Coordination normal.      Gait: Gait abnormal (Walking boot on left foot (recent surgery)). Psychiatric:         Behavior: Behavior normal.         Thought Content: Thought content normal.       Labs reviewed today:  Lab Results   Component Value Date    WBC 7.63 02/13/2023    HGB 13.8 02/13/2023    HCT 43.4 02/13/2023    MCV 95.6 (H) 02/13/2023     02/13/2023     Lab Results   Component Value Date    NEUTROABS 5.08 02/13/2023       ASSESSMENT/PLAN:      1. Ductal carcinoma in situ (DCIS) of left breast, stage 0, diagnosed December 2020. Current recommendation is for preventive endocrine therapy with tamoxifen 20 mg daily for anticipated 5 years, through January 2025. She has no known evidence of recurrent disease. Reports being compliant with tamoxifen. No new breast complaints. 06/28/2022 Bilateral mammogram- no mammographic evidence of malignancy    Bilateral breast examination today revealed no dominant masses, no skin or nipple changes and no axillary adenopathy. No suspicious abnormality to suggest recurrent breast cancer. Well-healed lumpectomy scar to left breast    -Continue tamoxifen 20 mg daily, anticipate through January 2025: Continue to decrease risk of recurrence of breast cancer  -Encourage self breast exams    2. Adverse effect of tamoxifen, subsequent encounter  Reports tolerating without difficulty, denies any hot flashes or other complaints. 3. Vitamin B 12 deficiency, currently not receiving replacement, previously received monthly B12 injection under the direction of Dr. Sukumar Reid. Hemoglobin 13.8 with an MCV of 1 95.6 today. 4.Iron deficiency without anemia. Experiences severe constipation with iron supplement. Currently not taking any supplement.   Iron substrates on 10/13/2022 within acceptable limits. Hemoglobin 13.8 with an MCV of 95.6 today    10/13/2022 Serology results  Iron 48  TIBC 256  Saturation 19%  Ferritin 663.9  B12/Folate 462/18.8    -CBC, CMP, ferritin, and iron panel today    5. Care plan discussed with patient   Survivorship and health maintenance recommendations  Keep a healthy body weight. Dietary recommendations include limit energy intake from total fats and sugars; increase consumption of fruit and vegetables, as well as legumes, whole grains and nuts. Engage in regular physical activity (150 minutes spread through the week). Other recommendations include minimizing alcohol intake, avoid use of tobacco products. Practice sun safety: Utilize a sunscreen with an SPF of at least 27. Avoiding tanning beds. Ensure adequate amount of sleep. Follow-up with primary care regularly and for further recommendations regarding age-appropriate screening for cancer, wellbeing visit (preventive measures), follow-up and treatment for other medical comorbidities. I discussed all of the above findings included in the assessment and plan with the patient and the patient is in agreement to move forward with current recommendations/treatment. I have addressed all of their questions and concerns that were verbalized. FOLLOW UP:  Follow-up appointment given for 3 months  Continue to follow with other medical providers as recommended  Labs at next visit: CBC and consider iron substrates if warranted    EMR Dragon/Transcription disclaimer:   Much of this encounter note is an electronic transcription/translation of spoken language to printed text.  The electronic translation of spoken language may permit erroneous, or at times, nonsensical words or phrases to be inadvertently transcribed; although attempts have made to review the note for such errors, some may still exist.  Please excuse any unrecognized transcription errors and contact us if the error is unintelligible or needs documented correction. Also, portions of this note have been copied forward, however, changed to reflect the most current clinical status of this patient. Electronically signed by ROSLYN Dozier on 2/17/2023 at 9:01 AM  Cheyenne BARR am starting this note as a registered nurse for ROSLYN Lyn.

## 2023-02-13 ENCOUNTER — HOSPITAL ENCOUNTER (OUTPATIENT)
Dept: INFUSION THERAPY | Age: 68
Discharge: HOME | End: 2023-02-13
Payer: MEDICARE

## 2023-02-13 ENCOUNTER — TELEPHONE (OUTPATIENT)
Dept: PODIATRY | Facility: CLINIC | Age: 68
End: 2023-02-13
Payer: COMMERCIAL

## 2023-02-13 ENCOUNTER — OFFICE VISIT (OUTPATIENT)
Dept: HEMATOLOGY | Age: 68
End: 2023-02-13
Payer: MEDICARE

## 2023-02-13 VITALS
BODY MASS INDEX: 27.29 KG/M2 | WEIGHT: 154 LBS | DIASTOLIC BLOOD PRESSURE: 82 MMHG | OXYGEN SATURATION: 96 % | HEIGHT: 63 IN | HEART RATE: 91 BPM | SYSTOLIC BLOOD PRESSURE: 118 MMHG

## 2023-02-13 DIAGNOSIS — D50.9 IRON DEFICIENCY ANEMIA, UNSPECIFIED IRON DEFICIENCY ANEMIA TYPE: ICD-10-CM

## 2023-02-13 DIAGNOSIS — Z71.89 CARE PLAN DISCUSSED WITH PATIENT: ICD-10-CM

## 2023-02-13 DIAGNOSIS — D05.12 DUCTAL CARCINOMA IN SITU (DCIS) OF LEFT BREAST: Primary | ICD-10-CM

## 2023-02-13 DIAGNOSIS — D05.12 DUCTAL CARCINOMA IN SITU (DCIS) OF LEFT BREAST: ICD-10-CM

## 2023-02-13 DIAGNOSIS — E53.8 VITAMIN B 12 DEFICIENCY: ICD-10-CM

## 2023-02-13 DIAGNOSIS — T38.6X5D ADVERSE EFFECT OF TAMOXIFEN, SUBSEQUENT ENCOUNTER: ICD-10-CM

## 2023-02-13 LAB
BASOPHILS ABSOLUTE: 0.07 K/UL (ref 0.01–0.08)
BASOPHILS RELATIVE PERCENT: 0.9 % (ref 0.1–1.2)
EOSINOPHILS ABSOLUTE: 0.1 K/UL (ref 0.04–0.54)
EOSINOPHILS RELATIVE PERCENT: 1.3 % (ref 0.7–7)
HCT VFR BLD CALC: 43.4 % (ref 34.1–44.9)
HEMOGLOBIN: 13.8 G/DL (ref 11.2–15.7)
LYMPHOCYTES ABSOLUTE: 1.73 K/UL (ref 1.18–3.74)
LYMPHOCYTES RELATIVE PERCENT: 22.7 % (ref 19.3–53.1)
MCH RBC QN AUTO: 30.4 PG (ref 25.6–32.2)
MCHC RBC AUTO-ENTMCNC: 31.8 G/DL (ref 32.3–35.5)
MCV RBC AUTO: 95.6 FL (ref 79.4–94.8)
MONOCYTES ABSOLUTE: 0.6 K/UL (ref 0.24–0.82)
MONOCYTES RELATIVE PERCENT: 7.9 % (ref 4.7–12.5)
NEUTROPHILS ABSOLUTE: 5.08 K/UL (ref 1.56–6.13)
NEUTROPHILS RELATIVE PERCENT: 66.5 % (ref 34–71.1)
PDW BLD-RTO: 13.4 % (ref 11.7–14.4)
PLATELET # BLD: 186 K/UL (ref 182–369)
PMV BLD AUTO: 10.8 FL (ref 7.4–10.4)
RBC # BLD: 4.54 M/UL (ref 3.93–5.22)
WBC # BLD: 7.63 K/UL (ref 3.98–10.04)

## 2023-02-13 PROCEDURE — 36415 COLL VENOUS BLD VENIPUNCTURE: CPT

## 2023-02-13 PROCEDURE — 1123F ACP DISCUSS/DSCN MKR DOCD: CPT | Performed by: NURSE PRACTITIONER

## 2023-02-13 PROCEDURE — 99212 OFFICE O/P EST SF 10 MIN: CPT

## 2023-02-13 PROCEDURE — 1036F TOBACCO NON-USER: CPT | Performed by: NURSE PRACTITIONER

## 2023-02-13 PROCEDURE — G8427 DOCREV CUR MEDS BY ELIG CLIN: HCPCS | Performed by: NURSE PRACTITIONER

## 2023-02-13 PROCEDURE — G8417 CALC BMI ABV UP PARAM F/U: HCPCS | Performed by: NURSE PRACTITIONER

## 2023-02-13 PROCEDURE — 1090F PRES/ABSN URINE INCON ASSESS: CPT | Performed by: NURSE PRACTITIONER

## 2023-02-13 PROCEDURE — G8399 PT W/DXA RESULTS DOCUMENT: HCPCS | Performed by: NURSE PRACTITIONER

## 2023-02-13 PROCEDURE — G8484 FLU IMMUNIZE NO ADMIN: HCPCS | Performed by: NURSE PRACTITIONER

## 2023-02-13 PROCEDURE — 85025 COMPLETE CBC W/AUTO DIFF WBC: CPT

## 2023-02-13 PROCEDURE — 99214 OFFICE O/P EST MOD 30 MIN: CPT | Performed by: NURSE PRACTITIONER

## 2023-02-13 PROCEDURE — 3017F COLORECTAL CA SCREEN DOC REV: CPT | Performed by: NURSE PRACTITIONER

## 2023-02-13 RX ORDER — DILTIAZEM HYDROCHLORIDE 120 MG/1
CAPSULE, COATED, EXTENDED RELEASE ORAL
COMMUNITY
Start: 2022-12-08

## 2023-02-14 ENCOUNTER — TELEPHONE (OUTPATIENT)
Dept: INTERNAL MEDICINE | Facility: CLINIC | Age: 68
End: 2023-02-14

## 2023-02-14 ENCOUNTER — OFFICE VISIT (OUTPATIENT)
Dept: PODIATRY | Facility: CLINIC | Age: 68
End: 2023-02-14
Payer: MEDICARE

## 2023-02-14 VITALS
HEIGHT: 63 IN | WEIGHT: 161 LBS | DIASTOLIC BLOOD PRESSURE: 78 MMHG | TEMPERATURE: 107 F | OXYGEN SATURATION: 97 % | SYSTOLIC BLOOD PRESSURE: 137 MMHG | BODY MASS INDEX: 28.53 KG/M2

## 2023-02-14 DIAGNOSIS — I48.20 CHRONIC ATRIAL FIBRILLATION: ICD-10-CM

## 2023-02-14 DIAGNOSIS — Z98.890 S/P FOOT SURGERY: Primary | ICD-10-CM

## 2023-02-14 PROCEDURE — 99024 POSTOP FOLLOW-UP VISIT: CPT | Performed by: PODIATRIST

## 2023-02-14 NOTE — TELEPHONE ENCOUNTER
PATIENT HAS BEEN CALLED, SHE WAS ASKING ABOUT HER ELIQUIS.    I SPOKE TO [facility] AND PATIENT HAS 5 REFILLS

## 2023-02-14 NOTE — TELEPHONE ENCOUNTER
Caller: Dieter Burr    Relationship: Self    Best call back number: [phone]    What is the best time to reach you:   ANYTIME    Who are you requesting to speak with (clinical staff, provider,  specific staff member):   DENISSE AND/OR CLINICAL STAFF    Do you know the name of the person who called:   DIETER BURR    What was the call regarding:   PATIENT WANTED TO SPEAK REGARDING MEDICATION    Do you require a callback:   YES

## 2023-02-15 ENCOUNTER — TELEPHONE (OUTPATIENT)
Dept: PODIATRY | Facility: CLINIC | Age: 68
End: 2023-02-15
Payer: COMMERCIAL

## 2023-02-15 NOTE — TELEPHONE ENCOUNTER
Called pt back and let her know not to use foot for the next 2 weeks then may start weight bearing after that as tolerated. Pt stated understanding

## 2023-02-15 NOTE — TELEPHONE ENCOUNTER
Caller: DIETER   Relationship to Patient: SELF  Phone Number: [phone]  Reason for Call:PATIENT STATING THAT SHE CAN NOT REMEMBER THE DETAILS FROM HER VISIT YESTERDAY

## 2023-02-17 ASSESSMENT — ENCOUNTER SYMPTOMS
CONSTIPATION: 0
RESPIRATORY NEGATIVE: 1
ABDOMINAL PAIN: 0
DIARRHEA: 0
EYE REDNESS: 0
SHORTNESS OF BREATH: 0
EYE PAIN: 0
BLOOD IN STOOL: 0
GASTROINTESTINAL NEGATIVE: 1
EYE DISCHARGE: 0
BACK PAIN: 0
SORE THROAT: 0
WHEEZING: 0
EYES NEGATIVE: 1
VOMITING: 0
NAUSEA: 0
COUGH: 0

## 2023-02-24 RX ORDER — MONTELUKAST SODIUM 10 MG/1
10 TABLET ORAL NIGHTLY
Qty: 30 TABLET | Refills: 5 | Status: SHIPPED | OUTPATIENT
Start: 2023-02-24

## 2023-03-01 NOTE — PROGRESS NOTES
[facility] - PODIATRY    Today's Date: 03/14/2023     Patient Name: Yun Blackwell  MRN: [id number]  CSN: [id number]  PCP: ANGEL Healy MD  Referring Provider: No ref. provider found    SUBJECTIVE     Chief Complaint   Patient presents with   • Follow-up     ANGEL Healy MD 11/17/2022 Return in about 1 month DOS 01/04/2023- pt states doing better, not swelling as much- pt pain 10/10 at worst at times     HPI: Yun Blackwell, a 67 y.o.female, comes to clinic as a(n) established patient for post-op appt 10 weeks s/p left foot revisional 1st MTPJ AD, 2-3 HT repair. Patient has h/o AF, anemia, CA, DM, HLD, HTN, IBS, sleep apnea. Patient is NIDDM and unsure of last BG level. Has remained in CAM and has been using compressigrip. States she has been up and moving more since last appointment but has been in CAM with activity. States that she is having pain along the dorsum of the foot and lateral aspect of the foot. Denies any injury or trauma. Admits pain at 10/10 level and described as stabbing, aching and sharp. She has taken medication as prescribed. Denies any constitutional symptoms. No other pedal complaints at this time.    Past Medical History:   Diagnosis Date   • A-fib (HCC)    • Abnormal ECG     tachycardia, a fib   • Acid reflux    • Anemia    • Arthritis    • Cancer (HCC)    • Diabetes mellitus (HCC)    • Hyperkalemia    • Hyperlipidemia    • Hypertension    • Hyponatremia    • IBS (irritable bowel syndrome)    • PONV (postoperative nausea and vomiting)    • Sleep apnea     USES CPAP   • UTI (urinary tract infection)    • Vaginal vault prolapse      Past Surgical History:   Procedure Laterality Date   • BREAST BIOPSY Left     X2   • CARDIAC CATHETERIZATION     • CARDIAC CATHETERIZATION N/A 02/05/2021    Procedure: Right Heart Cath;  Surgeon: Van Marcelino MD;  Location:  [facility] CATH [facility];  Service: Cardiology;  Laterality: N/A;   • CATARACT EXTRACTION, BILATERAL      • COLON SURGERY     • COLONOSCOPY  09/21/2015    TRANSVERSE COLON ADENOMATOUS POLYP, HEMORRHOIDS, RECALL 5 YEARS, DR LAMAS   • COLONOSCOPY N/A 11/13/2020    Procedure: COLONOSCOPY WITH ANESTHESIA;  Surgeon: Joe Lamas MD;  Location:  [facility] ENDOS[facility];  Service: Gastroenterology;  Laterality: N/A;  pre op: screening  post op:divdrticulosis, polyp  PCP: ANGEL Healy MD   • COLOSTOMY     • COLPOPEXY VAGINAL      2014   • ENDOSCOPY N/A 11/03/2016    LA GRADE B ESOPHAGITIS WITH NO BLEEDING UPPER THIRD OF ESOPHAGUS, GERI-WADSWORTH TEAR GEJ, BILIOUS BLOOD TINGED COFFEE GROUND GATRIC FLUIDDR LAMAS   • EXPLORATORY LAPAROTOMY N/A 10/14/2016    Procedure: LAPAROTOMY EXPLORATORY, LYSIS OF ADHESIONS, IRRIAGATION OF ABDOMEN, POSSIBLE BOWEL RESECTION;  Surgeon: Bacilio Marcial MD;  Location:  [facility] OR;  Service:    • FOOT NAVICULAR EXCISION OR BONE GRAFT Left 1/4/2023    Procedure: Expansion Graft, Harvesting of Bone Graft/Bone Marrow Aspirate;  Surgeon: Frankie Zapata DPM;  Location:  [facility] OR;  Service: Podiatry;  Laterality: Left;   • HAMMER TOE REPAIR Left 1/4/2023    Procedure: Hammertoe Repair 2 and 3 - Left Foot;  Surgeon: Frankie Zapata DPM;  Location:  [facility] OR;  Service: Podiatry;  Laterality: Left;   • HARDWARE REMOVAL Left 1/4/2023    Procedure: Hardware Removal;  Surgeon: Frankie Zapata DPM;  Location:  [facility] OR;  Service: Podiatry;  Laterality: Left;   • HYSTERECTOMY     • REVISION / TAKEDOWN COLOSTOMY     • SACROCOLPOPEXY N/A 09/21/2016    Procedure: SACROCOLPOPEXY LAPAROSCOPIC WITH ReliOnI SI ROBOT, CONVERTED TO OPEN SACROCOLPOPEXY, RIGHT SALPINGO- OOPHERECTOMY, CYSTO;  Surgeon: Maribell Beth MD;  Location:  [facility] OR;  Service:    • TOE FUSION Left 01/06/2022    Procedure: FIRST METATARSOPHALANGEAL JOINT ARTHRODESIS WITH INTERNAL FIXATION - LEFT FOOT;  Surgeon: Jorgito Red DPM;  Location:  [facility] OR;  Service: Podiatry;  Laterality: Left;   • TOE FUSION Left 1/4/2023     Procedure: Revisional 1st Metatarsophalangeal Joint Arthrodesis with Hardware Removal and Expansion Graft, Harvesting of Bone Graft/Bone Marrow Aspirate, Hammertoe Repair 2 and 3 - Left Foot;  Surgeon: Frankie Zapata DPM;  Location: [facility];  Service: Podiatry;  Laterality: Left;     Family History   Problem Relation Age of Onset   • Hypertension Mother    • No Known Problems Father    • Colon cancer Neg Hx    • Colon polyps Neg Hx      Social History     Socioeconomic History   • Marital status:    Tobacco Use   • Smoking status: Never     Passive exposure: Never   • Smokeless tobacco: Never   Vaping Use   • Vaping Use: Never used   Substance and Sexual Activity   • Alcohol use: No   • Drug use: No   • Sexual activity: Defer     Allergies   Allergen Reactions   • Morphine And Related Nausea And Vomiting and Dizziness   • Percocet [Oxycodone-Acetaminophen] Nausea And Vomiting and Dizziness     Current Outpatient Medications   Medication Sig Dispense Refill   • apixaban (ELIQUIS) 5 MG tablet tablet Take 1 tablet by mouth Every 12 (Twelve) Hours. 60 tablet 5   • cyanocobalamin 1000 MCG/ML injection Inject 1 mL Every 28 (Twenty-Eight) Days. 1 mL 5   • dilTIAZem CD (Cardizem CD) 120 MG 24 hr capsule Take 1 capsule by mouth Daily. 30 capsule 11   • docusate sodium (COLACE) 100 MG capsule Take 1 capsule by mouth Daily. 7 capsule 0   • HYDROcodone-acetaminophen (NORCO) 7.5-325 MG per tablet Take 1 tablet by mouth Every 6 (Six) Hours As Needed for Moderate Pain. 28 tablet 0   • montelukast (SINGULAIR) 10 MG tablet Take 1 tablet by mouth Every Night. 30 tablet 5   • omeprazole (priLOSEC) 20 MG capsule Take 1 capsule by mouth Daily. 30 capsule 11   • oxyCODONE-acetaminophen (PERCOCET) 7.5-325 MG per tablet Take 1 tablet by mouth Every 6 (Six) Hours As Needed (Pain). 28 tablet 0   • promethazine (PHENERGAN) 12.5 MG tablet Take 1 tablet by mouth Every 8 (Eight) Hours As Needed for Nausea or Vomiting. 21 tablet 0    • tamoxifen (NOLVADEX) 20 MG chemo tablet Take 1 tablet by mouth daily 90 tablet 1   • tiZANidine (ZANAFLEX) 4 MG tablet Take 1 tablet by mouth once Daily As Needed for Muscle Spasms. 30 tablet 2     Current Facility-Administered Medications   Medication Dose Route Frequency Provider Last Rate Last Admin   • cyanocobalamin injection 1,000 mcg  1,000 mcg Intramuscular Q28 Days ANGEL Healy MD   1,000 mcg at 12/22/22 0845     Review of Systems   Constitutional: Negative for chills and fever.   HENT: Negative for congestion.    Respiratory: Negative for shortness of breath.    Cardiovascular: Negative for chest pain and leg swelling.   Gastrointestinal: Negative for constipation, diarrhea, nausea and vomiting.   Musculoskeletal: Positive for arthralgias and gait problem.   Skin: Negative for wound.   Neurological: Negative for numbness.       OBJECTIVE     Vitals:    03/14/23 0802   BP: 124/78   Pulse: 101   SpO2: 97%       PHYSICAL EXAM  GEN:   Accompanied by none.     Foot/Ankle Exam:       General:   Appearance: appears stated age and healthy    Orientation: AAOx3    Affect: appropriate    Assistance: independent    Shoe Gear:  CAM boot    VASCULAR      Right Foot Vascularity   Dorsalis pedis:  2+  Posterior tibial:  2+  Skin Temperature: warm    Edema Grading:  None  CFT:  3  Pedal Hair Growth:  Present  Varicosities: mild varicosities       Left Foot Vascularity   Dorsalis pedis:  2+  Posterior tibial:  2+  Skin Temperature: warm    Edema Grading:  None  CFT:  3  Pedal Hair Growth:  Present  Varicosities: mild varicosities        NEUROLOGIC     Right Foot Neurologic   Normal sensation    Light touch sensation:  Normal  Vibratory sensation:  Normal  Hot/Cold sensation: normal    Protective Sensation using [place]-Yennifer Monofilament:  10     Left Foot Neurologic   Normal sensation    Light touch sensation:  Normal  Vibratory sensation:  Normal  Hot/cold sensation: normal    Protective Sensation using  [place]-Yennifer Monofilament:  10     MUSCULOSKELETAL      Right Foot Musculoskeletal   Ecchymosis:  None  Tenderness: great toe metatarsophalangeal joint and toe 1    Arch:  Normal  Hammertoe:  Second toe, third toe, fourth toe and fifth toe  Hallux valgus: Yes (large medial eminence with dorsal spurring. Moderately trackbound. Minimal crepitus.)       Left Foot Musculoskeletal   Ecchymosis:  None  Tenderness: metatarsals    Tenderness comment:  2nd/3rd metatarsals  Arch:  Normal  Stiff great toe joint: s/p arthodesis.       MUSCLE STRENGTH     Right Foot Muscle Strength   Foot dorsiflexion:  5  Foot plantar flexion:  5  Foot inversion:  5  Foot eversion:  5     Left Foot Muscle Strength   Foot dorsiflexion:  5  Foot plantar flexion:  5  Foot inversion:  5  Foot eversion:  5     RANGE OF MOTION      Right Foot Range of Motion   Foot and ankle ROM within normal limits       Left Foot Range of Motion   Foot and ankle ROM within normal limits       DERMATOLOGIC     Right Foot Dermatologic   Skin: skin intact    Nails: abnormally thick       Left Foot Dermatologic   Skin: skin intact    Nails: abnormally thick        Left Foot Additional Comments: Surgical sites remain fully coapted.      RADIOLOGY/NUCLEAR:  No results found.    LABORATORY/CULTURE RESULTS:      PATHOLOGY RESULTS:       ASSESSMENT/PLAN     Diagnoses and all orders for this visit:    1. S/P foot surgery (Primary)    2. Foot pain, left  -     XR Foot 3+ View Left    3. Controlled type 2 diabetes mellitus without complication, without long-term current use of insulin (HCC)      Comprehensive lower extremity examination and evaluation was performed.  Discussed findings and treatment plan including risks, benefits, and treatment options with patient in detail. Patient agreed with treatment plan.  Surgical sites evaluated. No complications with surgical sites or hardware.   New pain most consistent with stress type reaction of the foot, likely due to being  off of the foot for a period of time prior to and during post operative period.    Remain in CAM boot for immobilization and protection.   Will order new x-ray to evaluate for any changes along 2nd/3rd metatarsals.    An After Visit Summary was printed and given to the patient at discharge, including (if requested) any available informative/educational handouts regarding diagnosis, treatment, or medications. All questions were answered to patient/family satisfaction. Should symptoms fail to improve or worsen they agree to call or return to clinic or to go to the Emergency Department. Discussed the importance of following up with any needed screening tests/labs/specialist appointments and any requested follow-up recommended by me today. Importance of maintaining follow-up discussed and patient accepts that missed appointments can delay diagnosis and potentially lead to worsening of conditions.  Return in about 2 weeks (around 3/28/2023)., or sooner if acute issues arise.    Lab Frequency Next Occurrence   Follow Anesthesia Guidelines / Standing Orders Once 09/29/2020   Obtain Informed Consent Once 10/04/2020       This document has been electronically signed by Frankie Zapata DPM on March 14, 2023 08:15 CDT

## 2023-03-02 ENCOUNTER — TELEPHONE (OUTPATIENT)
Dept: PODIATRY | Facility: CLINIC | Age: 68
End: 2023-03-02
Payer: COMMERCIAL

## 2023-03-02 NOTE — TELEPHONE ENCOUNTER
Provider:  DR. JEREMIAH BROWN  Caller:  DIETER BURR  Relationship to Patient:  SELF  Phone Number:  [phone]  Reason for Call:  PATIENT SAYS EVER SINCE SHE STARTED TO BEAR SOME WEIGHT ON HER LEFT POST OP FOOT (SINCE EARLIER THIS WEEK) PATIENT IS HAVING SHARP PAINS IN THE FRONT OF HER LEFT LEG. PLEASE ADVISE.  When was the patient last seen:  2/14/23

## 2023-03-03 NOTE — TELEPHONE ENCOUNTER
Tried to contact patient to let her know that dr avila stated that her pain is most likely from being in non weight bearing CAM and may take some time to adjust back to weight bearing. Left a message for patient with call back number should she have any questions.

## 2023-03-07 ENCOUNTER — TELEPHONE (OUTPATIENT)
Dept: PODIATRY | Facility: CLINIC | Age: 68
End: 2023-03-07
Payer: COMMERCIAL

## 2023-03-07 NOTE — TELEPHONE ENCOUNTER
Called and spoke with patient about her concerns I consulted with dr avila and gave her the information. Pt verbalized understanding.

## 2023-03-07 NOTE — TELEPHONE ENCOUNTER
Provider: DR. BROWN  Caller: DIETER  Relationship to Patient: SELF  Phone Number: [phone]  Reason for Call: LEFT FOOT POST OP. PATIENT IS CONCERNED ABOUT INCREASED SWELLING AND NOW THE FOOT IS WARM TO THE TOUCH & PAINFUL    PLEASE CALL BACK ASAP

## 2023-03-13 ENCOUNTER — TELEPHONE (OUTPATIENT)
Dept: PODIATRY | Facility: CLINIC | Age: 68
End: 2023-03-13
Payer: COMMERCIAL

## 2023-03-13 NOTE — TELEPHONE ENCOUNTER
Called patient regarding appt on 03/14/2023. Left message for patient to return call if any questions or concerns arise.

## 2023-03-14 ENCOUNTER — OFFICE VISIT (OUTPATIENT)
Dept: PODIATRY | Facility: CLINIC | Age: 68
End: 2023-03-14
Payer: MEDICARE

## 2023-03-14 VITALS
DIASTOLIC BLOOD PRESSURE: 78 MMHG | WEIGHT: 156 LBS | OXYGEN SATURATION: 97 % | SYSTOLIC BLOOD PRESSURE: 124 MMHG | HEIGHT: 63 IN | BODY MASS INDEX: 27.64 KG/M2 | HEART RATE: 101 BPM

## 2023-03-14 DIAGNOSIS — Z98.890 S/P FOOT SURGERY: Primary | ICD-10-CM

## 2023-03-14 DIAGNOSIS — M79.672 FOOT PAIN, LEFT: ICD-10-CM

## 2023-03-14 DIAGNOSIS — E11.9 CONTROLLED TYPE 2 DIABETES MELLITUS WITHOUT COMPLICATION, WITHOUT LONG-TERM CURRENT USE OF INSULIN: ICD-10-CM

## 2023-03-14 PROCEDURE — 99024 POSTOP FOLLOW-UP VISIT: CPT | Performed by: PODIATRIST

## 2023-03-22 NOTE — PROGRESS NOTES
[facility] - PODIATRY    Today's Date: 03/31/2023     Patient Name: Yun Blackwell  MRN: [id number]  CSN: [id number]  PCP: ANGEL Healy MD  Referring Provider: No ref. provider found    SUBJECTIVE     Chief Complaint   Patient presents with   • Follow-up     ANGEL Healy MD 11/17/2022 2 WK RECHECK surgery on 01/04/2023-pt states she is here today for a recheck from surgery-pt denies pain     HPI: Yun Blackwell, a 67 y.o.female, comes to clinic as a(n) established patient for post-op appt 12 weeks s/p left foot revisional 1st MTPJ AD, 2-3 HT repair. Patient has h/o AF, anemia, CA, DM, HLD, HTN, IBS, sleep apnea. Patient is NIDDM and unsure of last BG level. States that she is now pain free. Has been in CAM and has decreased her WB for rest. Has been icing foot as needed. Denies pain. She has had updated x-rays. She has taken medication as prescribed. Denies any constitutional symptoms. No other pedal complaints at this time.    Past Medical History:   Diagnosis Date   • A-fib (HCC)    • Abnormal ECG     tachycardia, a fib   • Acid reflux    • Anemia    • Arthritis    • Cancer (HCC)    • Diabetes mellitus (HCC)    • Hyperkalemia    • Hyperlipidemia    • Hypertension    • Hyponatremia    • IBS (irritable bowel syndrome)    • PONV (postoperative nausea and vomiting)    • Sleep apnea     USES CPAP   • UTI (urinary tract infection)    • Vaginal vault prolapse      Past Surgical History:   Procedure Laterality Date   • BREAST BIOPSY Left     X2   • CARDIAC CATHETERIZATION     • CARDIAC CATHETERIZATION N/A 02/05/2021    Procedure: Right Heart Cath;  Surgeon: Van Marcelino MD;  Location:  [facility];  Service: Cardiology;  Laterality: N/A;   • CATARACT EXTRACTION, BILATERAL     • COLON SURGERY     • COLONOSCOPY  09/21/2015    TRANSVERSE COLON ADENOMATOUS POLYP, HEMORRHOIDS, RECALL 5 YEARS, DR LAMAS   • COLONOSCOPY N/A 11/13/2020    Procedure: COLONOSCOPY WITH ANESTHESIA;   Surgeon: Joe France MD;  Location:  [facility];  Service: Gastroenterology;  Laterality: N/A;  pre op: screening  post op:divdrticulosis, polyp  PCP: ANGEL Healy MD   • COLOSTOMY     • COLPOPEXY VAGINAL      2014   • ENDOSCOPY N/A 11/03/2016    LA GRADE B ESOPHAGITIS WITH NO BLEEDING UPPER THIRD OF ESOPHAGUS, GERI-WADSWORTH TEAR GEJ, BILIOUS BLOOD TINGED COFFEE GROUND GATRIC FLUIDDR [facility]   • EXPLORATORY LAPAROTOMY N/A 10/14/2016    Procedure: LAPAROTOMY EXPLORATORY, LYSIS OF ADHESIONS, IRRIAGATION OF ABDOMEN, POSSIBLE BOWEL RESECTION;  Surgeon: Baiclio Marcial MD;  Location:  [facility] OR;  Service:    • FOOT NAVICULAR EXCISION OR BONE GRAFT Left 1/4/2023    Procedure: Expansion Graft, Harvesting of Bone Graft/Bone Marrow Aspirate;  Surgeon: Frankie Zapata DPM;  Location:  [facility] OR;  Service: Podiatry;  Laterality: Left;   • HAMMER TOE REPAIR Left 1/4/2023    Procedure: Hammertoe Repair 2 and 3 - Left Foot;  Surgeon: Frankie Zapata DPM;  Location:  [facility] OR;  Service: Podiatry;  Laterality: Left;   • HARDWARE REMOVAL Left 1/4/2023    Procedure: Hardware Removal;  Surgeon: Frankie Zapata DPM;  Location:  [facility] OR;  Service: Podiatry;  Laterality: Left;   • HYSTERECTOMY     • REVISION / TAKEDOWN COLOSTOMY     • SACROCOLPOPEXY N/A 09/21/2016    Procedure: SACROCOLPOPEXY LAPAROSCOPIC WITH [place] SI ROBOT, CONVERTED TO OPEN SACROCOLPOPEXY, RIGHT SALPINGO- OOPHERECTOMY, CYSTO;  Surgeon: Maribell Beth MD;  Location:  [facility] OR;  Service:    • TOE FUSION Left 01/06/2022    Procedure: FIRST METATARSOPHALANGEAL JOINT ARTHRODESIS WITH INTERNAL FIXATION - LEFT FOOT;  Surgeon: Jorgito Red DPM;  Location:  [facility] OR;  Service: Podiatry;  Laterality: Left;   • TOE FUSION Left 1/4/2023    Procedure: Revisional 1st Metatarsophalangeal Joint Arthrodesis with Hardware Removal and Expansion Graft, Harvesting of Bone Graft/Bone Marrow Aspirate, Hammertoe Repair 2 and 3 - Left Foot;  Surgeon: Benny  Frankie DUMONT DPM;  Location:  [facility] OR;  Service: Podiatry;  Laterality: Left;     Family History   Problem Relation Age of Onset   • Hypertension Mother    • No Known Problems Father    • Colon cancer Neg Hx    • Colon polyps Neg Hx      Social History     Socioeconomic History   • Marital status:    Tobacco Use   • Smoking status: Never     Passive exposure: Never   • Smokeless tobacco: Never   Vaping Use   • Vaping Use: Never used   Substance and Sexual Activity   • Alcohol use: No   • Drug use: No   • Sexual activity: Defer     Allergies   Allergen Reactions   • Morphine And Related Nausea And Vomiting and Dizziness   • Percocet [Oxycodone-Acetaminophen] Nausea And Vomiting and Dizziness     Current Outpatient Medications   Medication Sig Dispense Refill   • apixaban (ELIQUIS) 5 MG tablet tablet Take 1 tablet by mouth Every 12 (Twelve) Hours. 60 tablet 5   • cyanocobalamin 1000 MCG/ML injection Inject 1 mL Every 28 (Twenty-Eight) Days. 1 mL 5   • dilTIAZem CD (Cardizem CD) 120 MG 24 hr capsule Take 1 capsule by mouth Daily. 30 capsule 11   • docusate sodium (COLACE) 100 MG capsule Take 1 capsule by mouth Daily. 7 capsule 0   • HYDROcodone-acetaminophen (NORCO) 7.5-325 MG per tablet Take 1 tablet by mouth Every 6 (Six) Hours As Needed for Moderate Pain. 28 tablet 0   • montelukast (SINGULAIR) 10 MG tablet Take 1 tablet by mouth Every Night. 30 tablet 5   • omeprazole (priLOSEC) 20 MG capsule Take 1 capsule by mouth Daily. 30 capsule 11   • oxyCODONE-acetaminophen (PERCOCET) 7.5-325 MG per tablet Take 1 tablet by mouth Every 6 (Six) Hours As Needed (Pain). 28 tablet 0   • promethazine (PHENERGAN) 12.5 MG tablet Take 1 tablet by mouth Every 8 (Eight) Hours As Needed for Nausea or Vomiting. 21 tablet 0   • tamoxifen (NOLVADEX) 20 MG chemo tablet Take 1 tablet by mouth daily 90 tablet 1   • tiZANidine (ZANAFLEX) 4 MG tablet Take 1 tablet by mouth once Daily As Needed for Muscle Spasms. 30 tablet 2     Current  Facility-Administered Medications   Medication Dose Route Frequency Provider Last Rate Last Admin   • cyanocobalamin injection 1,000 mcg  1,000 mcg Intramuscular Q28 Days ANGEL Healy MD   1,000 mcg at 12/22/22 0845     Review of Systems   Constitutional: Negative for chills and fever.   HENT: Negative for congestion.    Respiratory: Negative for shortness of breath.    Cardiovascular: Negative for chest pain and leg swelling.   Gastrointestinal: Negative for constipation, diarrhea, nausea and vomiting.   Musculoskeletal: Positive for arthralgias (improved). Negative for gait problem.   Skin: Negative for wound.   Neurological: Negative for numbness.       OBJECTIVE     Vitals:    03/31/23 0824   BP: 124/78   Pulse: 83   SpO2: 94%       PHYSICAL EXAM  GEN:   Accompanied by none.     Foot/Ankle Exam:       General:   Appearance: appears stated age and healthy    Orientation: AAOx3    Affect: appropriate    Assistance: independent    Shoe Gear:  CAM boot and casual shoes    VASCULAR      Right Foot Vascularity   Dorsalis pedis:  2+  Posterior tibial:  2+  Skin Temperature: warm    Edema Grading:  None  CFT:  3  Pedal Hair Growth:  Present  Varicosities: mild varicosities       Left Foot Vascularity   Dorsalis pedis:  2+  Posterior tibial:  2+  Skin Temperature: warm    Edema Grading:  None  CFT:  3  Pedal Hair Growth:  Present  Varicosities: mild varicosities        NEUROLOGIC     Right Foot Neurologic   Normal sensation    Light touch sensation:  Normal  Vibratory sensation:  Normal  Hot/Cold sensation: normal    Protective Sensation using [place]-Yennifer Monofilament:  10     Left Foot Neurologic   Normal sensation    Light touch sensation:  Normal  Vibratory sensation:  Normal  Hot/cold sensation: normal    Protective Sensation using [place]-Yennifer Monofilament:  10     MUSCULOSKELETAL      Right Foot Musculoskeletal   Ecchymosis:  None  Tenderness: great toe metatarsophalangeal joint and toe 1    Arch:   Normal  Hammertoe:  Second toe, third toe, fourth toe and fifth toe  Hallux valgus: Yes (large medial eminence with dorsal spurring. Moderately trackbound. Minimal crepitus.)       Left Foot Musculoskeletal   Ecchymosis:  None  Tenderness: none    Arch:  Normal  Stiff great toe joint: s/p arthodesis.       MUSCLE STRENGTH     Right Foot Muscle Strength   Foot dorsiflexion:  5  Foot plantar flexion:  5  Foot inversion:  5  Foot eversion:  5     Left Foot Muscle Strength   Foot dorsiflexion:  5  Foot plantar flexion:  5  Foot inversion:  5  Foot eversion:  5     RANGE OF MOTION      Right Foot Range of Motion   Foot and ankle ROM within normal limits       Left Foot Range of Motion   Foot and ankle ROM within normal limits       DERMATOLOGIC     Right Foot Dermatologic   Skin: skin intact    Nails: abnormally thick       Left Foot Dermatologic   Skin: skin intact    Nails: abnormally thick        Left Foot Additional Comments: Minimal scarring to surgical site      RADIOLOGY/NUCLEAR:  XR Foot 3+ View Left    Result Date: 3/27/2023  Narrative: EXAM: XR FOOT 3+ VW LEFT- - 3/27/2023 8:19 AM CDT  HISTORY: foot pain along 2nd/3rd metatarsals; M79.672-Pain in left foot   COMPARISON: 02/06/2023, 01/04/2023.  TECHNIQUE:  3 images.  DP, oblique, lateral views.  FINDINGS:  Fixation hardware spanning the first metatarsophalangeal joint, with changes of probable bunionectomy. No evidence of hardware fracture or loosening.  Osteotomy changes at the second and third proximal interphalangeal joints with fixation screws spanning the joint. Hardware appears intact without evidence of loosening. Interval removal of K wires.  Diffusely decreased bone mineralization. No evidence of acute bony fracture. Small os peroneum. Plantar and Achilles enthesophytes. No large ankle joint effusion. Mild midfoot spurring. Normal joint alignment with mild diffuse joint space narrowing. Vascular calcifications.       Impression: Postoperative changes  as above. No evidence of hardware fracture or loosening. This report was finalized on 03/27/2023 08:37 by Dr Iwona Herrera MD.      LABORATORY/CULTURE RESULTS:      PATHOLOGY RESULTS:       ASSESSMENT/PLAN     Diagnoses and all orders for this visit:    1. S/P foot surgery (Primary)    2. Controlled type 2 diabetes mellitus without complication, without long-term current use of insulin (HCC)      Comprehensive lower extremity examination and evaluation was performed.  Discussed findings and treatment plan including risks, benefits, and treatment options with patient in detail. Patient agreed with treatment plan.  X-rays reviewed with patient. No hardware complications and arthrodesis sites appear to be healing without issue. No radiographical issues along the metatarsals.   Previous issues with pain in the metatarsals has resolved with rest and CAM.    An After Visit Summary was printed and given to the patient at discharge, including (if requested) any available informative/educational handouts regarding diagnosis, treatment, or medications. All questions were answered to patient/family satisfaction. Should symptoms fail to improve or worsen they agree to call or return to clinic or to go to the Emergency Department. Discussed the importance of following up with any needed screening tests/labs/specialist appointments and any requested follow-up recommended by me today. Importance of maintaining follow-up discussed and patient accepts that missed appointments can delay diagnosis and potentially lead to worsening of conditions.  Return if symptoms worsen or fail to improve., or sooner if acute issues arise.    Lab Frequency Next Occurrence   Follow Anesthesia Guidelines / Standing Orders Once 09/29/2020   Obtain Informed Consent Once 10/04/2020       This document has been electronically signed by Frankie Zapata DPM on March 31, 2023 08:40 CDT

## 2023-03-27 ENCOUNTER — HOSPITAL ENCOUNTER (OUTPATIENT)
Dept: GENERAL RADIOLOGY | Facility: HOSPITAL | Age: 68
Discharge: HOME | End: 2023-03-27
Admitting: PODIATRIST
Payer: MEDICARE

## 2023-03-27 PROCEDURE — 73630 X-RAY EXAM OF FOOT: CPT

## 2023-03-30 ENCOUNTER — TELEPHONE (OUTPATIENT)
Dept: PODIATRY | Facility: CLINIC | Age: 68
End: 2023-03-30
Payer: COMMERCIAL

## 2023-03-30 NOTE — TELEPHONE ENCOUNTER
Called patient regarding appt on 03/31/2023. Left message for patient to return call if any questions or concerns arise.

## 2023-03-31 ENCOUNTER — OFFICE VISIT (OUTPATIENT)
Dept: PODIATRY | Facility: CLINIC | Age: 68
End: 2023-03-31
Payer: MEDICARE

## 2023-03-31 VITALS
DIASTOLIC BLOOD PRESSURE: 78 MMHG | SYSTOLIC BLOOD PRESSURE: 124 MMHG | HEART RATE: 83 BPM | BODY MASS INDEX: 27.64 KG/M2 | HEIGHT: 63 IN | WEIGHT: 156 LBS | OXYGEN SATURATION: 94 %

## 2023-03-31 DIAGNOSIS — Z98.890 S/P FOOT SURGERY: Primary | ICD-10-CM

## 2023-03-31 DIAGNOSIS — E11.9 CONTROLLED TYPE 2 DIABETES MELLITUS WITHOUT COMPLICATION, WITHOUT LONG-TERM CURRENT USE OF INSULIN: ICD-10-CM

## 2023-03-31 PROCEDURE — 1159F MED LIST DOCD IN RCRD: CPT | Performed by: PODIATRIST

## 2023-03-31 PROCEDURE — 1160F RVW MEDS BY RX/DR IN RCRD: CPT | Performed by: PODIATRIST

## 2023-03-31 PROCEDURE — 99024 POSTOP FOLLOW-UP VISIT: CPT | Performed by: PODIATRIST

## 2023-04-06 DIAGNOSIS — D05.12 DUCTAL CARCINOMA IN SITU (DCIS) OF LEFT BREAST: ICD-10-CM

## 2023-04-06 RX ORDER — TAMOXIFEN CITRATE 20 MG/1
20 TABLET ORAL DAILY
Qty: 90 TABLET | Refills: 1 | Status: SHIPPED | OUTPATIENT
Start: 2023-04-06

## 2023-04-07 ENCOUNTER — TELEPHONE (OUTPATIENT)
Dept: CARDIOLOGY | Facility: CLINIC | Age: 68
End: 2023-04-07
Payer: COMMERCIAL

## 2023-04-07 NOTE — TELEPHONE ENCOUNTER
Received call from patient stating she is having left pain/swelling.States she had bunion surgery in January by Dr Zapata and recently released from wearing boot. Patient called Dr Zapata office, but was unable to get an appointment.I have advised to elevate left foot with ice 20 minutes on and 20 minutes off. Tylenol for pain relief due to use of Eliquis. If pain continues to worsen she is to call Dr Zapata office for an appointment or go to nearest  for evaluation.V/U

## 2023-04-17 ENCOUNTER — HOSPITAL ENCOUNTER (OUTPATIENT)
Dept: GENERAL RADIOLOGY | Facility: HOSPITAL | Age: 68
Discharge: HOME | End: 2023-04-17
Payer: MEDICARE

## 2023-04-17 ENCOUNTER — OFFICE VISIT (OUTPATIENT)
Dept: INTERNAL MEDICINE | Facility: CLINIC | Age: 68
End: 2023-04-17
Payer: MEDICARE

## 2023-04-17 ENCOUNTER — LAB (OUTPATIENT)
Dept: LAB | Facility: HOSPITAL | Age: 68
End: 2023-04-17
Payer: MEDICARE

## 2023-04-17 VITALS
OXYGEN SATURATION: 99 % | DIASTOLIC BLOOD PRESSURE: 70 MMHG | WEIGHT: 162.1 LBS | TEMPERATURE: 97.8 F | BODY MASS INDEX: 28.72 KG/M2 | SYSTOLIC BLOOD PRESSURE: 120 MMHG | HEIGHT: 63 IN | HEART RATE: 100 BPM

## 2023-04-17 DIAGNOSIS — Z00.00 MEDICARE ANNUAL WELLNESS VISIT, SUBSEQUENT: ICD-10-CM

## 2023-04-17 DIAGNOSIS — I48.20 CHRONIC ATRIAL FIBRILLATION: ICD-10-CM

## 2023-04-17 DIAGNOSIS — E11.9 TYPE 2 DIABETES MELLITUS WITHOUT COMPLICATION, WITHOUT LONG-TERM CURRENT USE OF INSULIN: ICD-10-CM

## 2023-04-17 DIAGNOSIS — E53.8 B12 DEFICIENCY: ICD-10-CM

## 2023-04-17 DIAGNOSIS — Z00.00 MEDICARE ANNUAL WELLNESS VISIT, SUBSEQUENT: Primary | ICD-10-CM

## 2023-04-17 DIAGNOSIS — Z13.31 DEPRESSION SCREEN: ICD-10-CM

## 2023-04-17 DIAGNOSIS — Z91.81 AT LOW RISK FOR FALL: ICD-10-CM

## 2023-04-17 DIAGNOSIS — Z00.00 HEALTHCARE MAINTENANCE: ICD-10-CM

## 2023-04-17 DIAGNOSIS — M54.2 CERVICALGIA: ICD-10-CM

## 2023-04-17 LAB
ALBUMIN SERPL-MCNC: 4 G/DL (ref 3.5–5.2)
ALBUMIN UR-MCNC: 1.9 MG/DL
ALBUMIN/GLOB SERPL: 1.3 G/DL
ALP SERPL-CCNC: 114 U/L (ref 39–117)
ALT SERPL W P-5'-P-CCNC: 17 U/L (ref 1–33)
ANION GAP SERPL CALCULATED.3IONS-SCNC: 12.9 MMOL/L (ref 5–15)
AST SERPL-CCNC: 26 U/L (ref 1–32)
BILIRUB SERPL-MCNC: 0.7 MG/DL (ref 0–1.2)
BUN SERPL-MCNC: 17 MG/DL (ref 8–23)
BUN/CREAT SERPL: 24.6 (ref 7–25)
CALCIUM SPEC-SCNC: 9.8 MG/DL (ref 8.6–10.5)
CHLORIDE SERPL-SCNC: 107 MMOL/L (ref 98–107)
CHOLEST SERPL-MCNC: 125 MG/DL (ref 0–200)
CO2 SERPL-SCNC: 26.1 MMOL/L (ref 22–29)
CREAT SERPL-MCNC: 0.69 MG/DL (ref 0.57–1)
CREAT UR-MCNC: 82.5 MG/DL
DEPRECATED RDW RBC AUTO: 40.4 FL (ref 37–54)
EGFRCR SERPLBLD CKD-EPI 2021: 94.1 ML/MIN/1.73
ERYTHROCYTE [DISTWIDTH] IN BLOOD BY AUTOMATED COUNT: 11.7 % (ref 12.3–15.4)
GLOBULIN UR ELPH-MCNC: 3.2 GM/DL
GLUCOSE SERPL-MCNC: 135 MG/DL (ref 65–99)
HBA1C MFR BLD: 5.2 % (ref 4.8–5.6)
HCT VFR BLD AUTO: 42.3 % (ref 34–46.6)
HDLC SERPL-MCNC: 66 MG/DL (ref 40–60)
HGB BLD-MCNC: 13.4 G/DL (ref 12–15.9)
LDLC SERPL CALC-MCNC: 31 MG/DL (ref 0–100)
LDLC/HDLC SERPL: 0.37 {RATIO}
MCH RBC QN AUTO: 30 PG (ref 26.6–33)
MCHC RBC AUTO-ENTMCNC: 31.7 G/DL (ref 31.5–35.7)
MCV RBC AUTO: 94.8 FL (ref 79–97)
MICROALBUMIN/CREAT UR: 23 MG/G
PLATELET # BLD AUTO: 194 10*3/MM3 (ref 140–450)
PMV BLD AUTO: 11.9 FL (ref 6–12)
POTASSIUM SERPL-SCNC: 3.7 MMOL/L (ref 3.5–5.2)
PROT SERPL-MCNC: 7.2 G/DL (ref 6–8.5)
RBC # BLD AUTO: 4.46 10*6/MM3 (ref 3.77–5.28)
SODIUM SERPL-SCNC: 146 MMOL/L (ref 136–145)
TRIGL SERPL-MCNC: 174 MG/DL (ref 0–150)
VIT B12 BLD-MCNC: 315 PG/ML (ref 211–946)
VLDLC SERPL-MCNC: 28 MG/DL (ref 5–40)
WBC NRBC COR # BLD: 7.95 10*3/MM3 (ref 3.4–10.8)

## 2023-04-17 PROCEDURE — 82043 UR ALBUMIN QUANTITATIVE: CPT | Performed by: INTERNAL MEDICINE

## 2023-04-17 PROCEDURE — 82607 VITAMIN B-12: CPT

## 2023-04-17 PROCEDURE — 82570 ASSAY OF URINE CREATININE: CPT | Performed by: INTERNAL MEDICINE

## 2023-04-17 PROCEDURE — 80053 COMPREHEN METABOLIC PANEL: CPT

## 2023-04-17 PROCEDURE — 80061 LIPID PANEL: CPT

## 2023-04-17 PROCEDURE — 36415 COLL VENOUS BLD VENIPUNCTURE: CPT

## 2023-04-17 PROCEDURE — 85027 COMPLETE CBC AUTOMATED: CPT

## 2023-04-17 PROCEDURE — 83036 HEMOGLOBIN GLYCOSYLATED A1C: CPT

## 2023-04-17 PROCEDURE — 72040 X-RAY EXAM NECK SPINE 2-3 VW: CPT

## 2023-04-17 NOTE — PROGRESS NOTES
The ABCs of the Annual Wellness Visit  Subsequent Medicare Wellness Visit    Subjective      Yun Blackwell is a 67 y.o. female who presents for a Subsequent Medicare Wellness Visit.    Her left foot is still sore after her bunion surgery.  In her days she took 400 mg of ibuprofen to help with swelling and pain.  She would normally just take ibuprofen once a month since she is on Eliquis     Symptoms when she turns her head from the right to left there is pain behind her left ear.  Feels like something catches.    Continues to see [facility] for breast cancer.    The following portions of the patient's history were reviewed and   updated as appropriate: allergies, current medications, past family history, past medical history, past social history, past surgical history and problem list.    Compared to one year ago, the patient feels her physical   health is the same.  Compared to one year ago, the patient feels her mental   health is the same.    Recent Hospitalizations:  She was not admitted to the hospital during the last year.       Current Medical Providers:  Patient Care Team:  ANGEL Healy MD as PCP - General (Internal Medicine)  Van Marcelino MD as Cardiologist (Cardiology)  ANGEL Healy MD as Referring Physician (Internal Medicine)  Abbey Watson APRN as Nurse Practitioner (Family Medicine)    Outpatient Medications Prior to Visit   Medication Sig Dispense Refill   • apixaban (ELIQUIS) 5 MG tablet tablet Take 1 tablet by mouth Every 12 (Twelve) Hours. 60 tablet 5   • dilTIAZem CD (Cardizem CD) 120 MG 24 hr capsule Take 1 capsule by mouth Daily. 30 capsule 11   • montelukast (SINGULAIR) 10 MG tablet Take 1 tablet by mouth Every Night. 30 tablet 5   • omeprazole (priLOSEC) 20 MG capsule Take 1 capsule by mouth Daily. 30 capsule 11   • tamoxifen (NOLVADEX) 20 MG chemo tablet Take 1 tablet by mouth daily 90 tablet 1   • tiZANidine (ZANAFLEX) 4 MG tablet Take 1 tablet by mouth once Daily As Needed  for Muscle Spasms. 30 tablet 2   • cyanocobalamin 1000 MCG/ML injection Inject 1 mL Every 28 (Twenty-Eight) Days. (Patient not taking: Reported on 4/17/2023) 1 mL 5   • docusate sodium (COLACE) 100 MG capsule Take 1 capsule by mouth Daily. 7 capsule 0   • HYDROcodone-acetaminophen (NORCO) 7.5-325 MG per tablet Take 1 tablet by mouth Every 6 (Six) Hours As Needed for Moderate Pain. 28 tablet 0   • oxyCODONE-acetaminophen (PERCOCET) 7.5-325 MG per tablet Take 1 tablet by mouth Every 6 (Six) Hours As Needed (Pain). 28 tablet 0   • promethazine (PHENERGAN) 12.5 MG tablet Take 1 tablet by mouth Every 8 (Eight) Hours As Needed for Nausea or Vomiting. 21 tablet 0     Facility-Administered Medications Prior to Visit   Medication Dose Route Frequency Provider Last Rate Last Admin   • cyanocobalamin injection 1,000 mcg  1,000 mcg Intramuscular Q28 Days ANGEL Healy MD   1,000 mcg at 12/22/22 0845       No opioid medication identified on active medication list. I have reviewed chart for other potential  high risk medication/s and harmful drug interactions in the elderly.          Aspirin is not on active medication list.  Aspirin use is contraindicated for this patient due to: current use of Eliquis.  .    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  ACP discussion was held with the patient during this visit. Patient has an advance directive (not in EMR), copy requested.     Objective    Vitals:    23 0852   BP: 120/70   BP Location: Left arm   Patient Position: Sitting   Cuff Size: Adult   Pulse: 100   Temp: 97.8 °F (36.6 °C)   TempSrc: Temporal   SpO2: 99%   Weight: 73.5 kg (162 lb 1.6 oz)   Height: 160 cm (63\")   PainSc: 0-No pain     Physical exam: Heart is irregularly irregular with a heart murmur.  Some slight tenderness of the left posterior cervical spine.  Otherwise physical exam unremarkable    Estimated body mass index is 28.71 kg/m² as calculated from the following:    Height as of this encounter: 160 cm (63\").    Weight as of this encounter: 73.5 kg (162 lb 1.6 oz).    BMI is >= 25 and <30. (Overweight) The following options were offered after discussion;: exercise counseling/recommendations      Does the patient have evidence of cognitive impairment?   No            HEALTH RISK ASSESSMENT    Smoking Status:  Social History     Tobacco Use   Smoking Status Never   • Passive exposure: Never   Smokeless Tobacco Never     Alcohol Consumption:  Social History     Substance and Sexual Activity   Alcohol Use No     Fall Risk Screen:    STEADI Fall Risk Assessment was completed, and patient is at LOW risk for falls.Assessment completed on:2023    Depression Screenin/17/2023     8:54 AM   PHQ-2/PHQ-9 Depression Screening   Little Interest or Pleasure in Doing Things 0-->not at all   Feeling Down, Depressed or Hopeless 0-->not at all   PHQ-9: Brief Depression Severity Measure Score 0       Health Habits and Functional and Cognitive Screenin/17/2023     8:54 AM   Functional & Cognitive Status   Do you have difficulty preparing food and eating? No   Do you have difficulty bathing yourself, getting " dressed or grooming yourself? No   Do you have difficulty using the toilet? No   Do you have difficulty moving around from place to place? No   Do you have trouble with steps or getting out of a bed or a chair? No   Current Diet Well Balanced Diet   Dental Exam Up to date   Eye Exam Up to date   Exercise (times per week) 0 times per week   Current Exercises Include No Regular Exercise   Do you need help using the phone?  No   Are you deaf or do you have serious difficulty hearing?  No   Do you need help with transportation? No   Do you need help shopping? No   Do you need help preparing meals?  No   Do you need help with housework?  No   Do you need help with laundry? No   Do you need help taking your medications? No   Do you need help managing money? No   Do you ever drive or ride in a car without wearing a seat belt? No   Have you felt unusual stress, anger or loneliness in the last month? No   Who do you live with? Alone   If you need help, do you have trouble finding someone available to you? No   Have you been bothered in the last four weeks by sexual problems? No   Do you have difficulty concentrating, remembering or making decisions? No       Age-appropriate Screening Schedule:  Refer to the list below for future screening recommendations based on patient's age, sex and/or medical conditions. Orders for these recommended tests are listed in the plan section. The patient has been provided with a written plan.    Health Maintenance   Topic Date Due   • TDAP/TD VACCINES (1 - Tdap) Never done   • PAP SMEAR  Never done   • URINE MICROALBUMIN  12/17/2022   • ZOSTER VACCINE (1 of 2) 04/17/2023 (Originally 11/12/2003)   • Pneumococcal Vaccine 65+ (1 - PCV) 04/17/2024 (Originally 11/12/1961)   • DXA SCAN  04/22/2023   • LIPID PANEL  04/27/2023   • MAMMOGRAM  06/27/2023   • INFLUENZA VACCINE  08/01/2023   • ANNUAL WELLNESS VISIT  04/17/2024   • COLORECTAL CANCER SCREENING  11/13/2025   • HEPATITIS C SCREENING   Completed   • COVID-19 Vaccine  Discontinued   • DIABETIC FOOT EXAM  Discontinued   • HEMOGLOBIN A1C  Discontinued   • DIABETIC EYE EXAM  Discontinued                  CMS Preventative Services Quick Reference  Risk Factors Identified During Encounter:    Chronic Pain: Natural history and expected course discussed. Questions answered.  Immunizations Discussed/Encouraged: Prevnar 20 (Pneumococcal 20-valent conjugate), Shingrix and COVID19  Polypharmacy: Medication List reviewed    The above risks/problems have been discussed with the patient.  Pertinent information has been shared with the patient in the After Visit Summary.    Diagnoses and all orders for this visit:    1. Medicare annual wellness visit, subsequent (Primary)  -     Hemoglobin A1c; Future  -     Lipid Panel; Future    2. Depression screen    3. Chronic atrial fibrillation    4. Type 2 diabetes mellitus without complication, without long-term current use of insulin  -     Hemoglobin A1c; Future  -     Microalbumin / Creatinine Urine Ratio - Urine, Clean Catch    5. Cervicalgia  -     XR Spine Cervical 2 or 3 View; Future    6. At low risk for fall      Would like to obtain some blood work.  She does not need full panel of labs as they were done in February.  Depression screen was negative with PHQ 2 of 0.    Obtain neck x-ray for cervicalgia.    She declines vaccines including Prevnar 20, Shingrix and COVID-19    She could try compression socks for the swelling and pain in the left foot.  Ibuprofen once a month should be okay in regards to her chronic anticoagulation.  Follow-up with Dr. Zapata as scheduled      Follow Up:   6 months recheck and next Medicare Wellness visit to be scheduled in 1 year.      An After Visit Summary and PPPS were made available to the patient.

## 2023-04-19 ENCOUNTER — TELEPHONE (OUTPATIENT)
Dept: INTERNAL MEDICINE | Facility: CLINIC | Age: 68
End: 2023-04-19

## 2023-04-19 NOTE — TELEPHONE ENCOUNTER
Caller: Yun Blackwell    Relationship: Self    Best call back number: [phone]    What form or medical record are you requesting: COPY OF RECENT TEST RESULTS    Who is requesting this form or medical record from you: PATIENT  How would you like to receive the form or medical records (pick-up, mail, fax):  FROM OFFICE OR MAIL TO ADDRESS ON FILE    Timeframe paperwork needed: ASAP    Additional notes: PATIENT REQUESTING CALLBACK REGARDING HER RECENT TEST RESULTS.

## 2023-05-03 ENCOUNTER — TELEPHONE (OUTPATIENT)
Dept: PODIATRY | Facility: CLINIC | Age: 68
End: 2023-05-03
Payer: COMMERCIAL

## 2023-05-03 NOTE — TELEPHONE ENCOUNTER
Mrs millard called stating she was left a message from winnie asking if she was wearing her compression socks. She told me she was not but went and bought a pair yesterday, but her foot is still so swollen she can not get her socks on. I recommended elevated and icing her foot to help with swelling and to contact her cardiologist to see if she would be able to take any NSAIDS since she is on eliquis. I also recommended getting an ace bandage and wrapping her foot until the swelling goes down enough to get her compression sock on her feet. Pt verbalized understanding.

## 2023-06-28 ENCOUNTER — HOSPITAL ENCOUNTER (OUTPATIENT)
Dept: WOMENS IMAGING | Age: 68
Discharge: HOME | End: 2023-06-28
Payer: MEDICARE

## 2023-06-28 ENCOUNTER — OFFICE VISIT (OUTPATIENT)
Dept: SURGERY | Age: 68
End: 2023-06-28
Payer: MEDICARE

## 2023-06-28 VITALS
WEIGHT: 161 LBS | HEIGHT: 63 IN | TEMPERATURE: 97.7 F | OXYGEN SATURATION: 97 % | BODY MASS INDEX: 28.53 KG/M2 | HEART RATE: 101 BPM

## 2023-06-28 DIAGNOSIS — Z85.3 PERSONAL HISTORY OF MALIGNANT NEOPLASM OF BREAST: ICD-10-CM

## 2023-06-28 DIAGNOSIS — G44.52 NEW DAILY PERSISTENT HEADACHE: ICD-10-CM

## 2023-06-28 DIAGNOSIS — Z15.09 GENETIC SUSCEPTIBILITY TO OTHER MALIGNANT NEOPLASM: ICD-10-CM

## 2023-06-28 DIAGNOSIS — R92.0 MICROCALCIFICATIONS OF THE BREAST: Primary | ICD-10-CM

## 2023-06-28 PROCEDURE — 3017F COLORECTAL CA SCREEN DOC REV: CPT | Performed by: PHYSICIAN ASSISTANT

## 2023-06-28 PROCEDURE — 99213 OFFICE O/P EST LOW 20 MIN: CPT | Performed by: PHYSICIAN ASSISTANT

## 2023-06-28 PROCEDURE — 1036F TOBACCO NON-USER: CPT | Performed by: PHYSICIAN ASSISTANT

## 2023-06-28 PROCEDURE — G0279 TOMOSYNTHESIS, MAMMO: HCPCS

## 2023-06-28 PROCEDURE — 1090F PRES/ABSN URINE INCON ASSESS: CPT | Performed by: PHYSICIAN ASSISTANT

## 2023-06-28 PROCEDURE — 77066 DX MAMMO INCL CAD BI: CPT | Performed by: RADIOLOGY

## 2023-06-28 PROCEDURE — G8417 CALC BMI ABV UP PARAM F/U: HCPCS | Performed by: PHYSICIAN ASSISTANT

## 2023-06-28 PROCEDURE — G8399 PT W/DXA RESULTS DOCUMENT: HCPCS | Performed by: PHYSICIAN ASSISTANT

## 2023-06-28 PROCEDURE — G8427 DOCREV CUR MEDS BY ELIG CLIN: HCPCS | Performed by: PHYSICIAN ASSISTANT

## 2023-06-28 PROCEDURE — 1123F ACP DISCUSS/DSCN MKR DOCD: CPT | Performed by: PHYSICIAN ASSISTANT

## 2023-06-29 ENCOUNTER — HOSPITAL ENCOUNTER (OUTPATIENT)
Dept: INFUSION THERAPY | Age: 68
Discharge: HOME | End: 2023-06-29
Payer: MEDICARE

## 2023-06-29 ENCOUNTER — OFFICE VISIT (OUTPATIENT)
Dept: HEMATOLOGY | Age: 68
End: 2023-06-29
Payer: MEDICARE

## 2023-06-29 ENCOUNTER — TELEPHONE (OUTPATIENT)
Dept: NEUROLOGY | Age: 68
End: 2023-06-29

## 2023-06-29 VITALS
DIASTOLIC BLOOD PRESSURE: 70 MMHG | WEIGHT: 159 LBS | HEART RATE: 86 BPM | BODY MASS INDEX: 28.17 KG/M2 | HEIGHT: 63 IN | SYSTOLIC BLOOD PRESSURE: 126 MMHG | OXYGEN SATURATION: 97 %

## 2023-06-29 DIAGNOSIS — D50.9 IRON DEFICIENCY ANEMIA, UNSPECIFIED IRON DEFICIENCY ANEMIA TYPE: ICD-10-CM

## 2023-06-29 DIAGNOSIS — Z71.89 CARE PLAN DISCUSSED WITH PATIENT: ICD-10-CM

## 2023-06-29 DIAGNOSIS — D05.12 DUCTAL CARCINOMA IN SITU (DCIS) OF LEFT BREAST: Primary | ICD-10-CM

## 2023-06-29 DIAGNOSIS — T38.6X5D ADVERSE EFFECT OF TAMOXIFEN, SUBSEQUENT ENCOUNTER: ICD-10-CM

## 2023-06-29 DIAGNOSIS — E53.8 VITAMIN B 12 DEFICIENCY: ICD-10-CM

## 2023-06-29 DIAGNOSIS — D05.12 DUCTAL CARCINOMA IN SITU (DCIS) OF LEFT BREAST: ICD-10-CM

## 2023-06-29 LAB
BASOPHILS # BLD: 0.04 K/UL (ref 0.01–0.08)
BASOPHILS NFR BLD: 0.6 % (ref 0.1–1.2)
EOSINOPHIL # BLD: 0.06 K/UL (ref 0.04–0.54)
EOSINOPHIL NFR BLD: 0.9 % (ref 0.7–7)
ERYTHROCYTE [DISTWIDTH] IN BLOOD BY AUTOMATED COUNT: 13.6 % (ref 11.7–14.4)
HCT VFR BLD AUTO: 40.1 % (ref 34.1–44.9)
HGB BLD-MCNC: 12.2 G/DL (ref 11.2–15.7)
LYMPHOCYTES # BLD: 1.31 K/UL (ref 1.18–3.74)
LYMPHOCYTES NFR BLD: 19 % (ref 19.3–53.1)
MCH RBC QN AUTO: 29.5 PG (ref 25.6–32.2)
MCHC RBC AUTO-ENTMCNC: 30.4 G/DL (ref 32.3–35.5)
MCV RBC AUTO: 97.1 FL (ref 79.4–94.8)
MONOCYTES # BLD: 0.64 K/UL (ref 0.24–0.82)
MONOCYTES NFR BLD: 9.3 % (ref 4.7–12.5)
NEUTROPHILS # BLD: 4.8 K/UL (ref 1.56–6.13)
NEUTS SEG NFR BLD: 69.8 % (ref 34–71.1)
PLATELET # BLD AUTO: 142 K/UL (ref 182–369)
PMV BLD AUTO: 11.7 FL (ref 7.4–10.4)
RBC # BLD AUTO: 4.13 M/UL (ref 3.93–5.22)
WBC # BLD AUTO: 6.88 K/UL (ref 3.98–10.04)

## 2023-06-29 PROCEDURE — G8399 PT W/DXA RESULTS DOCUMENT: HCPCS | Performed by: NURSE PRACTITIONER

## 2023-06-29 PROCEDURE — 3017F COLORECTAL CA SCREEN DOC REV: CPT | Performed by: NURSE PRACTITIONER

## 2023-06-29 PROCEDURE — 1090F PRES/ABSN URINE INCON ASSESS: CPT | Performed by: NURSE PRACTITIONER

## 2023-06-29 PROCEDURE — 1036F TOBACCO NON-USER: CPT | Performed by: NURSE PRACTITIONER

## 2023-06-29 PROCEDURE — G8427 DOCREV CUR MEDS BY ELIG CLIN: HCPCS | Performed by: NURSE PRACTITIONER

## 2023-06-29 PROCEDURE — G8417 CALC BMI ABV UP PARAM F/U: HCPCS | Performed by: NURSE PRACTITIONER

## 2023-06-29 PROCEDURE — 1123F ACP DISCUSS/DSCN MKR DOCD: CPT | Performed by: NURSE PRACTITIONER

## 2023-06-29 PROCEDURE — 99214 OFFICE O/P EST MOD 30 MIN: CPT | Performed by: NURSE PRACTITIONER

## 2023-06-29 PROCEDURE — 36415 COLL VENOUS BLD VENIPUNCTURE: CPT

## 2023-06-29 PROCEDURE — 85025 COMPLETE CBC W/AUTO DIFF WBC: CPT

## 2023-06-29 PROCEDURE — 99211 OFF/OP EST MAY X REQ PHY/QHP: CPT

## 2023-07-04 ASSESSMENT — ENCOUNTER SYMPTOMS
BACK PAIN: 0
VOMITING: 0
SHORTNESS OF BREATH: 0
COUGH: 0
EYES NEGATIVE: 1
RESPIRATORY NEGATIVE: 1
EYE DISCHARGE: 0
SORE THROAT: 0
NAUSEA: 0
CONSTIPATION: 0
BLOOD IN STOOL: 0
DIARRHEA: 0
EYE REDNESS: 0
EYE PAIN: 0
GASTROINTESTINAL NEGATIVE: 1
ABDOMINAL PAIN: 0
WHEEZING: 0

## 2023-07-08 NOTE — PROGRESS NOTES
June 21, 2022    Hello, may I speak with Yun Blackwell?    My name is Mame      I am  with [facility]  [address], [address]  [place] [address] [phone].    Before we get started may I verify your date of birth? 1955    I am calling to officially welcome you to our practice and ask about your recent visit. Is this a good time to talk? yes    Tell me about your visit with us. What things went well?  The visit was very good and very informative.     We're always looking for ways to make our patients' experiences even better. Do you have recommendations on ways we may improve?  no    Overall were you satisfied with your first visit to our practice? Yes all of my questions were answered.     I appreciate you taking the time to speak with me today. Is there anything else I can do for you? no      Thank you, and have a great day.       08-Jul-2023 15:18

## 2023-07-10 ENCOUNTER — HOSPITAL ENCOUNTER (OUTPATIENT)
Dept: WOMENS IMAGING | Age: 68
Discharge: HOME | End: 2023-07-10
Payer: MEDICARE

## 2023-07-10 DIAGNOSIS — R92.1 BREAST CALCIFICATION, LEFT: ICD-10-CM

## 2023-07-10 DIAGNOSIS — R92.0 MICROCALCIFICATIONS OF THE BREAST: ICD-10-CM

## 2023-07-10 PROCEDURE — 2720000010 MAM STEREO BREAST BX W LOC DEVICE 1ST LESION LEFT

## 2023-07-10 PROCEDURE — 88305 TISSUE EXAM BY PATHOLOGIST: CPT

## 2023-07-10 PROCEDURE — 77065 DX MAMMO INCL CAD UNI: CPT

## 2023-07-10 PROCEDURE — 19081 BX BREAST 1ST LESION STRTCTC: CPT

## 2023-07-13 ENCOUNTER — TELEPHONE (OUTPATIENT)
Dept: SURGERY | Age: 68
End: 2023-07-13

## 2023-07-15 ENCOUNTER — TELEPHONE (OUTPATIENT)
Dept: SURGERY | Age: 68
End: 2023-07-15

## 2023-07-15 DIAGNOSIS — R92.0 MICROCALCIFICATIONS OF THE BREAST: ICD-10-CM

## 2023-07-15 DIAGNOSIS — Z85.3 PERSONAL HISTORY OF MALIGNANT NEOPLASM OF BREAST: Primary | ICD-10-CM

## 2023-07-15 NOTE — TELEPHONE ENCOUNTER
Pathology report given. We will see her back with unilateral mammogram in six months. Please cancel 7/26 appt and reschedule for six months. This has been electronically signed by Karla Segura.  Rosa Cabezas PA-C.

## 2023-07-27 ENCOUNTER — HOSPITAL ENCOUNTER (OUTPATIENT)
Dept: CT IMAGING | Age: 68
Discharge: HOME | End: 2023-07-27
Payer: MEDICARE

## 2023-07-27 DIAGNOSIS — G44.52 NEW DAILY PERSISTENT HEADACHE: ICD-10-CM

## 2023-07-27 DIAGNOSIS — Z85.3 PERSONAL HISTORY OF MALIGNANT NEOPLASM OF BREAST: ICD-10-CM

## 2023-07-27 DIAGNOSIS — Z15.09 GENETIC SUSCEPTIBILITY TO OTHER MALIGNANT NEOPLASM: ICD-10-CM

## 2023-07-27 PROCEDURE — 6360000004 HC RX CONTRAST MEDICATION: Performed by: PHYSICIAN ASSISTANT

## 2023-07-27 PROCEDURE — 70470 CT HEAD/BRAIN W/O & W/DYE: CPT

## 2023-07-27 RX ADMIN — IOPAMIDOL 60 ML: 755 INJECTION, SOLUTION INTRAVENOUS at 15:56

## 2023-07-28 ENCOUNTER — TELEPHONE (OUTPATIENT)
Dept: SURGERY | Age: 68
End: 2023-07-28

## 2023-07-28 NOTE — TELEPHONE ENCOUNTER
-  CT Head normal.  Discussed with patient. Recommended she see her PCP . This has been electronically signed by Yannick Guerra.  Vilma Franco.       ---- Message from Ketto, [address] sent at 7/28/2023 10:21 AM CDT -----  Regarding: CT results    ----- Message -----  From: Yazmin Mayes Incoming Radiology Results From [facility]  Sent: 7/27/2023   4:21 PM CDT  To: Rubi Multani PA-C

## 2023-07-31 RX ORDER — OMEPRAZOLE 20 MG/1
20 CAPSULE, DELAYED RELEASE ORAL DAILY
Qty: 30 CAPSULE | Refills: 11 | Status: SHIPPED | OUTPATIENT
Start: 2023-07-31

## 2023-08-01 ENCOUNTER — TELEPHONE (OUTPATIENT)
Dept: INTERNAL MEDICINE | Facility: CLINIC | Age: 68
End: 2023-08-01

## 2023-08-01 NOTE — TELEPHONE ENCOUNTER
Caller: Yun Blackwell    Relationship: Self    Best call back number:  [phone]     What is the medical concern/diagnosis:  FELL TWICE RECENTLY; OFF BALANCE; HAD SURGERY     What specialty or service is being requested:   PHYSICAL THERAPY    What is the provider, practice or medical service name:    UNKNOWN    What is the office location:  UNKNOWN    What is the office phone number:  UNKNOWN    Additional Info:  PATIENT SAID SHE'S FINE OTHERWISE, & THAT SHE JUST NEEDS A PT REFERRAL.

## 2023-08-02 ENCOUNTER — HOSPITAL ENCOUNTER (OUTPATIENT)
Dept: INFUSION THERAPY | Age: 68
Discharge: HOME | End: 2023-08-02
Payer: MEDICARE

## 2023-08-02 ENCOUNTER — OFFICE VISIT (OUTPATIENT)
Dept: HEMATOLOGY | Age: 68
End: 2023-08-02
Payer: MEDICARE

## 2023-08-02 VITALS
BODY MASS INDEX: 28.17 KG/M2 | OXYGEN SATURATION: 96 % | WEIGHT: 159 LBS | SYSTOLIC BLOOD PRESSURE: 120 MMHG | HEART RATE: 102 BPM | DIASTOLIC BLOOD PRESSURE: 70 MMHG | HEIGHT: 63 IN

## 2023-08-02 DIAGNOSIS — Z51.81 ENCOUNTER FOR MONITORING TAMOXIFEN THERAPY: ICD-10-CM

## 2023-08-02 DIAGNOSIS — D05.12 DUCTAL CARCINOMA IN SITU (DCIS) OF LEFT BREAST: Primary | ICD-10-CM

## 2023-08-02 DIAGNOSIS — Z79.810 ENCOUNTER FOR MONITORING TAMOXIFEN THERAPY: ICD-10-CM

## 2023-08-02 DIAGNOSIS — D05.12 DUCTAL CARCINOMA IN SITU (DCIS) OF LEFT BREAST: ICD-10-CM

## 2023-08-02 DIAGNOSIS — Z78.0 POST-MENOPAUSAL: ICD-10-CM

## 2023-08-02 DIAGNOSIS — D50.9 IRON DEFICIENCY ANEMIA, UNSPECIFIED IRON DEFICIENCY ANEMIA TYPE: ICD-10-CM

## 2023-08-02 DIAGNOSIS — Z91.89 AT RISK FOR OSTEOPENIA: ICD-10-CM

## 2023-08-02 LAB
ERYTHROCYTE [DISTWIDTH] IN BLOOD BY AUTOMATED COUNT: 13.6 % (ref 11.7–14.4)
HCT VFR BLD AUTO: 39.2 % (ref 34.1–44.9)
HGB BLD-MCNC: 12.4 G/DL (ref 11.2–15.7)
LYMPHOCYTES # BLD: 1.71 K/UL (ref 1.18–3.74)
LYMPHOCYTES NFR BLD: 23.7 % (ref 19.3–53.1)
MCH RBC QN AUTO: 29.5 PG (ref 25.6–32.2)
MCHC RBC AUTO-ENTMCNC: 31.6 G/DL (ref 32.3–35.5)
MCV RBC AUTO: 93.3 FL (ref 79.4–94.8)
MONOCYTES # BLD: 0.7 K/UL (ref 0.24–0.82)
MONOCYTES NFR BLD: 9.7 % (ref 4.7–12.5)
NEUTROPHILS # BLD: 4.66 K/UL (ref 1.56–6.13)
NEUTS SEG NFR BLD: 64.4 % (ref 34–71.1)
PLATELET # BLD AUTO: 154 K/UL (ref 182–369)
PMV BLD AUTO: 11.3 FL (ref 7.4–10.4)
RBC # BLD AUTO: 4.2 M/UL (ref 3.93–5.22)
WBC # BLD AUTO: 7.23 K/UL (ref 3.98–10.04)

## 2023-08-02 PROCEDURE — 85025 COMPLETE CBC W/AUTO DIFF WBC: CPT

## 2023-08-02 PROCEDURE — 3017F COLORECTAL CA SCREEN DOC REV: CPT | Performed by: NURSE PRACTITIONER

## 2023-08-02 PROCEDURE — 36415 COLL VENOUS BLD VENIPUNCTURE: CPT

## 2023-08-02 PROCEDURE — 99212 OFFICE O/P EST SF 10 MIN: CPT

## 2023-08-02 PROCEDURE — 1090F PRES/ABSN URINE INCON ASSESS: CPT | Performed by: NURSE PRACTITIONER

## 2023-08-02 PROCEDURE — G8427 DOCREV CUR MEDS BY ELIG CLIN: HCPCS | Performed by: NURSE PRACTITIONER

## 2023-08-02 PROCEDURE — 1036F TOBACCO NON-USER: CPT | Performed by: NURSE PRACTITIONER

## 2023-08-02 PROCEDURE — G8399 PT W/DXA RESULTS DOCUMENT: HCPCS | Performed by: NURSE PRACTITIONER

## 2023-08-02 PROCEDURE — 99214 OFFICE O/P EST MOD 30 MIN: CPT | Performed by: NURSE PRACTITIONER

## 2023-08-02 PROCEDURE — G8417 CALC BMI ABV UP PARAM F/U: HCPCS | Performed by: NURSE PRACTITIONER

## 2023-08-02 PROCEDURE — 1123F ACP DISCUSS/DSCN MKR DOCD: CPT | Performed by: NURSE PRACTITIONER

## 2023-08-02 NOTE — PROGRESS NOTES
Progress Note      Pt Name: Amanda Savage  YOB: 1955  MRN: [id number]    Date of evaluation: 08/02/2023  History Obtained From:  patient, electronic medical record    CHIEF COMPLAINT:    Chief Complaint   Patient presents with    Follow-up     Ductal carcinoma in situ (DCIS) of left breast    Anemia     Iron deficiency     HISTORY OF PRESENT ILLNESS:    Amanda Savage is a 69 y.o.  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 Current recommendation is for preventive endocrine therapy with tamoxifen 20 mg p.o. daily for anticipated 5 years with completing dosing in January 2025. She recently had a left breast stereotactic biopsy on 7/10/2023 that reported benign findings, she has no known evidence of recurrent disease. In relation to her iron and B12 deficiency, she experiences severe constipation oral iron replacement and currently not taking supplement, she has received parenteral B12 replacement in the past.  José Luis Holcomb returns today in scheduled follow-up for evaluation, side effect monitoring, lab monitoring and further treatment recommendations. She presents today indicating that she is compliant with her tamoxifen and has no new breast complaints. Today's clinic visit to include physical assessment, review of systems, any radiograph or lab findings that were available and plan of care are documented below.        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     José Luis Holcomb was

## 2023-08-02 NOTE — TELEPHONE ENCOUNTER
PATIENT HAS BEEN CALLED, SHE STATED THAT HER LEG IS WEAK, SHE HAS FALLEN 2X AND STATED THAT SHE FEELS SHE NEEDS PHYSICAL THERAPY.   PATIENT IS SCHEDULED FOR 08/03/2023 TO BE SEEN

## 2023-08-02 NOTE — TELEPHONE ENCOUNTER
Thanks.  Does she feel like she may have had a stroke?  Is the left leg weakness chronic issue, or something that is new.  I agree with office visit on August 3, but she may need to be seen in the ER to rule out a stroke sooner than this depending on her answers.

## 2023-08-02 NOTE — TELEPHONE ENCOUNTER
PATIENT HAS BEEN CALLED, SHE STATED THAT SHE DOES NOT FEEL LIKE SHE HAS HAD A STROKE.    SHE STATED THAT SHE WAS IN A BOOT FOR AROUND 1 YEAR.  SHE STATED THAT HER LEG IS JUST   WEAK AND SHE FEELS LIKE SHE IS GOING TO TUMBLE DOWN.   PATIENT DOES NOT FEEL THE NEED  TO GO TO THE ER.

## 2023-08-03 ENCOUNTER — OFFICE VISIT (OUTPATIENT)
Dept: INTERNAL MEDICINE | Facility: CLINIC | Age: 68
End: 2023-08-03
Payer: MEDICARE

## 2023-08-03 VITALS
OXYGEN SATURATION: 96 % | BODY MASS INDEX: 28.17 KG/M2 | WEIGHT: 159 LBS | HEIGHT: 63 IN | SYSTOLIC BLOOD PRESSURE: 122 MMHG | HEART RATE: 98 BPM | DIASTOLIC BLOOD PRESSURE: 64 MMHG

## 2023-08-03 DIAGNOSIS — Z98.890 HISTORY OF FOOT SURGERY: ICD-10-CM

## 2023-08-03 DIAGNOSIS — R26.89 BALANCE PROBLEM: ICD-10-CM
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
## 2023-08-03 DIAGNOSIS — R26.89 FUNCTIONAL GAIT ABNORMALITY: Primary | ICD-10-CM

## 2023-08-06 ASSESSMENT — ENCOUNTER SYMPTOMS
BACK PAIN: 0
EYES NEGATIVE: 1
SORE THROAT: 0
CONSTIPATION: 0
GASTROINTESTINAL NEGATIVE: 1
BLOOD IN STOOL: 0
DIARRHEA: 0
EYE PAIN: 0
VOMITING: 0
NAUSEA: 0
ABDOMINAL PAIN: 0
RESPIRATORY NEGATIVE: 1
SHORTNESS OF BREATH: 0
EYE REDNESS: 0
WHEEZING: 0
COUGH: 0
EYE DISCHARGE: 0

## 2023-08-07 ENCOUNTER — TREATMENT (OUTPATIENT)
Dept: PHYSICAL THERAPY | Facility: CLINIC | Age: 68
End: 2023-08-07
Payer: MEDICARE

## 2023-08-07 DIAGNOSIS — R26.9 ABNORMALITY OF GAIT AND MOBILITY: Primary | ICD-10-CM

## 2023-08-07 DIAGNOSIS — R29.6 MULTIPLE FALLS: ICD-10-CM

## 2023-08-07 DIAGNOSIS — Z98.890 H/O FOOT SURGERY: ICD-10-CM

## 2023-08-07 PROCEDURE — 97535 SELF CARE MNGMENT TRAINING: CPT | Performed by: PHYSICAL THERAPIST

## 2023-08-07 PROCEDURE — 97162 PT EVAL MOD COMPLEX 30 MIN: CPT | Performed by: PHYSICAL THERAPIST

## 2023-08-07 NOTE — PROGRESS NOTES
Physical Therapy Initial Evaluation and Plan of Care  [address] [place] [address]    Patient: Yun Blackwell               : 1955  Visit Date: 2023  Referring practitioner: MARÍA Rubio  Date of Initial Visit: 2023  Patient seen for 1 sessions    Visit Diagnoses:    ICD-10-CM ICD-9-CM   1. Abnormality of gait and mobility  R26.9 781.2   2. Multiple falls  R29.6 V15.88   3. H/O foot surgery  Z98.890 V15.29     Past Medical History:   Diagnosis Date    A-fib     Abnormal ECG     tachycardia, a fib    Acid reflux     Anemia     Arthritis     Cancer     Diabetes mellitus     Hyperkalemia     Hyperlipidemia     Hypertension     Hyponatremia     IBS (irritable bowel syndrome)     PONV (postoperative nausea and vomiting)     Sleep apnea     USES CPAP    UTI (urinary tract infection)     Vaginal vault prolapse      Past Surgical History:   Procedure Laterality Date    BREAST BIOPSY Left     X2    BUNIONECTOMY Left     CARDIAC CATHETERIZATION      CARDIAC CATHETERIZATION N/A 2021    Procedure: Right Heart Cath;  Surgeon: Van Marcelino MD;  Location: [facility] CATH INVASIVE LOCATION;  Service: Cardiology;  Laterality: N/A;    CATARACT EXTRACTION, BILATERAL      COLON SURGERY      COLONOSCOPY  2015    TRANSVERSE COLON ADENOMATOUS POLYP, HEMORRHOIDS, RECALL 5 YEARS, DR LAMAS    COLONOSCOPY N/A 2020    Procedure: COLONOSCOPY WITH ANESTHESIA;  Surgeon: Joe Lamas MD;  Location: [facility] ENDOSCOPY;  Service: Gastroenterology;  Laterality: N/A;  pre op: screening  post op:divdrticulosis, polyp  PCP: ANGEL Healy MD    COLOSTOMY      COLPOPEXY VAGINAL          ENDOSCOPY N/A 2016    LA GRADE B ESOPHAGITIS WITH NO BLEEDING UPPER THIRD OF ESOPHAGUS, GERI-WADSWORTH TEAR GEJ, BILIOUS BLOOD TINGED COFFEE GROUND GATRIC FLUIDDR LAMAS    EXPLORATORY LAPAROTOMY N/A 10/14/2016    Procedure: LAPAROTOMY EXPLORATORY,  LYSIS OF ADHESIONS, IRRIAGATION OF ABDOMEN, POSSIBLE BOWEL RESECTION;  Surgeon: Bacilio Marcial MD;  Location:  [facility];  Service:     FOOT NAVICULAR EXCISION OR BONE GRAFT Left 01/04/2023    Procedure: Expansion Graft, Harvesting of Bone Graft/Bone Marrow Aspirate;  Surgeon: Frankie Zapata DPM;  Location:  [facility];  Service: Podiatry;  Laterality: Left;    HAMMER TOE REPAIR Left 01/04/2023    Procedure: Hammertoe Repair 2 and 3 - Left Foot;  Surgeon: Frankie Zapata DPM;  Location:  [facility];  Service: Podiatry;  Laterality: Left;    HARDWARE REMOVAL Left 01/04/2023    Procedure: Hardware Removal;  Surgeon: Frankie Zapata DPM;  Location:  [facility];  Service: Podiatry;  Laterality: Left;    HYSTERECTOMY      REVISION / TAKEDOWN COLOSTOMY      SACROCOLPOPEXY N/A 09/21/2016    Procedure: SACROCOLPOPEXY LAPAROSCOPIC WITH [employer] SI ROBOT, CONVERTED TO OPEN SACROCOLPOPEXY, RIGHT SALPINGO- OOPHERECTOMY, CYSTO;  Surgeon: Maribell Beth MD;  Location:  [facility];  Service:     TOE FUSION Left 01/06/2022    Procedure: FIRST METATARSOPHALANGEAL JOINT ARTHRODESIS WITH INTERNAL FIXATION - LEFT FOOT;  Surgeon: Jorgito Red DPM;  Location:  [facility];  Service: Podiatry;  Laterality: Left;    TOE FUSION Left 01/04/2023    Procedure: Revisional 1st Metatarsophalangeal Joint Arthrodesis with Hardware Removal and Expansion Graft, Harvesting of Bone Graft/Bone Marrow Aspirate, Hammertoe Repair 2 and 3 - Left Foot;  Surgeon: Frankie Zapata DPM;  Location:  [facility];  Service: Podiatry;  Laterality: Left;         SUBJECTIVE     Subjective Evaluation    History of Present Illness  Mechanism of injury: She has fallen multiple times over the last year. She struggles with walking particularly when she is first standing up and it takes her several steps to loosen her foot to be able to walk. She struggles stepping up a curb or steps. She has a h/o left bunionectomy and was in an offloading shoe. She sought a second  opinion and had another bunion surgery and was advised to completely offload. She has tried a quad cane but isn't confident in this.       Patient Occupation: retired Pain  At worst pain ratin  Location: left foot  Aggravating factors: ambulation  Progression: no change    Social Support  Lives in: one-story house (2 steps into garage)  Lives with: alone    Diagnostic Tests  X-ray: abnormal (plate on left 1st ray CMC)    Patient Goals  Patient goals for therapy: decreased pain, improved balance, increased strength and independence with ADLs/IADLs       Outcome Measure:   FAAM: 49/84, 58%       OBJECTIVE     Objective          Static Posture     Knee   Genu varus.     Neurological Testing     Sensation     Ankle/Foot   Left Ankle/Foot   Diminished: light touch    Comments   Left light touch: left 2-4 toes.     Passive Range of Motion   Left Ankle/Foot    Dorsiflexion (ke): 5 degrees     Right Ankle/Foot    Dorsiflexion (ke): 14 degrees   Great toe extension: 80 degrees     Additional Passive Range of Motion Details  Left great toe CMC fused    Ambulation     Comments   Walks with no assistive device. She avoids fully weight bearing onto her left leg with a right shift of her trunk.     Functional Assessment     Single Leg Stance   Left single leg stance time: 1.5 secs, unstable.  Right single leg stance time: <1 sec.      LE MMT   HIP Strength L Strength R   flexion 4 5   extension 4 5   ABD 4- 4        KNEE     flexion 4 5   extension 4 5         ANKLE     dorsiflexion 4 5   plantarflexion 5 5   eversion 4 5   inversion 5 5   *pain    Therapy Education/Self Care [id number]   Education offered today HEP  Proper gait mechanics with cane on right   [employer]li Code Access Code: [email or web address]  URL: [email or web address]   Ongoing HEP     Date: 2023  Prepared by: Guille Grier    Exercises  - Single Leg Stance with Support  - 2 x daily - 7 x weekly - 2 sets - 10 reps  - Standing Ankle Dorsiflexion Stretch  - 2 x  daily - 7 x weekly - 2 sets - 10 reps  - Standing Soleus Stretch  - 2 x daily - 7 x weekly - 2 sets - 10 reps  - Sidelying Hip Abduction  - 2 x daily - 7 x weekly - 2 sets - 10 reps     Timed Minutes 10       Total Timed Treatment:     10   mins  Total Time of Visit:            45   mins    ASSESSMENT/PLAN     GOALS:  Goals                                          Progress Note due by 9/7/23                                                      Recert due by 11/4/23   LTG by: 6 weeks Comments Date Status   Improve passive left DF to 15 deg      Improve destiny hip abd MMT to 4+/5      Walk with symmetrical gait with equal stance and stable hips without a lateral lurch      SLS left LE x 5 sec      FAAM score of       Able to walk up/down 2 steps using rail for balance only alternating steps      Independent with HEP          Assessment & Plan     Assessment  Impairments: abnormal gait, abnormal or restricted ROM, lacks appropriate home exercise program, pain with function and weight-bearing intolerance  Functional Limitations: walking, uncomfortable because of pain, standing and unable to perform repetitive tasks  Assessment details: Her difficulty with walking appears to be a culmination of multiple issues beginning with the multiple surgeries to her left foot and her prolonged NWB status. She has changed her gait because of avoiding her left leg. She also appears to have some OA in her knees with her destiny genu valgus. She is also stiff in her left ankle changing her gait mechanics.   This patient would benefit from skilled PT.     Barriers to therapy: destiny severe knee OA  Prognosis: good    Plan  Therapy options: will be seen for skilled therapy services  Planned modality interventions: dry needling, low level laser therapy and TENS  Planned therapy interventions: manual therapy, joint mobilization, soft tissue mobilization, stretching, strengthening, gait training, functional ROM exercises, therapeutic activities and  home exercise program  Frequency: 3x week  Duration in weeks: 12  Treatment plan discussed with: patient  Plan details: Focus early on pain relief with STM and modalities including dry needling as needed. Work on release of the gastroc and mobilizations of the ankle. Progress with hip stability and improve gait mechanics. Progress HEP for the same.       SIGNATURE: Guille Grier PT, [place] License #: [id number]  Electronically Signed on 8/7/2023      Initial Certification  Certification Period: 8/7/2023 through 11/4/2023  I certify that the therapy services are furnished while this patient is under my care.  The services outlined above are required by this patient, and will be reviewed every 90 days.     PHYSICIAN: Jenny Canchola APRN (NPI: [id number])    Signature____________________________________________DATE: _________     Please sign and return via fax to [phone].   Thank you so much for letting us work with Yun. I appreciate your letting us work with your patients. If you have any questions or concerns, please don't hesitate to contact me.          [address] [address]  [phone]

## 2023-08-08 ENCOUNTER — TREATMENT (OUTPATIENT)
Dept: PHYSICAL THERAPY | Facility: CLINIC | Age: 68
End: 2023-08-08
Payer: MEDICARE

## 2023-08-08 DIAGNOSIS — Z98.890 H/O FOOT SURGERY: ICD-10-CM

## 2023-08-08 DIAGNOSIS — R29.6 MULTIPLE FALLS: ICD-10-CM

## 2023-08-08 DIAGNOSIS — R26.9 ABNORMALITY OF GAIT AND MOBILITY: Primary | ICD-10-CM

## 2023-08-08 PROCEDURE — 97140 MANUAL THERAPY 1/> REGIONS: CPT | Performed by: PHYSICAL THERAPIST

## 2023-08-08 NOTE — PROGRESS NOTES
Physical Therapy Treatment Note  [address] [place] [address]    Patient: Yun Blackwell                                                 Visit Date: 2023  :     1955    Referring practitioner:    MARÍA Rubio  Date of Initial Visit:          Type: THERAPY  Noted: 2023    Patient seen for 2 sessions    Visit Diagnoses:    ICD-10-CM ICD-9-CM   1. Abnormality of gait and mobility  R26.9 781.2   2. Multiple falls  R29.6 V15.88   3. H/O foot surgery  Z98.890 V15.29     SUBJECTIVE     Subjective:  She's been doing her HEP. She volunteers at the community [employer] and was working on her SLS.     PAIN: 5/10         OBJECTIVE     Objective       Manual Therapy     [id number]  Comments   Percussive IASTM using Powerboost to left gastroc at L2 using spade attachment      Supine left TC post mob Gr 3 repetitive   Supine left TC distraction Gr 3 sustained   Passive DF stretch        Timed Minutes 30       Therapy Education/Self Care [id number]   Education offered today Clarified hip abd ex   [employer] Code Access Code: [email or web address]  URL: [email or web address]   Ongoing HEP     Date: 2023  Prepared by: Guille Grier    Exercises  - Single Leg Stance with Support  - 2 x daily - 7 x weekly - 2 sets - 10 reps  - Standing Ankle Dorsiflexion Stretch  - 2 x daily - 7 x weekly - 2 sets - 10 reps  - Standing Soleus Stretch  - 2 x daily - 7 x weekly - 2 sets - 10 reps  - Sidelying Hip Abduction  - 2 x daily - 7 x weekly - 2 sets - 10 reps     Timed Minutes        Total Timed Treatment:     30   mins  Total Time of Visit:            30   mins    ASSESSMENT/PLAN     GOALS:  Goals                                          Progress Note due by 23                                                      Recert due by 23   LTG by: 6 weeks Comments Date Status   Improve passive left DF to 15 deg 10 deg after stretch  progressing   Improve destiny hip  abd MMT to 4+/5      Walk with symmetrical gait with equal stance and stable hips without a lateral lurch      SLS left LE x 5 sec      FAAM score of       Able to walk up/down 2 steps using rail for balance only alternating steps      Independent with HEP        Assessment/Plan     ASSESSMENT:   Today was her first visit after her eval. We focused more on mobility of her ankle and she gained 5 degrees.     PLAN:   Cont work on ankle DF and progress with hip stability and normalizing gait.    SIGNATURE: Guille Grier, PT, [place] License #: [id number]  Electronically Signed on 8/8/2023        [address] [place] [address]. [address]  [phone]

## 2023-08-15 ENCOUNTER — NURSE TRIAGE (OUTPATIENT)
Dept: CALL CENTER | Facility: HOSPITAL | Age: 68
End: 2023-08-15
Payer: COMMERCIAL

## 2023-08-15 ENCOUNTER — HOSPITAL ENCOUNTER (EMERGENCY)
Facility: HOSPITAL | Age: 68
Discharge: HOME | End: 2023-08-15
Attending: EMERGENCY MEDICINE | Admitting: EMERGENCY MEDICINE
Payer: MEDICARE

## 2023-08-15 ENCOUNTER — APPOINTMENT (OUTPATIENT)
Dept: MRI IMAGING | Facility: HOSPITAL | Age: 68
End: 2023-08-15
Payer: MEDICARE

## 2023-08-15 ENCOUNTER — APPOINTMENT (OUTPATIENT)
Dept: CT IMAGING | Facility: HOSPITAL | Age: 68
End: 2023-08-15
Payer: MEDICARE

## 2023-08-15 VITALS
HEART RATE: 101 BPM | DIASTOLIC BLOOD PRESSURE: 65 MMHG | HEIGHT: 63 IN | BODY MASS INDEX: 28 KG/M2 | RESPIRATION RATE: 18 BRPM | OXYGEN SATURATION: 96 % | SYSTOLIC BLOOD PRESSURE: 132 MMHG | TEMPERATURE: 98.2 F | WEIGHT: 158 LBS

## 2023-08-15 DIAGNOSIS — E23.6 EMPTY SELLA: ICD-10-CM

## 2023-08-15 DIAGNOSIS — M85.60 BONE CYST: ICD-10-CM
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
## 2023-08-15 DIAGNOSIS — S22.049S CLOSED FRACTURE OF FOURTH THORACIC VERTEBRA, UNSPECIFIED FRACTURE MORPHOLOGY, SEQUELA: ICD-10-CM

## 2023-08-15 DIAGNOSIS — K80.20 GALLSTONES: ICD-10-CM

## 2023-08-15 PROCEDURE — 72125 CT NECK SPINE W/O DYE: CPT

## 2023-08-15 PROCEDURE — 99284 EMERGENCY DEPT VISIT MOD MDM: CPT

## 2023-08-15 PROCEDURE — 63710000001 PREDNISONE PER 1 MG: Performed by: EMERGENCY MEDICINE

## 2023-08-15 PROCEDURE — 72146 MRI CHEST SPINE W/O DYE: CPT

## 2023-08-15 PROCEDURE — 70450 CT HEAD/BRAIN W/O DYE: CPT

## 2023-08-15 PROCEDURE — 72131 CT LUMBAR SPINE W/O DYE: CPT

## 2023-08-15 PROCEDURE — 72128 CT CHEST SPINE W/O DYE: CPT

## 2023-08-15 RX ORDER — PREDNISONE 20 MG/1
60 TABLET ORAL ONCE
Status: COMPLETED | OUTPATIENT
Start: 2023-08-15 | End: 2023-08-15

## 2023-08-15 RX ORDER — CYCLOBENZAPRINE HCL 10 MG
10 TABLET ORAL ONCE
Status: COMPLETED | OUTPATIENT
Start: 2023-08-15 | End: 2023-08-15

## 2023-08-15 RX ADMIN — PREDNISONE 60 MG: 20 TABLET ORAL at 07:14

## 2023-08-15 RX ADMIN — CYCLOBENZAPRINE 10 MG: 10 TABLET, FILM COATED ORAL at 07:14

## 2023-08-15 NOTE — ED PROVIDER NOTES
Subjective   History of Present Illness  Patient is a 67-year-old lady who last Friday was working [facility] and tripped over a stool and fell hitting her head at that time was complaining of lower back pain there was no loss of consciousness no neck pain no focal neurological deficits no weakness.  And then today when she got up to go to work she was having severe neck pain and headaches movement of the neck was causing pain.  And therefore came to the ED for evaluation she denies any paresthesias numbness but she cannot move her head up or down which causes pain.  Also having posterior headaches.  And discomfort in the lower back but any kind of movement.    Fall  Mechanism of injury: fall    Injury location:  Head/neck (Twisted her lower back)  Head/neck injury location:  Head  Incident location: [facility] kitchen.  Fall:     Fall occurred:  Walking    Height of fall:  Floor level fell after tripping over a bench    Impact surface:  Hard floor    Point of impact:  Head    Entrapped after fall: no    Suspicion of alcohol use: no    Suspicion of drug use: no    Prior to arrival data:     Patient ambulatory at scene: yes      Orientation at scene:  Person, place, situation and time    Loss of consciousness: no      Amnesic to event: no      Breathing interventions:  None    Airway condition since incident:  Stable    Breathing condition since incident:  Stable    Circulation condition since incident:  Stable    Mental status condition since incident:  Stable    Disability condition since incident:  Stable  Associated symptoms: back pain, headaches and neck pain    Associated symptoms: no abdominal pain, no blindness, no chest pain, no difficulty breathing, no hearing loss, no loss of consciousness, no nausea, no seizures and no vomiting    Risk factors: no AICD, no anticoagulation therapy, no COPD, no diabetes and no past MI      Review of Systems   Constitutional: Negative.    HENT:  Negative for  hearing loss.    Eyes: Negative.  Negative for blindness.   Respiratory: Negative.     Cardiovascular: Negative.  Negative for chest pain.   Gastrointestinal: Negative.  Negative for abdominal pain, nausea and vomiting.   Musculoskeletal:  Positive for back pain and neck pain.   Skin: Negative.    Neurological:  Positive for headaches. Negative for seizures and loss of consciousness.   All other systems reviewed and are negative.    Past Medical History:   Diagnosis Date    A-fib     Abnormal ECG     tachycardia, a fib    Acid reflux     Anemia     Arthritis     Cancer     Diabetes mellitus     Hyperkalemia     Hyperlipidemia     Hypertension     Hyponatremia     IBS (irritable bowel syndrome)     PONV (postoperative nausea and vomiting)     Sleep apnea     USES CPAP    UTI (urinary tract infection)     Vaginal vault prolapse        Allergies   Allergen Reactions    Morphine And Related Nausea And Vomiting and Dizziness    Percocet [Oxycodone-Acetaminophen] Nausea And Vomiting and Dizziness       Past Surgical History:   Procedure Laterality Date    BREAST BIOPSY Left     X2    BUNIONECTOMY Left     CARDIAC CATHETERIZATION      CARDIAC CATHETERIZATION N/A 02/05/2021    Procedure: Right Heart Cath;  Surgeon: Van Marcelino MD;  Location: [facility] CATH INVASIVE LOCATION;  Service: Cardiology;  Laterality: N/A;    CATARACT EXTRACTION, BILATERAL      COLON SURGERY      COLONOSCOPY  09/21/2015    TRANSVERSE COLON ADENOMATOUS POLYP, HEMORRHOIDS, RECALL 5 YEARS, DR LAMAS    COLONOSCOPY N/A 11/13/2020    Procedure: COLONOSCOPY WITH ANESTHESIA;  Surgeon: Joe Lamas MD;  Location: [facility] ENDOSCOPY;  Service: Gastroenterology;  Laterality: N/A;  pre op: screening  post op:divdrticulosis, polyp  PCP: ANGEL Healy MD    COLOSTOMY      COLPOPEXY VAGINAL      2014    ENDOSCOPY N/A 11/03/2016    LA GRADE B ESOPHAGITIS WITH NO BLEEDING UPPER THIRD OF ESOPHAGUS, GERI-WADSWORTH TEAR GEJ, BILIOUS BLOOD TINGED COFFEE GROUND  PRIYA LAMAS    EXPLORATORY LAPAROTOMY N/A 10/14/2016    Procedure: LAPAROTOMY EXPLORATORY, LYSIS OF ADHESIONS, IRRIAGATION OF ABDOMEN, POSSIBLE BOWEL RESECTION;  Surgeon: Bacilio Marcial MD;  Location:  [facility] OR;  Service:     FOOT NAVICULAR EXCISION OR BONE GRAFT Left 01/04/2023    Procedure: Expansion Graft, Harvesting of Bone Graft/Bone Marrow Aspirate;  Surgeon: Frankie Zapata DPM;  Location:  [facility] OR;  Service: Podiatry;  Laterality: Left;    HAMMER TOE REPAIR Left 01/04/2023    Procedure: Hammertoe Repair 2 and 3 - Left Foot;  Surgeon: Frankie Zapata DPM;  Location:  [facility] OR;  Service: Podiatry;  Laterality: Left;    HARDWARE REMOVAL Left 01/04/2023    Procedure: Hardware Removal;  Surgeon: Frankie Zapata DPM;  Location:  [facility] OR;  Service: Podiatry;  Laterality: Left;    HYSTERECTOMY      REVISION / TAKEDOWN COLOSTOMY      SACROCOLPOPEXY N/A 09/21/2016    Procedure: SACROCOLPOPEXY LAPAROSCOPIC WITH Retina Implant SI ROBOT, CONVERTED TO OPEN SACROCOLPOPEXY, RIGHT SALPINGO- OOPHERECTOMY, CYSTO;  Surgeon: Maribell Beth MD;  Location:  [facility];  Service:     TOE FUSION Left 01/06/2022    Procedure: FIRST METATARSOPHALANGEAL JOINT ARTHRODESIS WITH INTERNAL FIXATION - LEFT FOOT;  Surgeon: Jorgito Red DPM;  Location:  [facility] OR;  Service: Podiatry;  Laterality: Left;    TOE FUSION Left 01/04/2023    Procedure: Revisional 1st Metatarsophalangeal Joint Arthrodesis with Hardware Removal and Expansion Graft, Harvesting of Bone Graft/Bone Marrow Aspirate, Hammertoe Repair 2 and 3 - Left Foot;  Surgeon: Frankie Zapata DPM;  Location:  [facility] OR;  Service: Podiatry;  Laterality: Left;       Family History   Problem Relation Age of Onset    Hypertension Mother     No Known Problems Father     Colon cancer Neg Hx     Colon polyps Neg Hx        Social History     Socioeconomic History    Marital status:    Tobacco Use    Smoking status: Never     Passive exposure: Never    Smokeless  tobacco: Never   Vaping Use    Vaping Use: Never used   Substance and Sexual Activity    Alcohol use: No    Drug use: No    Sexual activity: Defer           Objective   Physical Exam  Vitals and nursing note reviewed. Exam conducted with a chaperone present.   Constitutional:       General: She is awake. She is not in acute distress.     Appearance: Normal appearance. She is well-developed. She is not toxic-appearing.   HENT:      Head: Normocephalic. No raccoon eyes, Davila's sign, abrasion, contusion or laceration.      Jaw: There is normal jaw occlusion. No tenderness.      Right Ear: Tympanic membrane and external ear normal. No hemotympanum.      Left Ear: Tympanic membrane and external ear normal. No hemotympanum.      Nose: Nose normal.   Eyes:      General: Lids are normal.      Conjunctiva/sclera: Conjunctivae normal.      Pupils: Pupils are equal, round, and reactive to light.   Neck:      Vascular: No carotid bruit or JVD.      Trachea: Trachea and phonation normal. No tracheal deviation.      Comments: Patient flexion-extension of the neck is limited because it causes pain at the cervical spine C6 and C7 level.  There is no dermatomal sensory loss.  There is no hyperreflexia.  Cardiovascular:      Rate and Rhythm: Normal rate and regular rhythm.      Chest Wall: PMI is not displaced.      Pulses: Normal pulses.      Heart sounds: Normal heart sounds.   Pulmonary:      Effort: Pulmonary effort is normal. No tachypnea, accessory muscle usage or respiratory distress.      Breath sounds: Normal breath sounds. No stridor.   Chest:      Chest wall: No mass, lacerations, deformity, swelling, tenderness or crepitus. There is no dullness to percussion.   Abdominal:      General: Abdomen is flat. Bowel sounds are normal. There is no distension.      Palpations: Abdomen is soft. Abdomen is not rigid.      Tenderness: There is no abdominal tenderness. There is no right CVA tenderness or left CVA tenderness.    Musculoskeletal:         General: Normal range of motion.      Cervical back: Neck supple. No rigidity, spasms, tenderness or bony tenderness. Spinous process tenderness and muscular tenderness present. Decreased range of motion.      Thoracic back: Normal. No spasms, tenderness or bony tenderness. Normal range of motion.      Lumbar back: No deformity, lacerations, spasms, tenderness or bony tenderness. Normal range of motion.      Right lower leg: No edema.      Left lower leg: No edema.      Comments: Thoracic spine upper is tender to palpation in the thoracic spinous processes.  There is no paravertebral tenderness noted.    Lumbar spine does not any step-off laxity but tenderness in the paravertebral area.  Range of motion of lower EXTR within normal limits.   Skin:     General: Skin is warm and dry.      Capillary Refill: Capillary refill takes less than 2 seconds.      Findings: No abrasion, ecchymosis or laceration.   Neurological:      General: No focal deficit present.      Mental Status: She is alert and oriented to person, place, and time.      GCS: GCS eye subscore is 4. GCS verbal subscore is 5. GCS motor subscore is 6.      Cranial Nerves: Cranial nerves 2-12 are intact. No cranial nerve deficit.      Sensory: Sensation is intact. No sensory deficit.      Motor: Motor function is intact.      Coordination: Coordination is intact.      Deep Tendon Reflexes: Reflexes are normal and symmetric.      Reflex Scores:       Tricep reflexes are 2+ on the right side and 2+ on the left side.       Bicep reflexes are 2+ on the right side and 2+ on the left side.       Patellar reflexes are 2+ on the right side and 2+ on the left side.       Achilles reflexes are 2+ on the right side and 2+ on the left side.     Comments: No hyperreflexia awake and alert   Psychiatric:         Speech: Speech normal.         Behavior: Behavior normal. Behavior is cooperative.       Procedures           ED Course  ED Course as of  08/15/23 1343   Tue Aug 15, 2023   0709  Patient is 18 or older, presenting with minor blunt head trauma. Head CT (including cosigned orders) was ordered  by an emergency care clinician for trauma because patient is 65 or older.        [TS]   0839  No evidence of acute lumbar spine fracture.  2. Degenerative changes, as described.  3. A 7.5 mm probable bone island in the L1 vertebral body.  4. Gallstones partially imaged in the right upper quadrant. Colon  These were discussed with the patient [TS]   0839 . Mild compression deformity involving superior plate of T4 with loss  of height approximately 20%. No retropulsion. Finding is  age-indeterminate without prior studies, however subacute injury is  considered.   [TS]   0840 . Loss of cervical lordosis. Listhesis at the C3-C4 and C4-C5 levels  felt to be arthritic in nature.  2. Central and foraminal stenosis as described above.      [TS]   0841 Mild cerebral and cerebellar atrophy with chronic microvascular  disease but no evidence of acute intracranial process.  2. Empty sella with expansion of the sella turcica.  3. No evidence of acute posttraumatic injury to the brain.     These findings were discussed with the patient [TS]   0848 Discussed with the patient she said that she has fallen quite a few times she is not sure about thoracic spine fracture we will get an MRI [TS]   1234 . Chronic/old compression deformity of the superior plate of T4 with  loss of height by 15-20%, no marrow edema and no retropulsion. No acute  thoracic vertebral pathology identified. No abnormal signal seen within  the thoracic spinal cord. No prominent central thoracic canal stenosis.  This report was finalized on 08/15/2023 11:46 by Dr. Teodora Stapleton MD.   [TS]   1333 Patient is awake alert no focal neurological deficits Anisa Coma Scale 15 of 15 will be discharged home at this time. [TS]      ED Course User Index  [TS] Andreas Flaherty MD                                            Medical Decision Making  Patient with a fall started hurting in the lower back initially and now hurting in the neck and the head.  Per the Nexus criteria her cervical spine cannot be completely ruled out because she having pain on certain current movements.  This could be a delayed injury we will get a CT of the head cervical spine T-spine and lumbar spine.  Patient was asked about pain medication she does not want to do that and therefore muscle relaxers steroids are given the patient.    Problems Addressed:  Bone cyst: chronic illness or injury     Details: Patient has got sclerosis over the L1 vertebrae.  Probably abdominal  Patient be advised about this and needs to follow-up with the primary MD.  Closed fracture of fourth thoracic vertebra, unspecified fracture morphology, sequela: chronic illness or injury     Details: This is a chronic fracture as per the MRI nothing acute.  Empty sella: undiagnosed new problem with uncertain prognosis     Details: Require further evaluation for hormonal abnormalities with the primary MD.  Patient has been informed  Fall, initial encounter: complicated acute illness or injury     Details: Chemical fall  Gallstones: undiagnosed new problem with uncertain prognosis     Details: No evidence of acute cholecystitis.  Require follow general surgery    Amount and/or Complexity of Data Reviewed  Radiology: ordered.  Discussion of management or test interpretation with external provider(s): Case were discussed the patient    Risk  Prescription drug management.  Risk Details: Patient with fall and neck pain.  Work-up was negative.The patient presented with neck pain.  Presentation not consistent with other acute, emergent cause of neck pain.  No evidence of meningismus.  No neck stiffness or overlying skin changes.  Low suspicion for dissection as the patient does not have a pupillary deficits, pulse deficits, is not hypertensive, and has no neurological deficits.  Low suspicion  for epidural abscess without overlying skin changes or any history of IV drug abuse or epidural injections.  Low suspicion for central cord syndrome as there is no neurological deficits .  Low suspicion for ACS as the patient does not have any associated chest pain or shortness of breath and has a nonsuspicious history of presenting illness        Final diagnoses:   Fall, initial encounter   Empty sella   Bone cyst   Gallstones   Closed fracture of fourth thoracic vertebra, unspecified fracture morphology, sequela       ED Disposition  ED Disposition       ED Disposition   Discharge    Condition   Stable    Comment   --               ANGEL Healy MD  [address] [address]  [address] [place], [place] [id number]  [phone]               Medication List      No changes were made to your prescriptions during this visit.            Andreas Flaherty MD  08/15/23 0730       Andreas Flaherty MD  08/15/23 1342       Andreas Flaherty MD  08/15/23 1347

## 2023-08-15 NOTE — TELEPHONE ENCOUNTER
"Reason for Disposition   Dangerous injury (e.g., MVA, diving, trampoline, contact sports, fall > 10 feet or 3 meters) or severe blow from hard object (e.g., golf club or baseball bat)    Additional Information   Negative: [1] ACUTE NEURO SYMPTOM AND [2] present now  (DEFINITION: difficult to awaken OR confused thinking and talking OR slurred speech OR weakness of arms OR unsteady walking)   Negative: Knocked out (unconscious) > 1 minute   Negative: Seizure (convulsion) occurred  (Exception: Prior history of seizures and now alert and without Acute Neuro Symptoms.)   Negative: Penetrating head injury (e.g., knife, gun shot wound, metal object)   Negative: [1] Major bleeding (e.g., actively dripping or spurting) AND [2] can't be stopped   Negative: [1] Dangerous mechanism of injury (e.g., MVA, diving, trampoline, contact sports, fall > 10 feet or 3 meters) AND [2] NECK pain AND [3] began < 1 hour after injury   Negative: Sounds like a life-threatening emergency to the triager   Negative: [1] Diagnosed with concussion AND [2] within last 14 days   Negative: [1] Traumatic brain injury (mTBI; concussion) AND [2] more than 14 days since head injury   Negative: Can't remember what happened (amnesia)   Negative: Vomiting once or more   Negative: [1] Loss of vision or double vision AND [2] present now   Negative: Watery or blood-tinged fluid dripping from the NOSE or EARS now  (Exception: Tears from crying or nosebleed from nasal trauma.)   Negative: [1] One or two \"black eyes\" (bruising, purple color of eyelids) AND [2] onset within 24 hours of head injury   Negative: Large swelling or bruise > 2 inches (5 cm)   Negative: Skin is split open or gaping  (or length > 1/2 inch or 12 mm)   Negative: [1] Bleeding AND [2] won't stop after 10 minutes of direct pressure (using correct technique)   Negative: Sounds like a serious injury to the triager   Negative: [1] ACUTE NEURO SYMPTOM AND [2] now fine  (DEFINITION: difficult to " "awaken OR confused thinking and talking OR slurred speech OR weakness of arms OR unsteady walking)   Negative: [1] Knocked out (unconscious) < 1 minute AND [2] now fine   Negative: [1] SEVERE headache AND [2] not improved 2 hours after pain medicine/ice packs    Answer Assessment - Initial Assessment Questions  1. MECHANISM: \"How did the injury happen?\" For falls, ask: \"What height did you fall from?\" and \"What surface did you fall against?\"       Tripped and fell onto concrete  2. ONSET: \"When did the injury happen?\" (Minutes or hours ago)       Friday  3. NEUROLOGIC SYMPTOMS: \"Was there any loss of consciousness?\" \"Are there any other neurological symptoms?\"       No LOC; no headaches; c/o neck pain  4. MENTAL STATUS: \"Does the person know who they are, who you are, and where they are?\"       A&O  5. LOCATION: \"What part of the head was hit?\"       back  6. SCALP APPEARANCE: \"What does the scalp look like? Is it bleeding now?\" If Yes, ask: \"Is it difficult to stop?\"       denies  7. SIZE: For cuts, bruises, or swelling, ask: \"How large is it?\" (e.g., inches or centimeters)       denies  8. PAIN: \"Is there any pain?\" If Yes, ask: \"How bad is it?\"  (e.g., Scale 1-10; or mild, moderate, severe)      Moderate to severe 8/10  9. TETANUS: For any breaks in the skin, ask: \"When was the last tetanus booster?\"      N/a  10. OTHER SYMPTOMS: \"Do you have any other symptoms?\" (e.g., neck pain, vomiting)        Severe neck pain  11. PREGNANCY: \"Is there any chance you are pregnant?\" \"When was your last menstrual period?\"        na    Protocols used: Head Injury-ADULT-AH    "

## 2023-08-16 ENCOUNTER — TREATMENT (OUTPATIENT)
Dept: PHYSICAL THERAPY | Facility: CLINIC | Age: 68
End: 2023-08-16
Payer: MEDICARE

## 2023-08-16 DIAGNOSIS — R29.6 MULTIPLE FALLS: ICD-10-CM

## 2023-08-16 DIAGNOSIS — R26.9 ABNORMALITY OF GAIT AND MOBILITY: Primary | ICD-10-CM

## 2023-08-16 DIAGNOSIS — Z98.890 H/O FOOT SURGERY: ICD-10-CM

## 2023-08-16 PROCEDURE — 97110 THERAPEUTIC EXERCISES: CPT | Performed by: PHYSICAL THERAPIST

## 2023-08-16 PROCEDURE — 97140 MANUAL THERAPY 1/> REGIONS: CPT | Performed by: PHYSICAL THERAPIST

## 2023-08-16 NOTE — PROGRESS NOTES
Physical Therapy Treatment Note  [address] [place] [address]    Patient: Yun Blackwell                                                 Visit Date: 2023  :     1955    Referring practitioner:    MARÍA Rubio  Date of Initial Visit:          Type: THERAPY  Noted: 2023    Patient seen for 3 sessions    Visit Diagnoses:    ICD-10-CM ICD-9-CM   1. Abnormality of gait and mobility  R26.9 781.2   2. Multiple falls  R29.6 V15.88   3. H/O foot surgery  Z98.890 V15.29     SUBJECTIVE     Subjective:  She's tripped over a stool and landed on her back and hit her head. She was OK that day but later, she couldn't get out of bed due to the pain. Imaging revealed no acute fractures.     PAIN: 5/10       OBJECTIVE     Objective       Manual Therapy     13691  Comments   Percussive IASTM using Powerboost to left gastroc at L2 using spade attachment    Supine left TC post mob Gr 3 repetitive   Supine and prone left TC distraction Gr 3 sustained           Timed Minutes 30     Therapeutic Exercises    24344 Units Comments   Prone and supine ankle DF stretch  Knee ext'd and flexed                       Timed Minutes 15       Therapy Education/Self Care [id number]   Education offered today Clarified hip abd ex   [employer] Code Access Code: [email or web address]  URL: [email or web address]   Ongoing HEP     Date: 2023  Prepared by: Guille Grier    Exercises  - Single Leg Stance with Support  - 2 x daily - 7 x weekly - 2 sets - 10 reps  - Standing Ankle Dorsiflexion Stretch  - 2 x daily - 7 x weekly - 2 sets - 10 reps  - Standing Soleus Stretch  - 2 x daily - 7 x weekly - 2 sets - 10 reps  - Sidelying Hip Abduction  - 2 x daily - 7 x weekly - 2 sets - 10 reps     Timed Minutes        Total Timed Treatment:     45   mins  Total Time of Visit:            45   mins    ASSESSMENT/PLAN     GOALS:  Goals                                          Progress  Note due by 9/7/23                                                      Recert due by 11/4/23   LTG by: 6 weeks Comments Date Status   Improve passive left DF to 15 deg 15 deg after stretch 8/16 progressing   Improve destiny hip abd MMT to 4+/5      Walk with symmetrical gait with equal stance and stable hips without a lateral lurch      SLS left LE x 5 sec      FAAM score of       Able to walk up/down 2 steps using rail for balance only alternating steps      Independent with HEP        Assessment/Plan     ASSESSMENT:   She progressed to 15 deg of DF. We talked about being able to maintain this as well as advancing her HEP to more standing.     PLAN:   Cont work on progress with hip stability and normalizing gait.    SIGNATURE: Guille Grier, PT, [place] License #: [id number]  Electronically Signed on 8/16/2023        [address] [place] [address] [place] [address]  [phone]

## 2023-08-18 ENCOUNTER — TREATMENT (OUTPATIENT)
Dept: PHYSICAL THERAPY | Facility: CLINIC | Age: 68
End: 2023-08-18
Payer: MEDICARE

## 2023-08-18 DIAGNOSIS — R29.6 MULTIPLE FALLS: ICD-10-CM

## 2023-08-18 DIAGNOSIS — R26.9 ABNORMALITY OF GAIT AND MOBILITY: Primary | ICD-10-CM

## 2023-08-18 DIAGNOSIS — Z98.890 H/O FOOT SURGERY: ICD-10-CM

## 2023-08-18 PROCEDURE — 97110 THERAPEUTIC EXERCISES: CPT | Performed by: PHYSICAL THERAPIST

## 2023-08-18 PROCEDURE — 97140 MANUAL THERAPY 1/> REGIONS: CPT | Performed by: PHYSICAL THERAPIST

## 2023-08-18 NOTE — PROGRESS NOTES
Physical Therapy Treatment Note  [address] [place] [address]    Patient: Yun Blackwell                                                 Visit Date: 2023  :     1955    Referring practitioner:    MARÍA Rubio  Date of Initial Visit:          Type: THERAPY  Noted: 2023    Patient seen for 4 sessions    Visit Diagnoses:    ICD-10-CM ICD-9-CM   1. Abnormality of gait and mobility  R26.9 781.2   2. Multiple falls  R29.6 V15.88   3. H/O foot surgery  Z98.890 V15.29     SUBJECTIVE     Subjective:  She says her right knee is her biggest pain right now. She feels like her walking is doing better but she still has to concentrate.     PAIN: 5/10       OBJECTIVE     Objective       Manual Therapy     68845  Comments   Percussive IASTM using Powerboost to left gastroc at L2 using spade attachment    Supine left TC post mob Gr 3 repetitive   Supine and prone left TC distraction Gr 3 sustained           Timed Minutes 30     Therapeutic Exercises    77758 Units Comments   Prone and supine ankle DF stretch  Knee ext'd and flexed                       Timed Minutes 15       Therapy Education/Self Care [id number]   Education offered today Clarified hip abd ex   [employer] Code Access Code: [email or web address]  URL: [email or web address]   Ongoing HEP     Date: 2023  Prepared by: Guille Grier    Exercises  - Single Leg Stance with Support  - 2 x daily - 7 x weekly - 2 sets - 10 reps  - Standing Ankle Dorsiflexion Stretch  - 2 x daily - 7 x weekly - 2 sets - 10 reps  - Standing Soleus Stretch  - 2 x daily - 7 x weekly - 2 sets - 10 reps  - Sidelying Hip Abduction  - 2 x daily - 7 x weekly - 2 sets - 10 reps     Timed Minutes        Total Timed Treatment:     45   mins  Total Time of Visit:            45   mins    ASSESSMENT/PLAN     GOALS:  Goals                                          Progress Note due by 23                                                       Recert due by 11/4/23   LTG by: 6 weeks Comments Date Status   Improve passive left DF to 15 deg 15 deg after stretch 8/18 progressing   Improve destiny hip abd MMT to 4+/5      Walk with symmetrical gait with equal stance and stable hips without a lateral lurch      SLS left LE x 5 sec      FAAM score of       Able to walk up/down 2 steps using rail for balance only alternating steps      Independent with HEP        Assessment/Plan     ASSESSMENT:   She is doing better with DF. Her knees are still hindering her but she is working on her hip stability to help this.     PLAN:   Cont work on progress with hip stability and normalizing gait.    SIGNATURE: Guille Grier, PT, [place] License #: [id number]  Electronically Signed on 8/18/2023      [address] [place] [address]  [phone]

## 2023-08-22 ENCOUNTER — TREATMENT (OUTPATIENT)
Dept: PHYSICAL THERAPY | Facility: CLINIC | Age: 68
End: 2023-08-22
Payer: MEDICARE

## 2023-08-22 DIAGNOSIS — Z98.890 H/O FOOT SURGERY: ICD-10-CM

## 2023-08-22 DIAGNOSIS — R29.6 MULTIPLE FALLS: ICD-10-CM

## 2023-08-22 DIAGNOSIS — R26.9 ABNORMALITY OF GAIT AND MOBILITY: Primary | ICD-10-CM

## 2023-08-22 PROCEDURE — 97116 GAIT TRAINING THERAPY: CPT | Performed by: PHYSICAL THERAPIST

## 2023-08-22 NOTE — PROGRESS NOTES
Physical Therapy Treatment Note  [address] [place] [address]    Patient: Yun Blackwell                                                 Visit Date: 2023  :     1955    Referring practitioner:    MARÍA Rubio  Date of Initial Visit:          Type: THERAPY  Noted: 2023    Patient seen for 5 sessions    Visit Diagnoses:    ICD-10-CM ICD-9-CM   1. Abnormality of gait and mobility  R26.9 781.2   2. Multiple falls  R29.6 V15.88   3. H/O foot surgery  Z98.890 V15.29     SUBJECTIVE     Subjective:She reports feeling pretty good. She took Tylenol and her R knee has behaved since.       PAIN: 0/10         OBJECTIVE     Objective     Neuromuscular Reeducation     [id number] Comments   Stepping strategy in agility ladder                    Timed Minutes 5      Gait Training          [id number]   Task/Terrain Asst AD Comments   Video analysis of gait then same after straddle walking with the cones, (stepping strategy above), and nutcracker walk                  Timed Minutes 40        Therapy Education/Self Care [id number]   Education offered today    [facility] Code [email or web address]    Ongoing HEP   Date: 2023  Prepared by: Guille Grier     Exercises  - Single Leg Stance with Support  - 2 x daily - 7 x weekly - 2 sets - 10 reps  - Standing Ankle Dorsiflexion Stretch  - 2 x daily - 7 x weekly - 2 sets - 10 reps  - Standing Soleus Stretch  - 2 x daily - 7 x weekly - 2 sets - 10 reps  - Sidelying Hip Abduction  - 2 x daily - 7 x weekly - 2 sets - 10 reps      Timed Minutes        Total Timed Treatment:     45   mins  Total Time of Visit:             45   mins         ASSESSMENT/PLAN     GOALS  Goals                                          Progress Note due by 23                                                      Recert due by 23   LTG by: 6 weeks Comments Date Status   Improve passive left DF to 15 deg 15 deg after stretch  progressing    Improve destiny hip abd MMT to 4+/5         Walk with symmetrical gait with equal stance and stable hips without a lateral lurch  addressed with 4 rounds of video analysis with training between each.   8/22  Ongoing   SLS left LE x 5 sec         FAAM score of          Able to walk up/down 2 steps using rail for balance only alternating steps         Independent with HEP             Assessment/Plan     ASSESSMENT:   Her B SL and HS improved post intervention today and she reported she felt more balanced. She had B UE arm swing for a very short period before she eventually returned to her previous habit of absent B arm swing.    PLAN:   Continue a focus on balance and gait activities.    SIGNATURE: Suresh Orozco, [facility] [place], [place] License #: [id number]  Electronically Signed on 8/22/2023        [address] [place] [address]  [phone]

## 2023-08-23 ENCOUNTER — HOSPITAL ENCOUNTER (OUTPATIENT)
Dept: GENERAL RADIOLOGY | Facility: HOSPITAL | Age: 68
Discharge: HOME | End: 2023-08-23
Payer: MEDICARE

## 2023-08-23 ENCOUNTER — OFFICE VISIT (OUTPATIENT)
Dept: INTERNAL MEDICINE | Facility: CLINIC | Age: 68
End: 2023-08-23
Payer: MEDICARE

## 2023-08-23 VITALS
DIASTOLIC BLOOD PRESSURE: 62 MMHG | OXYGEN SATURATION: 97 % | SYSTOLIC BLOOD PRESSURE: 108 MMHG | HEIGHT: 63 IN | HEART RATE: 94 BPM | BODY MASS INDEX: 27.99 KG/M2

## 2023-08-23 DIAGNOSIS — M25.561 CHRONIC PAIN OF BOTH KNEES: ICD-10-CM

## 2023-08-23 DIAGNOSIS — M25.562 CHRONIC PAIN OF BOTH KNEES: ICD-10-CM

## 2023-08-23 DIAGNOSIS — S00.93XA CONTUSION OF HEAD, UNSPECIFIED PART OF HEAD, INITIAL ENCOUNTER: ICD-10-CM

## 2023-08-23 DIAGNOSIS — G89.29 CHRONIC PAIN OF BOTH KNEES: ICD-10-CM

## 2023-08-23 DIAGNOSIS — K80.20 GALLSTONES: ICD-10-CM
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
## 2023-08-23 PROCEDURE — 73560 X-RAY EXAM OF KNEE 1 OR 2: CPT

## 2023-08-23 NOTE — PROGRESS NOTES
Chief Complaint   Patient presents with    Hospital Follow Up Visit     ER    Fall     Pt had a fall. pt states she is feeling better now         History:  Yun Blackwell is a 67 y.o. female who presents today for evaluation of the above problems.          She is here for ER follow up after she fell working at [employer]. Someone put a stool behind her, causing her fall. She hit the back of her head on concrete. She had a normal exam and CT head at ER. She feels better today and does not think she needs any further work up. She has not had headache, dizziness, sleepiness, or confusion.  The fall occurred almost 2 weeks ago.    She does want to have both knees xrayed. For many years she has gotten injections for arthritis and has been told she may need knee replacements. They hurt daily and are worsening. Would like referral to Dr. Shea in [place]. States it has been years since the knees were xrayed.    Gallstones noted on CT from ER. She is having abdominal MRI next week per another provider. She says she does not have nausea/vomiting/abdominal pain.    Fall      ROS:  Review of Systems    Allergies   Allergen Reactions    Morphine And Related Nausea And Vomiting and Dizziness    Percocet [Oxycodone-Acetaminophen] Nausea And Vomiting and Dizziness     Past Medical History:   Diagnosis Date    A-fib     Abnormal ECG     tachycardia, a fib    Acid reflux     Anemia     Arthritis     Cancer     Diabetes mellitus     Hyperkalemia     Hyperlipidemia     Hypertension     Hyponatremia     IBS (irritable bowel syndrome)     PONV (postoperative nausea and vomiting)     Sleep apnea     USES CPAP    UTI (urinary tract infection)     Vaginal vault prolapse      Past Surgical History:   Procedure Laterality Date    BREAST BIOPSY Left     X2    BUNIONECTOMY Left     CARDIAC CATHETERIZATION      CARDIAC CATHETERIZATION N/A 02/05/2021    Procedure: Right Heart Cath;  Surgeon: Van Marcelino MD;  Location:  [facility] CATH  INVASIVE LOCATION;  Service: Cardiology;  Laterality: N/A;    CATARACT EXTRACTION, BILATERAL      COLON SURGERY      COLONOSCOPY  09/21/2015    TRANSVERSE COLON ADENOMATOUS POLYP, HEMORRHOIDS, RECALL 5 YEARS, DR LAMAS    COLONOSCOPY N/A 11/13/2020    Procedure: COLONOSCOPY WITH ANESTHESIA;  Surgeon: Joe Lamas MD;  Location:  [facility] ENDOSCOPY;  Service: Gastroenterology;  Laterality: N/A;  pre op: screening  post op:divdrticulosis, polyp  PCP: ANGEL Healy MD    COLOSTOMY      COLPOPEXY VAGINAL      2014    ENDOSCOPY N/A 11/03/2016    LA GRADE B ESOPHAGITIS WITH NO BLEEDING UPPER THIRD OF ESOPHAGUS, GERI-WADSWORTH TEAR GEJ, BILIOUS BLOOD TINGED COFFEE GROUND GATRIC FLUIDDR ADAMS    EXPLORATORY LAPAROTOMY N/A 10/14/2016    Procedure: LAPAROTOMY EXPLORATORY, LYSIS OF ADHESIONS, IRRIAGATION OF ABDOMEN, POSSIBLE BOWEL RESECTION;  Surgeon: Bacilio Marcial MD;  Location:  [facility] OR;  Service:     FOOT NAVICULAR EXCISION OR BONE GRAFT Left 01/04/2023    Procedure: Expansion Graft, Harvesting of Bone Graft/Bone Marrow Aspirate;  Surgeon: Frankie Zapata DPM;  Location:  [facility] OR;  Service: Podiatry;  Laterality: Left;    HAMMER TOE REPAIR Left 01/04/2023    Procedure: Hammertoe Repair 2 and 3 - Left Foot;  Surgeon: Frankie Zapata DPM;  Location:  [facility] OR;  Service: Podiatry;  Laterality: Left;    HARDWARE REMOVAL Left 01/04/2023    Procedure: Hardware Removal;  Surgeon: Frankie Zapata DPM;  Location:  [facility] OR;  Service: Podiatry;  Laterality: Left;    HYSTERECTOMY      REVISION / TAKEDOWN COLOSTOMY      SACROCOLPOPEXY N/A 09/21/2016    Procedure: SACROCOLPOPEXY LAPAROSCOPIC WITH ZerplyINCI SI ROBOT, CONVERTED TO OPEN SACROCOLPOPEXY, RIGHT SALPINGO- OOPHERECTOMY, CYSTO;  Surgeon: Maribell Beth MD;  Location:  [facility] OR;  Service:     TOE FUSION Left 01/06/2022    Procedure: FIRST METATARSOPHALANGEAL JOINT ARTHRODESIS WITH INTERNAL FIXATION - LEFT FOOT;  Surgeon: Jorgito Red DPM;  Location:   [facility] OR;  Service: Podiatry;  Laterality: Left;    TOE FUSION Left 01/04/2023    Procedure: Revisional 1st Metatarsophalangeal Joint Arthrodesis with Hardware Removal and Expansion Graft, Harvesting of Bone Graft/Bone Marrow Aspirate, Hammertoe Repair 2 and 3 - Left Foot;  Surgeon: Frankie Zapata DPM;  Location:  [facility] OR;  Service: Podiatry;  Laterality: Left;     Family History   Problem Relation Age of Onset    Hypertension Mother     No Known Problems Father     Colon cancer Neg Hx     Colon polyps Neg Hx      Yun  reports that she has never smoked. She has never been exposed to tobacco smoke. She has never used smokeless tobacco. She reports that she does not drink alcohol and does not use drugs.    I have reviewed and updated the above documentation (if necessary) including but not limited to chief complaint, ROS, PFSH, allergies and medications        Current Outpatient Medications:     apixaban (ELIQUIS) 5 MG tablet tablet, Take 1 tablet by mouth 2 (Two) Times a Day., Disp: , Rfl:     dabigatran etexilate (PRADAXA) 150 MG capsu, Take 1 capsule by mouth 2 (Two) Times a Day., Disp: 60 capsule, Rfl: 11    dilTIAZem CD (Cardizem CD) 120 MG 24 hr capsule, Take 1 capsule by mouth Daily., Disp: 30 capsule, Rfl: 11    montelukast (SINGULAIR) 10 MG tablet, Take 1 tablet by mouth Every Night., Disp: 30 tablet, Rfl: 5    omeprazole (priLOSEC) 20 MG capsule, Take 1 capsule by mouth Daily., Disp: 30 capsule, Rfl: 11    tamoxifen (NOLVADEX) 20 MG chemo tablet, Take 1 tablet by mouth daily, Disp: 90 tablet, Rfl: 1    tiZANidine (ZANAFLEX) 4 MG tablet, Take 1 tablet by mouth once Daily As Needed for Muscle Spasms., Disp: 30 tablet, Rfl: 2    cyanocobalamin 1000 MCG/ML injection, Inject 1 mL Every 28 (Twenty-Eight) Days. (Patient not taking: Reported on 8/3/2023), Disp: 1 mL, Rfl: 5    Current Facility-Administered Medications:     cyanocobalamin injection 1,000 mcg, 1,000 mcg, Intramuscular, Q28 Days, ANGEL Healy,  "MD, 1,000 mcg at 12/22/22 0845    OBJECTIVE:  Visit Vitals  /62 (BP Location: Left arm, Patient Position: Sitting, Cuff Size: Adult)   Pulse 94   Ht 160 cm (63\")   SpO2 97%   BMI 27.99 kg/mý      Physical Exam  Vitals and nursing note reviewed.   Constitutional:       General: She is not in acute distress.     Appearance: Normal appearance. She is not ill-appearing, toxic-appearing or diaphoretic.   HENT:      Head: Normocephalic and atraumatic.      Comments: No palpable abnormality of skull where she hit the concrete.     Right Ear: Tympanic membrane, ear canal and external ear normal. There is no impacted cerumen.      Left Ear: Tympanic membrane, ear canal and external ear normal. There is no impacted cerumen.      Mouth/Throat:      Mouth: Mucous membranes are moist.      Pharynx: Oropharynx is clear. No oropharyngeal exudate or posterior oropharyngeal erythema.   Eyes:      General: No scleral icterus.        Right eye: No discharge.         Left eye: No discharge.      Conjunctiva/sclera: Conjunctivae normal.      Pupils: Pupils are equal, round, and reactive to light.   Cardiovascular:      Rate and Rhythm: Normal rate.   Pulmonary:      Effort: Pulmonary effort is normal. No respiratory distress.   Abdominal:      General: There is no distension.      Palpations: Abdomen is soft.      Tenderness: There is no abdominal tenderness.   Musculoskeletal:      Cervical back: Normal range of motion and neck supple.   Neurological:      General: No focal deficit present.      Mental Status: She is alert and oriented to person, place, and time.      Gait: Gait normal.   Psychiatric:         Mood and Affect: Mood normal.         Behavior: Behavior normal.         Thought Content: Thought content normal.         Judgment: Judgment normal.       [place]      Assessment/Plan    Diagnoses and all orders for this visit:    1. Fall, initial encounter (Primary)    2. Contusion of head, unspecified part of head, initial " encounter    3. Chronic pain of both knees  -     XR Knee 1 or 2 View Right; Future  -     XR Knee 1 or 2 View Left; Future  -     Ambulatory Referral to Orthopedic Surgery    4. Gallstones      Stable from the standpoint of the fall. I reviewed note and radiology reports from ER visit last week.    Xray knees and refer to ortho.    No further work-up indicated for gallstones at this time, as she is having an MRI abdomen next week and is asymptomatic.         Education materials and an After Visit Summary were printed and given to the patient at discharge.  Return if symptoms worsen or fail to improve, for Next scheduled follow up.         Jenny Canchola, APRN   16:26 CDT  8/23/2023

## 2023-08-23 NOTE — PROGRESS NOTES
We are really limited since she is on Eliquis. I recommend she take the Tylenol arthritis once daily if this is helping. Dr. Shea's office should be calling her for  an appointment.

## 2023-08-24 ENCOUNTER — TREATMENT (OUTPATIENT)
Dept: PHYSICAL THERAPY | Facility: CLINIC | Age: 68
End: 2023-08-24
Payer: MEDICARE

## 2023-08-24 DIAGNOSIS — R26.9 ABNORMALITY OF GAIT AND MOBILITY: Primary | ICD-10-CM

## 2023-08-24 DIAGNOSIS — R29.6 MULTIPLE FALLS: ICD-10-CM

## 2023-08-24 PROCEDURE — 97110 THERAPEUTIC EXERCISES: CPT | Performed by: PHYSICAL THERAPIST

## 2023-08-24 NOTE — PROGRESS NOTES
Physical Therapy Treatment Note  [address] [place] [address]    Patient: Yun Blackwell                                                 Visit Date: 2023  :     1955    Referring practitioner:    MARÍA Rubio  Date of Initial Visit:          Type: THERAPY  Noted: 2023    Patient seen for 6 sessions    Visit Diagnoses:    ICD-10-CM ICD-9-CM   1. Abnormality of gait and mobility  R26.9 781.2   2. Multiple falls  R29.6 V15.88     SUBJECTIVE     Subjective:She states the only thing that hurts are the knees. She had x-rays of both knees yesterday and she will see the Ortho Dr. Self.       PAIN: 3-4/10 going from sit to stand.         OBJECTIVE     Objective     Therapeutic Exercises    33395 Units Comments   B Dorsiflexion stretch     Parallel bars SLS X 2 ea    Parallel bars hip abduction 2 x 10 ea    Sitting B hip abduction with green tband 2 x 10    Sitting marching with green tband 2 x 10    Hip adduction with ball between knees X 2 Caused camping in L adductors   Standing hip hikes S x 10 ea    Timed Minutes 30          Therapy Education/Self Care 41937   Education offered today    [employer] Code [email or web address]    Ongoing HEP   Date: 2023  Prepared by: Guille Grier     Exercises  - Single Leg Stance with Support  - 2 x daily - 7 x weekly - 2 sets - 10 reps  - Standing Ankle Dorsiflexion Stretch  - 2 x daily - 7 x weekly - 2 sets - 10 reps  - Standing Soleus Stretch  - 2 x daily - 7 x weekly - 2 sets - 10 reps  - Sidelying Hip Abduction  - 2 x daily - 7 x weekly - 2 sets - 10 reps      Timed Minutes        Total Timed Treatment:     30   mins  Total Time of Visit:             30   mins         ASSESSMENT/PLAN     GOALS  Goals                                          Progress Note due by 23                                                      Recert due by 23   LTG by: 6 weeks Comments Date Status   Improve  passive left DF to 15 deg 15 deg after stretch 8/18 progressing   Improve destiny hip abd MMT to 4+/5         Walk with symmetrical gait with equal stance and stable hips without a lateral lurch  addressed with 4 rounds of video analysis with training between each.   8/22  Ongoing   SLS left LE x 5 sec  Needs verbal cues  8/24 Ongoing   FAAM score of          Able to walk up/down 2 steps using rail for balance only alternating steps         Independent with HEP             Assessment/Plan     ASSESSMENT:   Patient exhibits B hip weakness with the exercises as she tends to compensate by leaning with hip strengthening.  She is working on her HEP, will progress her home program next treatment if she was able to tolerate the exercises completed today.     PLAN:   Continue a focus on balance and gait activities.    SIGNATURE: Rosa Causey PTA, [place], [place] License #: [id number]  Electronically Signed on 8/24/2023        [address] [place] [address]. [address]  [phone]

## 2023-08-25 ENCOUNTER — TREATMENT (OUTPATIENT)
Dept: PHYSICAL THERAPY | Facility: CLINIC | Age: 68
End: 2023-08-25
Payer: MEDICARE

## 2023-08-25 DIAGNOSIS — Z98.890 H/O FOOT SURGERY: ICD-10-CM

## 2023-08-25 DIAGNOSIS — R29.6 MULTIPLE FALLS: ICD-10-CM

## 2023-08-25 DIAGNOSIS — R26.9 ABNORMALITY OF GAIT AND MOBILITY: Primary | ICD-10-CM

## 2023-08-25 PROCEDURE — 97110 THERAPEUTIC EXERCISES: CPT | Performed by: PHYSICAL THERAPIST

## 2023-08-25 NOTE — PROGRESS NOTES
Physical Therapy Treatment Note  [address] [place] [address]    Patient: Yun Blackwell                                                 Visit Date: 2023  :     1955    Referring practitioner:    MARÍA Rubio  Date of Initial Visit:          Type: THERAPY  Noted: 2023    Patient seen for 7 sessions    Visit Diagnoses:    ICD-10-CM ICD-9-CM   1. Abnormality of gait and mobility  R26.9 781.2   2. Multiple falls  R29.6 V15.88   3. H/O foot surgery  Z98.890 V15.29     SUBJECTIVE     Subjective:She is having R knee pain today.        PAIN: 5/10 R knee         OBJECTIVE     Objective     Therapeutic Exercises    50700 Units Comments   B Dorsiflexion stretch with Pro stretch X 1 min each In the parallel bars   Supine hooklying alternating BKFO  2 x 10 With red T- band, added to HEP   Bridges 2 x 10  Added to HEP   Sitting marching with green tband 2 x 10 Added to HEP   Hip adduction with ball between knees 2 x 10    Standing hip hikes 2 x 10 ea Added to HEP   Timed Minutes 30          Therapy Education/Self Care 04202   Education offered today    [employer] Code [email or web address]    Ongoing HEP   Access Code: [email or web address]  URL: [email or web address]  Date: 2023  Prepared by: Rosa Causey    Exercises  - Single Leg Stance with Support  - 2 x daily - 7 x weekly - 2 sets - 10 reps  - Standing Ankle Dorsiflexion Stretch  - 2 x daily - 7 x weekly - 2 sets - 10 reps  - Standing Soleus Stretch  - 2 x daily - 7 x weekly - 2 sets - 10 reps  - Standing Hip Hiking  - 2 x daily - 7 x weekly - 2 sets - 10 reps  - Standing Hip Abduction with Counter Support  - 2 x daily - 7 x weekly - 2 sets - 10 reps  - Sidelying Hip Abduction  - 2 x daily - 7 x weekly - 2 sets - 10 reps  - Hooklying Single Leg Bent Knee Fallouts with Resistance  - 2 x daily - 7 x weekly - 2 sets - 10 reps  - Seated March with Resistance  - 2 x daily - 7 x weekly - 2 sets -  10 reps  - Supine Bridge  - 2 x daily - 7 x weekly - 1 sets - 10 reps      Timed Minutes        Total Timed Treatment:     30   mins  Total Time of Visit:             30   mins         ASSESSMENT/PLAN     GOALS  Goals                                          Progress Note due by 9/7/23                                                      Recert due by 11/4/23   LTG by: 6 weeks Comments Date Status   Improve passive left DF to 15 deg 15 deg after stretch 8/18 progressing   Improve destiny hip abd MMT to 4+/5         Walk with symmetrical gait with equal stance and stable hips without a lateral lurch  addressed with 4 rounds of video analysis with training between each.   8/22  Ongoing   SLS left LE x 5 sec  Needs verbal cues  8/24 Ongoing   FAAM score of          Able to walk up/down 2 steps using rail for balance only alternating steps         Independent with HEP  Progressed HEP 8/25 Ongoing       Assessment/Plan     ASSESSMENT:   Patient was able to complete the exercises today with no increase in pain, I did progress her HEP. She did not have any cramping in the calves yesterday after her treatment, she did like the Pro Stretch today. Will modify HEP if needed today.    PLAN:   Continue to work on hip strengthening as well as gastroc/soleus tightness.     SIGNATURE: Rosa Causey PTA, [facility] [place] License #: [id number]  Electronically Signed on 8/25/2023        [address] [place] [address]. [address]  [phone]

## 2023-08-28 ENCOUNTER — TREATMENT (OUTPATIENT)
Dept: PHYSICAL THERAPY | Facility: CLINIC | Age: 68
End: 2023-08-28
Payer: MEDICARE

## 2023-08-28 DIAGNOSIS — R26.9 ABNORMALITY OF GAIT AND MOBILITY: Primary | ICD-10-CM

## 2023-08-28 DIAGNOSIS — R29.6 MULTIPLE FALLS: ICD-10-CM

## 2023-08-28 DIAGNOSIS — Z98.890 H/O FOOT SURGERY: ICD-10-CM

## 2023-08-28 PROCEDURE — 97110 THERAPEUTIC EXERCISES: CPT | Performed by: PHYSICAL THERAPIST

## 2023-08-28 PROCEDURE — 97140 MANUAL THERAPY 1/> REGIONS: CPT | Performed by: PHYSICAL THERAPIST

## 2023-08-28 NOTE — PROGRESS NOTES
Physical Therapy Treatment Note  [address] [place] [address]    Patient: Yun Blackwell                                                 Visit Date: 2023  :     1955    Referring practitioner:    MARÍA Rubio  Date of Initial Visit:          Type: THERAPY  Noted: 2023    Patient seen for 8 sessions    Visit Diagnoses:    ICD-10-CM ICD-9-CM   1. Abnormality of gait and mobility  R26.9 781.2   2. Multiple falls  R29.6 V15.88   3. H/O foot surgery  Z98.890 V15.29     SUBJECTIVE     Subjective:She had a really bad weekend and couldn't walk due to right lateral knee pain.     PAIN: 7/10 R knee    Xray 23: right medial compartment mod OA, left knee min OA       OBJECTIVE     Objective     Manual Therapy     [id number]  Comments   Percussive IASTM using Powerboost to left gastroc at L2 using spade attachment    Supine left TC post mob Gr 3 repetitive   Supine and prone left TC distraction Gr 3 sustained           Timed Minutes 20     Therapeutic Exercises    93780 Units Comments   Prone passive ankle DF stretch                         Timed Minutes 10       Therapy Education/Self Care 00417   Education offered today    [employer] Code [id number]    Ongoing HEP   Access Code: [email or web address]  URL: [email or web address]  Date: 2023  Prepared by: Rosa Causey    Exercises  - Single Leg Stance with Support  - 2 x daily - 7 x weekly - 2 sets - 10 reps  - Standing Ankle Dorsiflexion Stretch  - 2 x daily - 7 x weekly - 2 sets - 10 reps  - Standing Soleus Stretch  - 2 x daily - 7 x weekly - 2 sets - 10 reps  - Standing Hip Hiking  - 2 x daily - 7 x weekly - 2 sets - 10 reps  - Standing Hip Abduction with Counter Support  - 2 x daily - 7 x weekly - 2 sets - 10 reps  - Sidelying Hip Abduction  - 2 x daily - 7 x weekly - 2 sets - 10 reps  - Hooklying Single Leg Bent Knee Fallouts with Resistance  - 2 x daily - 7 x weekly - 2 sets - 10  reps  - Seated March with Resistance  - 2 x daily - 7 x weekly - 2 sets - 10 reps  - Supine Bridge  - 2 x daily - 7 x weekly - 1 sets - 10 reps      Timed Minutes        Total Timed Treatment:     30   mins  Total Time of Visit:            30   mins         ASSESSMENT/PLAN     GOALS  Goals                                          Progress Note due by 9/7/23                                                      Recert due by 11/4/23   LTG by: 6 weeks Comments Date Status   Improve passive left DF to 15 deg 15 deg after stretch 8/18 progressing   Improve destiny hip abd MMT to 4+/5         Walk with symmetrical gait with equal stance and stable hips without a lateral lurch  addressed with 4 rounds of video analysis with training between each.   8/22  Ongoing   SLS left LE x 5 sec  Needs verbal cues  8/24 Ongoing   FAAM score of          Able to walk up/down 2 steps using rail for balance only alternating steps         Independent with HEP  Progressed HEP 8/28 Ongoing       Assessment/Plan     ASSESSMENT:   I backed off today because of her right knee pain. This was affecting her walking    PLAN:   Continue to work on hip strengthening as well as gastroc/soleus tightness.     SIGNATURE: Guille Grier, PT, [place] License #: [id number]  Electronically Signed on 8/28/2023        [address] [place] [address], [place] [address]  [phone]

## 2023-08-29 RX ORDER — MONTELUKAST SODIUM 10 MG/1
10 TABLET ORAL NIGHTLY
Qty: 30 TABLET | Refills: 5 | Status: SHIPPED | OUTPATIENT
Start: 2023-08-29

## 2023-08-29 NOTE — TELEPHONE ENCOUNTER
Rx Refill Note  Requested Prescriptions     Pending Prescriptions Disp Refills    montelukast (SINGULAIR) 10 MG tablet 30 tablet 5     Sig: Take 1 tablet by mouth Every Night.      Last office visit with prescribing clinician: 4/17/2023   Last telemedicine visit with prescribing clinician: Visit date not found   Next office visit with prescribing clinician: 10/17/2023                         Would you like a call back once the refill request has been completed: [] Yes [] No    If the office needs to give you a call back, can they leave a voicemail: [] Yes [] No    Jose Ramires MA  08/29/23, 11:03 CDT

## 2023-08-30 ENCOUNTER — HOSPITAL ENCOUNTER (OUTPATIENT)
Dept: MRI IMAGING | Age: 68
Discharge: HOME | End: 2023-08-30
Payer: MEDICARE

## 2023-08-30 ENCOUNTER — TREATMENT (OUTPATIENT)
Dept: PHYSICAL THERAPY | Facility: CLINIC | Age: 68
End: 2023-08-30
Payer: MEDICARE

## 2023-08-30 DIAGNOSIS — R26.9 ABNORMALITY OF GAIT AND MOBILITY: Primary | ICD-10-CM

## 2023-08-30 DIAGNOSIS — Z98.890 H/O FOOT SURGERY: ICD-10-CM

## 2023-08-30 DIAGNOSIS — R29.6 MULTIPLE FALLS: ICD-10-CM

## 2023-08-30 DIAGNOSIS — Z15.09 GENETIC SUSCEPTIBILITY TO OTHER MALIGNANT NEOPLASM: ICD-10-CM

## 2023-08-30 PROCEDURE — 74183 MRI ABD W/O CNTR FLWD CNTR: CPT

## 2023-08-30 PROCEDURE — A9577 INJ MULTIHANCE: HCPCS | Performed by: PHYSICIAN ASSISTANT

## 2023-08-30 PROCEDURE — 97140 MANUAL THERAPY 1/> REGIONS: CPT | Performed by: PHYSICAL THERAPIST

## 2023-08-30 PROCEDURE — 97110 THERAPEUTIC EXERCISES: CPT | Performed by: PHYSICAL THERAPIST

## 2023-08-30 PROCEDURE — 6360000004 HC RX CONTRAST MEDICATION: Performed by: PHYSICIAN ASSISTANT

## 2023-08-30 RX ADMIN — GADOBENATE DIMEGLUMINE 15 ML: 529 INJECTION, SOLUTION INTRAVENOUS at 13:20

## 2023-08-30 NOTE — PROGRESS NOTES
"                                                                Physical Therapy Treatment Note  [address] [place] [address]    Patient: Yun Blackwell                                                 Visit Date: 2023  :     1955    Referring practitioner:    MARÍA Rubio  Date of Initial Visit:          Type: THERAPY  Noted: 2023    Patient seen for 9 sessions    Visit Diagnoses:    ICD-10-CM ICD-9-CM   1. Abnormality of gait and mobility  R26.9 781.2   2. H/O foot surgery  Z98.890 V15.29   3. Multiple falls  R29.6 V15.88     SUBJECTIVE     Subjective:She went to see Dr. Shea who suggested she continue with exercise and wasn't considering a knee replacement at this point.     PAIN: 4/10 R knee    Xray 23: right medial compartment mod OA, left knee min OA       OBJECTIVE     Objective     Manual Therapy     29334  Comments   Percussive IASTM using Powerboost to left gastroc at L2 using spade attachment    Supine left TC post mob Gr 3 repetitive   Supine and prone left TC distraction Gr 3 sustained           Timed Minutes 20     Therapeutic Exercises    78043 Units Comments   Prone passive ankle DF stretch     Left hip abd wipers 5\" each direct x 8    Standing birddog  Needed cues to hold on and for good alignment             Timed Minutes 25       Therapy Education/Self Care 93758   Education offered today    SecureLink Code [email or web address]    Ongoing HEP   Access Code: [email or web address]  URL: [email or web address]  Date: 2023  Prepared by: Rosa Causey    Exercises  - Single Leg Stance with Support  - 2 x daily - 7 x weekly - 2 sets - 10 reps  - Standing Ankle Dorsiflexion Stretch  - 2 x daily - 7 x weekly - 2 sets - 10 reps  - Standing Soleus Stretch  - 2 x daily - 7 x weekly - 2 sets - 10 reps  - Standing Hip Hiking  - 2 x daily - 7 x weekly - 2 sets - 10 reps  - Standing Hip Abduction with Counter Support  - 2 x daily - 7 x weekly - 2 sets - 10 reps  - Sidelying Hip Abduction  - " 2 x daily - 7 x weekly - 2 sets - 10 reps  - Hooklying Single Leg Bent Knee Fallouts with Resistance  - 2 x daily - 7 x weekly - 2 sets - 10 reps  - Seated March with Resistance  - 2 x daily - 7 x weekly - 2 sets - 10 reps  - Supine Bridge  - 2 x daily - 7 x weekly - 1 sets - 10 reps      Timed Minutes        Total Timed Treatment:     45   mins  Total Time of Visit:             45   mins         ASSESSMENT/PLAN     GOALS  Goals                                          Progress Note due by 9/7/23                                                      Recert due by 11/4/23   LTG by: 6 weeks Comments Date Status   Improve passive left DF to 15 deg 15 deg after stretch 8/30 MET   Improve destiny hip abd MMT to 4+/5         Walk with symmetrical gait with equal stance and stable hips without a lateral lurch  addressed with 4 rounds of video analysis with training between each.   8/22  Ongoing   SLS left LE x 5 sec  Needs verbal cues  8/24 Ongoing   FAAM score of          Able to walk up/down 2 steps using rail for balance only alternating steps         Independent with HEP  Progressed HEP 8/28 Ongoing       Assessment/Plan     ASSESSMENT:   I focused more on standing exs today. She is still quite unstable in a SLS. She has one more visit scheduled. She may be ready for DC regarding her ankle/toe and to continue with her HEP.     PLAN:   Continue to work on hip strengthening as well as gastroc/soleus tightness.  Assess the possibility of Dc.     SIGNATURE: Guille Grier, PT, [place] License #: [id number]  Electronically Signed on 8/30/2023        [address] [place] [address]. [address]  [phone]

## 2023-09-01 ENCOUNTER — TREATMENT (OUTPATIENT)
Dept: PHYSICAL THERAPY | Facility: CLINIC | Age: 68
End: 2023-09-01
Payer: MEDICARE

## 2023-09-01 DIAGNOSIS — Z98.890 H/O FOOT SURGERY: ICD-10-CM

## 2023-09-01 DIAGNOSIS — R29.6 MULTIPLE FALLS: ICD-10-CM

## 2023-09-01 DIAGNOSIS — R26.9 ABNORMALITY OF GAIT AND MOBILITY: Primary | ICD-10-CM

## 2023-09-01 NOTE — PROGRESS NOTES
Physical Therapy Treatment Note  [address] [place] [address]    Patient: Yun Blackwell                                                 Visit Date: 2023  :     1955    Referring practitioner:    MARÍA Rubio  Date of Initial Visit:          Type: THERAPY  Noted: 2023    Patient seen for 10 sessions    Visit Diagnoses:    ICD-10-CM ICD-9-CM   1. Abnormality of gait and mobility  R26.9 781.2   2. H/O foot surgery  Z98.890 V15.29   3. Multiple falls  R29.6 V15.88     SUBJECTIVE     Subjective: She volunteered at the [employer] and so her R knee is hurting today.     PAIN: 8/10 R knee         OBJECTIVE     Objective     Neuromuscular Reeducation     13000 Comments   Rockerboard M/L STS in // bars from elevated mat table no UE support/a Mirror for visual feedback   Step forward w/ contra hip flexion for SLS *3 sec hold    Scale STS // bars from elevated mat table no UE support/a    Timed Minutes 20        Therapeutic Exercises    49979 Units Comments   Iso ABD cone taps 10 B Red TB   Resisted ADD side stepping 10 B Red TB   TKE 10 sec holds *5 B Red TB   Timed Minutes 25       Therapy Education/Self Care 54520   Education offered today    COLOURlovers Code [email or web address]    Ongoing HEP     Date: 2023  Prepared by: Rosa Causey    Exercises  - Single Leg Stance with Support  - 2 x daily - 7 x weekly - 2 sets - 10 reps  - Standing Ankle Dorsiflexion Stretch  - 2 x daily - 7 x weekly - 2 sets - 10 reps  - Standing Soleus Stretch  - 2 x daily - 7 x weekly - 2 sets - 10 reps  - Standing Hip Hiking  - 2 x daily - 7 x weekly - 2 sets - 10 reps  - Standing Hip Abduction with Counter Support  - 2 x daily - 7 x weekly - 2 sets - 10 reps  - Sidelying Hip Abduction  - 2 x daily - 7 x weekly - 2 sets - 10 reps  - Hooklying Single Leg Bent Knee Fallouts with Resistance  - 2 x daily - 7 x weekly - 2 sets - 10 reps  - Seated March with  Resistance  - 2 x daily - 7 x weekly - 2 sets - 10 reps  - Supine Bridge  - 2 x daily - 7 x weekly - 1 sets - 10 reps      Timed Minutes        Total Timed Treatment:     45   mins  Total Time of Visit:             45   mins         ASSESSMENT/PLAN     GOALS  Goals                                          Progress Note due by 9/7/23                                                      Recert due by 11/4/23   LTG by: 6 weeks Comments Date Status   Improve passive left DF to 15 deg 15 deg after stretch 8/30 MET   Improve destiny hip abd MMT to 4+/5         Walk with symmetrical gait with equal stance and stable hips without a lateral lurch  addressed with 4 rounds of video analysis with training between each.   8/22  Ongoing   SLS left LE x 5 sec  Needs verbal cues  8/24 Ongoing   FAAM score of          Able to walk up/down 2 steps using rail for balance only alternating steps         Independent with HEP  Progressed HEP 8/28 Ongoing       Assessment/Plan     ASSESSMENT: She exhibits tendency to immediately shift her weight to the L w/ rockerboard STS but was able to improve equal Wbing w/ visual feedback using mirror and increased reps. Suggested performing today's activities at home in hopes of getting carryover into gait training and dynamic balance to improve equal Wbing, dynamic balance, and minimize lateral sway during ambulation d/t hiatus in care d/t schedule availability. She would benefit from further skilled OP PT services to further refine these deficits and improve safety w/ functional mobility.     PLAN: Continue to work on hip strengthening as well as gastroc/soleus tightness.      SIGNATURE: Lashay Davis, PT, [place] License #: [id number]  Electronically Signed on 9/1/2023        [place] [address]  [phone]

## 2023-09-06 ENCOUNTER — TELEPHONE (OUTPATIENT)
Dept: SURGERY | Age: 68
End: 2023-09-06

## 2023-09-07 ENCOUNTER — APPOINTMENT (OUTPATIENT)
Dept: CT IMAGING | Facility: HOSPITAL | Age: 68
End: 2023-09-07
Payer: MEDICARE

## 2023-09-07 ENCOUNTER — HOSPITAL ENCOUNTER (EMERGENCY)
Facility: HOSPITAL | Age: 68
Discharge: HOME | End: 2023-09-08
Payer: MEDICARE

## 2023-09-07 DIAGNOSIS — N30.90 CYSTITIS: Primary | ICD-10-CM

## 2023-09-07 DIAGNOSIS — R10.9 ABDOMINAL PAIN, UNSPECIFIED ABDOMINAL LOCATION: ICD-10-CM

## 2023-09-07 LAB
ALBUMIN SERPL-MCNC: 4.3 G/DL (ref 3.5–5.2)
ALBUMIN/GLOB SERPL: 1.6 G/DL
ALP SERPL-CCNC: 108 U/L (ref 39–117)
ALT SERPL W P-5'-P-CCNC: 10 U/L (ref 1–33)
ANION GAP SERPL CALCULATED.3IONS-SCNC: 11 MMOL/L (ref 5–15)
AST SERPL-CCNC: 22 U/L (ref 1–32)
BACTERIA UR QL AUTO: ABNORMAL /HPF
BASOPHILS # BLD AUTO: 0.05 10*3/MM3 (ref 0–0.2)
BASOPHILS NFR BLD AUTO: 0.5 % (ref 0–1.5)
BILIRUB SERPL-MCNC: 0.9 MG/DL (ref 0–1.2)
BILIRUB UR QL STRIP: NEGATIVE
BUN SERPL-MCNC: 15 MG/DL (ref 8–23)
BUN/CREAT SERPL: 30 (ref 7–25)
CALCIUM SPEC-SCNC: 10.1 MG/DL (ref 8.6–10.5)
CHLORIDE SERPL-SCNC: 105 MMOL/L (ref 98–107)
CLARITY UR: CLEAR
CO2 SERPL-SCNC: 25 MMOL/L (ref 22–29)
COLOR UR: YELLOW
CREAT SERPL-MCNC: 0.5 MG/DL (ref 0.57–1)
D-LACTATE SERPL-SCNC: 1.9 MMOL/L (ref 0.5–2)
DEPRECATED RDW RBC AUTO: 46.6 FL (ref 37–54)
EGFRCR SERPLBLD CKD-EPI 2021: 101.7 ML/MIN/1.73
EOSINOPHIL # BLD AUTO: 0.12 10*3/MM3 (ref 0–0.4)
EOSINOPHIL NFR BLD AUTO: 1.2 % (ref 0.3–6.2)
ERYTHROCYTE [DISTWIDTH] IN BLOOD BY AUTOMATED COUNT: 13.6 % (ref 12.3–15.4)
FLUAV RNA RESP QL NAA+PROBE: NOT DETECTED
FLUBV RNA RESP QL NAA+PROBE: NOT DETECTED
GLOBULIN UR ELPH-MCNC: 2.7 GM/DL
GLUCOSE SERPL-MCNC: 149 MG/DL (ref 65–99)
GLUCOSE UR STRIP-MCNC: NEGATIVE MG/DL
HCT VFR BLD AUTO: 41 % (ref 34–46.6)
HGB BLD-MCNC: 12.6 G/DL (ref 12–15.9)
HGB UR QL STRIP.AUTO: NEGATIVE
HYALINE CASTS UR QL AUTO: ABNORMAL /LPF
IMM GRANULOCYTES # BLD AUTO: 0.02 10*3/MM3 (ref 0–0.05)
IMM GRANULOCYTES NFR BLD AUTO: 0.2 % (ref 0–0.5)
KETONES UR QL STRIP: NEGATIVE
LEUKOCYTE ESTERASE UR QL STRIP.AUTO: ABNORMAL
LIPASE SERPL-CCNC: 32 U/L (ref 13–60)
LYMPHOCYTES # BLD AUTO: 1.72 10*3/MM3 (ref 0.7–3.1)
LYMPHOCYTES NFR BLD AUTO: 17.8 % (ref 19.6–45.3)
MCH RBC QN AUTO: 28.6 PG (ref 26.6–33)
MCHC RBC AUTO-ENTMCNC: 30.7 G/DL (ref 31.5–35.7)
MCV RBC AUTO: 93 FL (ref 79–97)
MONOCYTES # BLD AUTO: 0.71 10*3/MM3 (ref 0.1–0.9)
MONOCYTES NFR BLD AUTO: 7.3 % (ref 5–12)
NEUTROPHILS NFR BLD AUTO: 7.05 10*3/MM3 (ref 1.7–7)
NEUTROPHILS NFR BLD AUTO: 73 % (ref 42.7–76)
NITRITE UR QL STRIP: POSITIVE
NRBC BLD AUTO-RTO: 0 /100 WBC (ref 0–0.2)
PH UR STRIP.AUTO: 6.5 [PH] (ref 5–8)
PLATELET # BLD AUTO: 167 10*3/MM3 (ref 140–450)
PMV BLD AUTO: 10.9 FL (ref 6–12)
POTASSIUM SERPL-SCNC: 4.6 MMOL/L (ref 3.5–5.2)
PROT SERPL-MCNC: 7 G/DL (ref 6–8.5)
PROT UR QL STRIP: NEGATIVE
RBC # BLD AUTO: 4.41 10*6/MM3 (ref 3.77–5.28)
RBC # UR STRIP: ABNORMAL /HPF
REF LAB TEST METHOD: ABNORMAL
SARS-COV-2 RNA RESP QL NAA+PROBE: NOT DETECTED
SODIUM SERPL-SCNC: 141 MMOL/L (ref 136–145)
SP GR UR STRIP: 1.02 (ref 1–1.03)
SQUAMOUS #/AREA URNS HPF: ABNORMAL /HPF
UROBILINOGEN UR QL STRIP: ABNORMAL
WBC # UR STRIP: ABNORMAL /HPF
WBC NRBC COR # BLD: 9.67 10*3/MM3 (ref 3.4–10.8)

## 2023-09-07 PROCEDURE — 87086 URINE CULTURE/COLONY COUNT: CPT | Performed by: STUDENT IN AN ORGANIZED HEALTH CARE EDUCATION/TRAINING PROGRAM

## 2023-09-07 PROCEDURE — 87636 SARSCOV2 & INF A&B AMP PRB: CPT | Performed by: STUDENT IN AN ORGANIZED HEALTH CARE EDUCATION/TRAINING PROGRAM

## 2023-09-07 PROCEDURE — 83690 ASSAY OF LIPASE: CPT | Performed by: STUDENT IN AN ORGANIZED HEALTH CARE EDUCATION/TRAINING PROGRAM

## 2023-09-07 PROCEDURE — 36415 COLL VENOUS BLD VENIPUNCTURE: CPT

## 2023-09-07 PROCEDURE — 87186 SC STD MICRODIL/AGAR DIL: CPT | Performed by: STUDENT IN AN ORGANIZED HEALTH CARE EDUCATION/TRAINING PROGRAM

## 2023-09-07 PROCEDURE — 83605 ASSAY OF LACTIC ACID: CPT | Performed by: STUDENT IN AN ORGANIZED HEALTH CARE EDUCATION/TRAINING PROGRAM

## 2023-09-07 PROCEDURE — 81001 URINALYSIS AUTO W/SCOPE: CPT | Performed by: STUDENT IN AN ORGANIZED HEALTH CARE EDUCATION/TRAINING PROGRAM

## 2023-09-07 PROCEDURE — 25010000002 ONDANSETRON PER 1 MG

## 2023-09-07 PROCEDURE — 80053 COMPREHEN METABOLIC PANEL: CPT | Performed by: STUDENT IN AN ORGANIZED HEALTH CARE EDUCATION/TRAINING PROGRAM

## 2023-09-07 PROCEDURE — 87077 CULTURE AEROBIC IDENTIFY: CPT | Performed by: STUDENT IN AN ORGANIZED HEALTH CARE EDUCATION/TRAINING PROGRAM

## 2023-09-07 PROCEDURE — 85025 COMPLETE CBC W/AUTO DIFF WBC: CPT | Performed by: STUDENT IN AN ORGANIZED HEALTH CARE EDUCATION/TRAINING PROGRAM

## 2023-09-07 PROCEDURE — 99285 EMERGENCY DEPT VISIT HI MDM: CPT

## 2023-09-07 PROCEDURE — 96375 TX/PRO/DX INJ NEW DRUG ADDON: CPT

## 2023-09-07 RX ORDER — ONDANSETRON 2 MG/ML
4 INJECTION INTRAMUSCULAR; INTRAVENOUS ONCE
Status: COMPLETED | OUTPATIENT
Start: 2023-09-08 | End: 2023-09-07

## 2023-09-07 RX ADMIN — ONDANSETRON 4 MG: 2 INJECTION INTRAMUSCULAR; INTRAVENOUS at 23:57

## 2023-09-08 ENCOUNTER — APPOINTMENT (OUTPATIENT)
Dept: CT IMAGING | Facility: HOSPITAL | Age: 68
End: 2023-09-08
Payer: MEDICARE

## 2023-09-08 VITALS
BODY MASS INDEX: 28 KG/M2 | OXYGEN SATURATION: 97 % | SYSTOLIC BLOOD PRESSURE: 113 MMHG | HEART RATE: 80 BPM | HEIGHT: 63 IN | DIASTOLIC BLOOD PRESSURE: 56 MMHG | RESPIRATION RATE: 18 BRPM | WEIGHT: 158 LBS | TEMPERATURE: 98 F

## 2023-09-08 PROCEDURE — 25010000002 CEFTRIAXONE PER 250 MG

## 2023-09-08 PROCEDURE — 74177 CT ABD & PELVIS W/CONTRAST: CPT

## 2023-09-08 PROCEDURE — 25510000001 IOPAMIDOL 61 % SOLUTION

## 2023-09-08 PROCEDURE — 96365 THER/PROPH/DIAG IV INF INIT: CPT

## 2023-09-08 RX ORDER — CEFDINIR 300 MG/1
300 CAPSULE ORAL 2 TIMES DAILY
Qty: 14 CAPSULE | Refills: 0 | Status: SHIPPED | OUTPATIENT
Start: 2023-09-08 | End: 2023-09-15

## 2023-09-08 RX ADMIN — CEFTRIAXONE 1000 MG: 1 INJECTION, POWDER, FOR SOLUTION INTRAMUSCULAR; INTRAVENOUS at 00:50

## 2023-09-08 RX ADMIN — SODIUM CHLORIDE 1000 ML: 9 INJECTION, SOLUTION INTRAVENOUS at 00:50

## 2023-09-08 RX ADMIN — IOPAMIDOL 100 ML: 612 INJECTION, SOLUTION INTRAVENOUS at 00:30

## 2023-09-08 NOTE — ED PROVIDER NOTES
Subjective   History of Present Illness  Patient is a 67-year-old female who presents emergency department with complaints of not feeling well.  She states around 5 PM she started to feel nauseated and vomited.  She also states that she is having some stomach cramping.  She feels like she has felt hot, but she has not taken her temperature to check for fever.  Her abdominal pain is on her left lower abdomen.  She also feels like she has been getting her pills stuck.  She is able to swallow.  She is able to drink water.  States that her cardiologist changed her medications recently and since then she feels like they are getting stuck.  She is not complaining of any other symptoms.      Review of Systems   Gastrointestinal:  Positive for abdominal pain, nausea and vomiting.   All other systems reviewed and are negative.    Past Medical History:   Diagnosis Date    A-fib     Abnormal ECG     tachycardia, a fib    Acid reflux     Anemia     Arthritis     Cancer     Diabetes mellitus     Hyperkalemia     Hyperlipidemia     Hypertension     Hyponatremia     IBS (irritable bowel syndrome)     PONV (postoperative nausea and vomiting)     Sleep apnea     USES CPAP    UTI (urinary tract infection)     Vaginal vault prolapse        Allergies   Allergen Reactions    Morphine And Related Nausea And Vomiting and Dizziness    Percocet [Oxycodone-Acetaminophen] Nausea And Vomiting and Dizziness       Past Surgical History:   Procedure Laterality Date    BREAST BIOPSY Left     X2    BUNIONECTOMY Left     CARDIAC CATHETERIZATION      CARDIAC CATHETERIZATION N/A 02/05/2021    Procedure: Right Heart Cath;  Surgeon: Van Marcelino MD;  Location:  [facility]ATH [facility]AS[facility];  Service: Cardiology;  Laterality: N/A;    CATARACT EXTRACTION, BILATERAL      COLON SURGERY      COLONOSCOPY  09/21/2015    TRANSVERSE COLON ADENOMATOUS POLYP, HEMORRHOIDS, RECALL 5 YEARS, DR LAMAS    COLONOSCOPY N/A 11/13/2020    Procedure: COLONOSCOPY WITH  ANESTHESIA;  Surgeon: Joe France MD;  [facility]: [facility] ENDOSCOPY;  Service: Gastroenterology;  Laterality: N/A;  pre op: screening  post op:divdrticulosis, polyp  PCP: ANGEL Healy MD    COLOSTOMY      COLPOPEXY VAGINAL      2014    ENDOSCOPY N/A 11/03/2016    LA GRADE B ESOPHAGITIS WITH NO BLEEDING UPPER THIRD OF ESOPHAGUS, GERI-WADSWORTH TEAR GEJ, BILIOUS BLOOD TINGED COFFEE GROUND GATRIC FLUIDDR ADAMS    EXPLORATORY LAPAROTOMY N/A 10/14/2016    Procedure: LAPAROTOMY EXPLORATORY, LYSIS OF ADHESIONS, IRRIAGATION OF ABDOMEN, POSSIBLE BOWEL RESECTION;  Surgeon: Bacilio Marcial MD;  Location:  [facility] OR;  Service:     FOOT NAVICULAR EXCISION OR BONE GRAFT Left 01/04/2023    Procedure: Expansion Graft, Harvesting of Bone Graft/Bone Marrow Aspirate;  Surgeon: Frankie Zapata DPM;  Location:  [facility] OR;  Service: Podiatry;  Laterality: Left;    HAMMER TOE REPAIR Left 01/04/2023    Procedure: Hammertoe Repair 2 and 3 - Left Foot;  Surgeon: Frankie Zapata DPM;  Location:  [facility] OR;  Service: Podiatry;  Laterality: Left;    HARDWARE REMOVAL Left 01/04/2023    Procedure: Hardware Removal;  Surgeon: Frankie Zapata DPM;  Location:  [facility] OR;  Service: Podiatry;  Laterality: Left;    HYSTERECTOMY      REVISION / TAKEDOWN COLOSTOMY      SACROCOLPOPEXY N/A 09/21/2016    Procedure: SACROCOLPOPEXY LAPAROSCOPIC WITH Sol VoltaicsI SI ROBOT, CONVERTED TO OPEN SACROCOLPOPEXY, RIGHT SALPINGO- OOPHERECTOMY, CYSTO;  Surgeon: Maribell Beth MD;  Location:  [facility] OR;  Service:     TOE FUSION Left 01/06/2022    Procedure: FIRST METATARSOPHALANGEAL JOINT ARTHRODESIS WITH INTERNAL FIXATION - LEFT FOOT;  Surgeon: Jorgito Red DPM;  Location:  [facility] OR;  Service: Podiatry;  Laterality: Left;    TOE FUSION Left 01/04/2023    Procedure: Revisional 1st Metatarsophalangeal Joint Arthrodesis with Hardware Removal and Expansion Graft, Harvesting of Bone Graft/Bone Marrow Aspirate, Hammertoe Repair 2 and 3 - Left Foot;  Surgeon:  Frankie Zapata DPM;  Location: [facility];  Service: Podiatry;  Laterality: Left;       Family History   Problem Relation Age of Onset    Hypertension Mother     No Known Problems Father     Colon cancer Neg Hx     Colon polyps Neg Hx        Social History     Socioeconomic History    Marital status:    Tobacco Use    Smoking status: Never     Passive exposure: Never    Smokeless tobacco: Never   Vaping Use    Vaping Use: Never used   Substance and Sexual Activity    Alcohol use: No    Drug use: No    Sexual activity: Defer           Objective   Physical Exam  Vitals and nursing note reviewed.   Constitutional:       General: She is not in acute distress.     Appearance: She is well-developed and normal weight. She is not ill-appearing or toxic-appearing.   HENT:      Head: Normocephalic.      Mouth/Throat:      Mouth: Mucous membranes are moist.      Pharynx: Oropharynx is clear. No posterior oropharyngeal erythema.   Cardiovascular:      Rate and Rhythm: Normal rate and regular rhythm.      Heart sounds: Normal heart sounds.   Pulmonary:      Effort: Pulmonary effort is normal.      Breath sounds: Normal breath sounds.   Abdominal:      General: Abdomen is flat. Bowel sounds are normal. There is no distension.      Palpations: Abdomen is soft.      Tenderness: There is abdominal tenderness in the left lower quadrant.   Skin:     General: Skin is warm and dry.   Neurological:      General: No focal deficit present.      Mental Status: She is alert and oriented to person, place, and time.   Psychiatric:         Mood and Affect: Mood normal.         Behavior: Behavior normal.       Procedures           ED Course                                           Medical Decision Making  Yun Blackwell is a very pleasant 67 y.o. female who presents to the ED for abdominal pain, nausea, vomiting.     Patient was non-toxic and not-ill appearing on arrival. Vital signs stable, afebrile.     Patient's presentation raises  suspicion for differentials including, but not limited to, UTI, diverticulitis, gastroenteritis.     External (non-ED) record review: None    Given this, Yun was placed on the monitor. Laboratory studies and imaging studies were ordered including CBC, CMP, lipase, lactic acid, urinalysis, CT abdomen pelvis with contrast, COVID/flu swab.     Yun was given IV fluids, IV Zofran, IV Rocephin for symptomatic relief.    Imaging was reviewed by [facility] radiologist. This revealed no acute findings within the abdomen or pelvis. Moderate/large retained colonic and rectal stool. No bowel obstruction. No intraperitoneal free fluid or free aur, Scattered diverticulosis without diverticulitis.     Labs were reviewed and are unremarkable. Urinalysis with 4+ bacteria, 13-20 WBC, positive nitrates. On re-evaluation, patient remained hemodynamically stable and appeared to be comfortable and in no acute distress.    Given findings described above, patient's presentation is most likely related to urinary tract infection. Patient was given Rocephin in the ER and prescribed cefdinir at discharge. Encouraged to take the full course of this.    I discussed all of the lab and imaging results with the patient during this visit in the emergency department. I answered all the questions regarding the emergency department evaluation, diagnosis, and treatment plan. We talked about how crucial it is for the patient to follow up by calling his primary care provider as soon as possible to schedule an appointment (by calling their office) for within the next few days or as soon as possible so that the symptoms can be reassessed to see if they have improved or to answer any additional questions. I also provided the patient with advice on returning safely and urged the patient to visit the emergency department right away if any worsening or new symptoms appeared. The patient verbalized understanding of the discharge instructions and agreed with  themJose Malcolm was discharged in stable condition.    Signed by:   MARÍA Madrigal 9/8/2023 11:39 CDT     Dragon disclaimer:  Part of this note may be an electronic transcription/translation of spoken language to printed text using the Dragon Dictation System.    Problems Addressed:  Abdominal pain, unspecified abdominal location: acute illness or injury  Cystitis: acute illness or injury    Amount and/or Complexity of Data Reviewed  Radiology: ordered.    Risk  Prescription drug management.        Final diagnoses:   Cystitis   Abdominal pain, unspecified abdominal location       ED Disposition  ED Disposition       ED Disposition   Discharge    Condition   Stable    Comment   --               ANGEL Healy MD  [address] [facility] [address]  [address]  [facility] [address]  [phone]    Schedule an appointment as soon as possible for a visit in 1 day      [facility] EMERGENCY DEPARTMENT  [address] [place] [id number] [phone]  Go to   If symptoms worsen         Medication List        New Prescriptions      cefdinir 300 MG capsule  Commonly known as: OMNICEF  Take 1 capsule by mouth 2 (Two) Times a Day for 7 days.               Where to Get Your Medications        These medications were sent to [facility]  [address] [place] [address] [address], [facility] [address]      Hours: Monday to Friday 7 AM to 5 PM (Closed 12:30 PM to 1 PM) Phone: [phone]   cefdinir 300 MG capsule            Tiffanie Prather APRN  09/08/23 1146

## 2023-09-08 NOTE — DISCHARGE INSTRUCTIONS
It was very nice to meet you, Yun. Thank you for allowing us to take care of you today at [facility].    Your evaluation today did not show any emergent findings or have any emergent indications for admission to the hospital.     Please understand that an ER evaluation is just the start of your evaluation. We will do what we can, but we are often unable to fully figure out what is causing your symptoms from one evaluation. Thus, our primary goal is to determine whether you need to be evaluated in the hospital or if it is safe for you to go home and see other doctors such as a primary care physician or a specialist on an outpatient basis.     Like we discussed, it is VERY IMPORTANT that you follow up with your primary care doctor (call them to set up an appointment) within the next few days or as soon as possible so that you can be re-evaluated for improvement in your symptoms or for any other questions.     Please return to the emergency room within 12-48 hours if you experience any new or worsening symptoms.

## 2023-09-10 LAB — BACTERIA SPEC AEROBE CULT: ABNORMAL

## 2023-09-15 ENCOUNTER — OFFICE VISIT (OUTPATIENT)
Dept: NEUROLOGY | Age: 68
End: 2023-09-15
Payer: MEDICARE

## 2023-09-15 VITALS
SYSTOLIC BLOOD PRESSURE: 117 MMHG | HEIGHT: 63 IN | WEIGHT: 159 LBS | DIASTOLIC BLOOD PRESSURE: 74 MMHG | BODY MASS INDEX: 28.17 KG/M2 | HEART RATE: 80 BPM

## 2023-09-15 DIAGNOSIS — G47.33 OBSTRUCTIVE SLEEP APNEA: Primary | ICD-10-CM

## 2023-09-15 DIAGNOSIS — Z99.89 CPAP (CONTINUOUS POSITIVE AIRWAY PRESSURE) DEPENDENCE: ICD-10-CM

## 2023-09-15 DIAGNOSIS — R68.2 DRY MOUTH: ICD-10-CM

## 2023-09-15 DIAGNOSIS — Z78.9 DIFFICULTY WITH CPAP NASAL MASK USE: ICD-10-CM

## 2023-09-15 PROCEDURE — 1036F TOBACCO NON-USER: CPT | Performed by: PHYSICIAN ASSISTANT

## 2023-09-15 PROCEDURE — G8417 CALC BMI ABV UP PARAM F/U: HCPCS | Performed by: PHYSICIAN ASSISTANT

## 2023-09-15 PROCEDURE — 3017F COLORECTAL CA SCREEN DOC REV: CPT | Performed by: PHYSICIAN ASSISTANT

## 2023-09-15 PROCEDURE — G8399 PT W/DXA RESULTS DOCUMENT: HCPCS | Performed by: PHYSICIAN ASSISTANT

## 2023-09-15 PROCEDURE — 99214 OFFICE O/P EST MOD 30 MIN: CPT | Performed by: PHYSICIAN ASSISTANT

## 2023-09-15 PROCEDURE — G8427 DOCREV CUR MEDS BY ELIG CLIN: HCPCS | Performed by: PHYSICIAN ASSISTANT

## 2023-09-15 PROCEDURE — 1090F PRES/ABSN URINE INCON ASSESS: CPT | Performed by: PHYSICIAN ASSISTANT

## 2023-09-15 PROCEDURE — 1123F ACP DISCUSS/DSCN MKR DOCD: CPT | Performed by: PHYSICIAN ASSISTANT

## 2023-09-15 RX ORDER — ATENOLOL 100 MG/1
TABLET ORAL
COMMUNITY
Start: 2023-08-30

## 2023-09-15 NOTE — PATIENT INSTRUCTIONS
long-term follow-up should be established. Annual visits are reasonable, with more frequent visits in between if new issues arise. The purpose of long-term follow-up is to assess usage and monitor for recurrent GILBERTO, new side effects, air leakage, and fluctuations in body weight. WHERE TO GET MORE INFORMATION -- Your healthcare provider is the best source of information for questions and concerns related to your medical problem. Organizations  American Sleep Apnea Association  Provides information about sleep apnea to the public, publishes a newsletter, and serves as an advocate for people with the disorder. [address], [address] [facility]   [address], [address]   [email or web address]   [email or web address]   Tel: [phone]   Fax: [phone] [facility],[place] [address] organization that works to [employer] and safety by promoting public understanding of sleep and sleep disorders. Supports sleep-related education, research, and advocacy; produces and distributes educational materials to the public and healthcare professionals; and offers postdoctoral fellowships and grants for sleep researchers. [address] [place] [address]   [email or web address]   [email or web address]   Tel: [phone]   Fax: [phone]    Important information:  Medicare/private insurance CPAP/BiPAP/APAP requirements:  Medicare/private insurance has specific requirements for PAP compliance that must be met during the first 90 days of use to continue coverage for CPAP/BiPAP/APAP  from day 91 and beyond. The policy requires that patients use a PAP device 4 hours per 24 hour period, at least 70% of the time over a 30 day period. This data must be downloaded as a report direct from the PAP devices. This is called a compliance download. Your PAP supplier will assist you in this matter.      Note:  Where applicable, we will utilize PAP device efficiency

## 2023-09-15 NOTE — PROGRESS NOTES
REVIEW OF SYSTEMS    Constitutional: []Fever []Sweats []Chills [] Recent Injury   [x] Denies all unless marked  HENT:[]Headache  [] Head Injury  [] Sore Throat  [] Ear Pain  [] Dizziness [] Hearing Loss   [x] Denies all unless marked  Musculoskeletal: [] Arthralgia  [] Myalgias [] Muscle cramps  [] Muscle twitches   [x] Denies all unless marked   Spine:  [] Neck pain  [] Back pain  [] Sciatica  [x] Denies all unless marked  Neurological:[] Visual Disturbance [] Double Vision [] Slurred Speech [] Trouble swallowing  [] Vertigo [] Tingling [] Numbness [] Weakness [] Loss of Balance   [] Loss of Consciousness [] Memory Loss [] Seizures  [x] Denies all unless marked  Psychiatric/Behavioral:[] Depression [] Anxiety  [x] Denies all unless marked  Sleep: []  Insomnia [] Sleep Disturbance [] Snoring [] Restless Legs [] Daytime Sleepiness [x] Sleep Apnea  [x] Denies all unless marked
[place] in [place] but has seen [facility] cardiology    Acid reflux     Breast cancer ([address])     Breast mass     Cancer ([address])     Breast Cancer in SITU    CPAP (continuous positive airway pressure) dependence     Diabetes ([address])     type 2 (currently diet-controlled)    Hyperlipidemia     elevated triglycerides, but low cholesterol level    Hypertension     Prolonged emergence from general anesthesia     Sleep apnea     CPAP       Past Surgical History:   Procedure Laterality Date    BREAST BIOPSY Left 02/26/2019    EXCISION LEFT BREAST MASS WITH INTRAOP ULTRASOUND GUIDED NEEDLE LOCALIZATION performed by Nicki Kerns MD at [facility] LUMPECTOMY Left 01/31/2020    PREOP ULTRASOUND WITH LEFT LUMPECTOMY, NO SENTINEL NODE BIOPSY, INTRAOP ULTRASOUND GUIDED NEEDLE LOCALIZATION, BIOZORB, FLAPS, PECTORAL BLOCK, IORT performed by Nicki Kerns MD at [facility] [address]      after vaginal prolapse repair/damaged    HYSTERECTOMY (CERVIX STATUS UNKNOWN)      HYSTERECTOMY, VAGINAL      [facility] BREAST BIOPSY LEFT Left 12/16/2021    [facility] STEROTACT[facility] LOC BREAST BIOPSY LEFT 12/16/2021 [facility] [address]    [facility] LOC BREAST BIOPSY LEFT Left 7/10/2023    [facility] LOC BREAST BIOPSY LEFT 7/10/2023 [facility] Bilateral 07/19/2016    PLANTAR FASCIA INJECTION FEET performed by Cena Hatchet, DPM at [address] [place] [address].  2013       Recent Hospitalizations      Significant Injuries      Family History   Problem Relation Age of Onset    Diabetes Mother     Diabetes Father     Lung Cancer Father     Breast Cancer Sister     Brain Cancer Brother     Diabetes Sister     Diabetes Sister     Diabetes Sister     Diabetes Sister     Diabetes Brother     Diabetes Brother     Diabetes Brother     Uterine Cancer Maternal Aunt     Uterine Cancer Niece         Age Unknown    ADHD Niece     Breast Cancer Niece        Social

## 2023-09-18 ENCOUNTER — TELEPHONE (OUTPATIENT)
Dept: INTERNAL MEDICINE | Facility: CLINIC | Age: 68
End: 2023-09-18
Payer: COMMERCIAL

## 2023-09-18 NOTE — TELEPHONE ENCOUNTER
PLEASE CALL IN ONE TOUCH ULTRA 50 TEST STRIPS TO [facility]

## 2023-09-19 NOTE — TELEPHONE ENCOUNTER
Voltaren gel is over-the-counter and she can get this at any pharmacy.  I do not see any indication for glucose test strips.  Thanks

## 2023-09-19 NOTE — TELEPHONE ENCOUNTER
PATIENT HAS BEEN CALLED, SHE STATED THAT DR. PRICE PRESCRIBED HER THE GLUCOSE MONITOR AND SUPPLIES YEARS AGO.  SHE STATED THAT HE DONE A 24hour  URINE PATIENT WAS DIAGNOSED WITH PREDIABETIC.   SHE STATED THAT AFTER DR. PRICE SHE THEN SAW DR. JUVE DESAI AND HE KEPT HER ON BID GLUCOSE CHECKS.   SHE STATED THAT SHE HAS ALWAYS CHECKED HER BLOOD SUGAR 2 TIMES A DAY.    SHE STATED THAT SHE HAS BEEN CONTROLLING IT WITH

## 2023-09-19 NOTE — TELEPHONE ENCOUNTER
There is no indication for glucose checks especially twice a day glucose checks.  She can get test strips over-the-counter if she would like, but I do not think checking her glucose is necessary.

## 2023-09-20 ENCOUNTER — TELEPHONE (OUTPATIENT)
Dept: CARDIOLOGY | Facility: CLINIC | Age: 68
End: 2023-09-20
Payer: COMMERCIAL

## 2023-09-20 NOTE — TELEPHONE ENCOUNTER
Patient is having a severe allergic reaction (itching and scratching her hands and head until they bleed). This began on 9/18, and she believes it is from the Pradaxa although she's been taking this for almost 3 months. Dr Simon Rodriugez told her to stop the Pradaxa and has prescribed a steroid and Atarax. She wanted to ask about an alternative to Pradaxa. Please advise. Lisa Andres MA

## 2023-09-22 NOTE — TELEPHONE ENCOUNTER
" Van Marcelino MD  You42 minutes ago (2:02 PM)     MR  The other options would be Eliquis or Xarelto, either both which I feel like she might have been on but also may have had cost issues prompting us to select Pradaxa as the more recent change from Coumadin.  If the other drugs are not affordable for her, then Coumadin would be the only other alternative.       Notified patient. She said Dr Rodriguez suggested she stay off Pradaxa for a week, and then go back on it to \"shock her system.\" Please advise. Lisa Andres MA    "

## 2023-09-25 NOTE — TELEPHONE ENCOUNTER
Van Marcelino MD  You2 days ago     MR  That is a reasonable strategy - if symptoms resolve when off pradaxa, then return when back on it - that would confirm it as the culprit.  If that becomes the reality, then we'll need to try alternatives so please have her let us know.  Thanks!       Patient will try reintroducing Pradaxa with Hydroxyzine now that her itching has stopped. She does NOT want to take coumadin, and states Dr Sims had her taking that in the past and could never achieve the right INR (it gave her anxiety checking and readjusting medication dose all the time).Lisa Andres MA

## 2023-09-29 ENCOUNTER — TREATMENT (OUTPATIENT)
Dept: PHYSICAL THERAPY | Facility: CLINIC | Age: 68
End: 2023-09-29
Payer: MEDICARE

## 2023-09-29 ENCOUNTER — TELEPHONE (OUTPATIENT)
Dept: INTERNAL MEDICINE | Facility: CLINIC | Age: 68
End: 2023-09-29

## 2023-09-29 DIAGNOSIS — R26.9 ABNORMALITY OF GAIT AND MOBILITY: Primary | ICD-10-CM

## 2023-09-29 DIAGNOSIS — Z98.890 H/O FOOT SURGERY: ICD-10-CM

## 2023-09-29 DIAGNOSIS — R29.6 MULTIPLE FALLS: ICD-10-CM

## 2023-09-29 NOTE — TELEPHONE ENCOUNTER
Systemic absorption of the topical Voltaren is very low and should be okay to take with blood thinners.

## 2023-09-29 NOTE — PROGRESS NOTES
Physical Therapy Treatment Note and 30 Day Progress Note  [address] [place] [address]    Patient: Yun Blackwell                                                 Visit Date: 2023  :     1955    Referring practitioner:    MARÍA Rubio  Date of Initial Visit:          Type: THERAPY  Noted: 2023    Patient seen for 11 sessions    Visit Diagnoses:    ICD-10-CM ICD-9-CM   1. Abnormality of gait and mobility  R26.9 781.2   2. H/O foot surgery  Z98.890 V15.29   3. Multiple falls  R29.6 V15.88     SUBJECTIVE     Subjective: She feels like she is doing really well, she is doing HEP. She still feels a little unsteady but denies falls. She feels 50% improved, she wants to be able to attempt walking around her neighborhood and she is afraid to attempt.    PAIN: /10          OBJECTIVE     Objective     LE MMT   HIP Strength L Strength R   extension 3  3   ABD 4-  4-        Neuromuscular Reeducation     85426 Comments   NeuroCom testing limits of stability and rhythmic weight shift Slow forward reaction time, difficulty shifting weight forward and to the L/R; w/in age-matched norms for slow A/P M/L weight shift but below age-matched norms for medium and fast weight shifting   FGA     miniBEST  1) 1 2) 2 3) 1 4) 0 5) 1 6) 1 7) 2 8) 1 9) 0 10) 1 11) 2 12) 2 13) 0 14) 0   Timed Minutes 52        Therapeutic Exercises    85393 Units Comments   See MMT     Timed Minutes 8       Therapy Education/Self Care 28821   Education offered today Results and implications of functional test results   Medbridge Code [id number]    Ongoing HEP     Date: 2023  Prepared by: Rosa Causey    Exercises  - Single Leg Stance with Support  - 2 x daily - 7 x weekly - 2 sets - 10 reps  - Standing Ankle Dorsiflexion Stretch  - 2 x daily - 7 x weekly - 2 sets - 10 reps  - Standing Soleus Stretch  - 2 x daily - 7 x weekly - 2 sets - 10 reps  - Standing  Hip Hiking  - 2 x daily - 7 x weekly - 2 sets - 10 reps  - Standing Hip Abduction with Counter Support  - 2 x daily - 7 x weekly - 2 sets - 10 reps  - Sidelying Hip Abduction  - 2 x daily - 7 x weekly - 2 sets - 10 reps  - Hooklying Single Leg Bent Knee Fallouts with Resistance  - 2 x daily - 7 x weekly - 2 sets - 10 reps  - Seated [place] with Resistance  - 2 x daily - 7 x weekly - 2 sets - 10 reps  - Supine Bridge  - 2 x daily - 7 x weekly - 1 sets - 10 reps      Timed Minutes 15       Total Timed Treatment:     75   mins  Total Time of Visit:             75   mins         ASSESSMENT/PLAN     GOALS  Goals                                          Progress Note due by 10/27/23                                                      Recert due by 11/4/23   LTG by: 6 weeks Comments Date Status   Improve passive left DF to 15 deg 15 deg after stretch 8/30 MET   Improve destiny hip abd MMT to 4+/5  see grid  9/29  ongoing   Walk with symmetrical gait with equal stance and stable hips without a lateral lurch  decreased stance time R, trendelenburg B  9/29  Ongoing   SLS left LE x 5 sec  ~2-3 secs max B 9/29 Ongoing   FAAM score of   no initial goal score listed  9/29  D/C   Able to walk up/down 2 steps using rail for balance only alternating steps  RLE on ascent reciprocal pattern and HR, LLE leading non-reciprocal descent  9/29 ongoing   Improve forward reaction time and medium/fast rhythmic weight shift to w/in age/sex-matched norms w/ NeuroCom testing  9/29 NEW   Improve miniBEST score by >/=4 pts  9/29 NEW   Improve FGA score by >/=4 pts  9/29 NEW   Independent with HEP  Progressed HEP 8/28 Ongoing       Assessment & Plan       Assessment  Impairments: abnormal gait, abnormal or restricted ROM, lacks appropriate home exercise program, pain with function and weight-bearing intolerance   Functional limitations: walking, uncomfortable because of pain, standing and unable to perform repetitive tasks   Barriers to therapy: destiny  severe knee OA  Prognosis: good    Plan  Therapy options: will be seen for skilled therapy services  Planned modality interventions: dry needling, low level laser therapy and TENS  Planned therapy interventions: manual therapy, joint mobilization, soft tissue mobilization, stretching, strengthening, gait training, functional ROM exercises, therapeutic activities and home exercise program  Frequency: 3x week  Duration in weeks: 12  Treatment plan discussed with: patient       ASSESSMENT: Progress note performed per POC. She has achieved 1/6 initial goals, one goal D/C'd d/t not being completed on PT IE, and three new goals added to further address balance deficits. Functional outcome measures for balance today yielded deficits in gait speed, reaction time and stepping strategy forward, functional proprioceptive deficits, and heavy visual dependence for static and dynamic balance. These deficits in combination with her scores further illustrate high fall-risk. Her HEP will be bolstered to specifically address these deficits as well as remaining deficits in hip strength B. She continues to benefit from skilled OP PT services to optimize safety and independence w/ functional mobility.     PLAN: Bolster HEP. Continue to work on hip strengthening as well as gastroc/soleus tightness. Emphasize proper gait mechanics. Limit visual reliance during functional balance re-ed and NMR specifically targeting forward stepping and ankle strategies and proprioception. Progress to these types of activities on incline/decline and outdoors to enhance community mobility safety.     SIGNATURE: Lashay Davis, [place], [place] License #: [id number]  Electronically Signed on 9/29/2023        [place] [address]  [phone]

## 2023-09-29 NOTE — TELEPHONE ENCOUNTER
Caller: Yun Blackwell    Relationship: Self    Best call back number: [phone]     What medications are you currently taking:   Current Outpatient Medications on File Prior to Visit   Medication Sig Dispense Refill    apixaban (ELIQUIS) 5 MG tablet tablet Take 1 tablet by mouth 2 (Two) Times a Day.      cyanocobalamin 1000 MCG/ML injection Inject 1 mL Every 28 (Twenty-Eight) Days. (Patient not taking: Reported on 8/3/2023) 1 mL 5    dabigatran etexilate (PRADAXA) 150 MG capsu Take 1 capsule by mouth 2 (Two) Times a Day. 60 capsule 11    dilTIAZem CD (Cardizem CD) 120 MG 24 hr capsule Take 1 capsule by mouth Daily. 30 capsule 11    hydrOXYzine (ATARAX) 10 MG tablet Take 1 or 2 tablets every 4 to 6 hours as needed for itching. 60 tablet 0    methylPREDNISolone (Medrol) 4 MG dose pack Follow package directions. 21 tablet 0    montelukast (SINGULAIR) 10 MG tablet Take 1 tablet by mouth Every Night. 30 tablet 5    omeprazole (priLOSEC) 20 MG capsule Take 1 capsule by mouth Daily. 30 capsule 11    tamoxifen (NOLVADEX) 20 MG chemo tablet Take 1 tablet by mouth daily 90 tablet 1    tiZANidine (ZANAFLEX) 4 MG tablet Take 1 tablet by mouth once Daily As Needed for Muscle Spasms. 30 tablet 2     Current Facility-Administered Medications on File Prior to Visit   Medication Dose Route Frequency Provider Last Rate Last Admin    cyanocobalamin injection 1,000 mcg  1,000 mcg Intramuscular Q28 Days ANGEL Healy MD   1,000 mcg at 12/22/22 0845          Which medication are you concerned about: VOLTEREN CREAM     Who prescribed you this medication: GREGORY     What are your concerns: IF THIS MEDICATION IS SAFE TO TAKE WITH BLOOD THINNERS

## 2023-10-02 ENCOUNTER — TELEPHONE (OUTPATIENT)
Dept: CARDIOLOGY | Facility: CLINIC | Age: 68
End: 2023-10-02

## 2023-10-02 DIAGNOSIS — D05.12 DUCTAL CARCINOMA IN SITU (DCIS) OF LEFT BREAST: ICD-10-CM

## 2023-10-02 RX ORDER — TAMOXIFEN CITRATE 20 MG/1
20 TABLET ORAL DAILY
Qty: 90 TABLET | Refills: 1 | Status: SHIPPED | OUTPATIENT
Start: 2023-10-02

## 2023-10-02 NOTE — TELEPHONE ENCOUNTER
Caller:     Relationship:     Best call back number: [phone]    What is the best time to reach you: ANY OKAY TO LEAVE A MESSAGE        What was the call regarding: WAS TOLD TO HOLD PRADAXA FOR A WEEK DUE TO HIVES.      THE HIVES HAVE GONE AWAY BUT SHE WANTS TO KNOW IF SHE SHOULD START TAKING IT AGAIN.  SHE IS ALSO TAKING  HYDROXYZINE (FOR ITCHING) AT THIS MOMENT.

## 2023-10-02 NOTE — TELEPHONE ENCOUNTER
Notified patient to start taking Pradaxa again, and continue taking Hydroxyzine. She is to call and let us know if her itching returns. Patient voiced understanding and appreciation. Lisa Andres MA

## 2023-10-09 ENCOUNTER — TELEPHONE (OUTPATIENT)
Dept: PODIATRY | Facility: CLINIC | Age: 68
End: 2023-10-09
Payer: COMMERCIAL

## 2023-10-09 ENCOUNTER — HOSPITAL ENCOUNTER (OUTPATIENT)
Dept: GENERAL RADIOLOGY | Facility: HOSPITAL | Age: 68
Discharge: HOME | End: 2023-10-09
Admitting: PODIATRIST
Payer: MEDICARE

## 2023-10-09 DIAGNOSIS — M79.672 FOOT PAIN, LEFT: ICD-10-CM

## 2023-10-09 DIAGNOSIS — M79.672 FOOT PAIN, LEFT: Primary | ICD-10-CM

## 2023-10-09 PROCEDURE — 73630 X-RAY EXAM OF FOOT: CPT

## 2023-10-10 ENCOUNTER — HOSPITAL ENCOUNTER (OUTPATIENT)
Dept: WOMENS IMAGING | Age: 68
Discharge: HOME | End: 2023-10-10
Payer: MEDICARE

## 2023-10-10 DIAGNOSIS — Z91.89 AT RISK FOR OSTEOPENIA: ICD-10-CM

## 2023-10-10 DIAGNOSIS — Z78.0 POST-MENOPAUSAL: ICD-10-CM

## 2023-10-10 DIAGNOSIS — R92.0 MICROCALCIFICATIONS OF THE BREAST: ICD-10-CM

## 2023-10-10 DIAGNOSIS — Z85.3 PERSONAL HISTORY OF MALIGNANT NEOPLASM OF BREAST: ICD-10-CM

## 2023-10-10 PROCEDURE — 77065 DX MAMMO INCL CAD UNI: CPT

## 2023-10-10 PROCEDURE — 77080 DXA BONE DENSITY AXIAL: CPT

## 2023-10-10 PROCEDURE — G0279 TOMOSYNTHESIS, MAMMO: HCPCS

## 2023-10-10 NOTE — PROGRESS NOTES
HISTORY OF PRESENT ILLNESS:    Ms. 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 ER is positive at 100%. ID is positive at 98%. Prelude DX demonstrates a reduction of 15% all recurrences to 7%. Reduction in invasive cancer recurrences goes from 9% down to 5%. MRI- 12/18/2019     EXAMINATION: MRI BREAST BILATERAL W WO CONTRAST 12/18/2019 2:49 PM   HISTORY: Left breast cancer, DCIS. Strong family history of breast   cancer   COMPARISON: Screening mammogram 12/2/2019, diagnostic mammogram   12/2/2019; left breast biopsy 12/4/2019   Technical: Multiplanar, multisequence imaging was performed through   the breasts before and after the administration of IV contrast.   FINDINGS:   Background parenchymal enhancement is minimal and appears symmetric. There are scattered areas of fibroglandular density. Right breast:   No mass or nonmass enhancement in the right breast to suggest   malignancy. Left breast:   There is a discrepancy in the breast size, with the left breast   smaller in size in the right. Postbiopsy changes are seen within the   upper slightly outer quadrant of the left breast. A biopsy marking   clip is present. A small hematoma is present which measures up to 1.4   cm in size. There is discontinuous nonmass enhancement extending   posteriorly from the hematoma site for approximately 3.3 cm. There is   associated increased T2 signal. No additional areas of mass or nonmass   enhancement are seen in the left breast.   Lymph nodes:   No suspicious axillary or internal mammary lymphadenopathy is   identified. Impression   1. Postbiopsy changes in the left breast with a small hematoma at the   biopsy site. Abnormal enhancement extends in a linear configuration   from the posterior aspect of the biopsy site.  Based upon

## 2023-10-11 ENCOUNTER — TREATMENT (OUTPATIENT)
Dept: PHYSICAL THERAPY | Facility: CLINIC | Age: 68
End: 2023-10-11
Payer: MEDICARE

## 2023-10-11 DIAGNOSIS — R26.9 ABNORMALITY OF GAIT AND MOBILITY: Primary | ICD-10-CM

## 2023-10-11 DIAGNOSIS — Z98.890 H/O FOOT SURGERY: ICD-10-CM

## 2023-10-11 DIAGNOSIS — R29.6 MULTIPLE FALLS: ICD-10-CM

## 2023-10-11 NOTE — PROGRESS NOTES
Physical Therapy Treatment Note  [address] [place] [address]    Patient: Yun Blackwell                                                 Visit Date: 10/11/2023  :     1955    Referring practitioner:    MARÍA Rubio  Date of Initial Visit:          Type: THERAPY  Noted: 2023    Patient seen for 12 sessions    Visit Diagnoses:    ICD-10-CM ICD-9-CM   1. Abnormality of gait and mobility  R26.9 781.2   2. H/O foot surgery  Z98.890 V15.29   3. Multiple falls  R29.6 V15.88     SUBJECTIVE     Subjective: She has been doing okay, she started having swelling and pain on the medial aspect of her L foot. Her xrays came back okay.    PAIN: 0/10       OBJECTIVE     Objective     Neuromuscular Reeducation     76846 Comments   [place] EO/EC    Tandem walking forward/backward EO/EC head turns/nods    Forward step overs 4 stacked TD to pillow on floor EO/EC    Timed Minutes 30        Therapy Education/Self Care 88715   Education offered today Bolstered and reviewed HEP   Medbridge Code [id number]    Ongoing HEP     Date: 10/11/2023  Prepared by: Lashay Davis    Exercises  - Single Leg Stance with Support  - 2 x daily - 7 x weekly - 2 sets - 10 reps  - Standing Ankle Dorsiflexion Stretch  - 2 x daily - 7 x weekly - 2 sets - 10 reps  - Standing Soleus Stretch  - 2 x daily - 7 x weekly - 2 sets - 10 reps  - Standing Hip Abduction with Counter Support  - 2 x daily - 7 x weekly - 2 sets - 10 reps  - Sidelying Hip Abduction  - 2 x daily - 7 x weekly - 2 sets - 10 reps  - Hooklying Single Leg Bent Knee Fallouts with Resistance  - 2 x daily - 7 x weekly - 2 sets - 10 reps  - Supine Bridge  - 2 x daily - 7 x weekly - 1 sets - 10 reps  - Standing Tandem Balance with Counter Support  - 2-3 x daily - 7 x weekly - 2 reps - 30 sec hold  - Tandem Walking with Counter Support  - 1 x daily - 7 x weekly - 3 sets - 10 reps  - Alternating Step Taps with Counter  Support  - 1 x daily - 4-5 x weekly - 2-3 sets - 10 reps  - Step-Over  - 1 x daily - 4-5 x weekly - 2-3 sets - 10 reps      Timed Minutes 15     GOALS  Goals                                          Progress Note due by 10/27/23                                                      Recert due by 11/4/23   LTG by: 6 weeks Comments Date Status   Improve passive left DF to 15 deg 15 deg after stretch 8/30 MET   Improve destiny hip abd MMT to 4+/5  see grid  9/29  ongoing   Walk with symmetrical gait with equal stance and stable hips without a lateral lurch  decreased stance time R, trendelenburg B  9/29  Ongoing   SLS left LE x 5 sec  ~2-3 secs max B 9/29 Ongoing   FAAM score of   no initial goal score listed  9/29  D/C   Able to walk up/down 2 steps using rail for balance only alternating steps  RLE on ascent reciprocal pattern and HR, LLE leading non-reciprocal descent  9/29 ongoing   Improve forward reaction time and medium/fast rhythmic weight shift to w/in age/sex-matched norms w/ NeuroCom testing  9/29 NEW   Improve miniBEST score by >/=4 pts  9/29 NEW   Improve FGA score by >/=4 pts  9/29 NEW   Independent with HEP  Progressed HEP 8/28 Ongoing       Total Timed Treatment:     45   mins  Total Time of Visit:             45   mins         ASSESSMENT/PLAN     Assessment/Plan     ASSESSMENT: Emphasis of today's session on developing and performing bolstered HEP for balance to improve ankle strategy and proprioception as well as stepping strategy.     PLAN: progress POC per pt tolerance    SIGNATURE: Lashay Davis, [place], [place] License #: [id number]  Electronically Signed on 10/11/2023        [place] [address]  [phone]

## 2023-10-12 ENCOUNTER — TELEPHONE (OUTPATIENT)
Dept: PODIATRY | Facility: CLINIC | Age: 68
End: 2023-10-12
Payer: COMMERCIAL

## 2023-10-12 ENCOUNTER — OFFICE VISIT (OUTPATIENT)
Dept: SURGERY | Age: 68
End: 2023-10-12
Payer: MEDICARE

## 2023-10-12 VITALS — SYSTOLIC BLOOD PRESSURE: 110 MMHG | HEART RATE: 76 BPM | DIASTOLIC BLOOD PRESSURE: 77 MMHG

## 2023-10-12 DIAGNOSIS — R92.0 MAMMOGRAPHIC MICROCALCIFICATION: ICD-10-CM

## 2023-10-12 DIAGNOSIS — R92.8 ABNORMAL MAMMOGRAM: ICD-10-CM

## 2023-10-12 DIAGNOSIS — R92.8 ABNORMAL MAMMOGRAM OF LEFT BREAST: Primary | ICD-10-CM

## 2023-10-12 DIAGNOSIS — Z98.890 STATUS POST LEFT BREAST LUMPECTOMY: ICD-10-CM

## 2023-10-12 DIAGNOSIS — D05.12 DUCTAL CARCINOMA IN SITU (DCIS) OF LEFT BREAST: ICD-10-CM

## 2023-10-12 PROCEDURE — G8399 PT W/DXA RESULTS DOCUMENT: HCPCS | Performed by: SURGERY

## 2023-10-12 PROCEDURE — 3017F COLORECTAL CA SCREEN DOC REV: CPT | Performed by: SURGERY

## 2023-10-12 PROCEDURE — 99213 OFFICE O/P EST LOW 20 MIN: CPT | Performed by: SURGERY

## 2023-10-12 PROCEDURE — G8484 FLU IMMUNIZE NO ADMIN: HCPCS | Performed by: SURGERY

## 2023-10-12 PROCEDURE — 1123F ACP DISCUSS/DSCN MKR DOCD: CPT | Performed by: SURGERY

## 2023-10-12 PROCEDURE — 1036F TOBACCO NON-USER: CPT | Performed by: SURGERY

## 2023-10-12 PROCEDURE — G8427 DOCREV CUR MEDS BY ELIG CLIN: HCPCS | Performed by: SURGERY

## 2023-10-12 PROCEDURE — 1090F PRES/ABSN URINE INCON ASSESS: CPT | Performed by: SURGERY

## 2023-10-12 PROCEDURE — G8417 CALC BMI ABV UP PARAM F/U: HCPCS | Performed by: SURGERY

## 2023-10-12 RX ORDER — TIZANIDINE 4 MG/1
4 TABLET ORAL DAILY PRN
Qty: 30 TABLET | Refills: 2 | Status: SHIPPED | OUTPATIENT
Start: 2023-10-12

## 2023-10-12 NOTE — TELEPHONE ENCOUNTER
Rx Refill Note  Requested Prescriptions     Pending Prescriptions Disp Refills    tiZANidine (ZANAFLEX) 4 MG tablet 30 tablet 2     Sig: Take 1 tablet by mouth once Daily As Needed for Muscle Spasms.      Last office visit with prescribing clinician: 8/23/2023   Last telemedicine visit with prescribing clinician: Visit date not found   Next office visit with prescribing clinician: Visit date not found                         Would you like a call back once the refill request has been completed: [] Yes [] No    If the office needs to give you a call back, can they leave a voicemail: [] Yes [] No    Jose Ramires MA  10/12/23, 13:08 CDT

## 2023-10-12 NOTE — TELEPHONE ENCOUNTER
Called pt and left voice mail regarding xray results   Subjective   Lisseth Cota is a 53 y.o. female.     History of Present Illness comes in today for general checkup.  Had recent metabolic monitoring.  Presently denies significant concerns.  Social history changed with recent unexpected death of mother after an illness.  Will be visiting with gynecology later in year.  Mammogram at that time.  Takes no routine medicines except ERT.  Denies difficulty with sleep.  Good energy.  Denies allergy symptoms visual changes.  Denies sore throat swallowing difficulties.  Denies upper or lower GI changes.  No CP SOB palpitations  changes.  No skin concerns and does see dermatology routinely.  No new orthopedic concerns.  Participating in regular exercise and also yoga classes.    The following portions of the patient's history were reviewed and updated as appropriate: allergies, past family history, past medical history, past social history, past surgical history and problem list.    Review of Systems see the history of present illness    Objective   Physical Exam   Constitutional: She is oriented to person, place, and time. She appears well-developed and well-nourished.   HENT:   Head: Normocephalic.   Right Ear: External ear normal.   Left Ear: External ear normal.   Mouth/Throat: Oropharynx is clear and moist.   Eyes: Conjunctivae and EOM are normal. Pupils are equal, round, and reactive to light.   Neck: Normal range of motion. Neck supple. No tracheal deviation present. No thyromegaly present.   Cardiovascular: Normal rate, regular rhythm, normal heart sounds and intact distal pulses.    No murmur heard.  Pulmonary/Chest: Effort normal and breath sounds normal.   Abdominal: Soft. There is no tenderness.   Musculoskeletal: Normal range of motion. She exhibits no edema.   Lymphadenopathy:     She has no cervical adenopathy.   Neurological: She is alert and oriented to person, place, and time.   Skin: Skin is warm and dry.   Psychiatric: She has a normal mood and affect.  "  Vitals reviewed.    /72 (BP Location: Right arm, Patient Position: Sitting, Cuff Size: Adult)  Pulse 82  Temp 98.2 °F (36.8 °C) (Oral)   Ht 65.5\" (166.4 cm)  Wt 174 lb 1.6 oz (79 kg)  BMI 28.53 kg/m2  Assessment/Plan   Lisseth was seen today for annual exam.    Diagnoses and all orders for this visit:    Well adult exam  -     Tdap Vaccine Greater Than or Equal To 8yo IM    Screening for colon cancer  -     Ambulatory Referral to Gastroenterology     Today we discussed recent metabolic parameters.  All are very acceptable.  Good lipid profile.  After consent Tdap vaccine administered.  You will receive flu vaccine soon.  Will make referral for screening colonoscopy.  Continue aggressive physical activity as well as good dietary choices.  Would encourage you to keep GYN appointments.  Recheck at least yearly.           "

## 2023-10-13 ENCOUNTER — TREATMENT (OUTPATIENT)
Dept: PHYSICAL THERAPY | Facility: CLINIC | Age: 68
End: 2023-10-13
Payer: MEDICARE

## 2023-10-13 ENCOUNTER — TELEPHONE (OUTPATIENT)
Dept: SURGERY | Age: 68
End: 2023-10-13

## 2023-10-13 DIAGNOSIS — R26.9 ABNORMALITY OF GAIT AND MOBILITY: Primary | ICD-10-CM

## 2023-10-13 DIAGNOSIS — Z98.890 H/O FOOT SURGERY: ICD-10-CM

## 2023-10-13 DIAGNOSIS — R29.6 MULTIPLE FALLS: ICD-10-CM

## 2023-10-13 PROBLEM — R92.8 ABNORMAL MAMMOGRAM: Status: ACTIVE | Noted: 2023-10-13

## 2023-10-13 PROBLEM — R92.0 MAMMOGRAPHIC MICROCALCIFICATION: Status: ACTIVE | Noted: 2023-10-13

## 2023-10-13 NOTE — TELEPHONE ENCOUNTER
Dilma requests that clinic return her call. The best time to reach her is Anytime. Lily Roque returned a missed call from office that she doesn't know what it is about. She does have a question about her procedure instructions and stopping a medication before the procedure? Please contact patient to clarify. Thank you.

## 2023-10-13 NOTE — TELEPHONE ENCOUNTER
Left previous message for patient to call me at my direct extension - Gave her 10/17/23 stereo bx info & Eliquis instructions - pt agreeable

## 2023-10-13 NOTE — PROGRESS NOTES
Physical Therapy Treatment Note  [address] [place] [address]    Patient: Yun Blackwell                                                 Visit Date: 10/13/2023  :     1955    Referring practitioner:    MARÍA Rubio  Date of Initial Visit:          Type: THERAPY  Noted: 2023    Patient seen for 13 sessions    Visit Diagnoses:    ICD-10-CM ICD-9-CM   1. Abnormality of gait and mobility  R26.9 781.2   2. H/O foot surgery  Z98.890 V15.29   3. Multiple falls  R29.6 V15.88       SUBJECTIVE     Subjective: She debated whether or not to come in today because her knees hurt.    PAIN: 7/10       OBJECTIVE     Objective     Therapeutic Exercises    98229 Units Comments   clamshell 15 B    SL bent knee hip ext 15 B    Reverse clamshell 15 B    SB bridge 15 * 3 sec holds    Seated hip iso hip ABD 15 * 5 sec holds    Timed Minutes 40       Therapy Education/Self Care 42738   Education offered today Bolstered and reviewed HEP   Medbridge Code [id number]    Ongoing HEP     Date: 10/11/2023  Prepared by: Lashay Davis    Exercises  - Single Leg Stance with Support  - 2 x daily - 7 x weekly - 2 sets - 10 reps  - Standing Ankle Dorsiflexion Stretch  - 2 x daily - 7 x weekly - 2 sets - 10 reps  - Standing Soleus Stretch  - 2 x daily - 7 x weekly - 2 sets - 10 reps  - Standing Hip Abduction with Counter Support  - 2 x daily - 7 x weekly - 2 sets - 10 reps  - Sidelying Hip Abduction  - 2 x daily - 7 x weekly - 2 sets - 10 reps  - Hooklying Single Leg Bent Knee Fallouts with Resistance  - 2 x daily - 7 x weekly - 2 sets - 10 reps  - Supine Bridge  - 2 x daily - 7 x weekly - 1 sets - 10 reps  - Standing Tandem Balance with Counter Support  - 2-3 x daily - 7 x weekly - 2 reps - 30 sec hold  - Tandem Walking with Counter Support  - 1 x daily - 7 x weekly - 3 sets - 10 reps  - Alternating Step Taps with Counter Support  - 1 x daily - 4-5 x weekly - 2-3  sets - 10 reps  - Step-Over  - 1 x daily - 4-5 x weekly - 2-3 sets - 10 reps      Timed Minutes      GOALS  Goals                                          Progress Note due by 10/27/23                                                      Recert due by 11/4/23   LTG by: 6 weeks Comments Date Status   Improve passive left DF to 15 deg 15 deg after stretch 8/30 MET   Improve destiny hip abd MMT to 4+/5  see grid  9/29  ongoing   Walk with symmetrical gait with equal stance and stable hips without a lateral lurch  decreased stance time R, trendelenburg B  9/29  Ongoing   SLS left LE x 5 sec  ~2-3 secs max B 9/29 Ongoing   FAAM score of   no initial goal score listed  9/29  D/C   Able to walk up/down 2 steps using rail for balance only alternating steps  RLE on ascent reciprocal pattern and HR, LLE leading non-reciprocal descent  9/29 ongoing   Improve forward reaction time and medium/fast rhythmic weight shift to w/in age/sex-matched norms w/ NeuroCom testing  9/29 NEW   Improve miniBEST score by >/=4 pts  9/29 NEW   Improve FGA score by >/=4 pts  9/29 NEW   Independent with HEP  Progressed HEP 8/28 Ongoing       Total Timed Treatment:     40   mins  Total Time of Visit:             40   mins         ASSESSMENT/PLAN     Assessment/Plan     ASSESSMENT: Greater emphasis on supine and SL strengthening of hip mm today d/t decreased tolerance to standing and dynamic activities today.     PLAN: progress POC per pt tolerance    SIGNATURE: Lashay Davis, PT, [place] License #: [id number]  Electronically Signed on 10/13/2023        [place] [address]  [phone]

## 2023-10-16 ENCOUNTER — TREATMENT (OUTPATIENT)
Dept: PHYSICAL THERAPY | Facility: CLINIC | Age: 68
End: 2023-10-16
Payer: MEDICARE

## 2023-10-16 ENCOUNTER — TELEPHONE (OUTPATIENT)
Dept: PODIATRY | Facility: CLINIC | Age: 68
End: 2023-10-16
Payer: COMMERCIAL

## 2023-10-16 DIAGNOSIS — Z98.890 H/O FOOT SURGERY: ICD-10-CM

## 2023-10-16 DIAGNOSIS — R26.9 ABNORMALITY OF GAIT AND MOBILITY: Primary | ICD-10-CM

## 2023-10-16 DIAGNOSIS — R29.6 MULTIPLE FALLS: ICD-10-CM

## 2023-10-16 PROCEDURE — 97112 NEUROMUSCULAR REEDUCATION: CPT

## 2023-10-16 NOTE — PROGRESS NOTES
Physical Therapy Treatment Note  [address] [place] [address]    Patient: Yun Blackwell                                                 Visit Date: 10/16/2023  :     1955    Referring practitioner:    MARÍA Rubio  Date of Initial Visit:          Type: THERAPY  Noted: 2023    Patient seen for 14 sessions    Visit Diagnoses:    ICD-10-CM ICD-9-CM   1. Abnormality of gait and mobility  R26.9 781.2   2. H/O foot surgery  Z98.890 V15.29   3. Multiple falls  R29.6 V15.88         SUBJECTIVE     Subjective: They found a cyst on her foot. She felt good after last session.     PAIN: 0/10       OBJECTIVE     Objective     Neuromuscular Reeducation     [id number] Comments   Stop/go EC on incline/decline    Reaction drill LE on airex tapping to color and direction on command    Reaction drill on fitter M/L and A/P trunk rotation direction on command and tossing/catching ball at random    Timed Minutes 42        Therapy Education/Self Care [id number]   Education offered today Bolstered and reviewed HEP   Medbridge Code [id number]    Ongoing HEP     Date: 10/11/2023  Prepared by: Lashay Davis    Exercises  - Single Leg Stance with Support  - 2 x daily - 7 x weekly - 2 sets - 10 reps  - Standing Ankle Dorsiflexion Stretch  - 2 x daily - 7 x weekly - 2 sets - 10 reps  - Standing Soleus Stretch  - 2 x daily - 7 x weekly - 2 sets - 10 reps  - Standing Hip Abduction with Counter Support  - 2 x daily - 7 x weekly - 2 sets - 10 reps  - Sidelying Hip Abduction  - 2 x daily - 7 x weekly - 2 sets - 10 reps  - Hooklying Single Leg Bent Knee Fallouts with Resistance  - 2 x daily - 7 x weekly - 2 sets - 10 reps  - Supine Bridge  - 2 x daily - 7 x weekly - 1 sets - 10 reps  - Standing Tandem Balance with Counter Support  - 2-3 x daily - 7 x weekly - 2 reps - 30 sec hold  - Tandem Walking with Counter Support  - 1 x daily - 7 x weekly - 3 sets - 10 reps  -  Alternating Step Taps with Counter Support  - 1 x daily - 4-5 x weekly - 2-3 sets - 10 reps  - Step-Over  - 1 x daily - 4-5 x weekly - 2-3 sets - 10 reps      Timed Minutes      GOALS  Goals                                          Progress Note due by 10/27/23                                                      Recert due by 11/4/23   LTG by: 6 weeks Comments Date Status   Improve passive left DF to 15 deg 15 deg after stretch 8/30 MET   Improve destiny hip abd MMT to 4+/5  see grid  9/29  ongoing   Walk with symmetrical gait with equal stance and stable hips without a lateral lurch  decreased stance time R, trendelenburg B  9/29  Ongoing   SLS left LE x 5 sec  ~2-3 secs max B 9/29 Ongoing   FAAM score of   no initial goal score listed  9/29  D/C   Able to walk up/down 2 steps using rail for balance only alternating steps  RLE on ascent reciprocal pattern and HR, LLE leading non-reciprocal descent  9/29 ongoing   Improve forward reaction time and medium/fast rhythmic weight shift to w/in age/sex-matched norms w/ NeuroCom testing  9/29 NEW   Improve miniBEST score by >/=4 pts  9/29 NEW   Improve FGA score by >/=4 pts  9/29 NEW   Independent with HEP  Progressed HEP 8/28 Ongoing       Total Timed Treatment:     42   mins  Total Time of Visit:             42   mins         ASSESSMENT/PLAN     Assessment/Plan     ASSESSMENT: Emphasis today on refining reaction time and ankle strategy on compliant and uneven surfaces. She elects high hand guard and wide AKUA w/ most of these tasks as well as consistent fear of falling.     PLAN: progress POC per pt tolerance    SIGNATURE: Lashay Davis PT, [place] License #: [id number]  Electronically Signed on 10/16/2023        [place] [address]  [phone]

## 2023-10-17 ENCOUNTER — HOSPITAL ENCOUNTER (OUTPATIENT)
Dept: WOMENS IMAGING | Age: 68
Discharge: HOME | End: 2023-10-17
Attending: SURGERY
Payer: MEDICARE

## 2023-10-17 ENCOUNTER — OFFICE VISIT (OUTPATIENT)
Dept: INTERNAL MEDICINE | Facility: CLINIC | Age: 68
End: 2023-10-17
Payer: MEDICARE

## 2023-10-17 VITALS
OXYGEN SATURATION: 97 % | HEART RATE: 56 BPM | WEIGHT: 162.9 LBS | SYSTOLIC BLOOD PRESSURE: 120 MMHG | TEMPERATURE: 97.6 F | BODY MASS INDEX: 28.86 KG/M2 | DIASTOLIC BLOOD PRESSURE: 80 MMHG | HEIGHT: 63 IN

## 2023-10-17 DIAGNOSIS — R73.03 PREDIABETES: Primary | ICD-10-CM

## 2023-10-17 DIAGNOSIS — E53.8 B12 DEFICIENCY: ICD-10-CM

## 2023-10-17 DIAGNOSIS — R92.8 ABNORMAL MAMMOGRAM OF LEFT BREAST: ICD-10-CM

## 2023-10-17 DIAGNOSIS — R92.1 BREAST CALCIFICATIONS: ICD-10-CM

## 2023-10-17 DIAGNOSIS — I48.21 PERMANENT ATRIAL FIBRILLATION: ICD-10-CM

## 2023-10-17 DIAGNOSIS — Z00.00 HEALTHCARE MAINTENANCE: ICD-10-CM

## 2023-10-17 PROCEDURE — 77065 DX MAMMO INCL CAD UNI: CPT

## 2023-10-17 PROCEDURE — 19081 BX BREAST 1ST LESION STRTCTC: CPT

## 2023-10-17 PROCEDURE — 88305 TISSUE EXAM BY PATHOLOGIST: CPT

## 2023-10-17 NOTE — PATIENT INSTRUCTIONS
-Please get your labs 2 to 3 days prior to your next visit so we can discuss them during that visit.     patient

## 2023-10-17 NOTE — PROGRESS NOTES
Chief Complaint   Patient presents with    Follow-up     6 month     Hypertension         History:  Yun Blackwell is a 67 y.o. female who presents today for evaluation of the above problems.      6 month follow-up.    She notes stomach cramping that has resolved.  Symptoms resolved with Pepto-Bismol.  She does not feel like she has UTI but does not have typical UTI symptoms.  Symptoms come and go very quickly.    Dr. Marcelino took her off Eliquis and changed to Pradaxa due to cost of medication.    She is getting left breast biopsy with Dr. Branch later today.  Reviewed her mammogram from October 10, 2023 at [facility].  I also reviewed her DEXA scan from the same day showing osteopenia of the left femoral neck with a T score of -1.0.      HPI      ROS:  Review of Systems  As above      Current Outpatient Medications:     dabigatran etexilate (PRADAXA) 150 MG capsu, Take 1 capsule by mouth 2 (Two) Times a Day., Disp: 60 capsule, Rfl: 11    dilTIAZem CD (Cardizem CD) 120 MG 24 hr capsule, Take 1 capsule by mouth Daily., Disp: 30 capsule, Rfl: 11    hydrOXYzine (ATARAX) 10 MG tablet, Take 1 or 2 tablets every 4 to 6 hours as needed for itching., Disp: 60 tablet, Rfl: 0    montelukast (SINGULAIR) 10 MG tablet, Take 1 tablet by mouth Every Night., Disp: 30 tablet, Rfl: 5    omeprazole (priLOSEC) 20 MG capsule, Take 1 capsule by mouth Daily., Disp: 30 capsule, Rfl: 11    tamoxifen (NOLVADEX) 20 MG chemo tablet, Take 1 tablet by mouth daily, Disp: 90 tablet, Rfl: 1    tiZANidine (ZANAFLEX) 4 MG tablet, Take 1 tablet by mouth Daily As Needed for Muscle Spasms., Disp: 30 tablet, Rfl: 2    Current Facility-Administered Medications:     cyanocobalamin injection 1,000 mcg, 1,000 mcg, Intramuscular, Q28 Days, ANGEL Healy MD, 1,000 mcg at 12/22/22 0845    Lab Results   Component Value Date    GLUCOSE 149 (H) 09/07/2023    BUN 15 09/07/2023    CREATININE 0.50 (L) 09/07/2023    EGFR 101.7 09/07/2023    BCR 30.0 (H) 09/07/2023    K  4.6 09/07/2023    CO2 25.0 09/07/2023    CALCIUM 10.1 09/07/2023    ALBUMIN 4.3 09/07/2023    BILITOT 0.9 09/07/2023    AST 22 09/07/2023    ALT 10 09/07/2023       WBC   Date Value Ref Range Status   09/07/2023 9.67 3.40 - 10.80 10*3/mm3 Final   08/02/2023 7.23 3.98 - 10.04 K/uL Final     RBC   Date Value Ref Range Status   09/07/2023 4.41 3.77 - 5.28 10*6/mm3 Final   08/02/2023 4.20 3.93 - 5.22 M/uL Final     Hemoglobin   Date Value Ref Range Status   09/07/2023 12.6 12.0 - 15.9 g/dL Final   08/02/2023 12.4 11.2 - 15.7 g/dL Final     Hematocrit   Date Value Ref Range Status   09/07/2023 41.0 34.0 - 46.6 % Final   08/02/2023 39.2 34.1 - 44.9 % Final     MCV   Date Value Ref Range Status   09/07/2023 93.0 79.0 - 97.0 fL Final   08/02/2023 93.3 79.4 - 94.8 fL Final     MCH   Date Value Ref Range Status   09/07/2023 28.6 26.6 - 33.0 pg Final   08/02/2023 29.5 25.6 - 32.2 pg Final     MCHC   Date Value Ref Range Status   09/07/2023 30.7 (L) 31.5 - 35.7 g/dL Final   08/02/2023 31.6 (L) 32.3 - 35.5 g/dL Final     RDW   Date Value Ref Range Status   09/07/2023 13.6 12.3 - 15.4 % Final   08/02/2023 13.6 11.7 - 14.4 % Final     RDW-SD   Date Value Ref Range Status   09/07/2023 46.6 37.0 - 54.0 fl Final     MPV   Date Value Ref Range Status   09/07/2023 10.9 6.0 - 12.0 fL Final   08/02/2023 11.3 (H) 7.4 - 10.4 fL Final     Platelets   Date Value Ref Range Status   09/07/2023 167 140 - 450 10*3/mm3 Final   08/02/2023 154 (L) 182 - 369 K/uL Final     Neutrophil Rel %   Date Value Ref Range Status   08/02/2023 64.4 34.0 - 71.1 % Final     Neutrophil %   Date Value Ref Range Status   09/07/2023 73.0 42.7 - 76.0 % Final     Lymphocyte Rel %   Date Value Ref Range Status   08/02/2023 23.7 19.3 - 53.1 % Final     Lymphocyte %   Date Value Ref Range Status   09/07/2023 17.8 (L) 19.6 - 45.3 % Final     Monocyte Rel %   Date Value Ref Range Status   08/02/2023 9.7 4.7 - 12.5 % Final     Monocyte %   Date Value Ref Range Status  "  09/07/2023 7.3 5.0 - 12.0 % Final     Eosinophil Rel %   Date Value Ref Range Status   06/29/2023 0.9 0.7 - 7.0 % Final     Eosinophil %   Date Value Ref Range Status   09/07/2023 1.2 0.3 - 6.2 % Final     Basophil Rel %   Date Value Ref Range Status   06/29/2023 0.6 0.1 - 1.2 % Final     Basophil %   Date Value Ref Range Status   09/07/2023 0.5 0.0 - 1.5 % Final     Immature Grans %   Date Value Ref Range Status   09/07/2023 0.2 0.0 - 0.5 % Final     Neutrophils Absolute   Date Value Ref Range Status   08/02/2023 4.66 1.56 - 6.13 K/uL Final     Neutrophils, Absolute   Date Value Ref Range Status   09/07/2023 7.05 (H) 1.70 - 7.00 10*3/mm3 Final     Lymphocytes Absolute   Date Value Ref Range Status   08/02/2023 1.71 1.18 - 3.74 K/uL Final     Lymphocytes, Absolute   Date Value Ref Range Status   09/07/2023 1.72 0.70 - 3.10 10*3/mm3 Final     Monocytes Absolute   Date Value Ref Range Status   08/02/2023 0.70 0.24 - 0.82 K/uL Final     Monocytes, Absolute   Date Value Ref Range Status   09/07/2023 0.71 0.10 - 0.90 10*3/mm3 Final     Eosinophils Absolute   Date Value Ref Range Status   06/29/2023 0.06 0.04 - 0.54 K/uL Final     Eosinophils, Absolute   Date Value Ref Range Status   09/07/2023 0.12 0.00 - 0.40 10*3/mm3 Final     Basophils Absolute   Date Value Ref Range Status   06/29/2023 0.04 0.01 - 0.08 K/uL Final     Basophils, Absolute   Date Value Ref Range Status   09/07/2023 0.05 0.00 - 0.20 10*3/mm3 Final     Immature Grans, Absolute   Date Value Ref Range Status   09/07/2023 0.02 0.00 - 0.05 10*3/mm3 Final   11/10/2020 0.0 K/uL Final     nRBC   Date Value Ref Range Status   09/07/2023 0.0 0.0 - 0.2 /100 WBC Final         OBJECTIVE:  Visit Vitals  /80 (BP Location: Left arm, Patient Position: Sitting, Cuff Size: Adult)   Pulse 56   Temp 97.6 °F (36.4 °C) (Temporal)   Ht 160 cm (63\")   Wt 73.9 kg (162 lb 14.4 oz)   SpO2 97%   BMI 28.86 kg/m²      Physical Exam  Vitals and nursing note reviewed. "   Constitutional:       General: She is not in acute distress.     Appearance: Normal appearance. She is well-developed. She is not diaphoretic.      Comments: Pleasant   HENT:      Head: Normocephalic and atraumatic.   Eyes:      Pupils: Pupils are equal, round, and reactive to light.   Neck:      Thyroid: No thyromegaly.      Trachea: Phonation normal.   Pulmonary:      Effort: No respiratory distress.   Skin:     Coloration: Skin is not pale.      Findings: No erythema.   Neurological:      Mental Status: She is alert.   Psychiatric:         Behavior: Behavior normal.         Thought Content: Thought content normal.         Judgment: Judgment normal.         Assessment/Plan    Diagnoses and all orders for this visit:    1. Prediabetes (Primary)  -     Hemoglobin A1c; Future    2. Permanent atrial fibrillation    3. B12 deficiency  -     Vitamin B12; Future    4. Healthcare maintenance  -     Comprehensive Metabolic Panel; Future  -     Hemoglobin A1c; Future  -     Lipid Panel; Future  -     CBC (No Diff); Future  -     Vitamin B12; Future  -     TSH; Future      Overall, Yun is doing well.  She does not need any labs today but I have ordered labs to be done a few days before her next visit in 6 months.    Recommend continued adherence to a prediabetic diet.  Her most recent A1c was 5.2%.  Highest A1c on file was October 16, 2016 at 5.9%.    We will recheck B12 at the next visit.  She is no longer on B12 injection.    She declines influenza vaccine.    Return in about 6 months (around 4/17/2024) for Medicare Wellness.      DANIELLE Healy MD  08:04 CDT  10/17/2023   Electronically signed

## 2023-10-23 ENCOUNTER — TREATMENT (OUTPATIENT)
Dept: PHYSICAL THERAPY | Facility: CLINIC | Age: 68
End: 2023-10-23
Payer: MEDICARE

## 2023-10-23 DIAGNOSIS — Z98.890 H/O FOOT SURGERY: ICD-10-CM

## 2023-10-23 DIAGNOSIS — R29.6 MULTIPLE FALLS: ICD-10-CM

## 2023-10-23 DIAGNOSIS — R26.9 ABNORMALITY OF GAIT AND MOBILITY: Primary | ICD-10-CM

## 2023-10-23 PROCEDURE — 97530 THERAPEUTIC ACTIVITIES: CPT

## 2023-10-23 PROCEDURE — 97112 NEUROMUSCULAR REEDUCATION: CPT

## 2023-10-23 NOTE — PROGRESS NOTES
Physical Therapy Treatment Note  [address] [place] [address]    Patient: Yun Blackwell                                                 Visit Date: 10/23/2023  :     1955    Referring practitioner:    MARÍA Rubio  Date of Initial Visit:          Type: THERAPY  Noted: 2023    Patient seen for 15 sessions    Visit Diagnoses:    ICD-10-CM ICD-9-CM   1. Abnormality of gait and mobility  R26.9 781.2   2. H/O foot surgery  Z98.890 V15.29   3. Multiple falls  R29.6 V15.88           SUBJECTIVE     Subjective: Denies anything new or different since last visit. The foot is doing good, the swelling went down. Denies soreness or new falls since last visit.     PAIN: 3/10 B knees, L>R       OBJECTIVE     Objective     Neuromuscular Reeducation     [id number] Comments   Step taps standing on foam with 6' step    Step taps standing on foam with 6' step with cross body reaches    Stop/go on decline with 360 deg turns     AP/Lateral wobble board with stop/go            Timed Minutes 23      Therapeutic Activities    [id number] Comments   Side stepping on inclines Stop/go; x1 lap each direction    Retro walking on inclines Slow, cautious                Timed Minutes 20           Therapy Education/Self Care [id number]   Education offered today Bolstered and reviewed HEP   Medbridge Code [email or web address]    Ongoing HEP     Date: 10/11/2023  Prepared by: Lashay Davis    Exercises  - Single Leg Stance with Support  - 2 x daily - 7 x weekly - 2 sets - 10 reps  - Standing Ankle Dorsiflexion Stretch  - 2 x daily - 7 x weekly - 2 sets - 10 reps  - Standing Soleus Stretch  - 2 x daily - 7 x weekly - 2 sets - 10 reps  - Standing Hip Abduction with Counter Support  - 2 x daily - 7 x weekly - 2 sets - 10 reps  - Sidelying Hip Abduction  - 2 x daily - 7 x weekly - 2 sets - 10 reps  - Hooklying Single Leg Bent Knee Fallouts with Resistance  - 2 x daily - 7 x weekly - 2 sets -  10 reps  - Supine Bridge  - 2 x daily - 7 x weekly - 1 sets - 10 reps  - Standing Tandem Balance with Counter Support  - 2-3 x daily - 7 x weekly - 2 reps - 30 sec hold  - Tandem Walking with Counter Support  - 1 x daily - 7 x weekly - 3 sets - 10 reps  - Alternating Step Taps with Counter Support  - 1 x daily - 4-5 x weekly - 2-3 sets - 10 reps  - Step-Over  - 1 x daily - 4-5 x weekly - 2-3 sets - 10 reps      Timed Minutes      GOALS  Goals                                          Progress Note due by 10/27/23                                                      Recert due by 11/4/23   LTG by: 6 weeks Comments Date Status   Improve passive left DF to 15 deg 15 deg after stretch 8/30 MET   Improve destiny hip abd MMT to 4+/5  see grid  9/29  ongoing   Walk with symmetrical gait with equal stance and stable hips without a lateral lurch  decreased stance time R, trendelenburg B  9/29  Ongoing   SLS left LE x 5 sec  ~2-3 secs max B 9/29 Ongoing   FAAM score of   no initial goal score listed  9/29  D/C   Able to walk up/down 2 steps using rail for balance only alternating steps  RLE on ascent reciprocal pattern and HR, LLE leading non-reciprocal descent  9/29 ongoing   Improve forward reaction time and medium/fast rhythmic weight shift to w/in age/sex-matched norms w/ NeuroCom testing  9/29 NEW   Improve miniBEST score by >/=4 pts  9/29 NEW   Improve FGA score by >/=4 pts  9/29 NEW   Independent with HEP  Progressed HEP 8/28 Ongoing       Total Timed Treatment:     43   mins  Total Time of Visit:             43   mins         ASSESSMENT/PLAN     Assessment/Plan     ASSESSMENT: Pt had the most difficulty with stopping tasks this date, it seems as though her mind thinks her body is still moving immediately after cessation of movement, though this improves when she is given a task prior to stopping I.e. perform 360 deg turn prior to stopping.     PLAN: Assess all goals for progress note at next visit. progress POC per pt  tolerance, including hip/core stability and balance tasks.     SIGNATURE: Sarina Farnsworth PT, [place] License #: [id number]    Electronically Signed on 10/23/2023        [address]  [place] [address]  [phone]

## 2023-10-24 ENCOUNTER — TRANSCRIBE ORDERS (OUTPATIENT)
Dept: ADMINISTRATIVE | Facility: HOSPITAL | Age: 68
End: 2023-10-24
Payer: COMMERCIAL

## 2023-10-24 ENCOUNTER — LAB (OUTPATIENT)
Dept: LAB | Facility: HOSPITAL | Age: 68
End: 2023-10-24
Payer: MEDICARE

## 2023-10-24 DIAGNOSIS — L50.1 IDIOPATHIC URTICARIA: ICD-10-CM

## 2023-10-24 DIAGNOSIS — L50.1 IDIOPATHIC URTICARIA: Primary | ICD-10-CM

## 2023-10-24 LAB
BACTERIA UR QL AUTO: ABNORMAL /HPF
BILIRUB UR QL STRIP: NEGATIVE
CLARITY UR: CLEAR
COLOR UR: YELLOW
GLUCOSE UR STRIP-MCNC: NEGATIVE MG/DL
HGB UR QL STRIP.AUTO: NEGATIVE
HYALINE CASTS UR QL AUTO: ABNORMAL /LPF
KETONES UR QL STRIP: ABNORMAL
LEUKOCYTE ESTERASE UR QL STRIP.AUTO: ABNORMAL
NITRITE UR QL STRIP: NEGATIVE
PH UR STRIP.AUTO: 6 [PH] (ref 5–8)
PROT UR QL STRIP: NEGATIVE
RBC # UR STRIP: ABNORMAL /HPF
REF LAB TEST METHOD: ABNORMAL
SP GR UR STRIP: 1.02 (ref 1–1.03)
SQUAMOUS #/AREA URNS HPF: ABNORMAL /HPF
UROBILINOGEN UR QL STRIP: ABNORMAL
WBC # UR STRIP: ABNORMAL /HPF

## 2023-10-24 PROCEDURE — 87086 URINE CULTURE/COLONY COUNT: CPT

## 2023-10-24 PROCEDURE — 81001 URINALYSIS AUTO W/SCOPE: CPT

## 2023-10-24 PROCEDURE — 87186 SC STD MICRODIL/AGAR DIL: CPT

## 2023-10-24 PROCEDURE — 87077 CULTURE AEROBIC IDENTIFY: CPT

## 2023-10-25 ENCOUNTER — TREATMENT (OUTPATIENT)
Dept: PHYSICAL THERAPY | Facility: CLINIC | Age: 68
End: 2023-10-25
Payer: MEDICARE

## 2023-10-25 DIAGNOSIS — R29.6 MULTIPLE FALLS: ICD-10-CM

## 2023-10-25 DIAGNOSIS — R26.9 ABNORMALITY OF GAIT AND MOBILITY: Primary | ICD-10-CM

## 2023-10-25 DIAGNOSIS — Z98.890 H/O FOOT SURGERY: ICD-10-CM

## 2023-10-25 NOTE — PROGRESS NOTES
Physical Therapy Treatment Note and 30 Day Progress Note  [address] [place] [address]    Patient: Yun Blackwell                                                 Visit Date: 10/25/2023  :     1955    Referring practitioner:    MARÍA Rubio  Date of Initial Visit:          Type: THERAPY  Noted: 2023    Patient seen for 16 sessions    Visit Diagnoses:    ICD-10-CM ICD-9-CM   1. Abnormality of gait and mobility  R26.9 781.2   2. H/O foot surgery  Z98.890 V15.29   3. Multiple falls  R29.6 V15.88             SUBJECTIVE     Subjective: Nothing new to report.    PT G-Codes  Outcome Measure Options: FGA (Functional Gait Assessment)  FGA Total Score: 24    OBJECTIVE     Objective     Neuromuscular Reeducation     [id number] Comments   FGA SEE FLOWSHEET   1) 2 2) 2 3) 1 4) 1 5) 1 6) 2 7) 2 8) 2 9) 2 10) 1 11) 2 12) 2 13) 1 14) 1  TUG w/o CDT: 10.18 secs  TUG w/ CDT: 11.32 secs   Timed Minutes 37      Therapeutic Exercises    68115 Units Comments   Reviewed functional progression     MMT  See grid   Timed Minutes 8       LE MMT   HIP Strength L Strength R   extension 4  4   ABD 4+  4+     Therapy Education/Self Care 47916   Education offered today Bolstered and reviewed HEP   Medbridge Code [id number]    Ongoing HEP     Date: 10/11/2023  Prepared by: Lashay Davis    Exercises  - Single Leg Stance with Support  - 2 x daily - 7 x weekly - 2 sets - 10 reps  - Standing Ankle Dorsiflexion Stretch  - 2 x daily - 7 x weekly - 2 sets - 10 reps  - Standing Soleus Stretch  - 2 x daily - 7 x weekly - 2 sets - 10 reps  - Standing Hip Abduction with Counter Support  - 2 x daily - 7 x weekly - 2 sets - 10 reps  - Sidelying Hip Abduction  - 2 x daily - 7 x weekly - 2 sets - 10 reps  - Hooklying Single Leg Bent Knee Fallouts with Resistance  - 2 x daily - 7 x weekly - 2 sets - 10 reps  - Supine Bridge  - 2 x daily - 7 x weekly - 1 sets - 10 reps  - Standing  Tandem Balance with Counter Support  - 2-3 x daily - 7 x weekly - 2 reps - 30 sec hold  - Tandem Walking with Counter Support  - 1 x daily - 7 x weekly - 3 sets - 10 reps  - Alternating Step Taps with Counter Support  - 1 x daily - 4-5 x weekly - 2-3 sets - 10 reps  - Step-Over  - 1 x daily - 4-5 x weekly - 2-3 sets - 10 reps      Timed Minutes      GOALS  Goals                                          Progress Note due by 11/24/23                                                      Recert due by 11/4/23   LTG by: 6 weeks Comments Date Status   Improve passive left DF to 15 deg 15 deg after stretch 8/30 MET   Improve destiny hip abd MMT to 4+/5  see grid  10/25  MET    Walk with symmetrical gait with equal stance and stable hips without a lateral lurch Continued lateral lunch  10/25  Ongoing   SLS left LE x 5 sec  ~2-3 secs max B 10/25 Ongoing   FAAM score of   no initial goal score listed  9/29  D/C   Able to walk up/down 2 steps using rail for balance only alternating steps must do non-reciprocal descent d/t L knee pain not balance deficits  10/25 D/C   Improve forward reaction time and medium/fast rhythmic weight shift to w/in age/sex-matched norms w/ NeuroCom testing Defer to next session d/t time 10/25 Ongoing   Improve miniBEST score by >/=4 pts 22/28 on 10/25/2023 10/25 MET   Improve FGA score by >/=4 pts 24/30 on 10/25/2023 10/25 MET   Independent with HEP  Progressed HEP 10/25 Ongoing       Total Timed Treatment:     45   mins  Total Time of Visit:             45   mins         ASSESSMENT/PLAN     Assessment & Plan       Assessment  Impairments: abnormal gait, abnormal or restricted ROM, lacks appropriate home exercise program, pain with function and weight-bearing intolerance   Functional limitations: walking, uncomfortable because of pain, standing and unable to perform repetitive tasks   Barriers to therapy: destiny severe knee OA  Prognosis: good    Plan  Therapy options: will be seen for skilled therapy  services  Planned modality interventions: dry needling, low level laser therapy and TENS  Planned therapy interventions: manual therapy, joint mobilization, soft tissue mobilization, stretching, strengthening, gait training, functional ROM exercises, therapeutic activities and home exercise program  Frequency: 2x week  Duration in weeks: 6  Treatment plan discussed with: patient         ASSESSMENT: Progress note performed per POC. Pt has achieved 4/8 goals indicating functional progression w/ regards to static/dynamic balance and functional/formal strength. Her compliance w/ her HEP is evident from session to session. She does continue to exhibit abnormal gait w/ B knee varus and lateral lurch, however this deviation may be more difficult to completely correct d/t known severe knee OA. Due to her level of progress made over the last month, anticipate that she will be appropriate to D/C w/ HEP soon.    PLAN: Repeat NeuroCom testing next session, progress POC per pt tolerance, including hip/core stability and balance tasks.     SIGNATURE: Lashay Davis PT, [place] License #: [id number]  Electronically Signed on 10/25/2023        [place] [address]  [phone]

## 2023-10-26 LAB — BACTERIA SPEC AEROBE CULT: ABNORMAL

## 2023-11-01 ENCOUNTER — TREATMENT (OUTPATIENT)
Dept: PHYSICAL THERAPY | Facility: CLINIC | Age: 68
End: 2023-11-01
Payer: MEDICARE

## 2023-11-01 ENCOUNTER — OFFICE VISIT (OUTPATIENT)
Dept: SURGERY | Age: 68
End: 2023-11-01
Payer: MEDICARE

## 2023-11-01 VITALS
WEIGHT: 162 LBS | BODY MASS INDEX: 28.7 KG/M2 | HEIGHT: 63 IN | DIASTOLIC BLOOD PRESSURE: 70 MMHG | SYSTOLIC BLOOD PRESSURE: 114 MMHG

## 2023-11-01 DIAGNOSIS — Z85.3 PERSONAL HISTORY OF MALIGNANT NEOPLASM OF BREAST: Primary | ICD-10-CM

## 2023-11-01 DIAGNOSIS — R29.6 MULTIPLE FALLS: ICD-10-CM

## 2023-11-01 DIAGNOSIS — Z15.09 GENETIC SUSCEPTIBILITY TO OTHER MALIGNANT NEOPLASM: ICD-10-CM

## 2023-11-01 DIAGNOSIS — Z98.890 H/O FOOT SURGERY: ICD-10-CM

## 2023-11-01 DIAGNOSIS — R26.9 ABNORMALITY OF GAIT AND MOBILITY: Primary | ICD-10-CM

## 2023-11-01 PROCEDURE — 3017F COLORECTAL CA SCREEN DOC REV: CPT | Performed by: PHYSICIAN ASSISTANT

## 2023-11-01 PROCEDURE — 99212 OFFICE O/P EST SF 10 MIN: CPT | Performed by: PHYSICIAN ASSISTANT

## 2023-11-01 PROCEDURE — G8399 PT W/DXA RESULTS DOCUMENT: HCPCS | Performed by: PHYSICIAN ASSISTANT

## 2023-11-01 PROCEDURE — G8427 DOCREV CUR MEDS BY ELIG CLIN: HCPCS | Performed by: PHYSICIAN ASSISTANT

## 2023-11-01 PROCEDURE — 1090F PRES/ABSN URINE INCON ASSESS: CPT | Performed by: PHYSICIAN ASSISTANT

## 2023-11-01 PROCEDURE — G8417 CALC BMI ABV UP PARAM F/U: HCPCS | Performed by: PHYSICIAN ASSISTANT

## 2023-11-01 PROCEDURE — 1123F ACP DISCUSS/DSCN MKR DOCD: CPT | Performed by: PHYSICIAN ASSISTANT

## 2023-11-01 PROCEDURE — G8484 FLU IMMUNIZE NO ADMIN: HCPCS | Performed by: PHYSICIAN ASSISTANT

## 2023-11-01 PROCEDURE — 1036F TOBACCO NON-USER: CPT | Performed by: PHYSICIAN ASSISTANT

## 2023-11-01 NOTE — PROGRESS NOTES
"                                                                Physical Therapy Treatment Note  [address] [place] [address]    Patient: Yun Blackwell                                                 Visit Date: 2023  :     1955    Referring practitioner:    MARÍA Rubio  Date of Initial Visit:          Type: THERAPY  Noted: 2023    Patient seen for 17 sessions    Visit Diagnoses:    ICD-10-CM ICD-9-CM   1. Abnormality of gait and mobility  R26.9 781.2   2. H/O foot surgery  Z98.890 V15.29   3. Multiple falls  R29.6 V15.88     SUBJECTIVE     Subjective: Reports her knees hurt, just uncomfortable. \"Without thinkin', I just get up! So I was like woah! I just got up without moanin' and groanin' and grabbin'.\" Reports, \"I'm just glad I'm seein' somethin', like an improvement.\" Reports she has noticed she is able to stand from sitting and stand with more stability than she would in the past. Previously, she would've had to really think about and work on standing, but now can do so without thinking.     PAIN: 5/10     OBJECTIVE     Objective     Neuromuscular Reeducation     [id number] Comments   Limits of stability assessment on neurocom Difficulty with directional control and maintaining endpoint, WNL for age range   Rhythmic WS assessment on neurocom Difficulty with A/P < M/L, tends to stay on toes, difficulty with directional control and velocity. WNL at moderate pace (2 sec transition) for all parameters   Maintaining midline on neurocom Able to maintain about 1 min, would require posterior pull to achieve midline at times, tends to maintain anterior WB. 60% anterior WB primarily.    Attempted A/P WS on neurocom Able to achieve 100% WB, though 50% WB at best posterior today        Timed Minutes 38     Therapy Education/Self Care [id number]   Education offered today Bolstered and reviewed HEP   Medbridge Code [id number]    Ongoing HEP     Date: 10/11/2023  Prepared by: Lashay " [place]    Exercises  - Single Leg Stance with Support  - 2 x daily - 7 x weekly - 2 sets - 10 reps  - Standing Ankle Dorsiflexion Stretch  - 2 x daily - 7 x weekly - 2 sets - 10 reps  - Standing Soleus Stretch  - 2 x daily - 7 x weekly - 2 sets - 10 reps  - Standing Hip Abduction with Counter Support  - 2 x daily - 7 x weekly - 2 sets - 10 reps  - Sidelying Hip Abduction  - 2 x daily - 7 x weekly - 2 sets - 10 reps  - Hooklying Single Leg Bent Knee Fallouts with Resistance  - 2 x daily - 7 x weekly - 2 sets - 10 reps  - Supine Bridge  - 2 x daily - 7 x weekly - 1 sets - 10 reps  - Standing Tandem Balance with Counter Support  - 2-3 x daily - 7 x weekly - 2 reps - 30 sec hold  - Tandem Walking with Counter Support  - 1 x daily - 7 x weekly - 3 sets - 10 reps  - Alternating Step Taps with Counter Support  - 1 x daily - 4-5 x weekly - 2-3 sets - 10 reps  - Step-Over  - 1 x daily - 4-5 x weekly - 2-3 sets - 10 reps      Timed Minutes      GOALS  Goals                                          Progress Note due by 11/24/23                                                      Recert due by 11/4/23   LTG by: 6 weeks Comments Date Status   Improve passive left DF to 15 deg 15 deg after stretch 8/30 MET   Improve destiny hip abd MMT to 4+/5  see grid  10/25  MET    Walk with symmetrical gait with equal stance and stable hips without a lateral lurch Continued lateral lunch  10/25  Ongoing   SLS left LE x 5 sec  ~2-3 secs max B 10/25 Ongoing   FAAM score of   no initial goal score listed  9/29  D/C   Able to walk up/down 2 steps using rail for balance only alternating steps must do non-reciprocal descent d/t L knee pain not balance deficits  10/25 D/C   Improve forward reaction time and medium/fast rhythmic weight shift to w/in age/sex-matched norms w/ NeuroCom testing Difficulty with directional control and velocity during limits of stability, though WNL for age range. Difficulty primarily with rhythmic WS. Tends to avoid posterior  WS. Adequate with moderate pace, though difficulty with directional control and slow/fast velocities 11/1 Ongoing   Improve miniBEST score by >/=4 pts 22/28 on 10/25/2023 10/25 MET   Improve FGA score by >/=4 pts 24/30 on 10/25/2023 10/25 MET   Independent with HEP  Progressed HEP 10/25 Ongoing     Total Timed Treatment:     38   mins  Total Time of Visit:             45   mins         ASSESSMENT/PLAN     Assessment/Plan     ASSESSMENT: Patient demonstrates difficulty maintaining midline, avoids posterior WB and WS as demonstrated by neurocom results today. She demonstrated LOB x2 during lateral WS, though was able to recover without assist from therapist. Posterior assist to maintain midline, though independently maintains 60% anterior/40% posterior WB, which she felt was midline. Overall, she was WNL for age range during moderate speeds, though struggled with fast and slow speeds. Scores were improved since initial assessment, though continues to demonstrate difficulty with velocity and maintaining endpoint.     PLAN: Continue to address hip/core stability and balance tasks emphasizing posterior WS. May trial retro walking. Also continue to emphasize slow and fast paces as well as sudden stops and changes in direction.     SIGNATURE: Nadya Kwan PTA, [place] License #: [id number]  Electronically Signed on 11/1/2023        [address] [id number]  [phone]

## 2023-11-01 NOTE — PROGRESS NOTES
Subjective  Dilma Fernandez comes in follow up in left breast. She had a repeat recent stereotactic biopsy. 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    S/P breast biopsy 03/13/2019    Atypical ductal hyperplasia of breast 02/14/2019    A-fib ([address]) 04/06/2017       Current Outpatient Medications   Medication Sig Dispense Refill    tamoxifen (NOLVADEX) 20 MG tablet Take 1 tablet by mouth daily 90 tablet 1    PRADAXA 150 MG capsule       dilTIAZem (CARDIZEM CD) 120 MG extended release capsule       omeprazole (PRILOSEC) 20 MG delayed release capsule Take 1 capsule by mouth daily      apixaban (ELIQUIS) 5 MG TABS tablet Take by mouth 2 times daily      tiZANidine (ZANAFLEX) 4 MG tablet Take 1 tablet by mouth nightly as needed      montelukast (SINGULAIR) 10 MG tablet Take 1 tablet by mouth nightly 30 tablet 0    Multiple Vitamins-Minerals (THERAPEUTIC MULTIVITAMIN-MINERALS) tablet Take 1 tablet by mouth daily       No current facility-administered medications for this visit.        Allergies Morphine and Oxycodone    Past Medical History:   Diagnosis Date    A-fib [facility])     sees dr. Danisha Negrete in [place] but has seen [facility] cardiology    Acid reflux     Breast cancer ([address])     Breast mass     Cancer ([address])     Breast Cancer in [facility]    CPAP (continuous positive airway pressure) dependence     Diabetes ([address])     type 2 (currently

## 2023-11-03 ENCOUNTER — TREATMENT (OUTPATIENT)
Dept: PHYSICAL THERAPY | Facility: CLINIC | Age: 68
End: 2023-11-03
Payer: MEDICARE

## 2023-11-03 DIAGNOSIS — R26.9 ABNORMALITY OF GAIT AND MOBILITY: Primary | ICD-10-CM

## 2023-11-03 DIAGNOSIS — Z98.890 H/O FOOT SURGERY: ICD-10-CM

## 2023-11-03 DIAGNOSIS — R29.6 MULTIPLE FALLS: ICD-10-CM

## 2023-11-03 NOTE — PROGRESS NOTES
Physical Therapy Treatment Note and 90 Day Recertification Note  [address] [place] [address]    Patient: Yun Blackwell                                                 Visit Date: 11/3/2023  :     1955    Referring practitioner:    MARÍA Rubio  Date of Initial Visit:          Type: THERAPY  Noted: 2023    Patient seen for 18 sessions    Visit Diagnoses:    ICD-10-CM ICD-9-CM   1. Abnormality of gait and mobility  R26.9 781.2   2. H/O foot surgery  Z98.890 V15.29   3. Multiple falls  R29.6 V15.88     SUBJECTIVE     Subjective: Feeling pretty good today, states she has no pain. She denies falls.     PAIN: 0/10     OBJECTIVE     Objective   Therapeutic Exercises    43335 Units Comments   Amb for endurance                Timed Minutes 4      Neuromuscular Reeducation     57391 Comments   B cone step overs Varying levels of UE support on // bars; slight pain in R calf    B lateral cone step overs  Varying levels of UE support on // bars   B step ups onto 4 in step w/out UE support     B lateral step  ups onto 4 in step w/out UE support    Sit to stands on blue foam    Sit to stands w/ EC     Sit to stands w/ EC on blue foam     NBOS lateral ball pass     B tandem lateral ball pass         Timed Minutes 38     Therapy Education/Self Care 65139   Education offered today Bolstered and reviewed HEP   Medbridge Code [id number]    Ongoing HEP   Date: 10/11/2023  Prepared by: Lashay Davis    Exercises  - Single Leg Stance with Support  - 2 x daily - 7 x weekly - 2 sets - 10 reps  - Standing Ankle Dorsiflexion Stretch  - 2 x daily - 7 x weekly - 2 sets - 10 reps  - Standing Soleus Stretch  - 2 x daily - 7 x weekly - 2 sets - 10 reps  - Standing Hip Abduction with Counter Support  - 2 x daily - 7 x weekly - 2 sets - 10 reps  - Sidelying Hip Abduction  - 2 x daily - 7 x weekly - 2 sets - 10 reps  - Hooklying Single Leg Bent Knee Fallouts  with Resistance  - 2 x daily - 7 x weekly - 2 sets - 10 reps  - Supine Bridge  - 2 x daily - 7 x weekly - 1 sets - 10 reps  - Standing Tandem Balance with Counter Support  - 2-3 x daily - 7 x weekly - 2 reps - 30 sec hold  - Tandem Walking with Counter Support  - 1 x daily - 7 x weekly - 3 sets - 10 reps  - Alternating Step Taps with Counter Support  - 1 x daily - 4-5 x weekly - 2-3 sets - 10 reps  - Step-Over  - 1 x daily - 4-5 x weekly - 2-3 sets - 10 reps      Timed Minutes      GOALS  Goals                                          Progress Note due by 12/23/23                                                      Recert due by 2/1/24   LTG by: 6 weeks Comments Date Status   Improve passive left DF to 15 deg 15 deg after stretch 8/30 MET   Improve destiny hip abd MMT to 4+/5  see grid  10/25  MET    Walk with symmetrical gait with equal stance and stable hips without a lateral lurch Continued lateral lurch  11/3  Ongoing   SLS left LE x 5 sec  L 8 sec, R 9 sec  11/3  MET    FAAM score of   no initial goal score listed  9/29  D/C   Able to walk up/down 2 steps using rail for balance only alternating steps must do non-reciprocal descent d/t L knee pain not balance deficits  10/25 D/C   Improve forward reaction time and medium/fast rhythmic weight shift to w/in age/sex-matched norms w/ NeuroCom testing Difficulty with directional control and velocity during limits of stability, though WNL for age range. Difficulty primarily with rhythmic WS. Tends to avoid posterior WS. Adequate with moderate pace, though difficulty with directional control and slow/fast velocities 11/1 Ongoing   Improve miniBEST score by >/=4 pts 22/28 on 10/25/2023 10/25 MET   Improve FGA score by >/=4 pts 24/30 on 10/25/2023 10/25 MET   Independent with HEP  Progressed HEP 10/25 Ongoing     Total Timed Treatment:     42   mins  Total Time of Visit:             42   mins         ASSESSMENT/PLAN     Assessment/Plan     ASSESSMENT: Yun has met 4/8 goals  thus far as her balance and strength has improved. She is very motivated and is compliant with her HEP. She does continue to display several gait deviations but could be due to her knee pain. We challenged her strength/balance/endurance today and she did well. Occasionally she would complain of knee pain. Pt would continue to benefit from skilled PT to finalize HEP and increase confidence to progress independently.     PLAN: Continue to address hip/core stability and balance tasks emphasizing posterior WS. May trial retro walking. Also continue to emphasize slow and fast paces as well as sudden stops and changes in direction.     SIGNATURE: Sally Cabrera PTA, [place] License #: [id number]  Electronically Signed on 11/3/2023        [address]  [place] [address]  [phone]

## 2023-11-03 NOTE — PROGRESS NOTES
30 Day Progress Note and 90 Day Recertification Addendum      Patient: Yun Blackwell           : 1955  Visit Date: 11/3/2023  Referring practitioner: MARÍA Rubio  Date of Initial Visit: Type: THERAPY  Noted: 2023  Patient seen for 18 sessions  Visit Diagnoses:    ICD-10-CM ICD-9-CM   1. Abnormality of gait and mobility  R26.9 781.2   2. H/O foot surgery  Z98.890 V15.29   3. Multiple falls  R29.6 V15.88          Clinical Progress: improved  Home Program Compliance: Yes  Progress toward previous goals: Partially Met  Prognosis to achieve goals: good    Objective   See PTA note for goals.     Assessment & Plan       Assessment  Impairments: abnormal gait, abnormal or restricted ROM, lacks appropriate home exercise program, pain with function and weight-bearing intolerance   Functional limitations: walking, uncomfortable because of pain, standing and unable to perform repetitive tasks   Barriers to therapy: destiny severe knee OA  Prognosis: good    Plan  Therapy options: will be seen for skilled therapy services  Planned modality interventions: dry needling, low level laser therapy and TENS  Planned therapy interventions: manual therapy, joint mobilization, soft tissue mobilization, stretching, strengthening, gait training, functional ROM exercises, therapeutic activities and home exercise program  Frequency: 2x week  Duration in weeks: 6  Treatment plan discussed with: patient        I reviewed the treatment and goals with Sally Cabrera PTA and agree with the POC.    SIGNATURE: Sarina Farnsworth PT, License #: [id number]    Electronically Signed on 11/3/2023      90 Day Recertification  Certification Period: 11/3/2023 through 2024  Based upon review of the patient's progress and continued therapy plan, it is my medical opinion that Yun Blackwell should continue physical therapy treatment at [facility]     PHYSICIAN: Jenny Canchola APRN (NPI: [id number])    Signature:  __________________________________________________DATE: ___________________     Please sign and return via fax to [phone].   [email or web address]

## 2023-11-07 ENCOUNTER — TREATMENT (OUTPATIENT)
Dept: PHYSICAL THERAPY | Facility: CLINIC | Age: 68
End: 2023-11-07
Payer: MEDICARE

## 2023-11-07 DIAGNOSIS — R29.6 MULTIPLE FALLS: ICD-10-CM

## 2023-11-07 DIAGNOSIS — Z98.890 H/O FOOT SURGERY: ICD-10-CM

## 2023-11-07 DIAGNOSIS — R26.9 ABNORMALITY OF GAIT AND MOBILITY: Primary | ICD-10-CM

## 2023-11-07 NOTE — PROGRESS NOTES
Physical Therapy Treatment Note  [address] [place] [address]    Patient: Yun Blackwell                                                 Visit Date: 2023  :     1955    Referring practitioner:    MARÍA Rubio  Date of Initial Visit:          Type: THERAPY  Noted: 2023    Patient seen for 19 sessions    Visit Diagnoses:    ICD-10-CM ICD-9-CM   1. Abnormality of gait and mobility  R26.9 781.2   2. H/O foot surgery  Z98.890 V15.29   3. Multiple falls  R29.6 V15.88       SUBJECTIVE     Subjective: Pt reports that everything seems to be doing good. Denies any falls.     PAIN: 0/10     OBJECTIVE     Objective     Therapeutic Exercises    49955 Units Comments   Toe walking on inclines      Heel walking on inclines           Timed Minutes 10      Neuromuscular Reeducation     20324 Comments   Retro walking on inclines Slow/fast/stop/go, EO/EC, Head turns, up/down   Tandem walking outdoors  Cues for proper form    Braiding outdoors EO/EC           Timed Minutes 20      Therapeutic Activities    39325 Comments   Staggered STS from bench outside EC    Side stepping on inclines  Multiple cues for proper form, avoiding compensations            Timed Minutes 15     Therapy Education/Self Care [id number]   Education offered today Education on fall risk, correlation between step length and fall risk, compensation patterns, proper form, correlations between strengthening and balance    Medbridge Code [id number]    Ongoing HEP   Date: 10/11/2023  Prepared by: Lashay Davis    Exercises  - Single Leg Stance with Support  - 2 x daily - 7 x weekly - 2 sets - 10 reps  - Standing Ankle Dorsiflexion Stretch  - 2 x daily - 7 x weekly - 2 sets - 10 reps  - Standing Soleus Stretch  - 2 x daily - 7 x weekly - 2 sets - 10 reps  - Standing Hip Abduction with Counter Support  - 2 x daily - 7 x weekly - 2 sets - 10 reps  - Sidelying Hip Abduction  - 2 x daily  - 7 x weekly - 2 sets - 10 reps  - Hooklying Single Leg Bent Knee Fallouts with Resistance  - 2 x daily - 7 x weekly - 2 sets - 10 reps  - Supine Bridge  - 2 x daily - 7 x weekly - 1 sets - 10 reps  - Standing Tandem Balance with Counter Support  - 2-3 x daily - 7 x weekly - 2 reps - 30 sec hold  - Tandem Walking with Counter Support  - 1 x daily - 7 x weekly - 3 sets - 10 reps  - Alternating Step Taps with Counter Support  - 1 x daily - 4-5 x weekly - 2-3 sets - 10 reps  - Step-Over  - 1 x daily - 4-5 x weekly - 2-3 sets - 10 reps      Timed Minutes 10     GOALS  Goals                                          Progress Note due by 12/23/23                                                      Recert due by 2/1/24   LTG by: 6 weeks Comments Date Status   Improve passive left DF to 15 deg 15 deg after stretch 8/30 MET   Improve destiny hip abd MMT to 4+/5  see grid  10/25  MET    Walk with symmetrical gait with equal stance and stable hips without a lateral lurch Continued lateral lurch  11/3  Ongoing   SLS left LE x 5 sec  L 8 sec, R 9 sec  11/3  MET    FAAM score of   no initial goal score listed  9/29  D/C   Able to walk up/down 2 steps using rail for balance only alternating steps must do non-reciprocal descent d/t L knee pain not balance deficits  10/25 D/C   Improve forward reaction time and medium/fast rhythmic weight shift to w/in age/sex-matched norms w/ NeuroCom testing Difficulty with directional control and velocity during limits of stability, though WNL for age range. Difficulty primarily with rhythmic WS. Tends to avoid posterior WS. Adequate with moderate pace, though difficulty with directional control and slow/fast velocities 11/1 Ongoing   Improve miniBEST score by >/=4 pts 22/28 on 10/25/2023 10/25 MET   Improve FGA score by >/=4 pts 24/30 on 10/25/2023 10/25 MET   Independent with HEP  Progressed HEP 10/25 Ongoing     Total Timed Treatment:     55   mins  Total Time of Visit:             55   mins          ASSESSMENT/PLAN     Assessment/Plan     ASSESSMENT: Pt did well with outdoor activity today, and was highly motivated throughout the session. We were able to progress to more dynamic balance training today with tandem walking and braiding, as well as introduce greater visual disturbances with head movements and closed eye tasks. She did experience a few instances LOB with EC tasks, but was able to self correct without PT assist.       PLAN: Continue to address hip/core stability and balance tasks emphasizing posterior WS. May trial retro walking. Also continue to emphasize slow and fast paces as well as sudden stops and changes in direction.     SIGNATURE: Sarina Farnsworth, PT, [place] License #: [id number]    Electronically Signed on 11/7/2023        [address] [address]  [phone]

## 2023-11-09 ENCOUNTER — TREATMENT (OUTPATIENT)
Dept: PHYSICAL THERAPY | Facility: CLINIC | Age: 68
End: 2023-11-09
Payer: MEDICARE

## 2023-11-09 DIAGNOSIS — Z98.890 H/O FOOT SURGERY: ICD-10-CM

## 2023-11-09 DIAGNOSIS — R26.9 ABNORMALITY OF GAIT AND MOBILITY: Primary | ICD-10-CM

## 2023-11-09 DIAGNOSIS — R29.6 MULTIPLE FALLS: ICD-10-CM

## 2023-11-09 NOTE — PROGRESS NOTES
Physical Therapy Treatment Note  [address] [place] [address]    Patient: Yun Blackwell                                                 Visit Date: 2023  :     1955    Referring practitioner:    MARÍA Rubio  Date of Initial Visit:          Type: THERAPY  Noted: 2023    Patient seen for 20 sessions    Visit Diagnoses:    ICD-10-CM ICD-9-CM   1. Abnormality of gait and mobility  R26.9 781.2   2. H/O foot surgery  Z98.890 V15.29   3. Multiple falls  R29.6 V15.88         SUBJECTIVE     Subjective: Patient reports she is VERY sore from Tuesday. No pain other than the soreness, believes it is from all uphill exercises. No falls reported.     PAIN: 0/10 pain >  6/10 soreness     OBJECTIVE     Objective     Therapeutic Exercises    96465 Units Comments   Hip ABD with red band  10 each side     Hip EXT with red band  10 each side     Modified Mountain climbers 10 each side  Having pain in L hip after hip ext, modified to standing marches which tolerated   DKTC using orange ball  10   Small range              Timed Minutes 25      Neuromuscular Reeducation     32385 Comments   Retro walking in hallway EO 20 steps x 2   Retro walking in hallway EC 20 steps x 2   Slow/fast/stop/go walk for 3 min EO    Slow/fast/stop/go walk for 3 min EC Minor LOB as soon as closing eyes, able to self correct.   Stairs up/down, using R hand rails 3 stairs, step-to vs. reciprocal due to soreness       Timed Minutes 15     Therapy Education/Self Care [id number]   Education offered today    Medbridge Code [id number]    Ongoing HEP   Date: 10/11/2023  Prepared by: Lashay Davis    Exercises  - Single Leg Stance with Support  - 2 x daily - 7 x weekly - 2 sets - 10 reps  - Standing Ankle Dorsiflexion Stretch  - 2 x daily - 7 x weekly - 2 sets - 10 reps  - Standing Soleus Stretch  - 2 x daily - 7 x weekly - 2 sets - 10 reps  - Standing Hip Abduction with Counter  Support  - 2 x daily - 7 x weekly - 2 sets - 10 reps  - Sidelying Hip Abduction  - 2 x daily - 7 x weekly - 2 sets - 10 reps  - Hooklying Single Leg Bent Knee Fallouts with Resistance  - 2 x daily - 7 x weekly - 2 sets - 10 reps  - Supine Bridge  - 2 x daily - 7 x weekly - 1 sets - 10 reps  - Standing Tandem Balance with Counter Support  - 2-3 x daily - 7 x weekly - 2 reps - 30 sec hold  - Tandem Walking with Counter Support  - 1 x daily - 7 x weekly - 3 sets - 10 reps  - Alternating Step Taps with Counter Support  - 1 x daily - 4-5 x weekly - 2-3 sets - 10 reps  - Step-Over  - 1 x daily - 4-5 x weekly - 2-3 sets - 10 reps      Timed Minutes      GOALS  Goals                                          Progress Note due by 12/23/23                                                             Re-cert due by 2/1/24   LTG by: 6 weeks Comments Date Status   Improve passive left DF to 15 deg 15 deg after stretch 8/30 MET   Improve destiny hip abd MMT to 4+/5  see grid  10/25  MET    Walk with symmetrical gait with equal stance and stable hips without a lateral lurch Continued lateral lurch  11/3  Ongoing   SLS left LE x 5 sec  L 8 sec, R 9 sec  11/3  MET    FAAM score of   no initial goal score listed  9/29  D/C   Able to walk up/down 2 steps using rail for balance only alternating steps must do non-reciprocal descent d/t L knee pain not balance deficits  10/25 D/C   Improve forward reaction time and medium/fast rhythmic weight shift to w/in age/sex-matched norms w/ NeuroCom testing Difficulty with directional control and velocity during limits of stability, though WNL for age range. Difficulty primarily with rhythmic WS. Tends to avoid posterior WS. Adequate with moderate pace, though difficulty with directional control and slow/fast velocities 11/1 Ongoing   Improve miniBEST score by >/=4 pts 22/28 on 10/25/2023 10/25 MET   Improve FGA score by >/=4 pts 24/30 on 10/25/2023 10/25 MET   Independent with HEP  Progressed HEP 10/25  Ongoing     Total Timed Treatment:     40  mins  Total Time of Visit:             40   mins         ASSESSMENT/PLAN     Assessment/Plan     ASSESSMENT: Patient was able to ambulate in hallway incorporating different paces and stops both with eyes open and with eyes close. Unable to complete stairs with reciprocal pattern due to soreness from last session. Completed retro walking both with and without visual feedback. No significant LOB that required therapist assistance to correct. Completed hip strengthening exercises, causing some pain in L hip. Modified remaining exercises to reduce pain. Patient continues to be motivated even through soreness and pain.     PLAN: Continue to address hip/core strengthening and balance task. Also continue to emphasize slow and fast paces as well as sudden stops and changes in direction. Balance exercises both with and without visual field. Attempt stairs with handrails, progressing to reciprocal if having no pain.     SIGNATURE: Mateo De Luna PTA Student  Electronically Signed on 11/9/2023    The clinical instructor and/or supervising staff, Nadya Kwan PTA, was present in clinic guiding the visit by approving, concurring, and confirming the skilled judgement for all services rendered.    Signature:  Nadya Kwan PTA, [place] License #: [id number]  Electronically signed on 11/9/2023        [address] [address]  [phone]

## 2023-11-14 ENCOUNTER — TREATMENT (OUTPATIENT)
Dept: PHYSICAL THERAPY | Facility: CLINIC | Age: 68
End: 2023-11-14
Payer: MEDICARE

## 2023-11-14 DIAGNOSIS — Z98.890 H/O FOOT SURGERY: ICD-10-CM

## 2023-11-14 DIAGNOSIS — R29.6 MULTIPLE FALLS: ICD-10-CM

## 2023-11-14 DIAGNOSIS — R26.9 ABNORMALITY OF GAIT AND MOBILITY: Primary | ICD-10-CM

## 2023-11-14 NOTE — PROGRESS NOTES
Physical Therapy Treatment Note  [address] [place] [address]    Patient: Yun Blackwell                                                 Visit Date: 2023  :     1955    Referring practitioner:    MARÍA Rubio  Date of Initial Visit:          Type: THERAPY  Noted: 2023    Patient seen for 21 sessions    Visit Diagnoses:    ICD-10-CM ICD-9-CM   1. Abnormality of gait and mobility  R26.9 781.2   2. H/O foot surgery  Z98.890 V15.29   3. Multiple falls  R29.6 V15.88     SUBJECTIVE     Subjective: Feeling good today, no pain or soreness.  Felt good after last session. Trying to do exercises everyday, does not always work out. She has to keep grandchildren since last visit.      PAIN:  0/10       OBJECTIVE     Objective     Therapeutic Exercises    18309 Units Comments   Dead bugs  2 x 10    Hip rainbows, bilateral  2 x 8 Needed manual cues for demonstration    Quadruped leg extension  2 x 5    Stairs on staircase model, Bilateral hand rails  3 x 4 steps up/down reciprocal ascend/descend, with no pain         Timed Minutes 30      Neuromuscular Reeducation     [id number] Comments   Cone taps, no  UE support  10 each side    Cone taps on blue foam, No UE support  10 each side    Laterals cone taps, no UE support  10 each side    Lateral cone taps on blue foam, no UE support 10 each side            Timed Minutes 10     Therapy Education/Self Care [id number]   Education offered today    [employer] Code [email or web address]    Ongoing HEP   Date: 10/11/2023  Prepared by: Lashay Davis    Exercises  - Single Leg Stance with Support  - 2 x daily - 7 x weekly - 2 sets - 10 reps  - Standing Ankle Dorsiflexion Stretch  - 2 x daily - 7 x weekly - 2 sets - 10 reps  - Standing Soleus Stretch  - 2 x daily - 7 x weekly - 2 sets - 10 reps  - Standing Hip Abduction with Counter Support  - 2 x daily - 7 x weekly - 2 sets - 10 reps  - Sidelying Hip Abduction  - 2 x  daily - 7 x weekly - 2 sets - 10 reps  - Hooklying Single Leg Bent Knee Fallouts with Resistance  - 2 x daily - 7 x weekly - 2 sets - 10 reps  - Supine Bridge  - 2 x daily - 7 x weekly - 1 sets - 10 reps  - Standing Tandem Balance with Counter Support  - 2-3 x daily - 7 x weekly - 2 reps - 30 sec hold  - Tandem Walking with Counter Support  - 1 x daily - 7 x weekly - 3 sets - 10 reps  - Alternating Step Taps with Counter Support  - 1 x daily - 4-5 x weekly - 2-3 sets - 10 reps  - Step-Over  - 1 x daily - 4-5 x weekly - 2-3 sets - 10 reps      Timed Minutes      GOALS  Goals                                          Progress Note due by 12/23/23                                                             Re-cert due by 2/1/24   LTG by: 6 weeks Comments Date Status   Improve passive left DF to 15 deg 15 deg after stretch 8/30 MET   Improve destiny hip abd MMT to 4+/5  see grid  10/25  MET    Walk with symmetrical gait with equal stance and stable hips without a lateral lurch Continued lateral lurch  11/3  Ongoing   SLS left LE x 5 sec  L 8 sec, R 9 sec  11/3  MET    FAAM score of   no initial goal score listed  9/29  D/C   Able to walk up/down 2 steps using rail for balance only alternating steps Able to do 3 x 4 up/down on staircase model using B handrails for balance. No pain   11/14 D/C previously due to significant pain. Pt volunteered today due to having no pain, MET   Improve forward reaction time and medium/fast rhythmic weight shift to w/in age/sex-matched norms w/ Neuro[employer] testing Difficulty with directional control and velocity during limits of stability, though WNL for age range. Difficulty primarily with rhythmic WS. Tends to avoid posterior WS. Adequate with moderate pace, though difficulty with directional control and slow/fast velocities 11/1 Ongoing   Improve miniBEST score by >/=4 pts 22/28 on 10/25/2023 10/25 MET   Improve FGA score by >/=4 pts 24/30 on 10/25/2023 10/25 MET   Independent with HEP   "Progressed HEP 10/25 Ongoing     Total Timed Treatment:     40  mins  Total Time of Visit:             40  mins         ASSESSMENT/PLAN     Assessment/Plan     ASSESSMENT: Patient volunteered to try stairs because she believed she could do them. Was able to do 12 stairs reciprocally with no pain, using handrails only for balance. Completed core and hip strengthening exercises with no pain. Added in balance exercises and pt was able to complete cone taps forward and laterally on several surfaces with no UE support and no LOB. Patient stated her balance is defiantly getting better. No pain and \"feeling good\" after visit.    PLAN:  Continue to address hip/core strengthening to improve gait.  Progress balance exercises both with and without visual field. May try to incorporate ladder exercises both forward and backwards. Progress towards d/c.    SIGNATURE: Mateo De Luna PTA Student  Electronically Signed on 11/14/2023    The clinical instructor and/or supervising staff, Nadya Kwan PTA, was present in clinic guiding the visit by approving, concurring, and confirming the skilled judgement for all services rendered.    Signature:  Nadya Kwan PTA, [place] License #: [id number]  Electronically signed on 11/14/2023        [address] [place] [address] [place] [address]  [phone]  "

## 2023-11-16 ENCOUNTER — TREATMENT (OUTPATIENT)
Dept: PHYSICAL THERAPY | Facility: CLINIC | Age: 68
End: 2023-11-16
Payer: MEDICARE

## 2023-11-16 DIAGNOSIS — R26.9 ABNORMALITY OF GAIT AND MOBILITY: Primary | ICD-10-CM

## 2023-11-16 DIAGNOSIS — R29.6 MULTIPLE FALLS: ICD-10-CM

## 2023-11-16 DIAGNOSIS — Z98.890 H/O FOOT SURGERY: ICD-10-CM

## 2023-11-16 PROCEDURE — 97110 THERAPEUTIC EXERCISES: CPT | Performed by: PHYSICAL THERAPIST

## 2023-11-16 PROCEDURE — 97112 NEUROMUSCULAR REEDUCATION: CPT | Performed by: PHYSICAL THERAPIST

## 2023-11-16 PROCEDURE — 97535 SELF CARE MNGMENT TRAINING: CPT | Performed by: PHYSICAL THERAPIST

## 2023-11-16 NOTE — PROGRESS NOTES
Physical Therapy Treatment Note and Discharge Note  [address] [place] [address]    Patient: Yun Blackwell                                                 Visit Date: 2023  :     1955    Referring practitioner:    MARÍA Rubio  Date of Initial Visit:          Type: THERAPY  Noted: 2023    Patient seen for 22 sessions    Visit Diagnoses:    ICD-10-CM ICD-9-CM   1. Abnormality of gait and mobility  R26.9 781.2   2. H/O foot surgery  Z98.890 V15.29   3. Multiple falls  R29.6 V15.88     SUBJECTIVE     Subjective: Feeling really good, no pain. Soreness in core after last session. Believes she does not need therapy anymore!     PAIN:  0/10       OBJECTIVE     Objective     Therapeutic Exercises    41881 Units Comments   Lunges on blue foam  10 each side  Demonstration required, verbal cues to avoid leaning   Lunges on BOSU 10 each side  No UE support, cues to avoid leaning    Sitting on green ball LAQ 10 each side  Assist holding ball still with therapist feet, no UE support    Sitting on green ball Marching  15 each side  Assist holding ball still with therapist feet, no UE support    Timed Minutes 15      Neuromuscular Reeducation     [id number] Comments   Ambulation with sudden stop/go/fast/slow and EO/EC 6 min    SLS in // bars R 13sec  L 15sec   SLS in //bars on blue foam  R  8sec  L 13sec        Timed Minutes 10     Therapy Education/Self Care [id number]   Education offered today HEP, discharge plan    [employer] Code [id number]    Ongoing HEP     Date: 2023 Prepared by: Nadya Kwan     Exercises     - Standing Ankle Dorsiflexion Stretch  - 2 x daily - 7 x weekly - 2 sets - 10 reps   - Standing Soleus Stretch  - 2 x daily - 7 x weekly - 2 sets - 10 reps   - Standing Hip Abduction with Counter Support  - 2 x daily - 7 x weekly - 2 sets - 10 reps   - Hooklying Single Leg Bent Knee Fallouts with band  - 2 x daily - 7 x weekly  - 2 sets - 10 reps -   -Supine Bridge  - 2 x daily - 7 x weekly - 1 sets - 10 reps   - Standing Tandem Balance with Counter Support  - 2-3 x daily - 7 x weekly - 2 reps - 30 sec hold - Alternating Step Taps with Counter Support  - 1 x daily - 4-5 x weekly - 2-3 sets - 10 reps   - Single Leg Stance with Support  - 2 x daily - 7 x weekly - 2 sets - 10 reps   - Step-Over  - 1 x daily - 4-5 x weekly - 2-3 sets - 10 reps       Timed Minutes 20     GOALS  Goals                                          Progress Note due by 12/23/23                                                             Re-cert due by 2/1/24   LTG by: 6 weeks Comments Date Status   Improve passive left DF to 15 deg 15 deg after stretch 8/30 MET   Improve destiny hip abd MMT to 4+/5  see grid  10/25  MET    Walk with symmetrical gait with equal stance and stable hips without a lateral lurch Continued lateral lurch, though likely exacerbating by pre-existing pain 11/16 unmet   SLS left LE x 5 sec  L 8 sec, R 9 sec  11/3  MET    FAAM score of   no initial goal score listed  9/29  D/C   Able to walk up/down 2 steps using rail for balance only alternating steps Able to do 3 x 4 up/down on staircase model using B handrails for balance. No pain   11/14 D/C previously due to significant pain. Pt volunteered today due to having no pain, MET   Improve forward reaction time and medium/fast rhythmic weight shift to w/in age/sex-matched norms w/ Neuro[employer] testing Difficulty with directional control and velocity during limits of stability, though WNL for age range. Difficulty primarily with rhythmic WS. Tends to avoid posterior WS. Adequate with moderate pace, though difficulty with directional control and slow/fast velocities 11/1 unmet   Improve miniBEST score by >/=4 pts 22/28 on 10/25/2023 10/25 MET   Improve FGA score by >/=4 pts 24/30 on 10/25/2023 10/25 MET   Independent with HEP Finalized today  11/16 MET     Total Timed Treatment:     45  mins  Total Time of  Visit:             45  mins         ASSESSMENT/PLAN     Assessment/Plan     ASSESSMENT: Patient reports today that she believe she is ready to be d/c'd from physical therapy. Patient completed several balance activities and strengthening exercises with no pain or LOB, only requiring verbal cues to avoid lateral leaning. Updated HEP and educated on exercise safety at home, correcting compensations as needed. Issued more difficult color band to progress to at home as appropriate.       PLAN: D/C home with comprehensive HEP    SIGNATURE: Mateo De Luna PTA Student  Electronically Signed on 11/16/2023    DISCHARGE SUMMARY   Discharge date 11/16/2023   Dates of this episode 8/7/2023 through 11/16/2023   Number of visits on this episode 22   Reason for discharge at baseline functional level  patient/caregiver request   Outcomes achieved Refer to the goals table for specifics on goals   Discharge plan Continue with current home exercise program as instructed   Summary of care See assessment. She has met seven of her ten goals thus far (1 was d/c'd) and continues to progress well overall. She feels competent to continue with independent progression at home, therefore finalized and reviewed HEP this date.   Discharge instruction See plan     The clinical instructor and/or supervising staff, Nadya Kwan PTA, was present in clinic guiding the visit by approving, concurring, and confirming the skilled judgement for all services rendered.    Signature:  aNdya Kwan PTA, [place] License #: [id number]  Electronically signed on 11/16/2023        [place] [address]  [phone]

## 2023-11-20 NOTE — PROGRESS NOTES
I have reviewed the notes, assessments, and/or procedures performed by Nadya Kwan PTA, I concur with her/his documentation of Yun Blackwell.

## 2023-11-21 ENCOUNTER — LAB (OUTPATIENT)
Dept: LAB | Facility: HOSPITAL | Age: 68
End: 2023-11-21
Payer: MEDICARE

## 2023-11-21 ENCOUNTER — TELEPHONE (OUTPATIENT)
Dept: INTERNAL MEDICINE | Facility: CLINIC | Age: 68
End: 2023-11-21
Payer: MEDICARE

## 2023-11-21 DIAGNOSIS — R30.0 DYSURIA: ICD-10-CM

## 2023-11-21 DIAGNOSIS — E53.8 B12 DEFICIENCY: ICD-10-CM

## 2023-11-21 DIAGNOSIS — R30.0 DYSURIA: Primary | ICD-10-CM

## 2023-11-21 DIAGNOSIS — Z00.00 HEALTHCARE MAINTENANCE: ICD-10-CM

## 2023-11-21 DIAGNOSIS — R73.03 PREDIABETES: ICD-10-CM

## 2023-11-21 LAB
ALBUMIN SERPL-MCNC: 4 G/DL (ref 3.5–5.2)
ALBUMIN/GLOB SERPL: 1.3 G/DL
ALP SERPL-CCNC: 106 U/L (ref 39–117)
ALT SERPL W P-5'-P-CCNC: 12 U/L (ref 1–33)
ANION GAP SERPL CALCULATED.3IONS-SCNC: 8 MMOL/L (ref 5–15)
AST SERPL-CCNC: 23 U/L (ref 1–32)
BACTERIA UR QL AUTO: ABNORMAL /HPF
BILIRUB SERPL-MCNC: 0.7 MG/DL (ref 0–1.2)
BILIRUB UR QL STRIP: NEGATIVE
BUN SERPL-MCNC: 21 MG/DL (ref 8–23)
BUN/CREAT SERPL: 31.8 (ref 7–25)
CALCIUM SPEC-SCNC: 9 MG/DL (ref 8.6–10.5)
CHLORIDE SERPL-SCNC: 107 MMOL/L (ref 98–107)
CHOLEST SERPL-MCNC: 115 MG/DL (ref 0–200)
CLARITY UR: ABNORMAL
CO2 SERPL-SCNC: 24 MMOL/L (ref 22–29)
COLOR UR: ABNORMAL
CREAT SERPL-MCNC: 0.66 MG/DL (ref 0.57–1)
DEPRECATED RDW RBC AUTO: 46.5 FL (ref 37–54)
EGFRCR SERPLBLD CKD-EPI 2021: 94.5 ML/MIN/1.73
ERYTHROCYTE [DISTWIDTH] IN BLOOD BY AUTOMATED COUNT: 13.4 % (ref 12.3–15.4)
GLOBULIN UR ELPH-MCNC: 3 GM/DL
GLUCOSE SERPL-MCNC: 97 MG/DL (ref 65–99)
GLUCOSE UR STRIP-MCNC: NEGATIVE MG/DL
HBA1C MFR BLD: 5.3 % (ref 4.8–5.6)
HCT VFR BLD AUTO: 40.2 % (ref 34–46.6)
HDLC SERPL-MCNC: 57 MG/DL (ref 40–60)
HGB BLD-MCNC: 12.5 G/DL (ref 12–15.9)
HGB UR QL STRIP.AUTO: ABNORMAL
HYALINE CASTS UR QL AUTO: ABNORMAL /LPF
KETONES UR QL STRIP: ABNORMAL
LDLC SERPL CALC-MCNC: 34 MG/DL (ref 0–100)
LDLC/HDLC SERPL: 0.51 {RATIO}
LEUKOCYTE ESTERASE UR QL STRIP.AUTO: ABNORMAL
MCH RBC QN AUTO: 29.5 PG (ref 26.6–33)
MCHC RBC AUTO-ENTMCNC: 31.1 G/DL (ref 31.5–35.7)
MCV RBC AUTO: 94.8 FL (ref 79–97)
NITRITE UR QL STRIP: NEGATIVE
PH UR STRIP.AUTO: 5.5 [PH] (ref 5–8)
PLATELET # BLD AUTO: 173 10*3/MM3 (ref 140–450)
PMV BLD AUTO: 10.9 FL (ref 6–12)
POTASSIUM SERPL-SCNC: 4.1 MMOL/L (ref 3.5–5.2)
PROT SERPL-MCNC: 7 G/DL (ref 6–8.5)
PROT UR QL STRIP: NEGATIVE
RBC # BLD AUTO: 4.24 10*6/MM3 (ref 3.77–5.28)
RBC # UR STRIP: ABNORMAL /HPF
REF LAB TEST METHOD: ABNORMAL
SODIUM SERPL-SCNC: 139 MMOL/L (ref 136–145)
SP GR UR STRIP: 1.03 (ref 1–1.03)
SQUAMOUS #/AREA URNS HPF: ABNORMAL /HPF
TRIGL SERPL-MCNC: 145 MG/DL (ref 0–150)
TSH SERPL DL<=0.05 MIU/L-ACNC: 1.7 UIU/ML (ref 0.27–4.2)
UROBILINOGEN UR QL STRIP: ABNORMAL
VLDLC SERPL-MCNC: 24 MG/DL (ref 5–40)
WBC # UR STRIP: ABNORMAL /HPF
WBC NRBC COR # BLD AUTO: 7.56 10*3/MM3 (ref 3.4–10.8)

## 2023-11-21 PROCEDURE — 81001 URINALYSIS AUTO W/SCOPE: CPT

## 2023-11-21 PROCEDURE — 36415 COLL VENOUS BLD VENIPUNCTURE: CPT

## 2023-11-21 PROCEDURE — 80053 COMPREHEN METABOLIC PANEL: CPT

## 2023-11-21 PROCEDURE — 80061 LIPID PANEL: CPT

## 2023-11-21 PROCEDURE — 83036 HEMOGLOBIN GLYCOSYLATED A1C: CPT

## 2023-11-21 PROCEDURE — 87086 URINE CULTURE/COLONY COUNT: CPT

## 2023-11-21 PROCEDURE — 82607 VITAMIN B-12: CPT

## 2023-11-21 PROCEDURE — 85027 COMPLETE CBC AUTOMATED: CPT

## 2023-11-21 PROCEDURE — 84443 ASSAY THYROID STIM HORMONE: CPT

## 2023-11-21 RX ORDER — NITROFURANTOIN 25; 75 MG/1; MG/1
100 CAPSULE ORAL 2 TIMES DAILY
Qty: 10 CAPSULE | Refills: 0 | OUTPATIENT
Start: 2023-11-21 | End: 2023-11-24

## 2023-11-21 NOTE — TELEPHONE ENCOUNTER
He was diagnosed with this in the outpatient setting  With hematuria  We stopped his bactrim  His cultures are negative  Will transition to PO macrobid. Stop dates in place  Recommend outpatient follow up with urology regarding hematuria   Patient notified and voiced understanding.

## 2023-11-21 NOTE — TELEPHONE ENCOUNTER
Urinalysis has been ordered.  Please have her get this done as soon as possible this morning, so we can have results back before the holiday break.

## 2023-11-21 NOTE — TELEPHONE ENCOUNTER
Pt is having UTI symptoms.   Urgency along with pain in back.   She is requesting a order be sent in so she can have a UA done at the lab.    Referred to the urgent care, pt states her ins does not cover those visits so she is not going to go there.     Please advise.

## 2023-11-22 LAB — VIT B12 BLD-MCNC: 224 PG/ML (ref 211–946)

## 2023-11-23 LAB — BACTERIA SPEC AEROBE CULT: NORMAL

## 2023-11-24 ENCOUNTER — APPOINTMENT (OUTPATIENT)
Dept: CT IMAGING | Facility: HOSPITAL | Age: 68
End: 2023-11-24
Payer: MEDICARE

## 2023-11-24 ENCOUNTER — NURSE TRIAGE (OUTPATIENT)
Dept: CALL CENTER | Facility: HOSPITAL | Age: 68
End: 2023-11-24
Payer: MEDICARE

## 2023-11-24 ENCOUNTER — HOSPITAL ENCOUNTER (EMERGENCY)
Facility: HOSPITAL | Age: 68
Discharge: HOME | End: 2023-11-24
Attending: EMERGENCY MEDICINE
Payer: MEDICARE

## 2023-11-24 VITALS
BODY MASS INDEX: 28.7 KG/M2 | HEIGHT: 63 IN | HEART RATE: 79 BPM | RESPIRATION RATE: 20 BRPM | DIASTOLIC BLOOD PRESSURE: 79 MMHG | WEIGHT: 162 LBS | SYSTOLIC BLOOD PRESSURE: 123 MMHG | OXYGEN SATURATION: 98 % | TEMPERATURE: 98.9 F

## 2023-11-24 DIAGNOSIS — N30.90 CYSTITIS: ICD-10-CM

## 2023-11-24 DIAGNOSIS — K80.20 CALCULUS OF GALLBLADDER WITHOUT CHOLECYSTITIS WITHOUT OBSTRUCTION: ICD-10-CM

## 2023-11-24 DIAGNOSIS — R11.2 NAUSEA AND VOMITING, UNSPECIFIED VOMITING TYPE: Primary | ICD-10-CM

## 2023-11-24 DIAGNOSIS — K43.9 VENTRAL HERNIA WITHOUT OBSTRUCTION OR GANGRENE: ICD-10-CM

## 2023-11-24 DIAGNOSIS — M25.551 RIGHT HIP PAIN: ICD-10-CM

## 2023-11-24 LAB
AMPHET+METHAMPHET UR QL: NEGATIVE
AMPHETAMINES UR QL: NEGATIVE
ANION GAP SERPL CALCULATED.3IONS-SCNC: 9 MMOL/L (ref 5–15)
BACTERIA UR QL AUTO: ABNORMAL /HPF
BARBITURATES UR QL SCN: NEGATIVE
BASOPHILS # BLD AUTO: 0.03 10*3/MM3 (ref 0–0.2)
BASOPHILS NFR BLD AUTO: 0.5 % (ref 0–1.5)
BENZODIAZ UR QL SCN: NEGATIVE
BILIRUB UR QL STRIP: NEGATIVE
BUN SERPL-MCNC: 16 MG/DL (ref 8–23)
BUN/CREAT SERPL: 39 (ref 7–25)
BUPRENORPHINE SERPL-MCNC: NEGATIVE NG/ML
CALCIUM SPEC-SCNC: 9.6 MG/DL (ref 8.6–10.5)
CANNABINOIDS SERPL QL: NEGATIVE
CHLORIDE SERPL-SCNC: 103 MMOL/L (ref 98–107)
CLARITY UR: CLEAR
CO2 SERPL-SCNC: 28 MMOL/L (ref 22–29)
COCAINE UR QL: NEGATIVE
COLOR UR: ABNORMAL
CREAT SERPL-MCNC: 0.41 MG/DL (ref 0.57–1)
CRP SERPL-MCNC: <0.3 MG/DL (ref 0–0.5)
DEPRECATED RDW RBC AUTO: 47 FL (ref 37–54)
EGFRCR SERPLBLD CKD-EPI 2021: 106 ML/MIN/1.73
EOSINOPHIL # BLD AUTO: 0.06 10*3/MM3 (ref 0–0.4)
EOSINOPHIL NFR BLD AUTO: 1 % (ref 0.3–6.2)
ERYTHROCYTE [DISTWIDTH] IN BLOOD BY AUTOMATED COUNT: 13.2 % (ref 12.3–15.4)
FENTANYL UR-MCNC: NEGATIVE NG/ML
GLUCOSE SERPL-MCNC: 100 MG/DL (ref 65–99)
GLUCOSE UR STRIP-MCNC: NEGATIVE MG/DL
HCT VFR BLD AUTO: 42.1 % (ref 34–46.6)
HGB BLD-MCNC: 12.6 G/DL (ref 12–15.9)
HGB UR QL STRIP.AUTO: ABNORMAL
HYALINE CASTS UR QL AUTO: ABNORMAL /LPF
IMM GRANULOCYTES # BLD AUTO: 0.02 10*3/MM3 (ref 0–0.05)
IMM GRANULOCYTES NFR BLD AUTO: 0.3 % (ref 0–0.5)
KETONES UR QL STRIP: ABNORMAL
LEUKOCYTE ESTERASE UR QL STRIP.AUTO: ABNORMAL
LYMPHOCYTES # BLD AUTO: 1.62 10*3/MM3 (ref 0.7–3.1)
LYMPHOCYTES NFR BLD AUTO: 27 % (ref 19.6–45.3)
MCH RBC QN AUTO: 28.7 PG (ref 26.6–33)
MCHC RBC AUTO-ENTMCNC: 29.9 G/DL (ref 31.5–35.7)
MCV RBC AUTO: 95.9 FL (ref 79–97)
METHADONE UR QL SCN: NEGATIVE
MONOCYTES # BLD AUTO: 0.51 10*3/MM3 (ref 0.1–0.9)
MONOCYTES NFR BLD AUTO: 8.5 % (ref 5–12)
NEUTROPHILS NFR BLD AUTO: 3.77 10*3/MM3 (ref 1.7–7)
NEUTROPHILS NFR BLD AUTO: 62.7 % (ref 42.7–76)
NITRITE UR QL STRIP: NEGATIVE
NRBC BLD AUTO-RTO: 0 /100 WBC (ref 0–0.2)
OPIATES UR QL: POSITIVE
OXYCODONE UR QL SCN: NEGATIVE
PCP UR QL SCN: NEGATIVE
PH UR STRIP.AUTO: 5.5 [PH] (ref 5–8)
PLATELET # BLD AUTO: 177 10*3/MM3 (ref 140–450)
PMV BLD AUTO: 10.9 FL (ref 6–12)
POTASSIUM SERPL-SCNC: 4.3 MMOL/L (ref 3.5–5.2)
PROT UR QL STRIP: NEGATIVE
RBC # BLD AUTO: 4.39 10*6/MM3 (ref 3.77–5.28)
RBC # UR STRIP: ABNORMAL /HPF
REF LAB TEST METHOD: ABNORMAL
SODIUM SERPL-SCNC: 140 MMOL/L (ref 136–145)
SP GR UR STRIP: 1.02 (ref 1–1.03)
SQUAMOUS #/AREA URNS HPF: ABNORMAL /HPF
TRICYCLICS UR QL SCN: NEGATIVE
TROPONIN T SERPL HS-MCNC: 7 NG/L
UROBILINOGEN UR QL STRIP: ABNORMAL
WBC # UR STRIP: ABNORMAL /HPF
WBC NRBC COR # BLD AUTO: 6.01 10*3/MM3 (ref 3.4–10.8)

## 2023-11-24 PROCEDURE — 80307 DRUG TEST PRSMV CHEM ANLYZR: CPT | Performed by: EMERGENCY MEDICINE

## 2023-11-24 PROCEDURE — 93005 ELECTROCARDIOGRAM TRACING: CPT | Performed by: EMERGENCY MEDICINE

## 2023-11-24 PROCEDURE — 25010000002 ONDANSETRON PER 1 MG: Performed by: EMERGENCY MEDICINE

## 2023-11-24 PROCEDURE — 0 HYDROMORPHONE 1 MG/ML SOLUTION: Performed by: EMERGENCY MEDICINE

## 2023-11-24 PROCEDURE — 25010000002 PROCHLORPERAZINE 10 MG/2ML SOLUTION: Performed by: EMERGENCY MEDICINE

## 2023-11-24 PROCEDURE — 93010 ELECTROCARDIOGRAM REPORT: CPT | Performed by: INTERNAL MEDICINE

## 2023-11-24 PROCEDURE — 25810000003 LACTATED RINGERS SOLUTION: Performed by: EMERGENCY MEDICINE

## 2023-11-24 PROCEDURE — 85025 COMPLETE CBC W/AUTO DIFF WBC: CPT | Performed by: EMERGENCY MEDICINE

## 2023-11-24 PROCEDURE — 99284 EMERGENCY DEPT VISIT MOD MDM: CPT

## 2023-11-24 PROCEDURE — 84484 ASSAY OF TROPONIN QUANT: CPT | Performed by: EMERGENCY MEDICINE

## 2023-11-24 PROCEDURE — 74176 CT ABD & PELVIS W/O CONTRAST: CPT

## 2023-11-24 PROCEDURE — 81001 URINALYSIS AUTO W/SCOPE: CPT | Performed by: EMERGENCY MEDICINE

## 2023-11-24 PROCEDURE — 36415 COLL VENOUS BLD VENIPUNCTURE: CPT

## 2023-11-24 PROCEDURE — 96374 THER/PROPH/DIAG INJ IV PUSH: CPT

## 2023-11-24 PROCEDURE — 96375 TX/PRO/DX INJ NEW DRUG ADDON: CPT

## 2023-11-24 PROCEDURE — 80048 BASIC METABOLIC PNL TOTAL CA: CPT | Performed by: EMERGENCY MEDICINE

## 2023-11-24 PROCEDURE — 25010000002 DEXAMETHASONE PER 1 MG: Performed by: EMERGENCY MEDICINE

## 2023-11-24 PROCEDURE — 86140 C-REACTIVE PROTEIN: CPT | Performed by: EMERGENCY MEDICINE

## 2023-11-24 RX ORDER — DEXAMETHASONE SODIUM PHOSPHATE 10 MG/ML
10 INJECTION INTRAMUSCULAR; INTRAVENOUS ONCE
Status: COMPLETED | OUTPATIENT
Start: 2023-11-24 | End: 2023-11-24

## 2023-11-24 RX ORDER — CIPROFLOXACIN 500 MG/1
500 TABLET, FILM COATED ORAL 2 TIMES DAILY
Qty: 6 TABLET | Refills: 0 | Status: SHIPPED | OUTPATIENT
Start: 2023-11-24 | End: 2023-11-27

## 2023-11-24 RX ORDER — ONDANSETRON 4 MG/1
4 TABLET, FILM COATED ORAL EVERY 6 HOURS
Qty: 15 TABLET | Refills: 0 | Status: SHIPPED | OUTPATIENT
Start: 2023-11-24

## 2023-11-24 RX ORDER — PROCHLORPERAZINE EDISYLATE 5 MG/ML
5 INJECTION INTRAMUSCULAR; INTRAVENOUS EVERY 6 HOURS PRN
Status: DISCONTINUED | OUTPATIENT
Start: 2023-11-24 | End: 2023-11-24 | Stop reason: HOSPADM

## 2023-11-24 RX ORDER — ONDANSETRON 2 MG/ML
4 INJECTION INTRAMUSCULAR; INTRAVENOUS ONCE
Status: COMPLETED | OUTPATIENT
Start: 2023-11-24 | End: 2023-11-24

## 2023-11-24 RX ORDER — SODIUM CHLORIDE 0.9 % (FLUSH) 0.9 %
10 SYRINGE (ML) INJECTION AS NEEDED
Status: DISCONTINUED | OUTPATIENT
Start: 2023-11-24 | End: 2023-11-24 | Stop reason: HOSPADM

## 2023-11-24 RX ORDER — PANTOPRAZOLE SODIUM 40 MG/1
40 TABLET, DELAYED RELEASE ORAL EVERY 12 HOURS
Qty: 14 TABLET | Refills: 0 | Status: SHIPPED | OUTPATIENT
Start: 2023-11-24 | End: 2023-12-01

## 2023-11-24 RX ADMIN — SODIUM CHLORIDE, POTASSIUM CHLORIDE, SODIUM LACTATE AND CALCIUM CHLORIDE 500 ML: 600; 310; 30; 20 INJECTION, SOLUTION INTRAVENOUS at 11:17

## 2023-11-24 RX ADMIN — PROCHLORPERAZINE EDISYLATE 5 MG: 5 INJECTION, SOLUTION INTRAMUSCULAR; INTRAVENOUS at 12:11

## 2023-11-24 RX ADMIN — DEXAMETHASONE SODIUM PHOSPHATE 10 MG: 10 INJECTION INTRAMUSCULAR; INTRAVENOUS at 12:42

## 2023-11-24 RX ADMIN — HYDROMORPHONE HYDROCHLORIDE 1 MG: 1 INJECTION, SOLUTION INTRAMUSCULAR; INTRAVENOUS; SUBCUTANEOUS at 11:18

## 2023-11-24 RX ADMIN — ONDANSETRON 4 MG: 2 INJECTION INTRAMUSCULAR; INTRAVENOUS at 11:18

## 2023-11-24 NOTE — TELEPHONE ENCOUNTER
"Reason for Disposition   Patient sounds very sick or weak to the triager    Additional Information   Negative: Shock suspected (e.g., cold/pale/clammy skin, too weak to stand, low BP, rapid pulse)   Negative: Sounds like a life-threatening emergency to the triager   Negative: Urinary tract infection suspected, but not taking antibiotics   Negative: [1] Unable to urinate (or only a few drops) > 4 hours AND [2] bladder feels very full (e.g., palpable bladder or strong urge to urinate)   Negative: Passing pure blood or large blood clots (i.e., size > a dime)  (Exceptions: Flecks, small strands, or pinkish-red color)   Negative: Fever > 103 F (39.4 C)    Answer Assessment - Initial Assessment Questions  1. MAIN SYMPTOM: \"What is the main symptom you are concerned about?\" (e.g., painful urination, urine frequency)      Pain in right hip  2. BETTER-SAME-WORSE: \"Are you getting better, staying the same, or getting worse compared to how you felt at your last visit to the doctor (most recent medical visit)?\"      worse  3. PAIN: \"How bad is the pain?\"  (e.g., Scale 1-10; mild, moderate, or severe)    - MILD (1-3): complains slightly about urination hurting    - MODERATE (4-7): interferes with normal activities      - SEVERE (8-10): excruciating, unwilling or unable to urinate because of the pain       Severe  4. FEVER: \"Do you have a fever?\" If Yes, ask: \"What is it, how was it measured, and when did it start?\"      denies  5. OTHER SYMPTOMS: \"Do you have any other symptoms?\" (e.g., blood in the urine, flank pain, vaginal discharge)      Right hip pain  6. DIAGNOSIS: \"When was the UTI diagnosed?\" \"By whom?\" \"Was it a kidney infection, bladder infection or both?\"      11/21  7. ANTIBIOTIC: \"What antibiotic(s) are you taking?\" \"How many times per day?\"      macrobid  8. ANTIBIOTIC - START DATE: \"When did you start taking the antibiotic?\"      11/21    Protocols used: Urinary Tract Infection on Antibiotic Follow-up Call - " Female-ADULT-  Patient in severe pain, dark urine, right hip pain.  Offered to call md on call, patient stated when she had this before she ended up in hospital.  Discussed care advice with patient. Patient going to ER since pain is worse and intolerable.

## 2023-11-24 NOTE — ED PROVIDER NOTES
Subjective   History of Present Illness  Patient is a 68 years old who came the ER limping on her hip.  Wanting stronger antibiotic to be given to her because of her UTI.  There is no history of trauma there is no history any fall.  Her primary complaint is right hip pain and that so they came to the conclusion that she is got a UTI she states.    Hip Pain  Location:  Right hip pain atraumatic  Severity:  Moderate  Onset quality:  Gradual  Duration:  5 days  Timing:  Constant  Progression:  Unchanged  Chronicity:  New  Associated symptoms: vomiting    Associated symptoms: no abdominal pain, no chest pain, no congestion, no cough, no diarrhea, no fever, no headaches, no loss of consciousness, no myalgias, no nausea, no rhinorrhea, no shortness of breath, no sore throat and no wheezing        Review of Systems   Constitutional:  Negative for fever.   HENT:  Negative for congestion, rhinorrhea and sore throat.    Respiratory:  Negative for cough, shortness of breath and wheezing.    Cardiovascular:  Negative for chest pain.   Gastrointestinal:  Positive for vomiting. Negative for abdominal pain, diarrhea and nausea.   Musculoskeletal:  Negative for myalgias.   Neurological:  Negative for loss of consciousness and headaches.       Past Medical History:   Diagnosis Date    A-fib     Abnormal ECG     tachycardia, a fib    Acid reflux     Anemia     Arthritis     Cancer     Diabetes mellitus     Hyperkalemia     Hyperlipidemia     Hypertension     Hyponatremia     IBS (irritable bowel syndrome)     PONV (postoperative nausea and vomiting)     Sleep apnea     USES CPAP    UTI (urinary tract infection)     UTI (urinary tract infection) 11/25/2016    Vaginal vault prolapse        Allergies   Allergen Reactions    Morphine And Related Nausea And Vomiting and Dizziness    Percocet [Oxycodone-Acetaminophen] Nausea And Vomiting and Dizziness       Past Surgical History:   Procedure Laterality Date    BREAST BIOPSY Left     X2     BUNIONECTOMY Left     CARDIAC CATHETERIZATION      CARDIAC CATHETERIZATION N/A 02/05/2021    Procedure: Right Heart Cath;  Surgeon: Van Marcelino MD;  Location:  [facility];  Service: Cardiology;  Laterality: N/A;    CATARACT EXTRACTION, BILATERAL      COLON SURGERY      COLONOSCOPY  09/21/2015    TRANSVERSE COLON ADENOMATOUS POLYP, HEMORRHOIDS, RECALL 5 YEARS, DR LAMAS    COLONOSCOPY N/A 11/13/2020    6 mm tubular adenomatous polyp at 15 cm proximal to the anus,diverticulosis of the sigmoid colon    COLOSTOMY      COLPOPEXY VAGINAL      2014    ENDOSCOPY N/A 11/03/2016    LA GRADE B ESOPHAGITIS WITH NO BLEEDING UPPER THIRD OF ESOPHAGUS, GERI-WADSWORTH TEAR GEJ, BILIOUS BLOOD TINGED COFFEE GROUND GATRIC FLUIDDR LAMAS    EXPLORATORY LAPAROTOMY N/A 10/14/2016    Procedure: LAPAROTOMY EXPLORATORY, LYSIS OF ADHESIONS, IRRIAGATION OF ABDOMEN, POSSIBLE BOWEL RESECTION;  Surgeon: Bacilio Marcial MD;  Location:  [facility] OR;  Service:     FOOT NAVICULAR EXCISION OR BONE GRAFT Left 01/04/2023    Procedure: Expansion Graft, Harvesting of Bone Graft/Bone Marrow Aspirate;  Surgeon: Frankie Zapata DPM;  Location:  [facility];  Service: Podiatry;  Laterality: Left;    HAMMER TOE REPAIR Left 01/04/2023    Procedure: Hammertoe Repair 2 and 3 - Left Foot;  Surgeon: Frankie Zapata DPM;  Location:  [facility];  Service: Podiatry;  Laterality: Left;    HARDWARE REMOVAL Left 01/04/2023    Procedure: Hardware Removal;  Surgeon: Frankie Zapata DPM;  Location:  [facility];  Service: Podiatry;  Laterality: Left;    HYSTERECTOMY      REVISION / TAKEDOWN COLOSTOMY      SACROCOLPOPEXY N/A 09/21/2016    Procedure: SACROCOLPOPEXY LAPAROSCOPIC WITH VocalZoomINCI SI ROBOT, CONVERTED TO OPEN SACROCOLPOPEXY, RIGHT SALPINGO- OOPHERECTOMY, CYSTO;  Surgeon: Maribell Beth MD;  Location:  [facility];  Service:     TOE FUSION Left 01/06/2022    Procedure: FIRST METATARSOPHALANGEAL JOINT ARTHRODESIS WITH INTERNAL FIXATION - LEFT FOOT;   Surgeon: Jorgito Red DPM;  Location:  [facility];  Service: Podiatry;  Laterality: Left;    TOE FUSION Left 01/04/2023    Procedure: Revisional 1st Metatarsophalangeal Joint Arthrodesis with Hardware Removal and Expansion Graft, Harvesting of Bone Graft/Bone Marrow Aspirate, Hammertoe Repair 2 and 3 - Left Foot;  Surgeon: Frankie Zapata DPM;  Location:  [facility];  Service: Podiatry;  Laterality: Left;       Family History   Problem Relation Age of Onset    Hypertension Mother     No Known Problems Father     Colon cancer Neg Hx     Colon polyps Neg Hx        Social History     Socioeconomic History    Marital status:    Tobacco Use    Smoking status: Never     Passive exposure: Never    Smokeless tobacco: Never   Vaping Use    Vaping Use: Never used   Substance and Sexual Activity    Alcohol use: No    Drug use: No    Sexual activity: Defer           Objective   Physical Exam  Vitals and nursing note reviewed. Exam conducted with a chaperone present.   Constitutional:       General: She is awake. She is not in acute distress.     Appearance: Normal appearance. She is well-developed. She is not toxic-appearing.   HENT:      Head: Normocephalic and atraumatic.      Nose:      Right Nostril: No epistaxis.      Left Nostril: No epistaxis.   Eyes:      General: Lids are normal. No scleral icterus.     Conjunctiva/sclera: Conjunctivae normal.      Pupils: Pupils are equal, round, and reactive to light.   Neck:      Vascular: No hepatojugular reflux or JVD.   Cardiovascular:      Rate and Rhythm: Normal rate and regular rhythm.      Chest Wall: PMI is not displaced.      Pulses: Normal pulses. No decreased pulses.      Heart sounds: Normal heart sounds. No murmur heard.  Pulmonary:      Effort: Pulmonary effort is normal. No accessory muscle usage or respiratory distress.      Breath sounds: Normal breath sounds. No decreased breath sounds or wheezing.   Abdominal:      General: Abdomen is flat. Bowel  sounds are normal. There is no distension or abdominal bruit.      Palpations: Abdomen is soft. There is no shifting dullness, fluid wave, mass or pulsatile mass.      Tenderness: There is no abdominal tenderness. There is no right CVA tenderness, left CVA tenderness, guarding or rebound.      Hernia: No hernia is present.   Musculoskeletal:         General: Normal range of motion.      Cervical back: Normal range of motion and neck supple. No rigidity.      Right lower leg: No edema.      Left lower leg: No edema.      Comments: Right hip examination reveals range of motion to reproduce some pain.  With no deformities noted.  Neuro vas examination negative there is no evidence of any obvious infection of the hip on examination there is no cellulitis.  There is no evidence of DVT.   Skin:     General: Skin is warm and dry.      Capillary Refill: Capillary refill takes less than 2 seconds.      Coloration: Skin is not cyanotic, jaundiced, mottled or pale.   Neurological:      General: No focal deficit present.      Mental Status: She is alert and oriented to person, place, and time. Mental status is at baseline.      GCS: GCS eye subscore is 4. GCS verbal subscore is 5. GCS motor subscore is 6.      Cranial Nerves: No cranial nerve deficit.      Sensory: Sensation is intact.      Motor: Motor function is intact.      Deep Tendon Reflexes: Reflexes are normal and symmetric.   Psychiatric:         Behavior: Behavior normal. Behavior is cooperative.         Procedures           ED Course  ED Course as of 11/24/23 1441   Fri Nov 24, 2023   1140 No acute findings in the abdomen/pelvis.     3 ventral abdominal wall hernias, the one to the left of midline  contains a nonobstructed loop of small bowel.     Cholelithiasis.     Colonic diverticulosis.      This was discussed with patient [TS]   1257 Chronic afib [TS]   1431 Patient came to the ED complaining of right hip pain.  And wanted stronger med antibiotic.  For her UTI  no no clinical evidence of UTI.  Her urinalysis and she is negative with trace amount of bacteria since she is concerned about a UTI we can give her Cipro for the next couple of days.  She has been vomiting in the ED and every time she states that she is has vomiting almost like a cyclical episodes.  Her CT of the abdomen pelvis is negative except for hernias with no obstruction [TS]   1432 And cholelithiasis. [TS]   1432 Seb examination of the patient multiple times reveals no abdominal pain she denies any abdominal pain she denies any headaches denies any chest pain.  After the IV fluids and pain medication she feels much better for her nausea.  Given Zofran and Compazine followed by dexamethasone  I think the nausea may have been instigated by the pain medication that she received. [TS]   1433 I have discussed this case and have the patient and I have offered her repeated CAT scan with IV contrast to rule out any other abnormity pathology.  Along with other studies to determine the cause of her vomiting.  The patient states that she did not need anything in the morning.  And has been dry heaving.  She does not want any further testing or diagnostics performed at this time does not want further CT scans be performed and wants to go home she feels much better.  I have advised her that she was having fever increasing abdominal pain nausea vomiting she needs to come back to the ED for reevaluation reassessment patient is awake alert oriented x3 and can make appropriate decisions for herself asking appropriate questions and does not want a repeat CAT scan I have explained the reasoning behind repeating the CAT scan she understands but does not want to have another study performed at this time. [TS]      ED Course User Index  [TS] Andreas Flaherty MD                                           Medical Decision Making  Patient with right hip pain history of UTI will treat and get some lab work-up and get a CAT scan to rule  out any occult fractures or sacral insufficiency abnormalities.    Problems Addressed:  Calculus of gallbladder without cholecystitis without obstruction: undiagnosed new problem with uncertain prognosis     Details: No acute simona need follow-up with general surgery  Cystitis: acute illness or injury     Details: Mild   Nausea and vomiting, unspecified vomiting type: acute illness or injury  Right hip pain:     Details: No evidence of fracture or infectious process or insufficiency.  Ventral hernia without obstruction or gangrene: undiagnosed new problem with uncertain prognosis     Details: Will need follow-up with general surgery    Amount and/or Complexity of Data Reviewed  Labs: ordered.     Details: Labs reviewed  Radiology: ordered.     Details: X-rays reviewed  ECG/medicine tests: ordered.    Risk  Prescription drug management.  Risk Details: Well score for DVT is 0  This patient presents with vomiting arrived hemodynamically stable.  Presentation not consistent with other acute, emergent causes of abdominal pain at this time.  Low suspicion for dissection there is no widened mediastinum, hypotension, pulses deficits, and no pain tearing through to the back.  Low suspicion for nephrolithiasis without flank pain radiating to the groin, hematuria, or any history of kidney stones.  Low suspicion for viscus perforation without evidence of guarding, rebound, or rigidity that would be concerning for an acute abdomen.  Low concern for appendicitis as pain did not radiate from the umbilicus to the right lower quadrant, negative Rovsing's sign, no severe constipation on exam.  Low concern for cholecystitis with negative Knott sign, no history of gallstones, and no recent pale stools or pain after eating.  Low concern for ulcerative disease as pain is not consistent with eating  foods and is not relieved with patient refraining from eatingand there is no hematemesis or melena. Low suspicion for GI bleeding as there  is no melena hematemesis or hematochezia and patient's hemodynamics are stable and hemoglobin is at its baseline.  Low suspicion for gastroenteritis without evidence of diarrhea, fevers, or recent uncooked food exposure.  There is low concern for ischemic bowel with no significant abdominal pain normal vitals and no acidosis no history of peripheral vascular disease or cardiac dysrhythmias.                Final diagnoses:   Nausea and vomiting, unspecified vomiting type   Right hip pain   Cystitis   Calculus of gallbladder without cholecystitis without obstruction   Ventral hernia without obstruction or gangrene       ED Disposition  ED Disposition       ED Disposition   Discharge    Condition   Stable    Comment   --               ANGEL Healy MD  [address] [facility] [address]  [address]  [place] [id number] [phone]    In 1 day           Medication List        New Prescriptions      ciprofloxacin 500 MG tablet  Commonly known as: CIPRO  Take 1 tablet by mouth 2 (Two) Times a Day for 3 days.     ondansetron 4 MG tablet  Commonly known as: ZOFRAN  Take 1 tablet by mouth Every 6 (Six) Hours.     pantoprazole 40 MG EC tablet  Commonly known as: PROTONIX  Take 1 tablet by mouth Every 12 (Twelve) Hours for 7 days.            Stop      nitrofurantoin (macrocrystal-monohydrate) 100 MG capsule  Commonly known as: Macrobid     omeprazole 20 MG capsule  Commonly known as: priLOSEC               Where to Get Your Medications        These medications were sent to [facility] - [address], [place] [id number]      Hours: Monday to Friday 7 AM to 5 PM (Closed 12:30 PM to 1 PM) Phone: [phone]   ciprofloxacin 500 MG tablet  ondansetron 4 MG tablet  pantoprazole 40 MG EC tablet            Andreas Flaherty MD  11/24/23 1238       Andreas Flaherty MD  11/24/23 9917

## 2023-11-25 LAB
QT INTERVAL: 448 MS
QTC INTERVAL: 516 MS

## 2023-11-30 ENCOUNTER — OFFICE VISIT (OUTPATIENT)
Dept: INTERNAL MEDICINE | Facility: CLINIC | Age: 68
End: 2023-11-30
Payer: MEDICARE

## 2023-11-30 VITALS
HEIGHT: 63 IN | WEIGHT: 157.7 LBS | BODY MASS INDEX: 27.94 KG/M2 | TEMPERATURE: 97.4 F | HEART RATE: 89 BPM | SYSTOLIC BLOOD PRESSURE: 122 MMHG | OXYGEN SATURATION: 98 % | DIASTOLIC BLOOD PRESSURE: 80 MMHG

## 2023-11-30 DIAGNOSIS — B34.9 VIRAL INFECTION: Primary | ICD-10-CM

## 2023-11-30 DIAGNOSIS — M54.50 ACUTE RIGHT-SIDED LOW BACK PAIN WITHOUT SCIATICA: ICD-10-CM

## 2023-11-30 LAB
EXPIRATION DATE: NORMAL
EXPIRATION DATE: NORMAL
FLUAV AG NPH QL: NEGATIVE
FLUBV AG NPH QL: NEGATIVE
INTERNAL CONTROL: NORMAL
INTERNAL CONTROL: NORMAL
Lab: NORMAL
Lab: NORMAL
SARS-COV-2 AG UPPER RESP QL IA.RAPID: NOT DETECTED

## 2023-11-30 PROCEDURE — 99214 OFFICE O/P EST MOD 30 MIN: CPT | Performed by: INTERNAL MEDICINE

## 2023-11-30 PROCEDURE — 87426 SARSCOV CORONAVIRUS AG IA: CPT | Performed by: INTERNAL MEDICINE

## 2023-11-30 RX ORDER — DILTIAZEM HYDROCHLORIDE 120 MG/1
120 CAPSULE, COATED, EXTENDED RELEASE ORAL DAILY
Qty: 30 CAPSULE | Refills: 11 | Status: SHIPPED | OUTPATIENT
Start: 2023-11-30

## 2023-11-30 RX ORDER — METHYLPREDNISOLONE 4 MG/1
TABLET ORAL
Qty: 21 TABLET | Refills: 0 | Status: SHIPPED | OUTPATIENT
Start: 2023-11-30

## 2023-11-30 NOTE — PROGRESS NOTES
Chief Complaint   Patient presents with    ER follow up         History:  Yun Blackwell is a 68 y.o. female who presents today for evaluation of the above problems.      ER follow-up for nausea and vomiting.  She initially contacted our office for symptoms of UTI symptoms on November 21..  Urinalysis was ordered and was consistent with a urinary tract infection.  She was prescribed antibiotics and symptoms improved.  She then presented to the emergency room on November 24 complaining of Macrobid not working and right hip pain.  Evaluated the ER records including the notes, laboratory studies including urinalysis and imaging studies including the CT abdomen pelvis.  There is no acute finding in the abdomen or pelvis.  She has 3 ventral abdominal wall hernias no signs of obstruction.  There were no acute osseous changes.    She was given a short course of ciprofloxacin.  She was given IV fluids as well as Zofran, Compazine and dexamethasone.  She improved and was discharged home.    She feels like she has the flu.  She has chills, body aches, muscle aches.  The nausea has stopped.  Denies any upper respiratory symptoms.    She has right lower lumbar pain that started about a week prior to her initial correspondence with us.  Symptoms are worse when she turns.    She denies any dysuria.  The initial urine culture grew mixed fabiola which was felt to be contamination.    HPI      ROS:  Review of Systems   Constitutional:  Positive for activity change and fatigue.   Genitourinary: Negative.  Negative for dysuria, flank pain and frequency.   Musculoskeletal:  Positive for arthralgias, back pain and myalgias.         Current Outpatient Medications:     dabigatran etexilate (PRADAXA) 150 MG capsu, Take 1 capsule by mouth 2 (Two) Times a Day., Disp: 60 capsule, Rfl: 11    dilTIAZem CD (Cardizem CD) 120 MG 24 hr capsule, Take 1 capsule by mouth Daily., Disp: 30 capsule, Rfl: 11    hydrOXYzine (ATARAX) 10 MG tablet, Take 1  or 2 tablets every 4 to 6 hours as needed for itching., Disp: 60 tablet, Rfl: 0    hydrOXYzine (ATARAX) 10 MG tablet, Take 1-2 tablets by mouth Every 4 to 6 Hours As Needed., Disp: 60 tablet, Rfl: 1    montelukast (SINGULAIR) 10 MG tablet, Take 1 tablet by mouth Every Night., Disp: 30 tablet, Rfl: 5    ondansetron (ZOFRAN) 4 MG tablet, Take 1 tablet by mouth Every 6 (Six) Hours., Disp: 15 tablet, Rfl: 0    pantoprazole (PROTONIX) 40 MG EC tablet, Take 1 tablet by mouth Every 12 (Twelve) Hours for 7 days., Disp: 14 tablet, Rfl: 0    tamoxifen (NOLVADEX) 20 MG chemo tablet, Take 1 tablet by mouth daily, Disp: 90 tablet, Rfl: 1    tiZANidine (ZANAFLEX) 4 MG tablet, Take 1 tablet by mouth Daily As Needed for Muscle Spasms., Disp: 30 tablet, Rfl: 2    methylPREDNISolone (MEDROL) 4 MG dose pack, Take as directed on package instructions., Disp: 21 tablet, Rfl: 0    Current Facility-Administered Medications:     cyanocobalamin injection 1,000 mcg, 1,000 mcg, Intramuscular, Q28 Days, ANGEL Healy MD, 1,000 mcg at 12/22/22 0845    Lab Results   Component Value Date    GLUCOSE 100 (H) 11/24/2023    BUN 16 11/24/2023    CREATININE 0.41 (L) 11/24/2023    EGFR 106.0 11/24/2023    BCR 39.0 (H) 11/24/2023    K 4.3 11/24/2023    CO2 28.0 11/24/2023    CALCIUM 9.6 11/24/2023    ALBUMIN 4.0 11/21/2023    BILITOT 0.7 11/21/2023    AST 23 11/21/2023    ALT 12 11/21/2023       WBC   Date Value Ref Range Status   11/24/2023 6.01 3.40 - 10.80 10*3/mm3 Final   08/02/2023 7.23 3.98 - 10.04 K/uL Final     RBC   Date Value Ref Range Status   11/24/2023 4.39 3.77 - 5.28 10*6/mm3 Final   08/02/2023 4.20 3.93 - 5.22 M/uL Final     Hemoglobin   Date Value Ref Range Status   11/24/2023 12.6 12.0 - 15.9 g/dL Final   08/02/2023 12.4 11.2 - 15.7 g/dL Final     Hematocrit   Date Value Ref Range Status   11/24/2023 42.1 34.0 - 46.6 % Final   08/02/2023 39.2 34.1 - 44.9 % Final     MCV   Date Value Ref Range Status   11/24/2023 95.9 79.0 - 97.0 fL  Final   08/02/2023 93.3 79.4 - 94.8 fL Final     MCH   Date Value Ref Range Status   11/24/2023 28.7 26.6 - 33.0 pg Final   08/02/2023 29.5 25.6 - 32.2 pg Final     MCHC   Date Value Ref Range Status   11/24/2023 29.9 (L) 31.5 - 35.7 g/dL Final   08/02/2023 31.6 (L) 32.3 - 35.5 g/dL Final     RDW   Date Value Ref Range Status   11/24/2023 13.2 12.3 - 15.4 % Final   08/02/2023 13.6 11.7 - 14.4 % Final     RDW-SD   Date Value Ref Range Status   11/24/2023 47.0 37.0 - 54.0 fl Final     MPV   Date Value Ref Range Status   11/24/2023 10.9 6.0 - 12.0 fL Final   08/02/2023 11.3 (H) 7.4 - 10.4 fL Final     Platelets   Date Value Ref Range Status   11/24/2023 177 140 - 450 10*3/mm3 Final   08/02/2023 154 (L) 182 - 369 K/uL Final     Neutrophil Rel %   Date Value Ref Range Status   08/02/2023 64.4 34.0 - 71.1 % Final     Neutrophil %   Date Value Ref Range Status   11/24/2023 62.7 42.7 - 76.0 % Final     Lymphocyte Rel %   Date Value Ref Range Status   08/02/2023 23.7 19.3 - 53.1 % Final     Lymphocyte %   Date Value Ref Range Status   11/24/2023 27.0 19.6 - 45.3 % Final     Monocyte Rel %   Date Value Ref Range Status   08/02/2023 9.7 4.7 - 12.5 % Final     Monocyte %   Date Value Ref Range Status   11/24/2023 8.5 5.0 - 12.0 % Final     Eosinophil Rel %   Date Value Ref Range Status   06/29/2023 0.9 0.7 - 7.0 % Final     Eosinophil %   Date Value Ref Range Status   11/24/2023 1.0 0.3 - 6.2 % Final     Basophil Rel %   Date Value Ref Range Status   06/29/2023 0.6 0.1 - 1.2 % Final     Basophil %   Date Value Ref Range Status   11/24/2023 0.5 0.0 - 1.5 % Final     Immature Grans %   Date Value Ref Range Status   11/24/2023 0.3 0.0 - 0.5 % Final     Neutrophils Absolute   Date Value Ref Range Status   08/02/2023 4.66 1.56 - 6.13 K/uL Final     Neutrophils, Absolute   Date Value Ref Range Status   11/24/2023 3.77 1.70 - 7.00 10*3/mm3 Final     Lymphocytes Absolute   Date Value Ref Range Status   08/02/2023 1.71 1.18 - 3.74 K/uL  "Final     Lymphocytes, Absolute   Date Value Ref Range Status   11/24/2023 1.62 0.70 - 3.10 10*3/mm3 Final     Monocytes Absolute   Date Value Ref Range Status   08/02/2023 0.70 0.24 - 0.82 K/uL Final     Monocytes, Absolute   Date Value Ref Range Status   11/24/2023 0.51 0.10 - 0.90 10*3/mm3 Final     Eosinophils Absolute   Date Value Ref Range Status   06/29/2023 0.06 0.04 - 0.54 K/uL Final     Eosinophils, Absolute   Date Value Ref Range Status   11/24/2023 0.06 0.00 - 0.40 10*3/mm3 Final     Basophils Absolute   Date Value Ref Range Status   06/29/2023 0.04 0.01 - 0.08 K/uL Final     Basophils, Absolute   Date Value Ref Range Status   11/24/2023 0.03 0.00 - 0.20 10*3/mm3 Final     Immature Grans, Absolute   Date Value Ref Range Status   11/24/2023 0.02 0.00 - 0.05 10*3/mm3 Final   11/10/2020 0.0 K/uL Final     nRBC   Date Value Ref Range Status   11/24/2023 0.0 0.0 - 0.2 /100 WBC Final         OBJECTIVE:  Visit Vitals  /80 (BP Location: Left arm, Patient Position: Sitting, Cuff Size: Adult)   Pulse 89   Temp 97.4 °F (36.3 °C) (Temporal)   Ht 160 cm (63\")   Wt 71.5 kg (157 lb 11.2 oz)   SpO2 98%   BMI 27.94 kg/m²      Physical Exam  Constitutional:       General: She is not in acute distress.     Appearance: She is well-developed. She is not diaphoretic.   HENT:      Head: Normocephalic and atraumatic.   Eyes:      Pupils: Pupils are equal, round, and reactive to light.   Neck:      Thyroid: No thyromegaly.      Trachea: Phonation normal.   Pulmonary:      Effort: No respiratory distress.   Musculoskeletal:      Cervical back: Normal.      Thoracic back: Normal.      Lumbar back: Tenderness present.        Back:    Skin:     Coloration: Skin is not pale.      Findings: No erythema.   Neurological:      Mental Status: She is alert.   Psychiatric:         Behavior: Behavior normal.         Thought Content: Thought content normal.         Judgment: Judgment normal.         Assessment/Plan    Diagnoses and all " orders for this visit:    1. Viral infection (Primary)  -     POCT Influenza A/B  -     POCT SARS-CoV-2 Antigen ASHLEY    2. Acute right-sided low back pain without sciatica  -     XR Spine Lumbar 2 or 3 View; Future  -     methylPREDNISolone (MEDROL) 4 MG dose pack; Take as directed on package instructions.  Dispense: 21 tablet; Refill: 0    Yun has an acute illness with systemic symptoms that requires further workup point-of-care influenza and COVID-19 was negative today.  She may have a different viral infection.  Recommend conservative treatment.    Do not believe she has a urinary tract infection and suspect the pain in her back to be  musculoskeletal in nature.  Will prescribe a course of steroids.  Obtained a lumbar x-ray as well.  She will let me know if symptoms persist or worsen.      Return for Next scheduled follow up.      DANIELLE Healy MD  07:49 CST  11/30/2023   Electronically signed

## 2023-12-07 ENCOUNTER — OFFICE VISIT (OUTPATIENT)
Dept: GASTROENTEROLOGY | Facility: CLINIC | Age: 68
End: 2023-12-07
Payer: MEDICARE

## 2023-12-07 VITALS
BODY MASS INDEX: 27.25 KG/M2 | TEMPERATURE: 96.4 F | DIASTOLIC BLOOD PRESSURE: 80 MMHG | SYSTOLIC BLOOD PRESSURE: 120 MMHG | WEIGHT: 153.8 LBS | HEIGHT: 63 IN | HEART RATE: 94 BPM | OXYGEN SATURATION: 100 %

## 2023-12-07 DIAGNOSIS — Z86.010 HX OF ADENOMATOUS COLONIC POLYPS: ICD-10-CM

## 2023-12-07 DIAGNOSIS — K58.0 IRRITABLE BOWEL SYNDROME WITH DIARRHEA: ICD-10-CM

## 2023-12-07 DIAGNOSIS — R13.19 ESOPHAGEAL DYSPHAGIA: ICD-10-CM

## 2023-12-07 DIAGNOSIS — K62.5 BRBPR (BRIGHT RED BLOOD PER RECTUM): Primary | ICD-10-CM

## 2023-12-07 DIAGNOSIS — K64.9 HEMORRHOIDS, UNSPECIFIED HEMORRHOID TYPE: ICD-10-CM

## 2023-12-07 DIAGNOSIS — Z79.01 ANTICOAGULATED: ICD-10-CM

## 2023-12-07 RX ORDER — DICYCLOMINE HYDROCHLORIDE 10 MG/1
10 CAPSULE ORAL
Qty: 120 CAPSULE | Refills: 10 | Status: SHIPPED | OUTPATIENT
Start: 2023-12-07

## 2023-12-07 RX ORDER — OMEPRAZOLE 20 MG/1
20 CAPSULE, DELAYED RELEASE ORAL DAILY
COMMUNITY

## 2023-12-07 NOTE — H&P (VIEW-ONLY)
Yun Blackwell  1955 12/7/2023  Chief Complaint   Patient presents with    GI Problem     Rectal bleeding     Subjective   HPI  Yun Blackwell is a 68 y.o. female who presents with a complaint of rectal bleeding. She thinks hemorrhoid related. It comes and goes. It will occur then come back. It is on the tissue when she wipes. No burning or itching. No rectal pressure or pain. She does have some diarrhea no constipation.  Her bowels are more loose than normal. She has had this problem before and took fiber which helped her at first it is not helping her as much now. She takes Immodium to help treat as well as fiber. She says that she has a long hx of IBS. She has not tried anything else for this. She has been on antibiotics for UTI 11/24/23. The diarrhea was before that.    Dysphagia ongoing for 6 months with solids and large pills upper esophageal region. She has had dilatation in the past and this did help her. Time makes it better. It is moderate for her. No nausea or vomiting. No wt loss.   She has had a CT scan 11/24/23  IMPRESSION:        No acute findings in the abdomen/pelvis.     3 ventral abdominal wall hernias, the one to the left of midline  contains a nonobstructed loop of small bowel.     Cholelithiasis.     Colonic diverticulosis.            This report was signed and finalized on 11/24/2023 11:25 AM CST by  Mike Andujar.  Past Medical History:   Diagnosis Date    A-fib     Abnormal ECG     tachycardia, a fib    Acid reflux     Anemia     Arthritis     Cancer     Diabetes mellitus     Hyperkalemia     Hyperlipidemia     Hypertension     Hyponatremia     IBS (irritable bowel syndrome)     PONV (postoperative nausea and vomiting)     Sleep apnea     USES CPAP    UTI (urinary tract infection)     UTI (urinary tract infection) 11/25/2016    Vaginal vault prolapse      Past Surgical History:   Procedure Laterality Date    BREAST BIOPSY Left     X2    BUNIONECTOMY Left     CARDIAC  CATHETERIZATION      CARDIAC CATHETERIZATION N/A 02/05/2021    Procedure: Right Heart Cath;  Surgeon: Van Marcelino MD;  Location:  [facility];  Service: Cardiology;  Laterality: N/A;    CATARACT EXTRACTION, BILATERAL      COLON SURGERY      COLONOSCOPY  09/21/2015    TRANSVERSE COLON ADENOMATOUS POLYP, HEMORRHOIDS, RECALL 5 YEARS, DR LAMAS    COLONOSCOPY N/A 11/13/2020    6 mm tubular adenomatous polyp at 15 cm proximal to the anus,diverticulosis of the sigmoid colon    COLOSTOMY      COLPOPEXY VAGINAL      2014    ENDOSCOPY N/A 11/03/2016    LA GRADE B ESOPHAGITIS WITH NO BLEEDING UPPER THIRD OF ESOPHAGUS, GERI-[place] TEAR GEJ, BILIOUS BLOOD TINGED COFFEE GROUND GATRIC FLUIDDR LAMAS    EXPLORATORY LAPAROTOMY N/A 10/14/2016    Procedure: LAPAROTOMY EXPLORATORY, LYSIS OF ADHESIONS, IRRIAGATION OF ABDOMEN, POSSIBLE BOWEL RESECTION;  Surgeon: Bacilio Marcial MD;  Location:  [facility] OR;  Service:     FOOT NAVICULAR EXCISION OR BONE GRAFT Left 01/04/2023    Procedure: Expansion Graft, Harvesting of Bone Graft/Bone Marrow Aspirate;  Surgeon: Frankie Zapata DPM;  Location:  [facility];  Service: Podiatry;  Laterality: Left;    HAMMER TOE REPAIR Left 01/04/2023    Procedure: Hammertoe Repair 2 and 3 - Left Foot;  Surgeon: Frankie Zapata DPM;  Location:  [facility];  Service: Podiatry;  Laterality: Left;    HARDWARE REMOVAL Left 01/04/2023    Procedure: Hardware Removal;  Surgeon: Frankie Zapata DPM;  Location:  [facility];  Service: Podiatry;  Laterality: Left;    HYSTERECTOMY      REVISION / TAKEDOWN COLOSTOMY      SACROCOLPOPEXY N/A 09/21/2016    Procedure: SACROCOLPOPEXY LAPAROSCOPIC WITH [employer] SI ROBOT, CONVERTED TO OPEN SACROCOLPOPEXY, RIGHT SALPINGO- OOPHERECTOMY, CYSTO;  Surgeon: Maribell Beth MD;  Location:  [facility];  Service:     TOE FUSION Left 01/06/2022    Procedure: FIRST METATARSOPHALANGEAL JOINT ARTHRODESIS WITH INTERNAL FIXATION - LEFT FOOT;  Surgeon: Jorgito Red,  FAMILIA;  Location:  [facility];  Service: Podiatry;  Laterality: Left;    TOE FUSION Left 01/04/2023    Procedure: Revisional 1st Metatarsophalangeal Joint Arthrodesis with Hardware Removal and Expansion Graft, Harvesting of Bone Graft/Bone Marrow Aspirate, Hammertoe Repair 2 and 3 - Left Foot;  Surgeon: Frankie Zapata DPM;  Location:  [facility];  Service: Podiatry;  Laterality: Left;       Outpatient Medications Marked as Taking for the 12/7/23 encounter (Office Visit) with Teodora Macario APRN   Medication Sig Dispense Refill    dabigatran etexilate (PRADAXA) 150 MG capsu Take 1 capsule by mouth 2 (Two) Times a Day. 60 capsule 11    dilTIAZem CD (Cardizem CD) 120 MG 24 hr capsule Take 1 capsule by mouth Daily. 30 capsule 11    hydrOXYzine (ATARAX) 10 MG tablet Take 1-2 tablets by mouth Every 4 to 6 Hours As Needed. 60 tablet 1    montelukast (SINGULAIR) 10 MG tablet Take 1 tablet by mouth Every Night. 30 tablet 5    omeprazole (priLOSEC) 20 MG capsule Take 1 capsule by mouth Daily.      tamoxifen (NOLVADEX) 20 MG chemo tablet Take 1 tablet by mouth daily 90 tablet 1    tiZANidine (ZANAFLEX) 4 MG tablet Take 1 tablet by mouth Daily As Needed for Muscle Spasms. 30 tablet 2     Current Facility-Administered Medications for the 12/7/23 encounter (Office Visit) with Teodora Macario APRN   Medication Dose Route Frequency Provider Last Rate Last Admin    cyanocobalamin injection 1,000 mcg  1,000 mcg Intramuscular Q28 Days ANGEL Healy MD   1,000 mcg at 12/22/22 0845     Allergies   Allergen Reactions    Morphine And Related Nausea And Vomiting and Dizziness    Percocet [Oxycodone-Acetaminophen] Nausea And Vomiting and Dizziness     Social History     Socioeconomic History    Marital status:    Tobacco Use    Smoking status: Never     Passive exposure: Never    Smokeless tobacco: Never   Vaping Use    Vaping Use: Never used   Substance and Sexual Activity    Alcohol use: No    Drug use: No    Sexual  activity: Defer     Family History   Problem Relation Age of Onset    Hypertension Mother     No Known Problems Father     Colon cancer Neg Hx     Colon polyps Neg Hx      Health Maintenance   Topic Date Due    TDAP/TD VACCINES (1 - Tdap) Never done    ZOSTER VACCINE (1 of 2) Never done    PAP SMEAR  Never done    INFLUENZA VACCINE  03/31/2024 (Originally 8/1/2023)    Pneumococcal Vaccine 65+ (1 - PCV) 04/17/2024 (Originally 11/12/1961)    ANNUAL WELLNESS VISIT  04/17/2024    URINE MICROALBUMIN  04/17/2024    BMI FOLLOWUP  08/03/2024    MAMMOGRAM  10/17/2024    LIPID PANEL  11/21/2024    DXA SCAN  10/10/2025    COLORECTAL CANCER SCREENING  11/13/2025    HEPATITIS C SCREENING  Completed    COVID-19 Vaccine  Discontinued    DIABETIC FOOT EXAM  Discontinued    HEMOGLOBIN A1C  Discontinued    DIABETIC EYE EXAM  Discontinued     Review of Systems   Constitutional:  Negative for activity change, appetite change, chills, diaphoresis, fatigue, fever and unexpected weight change.   HENT:  Positive for trouble swallowing. Negative for ear pain, hearing loss, mouth sores, sore throat and voice change.    Eyes: Negative.    Respiratory:  Negative for cough, choking, shortness of breath and wheezing.    Cardiovascular:  Negative for chest pain and palpitations.   Gastrointestinal:  Positive for anal bleeding and diarrhea. Negative for abdominal pain, blood in stool, constipation, nausea and vomiting.   Endocrine: Negative for cold intolerance and heat intolerance.   Genitourinary:  Negative for decreased urine volume, dysuria, frequency, hematuria and urgency.   Musculoskeletal:  Negative for back pain, gait problem and myalgias.   Skin:  Negative for color change, pallor and rash.   Allergic/Immunologic: Negative for food allergies and immunocompromised state.   Neurological:  Negative for dizziness, tremors, seizures, syncope, weakness, light-headedness, numbness and headaches.   Hematological:  Negative for adenopathy. Does  "not bruise/bleed easily.   Psychiatric/Behavioral:  Negative for agitation and confusion. The patient is not nervous/anxious.    All other systems reviewed and are negative.    Objective   Vitals:    12/07/23 0850   BP: 120/80   BP Location: Left arm   Pulse: 94   Temp: 96.4 °F (35.8 °C)   TempSrc: Temporal   SpO2: 100%   Weight: 69.8 kg (153 lb 12.8 oz)   Height: 160 cm (62.99\")     Body mass index is 27.25 kg/m².  Physical Exam  Constitutional:       Appearance: She is well-developed.   HENT:      Head: Normocephalic and atraumatic.   Eyes:      Pupils: Pupils are equal, round, and reactive to light.   Neck:      Trachea: No tracheal deviation.   Cardiovascular:      Rate and Rhythm: Normal rate and regular rhythm.      Heart sounds: Normal heart sounds. No murmur heard.     No friction rub. No gallop.   Pulmonary:      Effort: Pulmonary effort is normal. No respiratory distress.      Breath sounds: Normal breath sounds. No wheezing or rales.   Chest:      Chest wall: No tenderness.   Abdominal:      General: Bowel sounds are normal. There is no distension.      Palpations: Abdomen is soft. Abdomen is not rigid.      Tenderness: There is no abdominal tenderness. There is no guarding or rebound.   Musculoskeletal:         General: No tenderness or deformity. Normal range of motion.      Cervical back: Normal range of motion and neck supple.   Skin:     General: Skin is warm and dry.      Coloration: Skin is not pale.      Findings: No rash.   Neurological:      Mental Status: She is alert and oriented to person, place, and time.      Deep Tendon Reflexes: Reflexes are normal and symmetric.   Psychiatric:         Behavior: Behavior normal.         Thought Content: Thought content normal.         Judgment: Judgment normal.       Assessment & Plan   Diagnoses and all orders for this visit:    1. BRBPR (bright red blood per rectum) (Primary)  -     Case Request; Standing  -     Case Request  -     Case Request; " Standing  -     Case Request    2. Hemorrhoids, unspecified hemorrhoid type    3. Hx of adenomatous colonic polyps    4. Anticoagulated  Comments:  Pradaxa to hold per Dr Marcelino she takes for Afib.    5. Irritable bowel syndrome with diarrhea    6. Esophageal dysphagia    Other orders  -     dicyclomine (BENTYL) 10 MG capsule; Take 1 capsule by mouth 4 (Four) Times a Day Before Meals & at Bedtime.  Dispense: 120 capsule; Refill: 10  -     Cancel: Implement Anesthesia Orders Day of Procedure; Standing  -     Cancel: Obtain Informed Consent; Standing  -     Follow Anesthesia Guidelines / Protocol; Future  -     Obtain Informed Consent; Future  -     Cancel: Verify bowel prep was successful; Standing  -     Implement Anesthesia Orders Day of Procedure; Standing  -     Obtain Informed Consent; Standing  -     Follow Anesthesia Guidelines / Protocol; Future  -     Obtain Informed Consent; Future  -     Verify Bowel Prep Was Successful; Standing    Miralax with Gatorade prep   Will try Bentyl   Continue fiber   Avoid cheese and diary    ESOPHAGOGASTRODUODENOSCOPY WITH ANESTHESIA (N/A), COLONOSCOPY WITH ANESTHESIA (N/A)  Part of this note may be an electronic transcription/translation of spoken language to printed text using the Dragon Dictation System.  Body mass index is 27.25 kg/m².  No follow-ups on file.           All risks, benefits, alternatives, and indications of colonoscopy and/or Endoscopy procedure have been discussed with the patient. Risks to include perforation of the colon requiring possible surgery or colostomy, risk of bleeding from biopsies or removal of colon tissue, possibility of missing a colon polyp or cancer, or adverse drug reaction.  Benefits to include the diagnosis and management of disease of the colon and rectum. Alternatives to include barium enema, radiographic evaluation, lab testing or no intervention. Pt verbalizes understanding and agrees.     Teodora Macario, APRN  12/7/2023  09:45  CST          If you smoke or use tobacco, 4 minutes reading provided  Steps to Quit Smoking  Smoking tobacco can be harmful to your health and can affect almost every organ in your body. Smoking puts you, and those around you, at risk for developing many serious chronic diseases. Quitting smoking is difficult, but it is one of the best things that you can do for your health. It is never too late to quit.  What are the benefits of quitting smoking?  When you quit smoking, you lower your risk of developing serious diseases and conditions, such as:  Lung cancer or lung disease, such as COPD.  Heart disease.  Stroke.  Heart attack.  Infertility.  Osteoporosis and bone fractures.  Additionally, symptoms such as coughing, wheezing, and shortness of breath may get better when you quit. You may also find that you get sick less often because your body is stronger at fighting off colds and infections. If you are pregnant, quitting smoking can help to reduce your chances of having a baby of low birth weight.  How do I get ready to quit?  When you decide to quit smoking, create a plan to make sure that you are successful. Before you quit:  Pick a date to quit. Set a date within the next two weeks to give you time to prepare.  Write down the reasons why you are quitting. Keep this list in places where you will see it often, such as on your bathroom mirror or in your car or wallet.  Identify the people, places, things, and activities that make you want to smoke (triggers) and avoid them. Make sure to take these actions:  Throw away all cigarettes at home, at work, and in your car.  Throw away smoking accessories, such as ashtrays and lighters.  Clean your car and make sure to empty the ashtray.  Clean your home, including curtains and carpets.  Tell your family, friends, and coworkers that you are quitting. Support from your loved ones can make quitting easier.  Talk with your health care provider about your options for quitting  smoking.  Find out what treatment options are covered by your health insurance.  What strategies can I use to quit smoking?  Talk with your healthcare provider about different strategies to quit smoking. Some strategies include:  Quitting smoking altogether instead of gradually lessening how much you smoke over a period of time. Research shows that quitting “cold turkey” is more successful than gradually quitting.  Attending in-person counseling to help you build problem-solving skills. You are more likely to have success in quitting if you attend several counseling sessions. Even short sessions of 10 minutes can be effective.  Finding resources and support systems that can help you to quit smoking and remain smoke-free after you quit. These resources are most helpful when you use them often. They can include:  Online chats with a counselor.  Telephone quitlines.  Printed self-help materials.  Support groups or group counseling.  Text messaging programs.  Mobile phone applications.  Taking medicines to help you quit smoking. (If you are pregnant or breastfeeding, talk with your health care provider first.) Some medicines contain nicotine and some do not. Both types of medicines help with cravings, but the medicines that include nicotine help to relieve withdrawal symptoms. Your health care provider may recommend:  Nicotine patches, gum, or lozenges.  Nicotine inhalers or sprays.  Non-nicotine medicine that is taken by mouth.  Talk with your health care provider about combining strategies, such as taking medicines while you are also receiving in-person counseling. Using these two strategies together makes you more likely to succeed in quitting than if you used either strategy on its own.  If you are pregnant or breastfeeding, talk with your health care provider about finding counseling or other support strategies to quit smoking. Do not take medicine to help you quit smoking unless told to do so by your health care  provider.  What things can I do to make it easier to quit?  Quitting smoking might feel overwhelming at first, but there is a lot that you can do to make it easier. Take these important actions:  Reach out to your family and friends and ask that they support and encourage you during this time. Call telephone quitlines, reach out to support groups, or work with a counselor for support.  Ask people who smoke to avoid smoking around you.  Avoid places that trigger you to smoke, such as bars, parties, or smoke-break areas at work.  Spend time around people who do not smoke.  Lessen stress in your life, because stress can be a smoking trigger for some people. To lessen stress, try:  Exercising regularly.  Deep-breathing exercises.  Yoga.  Meditating.  Performing a body scan. This involves closing your eyes, scanning your body from head to toe, and noticing which parts of your body are particularly tense. Purposefully relax the muscles in those areas.  Download or purchase mobile phone or tablet apps (applications) that can help you stick to your quit plan by providing reminders, tips, and encouragement. There are many free apps, such as QuitGuide from the CDC (Centers for Disease Control and Prevention). You can find other support for quitting smoking (smoking cessation) through smokefree.gov and other websites.  How will I feel when I quit smoking?  Within the first 24 hours of quitting smoking, you may start to feel some withdrawal symptoms. These symptoms are usually most noticeable 2-3 days after quitting, but they usually do not last beyond 2-3 weeks. Changes or symptoms that you might experience include:  Mood swings.  Restlessness, anxiety, or irritation.  Difficulty concentrating.  Dizziness.  Strong cravings for sugary foods in addition to nicotine.  Mild weight gain.  Constipation.  Nausea.  Coughing or a sore throat.  Changes in how your medicines work in your body.  A depressed mood.  Difficulty sleeping  (insomnia).  After the first 2-3 weeks of quitting, you may start to notice more positive results, such as:  Improved sense of smell and taste.  Decreased coughing and sore throat.  Slower heart rate.  Lower blood pressure.  Clearer skin.  The ability to breathe more easily.  Fewer sick days.  Quitting smoking is very challenging for most people. Do not get discouraged if you are not successful the first time. Some people need to make many attempts to quit before they achieve long-term success. Do your best to stick to your quit plan, and talk with your health care provider if you have any questions or concerns.  This information is not intended to replace advice given to you by your health care provider. Make sure you discuss any questions you have with your health care provider.  Document Released: 12/12/2002 Document Revised: 08/15/2017 Document Reviewed: 05/03/2016  [employer] Patient Education © 2017 [employer]

## 2023-12-07 NOTE — PROGRESS NOTES
Yun Blackwell  1955 12/7/2023  Chief Complaint   Patient presents with    GI Problem     Rectal bleeding     Subjective   HPI  Yun Blackwell is a 68 y.o. female who presents with a complaint of rectal bleeding. She thinks hemorrhoid related. It comes and goes. It will occur then come back. It is on the tissue when she wipes. No burning or itching. No rectal pressure or pain. She does have some diarrhea no constipation.  Her bowels are more loose than normal. She has had this problem before and took fiber which helped her at first it is not helping her as much now. She takes Immodium to help treat as well as fiber. She says that she has a long hx of IBS. She has not tried anything else for this. She has been on antibiotics for UTI 11/24/23. The diarrhea was before that.    Dysphagia ongoing for 6 months with solids and large pills upper esophageal region. She has had dilatation in the past and this did help her. Time makes it better. It is moderate for her. No nausea or vomiting. No wt loss.   She has had a CT scan 11/24/23  IMPRESSION:        No acute findings in the abdomen/pelvis.     3 ventral abdominal wall hernias, the one to the left of midline  contains a nonobstructed loop of small bowel.     Cholelithiasis.     Colonic diverticulosis.            This report was signed and finalized on 11/24/2023 11:25 AM CST by  Mike Andujar.  Past Medical History:   Diagnosis Date    A-fib     Abnormal ECG     tachycardia, a fib    Acid reflux     Anemia     Arthritis     Cancer     Diabetes mellitus     Hyperkalemia     Hyperlipidemia     Hypertension     Hyponatremia     IBS (irritable bowel syndrome)     PONV (postoperative nausea and vomiting)     Sleep apnea     USES CPAP    UTI (urinary tract infection)     UTI (urinary tract infection) 11/25/2016    Vaginal vault prolapse      Past Surgical History:   Procedure Laterality Date    BREAST BIOPSY Left     X2    BUNIONECTOMY Left     CARDIAC  CATHETERIZATION      CARDIAC CATHETERIZATION N/A 02/05/2021    Procedure: Right Heart Cath;  Surgeon: Van Marcelino MD;  Location:  [facility];  Service: Cardiology;  Laterality: N/A;    CATARACT EXTRACTION, BILATERAL      COLON SURGERY      COLONOSCOPY  09/21/2015    TRANSVERSE COLON ADENOMATOUS POLYP, HEMORRHOIDS, RECALL 5 YEARS, DR LAMAS    COLONOSCOPY N/A 11/13/2020    6 mm tubular adenomatous polyp at 15 cm proximal to the anus,diverticulosis of the sigmoid colon    COLOSTOMY      COLPOPEXY VAGINAL      2014    ENDOSCOPY N/A 11/03/2016    LA GRADE B ESOPHAGITIS WITH NO BLEEDING UPPER THIRD OF ESOPHAGUS, GERI-[place]TH TEAR GEJ, BILIOUS BLOOD TINGED COFFEE GROUND GATRIC FLUIDDR LAMAS    EXPLORATORY LAPAROTOMY N/A 10/14/2016    Procedure: LAPAROTOMY EXPLORATORY, LYSIS OF ADHESIONS, IRRIAGATION OF ABDOMEN, POSSIBLE BOWEL RESECTION;  Surgeon: Bacilio Marcial MD;  Location:  [facility] OR;  Service:     FOOT NAVICULAR EXCISION OR BONE GRAFT Left 01/04/2023    Procedure: Expansion Graft, Harvesting of Bone Graft/Bone Marrow Aspirate;  Surgeon: Frankie Zapata DPM;  Location:  [facility];  Service: Podiatry;  Laterality: Left;    HAMMER TOE REPAIR Left 01/04/2023    Procedure: Hammertoe Repair 2 and 3 - Left Foot;  Surgeon: Frankie Zapata DPM;  Location:  [facility];  Service: Podiatry;  Laterality: Left;    HARDWARE REMOVAL Left 01/04/2023    Procedure: Hardware Removal;  Surgeon: Frankie Zapata DPM;  Location:  [facility];  Service: Podiatry;  Laterality: Left;    HYSTERECTOMY      REVISION / TAKEDOWN COLOSTOMY      SACROCOLPOPEXY N/A 09/21/2016    Procedure: SACROCOLPOPEXY LAPAROSCOPIC WITH ArtabaseI SI ROBOT, CONVERTED TO OPEN SACROCOLPOPEXY, RIGHT SALPINGO- OOPHERECTOMY, CYSTO;  Surgeon: Maribell Beth MD;  Location:  [facility];  Service:     TOE FUSION Left 01/06/2022    Procedure: FIRST METATARSOPHALANGEAL JOINT ARTHRODESIS WITH INTERNAL FIXATION - LEFT FOOT;  Surgeon: Jorgito Red,  FAMILIA;  Location:  [facility];  Service: Podiatry;  Laterality: Left;    TOE FUSION Left 01/04/2023    Procedure: Revisional 1st Metatarsophalangeal Joint Arthrodesis with Hardware Removal and Expansion Graft, Harvesting of Bone Graft/Bone Marrow Aspirate, Hammertoe Repair 2 and 3 - Left Foot;  Surgeon: Frankie Zapata DPM;  Location:  [facility];  Service: Podiatry;  Laterality: Left;       Outpatient Medications Marked as Taking for the 12/7/23 encounter (Office Visit) with Teodora Macario APRN   Medication Sig Dispense Refill    dabigatran etexilate (PRADAXA) 150 MG capsu Take 1 capsule by mouth 2 (Two) Times a Day. 60 capsule 11    dilTIAZem CD (Cardizem CD) 120 MG 24 hr capsule Take 1 capsule by mouth Daily. 30 capsule 11    hydrOXYzine (ATARAX) 10 MG tablet Take 1-2 tablets by mouth Every 4 to 6 Hours As Needed. 60 tablet 1    montelukast (SINGULAIR) 10 MG tablet Take 1 tablet by mouth Every Night. 30 tablet 5    omeprazole (priLOSEC) 20 MG capsule Take 1 capsule by mouth Daily.      tamoxifen (NOLVADEX) 20 MG chemo tablet Take 1 tablet by mouth daily 90 tablet 1    tiZANidine (ZANAFLEX) 4 MG tablet Take 1 tablet by mouth Daily As Needed for Muscle Spasms. 30 tablet 2     Current Facility-Administered Medications for the 12/7/23 encounter (Office Visit) with Teodora Macario APRN   Medication Dose Route Frequency Provider Last Rate Last Admin    cyanocobalamin injection 1,000 mcg  1,000 mcg Intramuscular Q28 Days ANGEL Healy MD   1,000 mcg at 12/22/22 0845     Allergies   Allergen Reactions    Morphine And Related Nausea And Vomiting and Dizziness    Percocet [Oxycodone-Acetaminophen] Nausea And Vomiting and Dizziness     Social History     Socioeconomic History    Marital status:    Tobacco Use    Smoking status: Never     Passive exposure: Never    Smokeless tobacco: Never   Vaping Use    Vaping Use: Never used   Substance and Sexual Activity    Alcohol use: No    Drug use: No    Sexual  activity: Defer     Family History   Problem Relation Age of Onset    Hypertension Mother     No Known Problems Father     Colon cancer Neg Hx     Colon polyps Neg Hx      Health Maintenance   Topic Date Due    TDAP/TD VACCINES (1 - Tdap) Never done    ZOSTER VACCINE (1 of 2) Never done    PAP SMEAR  Never done    INFLUENZA VACCINE  03/31/2024 (Originally 8/1/2023)    Pneumococcal Vaccine 65+ (1 - PCV) 04/17/2024 (Originally 11/12/1961)    ANNUAL WELLNESS VISIT  04/17/2024    URINE MICROALBUMIN  04/17/2024    BMI FOLLOWUP  08/03/2024    MAMMOGRAM  10/17/2024    LIPID PANEL  11/21/2024    DXA SCAN  10/10/2025    COLORECTAL CANCER SCREENING  11/13/2025    HEPATITIS C SCREENING  Completed    COVID-19 Vaccine  Discontinued    DIABETIC FOOT EXAM  Discontinued    HEMOGLOBIN A1C  Discontinued    DIABETIC EYE EXAM  Discontinued     Review of Systems   Constitutional:  Negative for activity change, appetite change, chills, diaphoresis, fatigue, fever and unexpected weight change.   HENT:  Positive for trouble swallowing. Negative for ear pain, hearing loss, mouth sores, sore throat and voice change.    Eyes: Negative.    Respiratory:  Negative for cough, choking, shortness of breath and wheezing.    Cardiovascular:  Negative for chest pain and palpitations.   Gastrointestinal:  Positive for anal bleeding and diarrhea. Negative for abdominal pain, blood in stool, constipation, nausea and vomiting.   Endocrine: Negative for cold intolerance and heat intolerance.   Genitourinary:  Negative for decreased urine volume, dysuria, frequency, hematuria and urgency.   Musculoskeletal:  Negative for back pain, gait problem and myalgias.   Skin:  Negative for color change, pallor and rash.   Allergic/Immunologic: Negative for food allergies and immunocompromised state.   Neurological:  Negative for dizziness, tremors, seizures, syncope, weakness, light-headedness, numbness and headaches.   Hematological:  Negative for adenopathy. Does  "not bruise/bleed easily.   Psychiatric/Behavioral:  Negative for agitation and confusion. The patient is not nervous/anxious.    All other systems reviewed and are negative.    Objective   Vitals:    12/07/23 0850   BP: 120/80   BP Location: Left arm   Pulse: 94   Temp: 96.4 °F (35.8 °C)   TempSrc: Temporal   SpO2: 100%   Weight: 69.8 kg (153 lb 12.8 oz)   Height: 160 cm (62.99\")     Body mass index is 27.25 kg/m².  Physical Exam  Constitutional:       Appearance: She is well-developed.   HENT:      Head: Normocephalic and atraumatic.   Eyes:      Pupils: Pupils are equal, round, and reactive to light.   Neck:      Trachea: No tracheal deviation.   Cardiovascular:      Rate and Rhythm: Normal rate and regular rhythm.      Heart sounds: Normal heart sounds. No murmur heard.     No friction rub. No gallop.   Pulmonary:      Effort: Pulmonary effort is normal. No respiratory distress.      Breath sounds: Normal breath sounds. No wheezing or rales.   Chest:      Chest wall: No tenderness.   Abdominal:      General: Bowel sounds are normal. There is no distension.      Palpations: Abdomen is soft. Abdomen is not rigid.      Tenderness: There is no abdominal tenderness. There is no guarding or rebound.   Musculoskeletal:         General: No tenderness or deformity. Normal range of motion.      Cervical back: Normal range of motion and neck supple.   Skin:     General: Skin is warm and dry.      Coloration: Skin is not pale.      Findings: No rash.   Neurological:      Mental Status: She is alert and oriented to person, place, and time.      Deep Tendon Reflexes: Reflexes are normal and symmetric.   Psychiatric:         Behavior: Behavior normal.         Thought Content: Thought content normal.         Judgment: Judgment normal.       Assessment & Plan   Diagnoses and all orders for this visit:    1. BRBPR (bright red blood per rectum) (Primary)  -     Case Request; Standing  -     Case Request  -     Case Request; " Standing  -     Case Request    2. Hemorrhoids, unspecified hemorrhoid type    3. Hx of adenomatous colonic polyps    4. Anticoagulated  Comments:  Pradaxa to hold per Dr Marcelino she takes for Afib.    5. Irritable bowel syndrome with diarrhea    6. Esophageal dysphagia    Other orders  -     dicyclomine (BENTYL) 10 MG capsule; Take 1 capsule by mouth 4 (Four) Times a Day Before Meals & at Bedtime.  Dispense: 120 capsule; Refill: 10  -     Cancel: Implement Anesthesia Orders Day of Procedure; Standing  -     Cancel: Obtain Informed Consent; Standing  -     Follow Anesthesia Guidelines / Protocol; Future  -     Obtain Informed Consent; Future  -     Cancel: Verify bowel prep was successful; Standing  -     Implement Anesthesia Orders Day of Procedure; Standing  -     Obtain Informed Consent; Standing  -     Follow Anesthesia Guidelines / Protocol; Future  -     Obtain Informed Consent; Future  -     Verify Bowel Prep Was Successful; Standing    Miralax with Gatorade prep   Will try Bentyl   Continue fiber   Avoid cheese and diary    ESOPHAGOGASTRODUODENOSCOPY WITH ANESTHESIA (N/A), COLONOSCOPY WITH ANESTHESIA (N/A)  Part of this note may be an electronic transcription/translation of spoken language to printed text using the Dragon Dictation System.  Body mass index is 27.25 kg/m².  No follow-ups on file.           All risks, benefits, alternatives, and indications of colonoscopy and/or Endoscopy procedure have been discussed with the patient. Risks to include perforation of the colon requiring possible surgery or colostomy, risk of bleeding from biopsies or removal of colon tissue, possibility of missing a colon polyp or cancer, or adverse drug reaction.  Benefits to include the diagnosis and management of disease of the colon and rectum. Alternatives to include barium enema, radiographic evaluation, lab testing or no intervention. Pt verbalizes understanding and agrees.     Teodora Macario, APRN  12/7/2023  09:45  CST          If you smoke or use tobacco, 4 minutes reading provided  Steps to Quit Smoking  Smoking tobacco can be harmful to your health and can affect almost every organ in your body. Smoking puts you, and those around you, at risk for developing many serious chronic diseases. Quitting smoking is difficult, but it is one of the best things that you can do for your health. It is never too late to quit.  What are the benefits of quitting smoking?  When you quit smoking, you lower your risk of developing serious diseases and conditions, such as:  Lung cancer or lung disease, such as COPD.  Heart disease.  Stroke.  Heart attack.  Infertility.  Osteoporosis and bone fractures.  Additionally, symptoms such as coughing, wheezing, and shortness of breath may get better when you quit. You may also find that you get sick less often because your body is stronger at fighting off colds and infections. If you are pregnant, quitting smoking can help to reduce your chances of having a baby of low birth weight.  How do I get ready to quit?  When you decide to quit smoking, create a plan to make sure that you are successful. Before you quit:  Pick a date to quit. Set a date within the next two weeks to give you time to prepare.  Write down the reasons why you are quitting. Keep this list in places where you will see it often, such as on your bathroom mirror or in your car or wallet.  Identify the people, places, things, and activities that make you want to smoke (triggers) and avoid them. Make sure to take these actions:  Throw away all cigarettes at home, at work, and in your car.  Throw away smoking accessories, such as ashtrays and lighters.  Clean your car and make sure to empty the ashtray.  Clean your home, including curtains and carpets.  Tell your family, friends, and coworkers that you are quitting. Support from your loved ones can make quitting easier.  Talk with your health care provider about your options for quitting  smoking.  Find out what treatment options are covered by your health insurance.  What strategies can I use to quit smoking?  Talk with your healthcare provider about different strategies to quit smoking. Some strategies include:  Quitting smoking altogether instead of gradually lessening how much you smoke over a period of time. Research shows that quitting “cold turkey” is more successful than gradually quitting.  Attending in-person counseling to help you build problem-solving skills. You are more likely to have success in quitting if you attend several counseling sessions. Even short sessions of 10 minutes can be effective.  Finding resources and support systems that can help you to quit smoking and remain smoke-free after you quit. These resources are most helpful when you use them often. They can include:  Online chats with a counselor.  Telephone quitlines.  Printed self-help materials.  Support groups or group counseling.  Text messaging programs.  Mobile phone applications.  Taking medicines to help you quit smoking. (If you are pregnant or breastfeeding, talk with your health care provider first.) Some medicines contain nicotine and some do not. Both types of medicines help with cravings, but the medicines that include nicotine help to relieve withdrawal symptoms. Your health care provider may recommend:  Nicotine patches, gum, or lozenges.  Nicotine inhalers or sprays.  Non-nicotine medicine that is taken by mouth.  Talk with your health care provider about combining strategies, such as taking medicines while you are also receiving in-person counseling. Using these two strategies together makes you more likely to succeed in quitting than if you used either strategy on its own.  If you are pregnant or breastfeeding, talk with your health care provider about finding counseling or other support strategies to quit smoking. Do not take medicine to help you quit smoking unless told to do so by your health care  provider.  What things can I do to make it easier to quit?  Quitting smoking might feel overwhelming at first, but there is a lot that you can do to make it easier. Take these important actions:  Reach out to your family and friends and ask that they support and encourage you during this time. Call telephone quitlines, reach out to support groups, or work with a counselor for support.  Ask people who smoke to avoid smoking around you.  Avoid places that trigger you to smoke, such as bars, parties, or smoke-break areas at work.  Spend time around people who do not smoke.  Lessen stress in your life, because stress can be a smoking trigger for some people. To lessen stress, try:  Exercising regularly.  Deep-breathing exercises.  Yoga.  Meditating.  Performing a body scan. This involves closing your eyes, scanning your body from head to toe, and noticing which parts of your body are particularly tense. Purposefully relax the muscles in those areas.  Download or purchase mobile phone or tablet apps (applications) that can help you stick to your quit plan by providing reminders, tips, and encouragement. There are many free apps, such as QuitGuide from the CDC (Centers for Disease Control and Prevention). You can find other support for quitting smoking (smoking cessation) through smokefree.gov and other websites.  How will I feel when I quit smoking?  Within the first 24 hours of quitting smoking, you may start to feel some withdrawal symptoms. These symptoms are usually most noticeable 2-3 days after quitting, but they usually do not last beyond 2-3 weeks. Changes or symptoms that you might experience include:  Mood swings.  Restlessness, anxiety, or irritation.  Difficulty concentrating.  Dizziness.  Strong cravings for sugary foods in addition to nicotine.  Mild weight gain.  Constipation.  Nausea.  Coughing or a sore throat.  Changes in how your medicines work in your body.  A depressed mood.  Difficulty sleeping  (insomnia).  After the first 2-3 weeks of quitting, you may start to notice more positive results, such as:  Improved sense of smell and taste.  Decreased coughing and sore throat.  Slower heart rate.  Lower blood pressure.  Clearer skin.  The ability to breathe more easily.  Fewer sick days.  Quitting smoking is very challenging for most people. Do not get discouraged if you are not successful the first time. Some people need to make many attempts to quit before they achieve long-term success. Do your best to stick to your quit plan, and talk with your health care provider if you have any questions or concerns.  This information is not intended to replace advice given to you by your health care provider. Make sure you discuss any questions you have with your health care provider.  Document Released: 12/12/2002 Document Revised: 08/15/2017 Document Reviewed: 05/03/2016  [employer] Interactive Patient Education © 2017 [employer]

## 2023-12-13 ENCOUNTER — ANESTHESIA (OUTPATIENT)
Dept: GASTROENTEROLOGY | Facility: HOSPITAL | Age: 68
End: 2023-12-13
Payer: MEDICARE

## 2023-12-13 ENCOUNTER — ANESTHESIA EVENT (OUTPATIENT)
Dept: GASTROENTEROLOGY | Facility: HOSPITAL | Age: 68
End: 2023-12-13
Payer: MEDICARE

## 2023-12-13 ENCOUNTER — HOSPITAL ENCOUNTER (OUTPATIENT)
Facility: HOSPITAL | Age: 68
Setting detail: HOSPITAL OUTPATIENT SURGERY
Discharge: HOME | End: 2023-12-13
Attending: INTERNAL MEDICINE | Admitting: INTERNAL MEDICINE
Payer: MEDICARE

## 2023-12-13 VITALS
DIASTOLIC BLOOD PRESSURE: 75 MMHG | TEMPERATURE: 97 F | HEART RATE: 68 BPM | SYSTOLIC BLOOD PRESSURE: 106 MMHG | BODY MASS INDEX: 26.58 KG/M2 | HEIGHT: 63 IN | OXYGEN SATURATION: 95 % | WEIGHT: 150 LBS | RESPIRATION RATE: 22 BRPM

## 2023-12-13 DIAGNOSIS — K62.5 BRBPR (BRIGHT RED BLOOD PER RECTUM): ICD-10-CM

## 2023-12-13 PROCEDURE — 87081 CULTURE SCREEN ONLY: CPT | Performed by: INTERNAL MEDICINE

## 2023-12-13 PROCEDURE — 25010000002 PROPOFOL 10 MG/ML EMULSION: Performed by: NURSE ANESTHETIST, CERTIFIED REGISTERED

## 2023-12-13 PROCEDURE — 88305 TISSUE EXAM BY PATHOLOGIST: CPT | Performed by: INTERNAL MEDICINE

## 2023-12-13 PROCEDURE — 25810000003 SODIUM CHLORIDE 0.9 % SOLUTION: Performed by: ANESTHESIOLOGY

## 2023-12-13 RX ORDER — SODIUM CHLORIDE 0.9 % (FLUSH) 0.9 %
10 SYRINGE (ML) INJECTION AS NEEDED
Status: DISCONTINUED | OUTPATIENT
Start: 2023-12-13 | End: 2023-12-13 | Stop reason: HOSPADM

## 2023-12-13 RX ORDER — SODIUM CHLORIDE 0.9 % (FLUSH) 0.9 %
10 SYRINGE (ML) INJECTION EVERY 12 HOURS SCHEDULED
Status: CANCELLED | OUTPATIENT
Start: 2023-12-13

## 2023-12-13 RX ORDER — PROPOFOL 10 MG/ML
VIAL (ML) INTRAVENOUS AS NEEDED
Status: DISCONTINUED | OUTPATIENT
Start: 2023-12-13 | End: 2023-12-13 | Stop reason: SURG

## 2023-12-13 RX ORDER — LIDOCAINE HYDROCHLORIDE 20 MG/ML
INJECTION, SOLUTION EPIDURAL; INFILTRATION; INTRACAUDAL; PERINEURAL AS NEEDED
Status: DISCONTINUED | OUTPATIENT
Start: 2023-12-13 | End: 2023-12-13 | Stop reason: SURG

## 2023-12-13 RX ORDER — SODIUM CHLORIDE 9 MG/ML
500 INJECTION, SOLUTION INTRAVENOUS CONTINUOUS PRN
Status: DISCONTINUED | OUTPATIENT
Start: 2023-12-13 | End: 2023-12-13 | Stop reason: HOSPADM

## 2023-12-13 RX ORDER — SODIUM CHLORIDE 0.9 % (FLUSH) 0.9 %
10 SYRINGE (ML) INJECTION AS NEEDED
Status: CANCELLED | OUTPATIENT
Start: 2023-12-13

## 2023-12-13 RX ORDER — SODIUM CHLORIDE 9 MG/ML
40 INJECTION, SOLUTION INTRAVENOUS AS NEEDED
Status: CANCELLED | OUTPATIENT
Start: 2023-12-13

## 2023-12-13 RX ORDER — SODIUM CHLORIDE 9 MG/ML
100 INJECTION, SOLUTION INTRAVENOUS CONTINUOUS
Status: CANCELLED | OUTPATIENT
Start: 2023-12-13

## 2023-12-13 RX ADMIN — LIDOCAINE HYDROCHLORIDE 100 MG: 20 INJECTION, SOLUTION EPIDURAL; INFILTRATION; INTRACAUDAL; PERINEURAL at 10:54

## 2023-12-13 RX ADMIN — PROPOFOL INJECTABLE EMULSION 50 MG: 10 INJECTION, EMULSION INTRAVENOUS at 10:55

## 2023-12-13 RX ADMIN — SODIUM CHLORIDE 500 ML: 9 INJECTION, SOLUTION INTRAVENOUS at 09:13

## 2023-12-13 RX ADMIN — PROPOFOL INJECTABLE EMULSION 50 MG: 10 INJECTION, EMULSION INTRAVENOUS at 11:02

## 2023-12-13 RX ADMIN — PROPOFOL INJECTABLE EMULSION 50 MG: 10 INJECTION, EMULSION INTRAVENOUS at 10:54

## 2023-12-13 RX ADMIN — PROPOFOL INJECTABLE EMULSION 50 MG: 10 INJECTION, EMULSION INTRAVENOUS at 11:15

## 2023-12-13 RX ADMIN — PROPOFOL INJECTABLE EMULSION 50 MG: 10 INJECTION, EMULSION INTRAVENOUS at 11:05

## 2023-12-13 RX ADMIN — PROPOFOL INJECTABLE EMULSION 50 MG: 10 INJECTION, EMULSION INTRAVENOUS at 10:56

## 2023-12-13 RX ADMIN — PROPOFOL INJECTABLE EMULSION 50 MG: 10 INJECTION, EMULSION INTRAVENOUS at 11:08

## 2023-12-13 RX ADMIN — PROPOFOL INJECTABLE EMULSION 50 MG: 10 INJECTION, EMULSION INTRAVENOUS at 10:59

## 2023-12-13 NOTE — ANESTHESIA PREPROCEDURE EVALUATION
Anesthesia Evaluation     Patient summary reviewed   history of anesthetic complications:  PONV prolonged sedation  NPO Solid Status: > 8 hours             Airway   Mallampati: I  TM distance: >3 FB  Neck ROM: full  No difficulty expected  Dental      Pulmonary    (+) ,sleep apnea on CPAP  (-) asthma, not a smoker  Cardiovascular   Exercise tolerance: excellent (>7 METS)    ECG reviewed    (+) hypertension, dysrhythmias Atrial Fib, hyperlipidemia  (-) past MI, CABG      Neuro/Psych  (-) seizures, TIA, CVA  GI/Hepatic/Renal/Endo    (+) GERD, GI bleeding , diabetes mellitus (diet controlled)  (-) liver disease    Musculoskeletal     Abdominal    Substance History      OB/GYN          Other      history of cancer remission                      Anesthesia Plan    ASA 3     MAC   total IV anesthesia  (Pradaxa held )  intravenous induction     Anesthetic plan, risks, benefits, and alternatives have been provided, discussed and informed consent has been obtained with: patient.        CODE STATUS:

## 2023-12-13 NOTE — ANESTHESIA POSTPROCEDURE EVALUATION
Patient: Yun Blackwell    Procedure Summary       Date: 12/13/23 Room / Location: [facility] ENDOSCOPY 2 / [facility]    Anesthesia Start: 1052 Anesthesia Stop: 1123    Procedures:       ESOPHAGOGASTRODUODENOSCOPY WITH ANESTHESIA      COLONOSCOPY WITH ANESTHESIA Diagnosis:       BRBPR (bright red blood per rectum)      (BRBPR (bright red blood per rectum) [K62.5])    Surgeons: Joe France MD Provider: Pritesh Zapata CRNA    Anesthesia Type: MAC ASA Status: 3            Anesthesia Type: MAC    Vitals  Vitals Value Taken Time   BP 79/66 12/13/23 1126   Temp     Pulse 85 12/13/23 1127   Resp 16 12/13/23 1125   SpO2 97 % 12/13/23 1127   Vitals shown include unfiled device data.        Post Anesthesia Care and Evaluation    Patient location during evaluation: PHASE II  Patient participation: complete - patient participated  Level of consciousness: awake and alert  Pain score: 0  Pain management: adequate    Airway patency: patent  Anesthetic complications: No anesthetic complications  PONV Status: none  Cardiovascular status: acceptable  Respiratory status: acceptable  Hydration status: acceptable  No anesthesia care post op

## 2023-12-14 LAB
LAB AP CASE REPORT: NORMAL
Lab: NORMAL
PATH REPORT.FINAL DX SPEC: NORMAL
PATH REPORT.GROSS SPEC: NORMAL
UREASE TISS QL: NEGATIVE

## 2023-12-29 ENCOUNTER — OFFICE VISIT (OUTPATIENT)
Dept: CARDIOLOGY | Facility: CLINIC | Age: 68
End: 2023-12-29
Payer: MEDICARE

## 2023-12-29 VITALS
HEIGHT: 63 IN | WEIGHT: 158 LBS | OXYGEN SATURATION: 100 % | BODY MASS INDEX: 28 KG/M2 | SYSTOLIC BLOOD PRESSURE: 122 MMHG | DIASTOLIC BLOOD PRESSURE: 70 MMHG | HEART RATE: 86 BPM

## 2023-12-29 DIAGNOSIS — I48.21 PERMANENT ATRIAL FIBRILLATION: ICD-10-CM

## 2023-12-29 DIAGNOSIS — I51.7 RIGHT VENTRICULAR ENLARGEMENT: ICD-10-CM

## 2023-12-29 DIAGNOSIS — Q21.11 ASD SECUNDUM: ICD-10-CM

## 2023-12-29 DIAGNOSIS — Q24.5 CONGENITAL CORONARY ARTERY FISTULA TO PULMONARY ARTERY: Primary | ICD-10-CM

## 2023-12-29 PROCEDURE — 93000 ELECTROCARDIOGRAM COMPLETE: CPT | Performed by: NURSE PRACTITIONER

## 2023-12-29 PROCEDURE — 1160F RVW MEDS BY RX/DR IN RCRD: CPT | Performed by: NURSE PRACTITIONER

## 2023-12-29 PROCEDURE — 1159F MED LIST DOCD IN RCRD: CPT | Performed by: NURSE PRACTITIONER

## 2023-12-29 PROCEDURE — 99214 OFFICE O/P EST MOD 30 MIN: CPT | Performed by: NURSE PRACTITIONER

## 2023-12-29 NOTE — PROGRESS NOTES
Subjective:     Encounter Date: 12/29/23      Patient ID: Yun Blackwell is a 68 y.o. female.    Chief Complaint: Follow-up for permanent atrial fibrillation and ASD    History of Present Illness     Ms. Blackwell returns today for routine follow-up of the above.    By way of review, we follow her for an ASD, but a previous right heart catheterization showed no oxygenation step-up, and, therefore, he felt as though her right ventricular enlargement was not secondary to left-to-right shunting, and she would not be benefited in any way from ASD closure.    She followed up in June 2023 with Dr. Marcelino and did not have any complaints other than the cost of Eliquis.  She was denied manufacture assistance the previous year.  At that visit Dr. Marcelino transitioned her from Eliquis to Pradaxa and noted that if cost continues to be an issue Coumadin would be considered.  He also recommended repeat transthoracic echocardiogram in 2024.    Today the patient presents for follow-up and is doing well overall.  She recently had a colonoscopy and endoscopy due to bright red blood per rectum.  She felt as though this might be related to her hemorrhoids.  Based upon findings, it appears this was the case.  She did have a couple polyps removed.  She reports the bleeding from her hemorrhoids was an isolated episode and is not necessarily a recurrent issue.  She denies any other bleeding issues.  She continues to take Pradaxa.  She reports well-controlled blood pressure on diltiazem.  Occasionally, when she is at rest she notices rapid heart palpitations that might last 5 minutes but otherwise do not cause her other symptoms.  She states this is a fairly rare occurrence.  She denies any shortness of breath whatsoever.  Depending upon her diet choices (if she eats foods higher in sodium), she may have swelling which will resolve with low-sodium diet and overnight.      The following portions of the patient's history were reviewed and  updated as appropriate: allergies, current medications, past family history, past medical history, past social history, past surgical history and problem list.    Review of Systems   Constitutional: Negative for malaise/fatigue.   Cardiovascular:  Positive for leg swelling and palpitations. Negative for chest pain, claudication, dyspnea on exertion, near-syncope, orthopnea, paroxysmal nocturnal dyspnea and syncope.   Respiratory:  Negative for cough and shortness of breath.    Hematologic/Lymphatic: Does not bruise/bleed easily.   Musculoskeletal:  Negative for falls.   Gastrointestinal:  Positive for hemorrhoids. Negative for bloating.   Neurological:  Negative for dizziness, light-headedness and weakness.           Current Outpatient Medications:     dabigatran etexilate (PRADAXA) 150 MG capsu, Take 1 capsule by mouth 2 (Two) Times a Day., Disp: 60 capsule, Rfl: 11    dicyclomine (BENTYL) 10 MG capsule, Take 1 capsule by mouth 4 (Four) Times a Day Before Meals & at Bedtime., Disp: 120 capsule, Rfl: 10    dilTIAZem CD (Cardizem CD) 120 MG 24 hr capsule, Take 1 capsule by mouth Daily., Disp: 30 capsule, Rfl: 11    hydrOXYzine (ATARAX) 10 MG tablet, Take 1-2 tablets by mouth Every 4 to 6 Hours As Needed., Disp: 60 tablet, Rfl: 1    montelukast (SINGULAIR) 10 MG tablet, Take 1 tablet by mouth Every Night., Disp: 30 tablet, Rfl: 5    omeprazole (priLOSEC) 20 MG capsule, Take 1 capsule by mouth Daily., Disp: , Rfl:     tamoxifen (NOLVADEX) 20 MG chemo tablet, Take 1 tablet by mouth daily, Disp: 90 tablet, Rfl: 1    tiZANidine (ZANAFLEX) 4 MG tablet, Take 1 tablet by mouth Daily As Needed for Muscle Spasms., Disp: 30 tablet, Rfl: 2    Current Facility-Administered Medications:     cyanocobalamin injection 1,000 mcg, 1,000 mcg, Intramuscular, Q28 Days, ANGEL Healy MD, 1,000 mcg at 12/22/22 0845       Objective:      Vitals:    12/29/23 0942   BP: 122/70   Pulse: 86   SpO2: 100%     Weight: 158 pounds    Vitals and  nursing note reviewed.   Constitutional:       General: Not in acute distress.     Appearance: Well-developed and not in distress. Not diaphoretic.   Neck:      Vascular: No JVD or JVR. JVD normal.   Pulmonary:      Effort: Pulmonary effort is normal. No respiratory distress.      Breath sounds: Normal breath sounds.   Cardiovascular:      Normal rate. Irregularly irregular rhythm.      Murmurs: There is a murmur at the ULSB.   Edema:     Peripheral edema absent.   Abdominal:      Tenderness: There is no abdominal tenderness.   Skin:     General: Skin is warm and dry.   Neurological:      Mental Status: Alert, oriented to person, place, and time and oriented to person, place and time.         Lab Review:   Lab Results   Component Value Date    GLUCOSE 100 (H) 11/24/2023    BUN 16 11/24/2023    CREATININE 0.41 (L) 11/24/2023    EGFRIFNONA >60 10/13/2022    EGFRIFAFRI 86 12/30/2021    BCR 39.0 (H) 11/24/2023    K 4.3 11/24/2023    CO2 28.0 11/24/2023    CALCIUM 9.6 11/24/2023    ALBUMIN 4.0 11/21/2023    AST 23 11/21/2023    ALT 12 11/21/2023     Lab Results   Component Value Date    WBC 6.01 11/24/2023    HGB 12.6 11/24/2023    HCT 42.1 11/24/2023    MCV 95.9 11/24/2023     11/24/2023     Lab Results   Component Value Date    TSH 1.700 11/21/2023               ECG 12 Lead    Date/Time: 12/29/2023 10:22 AM  Performed by: Abbey Serna APRN    Authorized by: Abbey Serna APRN  Comparison: compared with previous ECG from 11/24/2023  Similar to previous ECG  Rhythm: atrial fibrillation  BPM: 86  Conduction: right bundle branch block             right heart catheterization performed 2/5/21:  Right heart catheterization:  Hemodynamics (in mmHg)  RA: 9  RV: 28/8  PA: 28/12, m17  PCW: 14     Oximetry (all %)  High SVC: 69  Low SVC: 70  High IVC: 72  Low IVC: 78  High RA: 68  Low RA: 68  RV: 68  PA: 68  PCW: 70 (confirmed both by fluoroscopy and waveform)     Ao (by pulse oximetry): 98%     Cardiac output and index  "via assumed Flex method: 4.4 L/min, 2.5 L/min/m2     TPG: 3  PVR: <1 Wood unit     Estimated Blood Loss: 5mL  Specimens: None  Complications: None     Impression:  1. No evidence of pulmonary hypertension; normal mean PA pressure and normal pulmonary vascular resistence  2. No evidence of left-to-right shunt (ie no oxygen \"step up\")  3. Normal cardiac output     Plan: resume Eliquis tonight; office f/u.  At this point, and based on this data, does not seem as though patient would benefit from ASD closure and that her right ventricle and right atrium are severely dilated for some other reason.      Results for orders placed during the hospital encounter of 01/25/21    Adult Transesophageal Echo (DEANN) W/ Cont if Necessary Per Protocol    Interpretation Summary  · The right ventricular cavity is dilated.  · Moderate tricuspid valve regurgitation is present.  · Estimated right ventricular systolic pressure from tricuspid regurgitation is mildly elevated (35-45 mmHg).  · Small-moderate sized multifenestrated secundum-type atrial septal defect (ASD).  · Severe biatrial enlargement.  · Normal LVEF.    \"Extensive records reviewed from 2016 re: gi bleed on pradaxa, and subsequent notes through \"[facility]\" in April 2017 regarding communications back-and-forth between patient and her cardiologist office at that time regarding Pradaxa.  A nurse note states that Dr. Matt advised switching from Pradaxa to Eliquis \"because of kidney function.\"  However, her renal function was normal at that time, and remained so now. \" -previous record review by Dr. 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 Healy in '17         ASD secundum    Overview     Added automatically from request for " surgery [id number]            Recommendations/plans:    1. Permanent atrial fibrillation: Established problem, stable.  [id number] (female, age 68, hypertension)    -continue cardizem 120mg daily for rate control  -Continue Pradaxa 150 mg twice daily for stroke prophylaxis.  She was transitioned from Eliquis to Pradaxa due to cost to last year.    -We briefly discussed Watchman device implantation as an alternative to long-term anticoagulation given her recent hemorrhoidal bleeding.  She is not interested at this time but will call back with any recurrent bleeding issues and/or if she would like to consider discussing this further with Dr. Marcelino at a structural clinic visit.    2. Secundum type ASD: Stable. previous catheterization revealed no oxygen step-up, so despite the fact that her right atrium and right ventricle are enlarged, it does not appear to be secondary to this, and since she is not dyspneic, there is no indication for closure at this time.   -Repeating echo 2024 as outlined below.    3.  RV/RA enlargement: as per above, doubt this is from ASD, nor from previously documented coronary artery to pulmonary artery fistula  -reassess with TTE in 2024 as previously recommended by Dr. Marcelino-order placed today.  She continues to deny any shortness of breath whatsoever.    Follow up in 6 months or sooner with any new or worsening symptoms

## 2024-01-08 ENCOUNTER — TELEPHONE (OUTPATIENT)
Dept: HEMATOLOGY | Age: 69
End: 2024-01-08

## 2024-01-08 DIAGNOSIS — D50.8 IRON DEFICIENCY ANEMIA SECONDARY TO INADEQUATE DIETARY IRON INTAKE: ICD-10-CM

## 2024-01-08 DIAGNOSIS — E53.8 VITAMIN B 12 DEFICIENCY: Primary | ICD-10-CM

## 2024-01-08 DIAGNOSIS — D05.12 DUCTAL CARCINOMA IN SITU (DCIS) OF LEFT BREAST: ICD-10-CM

## 2024-01-08 NOTE — TELEPHONE ENCOUNTER
Called patient and reminded patient of their appointment on 1/09/2024and patient confirmed they would be here.

## 2024-01-09 ENCOUNTER — HOSPITAL ENCOUNTER (OUTPATIENT)
Dept: INFUSION THERAPY | Age: 69
Discharge: HOME | End: 2024-01-09
Payer: MEDICARE

## 2024-01-09 ENCOUNTER — OFFICE VISIT (OUTPATIENT)
Dept: HEMATOLOGY | Age: 69
End: 2024-01-09
Payer: MEDICARE

## 2024-01-09 VITALS
WEIGHT: 159 LBS | OXYGEN SATURATION: 98 % | HEIGHT: 63 IN | BODY MASS INDEX: 28.17 KG/M2 | DIASTOLIC BLOOD PRESSURE: 74 MMHG | SYSTOLIC BLOOD PRESSURE: 116 MMHG | TEMPERATURE: 98.6 F | HEART RATE: 108 BPM

## 2024-01-09 DIAGNOSIS — D50.8 IRON DEFICIENCY ANEMIA SECONDARY TO INADEQUATE DIETARY IRON INTAKE: ICD-10-CM

## 2024-01-09 DIAGNOSIS — M85.852 OSTEOPENIA OF NECK OF LEFT FEMUR: ICD-10-CM

## 2024-01-09 DIAGNOSIS — D50.9 IRON DEFICIENCY ANEMIA, UNSPECIFIED IRON DEFICIENCY ANEMIA TYPE: ICD-10-CM

## 2024-01-09 DIAGNOSIS — E53.8 VITAMIN B 12 DEFICIENCY: ICD-10-CM

## 2024-01-09 DIAGNOSIS — D05.12 DUCTAL CARCINOMA IN SITU (DCIS) OF LEFT BREAST: ICD-10-CM

## 2024-01-09 DIAGNOSIS — Z51.81 ENCOUNTER FOR MONITORING TAMOXIFEN THERAPY: ICD-10-CM

## 2024-01-09 DIAGNOSIS — D05.12 DUCTAL CARCINOMA IN SITU (DCIS) OF LEFT BREAST: Primary | ICD-10-CM

## 2024-01-09 DIAGNOSIS — Z79.810 ENCOUNTER FOR MONITORING TAMOXIFEN THERAPY: ICD-10-CM

## 2024-01-09 LAB
ALBUMIN SERPL-MCNC: 4.1 G/DL (ref 3.5–5.2)
ALP SERPL-CCNC: 133 U/L (ref 35–104)
ALT SERPL-CCNC: 17 U/L (ref 9–52)
ANION GAP SERPL CALCULATED.3IONS-SCNC: 7 MMOL/L (ref 7–19)
AST SERPL-CCNC: 39 U/L (ref 14–36)
BASOPHILS # BLD: 0.04 K/UL (ref 0.01–0.08)
BASOPHILS NFR BLD: 0.5 % (ref 0.1–1.2)
BILIRUB SERPL-MCNC: 0.8 MG/DL (ref 0.2–1.3)
BUN SERPL-MCNC: 22 MG/DL (ref 7–17)
CALCIUM SERPL-MCNC: 8.8 MG/DL (ref 8.4–10.2)
CHLORIDE SERPL-SCNC: 108 MMOL/L (ref 98–111)
CO2 SERPL-SCNC: 26 MMOL/L (ref 22–29)
CREAT SERPL-MCNC: 0.8 MG/DL (ref 0.5–1)
EOSINOPHIL # BLD: 0.04 K/UL (ref 0.04–0.54)
EOSINOPHIL NFR BLD: 0.5 % (ref 0.7–7)
ERYTHROCYTE [DISTWIDTH] IN BLOOD BY AUTOMATED COUNT: 14.6 % (ref 11.7–14.4)
FERRITIN SERPL-MCNC: 613 NG/ML (ref 13–150)
GLOBULIN: 3.4 G/DL
GLUCOSE SERPL-MCNC: 109 MG/DL (ref 74–106)
HCT VFR BLD AUTO: 38 % (ref 34.1–44.9)
HGB BLD-MCNC: 11.7 G/DL (ref 11.2–15.7)
IRON SATN MFR SERPL: 16 % (ref 14–50)
IRON SERPL-MCNC: 43 UG/DL (ref 37–145)
LYMPHOCYTES # BLD: 1.74 K/UL (ref 1.18–3.74)
LYMPHOCYTES NFR BLD: 23.6 % (ref 19.3–53.1)
MCH RBC QN AUTO: 29.6 PG (ref 25.6–32.2)
MCHC RBC AUTO-ENTMCNC: 30.8 G/DL (ref 32.3–35.5)
MCV RBC AUTO: 96.2 FL (ref 79.4–94.8)
MONOCYTES # BLD: 0.58 K/UL (ref 0.24–0.82)
MONOCYTES NFR BLD: 7.9 % (ref 4.7–12.5)
NEUTROPHILS # BLD: 4.91 K/UL (ref 1.56–6.13)
NEUTS SEG NFR BLD: 66.8 % (ref 34–71.1)
PLATELET # BLD AUTO: 186 K/UL (ref 182–369)
PMV BLD AUTO: 10.8 FL (ref 7.4–10.4)
POTASSIUM SERPL-SCNC: 3.5 MMOL/L (ref 3.5–5.1)
PROT SERPL-MCNC: 7.5 G/DL (ref 6.3–8.2)
RBC # BLD AUTO: 3.95 M/UL (ref 3.93–5.22)
SODIUM SERPL-SCNC: 141 MMOL/L (ref 137–145)
TIBC SERPL-MCNC: 261 UG/DL (ref 250–400)
VIT B12 SERPL-MCNC: 344 PG/ML (ref 211–946)
WBC # BLD AUTO: 7.36 K/UL (ref 3.98–10.04)

## 2024-01-09 PROCEDURE — G8399 PT W/DXA RESULTS DOCUMENT: HCPCS | Performed by: NURSE PRACTITIONER

## 2024-01-09 PROCEDURE — 36415 COLL VENOUS BLD VENIPUNCTURE: CPT

## 2024-01-09 PROCEDURE — 1090F PRES/ABSN URINE INCON ASSESS: CPT | Performed by: NURSE PRACTITIONER

## 2024-01-09 PROCEDURE — G8484 FLU IMMUNIZE NO ADMIN: HCPCS | Performed by: NURSE PRACTITIONER

## 2024-01-09 PROCEDURE — 80053 COMPREHEN METABOLIC PANEL: CPT

## 2024-01-09 PROCEDURE — 1123F ACP DISCUSS/DSCN MKR DOCD: CPT | Performed by: NURSE PRACTITIONER

## 2024-01-09 PROCEDURE — 85025 COMPLETE CBC W/AUTO DIFF WBC: CPT

## 2024-01-09 PROCEDURE — G8417 CALC BMI ABV UP PARAM F/U: HCPCS | Performed by: NURSE PRACTITIONER

## 2024-01-09 PROCEDURE — 99212 OFFICE O/P EST SF 10 MIN: CPT

## 2024-01-09 PROCEDURE — 99214 OFFICE O/P EST MOD 30 MIN: CPT | Performed by: NURSE PRACTITIONER

## 2024-01-09 PROCEDURE — 3017F COLORECTAL CA SCREEN DOC REV: CPT | Performed by: NURSE PRACTITIONER

## 2024-01-09 PROCEDURE — 1036F TOBACCO NON-USER: CPT | Performed by: NURSE PRACTITIONER

## 2024-01-09 PROCEDURE — G8427 DOCREV CUR MEDS BY ELIG CLIN: HCPCS | Performed by: NURSE PRACTITIONER

## 2024-01-09 RX ORDER — ANTICAVITY FLUORIDE 0.2 MG/ML
600 MOUTHWASH ORAL 2 TIMES DAILY
Qty: 60 TABLET | Refills: 5 | Status: SHIPPED | OUTPATIENT
Start: 2024-01-09

## 2024-01-09 RX ORDER — DICYCLOMINE HYDROCHLORIDE 10 MG/1
10 CAPSULE ORAL
COMMUNITY
Start: 2024-01-02

## 2024-01-13 ASSESSMENT — ENCOUNTER SYMPTOMS
EYE REDNESS: 0
ABDOMINAL PAIN: 0
NAUSEA: 0
DIARRHEA: 0
CONSTIPATION: 0
EYES NEGATIVE: 1
BLOOD IN STOOL: 0
COUGH: 0
SHORTNESS OF BREATH: 0
WHEEZING: 0
VOMITING: 0
EYE PAIN: 0
BACK PAIN: 0
RESPIRATORY NEGATIVE: 1
SORE THROAT: 0
GASTROINTESTINAL NEGATIVE: 1
EYE DISCHARGE: 0

## 2024-01-29 ENCOUNTER — HOSPITAL ENCOUNTER (OUTPATIENT)
Dept: CARDIOLOGY | Facility: HOSPITAL | Age: 69
Discharge: HOME | End: 2024-01-29
Payer: MEDICARE

## 2024-01-29 ENCOUNTER — TELEPHONE (OUTPATIENT)
Dept: GASTROENTEROLOGY | Facility: CLINIC | Age: 69
End: 2024-01-29
Payer: MEDICARE

## 2024-01-29 VITALS
WEIGHT: 158 LBS | BODY MASS INDEX: 28 KG/M2 | HEIGHT: 63 IN | DIASTOLIC BLOOD PRESSURE: 70 MMHG | SYSTOLIC BLOOD PRESSURE: 120 MMHG

## 2024-01-29 DIAGNOSIS — I48.21 PERMANENT ATRIAL FIBRILLATION: ICD-10-CM

## 2024-01-29 DIAGNOSIS — I51.7 RIGHT VENTRICULAR ENLARGEMENT: ICD-10-CM

## 2024-01-29 DIAGNOSIS — R19.7 DIARRHEA, UNSPECIFIED TYPE: Primary | ICD-10-CM

## 2024-01-29 DIAGNOSIS — Q21.11 ASD SECUNDUM: ICD-10-CM

## 2024-01-29 DIAGNOSIS — Q24.5 CONGENITAL CORONARY ARTERY FISTULA TO PULMONARY ARTERY: ICD-10-CM

## 2024-01-29 PROCEDURE — 93306 TTE W/DOPPLER COMPLETE: CPT

## 2024-01-29 NOTE — TELEPHONE ENCOUNTER
Patient came to office today with complaints of worsening diarrhea and fowl smelling gas. She is taking Bentyl sometimes 2 pills at once to help. She states she will have a pain in stomach then diarrhea. She states she is lactose intolerant and avoids dairy, she has cut out spicy food. Had EGD/COLON in Dec. Scheduled patient an appointment on 2/6 to discuss. She will keep a food diary until then.

## 2024-02-01 ENCOUNTER — TELEPHONE (OUTPATIENT)
Dept: INTERNAL MEDICINE | Facility: CLINIC | Age: 69
End: 2024-02-01
Payer: MEDICARE

## 2024-02-01 NOTE — TELEPHONE ENCOUNTER
IT APPEARS THAT PRILOSEC IS STILL ON HER MEDICATIONS LIST, IT HAS BEEN SENT BY HISTORICAL PROVIDER.   DOES IT NEED PEND TO PROVIDER FOR REFILL?

## 2024-02-02 RX ORDER — OMEPRAZOLE 20 MG/1
20 CAPSULE, DELAYED RELEASE ORAL DAILY
Qty: 30 CAPSULE | Refills: 11 | Status: SHIPPED | OUTPATIENT
Start: 2024-02-02

## 2024-02-02 NOTE — TELEPHONE ENCOUNTER
I was not aware that this was discontinued.  Please pend it to me if she is still requiring this medication.  Thanks

## 2024-02-04 LAB
BH CV ECHO MEAS - AO MAX PG: 11.7 MMHG
BH CV ECHO MEAS - AO MEAN PG: 4.3 MMHG
BH CV ECHO MEAS - AO ROOT DIAM: 2.7 CM
BH CV ECHO MEAS - AO V2 MAX: 171 CM/SEC
BH CV ECHO MEAS - AO V2 VTI: 25.4 CM
BH CV ECHO MEAS - AVA(I,D): 2.03 CM2
BH CV ECHO MEAS - EDV(CUBED): 79.5 ML
BH CV ECHO MEAS - EDV(MOD-SP2): 51.9 ML
BH CV ECHO MEAS - EDV(MOD-SP4): 49.9 ML
BH CV ECHO MEAS - EF(MOD-SP4): 52.1 %
BH CV ECHO MEAS - ESV(CUBED): 19.7 ML
BH CV ECHO MEAS - ESV(MOD-SP2): 31.3 ML
BH CV ECHO MEAS - ESV(MOD-SP4): 23.9 ML
BH CV ECHO MEAS - FS: 37.2 %
BH CV ECHO MEAS - IVS/LVPW: 0.8 CM
BH CV ECHO MEAS - IVSD: 0.8 CM
BH CV ECHO MEAS - LA DIMENSION: 4.7 CM
BH CV ECHO MEAS - LAT PEAK E' VEL: 11.4 CM/SEC
BH CV ECHO MEAS - LV DIASTOLIC VOL/BSA (35-75): 28.5 CM2
BH CV ECHO MEAS - LV MASS(C)D: 123.3 GRAMS
BH CV ECHO MEAS - LV MAX PG: 3.6 MMHG
BH CV ECHO MEAS - LV MEAN PG: 2 MMHG
BH CV ECHO MEAS - LV SYSTOLIC VOL/BSA (12-30): 13.7 CM2
BH CV ECHO MEAS - LV V1 MAX: 95.1 CM/SEC
BH CV ECHO MEAS - LV V1 VTI: 18.2 CM
BH CV ECHO MEAS - LVIDD: 4.3 CM
BH CV ECHO MEAS - LVIDS: 2.7 CM
BH CV ECHO MEAS - LVOT AREA: 2.8 CM2
BH CV ECHO MEAS - LVOT DIAM: 1.9 CM
BH CV ECHO MEAS - LVPWD: 1 CM
BH CV ECHO MEAS - MED PEAK E' VEL: 9.5 CM/SEC
BH CV ECHO MEAS - MV DEC SLOPE: 551.3 CM/SEC2
BH CV ECHO MEAS - MV DEC TIME: 0.19 SEC
BH CV ECHO MEAS - MV E MAX VEL: 104 CM/SEC
BH CV ECHO MEAS - MV MAX PG: 4.9 MMHG
BH CV ECHO MEAS - MV MEAN PG: 2 MMHG
BH CV ECHO MEAS - MV V2 VTI: 23.5 CM
BH CV ECHO MEAS - MVA(VTI): 2.2 CM2
BH CV ECHO MEAS - PA V2 MAX: 117.3 CM/SEC
BH CV ECHO MEAS - RAP SYSTOLE: 10 MMHG
BH CV ECHO MEAS - RV MAX PG: 4.4 MMHG
BH CV ECHO MEAS - RV V1 MAX: 104.6 CM/SEC
BH CV ECHO MEAS - RV V1 VTI: 17.3 CM
BH CV ECHO MEAS - RVDD: 3.7 CM
BH CV ECHO MEAS - RVSP: 37 MMHG
BH CV ECHO MEAS - SI(MOD-SP2): 11.8 ML/M2
BH CV ECHO MEAS - SI(MOD-SP4): 14.9 ML/M2
BH CV ECHO MEAS - SV(LVOT): 51.6 ML
BH CV ECHO MEAS - SV(MOD-SP2): 20.6 ML
BH CV ECHO MEAS - SV(MOD-SP4): 26 ML
BH CV ECHO MEAS - TAPSE (>1.6): 2.29 CM
BH CV ECHO MEAS - TR MAX PG: 27.2 MMHG
BH CV ECHO MEAS - TR MAX VEL: 261 CM/SEC
BH CV ECHO MEASUREMENTS AVERAGE E/E' RATIO: 9.95
BH CV XLRA - RV BASE: 5.6 CM
BH CV XLRA - RV LENGTH: 8.7 CM
BH CV XLRA - RV MID: 4.6 CM
BH CV XLRA - TDI S': 11.2 CM/SEC
LEFT ATRIUM VOLUME INDEX: 55.7 ML/M2
LEFT ATRIUM VOLUME: 105 ML

## 2024-02-05 ENCOUNTER — TELEPHONE (OUTPATIENT)
Dept: CARDIOLOGY | Facility: CLINIC | Age: 69
End: 2024-02-05
Payer: MEDICARE

## 2024-02-05 NOTE — TELEPHONE ENCOUNTER
The patient called in reporting her Pradaxa costing her $91 for a 30 day supply.  I contacted the pharmacy to see if this was due to a prior authorization issue or deductible issue, and they stated the patient had switched insurances.  They also stated the patient was adamant on picking up the brand name of the medication.  If the patient went with the generic she would save about $50.  I relayed this information to the patient.  She stated when she gets the script filled next month she will request the generic.  She will contact us back if she runs into any further issues.

## 2024-02-06 ENCOUNTER — OFFICE VISIT (OUTPATIENT)
Dept: GASTROENTEROLOGY | Facility: CLINIC | Age: 69
End: 2024-02-06
Payer: MEDICARE

## 2024-02-06 VITALS
HEIGHT: 63 IN | TEMPERATURE: 97.7 F | OXYGEN SATURATION: 98 % | DIASTOLIC BLOOD PRESSURE: 80 MMHG | HEART RATE: 101 BPM | SYSTOLIC BLOOD PRESSURE: 120 MMHG | WEIGHT: 159 LBS | BODY MASS INDEX: 28.17 KG/M2

## 2024-02-06 DIAGNOSIS — Z78.9 NONSMOKER: ICD-10-CM

## 2024-02-06 DIAGNOSIS — R19.7 DIARRHEA, UNSPECIFIED TYPE: Primary | ICD-10-CM

## 2024-02-06 PROCEDURE — 99213 OFFICE O/P EST LOW 20 MIN: CPT | Performed by: CLINICAL NURSE SPECIALIST

## 2024-02-06 PROCEDURE — 1159F MED LIST DOCD IN RCRD: CPT | Performed by: CLINICAL NURSE SPECIALIST

## 2024-02-06 PROCEDURE — 1160F RVW MEDS BY RX/DR IN RCRD: CPT | Performed by: CLINICAL NURSE SPECIALIST

## 2024-02-06 NOTE — PROGRESS NOTES
Yun Blackwell  1955 2/6/2024  Chief Complaint   Patient presents with    GI Problem     Constipation,hernia     Subjective   HPI  Yun Blackwell is a 68 y.o. female who presented in December 2023 with a complaint of rectal bleeding. She thinks hemorrhoid related. It comes and goes. It will occur then come back. It is on the tissue when she wipes. No burning or itching. No rectal pressure or pain. She does have some diarrhea no constipation.  Her bowels are more loose than normal. She has had this problem before and took fiber which helped her at first it is not helping her as much now. She takes Immodium to help treat as well as fiber. She says that she has a long hx of IBS. She has not tried anything else for this. She has been on antibiotics for UTI 11/24/23. The diarrhea was before that.     Dysphagia ongoing for 6 months with solids and large pills upper esophageal region. She has had dilatation in the past and this did help her. Time makes it better. It is moderate for her. No nausea or vomiting. No wt loss.     She was set up for endo/colon evaluations as noted in surgical these were performed no colitis noted.     Today   Diarrhea: she says that she is having more diarrhea worse when she eats. She has it at night. She says it has awakened her at night and she is wearing pads to help manage. No bleeding. No fever chills or sweats. No nausea or vomiting. She has tried not to eat due to the diarrhea.       CT abdomen/pelvis IMPRESSION:        No acute findings in the abdomen/pelvis.     3 ventral abdominal wall hernias, the one to the left of midline  contains a nonobstructed loop of small bowel.     Cholelithiasis.     Colonic diverticulosis.      This report was signed and finalized on 11/24/2023 11:25 AM CST by  Mike Andujar.  Past Medical History:   Diagnosis Date    A-fib     Abnormal ECG     tachycardia, a fib    Acid reflux     Anemia     Arthritis     Cancer     Diabetes mellitus      Hyperkalemia     Hyperlipidemia     Hypertension     Hyponatremia     IBS (irritable bowel syndrome)     PONV (postoperative nausea and vomiting)     Sleep apnea     USES CPAP    UTI (urinary tract infection)     UTI (urinary tract infection) 11/25/2016    Vaginal vault prolapse      Past Surgical History:   Procedure Laterality Date    BREAST BIOPSY Left     X2    BUNIONECTOMY Left     CARDIAC CATHETERIZATION      CARDIAC CATHETERIZATION N/A 02/05/2021    Procedure: Right Heart Cath;  Surgeon: Van Marcelino MD;  Location: [facility] CATH INVASIVE LOCATION;  Service: Cardiology;  Laterality: N/A;    CATARACT EXTRACTION, BILATERAL      COLON SURGERY      COLONOSCOPY  09/21/2015    TRANSVERSE COLON ADENOMATOUS POLYP, HEMORRHOIDS, RECALL 5 YEARS, DR LAMAS    COLONOSCOPY N/A 11/13/2020    6 mm tubular adenomatous polyp at 15 cm proximal to the anus,diverticulosis of the sigmoid colon    COLONOSCOPY N/A 12/13/2023    - One 4 mm polyp at 40 cm proximal to the anus,. - One 4 mm polyp at 20 cm proximal to the anus, removed with a cold snare. Resected and retrieved. - Diverticulosis in the sigmoid colon. - Hemorrhoids----tubular adenoma no evidence of high grade dysplasia    COLOSTOMY      COLPOPEXY VAGINAL      2014    ENDOSCOPY N/A 11/03/2016    LA GRADE B ESOPHAGITIS WITH NO BLEEDING UPPER THIRD OF ESOPHAGUS, GERI-[place] TEAR GEJ, BILIOUS BLOOD TINGED COFFEE GROUND GATRIC FLUIDDR ADAMS    ENDOSCOPY N/A 12/13/2023    Procedure: ESOPHAGOGASTRODUODENOSCOPY WITH ANESTHESIA;  Surgeon: Joe Lamas MD;  Location: [facility] ENDOSCOPY;  Service: Gastroenterology;  Laterality: N/A;  pre: BRBPR. dysphagia.   post: esophagus dilated.   [place]    EXPLORATORY LAPAROTOMY N/A 10/14/2016    Procedure: LAPAROTOMY EXPLORATORY, LYSIS OF ADHESIONS, IRRIAGATION OF ABDOMEN, POSSIBLE BOWEL RESECTION;  Surgeon: Bacilio Marcial MD;  Location: [facility] OR;  Service:     FOOT NAVICULAR EXCISION OR BONE GRAFT Left 01/04/2023    Procedure:  Expansion Graft, Harvesting of Bone Graft/Bone Marrow Aspirate;  Surgeon: Frankie Zapata DPM;  Location:  [facility] OR;  Service: Podiatry;  Laterality: Left;    HAMMER TOE REPAIR Left 01/04/2023    Procedure: Hammertoe Repair 2 and 3 - Left Foot;  Surgeon: Frankie Zapata DPM;  Location:  [facility] OR;  Service: Podiatry;  Laterality: Left;    HARDWARE REMOVAL Left 01/04/2023    Procedure: Hardware Removal;  Surgeon: Frankie Zapata DPM;  Location:  [facility] OR;  Service: Podiatry;  Laterality: Left;    HYSTERECTOMY      REVISION / TAKEDOWN COLOSTOMY      SACROCOLPOPEXY N/A 09/21/2016    Procedure: SACROCOLPOPEXY LAPAROSCOPIC WITH [employer] ROBOT, CONVERTED TO OPEN SACROCOLPOPEXY, RIGHT SALPINGO- OOPHERECTOMY, CYSTO;  Surgeon: Maribell Beth MD;  Location:  [facility] OR;  Service:     TOE FUSION Left 01/06/2022    Procedure: FIRST METATARSOPHALANGEAL JOINT ARTHRODESIS WITH INTERNAL FIXATION - LEFT FOOT;  Surgeon: Jorgito Red DPM;  Location:  [facility] OR;  Service: Podiatry;  Laterality: Left;    TOE FUSION Left 01/04/2023    Procedure: Revisional 1st Metatarsophalangeal Joint Arthrodesis with Hardware Removal and Expansion Graft, Harvesting of Bone Graft/Bone Marrow Aspirate, Hammertoe Repair 2 and 3 - Left Foot;  Surgeon: Frankie Zapata DPM;  Location:  [facility] OR;  Service: Podiatry;  Laterality: Left;       Outpatient Medications Marked as Taking for the 2/6/24 encounter (Office Visit) with Teodora Macario APRN   Medication Sig Dispense Refill    dabigatran etexilate (PRADAXA) 150 MG capsu Take 1 capsule by mouth 2 (Two) Times a Day. 60 capsule 11    dicyclomine (BENTYL) 10 MG capsule Take 1 capsule by mouth 4 (Four) Times a Day Before Meals & at Bedtime. 120 capsule 10    dilTIAZem CD (Cardizem CD) 120 MG 24 hr capsule Take 1 capsule by mouth Daily. 30 capsule 11    hydrOXYzine (ATARAX) 10 MG tablet Take 1-2 tablets by mouth Every 4 to 6 Hours As Needed. 60 tablet 1    montelukast (SINGULAIR) 10 MG  tablet Take 1 tablet by mouth Every Night. 30 tablet 5    omeprazole (priLOSEC) 20 MG capsule Take 1 capsule by mouth Daily. 30 capsule 11    tamoxifen (NOLVADEX) 20 MG chemo tablet Take 1 tablet by mouth daily 90 tablet 1    tiZANidine (ZANAFLEX) 4 MG tablet Take 1 tablet by mouth Daily As Needed for Muscle Spasms. 30 tablet 2     Current Facility-Administered Medications for the 2/6/24 encounter (Office Visit) with Teodora Macario APRN   Medication Dose Route Frequency Provider Last Rate Last Admin    cyanocobalamin injection 1,000 mcg  1,000 mcg Intramuscular Q28 Days ANGEL Healy MD   1,000 mcg at 12/22/22 0845     Allergies   Allergen Reactions    Morphine And Related Nausea And Vomiting and Dizziness    Percocet [Oxycodone-Acetaminophen] Nausea And Vomiting and Dizziness     Social History     Socioeconomic History    Marital status:    Tobacco Use    Smoking status: Never     Passive exposure: Never    Smokeless tobacco: Never   Vaping Use    Vaping Use: Never used   Substance and Sexual Activity    Alcohol use: No    Drug use: No    Sexual activity: Defer     Family History   Problem Relation Age of Onset    Hypertension Mother     No Known Problems Father     Colon cancer Neg Hx     Colon polyps Neg Hx      Health Maintenance   Topic Date Due    TDAP/TD VACCINES (1 - Tdap) Never done    ZOSTER VACCINE (1 of 2) Never done    RSV Vaccine - Adults >60 Years or Pregnant 32-36 Weeks (1 - 1-dose 60+ series) Never done    PAP SMEAR  Never done    INFLUENZA VACCINE  03/31/2024 (Originally 8/1/2023)    Pneumococcal Vaccine 65+ (1 of 2 - PCV) 04/17/2024 (Originally 11/12/1961)    ANNUAL WELLNESS VISIT  04/17/2024    URINE MICROALBUMIN  04/17/2024    BMI FOLLOWUP  08/03/2024    MAMMOGRAM  10/17/2024    LIPID PANEL  11/21/2024    DXA SCAN  10/10/2025    COLORECTAL CANCER SCREENING  12/13/2026    HEPATITIS C SCREENING  Completed    COVID-19 Vaccine  Discontinued    DIABETIC FOOT EXAM  Discontinued     "HEMOGLOBIN A1C  Discontinued    DIABETIC EYE EXAM  Discontinued     Review of Systems   Constitutional:  Negative for activity change, appetite change, chills, diaphoresis, fatigue, fever and unexpected weight change.   HENT:  Negative for ear pain, hearing loss, mouth sores, sore throat, trouble swallowing and voice change.    Eyes: Negative.    Respiratory:  Negative for cough, choking, shortness of breath and wheezing.    Cardiovascular:  Negative for chest pain and palpitations.   Gastrointestinal:  Positive for diarrhea. Negative for abdominal pain, blood in stool, constipation, nausea and vomiting.   Endocrine: Negative for cold intolerance and heat intolerance.   Genitourinary:  Negative for decreased urine volume, dysuria, frequency, hematuria and urgency.   Musculoskeletal:  Negative for back pain, gait problem and myalgias.   Skin:  Negative for color change, pallor and rash.   Allergic/Immunologic: Negative for food allergies and immunocompromised state.   Neurological:  Negative for dizziness, tremors, seizures, syncope, weakness, light-headedness, numbness and headaches.   Hematological:  Negative for adenopathy. Does not bruise/bleed easily.   Psychiatric/Behavioral:  Negative for agitation and confusion. The patient is not nervous/anxious.    All other systems reviewed and are negative.    Objective   Vitals:    02/06/24 1420   BP: 120/80   BP Location: Left arm   Pulse: 101   Temp: 97.7 °F (36.5 °C)   TempSrc: Temporal   SpO2: 98%   Weight: 72.1 kg (159 lb)   Height: 160 cm (62.99\")     Body mass index is 28.17 kg/m².  Physical Exam  Constitutional:       Appearance: She is well-developed.   HENT:      Head: Normocephalic and atraumatic.   Eyes:      Pupils: Pupils are equal, round, and reactive to light.   Neck:      Trachea: No tracheal deviation.   Cardiovascular:      Rate and Rhythm: Normal rate and regular rhythm.      Heart sounds: Normal heart sounds. No murmur heard.     No friction rub. No " gallop.   Pulmonary:      Effort: Pulmonary effort is normal. No respiratory distress.      Breath sounds: Normal breath sounds. No wheezing or rales.   Chest:      Chest wall: No tenderness.   Abdominal:      General: Bowel sounds are normal. There is no distension.      Palpations: Abdomen is soft. Abdomen is not rigid.      Tenderness: There is no abdominal tenderness. There is no guarding or rebound.   Musculoskeletal:         General: No tenderness or deformity. Normal range of motion.      Cervical back: Normal range of motion and neck supple.   Skin:     General: Skin is warm and dry.      Coloration: Skin is not pale.      Findings: No rash.   Neurological:      Mental Status: She is alert and oriented to person, place, and time.      Deep Tendon Reflexes: Reflexes are normal and symmetric.   Psychiatric:         Behavior: Behavior normal.         Thought Content: Thought content normal.         Judgment: Judgment normal.       Assessment & Plan   Diagnoses and all orders for this visit:    1. Diarrhea, unspecified type (Primary)  Comments:  cultures ordered to rule out infectious. fiber regimen to begin, avoid dairy    2. Nonsmoker    To go to lab to get cultures  No dairy  No artificial sweeteners or sugar      Part of this note may be an electronic transcription/translation of spoken language to printed text using the Dragon Dictation System.  Body mass index is 28.17 kg/m².  No follow-ups on file.           All risks, benefits, alternatives, and indications of colonoscopy and/or Endoscopy procedure have been discussed with the patient. Risks to include perforation of the colon requiring possible surgery or colostomy, risk of bleeding from biopsies or removal of colon tissue, possibility of missing a colon polyp or cancer, or adverse drug reaction.  Benefits to include the diagnosis and management of disease of the colon and rectum. Alternatives to include barium enema, radiographic evaluation, lab  testing or no intervention. Pt verbalizes understanding and agrees.     Teodora Macario, APRN  2/6/2024  14:52 CST          If you smoke or use tobacco, 4 minutes reading provided  Steps to Quit Smoking  Smoking tobacco can be harmful to your health and can affect almost every organ in your body. Smoking puts you, and those around you, at risk for developing many serious chronic diseases. Quitting smoking is difficult, but it is one of the best things that you can do for your health. It is never too late to quit.  What are the benefits of quitting smoking?  When you quit smoking, you lower your risk of developing serious diseases and conditions, such as:  Lung cancer or lung disease, such as COPD.  Heart disease.  Stroke.  Heart attack.  Infertility.  Osteoporosis and bone fractures.  Additionally, symptoms such as coughing, wheezing, and shortness of breath may get better when you quit. You may also find that you get sick less often because your body is stronger at fighting off colds and infections. If you are pregnant, quitting smoking can help to reduce your chances of having a baby of low birth weight.  How do I get ready to quit?  When you decide to quit smoking, create a plan to make sure that you are successful. Before you quit:  Pick a date to quit. Set a date within the next two weeks to give you time to prepare.  Write down the reasons why you are quitting. Keep this list in places where you will see it often, such as on your bathroom mirror or in your car or wallet.  Identify the people, places, things, and activities that make you want to smoke (triggers) and avoid them. Make sure to take these actions:  Throw away all cigarettes at home, at work, and in your car.  Throw away smoking accessories, such as ashtrays and lighters.  Clean your car and make sure to empty the ashtray.  Clean your home, including curtains and carpets.  Tell your family, friends, and coworkers that you are quitting. Support  from your loved ones can make quitting easier.  Talk with your health care provider about your options for quitting smoking.  Find out what treatment options are covered by your health insurance.  What strategies can I use to quit smoking?  Talk with your healthcare provider about different strategies to quit smoking. Some strategies include:  Quitting smoking altogether instead of gradually lessening how much you smoke over a period of time. Research shows that quitting “cold turkey” is more successful than gradually quitting.  Attending in-person counseling to help you build problem-solving skills. You are more likely to have success in quitting if you attend several counseling sessions. Even short sessions of 10 minutes can be effective.  Finding resources and support systems that can help you to quit smoking and remain smoke-free after you quit. These resources are most helpful when you use them often. They can include:  Online chats with a counselor.  Telephone quitlines.  Printed self-help materials.  Support groups or group counseling.  Text messaging programs.  Mobile phone applications.  Taking medicines to help you quit smoking. (If you are pregnant or breastfeeding, talk with your health care provider first.) Some medicines contain nicotine and some do not. Both types of medicines help with cravings, but the medicines that include nicotine help to relieve withdrawal symptoms. Your health care provider may recommend:  Nicotine patches, gum, or lozenges.  Nicotine inhalers or sprays.  Non-nicotine medicine that is taken by mouth.  Talk with your health care provider about combining strategies, such as taking medicines while you are also receiving in-person counseling. Using these two strategies together makes you more likely to succeed in quitting than if you used either strategy on its own.  If you are pregnant or breastfeeding, talk with your health care provider about finding counseling or other support  strategies to quit smoking. Do not take medicine to help you quit smoking unless told to do so by your health care provider.  What things can I do to make it easier to quit?  Quitting smoking might feel overwhelming at first, but there is a lot that you can do to make it easier. Take these important actions:  Reach out to your family and friends and ask that they support and encourage you during this time. Call telephone quitlines, reach out to support groups, or work with a counselor for support.  Ask people who smoke to avoid smoking around you.  Avoid places that trigger you to smoke, such as bars, parties, or smoke-break areas at work.  Spend time around people who do not smoke.  Lessen stress in your life, because stress can be a smoking trigger for some people. To lessen stress, try:  Exercising regularly.  Deep-breathing exercises.  Yoga.  Meditating.  Performing a body scan. This involves closing your eyes, scanning your body from head to toe, and noticing which parts of your body are particularly tense. Purposefully relax the muscles in those areas.  Download or purchase mobile phone or tablet apps (applications) that can help you stick to your quit plan by providing reminders, tips, and encouragement. There are many free apps, such as QuitGuide from the CDC (Centers for Disease Control and Prevention). You can find other support for quitting smoking (smoking cessation) through smokefree.gov and other websites.  How will I feel when I quit smoking?  Within the first 24 hours of quitting smoking, you may start to feel some withdrawal symptoms. These symptoms are usually most noticeable 2-3 days after quitting, but they usually do not last beyond 2-3 weeks. Changes or symptoms that you might experience include:  Mood swings.  Restlessness, anxiety, or irritation.  Difficulty concentrating.  Dizziness.  Strong cravings for sugary foods in addition to nicotine.  Mild weight  gain.  Constipation.  Nausea.  Coughing or a sore throat.  Changes in how your medicines work in your body.  A depressed mood.  Difficulty sleeping (insomnia).  After the first 2-3 weeks of quitting, you may start to notice more positive results, such as:  Improved sense of smell and taste.  Decreased coughing and sore throat.  Slower heart rate.  Lower blood pressure.  Clearer skin.  The ability to breathe more easily.  Fewer sick days.  Quitting smoking is very challenging for most people. Do not get discouraged if you are not successful the first time. Some people need to make many attempts to quit before they achieve long-term success. Do your best to stick to your quit plan, and talk with your health care provider if you have any questions or concerns.  This information is not intended to replace advice given to you by your health care provider. Make sure you discuss any questions you have with your health care provider.  Document Released: 12/12/2002 Document Revised: 08/15/2017 Document Reviewed: 05/03/2016  [employer] Interactive Patient Education © 2017 [employer]

## 2024-02-07 ENCOUNTER — LAB (OUTPATIENT)
Dept: LAB | Facility: HOSPITAL | Age: 69
End: 2024-02-07
Payer: MEDICARE

## 2024-02-07 DIAGNOSIS — R19.7 DIARRHEA, UNSPECIFIED TYPE: ICD-10-CM

## 2024-02-07 PROCEDURE — 87209 SMEAR COMPLEX STAIN: CPT | Performed by: CLINICAL NURSE SPECIALIST

## 2024-02-07 PROCEDURE — 87177 OVA AND PARASITES SMEARS: CPT | Performed by: CLINICAL NURSE SPECIALIST

## 2024-02-07 PROCEDURE — 87045 FECES CULTURE AEROBIC BACT: CPT

## 2024-02-07 PROCEDURE — 83630 LACTOFERRIN FECAL (QUAL): CPT

## 2024-02-07 PROCEDURE — 87046 STOOL CULTR AEROBIC BACT EA: CPT

## 2024-02-07 PROCEDURE — 87427 SHIGA-LIKE TOXIN AG IA: CPT

## 2024-02-08 LAB — LACTOFERRIN STL QL LA: POSITIVE

## 2024-02-08 RX ORDER — METRONIDAZOLE 250 MG/1
250 TABLET ORAL 4 TIMES DAILY
Qty: 40 TABLET | Refills: 0 | Status: SHIPPED | OUTPATIENT
Start: 2024-02-08

## 2024-02-11 LAB
BACTERIA SPEC CULT: NORMAL
BACTERIA SPEC CULT: NORMAL
CAMPYLOBACTER STL CULT: NORMAL
E COLI SXT STL QL IA: NEGATIVE
SALM + SHIG STL CULT: NORMAL

## 2024-02-12 LAB
O+P SPEC MICRO: NORMAL
O+P STL CONC: NORMAL

## 2024-02-16 RX ORDER — MONTELUKAST SODIUM 10 MG/1
10 TABLET ORAL NIGHTLY
Qty: 30 TABLET | Refills: 11 | Status: SHIPPED | OUTPATIENT
Start: 2024-02-16

## 2024-02-20 ENCOUNTER — OFFICE VISIT (OUTPATIENT)
Dept: GASTROENTEROLOGY | Facility: CLINIC | Age: 69
End: 2024-02-20
Payer: MEDICARE

## 2024-02-20 VITALS
DIASTOLIC BLOOD PRESSURE: 78 MMHG | TEMPERATURE: 97.5 F | SYSTOLIC BLOOD PRESSURE: 130 MMHG | HEIGHT: 63 IN | HEART RATE: 91 BPM | WEIGHT: 159.4 LBS | OXYGEN SATURATION: 98 % | BODY MASS INDEX: 28.24 KG/M2

## 2024-02-20 DIAGNOSIS — R13.19 ESOPHAGEAL DYSPHAGIA: ICD-10-CM

## 2024-02-20 DIAGNOSIS — R19.7 DIARRHEA, UNSPECIFIED TYPE: Primary | ICD-10-CM

## 2024-02-20 DIAGNOSIS — Z78.9 NONSMOKER: ICD-10-CM

## 2024-02-20 PROCEDURE — 1159F MED LIST DOCD IN RCRD: CPT | Performed by: CLINICAL NURSE SPECIALIST

## 2024-02-20 PROCEDURE — 1160F RVW MEDS BY RX/DR IN RCRD: CPT | Performed by: CLINICAL NURSE SPECIALIST

## 2024-02-20 PROCEDURE — 99213 OFFICE O/P EST LOW 20 MIN: CPT | Performed by: CLINICAL NURSE SPECIALIST

## 2024-02-20 NOTE — PROGRESS NOTES
Yun Blackwell  1955 2/20/2024  Chief Complaint   Patient presents with    GI Problem     2 week fu-still having some diarrhea--having trouble swallowing pills     Subjective   HPI  Yun Blackwell is a 68 y.o. female who presents with a complaint of dysphagia with pills. She says that she puts them in water to get them wet prior to taking them. She says she has been able to spit them out because they will not go down. She is having problems with liquids as well. She was dilated with her endoscopy. This did help her somewhat.     The gas is better. Diarrhea is better. She has it at times. No bleeding. Colonoscopy was unremarkable no findings to suggest a reason for diarrhea. Bentyl is helping her.        Past Medical History:   Diagnosis Date    A-fib     Abnormal ECG     tachycardia, a fib    Acid reflux     Anemia     Arthritis     Cancer     Diabetes mellitus     Hyperkalemia     Hyperlipidemia     Hypertension     Hyponatremia     IBS (irritable bowel syndrome)     PONV (postoperative nausea and vomiting)     Sleep apnea     USES CPAP    UTI (urinary tract infection)     UTI (urinary tract infection) 11/25/2016    Vaginal vault prolapse      Past Surgical History:   Procedure Laterality Date    BREAST BIOPSY Left     X2    BUNIONECTOMY Left     CARDIAC CATHETERIZATION      CARDIAC CATHETERIZATION N/A 02/05/2021    Procedure: Right Heart Cath;  Surgeon: Van Marcelino MD;  Location:  [facility];  Service: Cardiology;  Laterality: N/A;    CATARACT EXTRACTION, BILATERAL      COLON SURGERY      COLONOSCOPY  09/21/2015    TRANSVERSE COLON ADENOMATOUS POLYP, HEMORRHOIDS, RECALL 5 YEARS, DR LAMAS    COLONOSCOPY N/A 11/13/2020    6 mm tubular adenomatous polyp at 15 cm proximal to the anus,diverticulosis of the sigmoid colon    COLONOSCOPY N/A 12/13/2023    - One 4 mm polyp at 40 cm proximal to the anus,. - One 4 mm polyp at 20 cm proximal to the anus, removed with a cold snare.  Resected and retrieved. - Diverticulosis in the sigmoid colon. - Hemorrhoids----tubular adenoma no evidence of high grade dysplasia    COLOSTOMY      COLPOPEXY VAGINAL      2014    ENDOSCOPY N/A 11/03/2016    LA GRADE B ESOPHAGITIS WITH NO BLEEDING UPPER THIRD OF ESOPHAGUS, GERI-WAD[place]TH TEAR GEJ, BILIOUS BLOOD TINGED COFFEE GROUND GATRIC FLUIDDR [facility]    ENDOSCOPY N/A 12/13/2023    Normal examined duodenum. - Normal stomach. Biopsied. - Z-line regular, 35 cm from the incisors. Dilated.-neg for h pylori    EXPLORATORY LAPAROTOMY N/A 10/14/2016    Procedure: LAPAROTOMY EXPLORATORY, LYSIS OF ADHESIONS, IRRIAGATION OF ABDOMEN, POSSIBLE BOWEL RESECTION;  Surgeon: Bacilio Marcial MD;  Location:  [facility] OR;  Service:     FOOT NAVICULAR EXCISION OR BONE GRAFT Left 01/04/2023    Procedure: Expansion Graft, Harvesting of Bone Graft/Bone Marrow Aspirate;  Surgeon: Frankie Zapata DPM;  Location:  [facility] OR;  Service: Podiatry;  Laterality: Left;    HAMMER TOE REPAIR Left 01/04/2023    Procedure: Hammertoe Repair 2 and 3 - Left Foot;  Surgeon: Frankie Zapata DPM;  Location:  [facility] OR;  Service: Podiatry;  Laterality: Left;    HARDWARE REMOVAL Left 01/04/2023    Procedure: Hardware Removal;  Surgeon: Frankie Zapata DPM;  Location:  [facility] OR;  Service: Podiatry;  Laterality: Left;    HYSTERECTOMY      REVISION / TAKEDOWN COLOSTOMY      SACROCOLPOPEXY N/A 09/21/2016    Procedure: SACROCOLPOPEXY LAPAROSCOPIC WITH TakepinI SI ROBOT, CONVERTED TO OPEN SACROCOLPOPEXY, RIGHT SALPINGO- OOPHERECTOMY, CYSTO;  Surgeon: Maribell Beth MD;  Location:  [facility] OR;  Service:     TOE FUSION Left 01/06/2022    Procedure: FIRST METATARSOPHALANGEAL JOINT ARTHRODESIS WITH INTERNAL FIXATION - LEFT FOOT;  Surgeon: Jorgito Red DPM;  Location:  [facility] OR;  Service: Podiatry;  Laterality: Left;    TOE FUSION Left 01/04/2023    Procedure: Revisional 1st Metatarsophalangeal Joint Arthrodesis with Hardware Removal and Expansion Graft,  Harvesting of Bone Graft/Bone Marrow Aspirate, Hammertoe Repair 2 and 3 - Left Foot;  Surgeon: Frankie Zapata DPM;  Location: [facility] OR;  Service: Podiatry;  Laterality: Left;       Outpatient Medications Marked as Taking for the 2/20/24 encounter (Office Visit) with Teodora Macario APRN   Medication Sig Dispense Refill    dabigatran etexilate (PRADAXA) 150 MG capsu Take 1 capsule by mouth 2 (Two) Times a Day. 60 capsule 11    dicyclomine (BENTYL) 10 MG capsule Take 1 capsule by mouth 4 (Four) Times a Day Before Meals & at Bedtime. 120 capsule 10    dilTIAZem CD (Cardizem CD) 120 MG 24 hr capsule Take 1 capsule by mouth Daily. 30 capsule 11    hydrOXYzine (ATARAX) 10 MG tablet Take 1-2 tablets by mouth Every 4 to 6 Hours As Needed. 60 tablet 1    metroNIDAZOLE (FLAGYL) 250 MG tablet Take 1 tablet by mouth 4 (Four) Times a Day. 40 tablet 0    montelukast (SINGULAIR) 10 MG tablet Take 1 tablet by mouth Every Night. 30 tablet 11    omeprazole (priLOSEC) 20 MG capsule Take 1 capsule by mouth Daily. 30 capsule 11    tamoxifen (NOLVADEX) 20 MG chemo tablet Take 1 tablet by mouth daily 90 tablet 1    tiZANidine (ZANAFLEX) 4 MG tablet Take 1 tablet by mouth Daily As Needed for Muscle Spasms. 30 tablet 2     Current Facility-Administered Medications for the 2/20/24 encounter (Office Visit) with Teodora Macario APRN   Medication Dose Route Frequency Provider Last Rate Last Admin    cyanocobalamin injection 1,000 mcg  1,000 mcg Intramuscular Q28 Days ANGEL Healy MD   1,000 mcg at 12/22/22 0845     Allergies   Allergen Reactions    Morphine And Related Nausea And Vomiting and Dizziness    Percocet [Oxycodone-Acetaminophen] Nausea And Vomiting and Dizziness     Social History     Socioeconomic History    Marital status:    Tobacco Use    Smoking status: Never     Passive exposure: Never    Smokeless tobacco: Never   Vaping Use    Vaping Use: Never used   Substance and Sexual Activity    Alcohol use:  No    Drug use: No    Sexual activity: Defer     Family History   Problem Relation Age of Onset    Hypertension Mother     No Known Problems Father     Colon cancer Neg Hx     Colon polyps Neg Hx      Health Maintenance   Topic Date Due    TDAP/TD VACCINES (1 - Tdap) Never done    ZOSTER VACCINE (1 of 2) Never done    RSV Vaccine - Adults (1 - 1-dose 60+ series) Never done    PAP SMEAR  Never done    INFLUENZA VACCINE  03/31/2024 (Originally 8/1/2023)    Pneumococcal Vaccine 65+ (1 of 2 - PCV) 04/17/2024 (Originally 11/12/1961)    ANNUAL WELLNESS VISIT  04/17/2024    URINE MICROALBUMIN  04/17/2024    BMI FOLLOWUP  08/03/2024    MAMMOGRAM  10/17/2024    LIPID PANEL  11/21/2024    DXA SCAN  10/10/2025    COLORECTAL CANCER SCREENING  12/13/2026    HEPATITIS C SCREENING  Completed    COVID-19 Vaccine  Discontinued    DIABETIC FOOT EXAM  Discontinued    HEMOGLOBIN A1C  Discontinued    DIABETIC EYE EXAM  Discontinued     Review of Systems   Constitutional:  Negative for activity change, appetite change, chills, diaphoresis, fatigue, fever and unexpected weight change.   HENT:  Negative for ear pain, hearing loss, mouth sores, sore throat, trouble swallowing and voice change.    Eyes: Negative.    Respiratory:  Negative for cough, choking, shortness of breath and wheezing.    Cardiovascular:  Negative for chest pain and palpitations.   Gastrointestinal:  Negative for abdominal pain, blood in stool, constipation, diarrhea, nausea and vomiting.   Endocrine: Negative for cold intolerance and heat intolerance.   Genitourinary:  Negative for decreased urine volume, dysuria, frequency, hematuria and urgency.   Musculoskeletal:  Negative for back pain, gait problem and myalgias.   Skin:  Negative for color change, pallor and rash.   Allergic/Immunologic: Negative for food allergies and immunocompromised state.   Neurological:  Negative for dizziness, tremors, seizures, syncope, weakness, light-headedness, numbness and headaches.  "  Hematological:  Negative for adenopathy. Does not bruise/bleed easily.   Psychiatric/Behavioral:  Negative for agitation and confusion. The patient is not nervous/anxious.    All other systems reviewed and are negative.    Objective   Vitals:    02/20/24 1027   BP: 130/78   BP Location: Left arm   Pulse: 91   Temp: 97.5 °F (36.4 °C)   TempSrc: Temporal   SpO2: 98%   Weight: 72.3 kg (159 lb 6.4 oz)   Height: 160 cm (62.99\")     Body mass index is 28.24 kg/m².  Physical Exam  Constitutional:       Appearance: She is well-developed.   HENT:      Head: Normocephalic and atraumatic.   Eyes:      Pupils: Pupils are equal, round, and reactive to light.   Neck:      Trachea: No tracheal deviation.   Cardiovascular:      Rate and Rhythm: Normal rate and regular rhythm.      Heart sounds: Normal heart sounds. No murmur heard.     No friction rub. No gallop.   Pulmonary:      Effort: Pulmonary effort is normal. No respiratory distress.      Breath sounds: Normal breath sounds. No wheezing or rales.   Chest:      Chest wall: No tenderness.   Abdominal:      General: Bowel sounds are normal. There is no distension.      Palpations: Abdomen is soft. Abdomen is not rigid.      Tenderness: There is no abdominal tenderness. There is no guarding or rebound.   Musculoskeletal:         General: No tenderness or deformity. Normal range of motion.      Cervical back: Normal range of motion and neck supple.   Skin:     General: Skin is warm and dry.      Coloration: Skin is not pale.      Findings: No rash.   Neurological:      Mental Status: She is alert and oriented to person, place, and time.      Deep Tendon Reflexes: Reflexes are normal and symmetric.   Psychiatric:         Behavior: Behavior normal.         Thought Content: Thought content normal.         Judgment: Judgment normal.       Assessment & Plan   Diagnoses and all orders for this visit:    1. Diarrhea, unspecified type (Primary)  Comments:  suspect IBS diarrhea bentyl " is helping her to continue    2. Nonsmoker    3. Esophageal dysphagia  Comments:  suggested a speech therapy evaluation or swallow study given more oropharyngeal she wants to wait.    To call me if she desires further workup for her dysphagia I feel more oropharyngeal and swallow study suggested    Endo/colon reviewed  Continue Bentyl.       Part of this note may be an electronic transcription/translation of spoken language to printed text using the Dragon Dictation System.  Body mass index is 28.24 kg/m².  No follow-ups on file.           All risks, benefits, alternatives, and indications of colonoscopy and/or Endoscopy procedure have been discussed with the patient. Risks to include perforation of the colon requiring possible surgery or colostomy, risk of bleeding from biopsies or removal of colon tissue, possibility of missing a colon polyp or cancer, or adverse drug reaction.  Benefits to include the diagnosis and management of disease of the colon and rectum. Alternatives to include barium enema, radiographic evaluation, lab testing or no intervention. Pt verbalizes understanding and agrees.     Teodora Macario, APRN  2/20/2024  10:53 CST          If you smoke or use tobacco, 4 minutes reading provided  Steps to Quit Smoking  Smoking tobacco can be harmful to your health and can affect almost every organ in your body. Smoking puts you, and those around you, at risk for developing many serious chronic diseases. Quitting smoking is difficult, but it is one of the best things that you can do for your health. It is never too late to quit.  What are the benefits of quitting smoking?  When you quit smoking, you lower your risk of developing serious diseases and conditions, such as:  Lung cancer or lung disease, such as COPD.  Heart disease.  Stroke.  Heart attack.  Infertility.  Osteoporosis and bone fractures.  Additionally, symptoms such as coughing, wheezing, and shortness of breath may get better when you  quit. You may also find that you get sick less often because your body is stronger at fighting off colds and infections. If you are pregnant, quitting smoking can help to reduce your chances of having a baby of low birth weight.  How do I get ready to quit?  When you decide to quit smoking, create a plan to make sure that you are successful. Before you quit:  Pick a date to quit. Set a date within the next two weeks to give you time to prepare.  Write down the reasons why you are quitting. Keep this list in places where you will see it often, such as on your bathroom mirror or in your car or wallet.  Identify the people, places, things, and activities that make you want to smoke (triggers) and avoid them. Make sure to take these actions:  Throw away all cigarettes at home, at work, and in your car.  Throw away smoking accessories, such as ashtrays and lighters.  Clean your car and make sure to empty the ashtray.  Clean your home, including curtains and carpets.  Tell your family, friends, and coworkers that you are quitting. Support from your loved ones can make quitting easier.  Talk with your health care provider about your options for quitting smoking.  Find out what treatment options are covered by your health insurance.  What strategies can I use to quit smoking?  Talk with your healthcare provider about different strategies to quit smoking. Some strategies include:  Quitting smoking altogether instead of gradually lessening how much you smoke over a period of time. Research shows that quitting “cold turkey” is more successful than gradually quitting.  Attending in-person counseling to help you build problem-solving skills. You are more likely to have success in quitting if you attend several counseling sessions. Even short sessions of 10 minutes can be effective.  Finding resources and support systems that can help you to quit smoking and remain smoke-free after you quit. These resources are most helpful when  you use them often. They can include:  Online chats with a counselor.  Telephone quitlines.  Printed self-help materials.  Support groups or group counseling.  Text messaging programs.  Mobile phone applications.  Taking medicines to help you quit smoking. (If you are pregnant or breastfeeding, talk with your health care provider first.) Some medicines contain nicotine and some do not. Both types of medicines help with cravings, but the medicines that include nicotine help to relieve withdrawal symptoms. Your health care provider may recommend:  Nicotine patches, gum, or lozenges.  Nicotine inhalers or sprays.  Non-nicotine medicine that is taken by mouth.  Talk with your health care provider about combining strategies, such as taking medicines while you are also receiving in-person counseling. Using these two strategies together makes you more likely to succeed in quitting than if you used either strategy on its own.  If you are pregnant or breastfeeding, talk with your health care provider about finding counseling or other support strategies to quit smoking. Do not take medicine to help you quit smoking unless told to do so by your health care provider.  What things can I do to make it easier to quit?  Quitting smoking might feel overwhelming at first, but there is a lot that you can do to make it easier. Take these important actions:  Reach out to your family and friends and ask that they support and encourage you during this time. Call telephone quitlines, reach out to support groups, or work with a counselor for support.  Ask people who smoke to avoid smoking around you.  Avoid places that trigger you to smoke, such as bars, parties, or smoke-break areas at work.  Spend time around people who do not smoke.  Lessen stress in your life, because stress can be a smoking trigger for some people. To lessen stress, try:  Exercising regularly.  Deep-breathing exercises.  Yoga.  Meditating.  Performing a body scan. This  involves closing your eyes, scanning your body from head to toe, and noticing which parts of your body are particularly tense. Purposefully relax the muscles in those areas.  Download or purchase mobile phone or tablet apps (applications) that can help you stick to your quit plan by providing reminders, tips, and encouragement. There are many free apps, such as QuitGuide from the CDC (Centers for Disease Control and Prevention). You can find other support for quitting smoking (smoking cessation) through smokefree.gov and other websites.  How will I feel when I quit smoking?  Within the first 24 hours of quitting smoking, you may start to feel some withdrawal symptoms. These symptoms are usually most noticeable 2-3 days after quitting, but they usually do not last beyond 2-3 weeks. Changes or symptoms that you might experience include:  Mood swings.  Restlessness, anxiety, or irritation.  Difficulty concentrating.  Dizziness.  Strong cravings for sugary foods in addition to nicotine.  Mild weight gain.  Constipation.  Nausea.  Coughing or a sore throat.  Changes in how your medicines work in your body.  A depressed mood.  Difficulty sleeping (insomnia).  After the first 2-3 weeks of quitting, you may start to notice more positive results, such as:  Improved sense of smell and taste.  Decreased coughing and sore throat.  Slower heart rate.  Lower blood pressure.  Clearer skin.  The ability to breathe more easily.  Fewer sick days.  Quitting smoking is very challenging for most people. Do not get discouraged if you are not successful the first time. Some people need to make many attempts to quit before they achieve long-term success. Do your best to stick to your quit plan, and talk with your health care provider if you have any questions or concerns.  This information is not intended to replace advice given to you by your health care provider. Make sure you discuss any questions you have with your health care  provider.  Document Released: 12/12/2002 Document Revised: 08/15/2017 Document Reviewed: 05/03/2016  [employer] Interactive Patient Education © 2017 [employer]

## 2024-02-21 ENCOUNTER — TELEPHONE (OUTPATIENT)
Dept: NEUROLOGY | Age: 69
End: 2024-02-21

## 2024-02-21 NOTE — TELEPHONE ENCOUNTER
Called the patient to reschedule their appointment for 03/22/24 due to the provider no longer being in the office on Fridays. The patient appointment was rescheduled to 03/27/24 at 12:30 with Adelaida Haas. The patient didn't answer the call and left a voicemail explaining to the patient to give us a call back if the appointment doesn't work with their schedule.

## 2024-02-29 NOTE — TELEPHONE ENCOUNTER
PATIENT CALLED WANTING TO KNOW ABOUT B12 SHOT HE WANT TO START TAKING THAT AGAIN AND WANT TO KNOW IF  WOULD RECOMMEND THAT FOR HER.   PLEASE CALL BACK.    CALL BACK: [phone]   None

## 2024-03-18 ENCOUNTER — TELEPHONE (OUTPATIENT)
Dept: NEUROLOGY | Age: 69
End: 2024-03-18

## 2024-03-18 NOTE — TELEPHONE ENCOUNTER
Returned pt phone call and left a VM twice. She left a message on my cell phone that she is having difficulty obtain supplies for her PAP. Left VM that I will try to call her tomorrow afternoon.

## 2024-03-19 NOTE — TELEPHONE ENCOUNTER
Tried calling patient to speak with her about her supplies, left a voicemail requesting a call back.

## 2024-03-20 NOTE — TELEPHONE ENCOUNTER
Patient came into the office, we spoke, she stated [facility] a [facility]    I called [facility] and spoke with Hamilton, he stated the supply shipping order was created 03/12/24 so she should be receiving the supplies this week. He stated if patient has any other issues to come into their office and ask for him.    I relayed information above to patient and advised if she does not receive the supplies by the beginning of next week to go to [facility] and speak with Hamilton. Patient understood and she is aware of her appointment on 3/27.

## 2024-03-27 ENCOUNTER — OFFICE VISIT (OUTPATIENT)
Dept: NEUROLOGY | Age: 69
End: 2024-03-27
Payer: MEDICARE

## 2024-03-27 VITALS
BODY MASS INDEX: 28.17 KG/M2 | HEIGHT: 63 IN | HEART RATE: 84 BPM | SYSTOLIC BLOOD PRESSURE: 129 MMHG | WEIGHT: 159 LBS | DIASTOLIC BLOOD PRESSURE: 82 MMHG

## 2024-03-27 DIAGNOSIS — Z99.89 CPAP (CONTINUOUS POSITIVE AIRWAY PRESSURE) DEPENDENCE: ICD-10-CM

## 2024-03-27 DIAGNOSIS — G47.33 OBSTRUCTIVE SLEEP APNEA: Primary | ICD-10-CM

## 2024-03-27 PROCEDURE — 1036F TOBACCO NON-USER: CPT | Performed by: PHYSICIAN ASSISTANT

## 2024-03-27 PROCEDURE — G8427 DOCREV CUR MEDS BY ELIG CLIN: HCPCS | Performed by: PHYSICIAN ASSISTANT

## 2024-03-27 PROCEDURE — 3017F COLORECTAL CA SCREEN DOC REV: CPT | Performed by: PHYSICIAN ASSISTANT

## 2024-03-27 PROCEDURE — 99213 OFFICE O/P EST LOW 20 MIN: CPT | Performed by: PHYSICIAN ASSISTANT

## 2024-03-27 PROCEDURE — G8484 FLU IMMUNIZE NO ADMIN: HCPCS | Performed by: PHYSICIAN ASSISTANT

## 2024-03-27 PROCEDURE — 1090F PRES/ABSN URINE INCON ASSESS: CPT | Performed by: PHYSICIAN ASSISTANT

## 2024-03-27 PROCEDURE — G8417 CALC BMI ABV UP PARAM F/U: HCPCS | Performed by: PHYSICIAN ASSISTANT

## 2024-03-27 PROCEDURE — G8399 PT W/DXA RESULTS DOCUMENT: HCPCS | Performed by: PHYSICIAN ASSISTANT

## 2024-03-27 PROCEDURE — 1123F ACP DISCUSS/DSCN MKR DOCD: CPT | Performed by: PHYSICIAN ASSISTANT

## 2024-03-27 NOTE — PATIENT INSTRUCTIONS
reports, additional testing, and face-to-face  clinical evaluation subsequent to any treatment, changes in treatment, and continued treatment.     Caution:  Please abstain from driving or engaging in other activities which may be hazardous in the presence of diminished alertness or daytime drowsiness. And avoid the use of sedatives or alcohol, which can worsen sleep apnea and daytime drowsiness.       Mask suggestions:  -     Resmed Airfit N20 (Nasal) or F20 (Full face mask).  They conform to your face, thus decreasing the potential for mask leakage. You might like the AirTouch F20(full face mask).  It has a \"memory foam\" like cushion. The AirFit F30 is a smaller style full face mask designed to sit low on and cover less of your face for fewer facial marks. AirFit N30i has a top of the head tube with a nasal mask. AirFit P10 reported to be the most comfortable nasal pillow mask. Resmed Mirage FX reported to be the most comfortable nasal mask. Resmed Mirage Henderson reported to be the most comfortable hybrid mask. AirTouch N20-memory foam nasal mask.    Respironics: You might also like to try a nasal mask called a Dreamwear nasal mask or the Dreamwear nasal pillow. Another suggestion is the Kamala View, it is a minimal contact full face mask.  The Kamala View's incredible under the nose design makes it the only full face mask that won't cause red marks on the bridge of your nose when compared to other full face masks. The Dreamwear full face mask has a  soft feel, unique in-frame air-flow, and innovative air tube connection at the top of the head for the ultimate in sleep comfort. Comfort Gel Blue. Dreamwear gel pillows.    Yusuf & Chris: Brevida nasal pillow mask and Simplus FFM    The use of a memory foam CPAP pillow supports the head and neck throughout the night.      CLEANING INSTRUCTIONS     Keeping your equipment and supplies clean is very important.     REMINDER: Only use DISTILLED WATER in your humidifier,

## 2024-03-27 NOTE — PROGRESS NOTES
REVIEW OF SYSTEMS    Constitutional: []Fever []Sweats []Chills [] Recent Injury   [x] Denies all unless marked  HENT:[]Headache  [] Head Injury  [] Sore Throat  [] Ear Pain  [] Dizziness [] Hearing Loss   [x] Denies all unless marked  Spine:  [] Neck pain  [] Back pain  [] Sciatica  [x] Denies all unless marked  Cardiovascular:[]Chest Pain []Palpitations [] Heart Disease  [x] Denies all unless marked  Pulmonary: []Shortness of Breath []Cough   [x] Denies all unless marked  Gastrointestinal:  []Abdominal Pain  []Blood in Stool  []Diarrhea []Constipation []Nausea  []Vomiting  [x] Denies all unless marked  Genitourinary:  [] Dysuria [] Frequency  [] Incontinence [] Urgency   [x] Denies all unless marked  Musculoskeletal: [] Arthralgia  [] Myalgias [] Muscle cramps  [] Muscle twitches   [x] Denies all unless marked   Extremities:   [] Pain   [] Swelling   [x] Denies all unless marked  Skin:[] Rash  [] Color Change  [x] Denies all unless marked  Neurological:[] Visual Disturbance [] Double Vision [] Slurred Speech [] Trouble swallowing  [] Vertigo [] Tingling [] Numbness [] Weakness [] Loss of Balance   [] Loss of Consciousness [] Memory Loss [] Seizures  [x] Denies all unless marked  Psychiatric/Behavioral:[] Depression [] Anxiety  [x] Denies all unless marked  Sleep: []  Insomnia [] Sleep Disturbance [] Snoring [] Restless Legs [] Daytime Sleepiness [x] Sleep Apnea  [x] Denies all unless marked    
         []  :  Stable     []  :  Improved                       [x]  :  Well controlled              []  :  Resolving     []  :  Resolved     []  :  Inadequately controlled     []  :  Worsening     []  :  Additional workup planned    Dilma Wright is a 68 y.o. year old female pt who presents with obstructive sleep apnea treated with PAP. Dilma Wright reports that she is doing well with no new signs or symptoms of obstructive sleep apnea. The ESS score is 6. She is able to sleep well with PAP. She notes compliance with therapy and the compliance report indicates compliance. She averages 8 hours of sleep and  PAP usage per night. She should continue PAP use q hs.       Plan:     No orders of the defined types were placed in this encounter.      1.   Previously advised of the etiology,  pathophysiology, signs, symptoms, diagnosis, treatment options, and risks of untreated GILBERTO. Risks may include, but are not limited to  hypertension, coronary artery disease, atrial fibrillation, CHF, diabetes, stroke, weight gain, impaired cognition, daytime somnolence, and motor vehicle accidents. Advised to abstain from driving or operating heavy machinery when drowsy and the use of respiratory suppressants. Reviewed treatment plan. Counseled on multimodal approach to treatment of sleep apnea to include but not limited to diet, exercise, sleep hygiene, and compliance with pap therapy, if indicated.  2.  Will continue to evaluate for PAP clinical benefit and compliance during a 30 day period within the preceding 90 days PRN.  3.  The following educational material has been included in this visit after visit summary for your review: GILBERTO/PAP guidelines-Discussed with the patient and all questions fully answered.  4.  Continue PAP therapy. The patient voices understanding and recognizes the need for adherence to the prescribed therapy  5.  Order-supplies-[facility]  6.  Follow up in 1 year to reassess symptomatology, PAP tolerance,

## 2024-03-28 DIAGNOSIS — D05.12 DUCTAL CARCINOMA IN SITU (DCIS) OF LEFT BREAST: ICD-10-CM

## 2024-03-28 RX ORDER — TAMOXIFEN CITRATE 20 MG/1
20 TABLET ORAL DAILY
Qty: 90 TABLET | Refills: 1 | Status: SHIPPED | OUTPATIENT
Start: 2024-03-28

## 2024-04-15 ENCOUNTER — LAB (OUTPATIENT)
Dept: LAB | Facility: HOSPITAL | Age: 69
End: 2024-04-15
Payer: MEDICARE

## 2024-04-15 ENCOUNTER — TELEPHONE (OUTPATIENT)
Dept: INTERNAL MEDICINE | Facility: CLINIC | Age: 69
End: 2024-04-15
Payer: MEDICARE

## 2024-04-15 DIAGNOSIS — E53.8 B12 DEFICIENCY: ICD-10-CM

## 2024-04-15 DIAGNOSIS — R73.03 PREDIABETES: ICD-10-CM

## 2024-04-15 DIAGNOSIS — Z00.00 HEALTHCARE MAINTENANCE: Primary | ICD-10-CM

## 2024-04-15 DIAGNOSIS — Z00.00 HEALTHCARE MAINTENANCE: ICD-10-CM

## 2024-04-15 LAB
ALBUMIN SERPL-MCNC: 4 G/DL (ref 3.5–5.2)
ALBUMIN/GLOB SERPL: 1.3 G/DL
ALP SERPL-CCNC: 92 U/L (ref 39–117)
ALT SERPL W P-5'-P-CCNC: 14 U/L (ref 1–33)
ANION GAP SERPL CALCULATED.3IONS-SCNC: 9 MMOL/L (ref 5–15)
AST SERPL-CCNC: 23 U/L (ref 1–32)
BILIRUB SERPL-MCNC: 1.1 MG/DL (ref 0–1.2)
BUN SERPL-MCNC: 14 MG/DL (ref 8–23)
BUN/CREAT SERPL: 32.6 (ref 7–25)
CALCIUM SPEC-SCNC: 9.5 MG/DL (ref 8.6–10.5)
CHLORIDE SERPL-SCNC: 106 MMOL/L (ref 98–107)
CHOLEST SERPL-MCNC: 118 MG/DL (ref 0–200)
CO2 SERPL-SCNC: 28 MMOL/L (ref 22–29)
CREAT SERPL-MCNC: 0.43 MG/DL (ref 0.57–1)
DEPRECATED RDW RBC AUTO: 46.7 FL (ref 37–54)
EGFRCR SERPLBLD CKD-EPI 2021: 104.8 ML/MIN/1.73
ERYTHROCYTE [DISTWIDTH] IN BLOOD BY AUTOMATED COUNT: 13.4 % (ref 12.3–15.4)
GLOBULIN UR ELPH-MCNC: 3.1 GM/DL
GLUCOSE SERPL-MCNC: 95 MG/DL (ref 65–99)
HBA1C MFR BLD: 5.7 % (ref 4.8–5.6)
HCT VFR BLD AUTO: 41 % (ref 34–46.6)
HDLC SERPL-MCNC: 64 MG/DL (ref 40–60)
HGB BLD-MCNC: 12.6 G/DL (ref 12–15.9)
LDLC SERPL CALC-MCNC: 36 MG/DL (ref 0–100)
LDLC/HDLC SERPL: 0.55 {RATIO}
MCH RBC QN AUTO: 29 PG (ref 26.6–33)
MCHC RBC AUTO-ENTMCNC: 30.7 G/DL (ref 31.5–35.7)
MCV RBC AUTO: 94.5 FL (ref 79–97)
PLATELET # BLD AUTO: 163 10*3/MM3 (ref 140–450)
PMV BLD AUTO: 11.4 FL (ref 6–12)
POTASSIUM SERPL-SCNC: 3.8 MMOL/L (ref 3.5–5.2)
PROT SERPL-MCNC: 7.1 G/DL (ref 6–8.5)
RBC # BLD AUTO: 4.34 10*6/MM3 (ref 3.77–5.28)
SODIUM SERPL-SCNC: 143 MMOL/L (ref 136–145)
TRIGL SERPL-MCNC: 93 MG/DL (ref 0–150)
VIT B12 BLD-MCNC: 247 PG/ML (ref 211–946)
VLDLC SERPL-MCNC: 18 MG/DL (ref 5–40)
WBC NRBC COR # BLD AUTO: 6.55 10*3/MM3 (ref 3.4–10.8)

## 2024-04-15 PROCEDURE — 36415 COLL VENOUS BLD VENIPUNCTURE: CPT

## 2024-04-15 PROCEDURE — 80053 COMPREHEN METABOLIC PANEL: CPT

## 2024-04-15 PROCEDURE — 85027 COMPLETE CBC AUTOMATED: CPT

## 2024-04-15 PROCEDURE — 80061 LIPID PANEL: CPT

## 2024-04-15 PROCEDURE — 82607 VITAMIN B-12: CPT

## 2024-04-15 PROCEDURE — 83036 HEMOGLOBIN GLYCOSYLATED A1C: CPT

## 2024-04-15 NOTE — TELEPHONE ENCOUNTER
Pt states she is to have lab orders done prior to her appt on the 19th but I dont see any orders in there. Can we send lab orders over and let her know that they are there so she can get them done

## 2024-04-19 ENCOUNTER — OFFICE VISIT (OUTPATIENT)
Dept: INTERNAL MEDICINE | Facility: CLINIC | Age: 69
End: 2024-04-19
Payer: MEDICARE

## 2024-04-19 VITALS
TEMPERATURE: 97.4 F | HEIGHT: 63 IN | OXYGEN SATURATION: 97 % | HEART RATE: 92 BPM | SYSTOLIC BLOOD PRESSURE: 140 MMHG | BODY MASS INDEX: 28.65 KG/M2 | WEIGHT: 161.7 LBS | DIASTOLIC BLOOD PRESSURE: 84 MMHG

## 2024-04-19 DIAGNOSIS — E53.8 B12 DEFICIENCY: ICD-10-CM

## 2024-04-19 DIAGNOSIS — I48.21 PERMANENT ATRIAL FIBRILLATION: ICD-10-CM

## 2024-04-19 DIAGNOSIS — Z00.00 MEDICARE ANNUAL WELLNESS VISIT, SUBSEQUENT: Primary | ICD-10-CM

## 2024-04-19 DIAGNOSIS — Z91.81 AT LOW RISK FOR FALL: ICD-10-CM

## 2024-04-19 DIAGNOSIS — Z13.31 DEPRESSION SCREEN: ICD-10-CM

## 2024-04-19 PROCEDURE — 1170F FXNL STATUS ASSESSED: CPT | Performed by: INTERNAL MEDICINE

## 2024-04-19 PROCEDURE — 1159F MED LIST DOCD IN RCRD: CPT | Performed by: INTERNAL MEDICINE

## 2024-04-19 PROCEDURE — 1160F RVW MEDS BY RX/DR IN RCRD: CPT | Performed by: INTERNAL MEDICINE

## 2024-04-19 PROCEDURE — G0439 PPPS, SUBSEQ VISIT: HCPCS | Performed by: INTERNAL MEDICINE

## 2024-04-19 PROCEDURE — 3044F HG A1C LEVEL LT 7.0%: CPT | Performed by: INTERNAL MEDICINE

## 2024-04-19 RX ORDER — CYANOCOBALAMIN 1000 UG/ML
1000 INJECTION, SOLUTION INTRAMUSCULAR; SUBCUTANEOUS
Qty: 1 ML | Refills: 11 | Status: SHIPPED | OUTPATIENT
Start: 2024-04-19

## 2024-04-19 NOTE — PROGRESS NOTES
The ABCs of the Annual Wellness Visit  Subsequent Medicare Wellness Visit    Subjective      Yun Blackwell is a 68 y.o. female who presents for a Subsequent Medicare Wellness Visit.    Feels good overall.  She has been having issues with losing hair.  She is taking biotin.  She has not tried any over-the-counter remedies.    Has had issues with insurance covering her B12.  Her B12 level was low on April 15, 2024.    Reviewed her other labs from April 15, 2024    The following portions of the patient's history were reviewed and   updated as appropriate: allergies, current medications, past family history, past medical history, past social history, past surgical history, and problem list.    Compared to one year ago, the patient feels her physical   health is the same.    Compared to one year ago, the patient feels her mental   health is the same.    Recent Hospitalizations:  She was not admitted to the hospital during the last year.       Current Medical Providers:  Patient Care Team:  ANGEL Healy MD as PCP - General (Internal Medicine)  Van Marcelino MD as Cardiologist (Cardiology)  ANGEL Healy MD as Referring Physician (Internal Medicine)  Abbey Serna APRN as Nurse Practitioner (Family Medicine)    Outpatient Medications Prior to Visit   Medication Sig Dispense Refill    dabigatran etexilate (PRADAXA) 150 MG capsu Take 1 capsule by mouth 2 (Two) Times a Day. 60 capsule 11    dicyclomine (BENTYL) 10 MG capsule Take 1 capsule by mouth 4 (Four) Times a Day Before Meals & at Bedtime. 120 capsule 10    dilTIAZem CD (Cardizem CD) 120 MG 24 hr capsule Take 1 capsule by mouth Daily. 30 capsule 11    hydrOXYzine (ATARAX) 10 MG tablet Take 1-2 tablets by mouth Every 4 to 6 Hours As Needed. 60 tablet 1    hydrOXYzine (ATARAX) 10 MG tablet Take 1-2 tablets by mouth Every 4 (Four) to 6 (Six) Hours As Needed. 60 tablet 0    montelukast (SINGULAIR) 10 MG tablet Take 1 tablet by mouth Every Night. 30  tablet 11    omeprazole (priLOSEC) 20 MG capsule Take 1 capsule by mouth Daily. 30 capsule 11    tamoxifen (NOLVADEX) 20 MG chemo tablet Take 1 tablet by mouth daily 90 tablet 1    tiZANidine (ZANAFLEX) 4 MG tablet Take 1 tablet by mouth Daily As Needed for Muscle Spasms. 30 tablet 2    metroNIDAZOLE (FLAGYL) 250 MG tablet Take 1 tablet by mouth 4 (Four) Times a Day. 40 tablet 0     Facility-Administered Medications Prior to Visit   Medication Dose Route Frequency Provider Last Rate Last Admin    cyanocobalamin injection 1,000 mcg  1,000 mcg Intramuscular Q28 Days ANGEL Healy MD   1,000 mcg at 12/22/22 0845       No opioid medication identified on active medication list. I have reviewed chart for other potential  high risk medication/s and harmful drug interactions in the elderly.        Aspirin is not on active medication list.  Aspirin use is contraindicated for this patient due to: Pradaxa.  .    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  ACP discussion was held with the patient during this visit. Patient has an advance directive (not in EMR), copy requested.     Objective    Vitals:    04/19/24 0854   BP: 140/84   BP Location: Left arm   Patient  "Position: Sitting   Cuff Size: Adult   Pulse: 92   Temp: 97.4 °F (36.3 °C)   TempSrc: Temporal   SpO2: 97%   Weight: 73.3 kg (161 lb 11.2 oz)   Height: 160 cm (62.99\")   Physical exam is unchanged.    Estimated body mass index is 28.65 kg/m² as calculated from the following:    Height as of this encounter: 160 cm (62.99\").    Weight as of this encounter: 73.3 kg (161 lb 11.2 oz).      Does the patient have evidence of cognitive impairment?   No    Lab Results   Component Value Date    TRIG 93 04/15/2024    HDL 64 (H) 04/15/2024    LDL 36 04/15/2024    VLDL 18 04/15/2024    HGBA1C 5.70 (H) 04/15/2024          HEALTH RISK ASSESSMENT    Smoking Status:  Social History     Tobacco Use   Smoking Status Never    Passive exposure: Never   Smokeless Tobacco Never     Alcohol Consumption:  Social History     Substance and Sexual Activity   Alcohol Use No     Fall Risk Screen:    DERECKADI Fall Risk Assessment was completed, and patient is at LOW risk for falls.Assessment completed on:2024    Depression Screenin/19/2024     8:55 AM   PHQ-2/PHQ-9 Depression Screening   Little Interest or Pleasure in Doing Things 0-->not at all   Feeling Down, Depressed or Hopeless 0-->not at all   PHQ-9: Brief Depression Severity Measure Score 0       Health Habits and Functional and Cognitive Screenin/19/2024     8:55 AM   Functional & Cognitive Status   Do you have difficulty preparing food and eating? No   Do you have difficulty bathing yourself, getting dressed or grooming yourself? No   Do you have difficulty using the toilet? No   Do you have difficulty moving around from place to place? No   Do you have trouble with steps or getting out of a bed or a chair? No   Current Diet Well Balanced Diet   Dental Exam Not up to date   Eye Exam Up to date   Exercise (times per week) 5 times per week   Current Exercises Include Walking   Do you need help using the phone?  No   Are you deaf or do you have serious difficulty " hearing?  No   Do you need help to go to places out of walking distance? No   Do you need help shopping? No   Do you need help preparing meals?  No   Do you need help with housework?  No   Do you need help with laundry? No   Do you need help taking your medications? No   Do you need help managing money? No   Do you ever drive or ride in a car without wearing a seat belt? No   Have you felt unusual stress, anger or loneliness in the last month? No   Who do you live with? Alone   If you need help, do you have trouble finding someone available to you? No   Have you been bothered in the last four weeks by sexual problems? No   Do you have difficulty concentrating, remembering or making decisions? No       Age-appropriate Screening Schedule:  Refer to the list below for future screening recommendations based on patient's age, sex and/or medical conditions. Orders for these recommended tests are listed in the plan section. The patient has been provided with a written plan.    Health Maintenance   Topic Date Due    Pneumococcal Vaccine 65+ (1 of 2 - PCV) Never done    TDAP/TD VACCINES (1 - Tdap) Never done    ZOSTER VACCINE (1 of 2) Never done    RSV Vaccine - Adults (1 - 1-dose 60+ series) Never done    PAP SMEAR  Never done    ANNUAL WELLNESS VISIT  04/17/2024    URINE MICROALBUMIN  04/17/2024    INFLUENZA VACCINE  08/01/2024    BMI FOLLOWUP  08/03/2024    MAMMOGRAM  10/17/2024    LIPID PANEL  04/15/2025    DXA SCAN  10/10/2025    COLORECTAL CANCER SCREENING  12/13/2026    HEPATITIS C SCREENING  Completed    COVID-19 Vaccine  Discontinued    DIABETIC FOOT EXAM  Discontinued    HEMOGLOBIN A1C  Discontinued    DIABETIC EYE EXAM  Discontinued          CMS Preventative Services Quick Reference  Risk Factors Identified During Encounter:    Immunizations Discussed/Encouraged: Tdap, Prevnar 20 (Pneumococcal 20-valent conjugate), Shingrix, and RSV (Respiratory Syncytial Virus)  Polypharmacy: Medication List reviewed    The above  risks/problems have been discussed with the patient.  Pertinent information has been shared with the patient in the After Visit Summary.    Diagnoses and all orders for this visit:    1. Medicare annual wellness visit, subsequent (Primary)    2. Depression screen    3. At low risk for fall    4. Permanent atrial fibrillation    5. B12 deficiency      History of DCIS and is on tamoxifen.  She will be seeing Abbey Rock in May, and her mammogram will likely be ordered at that time as well.    She declines vaccines.    Follow Up:   Next Medicare Wellness visit to be scheduled in 1 year.      An After Visit Summary and PPPS were made available to the patient.    ANGEL Healy MD  04/19/2024  Electronically signed by ANGEL Healy MD  04/19/24, 9:33 AM CDT

## 2024-05-07 ENCOUNTER — TELEPHONE (OUTPATIENT)
Dept: HEMATOLOGY | Age: 69
End: 2024-05-07

## 2024-05-07 NOTE — TELEPHONE ENCOUNTER
Called Patient and reminded patient of their appointment on 05/09/2024 and patient confirmed they would be here.

## 2024-05-08 DIAGNOSIS — D05.12 DUCTAL CARCINOMA IN SITU (DCIS) OF LEFT BREAST: Primary | ICD-10-CM

## 2024-05-08 DIAGNOSIS — D50.9 IRON DEFICIENCY ANEMIA, UNSPECIFIED IRON DEFICIENCY ANEMIA TYPE: ICD-10-CM

## 2024-05-08 NOTE — PROGRESS NOTES
Progress Note      Pt Name: Dilma Wright  YOB: 1955  MRN: [id number]    Date of evaluation: 05/09/2024  History Obtained From:  patient, electronic medical record    CHIEF COMPLAINT:    Chief Complaint   Patient presents with    Follow-up     Ductal carcinoma in situ (DCIS) of left breast     HISTORY OF PRESENT ILLNESS:    Dilma Wright is a 68 y.o.  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  Current recommendation is for preventive endocrine therapy with tamoxifen 20 mg p.o. daily for anticipated 5 years, anticipate completing dosing in January 2025.  In relation to her iron and B12 deficiency, she experiences severe constipation with oral iron replacement and currently not taking supplement.  Currently receiving parenteral B12 replacement with 1000 mcg every 4 weeks under the direction of her PCP.  Dilma returns today in scheduled follow-up for evaluation, side effect monitoring, lab monitoring and further treatment recommendations.      Today's clinic visit to include physical assessment, review of systems, any radiograph or lab findings that were available and plan of care are documented below.       ONCOLOGIC HISTORY:     Diagnosis  DCIS left breast, December 2020  Stage 0  Grade 2  %, RI 98%  [employer] 84 genes-  CDKN2A ([id number]) mutation (mutation associated with autosomal dominant melanoma-pancreatic cancer and melanoma neural system tumor  Iron deficiency anemia    Treatment summary  1/31/2020- left lumpectomy  January 2020-tamoxifen x5 years  Difficulty with oral iron supplement due to severe constipation  Parenteral B12 replacement in the past.     Dilma was referred by Dr. Tab Mendes for a diagnosis of DCIS of left breast.  This was a screening detected lesion.  She had a sister and a niece with breast cancer.  She denied any hormone replacement therapy. She had genetic

## 2024-05-09 ENCOUNTER — HOSPITAL ENCOUNTER (OUTPATIENT)
Dept: INFUSION THERAPY | Age: 69
Discharge: HOME | End: 2024-05-09
Payer: MEDICARE

## 2024-05-09 ENCOUNTER — OFFICE VISIT (OUTPATIENT)
Dept: HEMATOLOGY | Age: 69
End: 2024-05-09
Payer: MEDICARE

## 2024-05-09 VITALS
DIASTOLIC BLOOD PRESSURE: 70 MMHG | SYSTOLIC BLOOD PRESSURE: 116 MMHG | OXYGEN SATURATION: 98 % | WEIGHT: 157 LBS | HEIGHT: 63 IN | HEART RATE: 92 BPM | BODY MASS INDEX: 27.82 KG/M2 | TEMPERATURE: 98.7 F

## 2024-05-09 DIAGNOSIS — D05.12 DUCTAL CARCINOMA IN SITU (DCIS) OF LEFT BREAST: Primary | ICD-10-CM

## 2024-05-09 DIAGNOSIS — M85.852 OSTEOPENIA OF NECK OF LEFT FEMUR: ICD-10-CM

## 2024-05-09 DIAGNOSIS — D50.8 IRON DEFICIENCY ANEMIA SECONDARY TO INADEQUATE DIETARY IRON INTAKE: ICD-10-CM

## 2024-05-09 DIAGNOSIS — E53.8 VITAMIN B 12 DEFICIENCY: ICD-10-CM

## 2024-05-09 DIAGNOSIS — Z79.810 ENCOUNTER FOR MONITORING TAMOXIFEN THERAPY: ICD-10-CM

## 2024-05-09 DIAGNOSIS — D50.9 IRON DEFICIENCY ANEMIA, UNSPECIFIED IRON DEFICIENCY ANEMIA TYPE: ICD-10-CM

## 2024-05-09 DIAGNOSIS — Z51.81 ENCOUNTER FOR MONITORING TAMOXIFEN THERAPY: ICD-10-CM

## 2024-05-09 DIAGNOSIS — D05.12 DUCTAL CARCINOMA IN SITU (DCIS) OF LEFT BREAST: ICD-10-CM

## 2024-05-09 LAB
ALBUMIN SERPL-MCNC: 3.9 G/DL (ref 3.5–5.2)
ALP SERPL-CCNC: 107 U/L (ref 35–104)
ALT SERPL-CCNC: 14 U/L (ref 9–52)
ANION GAP SERPL CALCULATED.3IONS-SCNC: 7 MMOL/L (ref 7–19)
AST SERPL-CCNC: 23 U/L (ref 14–36)
BASOPHILS # BLD: 0.02 K/UL (ref 0.01–0.08)
BASOPHILS NFR BLD: 0.3 % (ref 0.1–1.2)
BILIRUB SERPL-MCNC: 0.6 MG/DL (ref 0.2–1.3)
BUN SERPL-MCNC: 19 MG/DL (ref 7–17)
CALCIUM SERPL-MCNC: 9 MG/DL (ref 8.4–10.2)
CHLORIDE SERPL-SCNC: 111 MMOL/L (ref 98–111)
CO2 SERPL-SCNC: 25 MMOL/L (ref 22–29)
CREAT SERPL-MCNC: 0.5 MG/DL (ref 0.5–1)
EOSINOPHIL # BLD: 0.1 K/UL (ref 0.04–0.54)
EOSINOPHIL NFR BLD: 1.6 % (ref 0.7–7)
ERYTHROCYTE [DISTWIDTH] IN BLOOD BY AUTOMATED COUNT: 13.9 % (ref 11.7–14.4)
FERRITIN SERPL-MCNC: 290.8 NG/ML (ref 13–150)
GLOBULIN: 2.6 G/DL
GLUCOSE SERPL-MCNC: 121 MG/DL (ref 74–106)
HCT VFR BLD AUTO: 39.4 % (ref 34.1–44.9)
HGB BLD-MCNC: 12.6 G/DL (ref 11.2–15.7)
IRON SATN MFR SERPL: 19 % (ref 14–50)
IRON SERPL-MCNC: 46 UG/DL (ref 37–145)
LYMPHOCYTES # BLD: 1.49 K/UL (ref 1.18–3.74)
LYMPHOCYTES NFR BLD: 24.5 % (ref 19.3–53.1)
MCH RBC QN AUTO: 30.6 PG (ref 25.6–32.2)
MCHC RBC AUTO-ENTMCNC: 32 G/DL (ref 32.3–35.5)
MCV RBC AUTO: 95.6 FL (ref 79.4–94.8)
MONOCYTES # BLD: 0.65 K/UL (ref 0.24–0.82)
MONOCYTES NFR BLD: 10.7 % (ref 4.7–12.5)
NEUTROPHILS # BLD: 3.79 K/UL (ref 1.56–6.13)
NEUTS SEG NFR BLD: 62.6 % (ref 34–71.1)
PLATELET # BLD AUTO: 151 K/UL (ref 182–369)
PMV BLD AUTO: 11 FL (ref 7.4–10.4)
POTASSIUM SERPL-SCNC: 4.1 MMOL/L (ref 3.5–5.1)
PROT SERPL-MCNC: 6.5 G/DL (ref 6.3–8.2)
RBC # BLD AUTO: 4.12 M/UL (ref 3.93–5.22)
SODIUM SERPL-SCNC: 143 MMOL/L (ref 137–145)
TIBC SERPL-MCNC: 241 UG/DL (ref 250–400)
WBC # BLD AUTO: 6.07 K/UL (ref 3.98–10.04)

## 2024-05-09 PROCEDURE — G8427 DOCREV CUR MEDS BY ELIG CLIN: HCPCS | Performed by: NURSE PRACTITIONER

## 2024-05-09 PROCEDURE — 3017F COLORECTAL CA SCREEN DOC REV: CPT | Performed by: NURSE PRACTITIONER

## 2024-05-09 PROCEDURE — 99214 OFFICE O/P EST MOD 30 MIN: CPT | Performed by: NURSE PRACTITIONER

## 2024-05-09 PROCEDURE — G8417 CALC BMI ABV UP PARAM F/U: HCPCS | Performed by: NURSE PRACTITIONER

## 2024-05-09 PROCEDURE — G8399 PT W/DXA RESULTS DOCUMENT: HCPCS | Performed by: NURSE PRACTITIONER

## 2024-05-09 PROCEDURE — 36415 COLL VENOUS BLD VENIPUNCTURE: CPT

## 2024-05-09 PROCEDURE — 1090F PRES/ABSN URINE INCON ASSESS: CPT | Performed by: NURSE PRACTITIONER

## 2024-05-09 PROCEDURE — 1123F ACP DISCUSS/DSCN MKR DOCD: CPT | Performed by: NURSE PRACTITIONER

## 2024-05-09 PROCEDURE — 85025 COMPLETE CBC W/AUTO DIFF WBC: CPT

## 2024-05-09 PROCEDURE — 1036F TOBACCO NON-USER: CPT | Performed by: NURSE PRACTITIONER

## 2024-05-09 PROCEDURE — 99212 OFFICE O/P EST SF 10 MIN: CPT

## 2024-05-09 PROCEDURE — 80053 COMPREHEN METABOLIC PANEL: CPT

## 2024-05-09 RX ORDER — CYANOCOBALAMIN 1000 UG/ML
1000 INJECTION, SOLUTION INTRAMUSCULAR; SUBCUTANEOUS ONCE
COMMUNITY

## 2024-05-09 ASSESSMENT — ENCOUNTER SYMPTOMS
RESPIRATORY NEGATIVE: 1
EYE DISCHARGE: 0
BLOOD IN STOOL: 0
EYE REDNESS: 0
ABDOMINAL PAIN: 0
EYE PAIN: 0
EYES NEGATIVE: 1
VOMITING: 0
SHORTNESS OF BREATH: 0
SORE THROAT: 0
BACK PAIN: 0
DIARRHEA: 0
NAUSEA: 0
COUGH: 0
WHEEZING: 0
CONSTIPATION: 0
GASTROINTESTINAL NEGATIVE: 1

## 2024-06-28 ENCOUNTER — HOSPITAL ENCOUNTER (OUTPATIENT)
Dept: WOMENS IMAGING | Age: 69
Discharge: HOME | End: 2024-06-28
Payer: MEDICARE

## 2024-06-28 DIAGNOSIS — Z85.3 PERSONAL HISTORY OF MALIGNANT NEOPLASM OF BREAST: ICD-10-CM

## 2024-06-28 PROCEDURE — G0279 TOMOSYNTHESIS, MAMMO: HCPCS

## 2024-07-01 DIAGNOSIS — R92.8 MAMMOGRAM ABNORMAL: Primary | ICD-10-CM

## 2024-07-01 RX ORDER — DABIGATRAN ETEXILATE 150 MG/1
150 CAPSULE ORAL 2 TIMES DAILY
Qty: 60 CAPSULE | Refills: 11 | Status: SHIPPED | OUTPATIENT
Start: 2024-07-01

## 2024-07-11 ENCOUNTER — TELEPHONE (OUTPATIENT)
Dept: SURGERY | Age: 69
End: 2024-07-11

## 2024-07-19 ENCOUNTER — HOSPITAL ENCOUNTER (OUTPATIENT)
Dept: WOMENS IMAGING | Age: 69
Discharge: HOME | End: 2024-07-19
Payer: MEDICARE

## 2024-07-19 DIAGNOSIS — R92.8 ABNORMAL MAMMOGRAM OF LEFT BREAST: ICD-10-CM

## 2024-07-19 PROCEDURE — 19081 BX BREAST 1ST LESION STRTCTC: CPT

## 2024-07-19 PROCEDURE — 77065 DX MAMMO INCL CAD UNI: CPT

## 2024-07-23 ENCOUNTER — TELEPHONE (OUTPATIENT)
Dept: SURGERY | Age: 69
End: 2024-07-23

## 2024-07-23 NOTE — TELEPHONE ENCOUNTER
7/23/2024 Patient called and stated she had missed a call.  Please callback.    Callback# [phone]  michael

## 2024-07-24 ENCOUNTER — TELEPHONE (OUTPATIENT)
Dept: SURGERY | Age: 69
End: 2024-07-24

## 2024-07-24 DIAGNOSIS — R92.8 ABNORMAL MAMMOGRAM OF LEFT BREAST: Primary | ICD-10-CM

## 2024-07-24 NOTE — TELEPHONE ENCOUNTER
Pathology report given. Pathology report given.  We will plan follow up in six months with left breast mammogram.    This has been electronically signed by Rajiv Dela Cruz PA-C

## 2024-07-29 ENCOUNTER — OFFICE VISIT (OUTPATIENT)
Dept: CARDIOLOGY | Facility: CLINIC | Age: 69
End: 2024-07-29
Payer: MEDICARE

## 2024-07-29 VITALS
DIASTOLIC BLOOD PRESSURE: 62 MMHG | HEIGHT: 63 IN | SYSTOLIC BLOOD PRESSURE: 106 MMHG | BODY MASS INDEX: 28.14 KG/M2 | WEIGHT: 158.8 LBS | HEART RATE: 92 BPM | OXYGEN SATURATION: 98 %

## 2024-07-29 DIAGNOSIS — I48.21 PERMANENT ATRIAL FIBRILLATION: ICD-10-CM

## 2024-07-29 DIAGNOSIS — Q24.5 CONGENITAL CORONARY ARTERY FISTULA TO PULMONARY ARTERY: Primary | ICD-10-CM

## 2024-07-29 DIAGNOSIS — Q21.11 ASD SECUNDUM: ICD-10-CM

## 2024-07-29 DIAGNOSIS — I51.7 RIGHT VENTRICULAR ENLARGEMENT: ICD-10-CM

## 2024-07-29 PROCEDURE — 1160F RVW MEDS BY RX/DR IN RCRD: CPT | Performed by: NURSE PRACTITIONER

## 2024-07-29 PROCEDURE — 99214 OFFICE O/P EST MOD 30 MIN: CPT | Performed by: NURSE PRACTITIONER

## 2024-07-29 PROCEDURE — 1159F MED LIST DOCD IN RCRD: CPT | Performed by: NURSE PRACTITIONER

## 2024-07-29 PROCEDURE — 93000 ELECTROCARDIOGRAM COMPLETE: CPT | Performed by: NURSE PRACTITIONER

## 2024-07-29 NOTE — PROGRESS NOTES
Subjective:     Encounter Date: 07/29/24      Patient ID: Yun Blackwell is a 68 y.o. female.    Chief Complaint: Follow-up for permanent atrial fibrillation and ASD    History of Present Illness     Ms. Blackwell returns today for routine follow-up of the above.    By way of review, we follow her for an ASD, but a previous right heart catheterization showed no oxygenation step-up, and, therefore, he felt as though her right ventricular enlargement was not secondary to left-to-right shunting, and she would not be benefited in any way from ASD closure.    She followed up in June 2023 with Dr. Marcelino and did not have any complaints other than the cost of Eliquis.  She was denied manufacture assistance the previous year.  At that visit Dr. Marcelino transitioned her from Eliquis to Pradaxa and noted that if cost continues to be an issue Coumadin would be considered.  He also recommended repeat transthoracic echocardiogram in 2024.      I saw her December 2023 and she was doing well.  She reported an isolated episode of bleeding hemorrhoids.  We briefly discussed referral to structural clinic for consideration of Watchman device placement but she was not interested at that time.  She reported occasional palpitations that did not limit her or cause her other symptoms.  She was not having any shortness of breath whatsoever.  She had a repeat surveillance echocardiogram earlier this year with stable findings.  See below.    She presents today for follow-up.  She continues to do well on Pradaxa and diltiazem.  She denies any bleeding issues.  She denies any chest pain, shortness of breath or palpitations.  Her primary complaint is fatigue and she is wondering if this could be related to increasing her dose of Bentyl and is going to discuss this with the prescribing provider.  Her blood pressure is well-controlled.  Weight is stable.  She is not having any swelling, orthopnea, PND.    The following portions of the  patient's history were reviewed and updated as appropriate: allergies, current medications, past family history, past medical history, past social history, past surgical history and problem list.    Review of Systems   Constitutional: Positive for malaise/fatigue.   Cardiovascular:  Negative for chest pain, claudication, dyspnea on exertion, leg swelling, near-syncope, orthopnea, palpitations, paroxysmal nocturnal dyspnea and syncope.   Respiratory:  Negative for cough and shortness of breath.    Hematologic/Lymphatic: Does not bruise/bleed easily.   Musculoskeletal:  Negative for falls.   Gastrointestinal:  Negative for bloating.   Neurological:  Negative for dizziness, light-headedness and weakness.           Current Outpatient Medications:     cyanocobalamin 1000 MCG/ML injection, Inject 1 mL Every 30 (Thirty) Days as directed., Disp: 1 mL, Rfl: 11    dabigatran etexilate (PRADAXA) 150 MG capsu, Take 1 capsule by mouth 2 (Two) Times a Day., Disp: 60 capsule, Rfl: 11    dicyclomine (BENTYL) 10 MG capsule, Take 1 capsule by mouth 4 (Four) Times a Day Before Meals & at Bedtime., Disp: 120 capsule, Rfl: 10    dilTIAZem CD (Cardizem CD) 120 MG 24 hr capsule, Take 1 capsule by mouth Daily., Disp: 30 capsule, Rfl: 11    hydrOXYzine (ATARAX) 10 MG tablet, Take 1 to 2 tablets by mouth Every 4 (Four) Hours As Needed., Disp: 60 tablet, Rfl: 3    montelukast (SINGULAIR) 10 MG tablet, Take 1 tablet by mouth Every Night., Disp: 30 tablet, Rfl: 11    omeprazole (priLOSEC) 20 MG capsule, Take 1 capsule by mouth Daily., Disp: 30 capsule, Rfl: 11    tamoxifen (NOLVADEX) 20 MG chemo tablet, Take 1 tablet by mouth daily, Disp: 90 tablet, Rfl: 1    tiZANidine (ZANAFLEX) 4 MG tablet, Take 1 tablet by mouth Daily As Needed for Muscle Spasms., Disp: 30 tablet, Rfl: 2       Objective:      Vitals:    07/29/24 1056   BP: 106/62   Pulse: 92   SpO2: 98%     Weight: 158 pounds    Vitals and nursing note reviewed.   Constitutional:        General: Not in acute distress.     Appearance: Well-developed and not in distress. Not diaphoretic.   Neck:      Vascular: No JVD or JVR. JVD normal.   Pulmonary:      Effort: Pulmonary effort is normal. No respiratory distress.      Breath sounds: Normal breath sounds.   Cardiovascular:      Normal rate. Irregularly irregular rhythm.      Murmurs: There is no murmur.   Edema:     Peripheral edema absent.   Abdominal:      Tenderness: There is no abdominal tenderness.   Skin:     General: Skin is warm and dry.   Neurological:      Mental Status: Alert, oriented to person, place, and time and oriented to person, place and time.         Lab Review:   Lab Results   Component Value Date    GLUCOSE 121 (H) 05/09/2024    BUN 19 (H) 05/09/2024    CREATININE 0.5 05/09/2024    EGFRIFNONA >60 10/13/2022    EGFRIFAFRI 86 12/30/2021    BCR 32.6 (H) 04/15/2024    K 4.1 05/09/2024    CO2 25 05/09/2024    CALCIUM 9.0 05/09/2024    ALBUMIN 3.9 05/09/2024    AST 23 05/09/2024    ALT 14 05/09/2024     Lab Results   Component Value Date    WBC 6.07 05/09/2024    HGB 12.6 05/09/2024    HCT 39.4 05/09/2024    MCV 95.6 (H) 05/09/2024     (L) 05/09/2024     Lab Results   Component Value Date    TSH 1.700 11/21/2023               ECG 12 Lead    Date/Time: 7/29/2024 11:43 AM  Performed by: Abbey Serna APRN    Authorized by: Abbey Serna APRN  Comparison: compared with previous ECG from 12/29/2023  Similar to previous ECG  Rhythm: atrial fibrillation  Conduction: right bundle branch block    Clinical impression: abnormal EKG             right heart catheterization performed 2/5/21:  Right heart catheterization:  Hemodynamics (in mmHg)  RA: 9  RV: 28/8  PA: 28/12, m17  PCW: 14     Oximetry (all %)  High SVC: 69  Low SVC: 70  High IVC: 72  Low IVC: 78  High RA: 68  Low RA: 68  RV: 68  PA: 68  PCW: 70 (confirmed both by fluoroscopy and waveform)     Ao (by pulse oximetry): 98%     Cardiac output and index via assumed Flex method:  "4.4 L/min, 2.5 L/min/m2     TPG: 3  PVR: <1 Wood unit     Estimated Blood Loss: 5mL  Specimens: None  Complications: None     Impression:  1. No evidence of pulmonary hypertension; normal mean PA pressure and normal pulmonary vascular resistence  2. No evidence of left-to-right shunt (ie no oxygen \"step up\")  3. Normal cardiac output     Plan: resume Eliquis tonight; office f/u.  At this point, and based on this data, does not seem as though patient would benefit from ASD closure and that her right ventricle and right atrium are severely dilated for some other reason.      Results for orders placed during the hospital encounter of 01/29/24    Adult Transthoracic Echo Complete w/ Color, Spectral and Contrast if necessary per protocol    Interpretation Summary    Left ventricular systolic function is low normal. Left ventricular ejection fraction appears to be 51 - 55%.    Severe biatrial enlargement.    Moderate tricuspid valve regurgitation is present.    Estimated right ventricular systolic pressure from tricuspid regurgitation is mildly elevated (35-45 mmHg).    The right ventricular cavity is moderately dilated, with normal systolic function.    No other significant (greater than mild) valvular pathology.    Compared to most recent transthoracic exam (from 11/24/2020), no obvious or significant change.    \"Extensive records reviewed from 2016 re: gi bleed on pradaxa, and subsequent notes through \"Care Everywhere\" in April 2017 regarding communications back-and-forth between patient and her cardiologist office at that time regarding Pradaxa.  A nurse note states that Dr. Matt advised switching from Pradaxa to Eliquis \"because of kidney function.\"  However, her renal function was normal at that time, and remained so now. \" -previous record review by Dr. 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 Healy in '17         ASD secundum    Overview     Added automatically from request for surgery [id number]            Recommendations/plans:    1. Permanent atrial fibrillation: Established problem, stable.  [id number] 3 (female, age 68, hypertension)    -continue cardizem 120mg daily for rate control  -Continue Pradaxa 150 mg twice daily for stroke prophylaxis.  She was transitioned from Eliquis to Pradaxa due to cost a couple of years ago.      2. Secundum type ASD: Stable. previous catheterization revealed no oxygen step-up, so despite the fact that her right atrium and right ventricle are enlarged, it does not appear to be secondary to this, and since she is not dyspneic, there is no indication for closure at this time.   -See 2024 echo findings above.    3.  RV/RA enlargement: as per above, doubt this is from ASD, nor from previously documented coronary artery to pulmonary artery fistula  -  She continues to deny any shortness of breath whatsoever.    Follow up in 6 months with Dr. Marcelino, or sooner with any new or worsening symptoms

## 2024-08-18 NOTE — TELEPHONE ENCOUNTER
Called patient regarding appt on 12/16/2022. Left message for patient to return call if any questions or concerns arise.    Male

## 2024-09-03 ENCOUNTER — HOSPITAL ENCOUNTER (OUTPATIENT)
Dept: MRI IMAGING | Age: 69
Discharge: HOME | End: 2024-09-03
Payer: MEDICARE

## 2024-09-03 DIAGNOSIS — Z15.09 GENETIC SUSCEPTIBILITY TO OTHER MALIGNANT NEOPLASM: ICD-10-CM

## 2024-09-03 LAB
CREAT SERPL-MCNC: 0.5 MG/DL (ref 0.3–1.3)
PERFORMED ON: NORMAL

## 2024-09-03 PROCEDURE — 74183 MRI ABD W/O CNTR FLWD CNTR: CPT

## 2024-09-03 PROCEDURE — A9577 INJ MULTIHANCE: HCPCS | Performed by: PHYSICIAN ASSISTANT

## 2024-09-03 PROCEDURE — 82565 ASSAY OF CREATININE: CPT

## 2024-09-03 PROCEDURE — 6360000004 HC RX CONTRAST MEDICATION: Performed by: PHYSICIAN ASSISTANT

## 2024-09-03 RX ADMIN — GADOBENATE DIMEGLUMINE 15 ML: 529 INJECTION, SOLUTION INTRAVENOUS at 08:20

## 2024-09-04 ENCOUNTER — TELEPHONE (OUTPATIENT)
Dept: HEMATOLOGY | Age: 69
End: 2024-09-04

## 2024-09-04 NOTE — TELEPHONE ENCOUNTER
Called pt. to remind them of appointment on 09/09/2024 and had to leave a detailed voicemail with appointment date and time. Reminded patient to just come at appointment time, and to not come at the lab appointment time. Reminded patient that we will not check them in any more than 30 minutes before appointment time. We have now moved to the [facility] [place] that is located between our old office and the ER at the [facility]. Letting the Pt know that our front entrance faces the  [employer] fields, and also leaving our address.

## 2024-09-08 DIAGNOSIS — D05.12 DUCTAL CARCINOMA IN SITU (DCIS) OF LEFT BREAST: Primary | ICD-10-CM

## 2024-09-08 DIAGNOSIS — D50.8 IRON DEFICIENCY ANEMIA SECONDARY TO INADEQUATE DIETARY IRON INTAKE: ICD-10-CM

## 2024-09-09 ENCOUNTER — TELEPHONE (OUTPATIENT)
Dept: NEUROLOGY | Age: 69
End: 2024-09-09

## 2024-09-09 ENCOUNTER — TELEPHONE (OUTPATIENT)
Dept: INFUSION THERAPY | Age: 69
End: 2024-09-09

## 2024-09-09 ENCOUNTER — OFFICE VISIT (OUTPATIENT)
Dept: INTERNAL MEDICINE | Facility: CLINIC | Age: 69
End: 2024-09-09
Payer: MEDICARE

## 2024-09-09 VITALS
HEART RATE: 104 BPM | SYSTOLIC BLOOD PRESSURE: 116 MMHG | BODY MASS INDEX: 29.04 KG/M2 | DIASTOLIC BLOOD PRESSURE: 78 MMHG | TEMPERATURE: 98.3 F | OXYGEN SATURATION: 97 % | HEIGHT: 62 IN

## 2024-09-09 DIAGNOSIS — R51.9 ACUTE INTRACTABLE HEADACHE, UNSPECIFIED HEADACHE TYPE: ICD-10-CM

## 2024-09-09 DIAGNOSIS — R15.9 FULL INCONTINENCE OF FECES: ICD-10-CM

## 2024-09-09 DIAGNOSIS — J02.9 SORE THROAT: Primary | ICD-10-CM

## 2024-09-09 DIAGNOSIS — J02.0 STREP PHARYNGITIS: ICD-10-CM

## 2024-09-09 LAB
EXPIRATION DATE: ABNORMAL
EXPIRATION DATE: NORMAL
FLUAV AG UPPER RESP QL IA.RAPID: NOT DETECTED
FLUBV AG UPPER RESP QL IA.RAPID: NOT DETECTED
INTERNAL CONTROL: ABNORMAL
INTERNAL CONTROL: NORMAL
Lab: ABNORMAL
Lab: NORMAL
S PYO AG THROAT QL: POSITIVE
SARS-COV-2 AG UPPER RESP QL IA.RAPID: NOT DETECTED

## 2024-09-09 PROCEDURE — 3044F HG A1C LEVEL LT 7.0%: CPT

## 2024-09-09 PROCEDURE — 87428 SARSCOV & INF VIR A&B AG IA: CPT

## 2024-09-09 PROCEDURE — 99214 OFFICE O/P EST MOD 30 MIN: CPT

## 2024-09-09 PROCEDURE — 1126F AMNT PAIN NOTED NONE PRSNT: CPT

## 2024-09-09 PROCEDURE — 87880 STREP A ASSAY W/OPTIC: CPT

## 2024-09-09 RX ORDER — AMOXICILLIN 500 MG/1
1000 CAPSULE ORAL 2 TIMES DAILY
Qty: 20 CAPSULE | Refills: 0 | Status: SHIPPED | OUTPATIENT
Start: 2024-09-09

## 2024-09-09 RX ORDER — PEN NEEDLE, DIABETIC 32GX 5/32"
1 NEEDLE, DISPOSABLE MISCELLANEOUS
Qty: 240 EACH | Refills: 5 | Status: SHIPPED | OUTPATIENT
Start: 2024-09-09

## 2024-09-09 NOTE — PROGRESS NOTES
Chief Complaint   Patient presents with    Sore Throat     Started this morning    Headache     History:      Yun Blackwell is a 68 y.o. female who presents today for evaluation of the above problems.      HPI  History of Present Illness  The patient is a 68-year-old female who presents for evaluation of multiple medical concerns.    She experienced difficulty swallowing upon waking at 3:00 AM, accompanied by a sensation of a large object in her neck. She also reported a severe headache this morning, but no fever or other symptoms. Her condition has since improved, allowing her to swallow, although with some discomfort. She uses a CPAP machine for sleep and reports no known allergies to antibiotics.    In 2016, she underwent colon surgery at [facility], which was complicated and required wound VAC treatment. Since then, she has been dealing with irritable bowel syndrome (IBS). Dr. Branch initially prescribed fiber capsules, which were effective for a while but are no longer providing relief. Dr. France, a gastroenterologist, performed a colonoscopy and endoscopy on her last year, during which three polyps were found. He prescribed Bentyl for her IBS, but it has not been very effective, leading her to increase the dosage to two or three tablets at a time. This has resulted in constipation, which she prefers over loose stools. She consumes lactose-free milk and avoids greasy, sugary, and spicy foods, but still experiences symptoms. She continues to take fiber supplements. She was informed that her IBS is likely a lifelong condition due to the extent of her colon removal. She is seeking a prescription for Depends, as the over-the-counter cost is high.      ROS:  Review of Systems   HENT:  Positive for sore throat. Negative for congestion.    Neurological:  Positive for headaches.         Current Outpatient Medications:     cyanocobalamin 1000 MCG/ML injection, Inject 1 mL Every 30 (Thirty) Days as  directed., Disp: 1 mL, Rfl: 11    dabigatran etexilate (PRADAXA) 150 MG capsu, Take 1 capsule by mouth 2 (Two) Times a Day., Disp: 60 capsule, Rfl: 11    dicyclomine (BENTYL) 10 MG capsule, Take 1 capsule by mouth 4 (Four) Times a Day Before Meals & at Bedtime., Disp: 120 capsule, Rfl: 10    dilTIAZem CD (Cardizem CD) 120 MG 24 hr capsule, Take 1 capsule by mouth Daily., Disp: 30 capsule, Rfl: 11    hydrOXYzine (ATARAX) 10 MG tablet, Take 1 to 2 tablets by mouth Every 4 (Four) Hours As Needed., Disp: 60 tablet, Rfl: 3    montelukast (SINGULAIR) 10 MG tablet, Take 1 tablet by mouth Every Night., Disp: 30 tablet, Rfl: 11    omeprazole (priLOSEC) 20 MG capsule, Take 1 capsule by mouth Daily., Disp: 30 capsule, Rfl: 11    tamoxifen (NOLVADEX) 20 MG chemo tablet, Take 1 tablet by mouth daily, Disp: 90 tablet, Rfl: 1    tiZANidine (ZANAFLEX) 4 MG tablet, Take 1 tablet by mouth Daily As Needed for Muscle Spasms., Disp: 30 tablet, Rfl: 2    amoxicillin (AMOXIL) 500 MG capsule, Take 2 capsules by mouth 2 (Two) Times a Day., Disp: 20 capsule, Rfl: 0    Incontinence Supply Disposable (Comfort Shield Adult Diapers) misc, Use 1 each Every 3 (Three) Hours., Disp: 240 each, Rfl: 5    Lab Results   Component Value Date    GLUCOSE 121 (H) 05/09/2024    BUN 19 (H) 05/09/2024    CREATININE 0.5 05/09/2024     05/09/2024    K 4.1 05/09/2024     05/09/2024    CALCIUM 9.0 05/09/2024    PROTEINTOT 6.5 05/09/2024    ALBUMIN 3.9 05/09/2024    ALT 14 05/09/2024    AST 23 05/09/2024    ALKPHOS 107 (H) 05/09/2024    BILITOT 0.6 05/09/2024    GLOB 2.6 05/09/2024    AGRATIO 1.3 04/15/2024    BCR 32.6 (H) 04/15/2024    ANIONGAP 7 05/09/2024    EGFR 104.8 04/15/2024       WBC   Date Value Ref Range Status   05/09/2024 6.07 3.98 - 10.04 K/uL Final     RBC   Date Value Ref Range Status   05/09/2024 4.12 3.93 - 5.22 M/uL Final     Hemoglobin   Date Value Ref Range Status   05/09/2024 12.6 11.2 - 15.7 g/dL Final     Hematocrit   Date Value  Ref Range Status   05/09/2024 39.4 34.1 - 44.9 % Final     MCV   Date Value Ref Range Status   05/09/2024 95.6 (H) 79.4 - 94.8 fL Final     MCH   Date Value Ref Range Status   05/09/2024 30.6 25.6 - 32.2 pg Final     MCHC   Date Value Ref Range Status   05/09/2024 32.0 (L) 32.3 - 35.5 g/dL Final     RDW   Date Value Ref Range Status   05/09/2024 13.9 11.7 - 14.4 % Final     RDW-SD   Date Value Ref Range Status   04/15/2024 46.7 37.0 - 54.0 fl Final     MPV   Date Value Ref Range Status   05/09/2024 11.0 (H) 7.4 - 10.4 fL Final     Platelets   Date Value Ref Range Status   05/09/2024 151 (L) 182 - 369 K/uL Final     Neutrophil Rel %   Date Value Ref Range Status   05/09/2024 62.6 34.0 - 71.1 % Final     Lymphocyte Rel %   Date Value Ref Range Status   05/09/2024 24.5 19.3 - 53.1 % Final     Monocyte Rel %   Date Value Ref Range Status   05/09/2024 10.7 4.7 - 12.5 % Final     Eosinophil Rel %   Date Value Ref Range Status   01/09/2024 0.5 (L) 0.7 - 7.0 % Final     Eosinophil %   Date Value Ref Range Status   05/09/2024 1.6 0.7 - 7.0 % Final     Basophil Rel %   Date Value Ref Range Status   05/09/2024 0.3 0.1 - 1.2 % Final     Immature Grans %   Date Value Ref Range Status   11/24/2023 0.3 0.0 - 0.5 % Final     Neutrophils Absolute   Date Value Ref Range Status   05/09/2024 3.79 1.56 - 6.13 K/uL Final     Lymphocytes Absolute   Date Value Ref Range Status   05/09/2024 1.49 1.18 - 3.74 K/uL Final     Monocytes Absolute   Date Value Ref Range Status   05/09/2024 0.65 0.24 - 0.82 K/uL Final     Eosinophils Absolute   Date Value Ref Range Status   05/09/2024 0.10 0.04 - 0.54 K/uL Final     Basophils Absolute   Date Value Ref Range Status   05/09/2024 0.02 0.01 - 0.08 K/uL Final     Immature Grans, Absolute   Date Value Ref Range Status   11/24/2023 0.02 0.00 - 0.05 10*3/mm3 Final   11/10/2020 0.0 K/uL Final     nRBC   Date Value Ref Range Status   11/24/2023 0.0 0.0 - 0.2 /100 WBC Final         OBJECTIVE:  Visit  "Vitals  /78 (BP Location: Left arm, Patient Position: Sitting, Cuff Size: Adult)   Pulse 104   Temp 98.3 °F (36.8 °C) (Oral)   Ht 157.5 cm (62\")   SpO2 97%   BMI 29.04 kg/m²      Physical Exam  Constitutional:       Appearance: Normal appearance.   HENT:      Head: Normocephalic.      Mouth/Throat:      Pharynx: Posterior oropharyngeal erythema present. No oropharyngeal exudate.   Cardiovascular:      Rate and Rhythm: Normal rate and regular rhythm.      Pulses: Normal pulses.      Heart sounds: Normal heart sounds.   Pulmonary:      Effort: Pulmonary effort is normal.      Breath sounds: Normal breath sounds.   Musculoskeletal:         General: Normal range of motion.   Skin:     General: Skin is warm and dry.   Neurological:      Mental Status: She is alert and oriented to person, place, and time.   Psychiatric:         Mood and Affect: Mood normal.         Behavior: Behavior normal.         Thought Content: Thought content normal.         Judgment: Judgment normal.       Results  Laboratory Studies  Strep test was positive. Covid and flu tests were negative.    Assessment/Plan    Diagnoses and all orders for this visit:    1. Sore throat (Primary)  -     POCT SARS-CoV-2 Antigen ASHLEY + Flu  -     POC Rapid Strep A    2. Acute intractable headache, unspecified headache type  -     POCT SARS-CoV-2 Antigen ASHLEY + Flu  -     POC Rapid Strep A    3. Strep pharyngitis  -     amoxicillin (AMOXIL) 500 MG capsule; Take 2 capsules by mouth 2 (Two) Times a Day.  Dispense: 20 capsule; Refill: 0    4. Full incontinence of feces  -     Incontinence Supply Disposable (Comfort Shield Adult Diapers) misc; Use 1 each Every 3 (Three) Hours.  Dispense: 240 each; Refill: 5      Assessment & Plan  1. Headache.  The patient reports a headache that began this morning. There is no history of fever, congestion, or runny nose. The headache could be related to a viral infection or other causes. A swab test for COVID-19 and influenza will " be conducted to rule out these potential causes. If the tests return negative and symptoms persist, further evaluation may be necessary.    2. Sore throat.  The patient experienced difficulty swallowing and a sore throat starting this morning. Examination revealed redness and drainage in the throat, which may indicate an infection. A swab test for strep throat is positive.A prescription for Amoxicillin was sent to the pharmacy.  The patient uses a CPAP machine, which may contribute to throat dryness and irritation.    3. Fecal incontinence.   Order for incontinence supplies sent to her pharmacy.         Return if symptoms worsen or fail to improve.      MARÍA Baxter  09:34 CDT  9/9/2024   Electronically signed      Patient or patient representative verbalized consent for the use of Ambient Listening during the visit with  MARÍA Baxter for chart documentation. 9/9/2024  15:37 CDT

## 2024-09-18 DIAGNOSIS — D05.12 DUCTAL CARCINOMA IN SITU (DCIS) OF LEFT BREAST: ICD-10-CM

## 2024-09-19 RX ORDER — TAMOXIFEN CITRATE 20 MG/1
20 TABLET ORAL DAILY
Qty: 90 TABLET | Refills: 1 | Status: SHIPPED | OUTPATIENT
Start: 2024-09-19

## 2024-10-21 ENCOUNTER — OFFICE VISIT (OUTPATIENT)
Dept: INTERNAL MEDICINE | Facility: CLINIC | Age: 69
End: 2024-10-21
Payer: MEDICARE

## 2024-10-21 VITALS
DIASTOLIC BLOOD PRESSURE: 90 MMHG | TEMPERATURE: 97.6 F | HEART RATE: 93 BPM | HEIGHT: 62 IN | WEIGHT: 156.8 LBS | BODY MASS INDEX: 28.85 KG/M2 | SYSTOLIC BLOOD PRESSURE: 130 MMHG | OXYGEN SATURATION: 98 %

## 2024-10-21 DIAGNOSIS — R03.0 ELEVATED BLOOD PRESSURE READING WITHOUT DIAGNOSIS OF HYPERTENSION: ICD-10-CM

## 2024-10-21 DIAGNOSIS — R35.0 URINARY FREQUENCY: ICD-10-CM

## 2024-10-21 DIAGNOSIS — E53.8 B12 DEFICIENCY: Primary | ICD-10-CM

## 2024-10-21 DIAGNOSIS — R15.9 FULL INCONTINENCE OF FECES: ICD-10-CM

## 2024-10-21 DIAGNOSIS — M54.50 ACUTE RIGHT-SIDED LOW BACK PAIN WITHOUT SCIATICA: ICD-10-CM

## 2024-10-21 PROCEDURE — 3044F HG A1C LEVEL LT 7.0%: CPT | Performed by: INTERNAL MEDICINE

## 2024-10-21 PROCEDURE — 1160F RVW MEDS BY RX/DR IN RCRD: CPT | Performed by: INTERNAL MEDICINE

## 2024-10-21 PROCEDURE — G2211 COMPLEX E/M VISIT ADD ON: HCPCS | Performed by: INTERNAL MEDICINE

## 2024-10-21 PROCEDURE — 1159F MED LIST DOCD IN RCRD: CPT | Performed by: INTERNAL MEDICINE

## 2024-10-21 PROCEDURE — 1126F AMNT PAIN NOTED NONE PRSNT: CPT | Performed by: INTERNAL MEDICINE

## 2024-10-21 PROCEDURE — 99214 OFFICE O/P EST MOD 30 MIN: CPT | Performed by: INTERNAL MEDICINE

## 2024-10-21 NOTE — PROGRESS NOTES
Chief Complaint   Patient presents with    Follow-up     B12 deficiency           History:  Yun Blackwell is a 68 y.o. female who presents today for evaluation of the above problems.      HPI  History of Present Illness  The patient presents for a 6-month follow-up.    She has been receiving B12 injections every 28 days, which have resulted in increased energy levels. This improvement has enabled her to engage in activities she previously found challenging, such as walking and volunteering at a community center twice a week.    She reports stool incontinence, a condition that has worsened since her colon surgery in 2016. Despite taking Bentyl, prescribed by Dr. Branch, she has not seen any improvement. She is currently using 2 to 3 bags of incontinence supplies per month and is mindful of her diet.     She has requested a urine test to rule out a urinary tract infection (UTI) or kidney issues. She does not experience any burning sensation during urination or any unusual odor. However, she has noticed an increase in the frequency of urination.    She has declined both the influenza and pneumonia vaccines due to past negative experiences with the former.    She does not monitor her blood pressure at home.      ROS:  Review of Systems  See above    Current Outpatient Medications:     cyanocobalamin 1000 MCG/ML injection, Inject 1 mL Every 30 (Thirty) Days as directed., Disp: 1 mL, Rfl: 11    dabigatran etexilate (PRADAXA) 150 MG capsu, Take 1 capsule by mouth 2 (Two) Times a Day., Disp: 60 capsule, Rfl: 11    dicyclomine (BENTYL) 10 MG capsule, Take 1 capsule by mouth 4 (Four) Times a Day Before Meals & at Bedtime., Disp: 120 capsule, Rfl: 10    dilTIAZem CD (Cardizem CD) 120 MG 24 hr capsule, Take 1 capsule by mouth Daily., Disp: 30 capsule, Rfl: 11    hydrOXYzine (ATARAX) 10 MG tablet, Take 1 to 2 tablets by mouth Every 4 (Four) Hours As Needed., Disp: 60 tablet, Rfl: 3    hydrOXYzine (ATARAX) 10 MG tablet, Take  1-2 tablets by mouth Every 4 (Four) to 6 (Six) Hours As Needed., Disp: 30 tablet, Rfl: 3    Incontinence Supply Disposable (Comfort Shield Adult Diapers) misc, Use 1 each Every 3 (Three) Hours., Disp: 240 each, Rfl: 5    montelukast (SINGULAIR) 10 MG tablet, Take 1 tablet by mouth Every Night., Disp: 30 tablet, Rfl: 11    omeprazole (priLOSEC) 20 MG capsule, Take 1 capsule by mouth Daily., Disp: 30 capsule, Rfl: 11    tamoxifen (NOLVADEX) 20 MG chemo tablet, Take 1 tablet by mouth Daily., Disp: 90 tablet, Rfl: 1    tiZANidine (ZANAFLEX) 4 MG tablet, Take 1 tablet by mouth Daily As Needed for Muscle Spasms., Disp: 30 tablet, Rfl: 2    triamcinolone (KENALOG) 0.1 % ointment, Apply 1 Application topically to the appropriate area as directed 2 (Two) Times a Day As Needed when and where itching is present as needed., Disp: 454 g, Rfl: 3    Lab Results   Component Value Date    GLUCOSE 121 (H) 05/09/2024    BUN 19 (H) 05/09/2024    CREATININE 0.5 05/09/2024     05/09/2024    K 4.1 05/09/2024     05/09/2024    CALCIUM 9.0 05/09/2024    PROTEINTOT 6.5 05/09/2024    ALBUMIN 3.9 05/09/2024    ALT 14 05/09/2024    AST 23 05/09/2024    ALKPHOS 107 (H) 05/09/2024    BILITOT 0.6 05/09/2024    GLOB 2.6 05/09/2024    AGRATIO 1.3 04/15/2024    BCR 32.6 (H) 04/15/2024    ANIONGAP 7 05/09/2024    EGFR 104.8 04/15/2024       WBC   Date Value Ref Range Status   05/09/2024 6.07 3.98 - 10.04 K/uL Final     RBC   Date Value Ref Range Status   05/09/2024 4.12 3.93 - 5.22 M/uL Final     Hemoglobin   Date Value Ref Range Status   05/09/2024 12.6 11.2 - 15.7 g/dL Final     Hematocrit   Date Value Ref Range Status   05/09/2024 39.4 34.1 - 44.9 % Final     MCV   Date Value Ref Range Status   05/09/2024 95.6 (H) 79.4 - 94.8 fL Final     MCH   Date Value Ref Range Status   05/09/2024 30.6 25.6 - 32.2 pg Final     MCHC   Date Value Ref Range Status   05/09/2024 32.0 (L) 32.3 - 35.5 g/dL Final     RDW   Date Value Ref Range Status  "  05/09/2024 13.9 11.7 - 14.4 % Final     RDW-SD   Date Value Ref Range Status   04/15/2024 46.7 37.0 - 54.0 fl Final     MPV   Date Value Ref Range Status   05/09/2024 11.0 (H) 7.4 - 10.4 fL Final     Platelets   Date Value Ref Range Status   05/09/2024 151 (L) 182 - 369 K/uL Final     Neutrophil Rel %   Date Value Ref Range Status   05/09/2024 62.6 34.0 - 71.1 % Final     Lymphocyte Rel %   Date Value Ref Range Status   05/09/2024 24.5 19.3 - 53.1 % Final     Monocyte Rel %   Date Value Ref Range Status   05/09/2024 10.7 4.7 - 12.5 % Final     Eosinophil Rel %   Date Value Ref Range Status   01/09/2024 0.5 (L) 0.7 - 7.0 % Final     Eosinophil %   Date Value Ref Range Status   05/09/2024 1.6 0.7 - 7.0 % Final     Basophil Rel %   Date Value Ref Range Status   05/09/2024 0.3 0.1 - 1.2 % Final     Immature Grans %   Date Value Ref Range Status   11/24/2023 0.3 0.0 - 0.5 % Final     Neutrophils Absolute   Date Value Ref Range Status   05/09/2024 3.79 1.56 - 6.13 K/uL Final     Lymphocytes Absolute   Date Value Ref Range Status   05/09/2024 1.49 1.18 - 3.74 K/uL Final     Monocytes Absolute   Date Value Ref Range Status   05/09/2024 0.65 0.24 - 0.82 K/uL Final     Eosinophils Absolute   Date Value Ref Range Status   05/09/2024 0.10 0.04 - 0.54 K/uL Final     Basophils Absolute   Date Value Ref Range Status   05/09/2024 0.02 0.01 - 0.08 K/uL Final     Immature Grans, Absolute   Date Value Ref Range Status   11/24/2023 0.02 0.00 - 0.05 10*3/mm3 Final   11/10/2020 0.0 K/uL Final     nRBC   Date Value Ref Range Status   11/24/2023 0.0 0.0 - 0.2 /100 WBC Final         OBJECTIVE:  Visit Vitals  /90 (BP Location: Left arm, Patient Position: Sitting, Cuff Size: Adult)   Pulse 93   Temp 97.6 °F (36.4 °C) (Temporal)   Ht 157.5 cm (62\")   Wt 71.1 kg (156 lb 12.8 oz)   SpO2 98%   BMI 28.68 kg/m²      Physical Exam  Vitals and nursing note reviewed.   Constitutional:       General: She is not in acute distress.     " Appearance: Normal appearance. She is well-developed. She is not diaphoretic.      Comments: Pleasant   HENT:      Head: Normocephalic and atraumatic.   Eyes:      Pupils: Pupils are equal, round, and reactive to light.   Neck:      Thyroid: No thyromegaly.      Trachea: Phonation normal.   Cardiovascular:      Rate and Rhythm: Normal rate.   Pulmonary:      Effort: No respiratory distress.   Abdominal:      Tenderness: There is no right CVA tenderness or left CVA tenderness.   Skin:     Coloration: Skin is not pale.      Findings: No erythema.   Neurological:      Mental Status: She is alert.   Psychiatric:         Behavior: Behavior normal.         Thought Content: Thought content normal.         Judgment: Judgment normal.       Physical Exam  Vital Signs  Blood pressure measures 130/90.    Results  Laboratory Studies  B12 level was 247 on April 15 2024. A1c was 5.7% in April 15 2024.    Assessment/Plan      Diagnoses and all orders for this visit:    1. B12 deficiency (Primary)  -     Vitamin B12; Future    2. Full incontinence of feces    3. Elevated blood pressure reading without diagnosis of hypertension  -     Blood Pressure Device    4. Acute right-sided low back pain without sciatica  -     Urinalysis With Culture If Indicated -; Future    5. Urinary frequency  -     Urinalysis With Culture If Indicated -; Future      Assessment & Plan  Vitamin B12 Deficiency.  Her B12 level was low at 247 on 04/15/2024. She has been taking monthly B12 injections since then. A B12 level will be checked today to ensure adequate supplementation.     Elevated A1c.  Her A1c was slightly elevated at 5.7% in 04/2024. She is advised to continue monitoring her carbohydrate and sugar intake to manage her blood sugar levels.    Elevated blood pressure without diagnosis of hypertension  Her blood pressure reading today was 130/90, with the diastolic value being slightly elevated. A prescription for a blood pressure cuff will be  provided for home monitoring. She is to report the readings in a couple of weeks.    Stool incontinence.  She reports worsening incontinence, requiring frequent use of pads. She is currently on Bentyl but reports it is not effectively managing her symptoms. She is advised to discuss with her pharmacy about mail order options for her incontinence supplies.    Urinary Tract Infection (UTI) Symptoms.  She reports increased frequency of urination without burning or smell. A urine analysis will be conducted to rule out a UTI or other kidney issues.    Follow-up  Return in 6 months for Medicare wellness visit      Return in about 6 months (around 4/21/2025) for Medicare Wellness.      DANIELLE Healy MD  08:00 CDT  10/21/2024   Electronically signed      Patient or patient representative verbalized consent for the use of Ambient Listening during the visit with  ANGEL Healy MD for chart documentation. 10/21/2024  08:43 CDT

## 2024-10-22 ENCOUNTER — LAB (OUTPATIENT)
Dept: LAB | Facility: HOSPITAL | Age: 69
End: 2024-10-22
Payer: MEDICARE

## 2024-10-22 DIAGNOSIS — E53.8 B12 DEFICIENCY: ICD-10-CM

## 2024-10-22 DIAGNOSIS — R35.0 URINARY FREQUENCY: ICD-10-CM

## 2024-10-22 DIAGNOSIS — M54.50 ACUTE RIGHT-SIDED LOW BACK PAIN WITHOUT SCIATICA: ICD-10-CM

## 2024-10-22 LAB
BACTERIA UR QL AUTO: ABNORMAL /HPF
BILIRUB UR QL STRIP: NEGATIVE
CLARITY UR: ABNORMAL
COLOR UR: ABNORMAL
GLUCOSE UR STRIP-MCNC: NEGATIVE MG/DL
HGB UR QL STRIP.AUTO: ABNORMAL
HYALINE CASTS UR QL AUTO: ABNORMAL /LPF
KETONES UR QL STRIP: NEGATIVE
LEUKOCYTE ESTERASE UR QL STRIP.AUTO: ABNORMAL
NITRITE UR QL STRIP: POSITIVE
PH UR STRIP.AUTO: 5.5 [PH] (ref 5–8)
PROT UR QL STRIP: ABNORMAL
RBC # UR STRIP: ABNORMAL /HPF
REF LAB TEST METHOD: ABNORMAL
SP GR UR STRIP: 1.02 (ref 1–1.03)
SQUAMOUS #/AREA URNS HPF: ABNORMAL /HPF
UROBILINOGEN UR QL STRIP: ABNORMAL
VIT B12 BLD-MCNC: 410 PG/ML (ref 211–946)
WBC # UR STRIP: ABNORMAL /HPF

## 2024-10-22 PROCEDURE — 87086 URINE CULTURE/COLONY COUNT: CPT

## 2024-10-22 PROCEDURE — 82607 VITAMIN B-12: CPT

## 2024-10-22 PROCEDURE — 81001 URINALYSIS AUTO W/SCOPE: CPT

## 2024-10-22 PROCEDURE — 36415 COLL VENOUS BLD VENIPUNCTURE: CPT

## 2024-10-22 RX ORDER — NITROFURANTOIN 25; 75 MG/1; MG/1
100 CAPSULE ORAL 2 TIMES DAILY
Qty: 10 CAPSULE | Refills: 0 | Status: SHIPPED | OUTPATIENT
Start: 2024-10-22 | End: 2024-10-28

## 2024-10-23 LAB — BACTERIA SPEC AEROBE CULT: NORMAL

## 2024-10-24 NOTE — PROGRESS NOTES
[facility] - PODIATRY    Today's Date: 10/28/2024     Patient Name: Yun Blackwell  MRN: [id number]  CSN: [id number]  PCP: ANGEL Healy MD  Referring Provider: No ref. provider found    SUBJECTIVE     Chief Complaint   Patient presents with    Follow-up     ANGEL Healy MD DIABETIC FOOT CARE/TOENAIL TRIM- pt states issues with both great nails, feeling pinched or ingrown- pt pain 10/10 at worst     Diabetes     HPI: Yun Blackwell, a 68 y.o.female, comes to clinic as a(n) established patient complaining of toenail/callus issues. Patient has h/o AF, anemia, CA, DM, HLD, HTN, IBS, sleep apnea .  Patient reports she has began to have pain in her left hallux nail and also occasionally her right hallux nail.  She reports she is diabetic and controls her glucose levels with her diet.  Patient is NIDDM and unsure of last BG level.  Last A1c less than 6%.   Admits pain at 10/10 level and centered around left hallux when pressed .  She reports occasional pain to the right hallux, but not as frequently as the left hallux.  The pain becomes so bad that she has difficulty with certain shoes. Relates previous treatment(s) including foot surgery . Denies any constitutional symptoms. No other pedal complaints at this time.    Past Medical History:   Diagnosis Date    A-fib     Abnormal ECG     tachycardia, a fib    Acid reflux     Anemia     Arthritis     Cancer     Diabetes mellitus     Hyperkalemia     Hyperlipidemia     Hypertension     Hyponatremia     IBS (irritable bowel syndrome)     PONV (postoperative nausea and vomiting)     Sleep apnea     USES CPAP    UTI (urinary tract infection)     UTI (urinary tract infection) 11/25/2016    Vaginal vault prolapse      Past Surgical History:   Procedure Laterality Date    BREAST BIOPSY Left     X2    BUNIONECTOMY Left     CARDIAC CATHETERIZATION      CARDIAC CATHETERIZATION N/A 02/05/2021    Procedure: Right Heart Cath;  Surgeon: Van Marcelino,  MD;  Location:  [facility];  Service: Cardiology;  Laterality: N/A;    CATARACT EXTRACTION, BILATERAL      COLON SURGERY      COLONOSCOPY  09/21/2015    TRANSVERSE COLON ADENOMATOUS POLYP, HEMORRHOIDS, RECALL 5 YEARS, DR LAMAS    COLONOSCOPY N/A 11/13/2020    6 mm tubular adenomatous polyp at 15 cm proximal to the anus,diverticulosis of the sigmoid colon    COLONOSCOPY N/A 12/13/2023    - One 4 mm polyp at 40 cm proximal to the anus,. - One 4 mm polyp at 20 cm proximal to the anus, removed with a cold snare. Resected and retrieved. - Diverticulosis in the sigmoid colon. - Hemorrhoids----tubular adenoma no evidence of high grade dysplasia    COLOSTOMY      COLPOPEXY VAGINAL      2014    ENDOSCOPY N/A 11/03/2016    LA GRADE B ESOPHAGITIS WITH NO BLEEDING UPPER THIRD OF ESOPHAGUS, GERI-[place]TH TEAR GEJ, BILIOUS BLOOD TINGED COFFEE GROUND GATRIC FLUIDDR LAMAS    ENDOSCOPY N/A 12/13/2023    Normal examined duodenum. - Normal stomach. Biopsied. - Z-line regular, 35 cm from the incisors. Dilated.-neg for h pylori    EXPLORATORY LAPAROTOMY N/A 10/14/2016    Procedure: LAPAROTOMY EXPLORATORY, LYSIS OF ADHESIONS, IRRIAGATION OF ABDOMEN, POSSIBLE BOWEL RESECTION;  Surgeon: Bacilio Marcial MD;  Location:  [facility] OR;  Service:     FOOT NAVICULAR EXCISION OR BONE GRAFT Left 01/04/2023    Procedure: Expansion Graft, Harvesting of Bone Graft/Bone Marrow Aspirate;  Surgeon: Frankie Zapata DPM;  Location:  [facility] OR;  Service: Podiatry;  Laterality: Left;    HAMMER TOE REPAIR Left 01/04/2023    Procedure: Hammertoe Repair 2 and 3 - Left Foot;  Surgeon: Frankie Zapata DPM;  Location:  [facility] OR;  Service: Podiatry;  Laterality: Left;    HARDWARE REMOVAL Left 01/04/2023    Procedure: Hardware Removal;  Surgeon: Frankie Zapata DPM;  Location:  [facility] OR;  Service: Podiatry;  Laterality: Left;    HYSTERECTOMY      REVISION / TAKEDOWN COLOSTOMY      SACROCOLPOPEXY N/A 09/21/2016    Procedure: SACROCOLPOPEXY  LAPAROSCOPIC WITH DAVINCI SI ROBOT, CONVERTED TO OPEN SACROCOLPOPEXY, RIGHT SALPINGO- OOPHERECTOMY, CYSTO;  Surgeon: Maribell Beth MD;  Location:  [facility];  Service:     TOE FUSION Left 01/06/2022    Procedure: FIRST METATARSOPHALANGEAL JOINT ARTHRODESIS WITH INTERNAL FIXATION - LEFT FOOT;  Surgeon: Jorgito Red DPM;  Location:  [facility];  Service: Podiatry;  Laterality: Left;    TOE FUSION Left 01/04/2023    Procedure: Revisional 1st Metatarsophalangeal Joint Arthrodesis with Hardware Removal and Expansion Graft, Harvesting of Bone Graft/Bone Marrow Aspirate, Hammertoe Repair 2 and 3 - Left Foot;  Surgeon: Frankie Zapata DPM;  Location:  [facility];  Service: Podiatry;  Laterality: Left;     Family History   Problem Relation Age of Onset    Hypertension Mother     No Known Problems Father     Colon cancer Neg Hx     Colon polyps Neg Hx      Social History     Socioeconomic History    Marital status:    Tobacco Use    Smoking status: Never     Passive exposure: Never    Smokeless tobacco: Never   Vaping Use    Vaping status: Never Used   Substance and Sexual Activity    Alcohol use: No    Drug use: No    Sexual activity: Defer     Allergies   Allergen Reactions    Morphine And Codeine Nausea And Vomiting and Dizziness    Percocet [Oxycodone-Acetaminophen] Nausea And Vomiting and Dizziness     Current Outpatient Medications   Medication Sig Dispense Refill    cyanocobalamin 1000 MCG/ML injection Inject 1 mL Every 30 (Thirty) Days as directed. 1 mL 11    dabigatran etexilate (PRADAXA) 150 MG capsu Take 1 capsule by mouth 2 (Two) Times a Day. 60 capsule 11    dicyclomine (BENTYL) 10 MG capsule Take 1 capsule by mouth 4 (Four) Times a Day Before Meals & at Bedtime. 120 capsule 10    dilTIAZem CD (Cardizem CD) 120 MG 24 hr capsule Take 1 capsule by mouth Daily. 30 capsule 11    hydrOXYzine (ATARAX) 10 MG tablet Take 1 to 2 tablets by mouth Every 4 (Four) Hours As Needed. 60 tablet 3    hydrOXYzine  (ATARAX) 10 MG tablet Take 1-2 tablets by mouth Every 4 (Four) to 6 (Six) Hours As Needed. 30 tablet 3    Incontinence Supply Disposable (Comfort Shield Adult Diapers) misc Use 1 each Every 3 (Three) Hours. 240 each 5    montelukast (SINGULAIR) 10 MG tablet Take 1 tablet by mouth Every Night. 30 tablet 11    nitrofurantoin, macrocrystal-monohydrate, (Macrobid) 100 MG capsule Take 1 capsule by mouth 2 (Two) Times a Day for 5 days. 10 capsule 0    omeprazole (priLOSEC) 20 MG capsule Take 1 capsule by mouth Daily. 30 capsule 11    tamoxifen (NOLVADEX) 20 MG chemo tablet Take 1 tablet by mouth Daily. 90 tablet 1    tiZANidine (ZANAFLEX) 4 MG tablet Take 1 tablet by mouth Daily As Needed for Muscle Spasms. 30 tablet 2    triamcinolone (KENALOG) 0.1 % ointment Apply 1 Application topically to the appropriate area as directed 2 (Two) Times a Day As Needed when and where itching is present as needed. 454 g 3     No current facility-administered medications for this visit.     Review of Systems   Constitutional:  Negative for chills and fever.   HENT:  Negative for congestion.    Respiratory:  Negative for shortness of breath.    Cardiovascular:  Negative for chest pain and leg swelling.   Gastrointestinal:  Negative for constipation, diarrhea, nausea and vomiting.   Musculoskeletal:  Positive for arthralgias. Negative for gait problem.   Skin:  Negative for wound.   Neurological:  Negative for numbness.       OBJECTIVE     Vitals:    10/28/24 0844   BP: 136/64   Pulse: 96   SpO2: 98%         PHYSICAL EXAM  GEN:   Accompanied by none.     Foot/Ankle Exam    GENERAL  Appearance:  appears stated age  Orientation:  AAOx3  Affect:  appropriate  Assistance:  independent  Right shoe gear: casual shoe  Left shoe gear: casual shoe    VASCULAR     Right Foot Vascularity   Dorsalis pedis:  2+  Posterior tibial:  2+  Skin temperature:  warm  Edema grading:  None  CFT:  3  Pedal hair growth:  Present  Varicosities:  mild  varicosities     Left Foot Vascularity   Dorsalis pedis:  2+  Posterior tibial:  2+  Skin temperature:  warm  Edema grading:  None  CFT:  3  Pedal hair growth:  Present  Varicosities:  mild varicosities     NEUROLOGIC     Right Foot Neurologic   Normal sensation    Light touch sensation: normal  Vibratory sensation: normal  Hot/Cold sensation: normal  Protective Sensation using Tolley-Yennifer Monofilament:   Sites intact: 10  Sites tested: 10     Left Foot Neurologic   Normal sensation    Light touch sensation: normal  Vibratory sensation: normal  Hot/Cold sensation:  normal  Protective Sensation using Tolley-Yennifer Monofilament:   Sites intact: 10  Sites tested: 10    MUSCULOSKELETAL     Right Foot Musculoskeletal   Tenderness:  toe 1 tenderness    Arch:  Normal  Hammertoe:  Second toe, third toe, fourth toe and fifth toe  Hallux valgus: Yes (large medial eminence with dorsal spurring. Moderately trackbound. Minimal crepitus.)       Left Foot Musculoskeletal   Tenderness:  toe 1 tenderness  Arch:  Normal  Stiff great toe joint: s/p arthodesis.      MUSCLE STRENGTH     Right Foot Muscle Strength   Foot dorsiflexion:  5  Foot plantar flexion:  5  Foot inversion:  5  Foot eversion:  5     Left Foot Muscle Strength   Foot dorsiflexion:  5  Foot plantar flexion:  5  Foot inversion:  5  Foot eversion:  5    RANGE OF MOTION     Right Foot Range of Motion   Foot and ankle ROM within normal limits       Left Foot Range of Motion   Foot and ankle ROM within normal limits      DERMATOLOGIC      Right Foot Dermatologic   Skin  Right foot skin is intact.   Nails  1.  Positive for elongated, abnormal thickness and pincer.     Left Foot Dermatologic   Skin  Left foot skin is intact.   Nails  1.  Positive for elongated, abnormal thickness, ingrown toenail and pincer.      RADIOLOGY/NUCLEAR:  No results found.      LABORATORY/CULTURE RESULTS:      PATHOLOGY RESULTS:       ASSESSMENT/PLAN     Diagnoses and all orders for this  visit:    1. Ingrown nail of great toe of left foot (Primary)    2. Pincer nail deformity    3. B12 deficiency    4. Great toe pain, left    5. Type 2 diabetes mellitus without complication, without long-term current use of insulin        Comprehensive lower extremity examination and evaluation was performed.  Discussed findings and treatment plan including risks, benefits, and treatment options with patient in detail. Patient agreed with treatment plan.  Discussed with patient options for ingrown toenails including partial nail avulsion versus slant back.  Due to signs of infection, patient opts for slant back today.  Discussed with patient that if she continues to have trouble with her hallux nails, a partial versus total nail avulsion may be warranted.  Also discussed with patient that if nail is removed and the matrix is damaged, there is frequency that the nail will return with a similar thickness and shape.  After verbal consent obtained, nail(s) x2 debrided of length and thickness with nail nipper without incidence  Patient may maintain nails and calluses at home utilizing emery board or pumice stone between visits as needed   Patient will begin daily Epsom salt soaks to the left foot.  She will return in 2 weeks to determine if further treatment is needed.  I spent 20 minutes caring for Yun on this date of service. This time includes time spent by me in the following activities: performing a medically appropriate examination and/or evaluation, counseling and educating the patient/family/caregiver, and documenting information in the medical record   An After Visit Summary was printed and given to the patient at discharge, including (if requested) any available informative/educational handouts regarding diagnosis, treatment, or medications. All questions were answered to patient/family satisfaction. Should symptoms fail to improve or worsen they agree to call or return to clinic or to go to the Emergency  Department. Discussed the importance of following up with any needed screening tests/labs/specialist appointments and any requested follow-up recommended by me today. Importance of maintaining follow-up discussed and patient accepts that missed appointments can delay diagnosis and potentially lead to worsening of conditions.  Return in about 2 weeks (around 11/11/2024) for With Lisa DANIEL, [place]., or sooner if acute issues arise.    Lab Frequency Next Occurrence   Follow Anesthesia Guidelines / Standing Orders Once 09/29/2020   Obtain Informed Consent Once 10/04/2020       This document has been electronically signed by MARÍA Lewis on October 28, 2024 11:47 CDT

## 2024-10-28 ENCOUNTER — OFFICE VISIT (OUTPATIENT)
Age: 69
End: 2024-10-28
Payer: MEDICARE

## 2024-10-28 VITALS
HEIGHT: 62 IN | OXYGEN SATURATION: 98 % | HEART RATE: 96 BPM | DIASTOLIC BLOOD PRESSURE: 64 MMHG | SYSTOLIC BLOOD PRESSURE: 136 MMHG | BODY MASS INDEX: 29.08 KG/M2 | WEIGHT: 158 LBS

## 2024-10-28 DIAGNOSIS — L60.0 INGROWN NAIL OF GREAT TOE OF LEFT FOOT: Primary | ICD-10-CM

## 2024-10-28 DIAGNOSIS — L60.8 PINCER NAIL DEFORMITY: ICD-10-CM

## 2024-10-28 DIAGNOSIS — M79.675 GREAT TOE PAIN, LEFT: ICD-10-CM

## 2024-10-28 DIAGNOSIS — E11.9 TYPE 2 DIABETES MELLITUS WITHOUT COMPLICATION, WITHOUT LONG-TERM CURRENT USE OF INSULIN: ICD-10-CM

## 2024-10-28 DIAGNOSIS — E53.8 B12 DEFICIENCY: ICD-10-CM

## 2024-10-28 PROBLEM — Z78.9 DIFFICULTY WITH CPAP NASAL MASK USE: Status: ACTIVE | Noted: 2023-09-15

## 2024-10-28 PROBLEM — R92.8 ABNORMAL MAMMOGRAM: Status: ACTIVE | Noted: 2023-10-13

## 2024-10-28 PROBLEM — Z99.89 CPAP (CONTINUOUS POSITIVE AIRWAY PRESSURE) DEPENDENCE: Status: ACTIVE | Noted: 2022-02-28

## 2024-10-28 PROBLEM — R92.0 MAMMOGRAPHIC MICROCALCIFICATION: Status: ACTIVE | Noted: 2023-10-13

## 2024-10-28 PROBLEM — Z15.09 GENETIC SUSCEPTIBILITY TO OTHER MALIGNANT NEOPLASM: Chronic | Status: ACTIVE | Noted: 2021-12-08

## 2024-10-28 PROBLEM — Z85.3 PERSONAL HISTORY OF BREAST CANCER: Status: ACTIVE | Noted: 2021-12-08

## 2024-10-28 PROBLEM — R68.2 DRY MOUTH: Status: ACTIVE | Noted: 2023-09-15

## 2024-10-28 PROCEDURE — 1160F RVW MEDS BY RX/DR IN RCRD: CPT | Performed by: NURSE PRACTITIONER

## 2024-10-28 PROCEDURE — 99213 OFFICE O/P EST LOW 20 MIN: CPT | Performed by: NURSE PRACTITIONER

## 2024-10-28 PROCEDURE — 11720 DEBRIDE NAIL 1-5: CPT | Performed by: NURSE PRACTITIONER

## 2024-10-28 PROCEDURE — 1159F MED LIST DOCD IN RCRD: CPT | Performed by: NURSE PRACTITIONER

## 2024-10-28 NOTE — PATIENT INSTRUCTIONS
Patient will begin daily Epsom salt soaks. If area becomes swollen, reddened or has purulent drainage, contact the office.

## 2024-11-07 NOTE — PROGRESS NOTES
[facility] - PODIATRY    Today's Date: 11/11/2024     Patient Name: Yun Blackwell  MRN: [id number]  CSN: [id number]  PCP: ANGEL Healy MD  Referring Provider: No ref. provider found    SUBJECTIVE     Chief Complaint   Patient presents with    Follow-up     ANGEL Healy MD 10/21/2024 Return in about 2 weeks-pt states she is here today for ingrown toe nail pt is not sure if there is a problem with the screws that in her foot or if her ingrown toe nail-pt reports pain level 1/10    Diabetes     160 mg/dl BG     HPI: Yun Blackwell, a 68 y.o.female, comes to clinic as a(n) established patient complaining of toenail/callus issues. Patient has h/o AF, anemia, CA, DM, HLD, HTN, IBS, sleep apnea .  Patient reports she has continued to have minimal pain to her left hallux nail.  She reports she is diabetic and controls her glucose levels with her diet.  Patient is NIDDM with last stated BG level of 160 mg/dl.  Last A1c less than 6%.   Admits pain at 1/10 level and centered around left hallux when pressed . Patient is concerned about the hardware from the previous surgery being the cause of some of her toe pain. Relates previous treatment(s) including foot surgery . Denies any constitutional symptoms. No other pedal complaints at this time.    Past Medical History:   Diagnosis Date    A-fib     Abnormal ECG     tachycardia, a fib    Acid reflux     Anemia     Arthritis     Cancer     Diabetes mellitus     Hyperkalemia     Hyperlipidemia     Hypertension     Hyponatremia     IBS (irritable bowel syndrome)     PONV (postoperative nausea and vomiting)     Sleep apnea     USES CPAP    UTI (urinary tract infection)     UTI (urinary tract infection) 11/25/2016    Vaginal vault prolapse      Past Surgical History:   Procedure Laterality Date    BREAST BIOPSY Left     X2    BUNIONECTOMY Left     CARDIAC CATHETERIZATION      CARDIAC CATHETERIZATION N/A 02/05/2021    Procedure: Right Heart Cath;   Surgeon: Van Marcelino MD;  Location:  [facility];  Service: Cardiology;  Laterality: N/A;    CATARACT EXTRACTION, BILATERAL      COLON SURGERY      COLONOSCOPY  09/21/2015    TRANSVERSE COLON ADENOMATOUS POLYP, HEMORRHOIDS, RECALL 5 YEARS, DR LAMAS    COLONOSCOPY N/A 11/13/2020    6 mm tubular adenomatous polyp at 15 cm proximal to the anus,diverticulosis of the sigmoid colon    COLONOSCOPY N/A 12/13/2023    - One 4 mm polyp at 40 cm proximal to the anus,. - One 4 mm polyp at 20 cm proximal to the anus, removed with a cold snare. Resected and retrieved. - Diverticulosis in the sigmoid colon. - Hemorrhoids----tubular adenoma no evidence of high grade dysplasia    COLOSTOMY      COLPOPEXY VAGINAL      2014    ENDOSCOPY N/A 11/03/2016    LA GRADE B ESOPHAGITIS WITH NO BLEEDING UPPER THIRD OF ESOPHAGUS, GERI-[place]TH TEAR GEJ, BILIOUS BLOOD TINGED COFFEE GROUND GATRIC FLUIDDR LAMAS    ENDOSCOPY N/A 12/13/2023    Normal examined duodenum. - Normal stomach. Biopsied. - Z-line regular, 35 cm from the incisors. Dilated.-neg for h pylori    EXPLORATORY LAPAROTOMY N/A 10/14/2016    Procedure: LAPAROTOMY EXPLORATORY, LYSIS OF ADHESIONS, IRRIAGATION OF ABDOMEN, POSSIBLE BOWEL RESECTION;  Surgeon: Bacilio Marcial MD;  Location:  [facility] OR;  Service:     FOOT NAVICULAR EXCISION OR BONE GRAFT Left 01/04/2023    Procedure: Expansion Graft, Harvesting of Bone Graft/Bone Marrow Aspirate;  Surgeon: Frankie Zapata DPM;  Location:  [facility];  Service: Podiatry;  Laterality: Left;    HAMMER TOE REPAIR Left 01/04/2023    Procedure: Hammertoe Repair 2 and 3 - Left Foot;  Surgeon: Frankie Zapata DPM;  Location:  [facility];  Service: Podiatry;  Laterality: Left;    HARDWARE REMOVAL Left 01/04/2023    Procedure: Hardware Removal;  Surgeon: Frankie Zapata DPM;  Location:  [facility];  Service: Podiatry;  Laterality: Left;    HYSTERECTOMY      REVISION / TAKEDOWN COLOSTOMY      SACROCOLPOPEXY N/A 09/21/2016     Procedure: SACROCOLPOPEXY LAPAROSCOPIC WITH DAVINCI SI ROBOT, CONVERTED TO OPEN SACROCOLPOPEXY, RIGHT SALPINGO- OOPHERECTOMY, CYSTO;  Surgeon: Maribell Beth MD;  Location:  [facility];  Service:     TOE FUSION Left 01/06/2022    Procedure: FIRST METATARSOPHALANGEAL JOINT ARTHRODESIS WITH INTERNAL FIXATION - LEFT FOOT;  Surgeon: Jorgito Red DPM;  Location:  [facility];  Service: Podiatry;  Laterality: Left;    TOE FUSION Left 01/04/2023    Procedure: Revisional 1st Metatarsophalangeal Joint Arthrodesis with Hardware Removal and Expansion Graft, Harvesting of Bone Graft/Bone Marrow Aspirate, Hammertoe Repair 2 and 3 - Left Foot;  Surgeon: Frankie Zapata DPM;  Location:  [facility];  Service: Podiatry;  Laterality: Left;     Family History   Problem Relation Age of Onset    Hypertension Mother     No Known Problems Father     Colon cancer Neg Hx     Colon polyps Neg Hx      Social History     Socioeconomic History    Marital status:    Tobacco Use    Smoking status: Never     Passive exposure: Never    Smokeless tobacco: Never   Vaping Use    Vaping status: Never Used   Substance and Sexual Activity    Alcohol use: No    Drug use: No    Sexual activity: Defer     Allergies   Allergen Reactions    Morphine And Codeine Nausea And Vomiting and Dizziness    Percocet [Oxycodone-Acetaminophen] Nausea And Vomiting and Dizziness     Current Outpatient Medications   Medication Sig Dispense Refill    cyanocobalamin 1000 MCG/ML injection Inject 1 mL Every 30 (Thirty) Days as directed. 1 mL 11    dabigatran etexilate (PRADAXA) 150 MG capsu Take 1 capsule by mouth 2 (Two) Times a Day. 60 capsule 11    dicyclomine (BENTYL) 10 MG capsule Take 1 capsule by mouth 4 (Four) Times a Day Before Meals & at Bedtime. 120 capsule 10    dilTIAZem CD (Cardizem CD) 120 MG 24 hr capsule Take 1 capsule by mouth Daily. 30 capsule 11    hydrOXYzine (ATARAX) 10 MG tablet Take 1-2 tablets by mouth Every 4 (Four) to 6 (Six) Hours As Needed.  30 tablet 3    Incontinence Supply Disposable (Comfort Shield Adult Diapers) misc Use 1 each Every 3 (Three) Hours. 240 each 5    montelukast (SINGULAIR) 10 MG tablet Take 1 tablet by mouth Every Night. 30 tablet 11    omeprazole (priLOSEC) 20 MG capsule Take 1 capsule by mouth Daily. 30 capsule 11    tamoxifen (NOLVADEX) 20 MG chemo tablet Take 1 tablet by mouth Daily. 90 tablet 1    tiZANidine (ZANAFLEX) 4 MG tablet Take 1 tablet by mouth Daily As Needed for Muscle Spasms. 30 tablet 2    triamcinolone (KENALOG) 0.1 % ointment Apply 1 Application topically to the appropriate area as directed 2 (Two) Times a Day As Needed when and where itching is present as needed. 454 g 3    hydrOXYzine (ATARAX) 10 MG tablet Take 1 to 2 tablets by mouth Every 4 (Four) Hours As Needed. (Patient not taking: Reported on 11/11/2024) 60 tablet 3     No current facility-administered medications for this visit.     Review of Systems   Constitutional:  Negative for chills and fever.   HENT:  Negative for congestion.    Respiratory:  Negative for shortness of breath.    Cardiovascular:  Negative for chest pain and leg swelling.   Gastrointestinal:  Negative for constipation, diarrhea, nausea and vomiting.   Musculoskeletal:  Positive for arthralgias. Negative for gait problem.   Skin:  Negative for wound.   Neurological:  Negative for numbness.       OBJECTIVE     Vitals:    11/11/24 0841   BP: 110/80   Pulse: 111   SpO2: 95%       PHYSICAL EXAM  GEN:   Accompanied by none.     Foot/Ankle Exam    GENERAL  Appearance:  appears stated age  Orientation:  AAOx3  Affect:  appropriate  Assistance:  independent  Right shoe gear: casual shoe  Left shoe gear: casual shoe    VASCULAR     Right Foot Vascularity   Dorsalis pedis:  2+  Posterior tibial:  2+  Skin temperature:  warm  Edema grading:  None  CFT:  3  Pedal hair growth:  Present  Varicosities:  mild varicosities     Left Foot Vascularity   Dorsalis pedis:  2+  Posterior tibial:  2+  Skin  temperature:  warm  Edema grading:  None  CFT:  3  Pedal hair growth:  Present  Varicosities:  mild varicosities     NEUROLOGIC     Right Foot Neurologic   Normal sensation    Light touch sensation: normal  Vibratory sensation: normal  Hot/Cold sensation: normal  Protective Sensation using [place] Monofilament:   Sites intact: 10  Sites tested: 10     Left Foot Neurologic   Normal sensation    Light touch sensation: normal  Vibratory sensation: normal  Hot/Cold sensation:  normal  Protective Sensation using [place]ifer Monofilament:   Sites intact: 10  Sites tested: 10    MUSCULOSKELETAL     Right Foot Musculoskeletal   Tenderness:  toe 1 tenderness    Arch:  Normal  Hammertoe:  Second toe, third toe, fourth toe and fifth toe  Hallux valgus: Yes (large medial eminence with dorsal spurring. Moderately trackbound. Minimal crepitus.)       Left Foot Musculoskeletal   Tenderness:  toe 1 tenderness  Arch:  Normal  Stiff great toe joint: s/p arthodesis.      MUSCLE STRENGTH     Right Foot Muscle Strength   Foot dorsiflexion:  5  Foot plantar flexion:  5  Foot inversion:  5  Foot eversion:  5     Left Foot Muscle Strength   Foot dorsiflexion:  5  Foot plantar flexion:  5  Foot inversion:  5  Foot eversion:  5    RANGE OF MOTION     Right Foot Range of Motion   Foot and ankle ROM within normal limits       Left Foot Range of Motion   Foot and ankle ROM within normal limits      DERMATOLOGIC      Right Foot Dermatologic   Skin  Right foot skin is intact.   Nails  1.  Positive for elongated and abnormal thickness.     Left Foot Dermatologic   Skin  Left foot skin is intact.   Nails  1.  Positive for elongated, abnormal thickness and pincer.      RADIOLOGY/NUCLEAR:  No results found.      LABORATORY/CULTURE RESULTS:      PATHOLOGY RESULTS:       ASSESSMENT/PLAN     Diagnoses and all orders for this visit:    1. Great toe pain, left (Primary)  -     XR Foot 3+ View Left; Future    2. Onychomycosis    3. Type 2  diabetes mellitus without complication, without long-term current use of insulin    4. B12 deficiency    5. Pincer nail deformity    6. Foot pain, left    7. Pain due to onychomycosis of toenail      Comprehensive lower extremity examination and evaluation was performed.  Discussed findings and treatment plan including risks, benefits, and treatment options with patient in detail. Patient agreed with treatment plan.  Discussed with patient that if she continues to have trouble with her hallux nails, a partial versus total nail avulsion may be warranted.  Also discussed with patient that if nail is removed and the matrix is damaged, there is frequency that the nail will return with a similar thickness and shape.  Patient may maintain nails and calluses at home utilizing emery board or pumice stone between visits as needed   Patient will obtain xray of left foot due to continued left toe pain.   An After Visit Summary was printed and given to the patient at discharge, including (if requested) any available informative/educational handouts regarding diagnosis, treatment, or medications. All questions were answered to patient/family satisfaction. Should symptoms fail to improve or worsen they agree to call or return to clinic or to go to the Emergency Department. Discussed the importance of following up with any needed screening tests/labs/specialist appointments and any requested follow-up recommended by me today. Importance of maintaining follow-up discussed and patient accepts that missed appointments can delay diagnosis and potentially lead to worsening of conditions.  Return in about 10 weeks (around 1/20/2025) for With Lisa DANIEL, Schedule [facility]., or sooner if acute issues arise.    Lab Frequency Next Occurrence   Follow Anesthesia Guidelines / Standing Orders Once 09/29/2020   Obtain Informed Consent Once 10/04/2020       This document has been electronically signed by MARÍA Lewis on  November 11, 2024 09:31 CST

## 2024-11-11 ENCOUNTER — OFFICE VISIT (OUTPATIENT)
Age: 69
End: 2024-11-11
Payer: MEDICARE

## 2024-11-11 ENCOUNTER — HOSPITAL ENCOUNTER (OUTPATIENT)
Dept: GENERAL RADIOLOGY | Facility: HOSPITAL | Age: 69
Discharge: HOME | End: 2024-11-11
Admitting: NURSE PRACTITIONER
Payer: MEDICARE

## 2024-11-11 VITALS
SYSTOLIC BLOOD PRESSURE: 110 MMHG | DIASTOLIC BLOOD PRESSURE: 80 MMHG | HEIGHT: 62 IN | BODY MASS INDEX: 29.26 KG/M2 | WEIGHT: 159 LBS | HEART RATE: 111 BPM | OXYGEN SATURATION: 95 %

## 2024-11-11 DIAGNOSIS — M79.672 FOOT PAIN, LEFT: ICD-10-CM

## 2024-11-11 DIAGNOSIS — B35.1 PAIN DUE TO ONYCHOMYCOSIS OF TOENAIL: ICD-10-CM

## 2024-11-11 DIAGNOSIS — M79.675 GREAT TOE PAIN, LEFT: Primary | ICD-10-CM

## 2024-11-11 DIAGNOSIS — M79.676 PAIN DUE TO ONYCHOMYCOSIS OF TOENAIL: ICD-10-CM

## 2024-11-11 DIAGNOSIS — E11.9 TYPE 2 DIABETES MELLITUS WITHOUT COMPLICATION, WITHOUT LONG-TERM CURRENT USE OF INSULIN: ICD-10-CM

## 2024-11-11 DIAGNOSIS — L60.8 PINCER NAIL DEFORMITY: ICD-10-CM

## 2024-11-11 DIAGNOSIS — M79.675 GREAT TOE PAIN, LEFT: ICD-10-CM

## 2024-11-11 DIAGNOSIS — E53.8 B12 DEFICIENCY: ICD-10-CM

## 2024-11-11 DIAGNOSIS — B35.1 ONYCHOMYCOSIS: ICD-10-CM

## 2024-11-11 PROCEDURE — 1160F RVW MEDS BY RX/DR IN RCRD: CPT | Performed by: NURSE PRACTITIONER

## 2024-11-11 PROCEDURE — 73630 X-RAY EXAM OF FOOT: CPT

## 2024-11-11 PROCEDURE — 99213 OFFICE O/P EST LOW 20 MIN: CPT | Performed by: NURSE PRACTITIONER

## 2024-11-11 PROCEDURE — 1159F MED LIST DOCD IN RCRD: CPT | Performed by: NURSE PRACTITIONER

## 2024-11-13 RX ORDER — DILTIAZEM HYDROCHLORIDE 120 MG/1
120 CAPSULE, COATED, EXTENDED RELEASE ORAL DAILY
Qty: 30 CAPSULE | Refills: 11 | Status: SHIPPED | OUTPATIENT
Start: 2024-11-13

## 2024-12-03 DIAGNOSIS — R19.7 DIARRHEA, UNSPECIFIED TYPE: Primary | ICD-10-CM

## 2024-12-04 RX ORDER — DICYCLOMINE HYDROCHLORIDE 10 MG/1
10 CAPSULE ORAL
Qty: 120 CAPSULE | Refills: 10 | Status: SHIPPED | OUTPATIENT
Start: 2024-12-04

## 2024-12-05 ENCOUNTER — HOSPITAL ENCOUNTER (OUTPATIENT)
Dept: WOMENS IMAGING | Age: 69
Discharge: HOME | End: 2024-12-05
Payer: MEDICARE

## 2024-12-05 DIAGNOSIS — R92.8 ABNORMAL MAMMOGRAM: ICD-10-CM

## 2024-12-05 PROCEDURE — 77065 DX MAMMO INCL CAD UNI: CPT

## 2024-12-20 ENCOUNTER — HOSPITAL ENCOUNTER (INPATIENT)
Facility: HOSPITAL | Age: 69
LOS: 2 days | Discharge: HOME | End: 2024-12-22
Attending: EMERGENCY MEDICINE | Admitting: INTERNAL MEDICINE
Payer: MEDICARE

## 2024-12-20 ENCOUNTER — TELEPHONE (OUTPATIENT)
Dept: INTERNAL MEDICINE | Facility: CLINIC | Age: 69
End: 2024-12-20
Payer: MEDICARE

## 2024-12-20 ENCOUNTER — APPOINTMENT (OUTPATIENT)
Dept: CT IMAGING | Facility: HOSPITAL | Age: 69
End: 2024-12-20
Payer: MEDICARE

## 2024-12-20 DIAGNOSIS — K62.5 GASTROINTESTINAL HEMORRHAGE ASSOCIATED WITH ANORECTAL SOURCE: ICD-10-CM

## 2024-12-20 DIAGNOSIS — K92.2 GASTROINTESTINAL HEMORRHAGE, UNSPECIFIED GASTROINTESTINAL HEMORRHAGE TYPE: Primary | ICD-10-CM

## 2024-12-20 DIAGNOSIS — K80.20 CALCULUS OF GALLBLADDER WITHOUT CHOLECYSTITIS WITHOUT OBSTRUCTION: ICD-10-CM

## 2024-12-20 DIAGNOSIS — I51.9 RIGHT VENTRICULAR DYSFUNCTION: ICD-10-CM

## 2024-12-20 LAB
ALBUMIN SERPL-MCNC: 3.6 G/DL (ref 3.5–5.2)
ALBUMIN/GLOB SERPL: 1.3 G/DL
ALP SERPL-CCNC: 98 U/L (ref 39–117)
ALT SERPL W P-5'-P-CCNC: 11 U/L (ref 1–33)
ANION GAP SERPL CALCULATED.3IONS-SCNC: 10 MMOL/L (ref 5–15)
AST SERPL-CCNC: 20 U/L (ref 1–32)
BILIRUB SERPL-MCNC: 0.8 MG/DL (ref 0–1.2)
BUN SERPL-MCNC: 17 MG/DL (ref 8–23)
BUN/CREAT SERPL: 38.6 (ref 7–25)
CALCIUM SPEC-SCNC: 8.6 MG/DL (ref 8.6–10.5)
CHLORIDE SERPL-SCNC: 108 MMOL/L (ref 98–107)
CO2 SERPL-SCNC: 25 MMOL/L (ref 22–29)
CREAT SERPL-MCNC: 0.44 MG/DL (ref 0.57–1)
DEPRECATED RDW RBC AUTO: 46.5 FL (ref 37–54)
DEVELOPER EXPIRATION DATE: NORMAL
DEVELOPER LOT NUMBER: 275
EGFRCR SERPLBLD CKD-EPI 2021: 103.6 ML/MIN/1.73
ERYTHROCYTE [DISTWIDTH] IN BLOOD BY AUTOMATED COUNT: 13.5 % (ref 12.3–15.4)
EXPIRATION DATE: NORMAL
FECAL OCCULT BLOOD SCREEN, POC: NEGATIVE
GLOBULIN UR ELPH-MCNC: 2.7 GM/DL
GLUCOSE SERPL-MCNC: 100 MG/DL (ref 65–99)
HCT VFR BLD AUTO: 37.5 % (ref 34–46.6)
HGB BLD-MCNC: 11.8 G/DL (ref 12–15.9)
INR PPP: 1.31 (ref 0.91–1.09)
Lab: 275
MCH RBC QN AUTO: 29.4 PG (ref 26.6–33)
MCHC RBC AUTO-ENTMCNC: 31.5 G/DL (ref 31.5–35.7)
MCV RBC AUTO: 93.5 FL (ref 79–97)
NEGATIVE CONTROL: NEGATIVE
PLATELET # BLD AUTO: 126 10*3/MM3 (ref 140–450)
PMV BLD AUTO: 11.8 FL (ref 6–12)
POSITIVE CONTROL: POSITIVE
POTASSIUM SERPL-SCNC: 3.8 MMOL/L (ref 3.5–5.2)
PROT SERPL-MCNC: 6.3 G/DL (ref 6–8.5)
PROTHROMBIN TIME: 16.8 SECONDS (ref 11.8–14.8)
RBC # BLD AUTO: 4.01 10*6/MM3 (ref 3.77–5.28)
SODIUM SERPL-SCNC: 143 MMOL/L (ref 136–145)
WBC NRBC COR # BLD AUTO: 7.44 10*3/MM3 (ref 3.4–10.8)

## 2024-12-20 PROCEDURE — 80053 COMPREHEN METABOLIC PANEL: CPT | Performed by: EMERGENCY MEDICINE

## 2024-12-20 PROCEDURE — 85025 COMPLETE CBC W/AUTO DIFF WBC: CPT | Performed by: EMERGENCY MEDICINE

## 2024-12-20 PROCEDURE — 85610 PROTHROMBIN TIME: CPT | Performed by: EMERGENCY MEDICINE

## 2024-12-20 PROCEDURE — 74174 CTA ABD&PLVS W/CONTRAST: CPT

## 2024-12-20 PROCEDURE — 25510000001 IOPAMIDOL PER 1 ML: Performed by: EMERGENCY MEDICINE

## 2024-12-20 PROCEDURE — 99285 EMERGENCY DEPT VISIT HI MDM: CPT

## 2024-12-20 PROCEDURE — 36415 COLL VENOUS BLD VENIPUNCTURE: CPT

## 2024-12-20 PROCEDURE — 82270 OCCULT BLOOD FECES: CPT | Performed by: EMERGENCY MEDICINE

## 2024-12-20 RX ORDER — ACETAMINOPHEN 160 MG/5ML
650 SOLUTION ORAL EVERY 4 HOURS PRN
Status: DISCONTINUED | OUTPATIENT
Start: 2024-12-20 | End: 2024-12-22 | Stop reason: HOSPADM

## 2024-12-20 RX ORDER — HYDROCORTISONE ACETATE 25 MG/1
25 SUPPOSITORY RECTAL 2 TIMES DAILY
Status: DISCONTINUED | OUTPATIENT
Start: 2024-12-21 | End: 2024-12-22 | Stop reason: HOSPADM

## 2024-12-20 RX ORDER — IOPAMIDOL 755 MG/ML
100 INJECTION, SOLUTION INTRAVASCULAR
Status: COMPLETED | OUTPATIENT
Start: 2024-12-20 | End: 2024-12-20

## 2024-12-20 RX ORDER — SODIUM CHLORIDE 0.9 % (FLUSH) 0.9 %
10 SYRINGE (ML) INJECTION EVERY 12 HOURS SCHEDULED
Status: DISCONTINUED | OUTPATIENT
Start: 2024-12-21 | End: 2024-12-22 | Stop reason: HOSPADM

## 2024-12-20 RX ORDER — SODIUM CHLORIDE 0.9 % (FLUSH) 0.9 %
10 SYRINGE (ML) INJECTION AS NEEDED
Status: DISCONTINUED | OUTPATIENT
Start: 2024-12-20 | End: 2024-12-22 | Stop reason: HOSPADM

## 2024-12-20 RX ORDER — PANTOPRAZOLE SODIUM 40 MG/1
40 TABLET, DELAYED RELEASE ORAL
Status: DISCONTINUED | OUTPATIENT
Start: 2024-12-21 | End: 2024-12-22 | Stop reason: HOSPADM

## 2024-12-20 RX ORDER — ACETAMINOPHEN 325 MG/1
650 TABLET ORAL EVERY 4 HOURS PRN
Status: DISCONTINUED | OUTPATIENT
Start: 2024-12-20 | End: 2024-12-22 | Stop reason: HOSPADM

## 2024-12-20 RX ORDER — MONTELUKAST SODIUM 10 MG/1
10 TABLET ORAL DAILY
Status: DISCONTINUED | OUTPATIENT
Start: 2024-12-21 | End: 2024-12-22 | Stop reason: HOSPADM

## 2024-12-20 RX ORDER — SODIUM CHLORIDE 9 MG/ML
40 INJECTION, SOLUTION INTRAVENOUS AS NEEDED
Status: DISCONTINUED | OUTPATIENT
Start: 2024-12-20 | End: 2024-12-22 | Stop reason: HOSPADM

## 2024-12-20 RX ORDER — DILTIAZEM HYDROCHLORIDE 120 MG/1
120 CAPSULE, COATED, EXTENDED RELEASE ORAL DAILY
Status: DISCONTINUED | OUTPATIENT
Start: 2024-12-21 | End: 2024-12-22 | Stop reason: HOSPADM

## 2024-12-20 RX ORDER — ACETAMINOPHEN 650 MG/1
650 SUPPOSITORY RECTAL EVERY 4 HOURS PRN
Status: DISCONTINUED | OUTPATIENT
Start: 2024-12-20 | End: 2024-12-22 | Stop reason: HOSPADM

## 2024-12-20 RX ADMIN — IOPAMIDOL 100 ML: 755 INJECTION, SOLUTION INTRAVENOUS at 19:19

## 2024-12-20 NOTE — ED PROVIDER NOTES
Subjective   History of Present Illness  69 years old came to the ER with complaints of blood in the stool.  When I went to see her in the room she wanted to pick her ear ring  from the floor once I gave her the earring she stated that she went o home and she started having spontaneous bowel movements and wiped herself and there was blood on the toilet paper she then produced a plastic bag with the same toilet paper in it from her pocket  Patient denies any abdominal pain she states that she talked to her primary physician and was told to go to the ER immediately and therefore came to the ER.  She has had a history of GI bleeding in the past which was taken to polyps that she does not remember exactly what happened at that time.    Illness  Location:  Lower GI bleeding  Severity:  Moderate  Onset quality:  Sudden  Timing:  Intermittent  Chronicity:  New  Associated symptoms: no abdominal pain, no chest pain, no congestion, no cough, no diarrhea, no fever, no headaches, no loss of consciousness, no myalgias, no rhinorrhea, no sore throat and no vomiting        Review of Systems   Constitutional: Negative.  Negative for fever.   HENT: Negative.  Negative for congestion, rhinorrhea and sore throat.    Eyes: Negative.    Respiratory: Negative.  Negative for cough.    Cardiovascular: Negative.  Negative for chest pain.   Gastrointestinal: Negative.  Negative for abdominal pain, diarrhea and vomiting.   Musculoskeletal: Negative.  Negative for back pain, myalgias and neck pain.   Skin: Negative.    Neurological: Negative.  Negative for loss of consciousness and headaches.   All other systems reviewed and are negative.      Past Medical History:   Diagnosis Date    A-fib     Abnormal ECG     tachycardia, a fib    Acid reflux     Anemia     Arthritis     Cancer     Diabetes mellitus     Hyperkalemia     Hyperlipidemia     Hypertension     Hyponatremia     IBS (irritable bowel syndrome)     PONV (postoperative nausea and  vomiting)     Sleep apnea     USES CPAP    UTI (urinary tract infection)     UTI (urinary tract infection) 11/25/2016    Vaginal vault prolapse        Allergies   Allergen Reactions    Morphine And Codeine Nausea And Vomiting and Dizziness    Percocet [Oxycodone-Acetaminophen] Nausea And Vomiting and Dizziness       Past Surgical History:   Procedure Laterality Date    BREAST BIOPSY Left     X2    BUNIONECTOMY Left     CARDIAC CATHETERIZATION      CARDIAC CATHETERIZATION N/A 02/05/2021    Procedure: Right Heart Cath;  Surgeon: Van Marcelino MD;  Location:  [facility];  Service: Cardiology;  Laterality: N/A;    CATARACT EXTRACTION, BILATERAL      COLON SURGERY      COLONOSCOPY  09/21/2015    TRANSVERSE COLON ADENOMATOUS POLYP, HEMORRHOIDS, RECALL 5 YEARS, DR LAMAS    COLONOSCOPY N/A 11/13/2020    6 mm tubular adenomatous polyp at 15 cm proximal to the anus,diverticulosis of the sigmoid colon    COLONOSCOPY N/A 12/13/2023    - One 4 mm polyp at 40 cm proximal to the anus,. - One 4 mm polyp at 20 cm proximal to the anus, removed with a cold snare. Resected and retrieved. - Diverticulosis in the sigmoid colon. - Hemorrhoids----tubular adenoma no evidence of high grade dysplasia    COLOSTOMY      COLPOPEXY VAGINAL      2014    ENDOSCOPY N/A 11/03/2016    LA GRADE B ESOPHAGITIS WITH NO BLEEDING UPPER THIRD OF ESOPHAGUS, GERI-[place] TEAR GEJ, BILIOUS BLOOD TINGED COFFEE GROUND GATRIC FLUIDDR LAMAS    ENDOSCOPY N/A 12/13/2023    Normal examined duodenum. - Normal stomach. Biopsied. - Z-line regular, 35 cm from the incisors. Dilated.-neg for h pylori    EXPLORATORY LAPAROTOMY N/A 10/14/2016    Procedure: LAPAROTOMY EXPLORATORY, LYSIS OF ADHESIONS, IRRIAGATION OF ABDOMEN, POSSIBLE BOWEL RESECTION;  Surgeon: Bacilio Marcial MD;  Location: [facility] OR;  Service:     FOOT NAVICULAR EXCISION OR BONE GRAFT Left 01/04/2023    Procedure: Expansion Graft, Harvesting of Bone Graft/Bone Marrow Aspirate;   Surgeon: Frankie Zapata DPM;  Location:  [facility] OR;  Service: Podiatry;  Laterality: Left;    HAMMER TOE REPAIR Left 01/04/2023    Procedure: Hammertoe Repair 2 and 3 - Left Foot;  Surgeon: Frankie Zapata DPM;  Location:  [facility] OR;  Service: Podiatry;  Laterality: Left;    HARDWARE REMOVAL Left 01/04/2023    Procedure: Hardware Removal;  Surgeon: Frankie Zapata DPM;  Location:  [facility] OR;  Service: Podiatry;  Laterality: Left;    HYSTERECTOMY      REVISION / TAKEDOWN COLOSTOMY      SACROCOLPOPEXY N/A 09/21/2016    Procedure: SACROCOLPOPEXY LAPAROSCOPIC WITH [employer] ROBOT, CONVERTED TO OPEN SACROCOLPOPEXY, RIGHT SALPINGO- OOPHERECTOMY, CYSTO;  Surgeon: Maribell Beth MD;  Location:  [facility] OR;  Service:     TOE FUSION Left 01/06/2022    Procedure: FIRST METATARSOPHALANGEAL JOINT ARTHRODESIS WITH INTERNAL FIXATION - LEFT FOOT;  Surgeon: Jorgito Red DPM;  Location:  [facility] OR;  Service: Podiatry;  Laterality: Left;    TOE FUSION Left 01/04/2023    Procedure: Revisional 1st Metatarsophalangeal Joint Arthrodesis with Hardware Removal and Expansion Graft, Harvesting of Bone Graft/Bone Marrow Aspirate, Hammertoe Repair 2 and 3 - Left Foot;  Surgeon: Frankie Zapata DPM;  Location:  [facility] OR;  Service: Podiatry;  Laterality: Left;       Family History   Problem Relation Age of Onset    Hypertension Mother     No Known Problems Father     Colon cancer Neg Hx     Colon polyps Neg Hx        Social History     Socioeconomic History    Marital status:    Tobacco Use    Smoking status: Never     Passive exposure: Never    Smokeless tobacco: Never   Vaping Use    Vaping status: Never Used   Substance and Sexual Activity    Alcohol use: No    Drug use: No    Sexual activity: Defer           Objective   Physical Exam  Vitals and nursing note reviewed. Exam conducted with a chaperone present.   Constitutional:       General: She is awake. She is not in acute distress.     Appearance: Normal appearance. She  is well-developed. She is not toxic-appearing.   HENT:      Head: Normocephalic and atraumatic.      Nose:      Right Nostril: No epistaxis.      Left Nostril: No epistaxis.   Eyes:      General: Lids are normal. No scleral icterus.     Conjunctiva/sclera: Conjunctivae normal.      Pupils: Pupils are equal, round, and reactive to light.   Neck:      Vascular: No hepatojugular reflux or JVD.   Cardiovascular:      Rate and Rhythm: Normal rate and regular rhythm.      Chest Wall: PMI is not displaced.      Pulses: Normal pulses. No decreased pulses.      Heart sounds: Normal heart sounds. No murmur heard.  Pulmonary:      Effort: Pulmonary effort is normal. No accessory muscle usage or respiratory distress.      Breath sounds: Normal breath sounds. No decreased breath sounds or wheezing.   Abdominal:      General: Abdomen is flat. Bowel sounds are normal. There is no distension or abdominal bruit.      Palpations: Abdomen is soft. There is no shifting dullness, fluid wave, mass or pulsatile mass.      Tenderness: There is no abdominal tenderness. There is no right CVA tenderness, left CVA tenderness, guarding or rebound.      Hernia: No hernia is present.   Genitourinary:     Rectum: Guaiac result negative.   Musculoskeletal:         General: Normal range of motion.      Cervical back: Normal range of motion and neck supple. No rigidity.      Right lower leg: No edema.      Left lower leg: No edema.   Skin:     General: Skin is warm and dry.      Capillary Refill: Capillary refill takes less than 2 seconds.      Coloration: Skin is not cyanotic, jaundiced, mottled or pale.   Neurological:      General: No focal deficit present.      Mental Status: She is alert and oriented to person, place, and time. Mental status is at baseline.      GCS: GCS eye subscore is 4. GCS verbal subscore is 5. GCS motor subscore is 6.      Cranial Nerves: No cranial nerve deficit.      Sensory: Sensation is intact.      Motor: Motor  function is intact.      Deep Tendon Reflexes: Reflexes are normal and symmetric.   Psychiatric:         Behavior: Behavior normal. Behavior is cooperative.         Procedures           ED Course  ED Course as of 12/20/24 2136   Fri Dec 20, 2024   2050 . No acute intra-abdominal or pelvic abnormality, no CT findings to  explain the bloody stools. There is diverticulosis of the descending and  proximal sigmoid colon without evidence of diverticulitis. Postop  changes associated with the sigmoid colon and small bowel loop in the  central pelvis appear stable. No evidence of bowel obstruction or mass  effect.  2. Multiple gallstones with partial contraction of the gallbladder. No  evidence of acute cholecystitis.  3. Multiple ventral hernias, including a 5.3 cm ventral hernia to the  left of the umbilicus which contains fat and a nonobstructed small bowel  loop.  Findings discussed the patient with incidental findings discussed with the patient and the patient was also informed to follow-up for RV problems as noted on the CT scan as an outpatient does not any shortness of breath or chest pain anything else going on at this time. [TS]   2057 Came in with possibility of GI bleeding.  Lab workup essentially unremarkable.  Hemoglobin is baseline no elevated BUN and CT scan angio is negative for any GI bleeding [TS]   2057  some incidental findings were noted which have been discussed with the patient. [TS]   2058 Glascow Blatchford score of 1 [TS]   2059   Left ventricular systolic function is low normal. Left ventricular ejection fraction appears to be 51 - 55%.  ·  Severe biatrial enlargement.  ·  Moderate tricuspid valve regurgitation is present.  ·  Estimated right ventricular systolic pressure from tricuspid regurgitation is mildly elevated (35-45 mmHg).  ·  The right ventricular cavity is moderately dilated, with normal systolic function.  ·  No other significant (greater than mild) valvular pathology.  ·  Compared  to most recent transthoracic exam (from 11/24/2020), no obvious or significant change.   [TS]   2103 Patient is a 69-year-old lady who came in with GI bleeding workup essentially is negative.  I reviewed her echo recently her EF was 35% but does have right ventricle cavity dilatation so that something that she is aware off and needs a follow-up as an outpatient with. [TS]   2110 Patient got up and started passing blood again in the bathroom.  She will go ahead and admit her to the hospital. [TS]      ED Course User Index  [TS] Andreas Flaherty MD                                                       Medical Decision Making  I considered upper gastrointestinal etiology, gastritis, gastroenteritis, peptic ulcer disease, gastroesophageal reflux disease, esophagitis, Berta-Owens tear of the esophagus, lower gastrointestinal etiology, ischemia of bowel, colitis, diverticular disease, intestinal polyps, colon cancer, arteriovenous malformation, angiodysplasia, Osler-Weber-Rendu syndrome, hemorrhoids, bleeding secondary to drugs, NSAIDS use and anticoagulant therapy as a possible cause of GI bleed in this patient.    Problems Addressed:  Calculus of gallbladder without cholecystitis without obstruction: complicated acute illness or injury  Gastrointestinal hemorrhage, unspecified gastrointestinal hemorrhage type: complicated acute illness or injury  Right ventricular dysfunction: complicated acute illness or injury    Amount and/or Complexity of Data Reviewed  Labs: ordered.     Details: Lab workup is within normal limits  Radiology: ordered.    Risk  Prescription drug management.  Risk Details: Patient given GI bleeding Hemoccult stool was negative she is got cholelithiasis patient is follow-up further evaluation.  Will require colonoscopy as an outpatient.  Patient be advised to return there for any worsening symptoms.  There is no abdominal pain associate with this.        Final diagnoses:   Gastrointestinal hemorrhage,  unspecified gastrointestinal hemorrhage type   Calculus of gallbladder without cholecystitis without obstruction   Right ventricular dysfunction       ED Disposition  ED Disposition       ED Disposition   Decision to Admit    Condition   --    Comment   Level of Care: Med/Surg [1]   Diagnosis: GI bleeding [[id number]]   Admitting Physician: NORMAN FORMAN [[id number]]   Attending Physician: NORMAN FORMAN [[id number]]   Certification: I Certify That Inpatient Hospital Services Are Medically Necessary For Greater Than 2 Midnights                 ANGEL Healy MD  [address] [place] [id number]  [phone]    Schedule an appointment as soon as possible for a visit in 2 days           Medication List      No changes were made to your prescriptions during this visit.            Andreas Flaherty MD  12/20/24 1753       Andreas Flaherty MD  12/20/24 2107       Andreas Flaherty MD  12/20/24 2130

## 2024-12-21 LAB
ANION GAP SERPL CALCULATED.3IONS-SCNC: 9 MMOL/L (ref 5–15)
BASOPHILS # BLD AUTO: 0.03 10*3/MM3 (ref 0–0.2)
BASOPHILS NFR BLD AUTO: 0.5 % (ref 0–1.5)
BUN SERPL-MCNC: 12 MG/DL (ref 8–23)
BUN/CREAT SERPL: 28.6 (ref 7–25)
CALCIUM SPEC-SCNC: 8.8 MG/DL (ref 8.6–10.5)
CHLORIDE SERPL-SCNC: 107 MMOL/L (ref 98–107)
CO2 SERPL-SCNC: 26 MMOL/L (ref 22–29)
CREAT SERPL-MCNC: 0.42 MG/DL (ref 0.57–1)
DEPRECATED RDW RBC AUTO: 46 FL (ref 37–54)
EGFRCR SERPLBLD CKD-EPI 2021: 104.7 ML/MIN/1.73
EOSINOPHIL # BLD AUTO: 0.09 10*3/MM3 (ref 0–0.4)
EOSINOPHIL NFR BLD AUTO: 1.4 % (ref 0.3–6.2)
ERYTHROCYTE [DISTWIDTH] IN BLOOD BY AUTOMATED COUNT: 13.6 % (ref 12.3–15.4)
GLUCOSE SERPL-MCNC: 100 MG/DL (ref 65–99)
HCT VFR BLD AUTO: 35.8 % (ref 34–46.6)
HGB BLD-MCNC: 11.4 G/DL (ref 12–15.9)
IMM GRANULOCYTES # BLD AUTO: 0.02 10*3/MM3 (ref 0–0.05)
IMM GRANULOCYTES NFR BLD AUTO: 0.3 % (ref 0–0.5)
LYMPHOCYTES # BLD AUTO: 1.76 10*3/MM3 (ref 0.7–3.1)
LYMPHOCYTES NFR BLD AUTO: 28.3 % (ref 19.6–45.3)
MCH RBC QN AUTO: 29.8 PG (ref 26.6–33)
MCHC RBC AUTO-ENTMCNC: 31.8 G/DL (ref 31.5–35.7)
MCV RBC AUTO: 93.5 FL (ref 79–97)
MONOCYTES # BLD AUTO: 0.63 10*3/MM3 (ref 0.1–0.9)
MONOCYTES NFR BLD AUTO: 10.1 % (ref 5–12)
NEUTROPHILS NFR BLD AUTO: 3.68 10*3/MM3 (ref 1.7–7)
NEUTROPHILS NFR BLD AUTO: 59.4 % (ref 42.7–76)
PLATELET # BLD AUTO: 116 10*3/MM3 (ref 140–450)
PMV BLD AUTO: 11.5 FL (ref 6–12)
POTASSIUM SERPL-SCNC: 4 MMOL/L (ref 3.5–5.2)
RBC # BLD AUTO: 3.83 10*6/MM3 (ref 3.77–5.28)
SODIUM SERPL-SCNC: 142 MMOL/L (ref 136–145)
WBC NRBC COR # BLD AUTO: 6.21 10*3/MM3 (ref 3.4–10.8)

## 2024-12-21 PROCEDURE — 99221 1ST HOSP IP/OBS SF/LOW 40: CPT | Performed by: INTERNAL MEDICINE

## 2024-12-21 PROCEDURE — 85025 COMPLETE CBC W/AUTO DIFF WBC: CPT | Performed by: FAMILY MEDICINE

## 2024-12-21 PROCEDURE — 80048 BASIC METABOLIC PNL TOTAL CA: CPT | Performed by: FAMILY MEDICINE

## 2024-12-21 RX ORDER — MONTELUKAST SODIUM 10 MG/1
10 TABLET ORAL NIGHTLY
COMMUNITY
End: 2024-12-22 | Stop reason: HOSPADM

## 2024-12-21 RX ADMIN — Medication 10 ML: at 00:32

## 2024-12-21 RX ADMIN — DILTIAZEM HYDROCHLORIDE 120 MG: 120 CAPSULE, EXTENDED RELEASE ORAL at 08:25

## 2024-12-21 RX ADMIN — MONTELUKAST SODIUM 10 MG: 10 TABLET, COATED ORAL at 08:25

## 2024-12-21 RX ADMIN — HYDROCORTISONE ACETATE 25 MG: 25 SUPPOSITORY RECTAL at 08:25

## 2024-12-21 RX ADMIN — HYDROCORTISONE ACETATE 25 MG: 25 SUPPOSITORY RECTAL at 00:29

## 2024-12-21 RX ADMIN — POLYETHYLENE GLYCOL 3350, SODIUM SULFATE ANHYDROUS, SODIUM BICARBONATE, SODIUM CHLORIDE, POTASSIUM CHLORIDE 2000 ML: 236; 22.74; 6.74; 5.86; 2.97 POWDER, FOR SOLUTION ORAL at 20:49

## 2024-12-21 RX ADMIN — HYDROCORTISONE ACETATE 25 MG: 25 SUPPOSITORY RECTAL at 20:49

## 2024-12-21 RX ADMIN — Medication 10 ML: at 08:26

## 2024-12-21 NOTE — PLAN OF CARE
Goal Outcome Evaluation:  Plan of Care Reviewed With: patient        Progress: improving  Outcome Evaluation: pt A/A/Ox4, VSS. pt NPO since midnight, waiting on GI to see. pt ambulating in room ad betty. no events overnight

## 2024-12-21 NOTE — H&P
[facility]  HISTORY AND PHYSICAL    Date of Admission: 12/20/2024  Primary Care Physician: ANGEL Healy MD    Subjective   Primary Historian: Patient    Chief Complaint: Bleeding per rectum    History of Present Illness  69-year-old female with past medical history atrial fibrillation on Pradaxa, hypertension, colon polyps, sleep apnea presents to the emergency room with complaints of bleeding with bowel movements.  She noted blood with bowel movements earlier in the evening.  She had a second episode in the ER ashlee blood.  Denies abdominal pain nausea vomiting diarrhea. Hemoglobin is 11.8.    Review of Systems   Otherwise complete ROS reviewed and negative except as mentioned in the HPI.    Past Medical History:   Past Medical History:   Diagnosis Date    A-fib     Abnormal ECG     tachycardia, a fib    Acid reflux     Anemia     Arthritis     Cancer     Diabetes mellitus     Hyperkalemia     Hyperlipidemia     Hypertension     Hyponatremia     IBS (irritable bowel syndrome)     PONV (postoperative nausea and vomiting)     Sleep apnea     USES CPAP    UTI (urinary tract infection)     UTI (urinary tract infection) 11/25/2016    Vaginal vault prolapse      Past Surgical History:  Past Surgical History:   Procedure Laterality Date    BREAST BIOPSY Left     X2    BUNIONECTOMY Left     CARDIAC CATHETERIZATION      CARDIAC CATHETERIZATION N/A 02/05/2021    Procedure: Right Heart Cath;  Surgeon: Van Marcelino MD;  Location: [facility] CATH INVASIVE LOCATION;  Service: Cardiology;  Laterality: N/A;    CATARACT EXTRACTION, BILATERAL      COLON SURGERY      COLONOSCOPY  09/21/2015    TRANSVERSE COLON ADENOMATOUS POLYP, HEMORRHOIDS, RECALL 5 YEARS, DR LAMAS    COLONOSCOPY N/A 11/13/2020    6 mm tubular adenomatous polyp at 15 cm proximal to the anus,diverticulosis of the sigmoid colon    COLONOSCOPY N/A 12/13/2023    - One 4 mm polyp at 40 cm proximal to the  anus,. - One 4 mm polyp at 20 cm proximal to the anus, removed with a cold snare. Resected and retrieved. - Diverticulosis in the sigmoid colon. - Hemorrhoids----tubular adenoma no evidence of high grade dysplasia    COLOSTOMY      COLPOPEXY VAGINAL      2014    ENDOSCOPY N/A 11/03/2016    LA GRADE B ESOPHAGITIS WITH NO BLEEDING UPPER THIRD OF ESOPHAGUS, GERI-[place] TEAR GEJ, BILIOUS BLOOD TINGED COFFEE GROUND [facility]    ENDOSCOPY N/A 12/13/2023    Normal examined duodenum. - Normal stomach. Biopsied. - Z-line regular, 35 cm from the incisors. Dilated.-neg for h pylori    EXPLORATORY LAPAROTOMY N/A 10/14/2016    Procedure: LAPAROTOMY EXPLORATORY, LYSIS OF ADHESIONS, IRRIAGATION OF ABDOMEN, POSSIBLE BOWEL RESECTION;  Surgeon: Bacilio Marcial MD;  Location:  [facility] OR;  Service:     FOOT NAVICULAR EXCISION OR BONE GRAFT Left 01/04/2023    Procedure: Expansion Graft, Harvesting of Bone Graft/Bone Marrow Aspirate;  Surgeon: Frankie Zapata DPM;  Location:  [facility] OR;  Service: Podiatry;  Laterality: Left;    HAMMER TOE REPAIR Left 01/04/2023    Procedure: Hammertoe Repair 2 and 3 - Left Foot;  Surgeon: Frankie Zapata DPM;  Location:  [facility];  Service: Podiatry;  Laterality: Left;    HARDWARE REMOVAL Left 01/04/2023    Procedure: Hardware Removal;  Surgeon: Frankie Zapata DPM;  Location:  [facility];  Service: Podiatry;  Laterality: Left;    HYSTERECTOMY      REVISION / TAKEDOWN COLOSTOMY      SACROCOLPOPEXY N/A 09/21/2016    Procedure: SACROCOLPOPEXY LAPAROSCOPIC WITH Who Works Around YouI SI ROBOT, CONVERTED TO OPEN SACROCOLPOPEXY, RIGHT SALPINGO- OOPHERECTOMY, CYSTO;  Surgeon: Maribell Beth MD;  Location:  [facility];  Service:     TOE FUSION Left 01/06/2022    Procedure: FIRST METATARSOPHALANGEAL JOINT ARTHRODESIS WITH INTERNAL FIXATION - LEFT FOOT;  Surgeon: Jorgito Red DPM;  Location:  [facility] OR;  Service: Podiatry;  Laterality: Left;    TOE FUSION Left 01/04/2023    Procedure: Revisional 1st  Metatarsophalangeal Joint Arthrodesis with Hardware Removal and Expansion Graft, Harvesting of Bone Graft/Bone Marrow Aspirate, Hammertoe Repair 2 and 3 - Left Foot;  Surgeon: Frankie Zapata DPM;  Location: [facility];  Service: Podiatry;  Laterality: Left;     Social History:  reports that she has never smoked. She has never been exposed to tobacco smoke. She has never used smokeless tobacco. She reports that she does not drink alcohol and does not use drugs.    Family History: family history includes Hypertension in her mother; No Known Problems in her father.       Allergies:  Allergies   Allergen Reactions    Morphine And Codeine Nausea And Vomiting and Dizziness    Percocet [Oxycodone-Acetaminophen] Nausea And Vomiting and Dizziness       Medications:  Prior to Admission medications    Medication Sig Start Date End Date Taking? Authorizing Provider   cyanocobalamin 1000 MCG/ML injection Inject 1 mL Every 30 (Thirty) Days as directed. 4/19/24  Yes ANGEL Healy MD   dabigatran etexilate (PRADAXA) 150 MG capsu Take 1 capsule by mouth 2 (Two) Times a Day. 7/1/24  Yes Abbey Serna APRN   dicyclomine (BENTYL) 10 MG capsule Take 1 capsule by mouth 4 (Four) Times a Day Before Meals & at Bedtime. 12/4/24  Yes Teodora Macario APRN   dilTIAZem CD (Cardizem CD) 120 MG 24 hr capsule Take 1 capsule by mouth Daily. 11/13/24  Yes Abbey Serna APRN   hydrOXYzine (ATARAX) 10 MG tablet Take 1 to 2 tablets by mouth Every 4 (Four) Hours As Needed. 7/23/24  Yes    hydrOXYzine (ATARAX) 10 MG tablet Take 1-2 tablets by mouth Every 4 (Four) to 6 (Six) Hours As Needed. 10/10/24  Yes    Incontinence Supply Disposable (Comfort Shield Adult Diapers) misc Use 1 each Every 3 (Three) Hours. 9/9/24  Yes Meenu Underwood APRN   montelukast (SINGULAIR) 10 MG tablet Take 1 tablet by mouth Every Night.  Patient taking differently: Take 1 tablet by mouth Daily. 2/16/24  Yes ANGEL Healy MD   omeprazole (priLOSEC) 20  "MG capsule Take 1 capsule by mouth Daily. 2/2/24  Yes ANGEL Healy MD   tamoxifen (NOLVADEX) 20 MG chemo tablet Take 1 tablet by mouth Daily. 9/19/24  Yes    tiZANidine (ZANAFLEX) 4 MG tablet Take 1 tablet by mouth Daily As Needed for Muscle Spasms. 10/12/23  Yes ANGEL Healy MD   triamcinolone (KENALOG) 0.1 % ointment Apply 1 Application topically to the appropriate area as directed 2 (Two) Times a Day As Needed when and where itching is present as needed. 10/10/24  Yes      I have utilized all available immediate resources to obtain, update, or review the patient's current medications (including all prescriptions, over-the-counter products, herbals, cannabis/cannabidiol products, and vitamin/mineral/dietary (nutritional) supplements).    Objective     Vital Signs: /71 (BP Location: Right arm, Patient Position: Lying)   Pulse 93   Temp 97.9 °F (36.6 °C) (Oral)   Resp 16   Ht 157.5 cm (62\")   Wt 71.8 kg (158 lb 6.4 oz)   SpO2 97%   BMI 28.97 kg/m²   Physical Exam  Vitals reviewed.   Constitutional:       General: She is not in acute distress.     Appearance: She is well-developed. She is not toxic-appearing.   HENT:      Head: Normocephalic and atraumatic.      Right Ear: External ear normal.      Left Ear: External ear normal.      Mouth/Throat:      Mouth: Mucous membranes are dry.      Pharynx: Oropharynx is clear.   Eyes:      General:         Right eye: No discharge.         Left eye: No discharge.      Extraocular Movements: Extraocular movements intact.      Conjunctiva/sclera: Conjunctivae normal.      Pupils: Pupils are equal, round, and reactive to light.   Neck:      Vascular: No JVD.   Cardiovascular:      Rate and Rhythm: Normal rate and regular rhythm.      Pulses: Normal pulses.      Heart sounds: Normal heart sounds. No murmur heard.     No friction rub. No gallop.   Pulmonary:      Effort: Pulmonary effort is normal. No respiratory distress.      Breath sounds: No stridor. " No wheezing or rhonchi.   Chest:      Chest wall: No tenderness.   Abdominal:      General: Bowel sounds are normal. There is no distension.      Palpations: Abdomen is soft.      Tenderness: There is no abdominal tenderness. There is no guarding or rebound.      Hernia: No hernia is present.   Musculoskeletal:         General: No swelling, tenderness or deformity. Normal range of motion.      Cervical back: Normal range of motion and neck supple. No rigidity or tenderness. No muscular tenderness.      Right lower leg: No edema.      Left lower leg: No edema.   Skin:     General: Skin is warm and dry.      Findings: No erythema or rash.   Neurological:      General: No focal deficit present.      Mental Status: She is alert and oriented to person, place, and time.      Cranial Nerves: No cranial nerve deficit.      Sensory: No sensory deficit.      Motor: No weakness or abnormal muscle tone.      Deep Tendon Reflexes: Reflexes normal.   Psychiatric:         Mood and Affect: Mood normal.         Behavior: Behavior normal.     Results Reviewed:  Lab Results (last 24 hours)       Procedure Component Value Units Date/Time    Comprehensive Metabolic Panel [[id number]]  (Abnormal) Collected: 12/20/24 1842    Specimen: Blood Updated: 12/20/24 1923     Glucose 100 mg/dL      BUN 17 mg/dL      Creatinine 0.44 mg/dL      Sodium 143 mmol/L      Potassium 3.8 mmol/L      Chloride 108 mmol/L      CO2 25.0 mmol/L      Calcium 8.6 mg/dL      Total Protein 6.3 g/dL      Albumin 3.6 g/dL      ALT (SGPT) 11 U/L      AST (SGOT) 20 U/L      Alkaline Phosphatase 98 U/L      Total Bilirubin 0.8 mg/dL      Globulin 2.7 gm/dL      A/G Ratio 1.3 g/dL      BUN/Creatinine Ratio 38.6     Anion Gap 10.0 mmol/L      eGFR 103.6 mL/min/1.73     Narrative:      GFR Categories in Chronic Kidney Disease (CKD)      GFR Category          GFR (mL/min/1.73)    Interpretation  G1                     90 or greater         Normal or high (1)  G2                       60-89                Mild decrease (1)  G3a                   45-59                Mild to moderate decrease  G3b                   30-44                Moderate to severe decrease  G4                    15-29                Severe decrease  G5                    14 or less           Kidney failure          (1)In the absence of evidence of kidney disease, neither GFR category G1 or G2 fulfill the criteria for CKD.    eGFR calculation 2021 CKD-EPI creatinine equation, which does not include race as a factor    Protime-INR [[id number]]  (Abnormal) Collected: 12/20/24 1842    Specimen: Blood Updated: 12/20/24 1904     Protime 16.8 Seconds      INR 1.31    CBC & Differential [[id number]]  (Abnormal) Collected: 12/20/24 1842    Specimen: Blood Updated: 12/20/24 1901    Narrative:      The following orders were created for panel order CBC & Differential.  Procedure                               Abnormality         Status                     ---------                               -----------         ------                     CBC Auto Differential[[id number]]        Abnormal            Final result                 Please view results for these tests on the individual orders.    CBC Auto Differential [[id number]]  (Abnormal) Collected: 12/20/24 1842    Specimen: Blood Updated: 12/20/24 1901     WBC 7.44 10*3/mm3      RBC 4.01 10*6/mm3      Hemoglobin 11.8 g/dL      Hematocrit 37.5 %      MCV 93.5 fL      MCH 29.4 pg      MCHC 31.5 g/dL      RDW 13.5 %      RDW-SD 46.5 fl      MPV 11.8 fL      Platelets 126 10*3/mm3     POC Occult Blood Stool [[id number]]  (Normal) Resulted: 12/20/24 1748    Specimen: Stool Updated: 12/20/24 1750     Fecal Occult Blood Negative     Lot Number [id number]     Expiration Date 04/30/25     DEVELOPER LOT NUMBER [id number]     DEVELOPER EXPIRATION DATE 04/30/25     Positive Control Positive     Negative Control Negative          Imaging Results (Last 24 Hours)       Procedure Component Value Units Date/Time     CT Angiogram Abdomen Pelvis [[id number]] Collected: 12/20/24 2024     Updated: 12/20/24 2040    Narrative:      EXAMINATION: CT ANGIOGRAM ABDOMEN PELVIS- 12/20/2024 8:24 PM     HISTORY: Bloody stools     TOTAL DOSE: 1648.56 mGy.cm (Automatic exposure control technique was  implemented in an effort to keep the radiation dose as low as possible  without compromising image quality)     REPORT: Spiral CT of the abdomen and pelvis was performed after  administration of intravenous contrast using CTA protocol, to include  reconstructed coronal, sagittal and 3D images.     COMPARISON: Noncontrast CT of the abdomen and pelvis 11/24/2023.     Review of lung windows demonstrates mild respiratory motion artifact,  the lung bases are clear. The noncontrast images demonstrate several  gallstones within the gallbladder, the gallbladder appears partially  contracted. No evidence of acute cholecystitis is identified. No urinary  tract calculi are identified. There is surgical suture lines associated  with the sigmoid colon and a small bowel loop in the central pelvis,  this loop is slightly distended with gas and fluid, the appearance is  similar to the previous CT there is no convincing evidence mechanical  small bowel obstruction.     There is a ventral hernia to the left of the umbilicus that contains a  small bowel loop which does not appear obstructed or thickened. The  hernia sac measures 5.3 cm. This hernia was present before and measured  about 3.5 cm. There is also a fat-containing supraumbilical ventral  hernia that measures 1.8 cm. This is stable. There is also a  fat-containing ventral hernia in the right abdomen image 143 of series  5, measuring 6.9 cm in diameter, compared with 5.8 cm before. Normal  patency and enhancement of the abdominal aorta, mesenteric and renal  arteries and iliac arteries. Normal caliber of the abdominal aorta.  Review of bone windows shows no acute osseous abnormality. There there  is  multilevel facet degeneration in the lumbar spine     Contrast-enhanced images demonstrate mild cardiomegaly, with mild  dilation of the right and left atrium. There is reflux of contrast into  the hepatic veins and intrahepatic IVC which is concerning for right  heart failure. No evidence of pulmonary edema is identified. The stomach  is decompressed. The liver and spleen appear homogeneous without  evidence of a mass. There is mild periportal edema within the liver,  which is nonspecific. The pancreas and adrenal glands are within normal  limits. The kidneys enhance normally, no renal mass or hydronephrosis is  identified. No intra-abdominal mass or lymphadenopathy is identified.  There is mild sigmoid and descending colonic diverticulosis without  acute diverticulitis. Delayed images were obtained to evaluate for  active GI bleeding, the delayed images demonstrate no sites of contrast  extravasation within the lumen of the GI tract. There are small stable  cysts within the kidneys which are better demonstrated on the delayed  images. These appear benign. There is evidence of previous hysterectomy.       Impression:      1. No acute intra-abdominal or pelvic abnormality, no CT findings to  explain the bloody stools. There is diverticulosis of the descending and  proximal sigmoid colon without evidence of diverticulitis. Postop  changes associated with the sigmoid colon and small bowel loop in the  central pelvis appear stable. No evidence of bowel obstruction or mass  effect.  2. Multiple gallstones with partial contraction of the gallbladder. No  evidence of acute cholecystitis.  3. Multiple ventral hernias, including a 5.3 cm ventral hernia to the  left of the umbilicus which contains fat and a nonobstructed small bowel  loop.  4. Findings suggestive of right heart dysfunction as detailed above.           This report was signed and finalized on 12/20/2024 8:37 PM by Dr. Venkat Pate MD.             I have  personally reviewed and interpreted the radiology studies and ECG obtained at time of admission.     Assessment / Plan   Assessment:   Active Hospital Problems    Diagnosis     **GI bleeding     LEROY on CPAP     Pulmonary hypertension     Permanent atrial fibrillation      Treatment Plan  The patient will be admitted to my service here at [facility].   Admit to med floor  Vitals every 4 hours  Cardiac diet n.p.o. after midnight  IV fluids saline lock     rectal bleeding  Patient first history of internal hemorrhoids  Hold Pradaxa  GI consult in a.m.  Anusol suppository twice daily   CBC in a.m.  Transfuse for hemoglobin less than 7  If more profuse bleeding will consider reversing Pradaxa    Permanent atrial fibrillation  Continue Cardizem  mg daily  Hold Pradaxa    Obstructive sleep apnea  Continue CPAP home settings    DVT prophylaxis SCDs    Medical Decision Making  Number and Complexity of problems: 4 complex medical problems  Differential Diagnosis: see above    Conditions and Status        Condition is unchanged.     [facility] Data  External documents reviewed: [employer] EHR  Cardiac tracing (EKG, telemetry) interpretation: -  Radiology interpretation: See above  Labs reviewed: see above  Any tests that were considered but not ordered: none    Discussed with: Patient     Care Planning  Shared decision making: Patient  Code status and discussions: Full code    Disposition  Social Determinants of Health that impact treatment or disposition: none  Estimated length of stay is to be determined, will likely need a period of 2 days observation for anticoagulation to wear off MBO to proceed with colonoscopy.     I confirmed that the patient's advanced care plan is present, code status is documented, and a surrogate decision maker is listed in the patient's medical record.     The patient's surrogate decision maker is family, see records.     The patient was seen and examined by me on 12/20/2024 at  2255.    Electronically signed by Josesito Sharma MD, 12/20/24, 23:33 CST.

## 2024-12-21 NOTE — CONSULTS
[facility] Gastroenterology  Initial Inpatient Consult Note    Referring Provider: No ref. provider found    Date of Admission: 12/20/2024  Date of Service:  12/21/24    Reason for Consultation: Rectal bleeding    Subjective     History of present illness: 69-year-old lady with history of atrial fibrillation, hypertension, sleep apnea, diverticulosis and colon polyp presented with bright red bleeding per rectum with bowel movement, she noted blood with her bowel movement yesterday, she had a second episode of ashlee bleeding in the ER, denies nausea vomiting, diarrhea, constipation or abdominal pain  She had gastroscopy for dysphagia and had esophageal dilatation on December 13, 2023 and was otherwise normal  Colonoscopy at the same time showed diverticulosis, internal hemorrhoids, and multiple colon polyps      Past Medical History:  Past Medical History:   Diagnosis Date    A-fib     Abnormal ECG     tachycardia, a fib    Acid reflux     Anemia     Arthritis     Cancer     Diabetes mellitus     Hyperkalemia     Hyperlipidemia     Hypertension     Hyponatremia     IBS (irritable bowel syndrome)     PONV (postoperative nausea and vomiting)     Sleep apnea     USES CPAP    UTI (urinary tract infection)     UTI (urinary tract infection) 11/25/2016    Vaginal vault prolapse        Past Surgical History:  Past Surgical History:   Procedure Laterality Date    BREAST BIOPSY Left     X2    BUNIONECTOMY Left     CARDIAC CATHETERIZATION      CARDIAC CATHETERIZATION N/A 02/05/2021    Procedure: Right Heart Cath;  Surgeon: Van Marcelino MD;  Location: [facility] CATH INVASIVE LOCATION;  Service: Cardiology;  Laterality: N/A;    CATARACT EXTRACTION, BILATERAL      COLON SURGERY      COLONOSCOPY  09/21/2015    TRANSVERSE COLON ADENOMATOUS POLYP, HEMORRHOIDS, RECALL 5 YEARS, DR LAMAS    COLONOSCOPY N/A 11/13/2020    6 mm tubular adenomatous polyp at 15 cm proximal to the anus,diverticulosis of the sigmoid colon    COLONOSCOPY  N/A 12/13/2023    - One 4 mm polyp at 40 cm proximal to the anus,. - One 4 mm polyp at 20 cm proximal to the anus, removed with a cold snare. Resected and retrieved. - Diverticulosis in the sigmoid colon. - Hemorrhoids----tubular adenoma no evidence of high grade dysplasia    COLOSTOMY      COLPOPEXY VAGINAL      2014    ENDOSCOPY N/A 11/03/2016    LA GRADE B ESOPHAGITIS WITH NO BLEEDING UPPER THIRD OF ESOPHAGUS, GERI-[place] TEAR GEJ, BILIOUS BLOOD TINGED COFFEE GROUND GATRIC FLUIDDR [facility]    ENDOSCOPY N/A 12/13/2023    Normal examined duodenum. - Normal stomach. Biopsied. - Z-line regular, 35 cm from the incisors. Dilated.-neg for h pylori    EXPLORATORY LAPAROTOMY N/A 10/14/2016    Procedure: LAPAROTOMY EXPLORATORY, LYSIS OF ADHESIONS, IRRIAGATION OF ABDOMEN, POSSIBLE BOWEL RESECTION;  Surgeon: Bacilio Marcial MD;  Location:  [facility] OR;  Service:     FOOT NAVICULAR EXCISION OR BONE GRAFT Left 01/04/2023    Procedure: Expansion Graft, Harvesting of Bone Graft/Bone Marrow Aspirate;  Surgeon: Frankie Zapata DPM;  Location:  [facility] OR;  Service: Podiatry;  Laterality: Left;    HAMMER TOE REPAIR Left 01/04/2023    Procedure: Hammertoe Repair 2 and 3 - Left Foot;  Surgeon: Frankie Zapata DPM;  Location:  [facility] OR;  Service: Podiatry;  Laterality: Left;    HARDWARE REMOVAL Left 01/04/2023    Procedure: Hardware Removal;  Surgeon: Frankie Zapata DPM;  Location:  [facility] OR;  Service: Podiatry;  Laterality: Left;    HYSTERECTOMY      REVISION / TAKEDOWN COLOSTOMY      SACROCOLPOPEXY N/A 09/21/2016    Procedure: SACROCOLPOPEXY LAPAROSCOPIC WITH TRIA BeautyI SI ROBOT, CONVERTED TO OPEN SACROCOLPOPEXY, RIGHT SALPINGO- OOPHERECTOMY, CYSTO;  Surgeon: Maribell Beth MD;  Location:  [facility] OR;  Service:     TOE FUSION Left 01/06/2022    Procedure: FIRST METATARSOPHALANGEAL JOINT ARTHRODESIS WITH INTERNAL FIXATION - LEFT FOOT;  Surgeon: Jorgito Red DPM;  Location:  [facility] OR;  Service: Podiatry;  Laterality: Left;     TOE FUSION Left 01/04/2023    Procedure: Revisional 1st Metatarsophalangeal Joint Arthrodesis with Hardware Removal and Expansion Graft, Harvesting of Bone Graft/Bone Marrow Aspirate, Hammertoe Repair 2 and 3 - Left Foot;  Surgeon: Frankie Zapata DPM;  Location: [facility] OR;  Service: Podiatry;  Laterality: Left;        Social History:   Social History     Tobacco Use    Smoking status: Never     Passive exposure: Never    Smokeless tobacco: Never   Substance Use Topics    Alcohol use: No        Family History:  Family History   Problem Relation Age of Onset    Hypertension Mother     No Known Problems Father     Colon cancer Neg Hx     Colon polyps Neg Hx        Home Meds:  Medications Prior to Admission   Medication Sig Dispense Refill Last Dose/Taking    cyanocobalamin 1000 MCG/ML injection Inject 1 mL Every 30 (Thirty) Days as directed. 1 mL 11 Taking    dabigatran etexilate (PRADAXA) 150 MG capsu Take 1 capsule by mouth 2 (Two) Times a Day. 60 capsule 11 Taking    dicyclomine (BENTYL) 10 MG capsule Take 1 capsule by mouth 4 (Four) Times a Day Before Meals & at Bedtime. 120 capsule 10 Taking    dilTIAZem CD (Cardizem CD) 120 MG 24 hr capsule Take 1 capsule by mouth Daily. 30 capsule 11 Taking    hydrOXYzine (ATARAX) 10 MG tablet Take 1 to 2 tablets by mouth Every 4 (Four) Hours As Needed. 60 tablet 3 Taking As Needed    montelukast (SINGULAIR) 10 MG tablet Take 1 tablet by mouth Every Night.   Taking    omeprazole (priLOSEC) 20 MG capsule Take 1 capsule by mouth Daily. 30 capsule 11 Taking    tamoxifen (NOLVADEX) 20 MG chemo tablet Take 1 tablet by mouth Daily. 90 tablet 1 Taking    tiZANidine (ZANAFLEX) 4 MG tablet Take 1 tablet by mouth Daily As Needed for Muscle Spasms. 30 tablet 2 Taking As Needed    triamcinolone (KENALOG) 0.1 % ointment Apply 1 Application topically to the appropriate area as directed 2 (Two) Times a Day As Needed when and where itching is present as needed. 454 g 3 Taking As Needed        Current Meds:     Current Facility-Administered Medications:     acetaminophen (TYLENOL) tablet 650 mg, 650 mg, Oral, Q4H PRN **OR** acetaminophen (TYLENOL) 160 MG/5ML oral solution 650 mg, 650 mg, Oral, Q4H PRN **OR** acetaminophen (TYLENOL) suppository 650 mg, 650 mg, Rectal, Q4H PRN, Josesito Sharma MD    dilTIAZem CD (CARDIZEM CD) 24 hr capsule 120 mg, 120 mg, Oral, Daily, Josesito Sharma MD, 120 mg at 12/21/24 0825    hydrocortisone (ANUSOL-HC) suppository 25 mg, 25 mg, Rectal, BID, Josesito Sharma MD, 25 mg at 12/21/24 0825    montelukast (SINGULAIR) tablet 10 mg, 10 mg, Oral, Daily, Josesito Sharma MD, 10 mg at 12/21/24 0825    pantoprazole (PROTONIX) EC tablet 40 mg, 40 mg, Oral, Q AM, Josesito Sharma MD    sodium chloride 0.9 % flush 10 mL, 10 mL, Intravenous, Q12H, Josesito Sharma MD, 10 mL at 12/21/24 0826    sodium chloride 0.9 % flush 10 mL, 10 mL, Intravenous, PRN, Josesito Sharma MD    sodium chloride 0.9 % infusion 40 mL, 40 mL, Intravenous, PRN, Josesito Sharma MD    tiZANidine (ZANAFLEX) tablet 4 mg, 4 mg, Oral, Daily PRN, Josesito Sharma MD    Allergies:  Allergies   Allergen Reactions    Morphine And Codeine Nausea And Vomiting and Dizziness    Percocet [Oxycodone-Acetaminophen] Nausea And Vomiting and Dizziness       Vital Signs  Temp:  [97.9 °F (36.6 °C)-98.2 °F (36.8 °C)] 98.2 °F (36.8 °C)  Heart Rate:  [] 86  Resp:  [16-18] 16  BP: (104-133)/(60-86) 133/85  Body mass index is 28.97 kg/m².  No intake or output data in the 24 hours ending 12/21/24 6941  No intake/output data recorded.    Review of Systems   As above      Objective     Physical Exam:  General :    Alert, cooperative, in no acute distress   Head:    Normocephalic, without obvious abnormality, atraumatic   Eyes:            Lids and lashes normal, conjunctivae and sclerae normal, no   Icterus, conjunctival pallor   Throat:   No oral  lesions, no thrush, oral mucosa moist, posterior oropharynx clear   Neck:   No adenopathy, supple, trachea midline, no thyromegaly, no   carotid bruit, no JVD   Lungs:     Clear to auscultation, respirations regular, even and                   unlabored    Heart:    Regular rhythm and normal rate, normal S1 and S2, no            murmur   Chest Wall:    No abnormalities observed   Abdomen:     Normal bowel sounds, no masses, no organomegaly, soft        nontender, nondistended, no guarding, no rebound                 tenderness   Rectal:     Deferred   Extremities:   No edema, no cyanosis   Skin:   No open lesions, bruising or rash   Lymph nodes:   No palpable cervical adenopathy   Psychiatric:  Judgement and insight: normal   Orientation to person place and time: normal   Mood and affect: normal        Results Review:    I have reviewed all of the patients current test results  Results from last 7 days   Lab Units 12/21/24  0251 12/20/24  1842   WBC 10*3/mm3 6.21 7.44   HEMOGLOBIN g/dL 11.4* 11.8*   HEMATOCRIT % 35.8 37.5   PLATELETS 10*3/mm3 116* 126*       Results from last 7 days   Lab Units 12/21/24  0251 12/20/24  1842   SODIUM mmol/L 142 143   POTASSIUM mmol/L 4.0 3.8   CHLORIDE mmol/L 107 108*   CO2 mmol/L 26.0 25.0   BUN mg/dL 12 17   CREATININE mg/dL 0.42* 0.44*   CALCIUM mg/dL 8.8 8.6   BILIRUBIN mg/dL  --  0.8   ALK PHOS U/L  --  98   ALT (SGPT) U/L  --  11   AST (SGOT) U/L  --  20   GLUCOSE mg/dL 100* 100*       Results from last 7 days   Lab Units 12/20/24  1842   INR  1.31*       Lab Results   Lab Value Date/Time    LIPASE 32 09/07/2023 2248    LIPASE 166 04/02/2017 0621    LIPASE 267 (H) 04/01/2017 1622    LIPASE 185 12/16/2016 1613    LIPASE 498 (H) 12/06/2016 0424    LIPASE 544 (H) 12/05/2016 0614    LIPASE 676 (H) 12/04/2016 2114    LIPASE 444 (H) 11/23/2016 0517       Radiology:    Imaging Results (Last 24 Hours)       Procedure Component Value Units Date/Time    CT Angiogram Abdomen Pelvis  [[id number]] Collected: 12/20/24 2024     Updated: 12/20/24 2040    Narrative:      EXAMINATION: CT ANGIOGRAM ABDOMEN PELVIS- 12/20/2024 8:24 PM     HISTORY: Bloody stools     TOTAL DOSE: 1648.56 mGy.cm (Automatic exposure control technique was  implemented in an effort to keep the radiation dose as low as possible  without compromising image quality)     REPORT: Spiral CT of the abdomen and pelvis was performed after  administration of intravenous contrast using CTA protocol, to include  reconstructed coronal, sagittal and 3D images.     COMPARISON: Noncontrast CT of the abdomen and pelvis 11/24/2023.     Review of lung windows demonstrates mild respiratory motion artifact,  the lung bases are clear. The noncontrast images demonstrate several  gallstones within the gallbladder, the gallbladder appears partially  contracted. No evidence of acute cholecystitis is identified. No urinary  tract calculi are identified. There is surgical suture lines associated  with the sigmoid colon and a small bowel loop in the central pelvis,  this loop is slightly distended with gas and fluid, the appearance is  similar to the previous CT there is no convincing evidence mechanical  small bowel obstruction.     There is a ventral hernia to the left of the umbilicus that contains a  small bowel loop which does not appear obstructed or thickened. The  hernia sac measures 5.3 cm. This hernia was present before and measured  about 3.5 cm. There is also a fat-containing supraumbilical ventral  hernia that measures 1.8 cm. This is stable. There is also a  fat-containing ventral hernia in the right abdomen image 143 of series  5, measuring 6.9 cm in diameter, compared with 5.8 cm before. Normal  patency and enhancement of the abdominal aorta, mesenteric and renal  arteries and iliac arteries. Normal caliber of the abdominal aorta.  Review of bone windows shows no acute osseous abnormality. There there  is multilevel facet degeneration in the  lumbar spine     Contrast-enhanced images demonstrate mild cardiomegaly, with mild  dilation of the right and left atrium. There is reflux of contrast into  the hepatic veins and intrahepatic IVC which is concerning for right  heart failure. No evidence of pulmonary edema is identified. The stomach  is decompressed. The liver and spleen appear homogeneous without  evidence of a mass. There is mild periportal edema within the liver,  which is nonspecific. The pancreas and adrenal glands are within normal  limits. The kidneys enhance normally, no renal mass or hydronephrosis is  identified. No intra-abdominal mass or lymphadenopathy is identified.  There is mild sigmoid and descending colonic diverticulosis without  acute diverticulitis. Delayed images were obtained to evaluate for  active GI bleeding, the delayed images demonstrate no sites of contrast  extravasation within the lumen of the GI tract. There are small stable  cysts within the kidneys which are better demonstrated on the delayed  images. These appear benign. There is evidence of previous hysterectomy.       Impression:      1. No acute intra-abdominal or pelvic abnormality, no CT findings to  explain the bloody stools. There is diverticulosis of the descending and  proximal sigmoid colon without evidence of diverticulitis. Postop  changes associated with the sigmoid colon and small bowel loop in the  central pelvis appear stable. No evidence of bowel obstruction or mass  effect.  2. Multiple gallstones with partial contraction of the gallbladder. No  evidence of acute cholecystitis.  3. Multiple ventral hernias, including a 5.3 cm ventral hernia to the  left of the umbilicus which contains fat and a nonobstructed small bowel  loop.  4. Findings suggestive of right heart dysfunction as detailed above.           This report was signed and finalized on 12/20/2024 8:37 PM by Dr. Venkat Pate MD.                 Assessment & Plan       GI bleeding     Permanent atrial fibrillation    Pulmonary hypertension    LEROY on CPAP      Impression/Plan    Hematochezia  Most likely hemorrhoidal bleeding  No significant drop of hemoglobin  Also need to rule out diverticular bleed  Monitor CBC  Hold anticoagulant  Colonoscopy in a.m.  Clear liquid diet  N.p.o. after midnight    Electronically signed by Christopher Ludwig MD, 12/21/24, 2:51 PM CST.         Christopher Ludwig MD  12/21/24  14:51 CST

## 2024-12-21 NOTE — PROGRESS NOTES
[facility] Medicine Services  INPATIENT PROGRESS NOTE    Patient Name: Yun Blackwell  Date of Admission: 12/20/2024  Today's Date: 12/21/24  Length of Stay: 1  Primary Care Physician: ANGEL Healy MD    Subjective   Chief Complaint: Follow-up  HPI     Patient admitted overnight for bleeding per rectum  She carries history of atrial fibrillation on Pradaxa  Other comorbidities include: Hypertension, sleep apnea.  She had prior endoscopic exam which mention presence of colonic polyp.    Her hemoglobin on admit is 11.8 well platelets 126  PT/INR 16.8 and 1.31 respectively-on Pradaxa  BUN is 17  CT angiogram of the abdomen showed no acute intra-abdominal or pelvic abnormality to explain the bloody stools.  Multiple gallstones without evidence of acute cholecystitis.  Multiple ventral hernia.  Nonobstructive small bowel  Radiologist also mention about reflux of contrast into hepatic veins and intrahepatic IVC.  Mild cardiomegaly with mild dilatation of the right and left atrium.      Provisional diagnosis on admission includes:  Rectal bleeding, patient's history of internal hemorrhoids  permanent atrial fibrillation  Obstructive sleep apnea    Patient claims that she has intermittent bleeding but not like this.  She saw bright red blood in tissue paper.  She denies any dizziness lightheadedness, chest pain, palpitation, shortness of breath    She told me that she had prior colonoscopy and endoscopy here in our hospital about a year ago (December 13, 2023).          She told me that the GI specialist was just in.  Awaiting consult report.  Patient shared with me that she is scheduled for colonoscopy Monday    Review of Systems   All pertinent negatives and positives are as above. All other systems have been reviewed and are negative unless otherwise stated.     Objective    Temp:  [97.9 °F (36.6 °C)-98.2 °F (36.8 °C)] 98.2 °F (36.8 °C)  Heart Rate:  [] 86  Resp:   "[16-18] 16  BP: (104-133)/(60-86) 133/85  Physical Exam  GEN: Awake, alert, interactive, in NAD  HEENT: Atraumatic, PERRLA, EOMI, Anicteric, Trachea midline  Lungs: CTAB, no wheezing/rales/rhonchi  Heart: RRR, +S1/s2, no rub  ABD: soft, nt/nd, +BS, no guarding/rebound  Extremities: atraumatic, no cyanosis, no edema  Skin: no rashes or lesions  Neuro: AAOx3, no focal deficits  Psych: normal mood & affect      Results Review:  I have reviewed the labs, radiology results, and diagnostic studies.    Laboratory Data:   Results from last 7 days   Lab Units 12/21/24  0251 12/20/24  1842   WBC 10*3/mm3 6.21 7.44   HEMOGLOBIN g/dL 11.4* 11.8*   HEMATOCRIT % 35.8 37.5   PLATELETS 10*3/mm3 116* 126*        Results from last 7 days   Lab Units 12/21/24  0251 12/20/24  1842   SODIUM mmol/L 142 143   POTASSIUM mmol/L 4.0 3.8   CHLORIDE mmol/L 107 108*   CO2 mmol/L 26.0 25.0   BUN mg/dL 12 17   CREATININE mg/dL 0.42* 0.44*   CALCIUM mg/dL 8.8 8.6   BILIRUBIN mg/dL  --  0.8   ALK PHOS U/L  --  98   ALT (SGPT) U/L  --  11   AST (SGOT) U/L  --  20   GLUCOSE mg/dL 100* 100*       Culture Data:   No results found for: \"BLOODCX\", \"URINECX\", \"WOUNDCX\", \"MRSACX\", \"RESPCX\", \"STOOLCX\"    Radiology Data:   Imaging Results (Last 24 Hours)       Procedure Component Value Units Date/Time    CT Angiogram Abdomen Pelvis [[id number]] Collected: 12/20/24 2024     Updated: 12/20/24 2040    Narrative:      EXAMINATION: CT ANGIOGRAM ABDOMEN PELVIS- 12/20/2024 8:24 PM     HISTORY: Bloody stools     TOTAL DOSE: 1648.56 mGy.cm (Automatic exposure control technique was  implemented in an effort to keep the radiation dose as low as possible  without compromising image quality)     REPORT: Spiral CT of the abdomen and pelvis was performed after  administration of intravenous contrast using CTA protocol, to include  reconstructed coronal, sagittal and 3D images.     COMPARISON: Noncontrast CT of the abdomen and pelvis 11/24/2023.     Review of lung windows " demonstrates mild respiratory motion artifact,  the lung bases are clear. The noncontrast images demonstrate several  gallstones within the gallbladder, the gallbladder appears partially  contracted. No evidence of acute cholecystitis is identified. No urinary  tract calculi are identified. There is surgical suture lines associated  with the sigmoid colon and a small bowel loop in the central pelvis,  this loop is slightly distended with gas and fluid, the appearance is  similar to the previous CT there is no convincing evidence mechanical  small bowel obstruction.     There is a ventral hernia to the left of the umbilicus that contains a  small bowel loop which does not appear obstructed or thickened. The  hernia sac measures 5.3 cm. This hernia was present before and measured  about 3.5 cm. There is also a fat-containing supraumbilical ventral  hernia that measures 1.8 cm. This is stable. There is also a  fat-containing ventral hernia in the right abdomen image 143 of series  5, measuring 6.9 cm in diameter, compared with 5.8 cm before. Normal  patency and enhancement of the abdominal aorta, mesenteric and renal  arteries and iliac arteries. Normal caliber of the abdominal aorta.  Review of bone windows shows no acute osseous abnormality. There there  is multilevel facet degeneration in the lumbar spine     Contrast-enhanced images demonstrate mild cardiomegaly, with mild  dilation of the right and left atrium. There is reflux of contrast into  the hepatic veins and intrahepatic IVC which is concerning for right  heart failure. No evidence of pulmonary edema is identified. The stomach  is decompressed. The liver and spleen appear homogeneous without  evidence of a mass. There is mild periportal edema within the liver,  which is nonspecific. The pancreas and adrenal glands are within normal  limits. The kidneys enhance normally, no renal mass or hydronephrosis is  identified. No intra-abdominal mass or  lymphadenopathy is identified.  There is mild sigmoid and descending colonic diverticulosis without  acute diverticulitis. Delayed images were obtained to evaluate for  active GI bleeding, the delayed images demonstrate no sites of contrast  extravasation within the lumen of the GI tract. There are small stable  cysts within the kidneys which are better demonstrated on the delayed  images. These appear benign. There is evidence of previous hysterectomy.       Impression:      1. No acute intra-abdominal or pelvic abnormality, no CT findings to  explain the bloody stools. There is diverticulosis of the descending and  proximal sigmoid colon without evidence of diverticulitis. Postop  changes associated with the sigmoid colon and small bowel loop in the  central pelvis appear stable. No evidence of bowel obstruction or mass  effect.  2. Multiple gallstones with partial contraction of the gallbladder. No  evidence of acute cholecystitis.  3. Multiple ventral hernias, including a 5.3 cm ventral hernia to the  left of the umbilicus which contains fat and a nonobstructed small bowel  loop.  4. Findings suggestive of right heart dysfunction as detailed above.           This report was signed and finalized on 12/20/2024 8:37 PM by Dr. Venkat Pate MD.               I have reviewed the patient's current medications.     Assessment/Plan   Assessment  Active Hospital Problems    Diagnosis     **GI bleeding     LEROY on CPAP     Pulmonary hypertension     Permanent atrial fibrillation              Medical Decision Making  Number and Complexity of problems:   Rectal bleeding  Personal history of internal hemorrhoids  Permanent atrial fibrillation  History of colonic polyp  Obstructive sleep apnea  Thrombocytopenia  Gallstone without cholecystitis or symptoms related to it  Multiple ventral hernias      Treatment Plan  For colonoscopy Monday as per GI service  Transfuse as needed for hemoglobin less than 7 or less than 8 with  active bleeding or symptoms of anemia  Pradaxa on hold due to rectal bleeding.  Continue Anusol  Have her use her CPAP.  dilTIAZem CD, 120 mg, Oral, Daily  hydrocortisone, 25 mg, Rectal, BID  montelukast, 10 mg, Oral, Daily  pantoprazole, 40 mg, Oral, Q AM  polyethylene glycol, 2,000 mL, Oral, Q8H  sodium chloride, 10 mL, Intravenous, Q12H        Conditions and Status  Fair, stable     MDM Data  External documents reviewed: Reviewed epic record  Cardiac tracing (EKG, telemetry) interpretation: Unavailable  Radiology interpretation: Reviewed  Labs reviewed: Yes  Any tests that were considered but not ordered: None     Decision rules/scores evaluated (example [id number], Wells, etc): -     Discussed with: Patient and nursing staff     Care Planning  Shared decision making: Patient and consultant  Code status and discussions: Full code    Disposition  Social Determinants of Health that impact treatment or disposition: None identified at this time  I expect the patient to be discharged to (TBD)          Electronically signed by Kenny Moore MD, 12/21/24, 13:55 CST.

## 2024-12-21 NOTE — ED NOTES
After pt went to the bathroom she reports she passed a large amount of blood, MD Flaherty informed and pt to be admitted, new IV will be started

## 2024-12-21 NOTE — ED NOTES
Nursing report ED to floor  Yun Blackwell  69 y.o.  female    HPI:   Chief Complaint   Patient presents with    Black or Bloody Stool       Admitting doctor:   Josesito Sharma MD    Consulting provider(s):  Consults       No orders found from 11/21/2024 to 12/21/2024.             Admitting diagnosis:   The primary encounter diagnosis was Gastrointestinal hemorrhage, unspecified gastrointestinal hemorrhage type. Diagnoses of Calculus of gallbladder without cholecystitis without obstruction and Right ventricular dysfunction were also pertinent to this visit.    Code status:   Current Code Status       Date Active Code Status Order ID Comments User Context       Prior            Allergies:   Morphine and codeine and Percocet [oxycodone-acetaminophen]    Intake and Output  No intake or output data in the 24 hours ending 12/20/24 2126    Weight:       12/20/24  1716   Weight: 73.5 kg (162 lb)       Most recent vitals:   Vitals:    12/20/24 1831 12/20/24 1846 12/20/24 1926 12/20/24 1929   BP: 110/69 119/86 117/78    BP Location:       Patient Position:       Pulse: 79 81 89 93   Resp:       Temp:       TempSrc:       SpO2: 96% 96%  97%   Weight:       Height:         Oxygen Therapy: .    Active LDAs/IV Access:   Lines, Drains & Airways       Active LDAs       Name Placement date Placement time Site Days    Peripheral IV 12/20/24 2117 Anterior;Left;Proximal Forearm 12/20/24 2117  Forearm  less than 1                    Labs (abnormal labs have a star):   Labs Reviewed   COMPREHENSIVE METABOLIC PANEL - Abnormal; Notable for the following components:       Result Value    Glucose 100 (*)     Creatinine 0.44 (*)     Chloride 108 (*)     BUN/Creatinine Ratio 38.6 (*)     All other components within normal limits    Narrative:     GFR Categories in Chronic Kidney Disease (CKD)      GFR Category          GFR (mL/min/1.73)    Interpretation  G1                     90 or greater         Normal or high (1)  G2                       60-89                Mild decrease (1)  G3a                   45-59                Mild to moderate decrease  G3b                   30-44                Moderate to severe decrease  G4                    15-29                Severe decrease  G5                    14 or less           Kidney failure          (1)In the absence of evidence of kidney disease, neither GFR category G1 or G2 fulfill the criteria for CKD.    eGFR calculation 2021 CKD-EPI creatinine equation, which does not include race as a factor   PROTIME-INR - Abnormal; Notable for the following components:    Protime 16.8 (*)     INR 1.31 (*)     All other components within normal limits   CBC WITH AUTO DIFFERENTIAL - Abnormal; Notable for the following components:    Hemoglobin 11.8 (*)     Platelets 126 (*)     All other components within normal limits   POCT OCCULT BLOOD STOOL - Normal   CBC AND DIFFERENTIAL    Narrative:     The following orders were created for panel order CBC & Differential.  Procedure                               Abnormality         Status                     ---------                               -----------         ------                     CBC Auto Differential[[id number]]        Abnormal            Final result                 Please view results for these tests on the individual orders.       Meds given in ED:   Medications   iopamidol (ISOVUE-370) 76 % injection 100 mL (100 mL Intravenous Given 12/20/24 1919)     No current facility-administered medications for this encounter.       NIH Stroke Scale:       Isolation/Infection(s):  No active isolations   No active infections     COVID Testing  Collected .  Resulted .    Nursing report ED to floor:  Mental status: .  Ambulatory status: .  Precautions: .    ED nurse phone extentsion- ..

## 2024-12-21 NOTE — DISCHARGE INSTRUCTIONS
Please return to the ER for any worsening symptoms recurrent GI bleeding otherwise follow-up with the GI specialist and primary MD.

## 2024-12-21 NOTE — PLAN OF CARE
Goal Outcome Evaluation:     Patient alert and oriented during shift. Respiration even and unlabored. Iv site patient and intact. No complaint during shift. Call light and water within reach of patient. Bed lock and low. Bed alarm present and activated.       Problem: Adult Inpatient Plan of Care  Goal: Plan of Care Review  Outcome: Progressing  Goal: Patient-Specific Goal (Individualized)  Outcome: Progressing  Goal: Absence of Hospital-Acquired Illness or Injury  Outcome: Progressing  Intervention: Identify and Manage Fall Risk  Recent Flowsheet Documentation  Taken 12/21/2024 1354 by Lorena Vallecillo RN  Safety Promotion/Fall Prevention: safety round/check completed  Taken 12/21/2024 1117 by Lorena Vallecillo RN  Safety Promotion/Fall Prevention: safety round/check completed  Taken 12/21/2024 0950 by Lorena Vallecillo RN  Safety Promotion/Fall Prevention: safety round/check completed  Taken 12/21/2024 0752 by Lorena Vallecillo RN  Safety Promotion/Fall Prevention: safety round/check completed  Intervention: Prevent Skin Injury  Recent Flowsheet Documentation  Taken 12/21/2024 1354 by Lorena Vallecillo RN  Body Position: position changed independently  Taken 12/21/2024 0752 by Lorena Vallecillo RN  Body Position: position changed independently  Goal: Optimal Comfort and Wellbeing  Outcome: Progressing  Intervention: Provide Person-Centered Care  Recent Flowsheet Documentation  Taken 12/21/2024 0752 by Lorena Vallecillo RN  Trust Relationship/Rapport: care explained  Goal: Readiness for Transition of Care  Outcome: Progressing     Problem: Fall Injury Risk  Goal: Absence of Fall and Fall-Related Injury  Outcome: Progressing  Intervention: Promote Injury-Free Environment  Recent Flowsheet Documentation  Taken 12/21/2024 1354 by Lorena Vallecillo RN  Safety Promotion/Fall Prevention: safety round/check completed  Taken 12/21/2024 1117 by Lorena Vallecillo RN  Safety Promotion/Fall Prevention: safety round/check completed  Taken  12/21/2024 0950 by Lorena Vallecillo, RN  Safety Promotion/Fall Prevention: safety round/check completed  Taken 12/21/2024 0752 by Lorena Vallecillo, RN  Safety Promotion/Fall Prevention: safety round/check completed

## 2024-12-22 ENCOUNTER — ANESTHESIA EVENT (OUTPATIENT)
Dept: PERIOP | Facility: HOSPITAL | Age: 69
End: 2024-12-22
Payer: MEDICARE

## 2024-12-22 ENCOUNTER — READMISSION MANAGEMENT (OUTPATIENT)
Dept: CALL CENTER | Facility: HOSPITAL | Age: 69
End: 2024-12-22
Payer: MEDICARE

## 2024-12-22 ENCOUNTER — ANESTHESIA (OUTPATIENT)
Dept: PERIOP | Facility: HOSPITAL | Age: 69
End: 2024-12-22
Payer: MEDICARE

## 2024-12-22 VITALS
WEIGHT: 158.4 LBS | BODY MASS INDEX: 29.15 KG/M2 | TEMPERATURE: 97.8 F | HEART RATE: 64 BPM | DIASTOLIC BLOOD PRESSURE: 90 MMHG | RESPIRATION RATE: 16 BRPM | OXYGEN SATURATION: 94 % | HEIGHT: 62 IN | SYSTOLIC BLOOD PRESSURE: 139 MMHG

## 2024-12-22 LAB
ANION GAP SERPL CALCULATED.3IONS-SCNC: 11 MMOL/L (ref 5–15)
BASOPHILS # BLD MANUAL: 0.06 10*3/MM3 (ref 0–0.2)
BASOPHILS NFR BLD MANUAL: 1 % (ref 0–1.5)
BUN SERPL-MCNC: 9 MG/DL (ref 8–23)
BUN/CREAT SERPL: 21.4 (ref 7–25)
CALCIUM SPEC-SCNC: 9.3 MG/DL (ref 8.6–10.5)
CHLORIDE SERPL-SCNC: 104 MMOL/L (ref 98–107)
CO2 SERPL-SCNC: 27 MMOL/L (ref 22–29)
CREAT SERPL-MCNC: 0.42 MG/DL (ref 0.57–1)
DEPRECATED RDW RBC AUTO: 45.1 FL (ref 37–54)
EGFRCR SERPLBLD CKD-EPI 2021: 104.7 ML/MIN/1.73
EOSINOPHIL # BLD MANUAL: 0.12 10*3/MM3 (ref 0–0.4)
EOSINOPHIL NFR BLD MANUAL: 2.1 % (ref 0.3–6.2)
ERYTHROCYTE [DISTWIDTH] IN BLOOD BY AUTOMATED COUNT: 13.2 % (ref 12.3–15.4)
GLUCOSE SERPL-MCNC: 86 MG/DL (ref 65–99)
HCT VFR BLD AUTO: 40.2 % (ref 34–46.6)
HGB BLD-MCNC: 12.9 G/DL (ref 12–15.9)
LYMPHOCYTES # BLD MANUAL: 1.36 10*3/MM3 (ref 0.7–3.1)
LYMPHOCYTES NFR BLD MANUAL: 13.4 % (ref 5–12)
MCH RBC QN AUTO: 29.9 PG (ref 26.6–33)
MCHC RBC AUTO-ENTMCNC: 32.1 G/DL (ref 31.5–35.7)
MCV RBC AUTO: 93.3 FL (ref 79–97)
MONOCYTES # BLD: 0.77 10*3/MM3 (ref 0.1–0.9)
NEUTROPHILS # BLD AUTO: 3.43 10*3/MM3 (ref 1.7–7)
NEUTROPHILS NFR BLD MANUAL: 58.8 % (ref 42.7–76)
NEUTS BAND NFR BLD MANUAL: 1 % (ref 0–5)
NEUTS VAC BLD QL SMEAR: ABNORMAL
PLATELET # BLD AUTO: 131 10*3/MM3 (ref 140–450)
PMV BLD AUTO: 11.9 FL (ref 6–12)
POTASSIUM SERPL-SCNC: 4.2 MMOL/L (ref 3.5–5.2)
RBC # BLD AUTO: 4.31 10*6/MM3 (ref 3.77–5.28)
RBC MORPH BLD: NORMAL
SMALL PLATELETS BLD QL SMEAR: ABNORMAL
SODIUM SERPL-SCNC: 142 MMOL/L (ref 136–145)
VARIANT LYMPHS NFR BLD MANUAL: 14.4 % (ref 19.6–45.3)
VARIANT LYMPHS NFR BLD MANUAL: 9.3 % (ref 0–5)
WBC NRBC COR # BLD AUTO: 5.74 10*3/MM3 (ref 3.4–10.8)

## 2024-12-22 PROCEDURE — 45378 DIAGNOSTIC COLONOSCOPY: CPT | Performed by: INTERNAL MEDICINE

## 2024-12-22 PROCEDURE — 25010000002 PROPOFOL 10 MG/ML EMULSION: Performed by: NURSE ANESTHETIST, CERTIFIED REGISTERED

## 2024-12-22 PROCEDURE — 0DJD8ZZ INSPECTION OF LOWER INTESTINAL TRACT, VIA NATURAL OR ARTIFICIAL OPENING ENDOSCOPIC: ICD-10-PCS | Performed by: INTERNAL MEDICINE

## 2024-12-22 PROCEDURE — 99232 SBSQ HOSP IP/OBS MODERATE 35: CPT | Performed by: INTERNAL MEDICINE

## 2024-12-22 PROCEDURE — 25810000003 LACTATED RINGERS PER 1000 ML: Performed by: NURSE ANESTHETIST, CERTIFIED REGISTERED

## 2024-12-22 PROCEDURE — 85007 BL SMEAR W/DIFF WBC COUNT: CPT | Performed by: FAMILY MEDICINE

## 2024-12-22 PROCEDURE — 85025 COMPLETE CBC W/AUTO DIFF WBC: CPT | Performed by: FAMILY MEDICINE

## 2024-12-22 PROCEDURE — 80048 BASIC METABOLIC PNL TOTAL CA: CPT | Performed by: FAMILY MEDICINE

## 2024-12-22 RX ORDER — HYDROCORTISONE ACETATE 25 MG/1
25 SUPPOSITORY RECTAL 2 TIMES DAILY
Qty: 10 SUPPOSITORY | Refills: 0 | Status: SHIPPED | OUTPATIENT
Start: 2024-12-22 | End: 2024-12-29

## 2024-12-22 RX ORDER — PROPOFOL 10 MG/ML
VIAL (ML) INTRAVENOUS AS NEEDED
Status: DISCONTINUED | OUTPATIENT
Start: 2024-12-22 | End: 2024-12-22 | Stop reason: SURG

## 2024-12-22 RX ORDER — SODIUM CHLORIDE, SODIUM LACTATE, POTASSIUM CHLORIDE, CALCIUM CHLORIDE 600; 310; 30; 20 MG/100ML; MG/100ML; MG/100ML; MG/100ML
INJECTION, SOLUTION INTRAVENOUS CONTINUOUS PRN
Status: DISCONTINUED | OUTPATIENT
Start: 2024-12-22 | End: 2024-12-22 | Stop reason: SURG

## 2024-12-22 RX ADMIN — Medication 10 ML: at 08:30

## 2024-12-22 RX ADMIN — SODIUM CHLORIDE, POTASSIUM CHLORIDE, SODIUM LACTATE AND CALCIUM CHLORIDE: 600; 310; 30; 20 INJECTION, SOLUTION INTRAVENOUS at 09:06

## 2024-12-22 RX ADMIN — POLYETHYLENE GLYCOL 3350, SODIUM SULFATE ANHYDROUS, SODIUM BICARBONATE, SODIUM CHLORIDE, POTASSIUM CHLORIDE 2000 ML: 236; 22.74; 6.74; 5.86; 2.97 POWDER, FOR SOLUTION ORAL at 05:11

## 2024-12-22 RX ADMIN — PROPOFOL 70 MG: 10 INJECTION, EMULSION INTRAVENOUS at 09:11

## 2024-12-22 RX ADMIN — PROPOFOL 70 MG: 10 INJECTION, EMULSION INTRAVENOUS at 09:07

## 2024-12-22 NOTE — PROGRESS NOTES
[facility]  Inpatient Progress Note      Date of Admission: 12/20/2024  Date of Service:  12/22/24    Reason for Follow Up: Hematochezia    Subjective     Subjective:   Colonoscopy today showed diffuse diverticulosis throughout the colon, large nonbleeding internal hemorrhoids and normal terminal ileum      Current Facility-Administered Medications:     [Transfer Hold] acetaminophen (TYLENOL) tablet 650 mg, 650 mg, Oral, Q4H PRN **OR** [Transfer Hold] acetaminophen (TYLENOL) 160 MG/5ML oral solution 650 mg, 650 mg, Oral, Q4H PRN **OR** [Transfer Hold] acetaminophen (TYLENOL) suppository 650 mg, 650 mg, Rectal, Q4H PRN, Josesito Sharma MD    dilTIAZem CD (CARDIZEM CD) 24 hr capsule 120 mg, 120 mg, Oral, Daily, Josesito Sharma MD, 120 mg at 12/21/24 0825    [Transfer Hold] hydrocortisone (ANUSOL-HC) suppository 25 mg, 25 mg, Rectal, BID, Josesito Sharma MD, 25 mg at 12/21/24 2049    [Transfer Hold] montelukast (SINGULAIR) tablet 10 mg, 10 mg, Oral, Daily, Josesito Sharma MD, 10 mg at 12/21/24 0825    [Transfer Hold] pantoprazole (PROTONIX) EC tablet 40 mg, 40 mg, Oral, Q AM, Josesito Sharma MD    [Transfer Hold] sodium chloride 0.9 % flush 10 mL, 10 mL, Intravenous, Q12H, Josesito Sharma MD, 10 mL at 12/21/24 0826    [Transfer Hold] sodium chloride 0.9 % flush 10 mL, 10 mL, Intravenous, PRN, Josesito Sharma MD    [Transfer Hold] sodium chloride 0.9 % infusion 40 mL, 40 mL, Intravenous, PRN, Josesito Sharma MD    [Transfer Hold] tiZANidine (ZANAFLEX) tablet 4 mg, 4 mg, Oral, Daily PRN, Josesito Sharma MD    Facility-Administered Medications Ordered in Other Encounters:     lactated ringers infusion, , Intravenous, Continuous PRN, Cordell Nassar, CRNA, New Bag at 12/22/24 0906    Propofol (DIPRIVAN) injection, , Intravenous, PRNCesario Matthew T., CRNA, 70 mg at 12/22/24 0911    Review of Systems      Negative        Objective     Vital Signs  Temp:  [97.8 °F (36.6 °C)-98.3 °F (36.8 °C)] 97.8 °F (36.6 °C)  Heart Rate:  [81-89] 89  Resp:  [16-18] 16  BP: (122-136)/(71-86) 122/71  Body mass index is 28.97 kg/m².  No intake or output data in the 24 hours ending 12/22/24 0915  No intake/output data recorded.       Physical Exam:   General: patient awake, alert and cooperative   Eyes: Normal lids and lashes, no scleral icterus   Neck: supple, normal ROM   Skin: warm and dry, not jaundiced   Cardiovascular: regular rhythm and rate, no murmurs auscultated   Pulm: clear to auscultation bilaterally, regular and unlabored   Abdomen: soft, nontender, nondistended; normal bowel sounds   Rectal: deferred   Extremities: no rash or edema   Psychiatric: Normal mood and behavior; memory intact         Results Review:    I have reviewed all of the patients current test results  Results from last 7 days   Lab Units 12/22/24 0438 12/21/24 0251 12/20/24  1842   WBC 10*3/mm3 5.74 6.21 7.44   HEMOGLOBIN g/dL 12.9 11.4* 11.8*   HEMATOCRIT % 40.2 35.8 37.5   PLATELETS 10*3/mm3 131* 116* 126*       Results from last 7 days   Lab Units 12/22/24  0438 12/21/24  0251 12/20/24  1842   SODIUM mmol/L 142 142 143   POTASSIUM mmol/L 4.2 4.0 3.8   CHLORIDE mmol/L 104 107 108*   CO2 mmol/L 27.0 26.0 25.0   BUN mg/dL 9 12 17   CREATININE mg/dL 0.42* 0.42* 0.44*   CALCIUM mg/dL 9.3 8.8 8.6   BILIRUBIN mg/dL  --   --  0.8   ALK PHOS U/L  --   --  98   ALT (SGPT) U/L  --   --  11   AST (SGOT) U/L  --   --  20   GLUCOSE mg/dL 86 100* 100*       Results from last 7 days   Lab Units 12/20/24  1842   INR  1.31*       Lab Results   Lab Value Date/Time    LIPASE 32 09/07/2023 2248    LIPASE 166 04/02/2017 0621    LIPASE 267 (H) 04/01/2017 1622    LIPASE 185 12/16/2016 1613    LIPASE 498 (H) 12/06/2016 0424    LIPASE 544 (H) 12/05/2016 0614    LIPASE 676 (H) 12/04/2016 2114    LIPASE 444 (H) 11/23/2016 0517       Radiology:    CT Angiogram Abdomen  Pelvis  Narrative: EXAMINATION: CT ANGIOGRAM ABDOMEN PELVIS- 12/20/2024 8:24 PM     HISTORY: Bloody stools     TOTAL DOSE: 1648.56 mGy.cm (Automatic exposure control technique was  implemented in an effort to keep the radiation dose as low as possible  without compromising image quality)     REPORT: Spiral CT of the abdomen and pelvis was performed after  administration of intravenous contrast using CTA protocol, to include  reconstructed coronal, sagittal and 3D images.     COMPARISON: Noncontrast CT of the abdomen and pelvis 11/24/2023.     Review of lung windows demonstrates mild respiratory motion artifact,  the lung bases are clear. The noncontrast images demonstrate several  gallstones within the gallbladder, the gallbladder appears partially  contracted. No evidence of acute cholecystitis is identified. No urinary  tract calculi are identified. There is surgical suture lines associated  with the sigmoid colon and a small bowel loop in the central pelvis,  this loop is slightly distended with gas and fluid, the appearance is  similar to the previous CT there is no convincing evidence mechanical  small bowel obstruction.     There is a ventral hernia to the left of the umbilicus that contains a  small bowel loop which does not appear obstructed or thickened. The  hernia sac measures 5.3 cm. This hernia was present before and measured  about 3.5 cm. There is also a fat-containing supraumbilical ventral  hernia that measures 1.8 cm. This is stable. There is also a  fat-containing ventral hernia in the right abdomen image 143 of series  5, measuring 6.9 cm in diameter, compared with 5.8 cm before. Normal  patency and enhancement of the abdominal aorta, mesenteric and renal  arteries and iliac arteries. Normal caliber of the abdominal aorta.  Review of bone windows shows no acute osseous abnormality. There there  is multilevel facet degeneration in the lumbar spine     Contrast-enhanced images demonstrate mild  cardiomegaly, with mild  dilation of the right and left atrium. There is reflux of contrast into  the hepatic veins and intrahepatic IVC which is concerning for right  heart failure. No evidence of pulmonary edema is identified. The stomach  is decompressed. The liver and spleen appear homogeneous without  evidence of a mass. There is mild periportal edema within the liver,  which is nonspecific. The pancreas and adrenal glands are within normal  limits. The kidneys enhance normally, no renal mass or hydronephrosis is  identified. No intra-abdominal mass or lymphadenopathy is identified.  There is mild sigmoid and descending colonic diverticulosis without  acute diverticulitis. Delayed images were obtained to evaluate for  active GI bleeding, the delayed images demonstrate no sites of contrast  extravasation within the lumen of the GI tract. There are small stable  cysts within the kidneys which are better demonstrated on the delayed  images. These appear benign. There is evidence of previous hysterectomy.     Impression: 1. No acute intra-abdominal or pelvic abnormality, no CT findings to  explain the bloody stools. There is diverticulosis of the descending and  proximal sigmoid colon without evidence of diverticulitis. Postop  changes associated with the sigmoid colon and small bowel loop in the  central pelvis appear stable. No evidence of bowel obstruction or mass  effect.  2. Multiple gallstones with partial contraction of the gallbladder. No  evidence of acute cholecystitis.  3. Multiple ventral hernias, including a 5.3 cm ventral hernia to the  left of the umbilicus which contains fat and a nonobstructed small bowel  loop.  4. Findings suggestive of right heart dysfunction as detailed above.           This report was signed and finalized on 12/20/2024 8:37 PM by Dr. Venkat Pate MD.           Assessment & Plan       GI bleeding    Permanent atrial fibrillation    Pulmonary hypertension    LEROY on  CPAP      Impression/Plan    Hematochezia  Most likely hemorrhoidal bleeding, colonoscopy showed large internal hemorrhoids, diffuse diverticulosis throughout the colon and normal terminal ileum  No significant drop of hemoglobin  Monitor CBC  Resume diet  Antihemorrhoidal measures  If bleeding care can have hemorrhoidal banding at the GI office or with colorectal surgery as an outpatient  Okay for discharge from GI standpoint    Will sign off, please call for reevaluation if needed    Electronically signed by Christopher Ludwig MD, 12/22/24, 9:15 AM CST.     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  Any results not available at time of inpatient discharge and/or GI clinic follow up should be managed by the hospitalist team, PCP, or outpatient gastroenterologist.    Christopher Ludwig MD  12/22/24  09:15 CST

## 2024-12-22 NOTE — ANESTHESIA POSTPROCEDURE EVALUATION
Patient: Yun Blackwell    Procedure Summary       Date: 12/22/24 Room / Location: [facility] OR  /  [facility] OR    Anesthesia Start: 0906 Anesthesia Stop: 0919    Procedure: COLONOSCOPY Diagnosis:     Surgeons: Christopher Ludwig MD Provider: Cordell Nassar CRNA    Anesthesia Type: MAC ASA Status: 3 - Emergent            Anesthesia Type: MAC    Vitals  Vitals Value Taken Time   /64 12/22/24 0945   Temp 98 °F (36.7 °C) 12/22/24 0940   Pulse 80 12/22/24 0945   Resp 14 12/22/24 0945   SpO2 96 % 12/22/24 0945           Post Anesthesia Care and Evaluation    Patient location during evaluation: PACU  Patient participation: complete - patient participated  Level of consciousness: awake and alert  Pain management: adequate    Airway patency: patent  Anesthetic complications: No anesthetic complications  PONV Status: none  Cardiovascular status: acceptable and stable  Respiratory status: acceptable and unassisted  Hydration status: acceptable

## 2024-12-22 NOTE — DISCHARGE SUMMARY
[facility] Medicine Services  DISCHARGE SUMMARY       Date of Admission: 12/20/2024  Date of Discharge:  12/22/2024  Primary Care Physician: ANGEL Healy MD    Presenting Problem/History of Present Illness:  Bleeding per rectum    Final Discharge Diagnoses:  Active Hospital Problems    Diagnosis     **GI bleeding     LEROY on CPAP     Pulmonary hypertension     Permanent atrial fibrillation    In addition to above  Diverticulosis  Nonbleeding internal hemorrhoids.    Consults:   Gastroenterology  Procedures Performed:   Colonoscopy-December 22      Pertinent Test Results:   Results for orders placed during the hospital encounter of 01/29/24    Adult Transthoracic Echo Complete w/ Color, Spectral and Contrast if necessary per protocol    Interpretation Summary    Left ventricular systolic function is low normal. Left ventricular ejection fraction appears to be 51 - 55%.    Severe biatrial enlargement.    Moderate tricuspid valve regurgitation is present.    Estimated right ventricular systolic pressure from tricuspid regurgitation is mildly elevated (35-45 mmHg).    The right ventricular cavity is moderately dilated, with normal systolic function.    No other significant (greater than mild) valvular pathology.    Compared to most recent transthoracic exam (from 11/24/2020), no obvious or significant change.      Imaging Results (All)       Procedure Component Value Units Date/Time    CT Angiogram Abdomen Pelvis [[id number]] Collected: 12/20/24 2024     Updated: 12/20/24 2040    Narrative:      EXAMINATION: CT ANGIOGRAM ABDOMEN PELVIS- 12/20/2024 8:24 PM     HISTORY: Bloody stools     TOTAL DOSE: 1648.56 mGy.cm (Automatic exposure control technique was  implemented in an effort to keep the radiation dose as low as possible  without compromising image quality)     REPORT: Spiral CT of the abdomen and pelvis was performed after  administration of intravenous contrast using CTA  protocol, to include  reconstructed coronal, sagittal and 3D images.     COMPARISON: Noncontrast CT of the abdomen and pelvis 11/24/2023.     Review of lung windows demonstrates mild respiratory motion artifact,  the lung bases are clear. The noncontrast images demonstrate several  gallstones within the gallbladder, the gallbladder appears partially  contracted. No evidence of acute cholecystitis is identified. No urinary  tract calculi are identified. There is surgical suture lines associated  with the sigmoid colon and a small bowel loop in the central pelvis,  this loop is slightly distended with gas and fluid, the appearance is  similar to the previous CT there is no convincing evidence mechanical  small bowel obstruction.     There is a ventral hernia to the left of the umbilicus that contains a  small bowel loop which does not appear obstructed or thickened. The  hernia sac measures 5.3 cm. This hernia was present before and measured  about 3.5 cm. There is also a fat-containing supraumbilical ventral  hernia that measures 1.8 cm. This is stable. There is also a  fat-containing ventral hernia in the right abdomen image 143 of series  5, measuring 6.9 cm in diameter, compared with 5.8 cm before. Normal  patency and enhancement of the abdominal aorta, mesenteric and renal  arteries and iliac arteries. Normal caliber of the abdominal aorta.  Review of bone windows shows no acute osseous abnormality. There there  is multilevel facet degeneration in the lumbar spine     Contrast-enhanced images demonstrate mild cardiomegaly, with mild  dilation of the right and left atrium. There is reflux of contrast into  the hepatic veins and intrahepatic IVC which is concerning for right  heart failure. No evidence of pulmonary edema is identified. The stomach  is decompressed. The liver and spleen appear homogeneous without  evidence of a mass. There is mild periportal edema within the liver,  which is nonspecific. The  pancreas and adrenal glands are within normal  limits. The kidneys enhance normally, no renal mass or hydronephrosis is  identified. No intra-abdominal mass or lymphadenopathy is identified.  There is mild sigmoid and descending colonic diverticulosis without  acute diverticulitis. Delayed images were obtained to evaluate for  active GI bleeding, the delayed images demonstrate no sites of contrast  extravasation within the lumen of the GI tract. There are small stable  cysts within the kidneys which are better demonstrated on the delayed  images. These appear benign. There is evidence of previous hysterectomy.       Impression:      1. No acute intra-abdominal or pelvic abnormality, no CT findings to  explain the bloody stools. There is diverticulosis of the descending and  proximal sigmoid colon without evidence of diverticulitis. Postop  changes associated with the sigmoid colon and small bowel loop in the  central pelvis appear stable. No evidence of bowel obstruction or mass  effect.  2. Multiple gallstones with partial contraction of the gallbladder. No  evidence of acute cholecystitis.  3. Multiple ventral hernias, including a 5.3 cm ventral hernia to the  left of the umbilicus which contains fat and a nonobstructed small bowel  loop.  4. Findings suggestive of right heart dysfunction as detailed above.           This report was signed and finalized on 12/20/2024 8:37 PM by Dr. Venkat Pate MD.             LAB RESULTS:      Lab 12/22/24  0438 12/21/24  0251 12/20/24  1842   WBC 5.74 6.21 7.44   HEMOGLOBIN 12.9 11.4* 11.8*   HEMATOCRIT 40.2 35.8 37.5   PLATELETS 131* 116* 126*   NEUTROS ABS 3.43 3.68  --    IMMATURE GRANS (ABS)  --  0.02  --    LYMPHS ABS  --  1.76  --    MONOS ABS  --  0.63  --    EOS ABS 0.12 0.09  --    MCV 93.3 93.5 93.5   PROTIME  --   --  16.8*         Lab 12/22/24  0438 12/21/24  0251 12/20/24  1842   SODIUM 142 142 143   POTASSIUM 4.2 4.0 3.8   CHLORIDE 104 107 108*   CO2 27.0 26.0  "25.0   ANION GAP 11.0 9.0 10.0   BUN 9 12 17   CREATININE 0.42* 0.42* 0.44*   EGFR 104.7 104.7 103.6   GLUCOSE 86 100* 100*   CALCIUM 9.3 8.8 8.6         Lab 12/20/24  1842   TOTAL PROTEIN 6.3   ALBUMIN 3.6   GLOBULIN 2.7   ALT (SGPT) 11   AST (SGOT) 20   BILIRUBIN 0.8   ALK PHOS 98         Lab 12/20/24  1842   PROTIME 16.8*   INR 1.31*                 Brief Urine Lab Results  (Last result in the past 365 days)        Color   Clarity   Blood   Leuk Est   Nitrite   Protein   CREAT   Urine HCG        10/22/24 0906 Dark Yellow   Cloudy   Moderate (2+)   Moderate (2+)   Positive   Trace                 Microbiology Results (last 10 days)       ** No results found for the last 240 hours. **            Hospital Course:   Patient is a 69-year-old woman who presented with bright red blood per rectum.  She underwent upper GI endoscopy with findings as outlined above  Patient has been cleared to anticoagulant by GI service.  Only new medication is hydrocortisone suppository.  I directed this to [facility] per their request.  Continue medications as per outlined below  Patient strongly advised on high-fiber diet and take sitz bath.  Patient verbalized adequate understanding.  Meds reviewed with patient and family        Physical Exam on Discharge:  /90 (Patient Position: Standing)   Pulse 64   Temp 97.8 °F (36.6 °C) (Oral)   Resp 16   Ht 157.5 cm (62\")   Wt 71.9 kg (158 lb 6.4 oz)   SpO2 94%   BMI 28.97 kg/m²   Physical Exam  GEN: Awake, alert, interactive, in NAD  HEENT: Atraumatic, PERRLA, EOMI, Anicteric, Trachea midline  Lungs: CTAB, no wheezing/rales/rhonchi  Heart: RRR, +S1/s2, no rub  ABD: soft, nt/nd, +BS, no guarding/rebound  Extremities: atraumatic, no cyanosis, no edema  Skin: no rashes or lesions  Neuro: AAOx3, no focal deficits  Psych: normal mood & affect       Condition on Discharge: Stable    Discharge Disposition:  Home or Self Care    Discharge Medications:     Discharge Medications    "     New Medications        Instructions Start Date   hydrocortisone 25 MG suppository  Commonly known as: ANUSOL-HC   25 mg, Rectal, 2 Times Daily             Continue These Medications        Instructions Start Date   cyanocobalamin 1000 MCG/ML injection   Inject 1 mL Every 30 (Thirty) Days as directed.      dabigatran etexilate 150 MG capsu  Commonly known as: PRADAXA   150 mg, Oral, 2 Times Daily      dicyclomine 10 MG capsule  Commonly known as: BENTYL   10 mg, Oral, 4 Times Daily Before Meals & Nightly      dilTIAZem  MG 24 hr capsule  Commonly known as: Cardizem CD   120 mg, Oral, Daily      hydrOXYzine 10 MG tablet  Commonly known as: ATARAX   Take 1 to 2 tablets by mouth Every 4 (Four) Hours As Needed.      omeprazole 20 MG capsule  Commonly known as: priLOSEC   20 mg, Oral, Daily      tamoxifen 20 MG chemo tablet  Commonly known as: NOLVADEX   20 mg, Oral, Daily      tiZANidine 4 MG tablet  Commonly known as: ZANAFLEX   4 mg, Oral, Daily PRN      triamcinolone 0.1 % ointment  Commonly known as: KENALOG   Apply 1 Application topically to the appropriate area as directed 2 (Two) Times a Day As Needed when and where itching is present as needed.             Stop These Medications      montelukast 10 MG tablet  Commonly known as: SINGULAIR              This patient has current or prior documentation of an left ventricular ejection fraction (LVEF) of less than or equal to 40%.-Not applicable based on information below.  Results for orders placed during the hospital encounter of 01/29/24    Adult Transthoracic Echo Complete w/ Color, Spectral and Contrast if necessary per protocol    Interpretation Summary    Left ventricular systolic function is low normal. Left ventricular ejection fraction appears to be 51 - 55%.    Severe biatrial enlargement.    Moderate tricuspid valve regurgitation is present.    Estimated right ventricular systolic pressure from tricuspid regurgitation is mildly elevated (35-45  mmHg).    The right ventricular cavity is moderately dilated, with normal systolic function.    No other significant (greater than mild) valvular pathology.    Compared to most recent transthoracic exam (from 11/24/2020), no obvious or significant change.          Discharge Diet:   Diet Instructions       Diet: Regular/House Diet; Thin (IDDSI 0)      Discharge Diet: Regular/House Diet    Fluid Consistency: Thin (IDDSI 0)    High fiber            Activity at Discharge:   Activity Instructions       Gradually Increase Activity Until at Pre-Hospitalization Level              Follow-up Appointments:   Future Appointments   Date Time Provider Department Center   1/20/2025  8:30 AM Lisa Welch APRN [facility] POD [facility]AD   4/22/2025  3:00 PM Meenu Underwood APRN [facility] PAD PAD   7/31/2025  9:30 AM Van Marcelino MD [facility] P[facility]       Test Results Pending at Discharge: none    Electronically signed by Kenny Moore MD, 12/22/24, 12:49 CST.    Time: Greater than 30 minutes.

## 2024-12-22 NOTE — PLAN OF CARE
Goal Outcome Evaluation:      Patient alert and oriented. Family at bedside with patient to take home. Respiration even and unlabored. Iv site removed and site care provided. Discharge instruction reviewed and discussed with patient and family.       Problem: Adult Inpatient Plan of Care  Goal: Plan of Care Review  Outcome: Met  Goal: Patient-Specific Goal (Individualized)  Outcome: Met  Goal: Absence of Hospital-Acquired Illness or Injury  Outcome: Met  Intervention: Identify and Manage Fall Risk  Recent Flowsheet Documentation  Taken 12/22/2024 0730 by Lorena Vallecillo RN  Safety Promotion/Fall Prevention: safety round/check completed  Intervention: Prevent Skin Injury  Recent Flowsheet Documentation  Taken 12/22/2024 0730 by Lorena Vallecillo RN  Body Position: position changed independently  Goal: Optimal Comfort and Wellbeing  Outcome: Met  Intervention: Provide Person-Centered Care  Recent Flowsheet Documentation  Taken 12/22/2024 0730 by Lorena Vallecillo RN  Trust Relationship/Rapport: care explained  Goal: Readiness for Transition of Care  Outcome: Met     Problem: Fall Injury Risk  Goal: Absence of Fall and Fall-Related Injury  Outcome: Met  Intervention: Promote Injury-Free Environment  Recent Flowsheet Documentation  Taken 12/22/2024 0730 by Lorena Vallecillo RN  Safety Promotion/Fall Prevention: safety round/check completed

## 2024-12-22 NOTE — OUTREACH NOTE
Prep Survey      Flowsheet Row Responses   [facility] [place] patient discharged from? [place]   Is LACE score < 7 ? No   Eligibility [facility]   Date of Admission 12/20/24   Date of Discharge 12/22/24   Discharge Disposition Home or Self Care   Discharge diagnosis GI bleeding   Does the patient have one of the following disease processes/diagnoses(primary or secondary)? Other   Does the patient have Home health ordered? No   Is there a DME ordered? No   Prep survey completed? Yes            Katja PINEDA - Registered Nurse

## 2024-12-22 NOTE — ANESTHESIA PREPROCEDURE EVALUATION
Anesthesia Evaluation     Patient summary reviewed   history of anesthetic complications:  PONV prolonged sedation  NPO Solid Status: > 8 hours             Airway   Mallampati: I  TM distance: >3 FB  Neck ROM: full  No difficulty expected  Dental      Pulmonary    (+) ,sleep apnea on CPAP  (-) asthma, not a smoker  Cardiovascular   Exercise tolerance: excellent (>7 METS)    ECG reviewed    (+) hypertension, dysrhythmias Atrial Fib, hyperlipidemia  (-) past MI, CABG      Neuro/Psych  (-) seizures, TIA, CVA  GI/Hepatic/Renal/Endo    (+) GERD, GI bleeding , diabetes mellitus (diet controlled)  (-) liver disease    Musculoskeletal     Abdominal    Substance History      OB/GYN          Other   blood dyscrasia anemia,   history of cancer remission                  Anesthesia Plan    ASA 3 - emergent     MAC   total IV anesthesia  (Pradaxa held )  intravenous induction     Anesthetic plan, risks, benefits, and alternatives have been provided, discussed and informed consent has been obtained with: patient.      CODE STATUS:    Code Status (Patient has no pulse and is not breathing): CPR (Attempt to Resuscitate)  Medical Interventions (Patient has pulse or is breathing): Full Support

## 2024-12-23 ENCOUNTER — TRANSITIONAL CARE MANAGEMENT TELEPHONE ENCOUNTER (OUTPATIENT)
Dept: CALL CENTER | Facility: HOSPITAL | Age: 69
End: 2024-12-23
Payer: MEDICARE

## 2024-12-23 ENCOUNTER — TELEPHONE (OUTPATIENT)
Dept: INTERNAL MEDICINE | Facility: CLINIC | Age: 69
End: 2024-12-23
Payer: MEDICARE

## 2024-12-23 ENCOUNTER — OFFICE VISIT (OUTPATIENT)
Dept: INTERNAL MEDICINE | Facility: CLINIC | Age: 69
End: 2024-12-23
Payer: MEDICARE

## 2024-12-23 VITALS
HEIGHT: 62 IN | RESPIRATION RATE: 16 BRPM | OXYGEN SATURATION: 96 % | WEIGHT: 154.1 LBS | HEART RATE: 58 BPM | DIASTOLIC BLOOD PRESSURE: 78 MMHG | BODY MASS INDEX: 28.36 KG/M2 | SYSTOLIC BLOOD PRESSURE: 118 MMHG

## 2024-12-23 DIAGNOSIS — K92.2 GASTROINTESTINAL HEMORRHAGE, UNSPECIFIED GASTROINTESTINAL HEMORRHAGE TYPE: Primary | ICD-10-CM

## 2024-12-23 PROCEDURE — 3044F HG A1C LEVEL LT 7.0%: CPT

## 2024-12-23 PROCEDURE — 1111F DSCHRG MED/CURRENT MED MERGE: CPT

## 2024-12-23 PROCEDURE — 1126F AMNT PAIN NOTED NONE PRSNT: CPT

## 2024-12-23 PROCEDURE — 99495 TRANSJ CARE MGMT MOD F2F 14D: CPT

## 2024-12-23 NOTE — TELEPHONE ENCOUNTER
Preparation H will likely not work as well, although I do understand if she is not able to obtain the suppositories.  It is fine to switch to Preparation H until she is able to follow-up with gastroenterology.  Thank you

## 2024-12-23 NOTE — TELEPHONE ENCOUNTER
PATIENT HAS CALLED, SHE STATED THAT THE SUPPOSITORIES WILL COST 170ish.  SHE SAID THE PHARMACIST IS ASKING IF ITS OK FOR PATIENT TO JUST PREPARATION H?

## 2024-12-23 NOTE — PROGRESS NOTES
Transitional Care Follow Up Visit  Subjective       Yunvicky Blackwell is a 69 y.o. female who presents for a transitional care management visit.    Within 48 business hours after discharge our office contacted her via telephone to coordinate her care and needs.      I reviewed and discussed the details of that call along with the discharge summary, hospital problems, inpatient lab results, inpatient diagnostic studies, and consultation reports with Yun.     Current outpatient and discharge medications have been reconciled for the patient.  Reviewed by: MARÍA Baxter          12/22/2024     5:06 PM   Date of TCM Phone Call   [facility]   Date of Admission 12/20/2024   Date of Discharge 12/22/2024   Discharge Disposition Home or Self Care     Risk for Readmission (LACE) Score: 9 (12/22/2024  5:00 AM)      History of Present Illness   Course During Hospital Stay:      History of Present Illness  The patient is a 69-year-old female who presents for follow-up after a recent hospitalization for a gastrointestinal bleed.    She was admitted to the hospital from 12/20/2024 to 12/22/2024 due to a gastrointestinal bleed and was discharged yesterday. She reports feeling well overall, with no further instances of blood in her stool. She has been adhering to a high-fiber diet and maintaining adequate hydration. She has not had a bowel movement since her hospital discharge.  She has been diagnosed with gallstones but reports no associated pain. She avoids greasy foods due to her irritable bowel syndrome. She underwent a colon resection in 2016, which required the use of an ileostomy bag and wound VAC.     She was prescribed hydrocortisone suppositories during her hospital stay, but her insurance did not cover the cost. She has been unable to obtain the medication from her usual pharmacy as it is closed on Sundays. She was advised to take the medication immediately but has not been able to do  "so. She has contacted her insurance provider and is awaiting their response.    She has been advised to monitor her blood pressure at home and return for a follow-up visit in 6 months. She has not had a recent consultation with her gastroenterologist, Dr. Joe France. Her last outpatient visit with a gastroenterologist was in 2023.       The following portions of the patient's history were reviewed and updated as appropriate: allergies, current medications, past family history, past medical history, past social history, past surgical history, and problem list.    Review of Systems   Respiratory:  Negative for chest tightness and shortness of breath.    Cardiovascular:  Negative for chest pain and palpitations.   Gastrointestinal:  Negative for abdominal pain, blood in stool, constipation, diarrhea, nausea and vomiting.       Objective   /78   Pulse 58   Resp 16   Ht 157.5 cm (62\")   Wt 69.9 kg (154 lb 1.6 oz)   SpO2 96%   BMI 28.19 kg/m²   Physical Exam  Physical Exam  Lungs were auscultated.    Vital Signs  Blood pressure is 118/78.    Assessment & Plan   Diagnoses and all orders for this visit:    1. Gastrointestinal hemorrhage, unspecified gastrointestinal hemorrhage type (Primary)      Assessment & Plan  1. Post-hospitalization follow-up for gastrointestinal bleeding.  She was hospitalized from 12/20/2024 to 12/22/2024 for a gastrointestinal bleed. She reports no further blood in her stool and is feeling well. A colonoscopy performed during her hospital stay did not reveal any areas of active bleeding. She was slightly anemic during her hospital stay, but her levels normalized prior to discharge. Her platelet count was slightly low, likely due to the bleeding episode, but not significantly so. She has gallstones without evidence of gallbladder inflammation. She is advised to monitor her bowel movements and report any abnormalities. She is also advised to follow up with her gastroenterologist, Dr." Joe France, to discuss her recent hospitalization and ensure there are no additional recommendations or changes to her treatment plan. She is advised to monitor her gallstones and report any pain, particularly after eating.    2. Medication management.  She was prescribed hydrocortisone suppositories during her hospital stay but has not been able to obtain them due to insurance issues. She is advised to contact her pharmacy to determine the out-of-pocket cost and confirm whether the prescription has been received. If the cost is manageable, she should start using the suppositories as directed (twice a day for 5 days). If there are issues with the prescription transfer, she should contact the office for assistance.    3. Elevated blood pressure.  Her blood pressure was slightly elevated during her hospital stay (139/90 on 12/22/2024 and 136/86 the day before). Today, her blood pressure is within normal limits at 118/78. She is advised to monitor her blood pressure at home periodically, ensuring she is seated with her feet flat on the floor and her arm supported. No medication is required at this time, but she should continue to monitor her readings.  Blood pressures consistently elevated greater than 140/90, she should contact the office for further recommendations.    Follow-up  The patient will follow up in 4 months for her annual wellness check.    PROCEDURE  Colonoscopy performed during the recent hospitalization did not reveal any areas of active bleeding. The patient underwent a colon resection in 2016, which required the use of an ileostomy bag and wound VAC.             Patient or patient representative verbalized consent for the use of Ambient Listening during the visit with  MARÍA Baxter for chart documentation. 12/23/2024  19:10 CST

## 2024-12-23 NOTE — OUTREACH NOTE
Call Center TCM Note      Flowsheet Row Responses   [facility] patient discharged from? [place]   Does the patient have one of the following disease processes/diagnoses(primary or secondary)? Other   TCM attempt successful? Yes   Comments Patient currently attending hospital f/u appt with PCP, fulfilling TCM requirements. TCM complete.   Does the patient have an appointment with their PCP within 7-14 days of discharge? Yes   TCM call completed? Yes   Wrap up additional comments Patient currently attending [facility] f/u appt with PCP, fulfilling TCM requirements. TCM complete.            Sonya Henderson RN    12/23/2024, 15:14 CST

## 2024-12-26 ENCOUNTER — OFFICE VISIT (OUTPATIENT)
Dept: SURGERY | Age: 69
End: 2024-12-26
Payer: MEDICARE

## 2024-12-26 DIAGNOSIS — Z85.3 PERSONAL HISTORY OF MALIGNANT NEOPLASM OF BREAST: Primary | ICD-10-CM

## 2024-12-26 PROCEDURE — 1159F MED LIST DOCD IN RCRD: CPT | Performed by: PHYSICIAN ASSISTANT

## 2024-12-26 PROCEDURE — G8484 FLU IMMUNIZE NO ADMIN: HCPCS | Performed by: PHYSICIAN ASSISTANT

## 2024-12-26 PROCEDURE — 99212 OFFICE O/P EST SF 10 MIN: CPT | Performed by: PHYSICIAN ASSISTANT

## 2024-12-26 PROCEDURE — G8417 CALC BMI ABV UP PARAM F/U: HCPCS | Performed by: PHYSICIAN ASSISTANT

## 2024-12-26 PROCEDURE — 1036F TOBACCO NON-USER: CPT | Performed by: PHYSICIAN ASSISTANT

## 2024-12-26 PROCEDURE — 1090F PRES/ABSN URINE INCON ASSESS: CPT | Performed by: PHYSICIAN ASSISTANT

## 2024-12-26 PROCEDURE — 3017F COLORECTAL CA SCREEN DOC REV: CPT | Performed by: PHYSICIAN ASSISTANT

## 2024-12-26 PROCEDURE — 1123F ACP DISCUSS/DSCN MKR DOCD: CPT | Performed by: PHYSICIAN ASSISTANT

## 2024-12-26 PROCEDURE — G8427 DOCREV CUR MEDS BY ELIG CLIN: HCPCS | Performed by: PHYSICIAN ASSISTANT

## 2024-12-26 PROCEDURE — G8399 PT W/DXA RESULTS DOCUMENT: HCPCS | Performed by: PHYSICIAN ASSISTANT

## 2024-12-30 NOTE — PROGRESS NOTES
Dilma Wright comes today for her follow-up breast exam.  She has had no new breast complaints.  She reports no new palpable masses.  There is no skin or nipple changes.  There is no nipple discharge.  She has no appreciable evidence of supraclavicular or axillary adenopathy.    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 Mendes on January 31, 2020.  Most recently, she had a biopsy of the left breast for recurrent microcalcifications.  Pathology report revealed:  Breast, left breast needle core biopsies at 12 o'clock position:   1.  Benign breast parenchyma with fibroadenomatoid mastopathy.   2.  Microcalcifications are identified.     Patient Active Problem List    Diagnosis Date Noted    Abnormal nuclear stress test     Precordial pain     Abnormal mammogram 10/13/2023    Mammographic microcalcification 10/13/2023    Dry mouth 09/15/2023    Difficulty with CPAP nasal mask use 09/15/2023    Obstructive sleep apnea 02/28/2022    CPAP (continuous positive airway pressure) dependence 02/28/2022    Genetic susceptibility to other malignant neoplasm 12/08/2021    Personal history of malignant neoplasm of breast 12/08/2021    Status post left breast lumpectomy with IORT 1/31/2020 02/08/2020    Ductal carcinoma in situ (DCIS) of left breast 12/26/2019    S/P breast biopsy 03/13/2019    Atypical ductal hyperplasia of breast 02/14/2019    A-fib (HCC) 04/06/2017       Current Outpatient Medications   Medication Sig Dispense Refill    tamoxifen (NOLVADEX) 20 MG tablet Take 1 tablet by mouth daily 90 tablet 1    cyanocobalamin 1000 MCG/ML injection Inject 1 mL into the muscle once Every 28 Days      dicyclomine (BENTYL) 10 MG capsule Take 1 capsule by mouth 4 times daily (before meals and nightly)      Calcium Carbonate-Vit D-Min (RA CALCIUM/VITAMIN D/MINERALS) 600-400 MG-UNIT TABS Take 600 mg by mouth in the morning and at bedtime 60 tablet 5

## 2025-01-02 ENCOUNTER — TELEPHONE (OUTPATIENT)
Dept: INTERNAL MEDICINE | Facility: CLINIC | Age: 70
End: 2025-01-02
Payer: MEDICARE

## 2025-01-02 ENCOUNTER — LAB (OUTPATIENT)
Dept: LAB | Facility: HOSPITAL | Age: 70
End: 2025-01-02
Payer: MEDICARE

## 2025-01-02 DIAGNOSIS — R10.9 FLANK PAIN: Primary | ICD-10-CM

## 2025-01-02 DIAGNOSIS — R10.9 FLANK PAIN: ICD-10-CM

## 2025-01-02 DIAGNOSIS — N39.0 URINARY TRACT INFECTION WITHOUT HEMATURIA, SITE UNSPECIFIED: ICD-10-CM

## 2025-01-02 LAB
BACTERIA UR QL AUTO: ABNORMAL /HPF
BILIRUB UR QL STRIP: NEGATIVE
CLARITY UR: CLEAR
COLOR UR: YELLOW
GLUCOSE UR STRIP-MCNC: NEGATIVE MG/DL
HGB UR QL STRIP.AUTO: NEGATIVE
HYALINE CASTS UR QL AUTO: ABNORMAL /LPF
KETONES UR QL STRIP: ABNORMAL
LEUKOCYTE ESTERASE UR QL STRIP.AUTO: ABNORMAL
NITRITE UR QL STRIP: POSITIVE
PH UR STRIP.AUTO: 5.5 [PH] (ref 5–8)
PROT UR QL STRIP: NEGATIVE
RBC # UR STRIP: ABNORMAL /HPF
REF LAB TEST METHOD: ABNORMAL
SP GR UR STRIP: 1.01 (ref 1–1.03)
SQUAMOUS #/AREA URNS HPF: ABNORMAL /HPF
UROBILINOGEN UR QL STRIP: ABNORMAL
WBC # UR STRIP: ABNORMAL /HPF

## 2025-01-02 PROCEDURE — 87088 URINE BACTERIA CULTURE: CPT

## 2025-01-02 PROCEDURE — 87186 SC STD MICRODIL/AGAR DIL: CPT

## 2025-01-02 PROCEDURE — 81001 URINALYSIS AUTO W/SCOPE: CPT

## 2025-01-02 PROCEDURE — 87086 URINE CULTURE/COLONY COUNT: CPT

## 2025-01-02 NOTE — TELEPHONE ENCOUNTER
Order for UA has been placed.  She will need to go to outpatient imaging and lab to have this completed.  Thank you

## 2025-01-02 NOTE — TELEPHONE ENCOUNTER
PATIENT HAS CALLED, SHE IS ASKING FOR A UA TO BE PUT IN.  SHE STATED THAT SHE HAS LOWER BACK PAIN.   PATIENT STATED THAT SHE DOES NOT USUALLY HAVE URGENCY, FREQUENCY, NO BURNING WITH URINATION.

## 2025-01-03 ENCOUNTER — TELEPHONE (OUTPATIENT)
Dept: INTERNAL MEDICINE | Facility: CLINIC | Age: 70
End: 2025-01-03
Payer: MEDICARE

## 2025-01-03 RX ORDER — SULFAMETHOXAZOLE AND TRIMETHOPRIM 800; 160 MG/1; MG/1
1 TABLET ORAL 2 TIMES DAILY
Qty: 10 TABLET | Refills: 0 | Status: SHIPPED | OUTPATIENT
Start: 2025-01-03

## 2025-01-03 NOTE — TELEPHONE ENCOUNTER
PATIENT HAS CALLED ASKING FOR HER RESULTS OF HER UA.   SHE STATED THAT SHE IS STILL HURTING IN HER BACK AROUND THE KIDNEY AREA.

## 2025-01-04 LAB — BACTERIA SPEC AEROBE CULT: ABNORMAL

## 2025-01-08 PROCEDURE — 87086 URINE CULTURE/COLONY COUNT: CPT

## 2025-01-09 ENCOUNTER — TELEPHONE (OUTPATIENT)
Dept: HEMATOLOGY | Age: 70
End: 2025-01-09

## 2025-01-09 NOTE — TELEPHONE ENCOUNTER

## 2025-01-13 DIAGNOSIS — D50.8 IRON DEFICIENCY ANEMIA SECONDARY TO INADEQUATE DIETARY IRON INTAKE: Primary | ICD-10-CM

## 2025-01-14 ENCOUNTER — HOSPITAL ENCOUNTER (OUTPATIENT)
Dept: INFUSION THERAPY | Age: 70
Discharge: HOME | End: 2025-01-14
Payer: MEDICARE

## 2025-01-14 ENCOUNTER — READMISSION MANAGEMENT (OUTPATIENT)
Dept: CALL CENTER | Facility: HOSPITAL | Age: 70
End: 2025-01-14
Payer: MEDICARE

## 2025-01-14 ENCOUNTER — OFFICE VISIT (OUTPATIENT)
Dept: HEMATOLOGY | Age: 70
End: 2025-01-14
Payer: MEDICARE

## 2025-01-14 VITALS
OXYGEN SATURATION: 96 % | HEART RATE: 105 BPM | BODY MASS INDEX: 27.29 KG/M2 | TEMPERATURE: 97.8 F | HEIGHT: 63 IN | WEIGHT: 154 LBS | DIASTOLIC BLOOD PRESSURE: 68 MMHG | SYSTOLIC BLOOD PRESSURE: 132 MMHG

## 2025-01-14 DIAGNOSIS — D50.8 IRON DEFICIENCY ANEMIA SECONDARY TO INADEQUATE DIETARY IRON INTAKE: ICD-10-CM

## 2025-01-14 DIAGNOSIS — M85.852 OSTEOPENIA OF NECK OF LEFT FEMUR: ICD-10-CM

## 2025-01-14 DIAGNOSIS — D05.12 DUCTAL CARCINOMA IN SITU (DCIS) OF LEFT BREAST: ICD-10-CM

## 2025-01-14 DIAGNOSIS — Z79.810 ENCOUNTER FOR MONITORING TAMOXIFEN THERAPY: ICD-10-CM

## 2025-01-14 DIAGNOSIS — D05.12 DUCTAL CARCINOMA IN SITU (DCIS) OF LEFT BREAST: Primary | ICD-10-CM

## 2025-01-14 DIAGNOSIS — Z51.81 ENCOUNTER FOR MONITORING TAMOXIFEN THERAPY: ICD-10-CM

## 2025-01-14 LAB
ALBUMIN SERPL-MCNC: 4.1 G/DL (ref 3.5–5.2)
ALP SERPL-CCNC: 112 U/L (ref 35–104)
ALT SERPL-CCNC: 13 U/L (ref 5–33)
ANION GAP SERPL CALCULATED.3IONS-SCNC: 8 MMOL/L (ref 7–19)
AST SERPL-CCNC: 36 U/L (ref 5–32)
BASOPHILS # BLD: 0.04 K/UL (ref 0–0.2)
BASOPHILS NFR BLD: 0.5 % (ref 0–1)
BILIRUB SERPL-MCNC: 0.9 MG/DL (ref 0–1.2)
BUN SERPL-MCNC: 16 MG/DL (ref 8–23)
CALCIUM SERPL-MCNC: 9.3 MG/DL (ref 8.8–10.2)
CHLORIDE SERPL-SCNC: 103 MMOL/L (ref 98–107)
CO2 SERPL-SCNC: 26 MMOL/L (ref 22–29)
CREAT SERPL-MCNC: 0.5 MG/DL (ref 0.5–0.9)
EOSINOPHIL # BLD: 0.08 K/UL (ref 0–0.6)
EOSINOPHIL NFR BLD: 1 % (ref 0–5)
ERYTHROCYTE [DISTWIDTH] IN BLOOD BY AUTOMATED COUNT: 13.6 % (ref 11.5–14.5)
FERRITIN SERPL-MCNC: 509.8 NG/ML (ref 13–150)
GLUCOSE SERPL-MCNC: 98 MG/DL (ref 70–99)
HCT VFR BLD AUTO: 41.6 % (ref 37–47)
HGB BLD-MCNC: 13.4 G/DL (ref 12–16)
IRON SATN MFR SERPL: 41 % (ref 14–50)
IRON SERPL-MCNC: 106 UG/DL (ref 37–145)
LYMPHOCYTES # BLD: 1.6 K/UL (ref 1.1–4.5)
LYMPHOCYTES NFR BLD: 20.9 % (ref 20–40)
MCH RBC QN AUTO: 30.7 PG (ref 27–31)
MCHC RBC AUTO-ENTMCNC: 32.2 G/DL (ref 33–37)
MCV RBC AUTO: 95.2 FL (ref 81–99)
MONOCYTES # BLD: 0.66 K/UL (ref 0–0.9)
MONOCYTES NFR BLD: 8.6 % (ref 1–10)
NEUTROPHILS # BLD: 5.25 K/UL (ref 1.5–7.5)
NEUTS SEG NFR BLD: 68.6 % (ref 50–65)
PLATELET # BLD AUTO: 191 K/UL (ref 130–400)
PMV BLD AUTO: 10.5 FL (ref 9.4–12.3)
POTASSIUM SERPL-SCNC: 4 MMOL/L (ref 3.5–5.1)
PROT SERPL-MCNC: 7.3 G/DL (ref 6.4–8.3)
RBC # BLD AUTO: 4.37 M/UL (ref 4.2–5.4)
SODIUM SERPL-SCNC: 137 MMOL/L (ref 136–145)
TIBC SERPL-MCNC: 260 UG/DL (ref 250–400)
WBC # BLD AUTO: 7.66 K/UL (ref 4.8–10.8)

## 2025-01-14 PROCEDURE — 1090F PRES/ABSN URINE INCON ASSESS: CPT | Performed by: NURSE PRACTITIONER

## 2025-01-14 PROCEDURE — 36415 COLL VENOUS BLD VENIPUNCTURE: CPT

## 2025-01-14 PROCEDURE — G8399 PT W/DXA RESULTS DOCUMENT: HCPCS | Performed by: NURSE PRACTITIONER

## 2025-01-14 PROCEDURE — 1126F AMNT PAIN NOTED NONE PRSNT: CPT | Performed by: NURSE PRACTITIONER

## 2025-01-14 PROCEDURE — 1159F MED LIST DOCD IN RCRD: CPT | Performed by: NURSE PRACTITIONER

## 2025-01-14 PROCEDURE — G8427 DOCREV CUR MEDS BY ELIG CLIN: HCPCS | Performed by: NURSE PRACTITIONER

## 2025-01-14 PROCEDURE — 85025 COMPLETE CBC W/AUTO DIFF WBC: CPT

## 2025-01-14 PROCEDURE — 3017F COLORECTAL CA SCREEN DOC REV: CPT | Performed by: NURSE PRACTITIONER

## 2025-01-14 PROCEDURE — 80053 COMPREHEN METABOLIC PANEL: CPT

## 2025-01-14 PROCEDURE — 1036F TOBACCO NON-USER: CPT | Performed by: NURSE PRACTITIONER

## 2025-01-14 PROCEDURE — G8417 CALC BMI ABV UP PARAM F/U: HCPCS | Performed by: NURSE PRACTITIONER

## 2025-01-14 PROCEDURE — 99214 OFFICE O/P EST MOD 30 MIN: CPT | Performed by: NURSE PRACTITIONER

## 2025-01-14 PROCEDURE — 1123F ACP DISCUSS/DSCN MKR DOCD: CPT | Performed by: NURSE PRACTITIONER

## 2025-01-14 ASSESSMENT — ENCOUNTER SYMPTOMS
BACK PAIN: 0
SORE THROAT: 0
EYE REDNESS: 0
WHEEZING: 0
NAUSEA: 0
BLOOD IN STOOL: 0
EYES NEGATIVE: 1
EYE DISCHARGE: 0
COUGH: 0
EYE PAIN: 0
ABDOMINAL PAIN: 0
CONSTIPATION: 0
RESPIRATORY NEGATIVE: 1
SHORTNESS OF BREATH: 0
GASTROINTESTINAL NEGATIVE: 1
VOMITING: 0
DIARRHEA: 0

## 2025-01-14 NOTE — OUTREACH NOTE
Medical Week 3 Survey      Flowsheet Row Responses   [facility] patient discharged from? [place]   Does the patient have one of the following disease processes/diagnoses(primary or secondary)? Other   Week 3 attempt successful? No   Unsuccessful attempts Attempt 1            Tianna Brunner Registered Nurse

## 2025-01-20 ENCOUNTER — TELEPHONE (OUTPATIENT)
Dept: SURGERY | Age: 70
End: 2025-01-20

## 2025-01-21 ENCOUNTER — OFFICE VISIT (OUTPATIENT)
Dept: GASTROENTEROLOGY | Facility: CLINIC | Age: 70
End: 2025-01-21
Payer: MEDICARE

## 2025-01-21 VITALS
HEIGHT: 63 IN | BODY MASS INDEX: 27.61 KG/M2 | HEART RATE: 118 BPM | TEMPERATURE: 98 F | SYSTOLIC BLOOD PRESSURE: 118 MMHG | WEIGHT: 155.8 LBS | DIASTOLIC BLOOD PRESSURE: 70 MMHG | OXYGEN SATURATION: 97 %

## 2025-01-21 DIAGNOSIS — K62.5 BRBPR (BRIGHT RED BLOOD PER RECTUM): ICD-10-CM

## 2025-01-21 DIAGNOSIS — Z78.9 NONSMOKER: ICD-10-CM

## 2025-01-21 DIAGNOSIS — K64.9 HEMORRHOIDS, UNSPECIFIED HEMORRHOID TYPE: Primary | ICD-10-CM

## 2025-01-21 PROCEDURE — 1160F RVW MEDS BY RX/DR IN RCRD: CPT | Performed by: CLINICAL NURSE SPECIALIST

## 2025-01-21 PROCEDURE — 99213 OFFICE O/P EST LOW 20 MIN: CPT | Performed by: CLINICAL NURSE SPECIALIST

## 2025-01-21 PROCEDURE — 1159F MED LIST DOCD IN RCRD: CPT | Performed by: CLINICAL NURSE SPECIALIST

## 2025-01-21 NOTE — PROGRESS NOTES
Yun Blackwell  1955 1/21/2025  Chief Complaint   Patient presents with    GI Problem     Hfu-rectal bleeding-anemia--still some bleeding     Subjective   HPI  Yun Blackwell is a 69 y.o. female who presents with a complaint of BRBPR leading to hospitalization GI consulted and colonoscopy performed on December 22, 2024. Labs from 13.4/41.6. 1/14/25  It was suggested she start fiber and she says it is helping her. Her bowels are more regular. She is overall better. She only has bleeding on the tissue when she wipes. No abdominal pain. No upper GI complaints.     Consults:   Gastroenterology  Procedures Performed:   Colonoscopy-December 22       Today she says she was treated with hemorrhoids.     Past Medical History:   Diagnosis Date    A-fib     Abnormal ECG     tachycardia, a fib    Acid reflux     Anemia     Arthritis     Cancer     Diabetes mellitus     Hyperkalemia     Hyperlipidemia     Hypertension     Hyponatremia     IBS (irritable bowel syndrome)     PONV (postoperative nausea and vomiting)     Sleep apnea     USES CPAP    UTI (urinary tract infection)     UTI (urinary tract infection) 11/25/2016    Vaginal vault prolapse      Past Surgical History:   Procedure Laterality Date    BREAST BIOPSY Left     X2    BUNIONECTOMY Left     CARDIAC CATHETERIZATION      CARDIAC CATHETERIZATION N/A 02/05/2021    Procedure: Right Heart Cath;  Surgeon: Van Marcelino MD;  Location:  [facility];  Service: Cardiology;  Laterality: N/A;    CATARACT EXTRACTION, BILATERAL      COLON SURGERY      COLONOSCOPY  09/21/2015    TRANSVERSE COLON ADENOMATOUS POLYP, HEMORRHOIDS, RECALL 5 YEARS, DR LAMAS    COLONOSCOPY N/A 11/13/2020    6 mm tubular adenomatous polyp at 15 cm proximal to the anus,diverticulosis of the sigmoid colon    COLONOSCOPY N/A 12/13/2023    - One 4 mm polyp at 40 cm proximal to the anus,. - One 4 mm polyp at 20 cm proximal to the anus, removed with a cold snare. Resected  and retrieved. - Diverticulosis in the sigmoid colon. - Hemorrhoids----tubular adenoma no evidence of high grade dysplasia    COLONOSCOPY N/A 12/22/2024    Dr. Ludwig-- The examined portion of the ileum was normal. - Diverticulosis in the entire examined colon. - Non-bleeding internal hemorrhoids. - No specimens collected.    COLOSTOMY      COLPOPEXY VAGINAL      2014    ENDOSCOPY N/A 11/03/2016    LA GRADE B ESOPHAGITIS WITH NO BLEEDING UPPER THIRD OF ESOPHAGUS, GERI-[place] TEAR GEJ, BILIOUS BLOOD TINGED COFFEE GROUND GATRIC FLUIDDR [facility]    ENDOSCOPY N/A 12/13/2023    Normal examined duodenum. - Normal stomach. Biopsied. - Z-line regular, 35 cm from the incisors. Dilated.-neg for h pylori    EXPLORATORY LAPAROTOMY N/A 10/14/2016    Procedure: LAPAROTOMY EXPLORATORY, LYSIS OF ADHESIONS, IRRIAGATION OF ABDOMEN, POSSIBLE BOWEL RESECTION;  Surgeon: Bacilio Marcial MD;  Location:  [facility] OR;  Service:     FOOT NAVICULAR EXCISION OR BONE GRAFT Left 01/04/2023    Procedure: Expansion Graft, Harvesting of Bone Graft/Bone Marrow Aspirate;  Surgeon: Frankie Zapata DPM;  Location:  [facility];  Service: Podiatry;  Laterality: Left;    HAMMER TOE REPAIR Left 01/04/2023    Procedure: Hammertoe Repair 2 and 3 - Left Foot;  Surgeon: Frankie Zapata DPM;  Location:  [facility];  Service: Podiatry;  Laterality: Left;    HARDWARE REMOVAL Left 01/04/2023    Procedure: Hardware Removal;  Surgeon: Frankie Zapata DPM;  Location:  [facility];  Service: Podiatry;  Laterality: Left;    HYSTERECTOMY      REVISION / TAKEDOWN COLOSTOMY      SACROCOLPOPEXY N/A 09/21/2016    Procedure: SACROCOLPOPEXY LAPAROSCOPIC WITH [employer] SI ROBOT, CONVERTED TO OPEN SACROCOLPOPEXY, RIGHT SALPINGO- OOPHERECTOMY, CYSTO;  Surgeon: Maribell Beth MD;  Location:  [facility];  Service:     TOE FUSION Left 01/06/2022    Procedure: FIRST METATARSOPHALANGEAL JOINT ARTHRODESIS WITH INTERNAL FIXATION - LEFT FOOT;  Surgeon: Jorgito Red DPM;  Location:   [facility];  Service: Podiatry;  Laterality: Left;    TOE FUSION Left 01/04/2023    Procedure: Revisional 1st Metatarsophalangeal Joint Arthrodesis with Hardware Removal and Expansion Graft, Harvesting of Bone Graft/Bone Marrow Aspirate, Hammertoe Repair 2 and 3 - Left Foot;  Surgeon: Frankie Zapata DPM;  Location:  [facility];  Service: Podiatry;  Laterality: Left;       Outpatient Medications Marked as Taking for the 1/21/25 encounter (Office Visit) with Teodora Macario APRN   Medication Sig Dispense Refill    cyanocobalamin 1000 MCG/ML injection Inject 1 mL Every 30 (Thirty) Days as directed. 1 mL 11    dabigatran etexilate (PRADAXA) 150 MG capsu Take 1 capsule by mouth 2 (Two) Times a Day. 60 capsule 11    dicyclomine (BENTYL) 10 MG capsule Take 1 capsule by mouth 4 (Four) Times a Day Before Meals & at Bedtime. 120 capsule 10    dilTIAZem CD (Cardizem CD) 120 MG 24 hr capsule Take 1 capsule by mouth Daily. 30 capsule 11    hydrOXYzine (ATARAX) 10 MG tablet Take 1 to 2 tablets by mouth Every 4 (Four) Hours As Needed. 60 tablet 3    omeprazole (priLOSEC) 20 MG capsule Take 1 capsule by mouth Daily. 30 capsule 11    tiZANidine (ZANAFLEX) 4 MG tablet Take 1 tablet by mouth Daily As Needed for Muscle Spasms. 30 tablet 2    triamcinolone (KENALOG) 0.1 % ointment Apply 1 Application topically to the appropriate area as directed 2 (Two) Times a Day As Needed when and where itching is present as needed. 454 g 3     Allergies   Allergen Reactions    Morphine And Codeine Nausea And Vomiting and Dizziness    Percocet [Oxycodone-Acetaminophen] Nausea And Vomiting and Dizziness     Social History     Socioeconomic History    Marital status:    Tobacco Use    Smoking status: Never     Passive exposure: Never    Smokeless tobacco: Never   Vaping Use    Vaping status: Never Used   Substance and Sexual Activity    Alcohol use: No    Drug use: No    Sexual activity: Defer     Family History   Problem Relation Age of  Onset    Hypertension Mother     No Known Problems Father     Colon cancer Neg Hx     Colon polyps Neg Hx      Health Maintenance   Topic Date Due    ZOSTER VACCINE (1 of 2) Never done    PAP SMEAR  Never done    BMI FOLLOWUP  08/03/2024    ANNUAL WELLNESS VISIT  04/19/2025    INFLUENZA VACCINE  03/31/2025 (Originally 7/1/2024)    TDAP/TD VACCINES (1 - Tdap) 09/09/2025 (Originally 11/12/1972)    Pneumococcal Vaccine 65+ (1 of 2 - PCV) 10/21/2025 (Originally 11/12/1959)    LIPID PANEL  04/15/2025    DXA SCAN  10/10/2025    MAMMOGRAM  12/05/2026    COLORECTAL CANCER SCREENING  12/22/2027    HEPATITIS C SCREENING  Completed    COVID-19 Vaccine  Discontinued    DIABETIC FOOT EXAM  Discontinued    HEMOGLOBIN A1C  Discontinued    DIABETIC EYE EXAM  Discontinued    URINE MICROALBUMIN  Discontinued     Review of Systems   Constitutional:  Negative for activity change, appetite change, chills, diaphoresis, fatigue, fever and unexpected weight change.   HENT:  Negative for ear pain, hearing loss, mouth sores, sore throat, trouble swallowing and voice change.    Eyes: Negative.    Respiratory:  Negative for cough, choking, shortness of breath and wheezing.    Cardiovascular:  Negative for chest pain and palpitations.   Gastrointestinal:  Positive for anal bleeding. Negative for abdominal pain, blood in stool, constipation, diarrhea, nausea and vomiting.   Endocrine: Negative for cold intolerance and heat intolerance.   Genitourinary:  Negative for decreased urine volume, dysuria, frequency, hematuria and urgency.   Musculoskeletal:  Negative for back pain, gait problem and myalgias.   Skin:  Negative for color change, pallor and rash.   Allergic/Immunologic: Negative for food allergies and immunocompromised state.   Neurological:  Negative for dizziness, tremors, seizures, syncope, weakness, light-headedness, numbness and headaches.   Hematological:  Negative for adenopathy. Does not bruise/bleed easily.  "  Psychiatric/Behavioral:  Negative for agitation and confusion. The patient is not nervous/anxious.    All other systems reviewed and are negative.    Objective   Vitals:    01/21/25 1311   BP: 118/70   BP Location: Left arm   Pulse: 118   Temp: 98 °F (36.7 °C)   TempSrc: Temporal   SpO2: 97%   Weight: 70.7 kg (155 lb 12.8 oz)   Height: 160 cm (62.99\")     Body mass index is 27.61 kg/m².  Physical Exam  Constitutional:       Appearance: She is well-developed.   HENT:      Head: Normocephalic and atraumatic.   Eyes:      Pupils: Pupils are equal, round, and reactive to light.   Neck:      Trachea: No tracheal deviation.   Cardiovascular:      Rate and Rhythm: Normal rate and regular rhythm.      Heart sounds: Normal heart sounds. No murmur heard.     No friction rub. No gallop.   Pulmonary:      Effort: Pulmonary effort is normal. No respiratory distress.      Breath sounds: Normal breath sounds. No wheezing or rales.   Chest:      Chest wall: No tenderness.   Abdominal:      General: Bowel sounds are normal. There is no distension.      Palpations: Abdomen is soft. Abdomen is not rigid.      Tenderness: There is no abdominal tenderness. There is no guarding or rebound.   Genitourinary:     Comments: Small external hemorrhoid, no bleeding noted.   Musculoskeletal:         General: No tenderness or deformity. Normal range of motion.      Cervical back: Normal range of motion and neck supple.   Skin:     General: Skin is warm and dry.      Coloration: Skin is not pale.      Findings: No rash.   Neurological:      Mental Status: She is alert and oriented to person, place, and time.      Deep Tendon Reflexes: Reflexes are normal and symmetric.   Psychiatric:         Behavior: Behavior normal.         Thought Content: Thought content normal.         Judgment: Judgment normal.       Assessment & Plan   Diagnoses and all orders for this visit:    1. Hemorrhoids, unspecified hemorrhoid type (Primary)    2. BRBPR (bright red " blood per rectum)    3. Nonsmoker    Continue fiber for soft stools, no straining.   Keep stool soft, do not sit on the toilet for long periods of time  Recticare for hemorrhoids  To call if she desires the compounded suppositories for hemorrhoids.     Part of this note may be an electronic transcription/translation of spoken language to printed text using the Dragon Dictation System.  Body mass index is 27.61 kg/m².  Return if symptoms worsen or fail to improve.    BMI is >= 25 and <30. (Overweight) The following options were offered after discussion;: weight loss educational material (shared in after visit summary)      All risks, benefits, alternatives, and indications of colonoscopy and/or Endoscopy procedure have been discussed with the patient. Risks to include perforation of the colon requiring possible surgery or colostomy, risk of bleeding from biopsies or removal of colon tissue, possibility of missing a colon polyp or cancer, or adverse drug reaction.  Benefits to include the diagnosis and management of disease of the colon and rectum. Alternatives to include barium enema, radiographic evaluation, lab testing or no intervention. Pt verbalizes understanding and agrees.     Teodora Macario, APRN  1/21/2025  13:32 CST          If you smoke or use tobacco, 4 minutes reading provided  Steps to Quit Smoking  Smoking tobacco can be harmful to your health and can affect almost every organ in your body. Smoking puts you, and those around you, at risk for developing many serious chronic diseases. Quitting smoking is difficult, but it is one of the best things that you can do for your health. It is never too late to quit.  What are the benefits of quitting smoking?  When you quit smoking, you lower your risk of developing serious diseases and conditions, such as:  Lung cancer or lung disease, such as COPD.  Heart disease.  Stroke.  Heart attack.  Infertility.  Osteoporosis and bone fractures.  Additionally,  symptoms such as coughing, wheezing, and shortness of breath may get better when you quit. You may also find that you get sick less often because your body is stronger at fighting off colds and infections. If you are pregnant, quitting smoking can help to reduce your chances of having a baby of low birth weight.  How do I get ready to quit?  When you decide to quit smoking, create a plan to make sure that you are successful. Before you quit:  Pick a date to quit. Set a date within the next two weeks to give you time to prepare.  Write down the reasons why you are quitting. Keep this list in places where you will see it often, such as on your bathroom mirror or in your car or wallet.  Identify the people, places, things, and activities that make you want to smoke (triggers) and avoid them. Make sure to take these actions:  Throw away all cigarettes at home, at work, and in your car.  Throw away smoking accessories, such as ashtrays and lighters.  Clean your car and make sure to empty the ashtray.  Clean your home, including curtains and carpets.  Tell your family, friends, and coworkers that you are quitting. Support from your loved ones can make quitting easier.  Talk with your health care provider about your options for quitting smoking.  Find out what treatment options are covered by your health insurance.  What strategies can I use to quit smoking?  Talk with your healthcare provider about different strategies to quit smoking. Some strategies include:  Quitting smoking altogether instead of gradually lessening how much you smoke over a period of time. Research shows that quitting “cold turkey” is more successful than gradually quitting.  Attending in-person counseling to help you build problem-solving skills. You are more likely to have success in quitting if you attend several counseling sessions. Even short sessions of 10 minutes can be effective.  Finding resources and support systems that can help you to quit  smoking and remain smoke-free after you quit. These resources are most helpful when you use them often. They can include:  Online chats with a counselor.  Telephone quitlines.  Printed self-help materials.  Support groups or group counseling.  Text messaging programs.  Mobile phone applications.  Taking medicines to help you quit smoking. (If you are pregnant or breastfeeding, talk with your health care provider first.) Some medicines contain nicotine and some do not. Both types of medicines help with cravings, but the medicines that include nicotine help to relieve withdrawal symptoms. Your health care provider may recommend:  Nicotine patches, gum, or lozenges.  Nicotine inhalers or sprays.  Non-nicotine medicine that is taken by mouth.  Talk with your health care provider about combining strategies, such as taking medicines while you are also receiving in-person counseling. Using these two strategies together makes you more likely to succeed in quitting than if you used either strategy on its own.  If you are pregnant or breastfeeding, talk with your health care provider about finding counseling or other support strategies to quit smoking. Do not take medicine to help you quit smoking unless told to do so by your health care provider.  What things can I do to make it easier to quit?  Quitting smoking might feel overwhelming at first, but there is a lot that you can do to make it easier. Take these important actions:  Reach out to your family and friends and ask that they support and encourage you during this time. Call telephone quitlines, reach out to support groups, or work with a counselor for support.  Ask people who smoke to avoid smoking around you.  Avoid places that trigger you to smoke, such as bars, parties, or smoke-break areas at work.  Spend time around people who do not smoke.  Lessen stress in your life, because stress can be a smoking trigger for some people. To lessen stress, try:  Exercising  regularly.  Deep-breathing exercises.  Yoga.  Meditating.  Performing a body scan. This involves closing your eyes, scanning your body from head to toe, and noticing which parts of your body are particularly tense. Purposefully relax the muscles in those areas.  Download or purchase mobile phone or tablet apps (applications) that can help you stick to your quit plan by providing reminders, tips, and encouragement. There are many free apps, such as QuitGuide from the CDC (Centers for Disease Control and Prevention). You can find other support for quitting smoking (smoking cessation) through smokefree.gov and other websites.  How will I feel when I quit smoking?  Within the first 24 hours of quitting smoking, you may start to feel some withdrawal symptoms. These symptoms are usually most noticeable 2-3 days after quitting, but they usually do not last beyond 2-3 weeks. Changes or symptoms that you might experience include:  Mood swings.  Restlessness, anxiety, or irritation.  Difficulty concentrating.  Dizziness.  Strong cravings for sugary foods in addition to nicotine.  Mild weight gain.  Constipation.  Nausea.  Coughing or a sore throat.  Changes in how your medicines work in your body.  A depressed mood.  Difficulty sleeping (insomnia).  After the first 2-3 weeks of quitting, you may start to notice more positive results, such as:  Improved sense of smell and taste.  Decreased coughing and sore throat.  Slower heart rate.  Lower blood pressure.  Clearer skin.  The ability to breathe more easily.  Fewer sick days.  Quitting smoking is very challenging for most people. Do not get discouraged if you are not successful the first time. Some people need to make many attempts to quit before they achieve long-term success. Do your best to stick to your quit plan, and talk with your health care provider if you have any questions or concerns.  This information is not intended to replace advice given to you by your health  care provider. Make sure you discuss any questions you have with your health care provider.  Document Released: 12/12/2002 Document Revised: 08/15/2017 Document Reviewed: 05/03/2016  Elsevier Interactive Patient Education © 2017 [employer]

## 2025-01-22 NOTE — TELEPHONE ENCOUNTER
I have tried calling Amanda with [facility] again to get information needed. She can return my call to the office

## 2025-01-22 NOTE — TELEPHONE ENCOUNTER
I spoke to Dilma to let her know I have tried calling Amanda (with [facility]) at [phone] and left a message in regards to her , Uzair Wright-patient of Dr. Prieto) that she needs to send our office a request of what is needed. I have not heard back from her and will continue to get in touch with her.

## 2025-01-23 ENCOUNTER — TELEPHONE (OUTPATIENT)
Dept: GASTROENTEROLOGY | Facility: CLINIC | Age: 70
End: 2025-01-23
Payer: MEDICARE

## 2025-01-23 DIAGNOSIS — E11.43 TYPE 2 DIABETES MELLITUS WITH AUTONOMIC NEUROPATHY, UNSPECIFIED WHETHER LONG TERM INSULIN USE: ICD-10-CM

## 2025-01-23 DIAGNOSIS — R19.7 DIARRHEA, UNSPECIFIED TYPE: Primary | ICD-10-CM

## 2025-01-23 NOTE — TELEPHONE ENCOUNTER
She called and said the bentyl isn't helping and wants to know if we can up her dosage--still having diarrhea-having to run tot the bathroom--she forgot to tell you at her appt this week

## 2025-02-03 PROCEDURE — 87077 CULTURE AEROBIC IDENTIFY: CPT | Performed by: NURSE PRACTITIONER

## 2025-02-03 PROCEDURE — 87186 SC STD MICRODIL/AGAR DIL: CPT | Performed by: NURSE PRACTITIONER

## 2025-02-03 PROCEDURE — 87086 URINE CULTURE/COLONY COUNT: CPT | Performed by: NURSE PRACTITIONER

## 2025-02-06 ENCOUNTER — OFFICE VISIT (OUTPATIENT)
Dept: INTERNAL MEDICINE | Facility: CLINIC | Age: 70
End: 2025-02-06
Payer: MEDICARE

## 2025-02-06 VITALS
OXYGEN SATURATION: 97 % | HEART RATE: 92 BPM | HEIGHT: 63 IN | DIASTOLIC BLOOD PRESSURE: 82 MMHG | BODY MASS INDEX: 27.35 KG/M2 | SYSTOLIC BLOOD PRESSURE: 128 MMHG

## 2025-02-06 DIAGNOSIS — N39.0 URINARY TRACT INFECTION WITHOUT HEMATURIA, SITE UNSPECIFIED: Primary | ICD-10-CM

## 2025-02-06 PROCEDURE — 99213 OFFICE O/P EST LOW 20 MIN: CPT

## 2025-02-06 PROCEDURE — 1126F AMNT PAIN NOTED NONE PRSNT: CPT

## 2025-02-06 NOTE — PROGRESS NOTES
No chief complaint on file.      History:      Yun Blackwell is a 69 y.o. female who presents today for evaluation of the above problems.      HPI  History of Present Illness  The patient is a 69-year-old female who presents for follow-up from urgent care for back pain and a urinary tract infection.    She sought medical attention at an urgent care facility on Monday due to persistent back pain, which was subsequently diagnosed as a severe urinary tract infection (UTI). Despite the absence of typical UTI symptoms, she reported a distinct odor in her urine and discomfort localized to her lower back. She was evaluated for this last month, at that time she was prescribed Macrobid, but it did not alleviate her symptoms. She completed the entire course of Macrobid. She went back to the urgent care again on Monday and was given Bactrim. She has been maintaining adequate hydration by carrying a water bottle in her vehicle and consuming water regularly.  Since starting Bactrim she has had a complete resolution of symptoms.    She has a history of frequent urinary tract infections.  She was previously referred to urology and had a valuation at that time but this has been several years ago.  She has tried cranberry juice without any benefit. She does not drink tea or coffee.  During her previous urology workup she was told that she had incomplete emptying of her bladder, she has been leaning forward while urinating to empty her bladder completely.     She has atrial fibrillation. She sleeps with a sleep apnea machine and sometimes it wakes her up.    SOCIAL HISTORY  She does not drink tea or coffee.    MEDICATIONS  Current: Macrobid, Bactrim, hydroxyzine, tizanidine      ROS:  Review of Systems   Genitourinary:  Negative for difficulty urinating, dysuria and flank pain.   Musculoskeletal:  Negative for back pain.         Current Outpatient Medications:     cyanocobalamin 1000 MCG/ML injection, Inject 1 mL Every 30  (Thirty) Days as directed., Disp: 1 mL, Rfl: 11    dabigatran etexilate (PRADAXA) 150 MG capsu, Take 1 capsule by mouth 2 (Two) Times a Day., Disp: 60 capsule, Rfl: 11    dilTIAZem CD (Cardizem CD) 120 MG 24 hr capsule, Take 1 capsule by mouth Daily., Disp: 30 capsule, Rfl: 11    hydrOXYzine (ATARAX) 10 MG tablet, Take 1 to 2 tablets by mouth Every 4 (Four) Hours As Needed., Disp: 60 tablet, Rfl: 3    omeprazole (priLOSEC) 20 MG capsule, Take 1 capsule by mouth Daily., Disp: 30 capsule, Rfl: 11    sulfamethoxazole-trimethoprim (BACTRIM DS,SEPTRA DS) 800-160 MG per tablet, Take 1 tablet by mouth 2 (Two) Times a Day for 10 days., Disp: 20 tablet, Rfl: 0    tiZANidine (ZANAFLEX) 4 MG tablet, Take 1 tablet by mouth Daily As Needed for Muscle Spasms., Disp: 30 tablet, Rfl: 2    tamoxifen (NOLVADEX) 10 MG tablet, Take  by mouth 2 (Two) Times a Day. (Patient not taking: Reported on 2/6/2025), Disp: , Rfl:     Lab Results   Component Value Date    GLUCOSE 98 01/14/2025    BUN 16 01/14/2025    CREATININE 0.5 01/14/2025     01/14/2025    K 4.0 01/14/2025     01/14/2025    CALCIUM 9.3 01/14/2025    PROTEINTOT 7.3 01/14/2025    ALBUMIN 4.1 01/14/2025    ALT 13 01/14/2025    AST 36 (H) 01/14/2025    ALKPHOS 112 (H) 01/14/2025    BILITOT 0.9 01/14/2025    GLOB 2.7 12/20/2024    AGRATIO 1.3 12/20/2024    BCR 21.4 12/22/2024    ANIONGAP 8 01/14/2025    EGFR 104.7 12/22/2024       WBC   Date Value Ref Range Status   01/14/2025 7.66 4.80 - 10.80 K/uL Final     RBC   Date Value Ref Range Status   01/14/2025 4.37 4.20 - 5.40 M/uL Final     Hemoglobin   Date Value Ref Range Status   01/14/2025 13.4 12.0 - 16.0 g/dL Final     Hematocrit   Date Value Ref Range Status   01/14/2025 41.6 37.0 - 47.0 % Final     MCV   Date Value Ref Range Status   01/14/2025 95.2 81.0 - 99.0 fL Final     MCH   Date Value Ref Range Status   01/14/2025 30.7 27.0 - 31.0 pg Final     MCHC   Date Value Ref Range Status   01/14/2025 32.2 (L) 33.0 - 37.0  "g/dL Final     RDW   Date Value Ref Range Status   01/14/2025 13.6 11.5 - 14.5 % Final     RDW-SD   Date Value Ref Range Status   12/22/2024 45.1 37.0 - 54.0 fl Final     MPV   Date Value Ref Range Status   01/14/2025 10.5 9.4 - 12.3 fL Final     Platelets   Date Value Ref Range Status   01/14/2025 191 130 - 400 K/uL Final     Neutrophil Rel %   Date Value Ref Range Status   01/14/2025 68.6 (H) 50.0 - 65.0 % Final     Lymphocyte Rel %   Date Value Ref Range Status   01/14/2025 20.9 20.0 - 40.0 % Final     Monocyte Rel %   Date Value Ref Range Status   01/14/2025 8.6 1.0 - 10.0 % Final     Eosinophil Rel %   Date Value Ref Range Status   01/09/2024 0.5 (L) 0.7 - 7.0 % Final     Eosinophil %   Date Value Ref Range Status   01/14/2025 1.0 0.0 - 5.0 % Final     Basophil Rel %   Date Value Ref Range Status   01/14/2025 0.5 0.0 - 1.0 % Final     Immature Grans %   Date Value Ref Range Status   12/21/2024 0.3 0.0 - 0.5 % Final     Neutrophils Absolute   Date Value Ref Range Status   01/14/2025 5.25 1.50 - 7.50 K/uL Final     Lymphocytes Absolute   Date Value Ref Range Status   01/14/2025 1.60 1.10 - 4.50 K/uL Final     Monocytes Absolute   Date Value Ref Range Status   01/14/2025 0.66 0.00 - 0.90 K/uL Final     Eosinophils Absolute   Date Value Ref Range Status   01/14/2025 0.08 0.00 - 0.60 K/uL Final     Basophils Absolute   Date Value Ref Range Status   01/14/2025 0.04 0.00 - 0.20 K/uL Final     Immature Grans, Absolute   Date Value Ref Range Status   12/21/2024 0.02 0.00 - 0.05 10*3/mm3 Final   11/10/2020 0.0 K/uL Final     nRBC   Date Value Ref Range Status   11/24/2023 0.0 0.0 - 0.2 /100 WBC Final       OBJECTIVE:  Visit Vitals  /82 (BP Location: Left arm, Patient Position: Sitting, Cuff Size: Adult)   Pulse 92   Ht 160 cm (63\")   SpO2 97%   BMI 27.35 kg/m²      Physical Exam  Constitutional:       Appearance: Normal appearance.   HENT:      Head: Normocephalic.   Cardiovascular:      Rate and Rhythm: Normal " rate. Rhythm irregular.      Pulses: Normal pulses.      Heart sounds: Normal heart sounds.   Pulmonary:      Effort: Pulmonary effort is normal.      Breath sounds: Normal breath sounds.   Abdominal:      General: Bowel sounds are normal.      Palpations: Abdomen is soft.      Tenderness: There is no abdominal tenderness. There is no right CVA tenderness or left CVA tenderness.   Musculoskeletal:         General: Normal range of motion.   Skin:     General: Skin is warm and dry.   Neurological:      Mental Status: She is alert and oriented to person, place, and time.   Psychiatric:         Mood and Affect: Mood normal.         Behavior: Behavior normal.         Thought Content: Thought content normal.         Judgment: Judgment normal.       Physical Exam  Lungs were auscultated.  Heart sounds irregular.    Results      Assessment/Plan    Diagnoses and all orders for this visit:    1. Urinary tract infection without hematuria, site unspecified (Primary)      Assessment & Plan  1. Urinary tract infection (UTI).  She has experienced two consecutive urinary tract infections. The presence of E. coli was not detected during the initial infection but was identified in the subsequent one. She was prescribed Macrobid initially, which did not alleviate her symptoms. She was then prescribed Bactrim, which she started on Monday. She is advised to complete the full course of Bactrim. She is also advised to take d-mannose daily, an over-the-counter supplement, to prevent bacterial adhesion to the bladder walls. Adequate hydration is emphasized. If she experiences any symptoms after completing the antibiotic course, she should inform the clinic immediately. If another UTI occurs within the year, a referral to urology will be considered.    2. Back pain.  Her back pain has improved since starting Bactrim. If her back pain persists after completing the antibiotic course, she should inform the clinic, and a urine recheck will be  ordered.    4. Atrial fibrillation.  She has a history of atrial fibrillation, which appears to be persisting.      Return if symptoms worsen or fail to improve.      MARÍA Baxter  13:10 CST  2/6/2025   Electronically signed      Patient or patient representative verbalized consent for the use of Ambient Listening during the visit with  MARÍA Baxter for chart documentation. 2/6/2025  15:51 CST

## 2025-02-20 ENCOUNTER — LAB (OUTPATIENT)
Dept: LAB | Facility: HOSPITAL | Age: 70
End: 2025-02-20
Payer: MEDICARE

## 2025-02-20 DIAGNOSIS — E11.43 TYPE 2 DIABETES MELLITUS WITH AUTONOMIC NEUROPATHY, UNSPECIFIED WHETHER LONG TERM INSULIN USE: ICD-10-CM

## 2025-02-20 DIAGNOSIS — R19.7 DIARRHEA, UNSPECIFIED TYPE: ICD-10-CM

## 2025-02-20 LAB
ADV 40+41 DNA STL QL NAA+NON-PROBE: NOT DETECTED
ASTRO TYP 1-8 RNA STL QL NAA+NON-PROBE: NOT DETECTED
C CAYETANENSIS DNA STL QL NAA+NON-PROBE: NOT DETECTED
C COLI+JEJ+UPSA DNA STL QL NAA+NON-PROBE: NOT DETECTED
C DIFF TOX GENS STL QL NAA+PROBE: NEGATIVE
CRYPTOSP DNA STL QL NAA+NON-PROBE: NOT DETECTED
E HISTOLYT DNA STL QL NAA+NON-PROBE: NOT DETECTED
EAEC PAA PLAS AGGR+AATA ST NAA+NON-PRB: NOT DETECTED
EC STX1+STX2 GENES STL QL NAA+NON-PROBE: NOT DETECTED
EPEC EAE GENE STL QL NAA+NON-PROBE: NOT DETECTED
ETEC LTA+ST1A+ST1B TOX ST NAA+NON-PROBE: NOT DETECTED
G LAMBLIA DNA STL QL NAA+NON-PROBE: NOT DETECTED
NOROVIRUS GI+II RNA STL QL NAA+NON-PROBE: NOT DETECTED
P SHIGELLOIDES DNA STL QL NAA+NON-PROBE: NOT DETECTED
RVA RNA STL QL NAA+NON-PROBE: NOT DETECTED
S ENT+BONG DNA STL QL NAA+NON-PROBE: NOT DETECTED
SAPO I+II+IV+V RNA STL QL NAA+NON-PROBE: NOT DETECTED
SHIGELLA SP+EIEC IPAH ST NAA+NON-PROBE: NOT DETECTED
V CHOL+PARA+VUL DNA STL QL NAA+NON-PROBE: NOT DETECTED
V CHOLERAE DNA STL QL NAA+NON-PROBE: NOT DETECTED
Y ENTEROCOL DNA STL QL NAA+NON-PROBE: NOT DETECTED

## 2025-02-20 PROCEDURE — 87493 C DIFF AMPLIFIED PROBE: CPT | Performed by: CLINICAL NURSE SPECIALIST

## 2025-02-20 PROCEDURE — 87507 IADNA-DNA/RNA PROBE TQ 12-25: CPT

## 2025-02-20 PROCEDURE — 83630 LACTOFERRIN FECAL (QUAL): CPT

## 2025-02-21 LAB — LACTOFERRIN STL QL LA: NEGATIVE

## 2025-02-24 ENCOUNTER — OFFICE VISIT (OUTPATIENT)
Dept: INTERNAL MEDICINE | Facility: CLINIC | Age: 70
End: 2025-02-24
Payer: MEDICARE

## 2025-02-24 ENCOUNTER — HOSPITAL ENCOUNTER (OUTPATIENT)
Dept: GENERAL RADIOLOGY | Facility: HOSPITAL | Age: 70
Discharge: HOME | End: 2025-02-24
Payer: MEDICARE

## 2025-02-24 VITALS — SYSTOLIC BLOOD PRESSURE: 116 MMHG | DIASTOLIC BLOOD PRESSURE: 76 MMHG | HEART RATE: 102 BPM | OXYGEN SATURATION: 98 %

## 2025-02-24 DIAGNOSIS — T14.8XXA MUSCLE STRAIN: ICD-10-CM

## 2025-02-24 DIAGNOSIS — M50.30 DEGENERATIVE DISC DISEASE, CERVICAL: ICD-10-CM

## 2025-02-24 DIAGNOSIS — M54.2 NECK PAIN: ICD-10-CM

## 2025-02-24 DIAGNOSIS — M54.2 NECK PAIN: Primary | ICD-10-CM

## 2025-02-24 PROCEDURE — 72040 X-RAY EXAM NECK SPINE 2-3 VW: CPT

## 2025-02-24 PROCEDURE — 99214 OFFICE O/P EST MOD 30 MIN: CPT

## 2025-02-24 PROCEDURE — 1126F AMNT PAIN NOTED NONE PRSNT: CPT

## 2025-02-24 RX ORDER — PREDNISONE 10 MG/1
TABLET ORAL
Qty: 14 TABLET | Refills: 0 | Status: ON HOLD | OUTPATIENT
Start: 2025-02-24 | End: 2025-03-03

## 2025-02-24 NOTE — PROGRESS NOTES
Chief Complaint   Patient presents with    Back Pain    Neck Pain     History:      Yun Blackwell is a 69 y.o. female who presents today for evaluation of the above problems.      Back Pain    Neck Pain       History of Present Illness  The patient presents for evaluation of back and neck pain.    She reports that her low back pain has largely resolved, but she is now experiencing significant neck pain. She describes the pain as originating from a specific point in her neck and radiating throughout the area. The pain is exacerbated by head movements and position changes. She has not taken any oral medications for her neck pain. She is unable to take ibuprofen due to her use of blood thinners. She reports no recent falls or injuries. She also reports no cold symptoms or headaches.  Despite the application of hot and cold compresses and the use of Absorbine Jr., the pain persists. Hot showers provide some relief, but the pain recurs once she exits the shower.    She experiences frequent itching, particularly severe on her legs, and takes hydroxyzine as needed for relief. She has consulted with a dermatologist, Dr. Villavicencio, who conducted tests that returned negative results. She initially suspected Singulair as the cause of her itching, but continued symptoms while on the medication led her to rule it out. She expresses a desire to discontinue hydroxyzine due to its sedative effects.    She takes Tylenol with aspirin for arthritis in her knee.    She continues to take tizanidine as needed for muscle spasms, which are less frequent. She reports that the medication is effective and does not cause drowsiness.    MEDICATIONS  Current: Tylenol, aspirin, hydroxyzine, tizanidine, Singulair.      ROS:  Review of Systems   Musculoskeletal:  Positive for back pain, neck pain and neck stiffness.         Current Outpatient Medications:     cyanocobalamin 1000 MCG/ML injection, Inject 1 mL Every 30 (Thirty) Days as directed.,  Disp: 1 mL, Rfl: 11    dabigatran etexilate (PRADAXA) 150 MG capsu, Take 1 capsule by mouth 2 (Two) Times a Day., Disp: 60 capsule, Rfl: 11    dilTIAZem CD (Cardizem CD) 120 MG 24 hr capsule, Take 1 capsule by mouth Daily., Disp: 30 capsule, Rfl: 11    hydrOXYzine (ATARAX) 10 MG tablet, Take 1 to 2 tablets by mouth Every 4 (Four) Hours As Needed., Disp: 60 tablet, Rfl: 3    omeprazole (priLOSEC) 20 MG capsule, Take 1 capsule by mouth Daily., Disp: 30 capsule, Rfl: 11    tamoxifen (NOLVADEX) 10 MG tablet, Take  by mouth 2 (Two) Times a Day., Disp: , Rfl:     tiZANidine (ZANAFLEX) 4 MG tablet, Take 1 tablet by mouth Every 6 (Six) Hours As Needed for Muscle Spasms., Disp: 60 tablet, Rfl: 2    predniSONE (DELTASONE) 10 MG tablet, Take 2 tablets by mouth Daily for 4 days, THEN 1 tablet Daily for 4 days, THEN 0.5 tablets Daily for 4 days., Disp: 14 tablet, Rfl: 0    Lab Results   Component Value Date    GLUCOSE 98 01/14/2025    BUN 16 01/14/2025    CREATININE 0.5 01/14/2025     01/14/2025    K 4.0 01/14/2025     01/14/2025    CALCIUM 9.3 01/14/2025    PROTEINTOT 7.3 01/14/2025    ALBUMIN 4.1 01/14/2025    ALT 13 01/14/2025    AST 36 (H) 01/14/2025    ALKPHOS 112 (H) 01/14/2025    BILITOT 0.9 01/14/2025    GLOB 2.7 12/20/2024    AGRATIO 1.3 12/20/2024    BCR 21.4 12/22/2024    ANIONGAP 8 01/14/2025    EGFR 104.7 12/22/2024       WBC   Date Value Ref Range Status   01/14/2025 7.66 4.80 - 10.80 K/uL Final     RBC   Date Value Ref Range Status   01/14/2025 4.37 4.20 - 5.40 M/uL Final     Hemoglobin   Date Value Ref Range Status   01/14/2025 13.4 12.0 - 16.0 g/dL Final     Hematocrit   Date Value Ref Range Status   01/14/2025 41.6 37.0 - 47.0 % Final     MCV   Date Value Ref Range Status   01/14/2025 95.2 81.0 - 99.0 fL Final     MCH   Date Value Ref Range Status   01/14/2025 30.7 27.0 - 31.0 pg Final     MCHC   Date Value Ref Range Status   01/14/2025 32.2 (L) 33.0 - 37.0 g/dL Final     RDW   Date Value Ref Range  Status   01/14/2025 13.6 11.5 - 14.5 % Final     RDW-SD   Date Value Ref Range Status   12/22/2024 45.1 37.0 - 54.0 fl Final     MPV   Date Value Ref Range Status   01/14/2025 10.5 9.4 - 12.3 fL Final     Platelets   Date Value Ref Range Status   01/14/2025 191 130 - 400 K/uL Final     Neutrophil Rel %   Date Value Ref Range Status   01/14/2025 68.6 (H) 50.0 - 65.0 % Final     Lymphocyte Rel %   Date Value Ref Range Status   01/14/2025 20.9 20.0 - 40.0 % Final     Monocyte Rel %   Date Value Ref Range Status   01/14/2025 8.6 1.0 - 10.0 % Final     Eosinophil Rel %   Date Value Ref Range Status   01/09/2024 0.5 (L) 0.7 - 7.0 % Final     Eosinophil %   Date Value Ref Range Status   01/14/2025 1.0 0.0 - 5.0 % Final     Basophil Rel %   Date Value Ref Range Status   01/14/2025 0.5 0.0 - 1.0 % Final     Immature Grans %   Date Value Ref Range Status   12/21/2024 0.3 0.0 - 0.5 % Final     Neutrophils Absolute   Date Value Ref Range Status   01/14/2025 5.25 1.50 - 7.50 K/uL Final     Lymphocytes Absolute   Date Value Ref Range Status   01/14/2025 1.60 1.10 - 4.50 K/uL Final     Monocytes Absolute   Date Value Ref Range Status   01/14/2025 0.66 0.00 - 0.90 K/uL Final     Eosinophils Absolute   Date Value Ref Range Status   01/14/2025 0.08 0.00 - 0.60 K/uL Final     Basophils Absolute   Date Value Ref Range Status   01/14/2025 0.04 0.00 - 0.20 K/uL Final     Immature Grans, Absolute   Date Value Ref Range Status   12/21/2024 0.02 0.00 - 0.05 10*3/mm3 Final   11/10/2020 0.0 K/uL Final     nRBC   Date Value Ref Range Status   11/24/2023 0.0 0.0 - 0.2 /100 WBC Final       OBJECTIVE:  Visit Vitals  /76 (BP Location: Right arm, Patient Position: Sitting, Cuff Size: Adult)   Pulse 102   SpO2 98%      Physical Exam  Constitutional:       Appearance: Normal appearance.   Neck:     Cardiovascular:      Rate and Rhythm: Normal rate and regular rhythm.   Pulmonary:      Effort: Pulmonary effort is normal.      Breath sounds:  Normal breath sounds.   Skin:     General: Skin is warm and dry.   Neurological:      Mental Status: She is alert.   Psychiatric:         Mood and Affect: Mood normal.         Behavior: Behavior normal.         Thought Content: Thought content normal.         Judgment: Judgment normal.       Physical Exam  Lungs were auscultated.    Results  Imaging  CAT scan of neck from a year and a half ago showed change in the curvature of the spine, some slipping of the disc, and stiffening of the bones, likely due to arthritis.    Assessment/Plan    Diagnoses and all orders for this visit:    1. Neck pain (Primary)  -     XR Spine Cervical 2 or 3 View; Future  -     tiZANidine (ZANAFLEX) 4 MG tablet; Take 1 tablet by mouth Every 6 (Six) Hours As Needed for Muscle Spasms.  Dispense: 60 tablet; Refill: 2  -     predniSONE (DELTASONE) 10 MG tablet; Take 2 tablets by mouth Daily for 4 days, THEN 1 tablet Daily for 4 days, THEN 0.5 tablets Daily for 4 days.  Dispense: 14 tablet; Refill: 0    2. Muscle strain  -     tiZANidine (ZANAFLEX) 4 MG tablet; Take 1 tablet by mouth Every 6 (Six) Hours As Needed for Muscle Spasms.  Dispense: 60 tablet; Refill: 2  -     predniSONE (DELTASONE) 10 MG tablet; Take 2 tablets by mouth Daily for 4 days, THEN 1 tablet Daily for 4 days, THEN 0.5 tablets Daily for 4 days.  Dispense: 14 tablet; Refill: 0      Assessment & Plan  1. Cervicalgia.  Her symptoms suggest a potential muscle strain in the cervical region. A previous CT scan of her neck, conducted approximately 1.5 years ago following a fall, revealed alterations in spinal curvature, disc displacement, and probable arthritic changes. It is plausible that these pre-existing conditions have become inflamed, necessitating intervention to alleviate the inflammation. An x-ray of the cervical spine will be ordered to rule out any other underlying conditions. A prescription for tizanidine 4 mg, to be taken every 6 hours as needed, will be provided.  Additionally, a 12-day course of steroids will be initiated to manage the inflammation. She is advised to continue with the application of warm compresses. The results of the x-ray will be communicated upon receipt. If the current treatment regimen proves ineffective, physical therapy may be considered as an alternative approach.    2. Pruritus.  She reports persistent itching, particularly on her legs, which is managed with hydroxyzine. She is currently taking hydroxyzine 10-20 mg every 4 hours as needed. She is advised to continue this regimen as it effectively controls her symptoms. Potential environmental factors, such as allergens, were discussed, and she was advised to consider changes in detergents and other possible irritants.    3. Muscle spasms.  She uses tizanidine as needed for muscle spasms. A prescription for tizanidine 4 mg, to be taken every 6 hours as needed, will be provided.      Return if symptoms worsen or fail to improve.      MARÍA Baxter  10:24 CST  2/24/2025   Electronically signed      Patient or patient representative verbalized consent for the use of Ambient Listening during the visit with  MARÍA Baxter for chart documentation. 2/24/2025  10:28 CST

## 2025-02-26 ENCOUNTER — TELEPHONE (OUTPATIENT)
Dept: SURGERY | Age: 70
End: 2025-02-26

## 2025-02-26 NOTE — TELEPHONE ENCOUNTER
Patient has a mammogram scheduled for June and wants to know if she needs to keep that appt    Cc: [facility]

## 2025-03-02 ENCOUNTER — ANESTHESIA (OUTPATIENT)
Dept: PERIOP | Facility: HOSPITAL | Age: 70
End: 2025-03-02
Payer: MEDICARE

## 2025-03-02 ENCOUNTER — HOSPITAL ENCOUNTER (INPATIENT)
Facility: HOSPITAL | Age: 70
LOS: 6 days | Discharge: HOME | DRG: 336 | End: 2025-03-08
Attending: FAMILY MEDICINE | Admitting: STUDENT IN AN ORGANIZED HEALTH CARE EDUCATION/TRAINING PROGRAM
Payer: MEDICARE

## 2025-03-02 ENCOUNTER — NURSE TRIAGE (OUTPATIENT)
Dept: CALL CENTER | Facility: HOSPITAL | Age: 70
End: 2025-03-02
Payer: MEDICARE

## 2025-03-02 ENCOUNTER — ANESTHESIA EVENT (OUTPATIENT)
Dept: PERIOP | Facility: HOSPITAL | Age: 70
End: 2025-03-02
Payer: MEDICARE

## 2025-03-02 ENCOUNTER — APPOINTMENT (OUTPATIENT)
Dept: CT IMAGING | Facility: HOSPITAL | Age: 70
DRG: 336 | End: 2025-03-02
Payer: MEDICARE

## 2025-03-02 DIAGNOSIS — Z74.09 IMPAIRED FUNCTIONAL MOBILITY AND ACTIVITY TOLERANCE: ICD-10-CM

## 2025-03-02 DIAGNOSIS — K56.609 SMALL BOWEL OBSTRUCTION: Primary | ICD-10-CM

## 2025-03-02 DIAGNOSIS — K43.6 INCARCERATED VENTRAL HERNIA: ICD-10-CM

## 2025-03-02 LAB
ABO GROUP BLD: NORMAL
ALBUMIN SERPL-MCNC: 4 G/DL (ref 3.5–5.2)
ALBUMIN/GLOB SERPL: 1.3 G/DL
ALP SERPL-CCNC: 96 U/L (ref 39–117)
ALT SERPL W P-5'-P-CCNC: 14 U/L (ref 1–33)
ANION GAP SERPL CALCULATED.3IONS-SCNC: 12 MMOL/L (ref 5–15)
AST SERPL-CCNC: 27 U/L (ref 1–32)
BACTERIA UR QL AUTO: ABNORMAL /HPF
BASOPHILS # BLD AUTO: 0.02 10*3/MM3 (ref 0–0.2)
BASOPHILS NFR BLD AUTO: 0.2 % (ref 0–1.5)
BILIRUB SERPL-MCNC: 0.6 MG/DL (ref 0–1.2)
BILIRUB UR QL STRIP: NEGATIVE
BLD GP AB SCN SERPL QL: NEGATIVE
BUN SERPL-MCNC: 13 MG/DL (ref 8–23)
BUN/CREAT SERPL: 27.1 (ref 7–25)
CALCIUM SPEC-SCNC: 9.7 MG/DL (ref 8.6–10.5)
CHLORIDE SERPL-SCNC: 102 MMOL/L (ref 98–107)
CLARITY UR: CLEAR
CO2 SERPL-SCNC: 24 MMOL/L (ref 22–29)
COLOR UR: YELLOW
CREAT SERPL-MCNC: 0.48 MG/DL (ref 0.57–1)
D-LACTATE SERPL-SCNC: 1.7 MMOL/L (ref 0.5–2)
DEPRECATED RDW RBC AUTO: 46.5 FL (ref 37–54)
EGFRCR SERPLBLD CKD-EPI 2021: 101.4 ML/MIN/1.73
EOSINOPHIL # BLD AUTO: 0.02 10*3/MM3 (ref 0–0.4)
EOSINOPHIL NFR BLD AUTO: 0.2 % (ref 0.3–6.2)
ERYTHROCYTE [DISTWIDTH] IN BLOOD BY AUTOMATED COUNT: 13.8 % (ref 12.3–15.4)
GLOBULIN UR ELPH-MCNC: 3.1 GM/DL
GLUCOSE BLDC GLUCOMTR-MCNC: 141 MG/DL (ref 70–130)
GLUCOSE BLDC GLUCOMTR-MCNC: 146 MG/DL (ref 70–130)
GLUCOSE SERPL-MCNC: 127 MG/DL (ref 65–99)
GLUCOSE UR STRIP-MCNC: NEGATIVE MG/DL
HCT VFR BLD AUTO: 42 % (ref 34–46.6)
HGB BLD-MCNC: 13.5 G/DL (ref 12–15.9)
HGB UR QL STRIP.AUTO: ABNORMAL
HYALINE CASTS UR QL AUTO: ABNORMAL /LPF
IMM GRANULOCYTES # BLD AUTO: 0.05 10*3/MM3 (ref 0–0.05)
IMM GRANULOCYTES NFR BLD AUTO: 0.6 % (ref 0–0.5)
KETONES UR QL STRIP: NEGATIVE
LEUKOCYTE ESTERASE UR QL STRIP.AUTO: ABNORMAL
LIPASE SERPL-CCNC: 35 U/L (ref 13–60)
LYMPHOCYTES # BLD AUTO: 0.85 10*3/MM3 (ref 0.7–3.1)
LYMPHOCYTES NFR BLD AUTO: 9.6 % (ref 19.6–45.3)
MCH RBC QN AUTO: 29.6 PG (ref 26.6–33)
MCHC RBC AUTO-ENTMCNC: 32.1 G/DL (ref 31.5–35.7)
MCV RBC AUTO: 92.1 FL (ref 79–97)
MONOCYTES # BLD AUTO: 0.44 10*3/MM3 (ref 0.1–0.9)
MONOCYTES NFR BLD AUTO: 5 % (ref 5–12)
NEUTROPHILS NFR BLD AUTO: 7.47 10*3/MM3 (ref 1.7–7)
NEUTROPHILS NFR BLD AUTO: 84.4 % (ref 42.7–76)
NITRITE UR QL STRIP: NEGATIVE
NRBC BLD AUTO-RTO: 0 /100 WBC (ref 0–0.2)
PH UR STRIP.AUTO: 5.5 [PH] (ref 5–8)
PLATELET # BLD AUTO: 201 10*3/MM3 (ref 140–450)
PMV BLD AUTO: 10.6 FL (ref 6–12)
POTASSIUM SERPL-SCNC: 4.5 MMOL/L (ref 3.5–5.2)
PROT SERPL-MCNC: 7.1 G/DL (ref 6–8.5)
PROT UR QL STRIP: NEGATIVE
RBC # BLD AUTO: 4.56 10*6/MM3 (ref 3.77–5.28)
RBC # UR STRIP: ABNORMAL /HPF
REF LAB TEST METHOD: ABNORMAL
RH BLD: POSITIVE
SODIUM SERPL-SCNC: 138 MMOL/L (ref 136–145)
SP GR UR STRIP: 1.02 (ref 1–1.03)
SQUAMOUS #/AREA URNS HPF: ABNORMAL /HPF
T&S EXPIRATION DATE: NORMAL
UROBILINOGEN UR QL STRIP: ABNORMAL
WBC # UR STRIP: ABNORMAL /HPF
WBC NRBC COR # BLD AUTO: 8.85 10*3/MM3 (ref 3.4–10.8)

## 2025-03-02 PROCEDURE — 80053 COMPREHEN METABOLIC PANEL: CPT

## 2025-03-02 PROCEDURE — 25010000002 SUGAMMADEX 200 MG/2ML SOLUTION: Performed by: NURSE ANESTHETIST, CERTIFIED REGISTERED

## 2025-03-02 PROCEDURE — 25010000002 LIDOCAINE PF 2% 2 % SOLUTION: Performed by: NURSE ANESTHETIST, CERTIFIED REGISTERED

## 2025-03-02 PROCEDURE — 86901 BLOOD TYPING SEROLOGIC RH(D): CPT | Performed by: FAMILY MEDICINE

## 2025-03-02 PROCEDURE — 8E0W4CZ ROBOTIC ASSISTED PROCEDURE OF TRUNK REGION, PERCUTANEOUS ENDOSCOPIC APPROACH: ICD-10-PCS | Performed by: SURGERY

## 2025-03-02 PROCEDURE — 25810000003 LACTATED RINGERS SOLUTION: Performed by: EMERGENCY MEDICINE

## 2025-03-02 PROCEDURE — 82948 REAGENT STRIP/BLOOD GLUCOSE: CPT

## 2025-03-02 PROCEDURE — 83605 ASSAY OF LACTIC ACID: CPT

## 2025-03-02 PROCEDURE — 36415 COLL VENOUS BLD VENIPUNCTURE: CPT | Performed by: FAMILY MEDICINE

## 2025-03-02 PROCEDURE — 25810000003 LACTATED RINGERS PER 1000 ML: Performed by: NURSE ANESTHETIST, CERTIFIED REGISTERED

## 2025-03-02 PROCEDURE — C9399 UNCLASSIFIED DRUGS OR BIOLOG: HCPCS | Performed by: FAMILY MEDICINE

## 2025-03-02 PROCEDURE — 25510000001 IOPAMIDOL 61 % SOLUTION: Performed by: EMERGENCY MEDICINE

## 2025-03-02 PROCEDURE — 25810000003 LACTATED RINGERS PER 1000 ML: Performed by: FAMILY MEDICINE

## 2025-03-02 PROCEDURE — C1781 MESH (IMPLANTABLE): HCPCS | Performed by: SURGERY

## 2025-03-02 PROCEDURE — 25010000002 ONDANSETRON PER 1 MG: Performed by: EMERGENCY MEDICINE

## 2025-03-02 PROCEDURE — 49594 RPR AA HRN 1ST 3-10 NCR/STRN: CPT | Performed by: SURGERY

## 2025-03-02 PROCEDURE — 25010000002 IDARUCIZUMAB 2.5 GM/50ML SOLUTION: Performed by: FAMILY MEDICINE

## 2025-03-02 PROCEDURE — 25010000002 DEXAMETHASONE PER 1 MG: Performed by: NURSE ANESTHETIST, CERTIFIED REGISTERED

## 2025-03-02 PROCEDURE — 49622 RPR PARASTOMAL HRNA NCR/STRN: CPT | Performed by: SURGERY

## 2025-03-02 PROCEDURE — 86900 BLOOD TYPING SEROLOGIC ABO: CPT | Performed by: FAMILY MEDICINE

## 2025-03-02 PROCEDURE — 25010000002 FENTANYL CITRATE (PF) 50 MCG/ML SOLUTION: Performed by: NURSE ANESTHETIST, CERTIFIED REGISTERED

## 2025-03-02 PROCEDURE — 74177 CT ABD & PELVIS W/CONTRAST: CPT

## 2025-03-02 PROCEDURE — S2900 ROBOTIC SURGICAL SYSTEM: HCPCS | Performed by: SURGERY

## 2025-03-02 PROCEDURE — 99285 EMERGENCY DEPT VISIT HI MDM: CPT

## 2025-03-02 PROCEDURE — 81001 URINALYSIS AUTO W/SCOPE: CPT

## 2025-03-02 PROCEDURE — 25010000002 ROPIVACAINE PER 1 MG: Performed by: SURGERY

## 2025-03-02 PROCEDURE — 0DNU4ZZ RELEASE OMENTUM, PERCUTANEOUS ENDOSCOPIC APPROACH: ICD-10-PCS | Performed by: SURGERY

## 2025-03-02 PROCEDURE — 85025 COMPLETE CBC W/AUTO DIFF WBC: CPT

## 2025-03-02 PROCEDURE — 25010000002 HYDROMORPHONE PER 4 MG: Performed by: EMERGENCY MEDICINE

## 2025-03-02 PROCEDURE — 25010000002 PROPOFOL 10 MG/ML EMULSION: Performed by: NURSE ANESTHETIST, CERTIFIED REGISTERED

## 2025-03-02 PROCEDURE — 25010000002 CEFAZOLIN PER 500 MG: Performed by: NURSE ANESTHETIST, CERTIFIED REGISTERED

## 2025-03-02 PROCEDURE — 86850 RBC ANTIBODY SCREEN: CPT | Performed by: FAMILY MEDICINE

## 2025-03-02 PROCEDURE — 99223 1ST HOSP IP/OBS HIGH 75: CPT | Performed by: SURGERY

## 2025-03-02 PROCEDURE — 0WUF0JZ SUPPLEMENT ABDOMINAL WALL WITH SYNTHETIC SUBSTITUTE, OPEN APPROACH: ICD-10-PCS | Performed by: SURGERY

## 2025-03-02 PROCEDURE — 83690 ASSAY OF LIPASE: CPT

## 2025-03-02 PROCEDURE — 25010000002 ONDANSETRON PER 1 MG: Performed by: NURSE ANESTHETIST, CERTIFIED REGISTERED

## 2025-03-02 PROCEDURE — 25010000002 HYDROMORPHONE PER 4 MG: Performed by: FAMILY MEDICINE

## 2025-03-02 DEVICE — DEV WND/CLS CONTRL TISS STRATAFIX SYMM PDS PLS SZ0 45CM VIL: Type: IMPLANTABLE DEVICE | Site: ABDOMEN | Status: FUNCTIONAL

## 2025-03-02 DEVICE — HEMOST ABS SURGICEL PWDR 3GM: Type: IMPLANTABLE DEVICE | Site: ABDOMEN | Status: FUNCTIONAL

## 2025-03-02 DEVICE — VENTRALIGHT ST MESH WITH ECHO 2 POSITIONING SYSTEM, 6" X 8" (15 X 20 CM), ELLIPSE
Type: IMPLANTABLE DEVICE | Site: ABDOMEN | Status: FUNCTIONAL
Brand: VENTRALIGHT

## 2025-03-02 DEVICE — ABSORBABLE WOUND CLOSURE DEVICE
Type: IMPLANTABLE DEVICE | Site: ABDOMEN | Status: FUNCTIONAL
Brand: V-LOC 90

## 2025-03-02 RX ORDER — ONDANSETRON 2 MG/ML
4 INJECTION INTRAMUSCULAR; INTRAVENOUS EVERY 6 HOURS PRN
Status: DISCONTINUED | OUTPATIENT
Start: 2025-03-02 | End: 2025-03-08 | Stop reason: HOSPADM

## 2025-03-02 RX ORDER — LIDOCAINE 4 G/G
1 PATCH TOPICAL
Status: DISCONTINUED | OUTPATIENT
Start: 2025-03-02 | End: 2025-03-08 | Stop reason: HOSPADM

## 2025-03-02 RX ORDER — SODIUM CHLORIDE, SODIUM LACTATE, POTASSIUM CHLORIDE, CALCIUM CHLORIDE 600; 310; 30; 20 MG/100ML; MG/100ML; MG/100ML; MG/100ML
75 INJECTION, SOLUTION INTRAVENOUS CONTINUOUS
Status: DISPENSED | OUTPATIENT
Start: 2025-03-02 | End: 2025-03-04

## 2025-03-02 RX ORDER — SODIUM CHLORIDE 0.9 % (FLUSH) 0.9 %
10 SYRINGE (ML) INJECTION AS NEEDED
Status: DISCONTINUED | OUTPATIENT
Start: 2025-03-02 | End: 2025-03-08 | Stop reason: HOSPADM

## 2025-03-02 RX ORDER — SODIUM CHLORIDE, SODIUM LACTATE, POTASSIUM CHLORIDE, CALCIUM CHLORIDE 600; 310; 30; 20 MG/100ML; MG/100ML; MG/100ML; MG/100ML
100 INJECTION, SOLUTION INTRAVENOUS CONTINUOUS
Status: CANCELLED | OUTPATIENT
Start: 2025-03-02 | End: 2025-03-03

## 2025-03-02 RX ORDER — HYDROMORPHONE HYDROCHLORIDE 1 MG/ML
0.5 INJECTION, SOLUTION INTRAMUSCULAR; INTRAVENOUS; SUBCUTANEOUS ONCE
Status: COMPLETED | OUTPATIENT
Start: 2025-03-02 | End: 2025-03-02

## 2025-03-02 RX ORDER — GABAPENTIN 100 MG/1
100 CAPSULE ORAL EVERY 8 HOURS SCHEDULED
Status: DISCONTINUED | OUTPATIENT
Start: 2025-03-02 | End: 2025-03-03

## 2025-03-02 RX ORDER — SODIUM CHLORIDE 0.9 % (FLUSH) 0.9 %
3 SYRINGE (ML) INJECTION EVERY 12 HOURS SCHEDULED
Status: CANCELLED | OUTPATIENT
Start: 2025-03-02

## 2025-03-02 RX ORDER — DEXAMETHASONE SODIUM PHOSPHATE 4 MG/ML
INJECTION, SOLUTION INTRA-ARTICULAR; INTRALESIONAL; INTRAMUSCULAR; INTRAVENOUS; SOFT TISSUE AS NEEDED
Status: DISCONTINUED | OUTPATIENT
Start: 2025-03-02 | End: 2025-03-02 | Stop reason: SURG

## 2025-03-02 RX ORDER — HYDROCODONE BITARTRATE AND ACETAMINOPHEN 10; 325 MG/1; MG/1
1 TABLET ORAL EVERY 4 HOURS PRN
Status: DISCONTINUED | OUTPATIENT
Start: 2025-03-02 | End: 2025-03-03

## 2025-03-02 RX ORDER — LABETALOL HYDROCHLORIDE 5 MG/ML
5 INJECTION, SOLUTION INTRAVENOUS
Status: DISCONTINUED | OUTPATIENT
Start: 2025-03-02 | End: 2025-03-02 | Stop reason: HOSPADM

## 2025-03-02 RX ORDER — ACETAMINOPHEN 500 MG
1000 TABLET ORAL ONCE
Status: CANCELLED | OUTPATIENT
Start: 2025-03-02 | End: 2025-03-02

## 2025-03-02 RX ORDER — HYDROMORPHONE HYDROCHLORIDE 1 MG/ML
0.25 INJECTION, SOLUTION INTRAMUSCULAR; INTRAVENOUS; SUBCUTANEOUS EVERY 4 HOURS PRN
Status: DISCONTINUED | OUTPATIENT
Start: 2025-03-02 | End: 2025-03-03

## 2025-03-02 RX ORDER — OXYCODONE AND ACETAMINOPHEN 10; 325 MG/1; MG/1
1 TABLET ORAL EVERY 4 HOURS PRN
Status: DISCONTINUED | OUTPATIENT
Start: 2025-03-02 | End: 2025-03-02 | Stop reason: HOSPADM

## 2025-03-02 RX ORDER — SODIUM CHLORIDE 0.9 % (FLUSH) 0.9 %
10 SYRINGE (ML) INJECTION ONCE
Status: DISCONTINUED | OUTPATIENT
Start: 2025-03-02 | End: 2025-03-06

## 2025-03-02 RX ORDER — METOPROLOL TARTRATE 1 MG/ML
5 INJECTION, SOLUTION INTRAVENOUS EVERY 6 HOURS PRN
Status: DISCONTINUED | OUTPATIENT
Start: 2025-03-02 | End: 2025-03-08 | Stop reason: HOSPADM

## 2025-03-02 RX ORDER — SODIUM CHLORIDE 0.9 % (FLUSH) 0.9 %
10 SYRINGE (ML) INJECTION AS NEEDED
Status: DISCONTINUED | OUTPATIENT
Start: 2025-03-02 | End: 2025-03-06

## 2025-03-02 RX ORDER — ONDANSETRON 2 MG/ML
4 INJECTION INTRAMUSCULAR; INTRAVENOUS EVERY 6 HOURS PRN
Status: DISCONTINUED | OUTPATIENT
Start: 2025-03-02 | End: 2025-03-02 | Stop reason: SDUPTHER

## 2025-03-02 RX ORDER — SODIUM CHLORIDE 9 MG/ML
40 INJECTION, SOLUTION INTRAVENOUS AS NEEDED
Status: DISCONTINUED | OUTPATIENT
Start: 2025-03-02 | End: 2025-03-08 | Stop reason: HOSPADM

## 2025-03-02 RX ORDER — SODIUM CHLORIDE 0.9 % (FLUSH) 0.9 %
3-10 SYRINGE (ML) INJECTION AS NEEDED
Status: CANCELLED | OUTPATIENT
Start: 2025-03-02

## 2025-03-02 RX ORDER — MAGNESIUM HYDROXIDE 1200 MG/15ML
LIQUID ORAL AS NEEDED
Status: DISCONTINUED | OUTPATIENT
Start: 2025-03-02 | End: 2025-03-02 | Stop reason: HOSPADM

## 2025-03-02 RX ORDER — LIDOCAINE HYDROCHLORIDE 20 MG/ML
INJECTION, SOLUTION EPIDURAL; INFILTRATION; INTRACAUDAL; PERINEURAL AS NEEDED
Status: DISCONTINUED | OUTPATIENT
Start: 2025-03-02 | End: 2025-03-02 | Stop reason: SURG

## 2025-03-02 RX ORDER — SODIUM CHLORIDE 9 MG/ML
40 INJECTION, SOLUTION INTRAVENOUS AS NEEDED
Status: CANCELLED | OUTPATIENT
Start: 2025-03-02 | End: 2025-03-03

## 2025-03-02 RX ORDER — FENTANYL CITRATE 50 UG/ML
INJECTION, SOLUTION INTRAMUSCULAR; INTRAVENOUS AS NEEDED
Status: DISCONTINUED | OUTPATIENT
Start: 2025-03-02 | End: 2025-03-02 | Stop reason: SURG

## 2025-03-02 RX ORDER — ONDANSETRON 2 MG/ML
4 INJECTION INTRAMUSCULAR; INTRAVENOUS ONCE
Status: COMPLETED | OUTPATIENT
Start: 2025-03-02 | End: 2025-03-02

## 2025-03-02 RX ORDER — MIDAZOLAM HYDROCHLORIDE 2 MG/2ML
0.5 INJECTION, SOLUTION INTRAMUSCULAR; INTRAVENOUS
Status: CANCELLED | OUTPATIENT
Start: 2025-03-02

## 2025-03-02 RX ORDER — HYDROMORPHONE HYDROCHLORIDE 1 MG/ML
0.5 INJECTION, SOLUTION INTRAMUSCULAR; INTRAVENOUS; SUBCUTANEOUS
Status: DISCONTINUED | OUTPATIENT
Start: 2025-03-02 | End: 2025-03-02 | Stop reason: HOSPADM

## 2025-03-02 RX ORDER — SODIUM CHLORIDE 0.9 % (FLUSH) 0.9 %
10 SYRINGE (ML) INJECTION EVERY 12 HOURS SCHEDULED
Status: DISCONTINUED | OUTPATIENT
Start: 2025-03-02 | End: 2025-03-08 | Stop reason: HOSPADM

## 2025-03-02 RX ORDER — PROPOFOL 10 MG/ML
VIAL (ML) INTRAVENOUS AS NEEDED
Status: DISCONTINUED | OUTPATIENT
Start: 2025-03-02 | End: 2025-03-02 | Stop reason: SURG

## 2025-03-02 RX ORDER — IOPAMIDOL 612 MG/ML
100 INJECTION, SOLUTION INTRAVASCULAR
Status: COMPLETED | OUTPATIENT
Start: 2025-03-02 | End: 2025-03-02

## 2025-03-02 RX ORDER — SODIUM CHLORIDE, SODIUM LACTATE, POTASSIUM CHLORIDE, CALCIUM CHLORIDE 600; 310; 30; 20 MG/100ML; MG/100ML; MG/100ML; MG/100ML
INJECTION, SOLUTION INTRAVENOUS CONTINUOUS PRN
Status: DISCONTINUED | OUTPATIENT
Start: 2025-03-02 | End: 2025-03-02 | Stop reason: SURG

## 2025-03-02 RX ORDER — CEFAZOLIN SODIUM 1 G/3ML
INJECTION, POWDER, FOR SOLUTION INTRAMUSCULAR; INTRAVENOUS AS NEEDED
Status: DISCONTINUED | OUTPATIENT
Start: 2025-03-02 | End: 2025-03-02 | Stop reason: SURG

## 2025-03-02 RX ORDER — ONDANSETRON 2 MG/ML
INJECTION INTRAMUSCULAR; INTRAVENOUS AS NEEDED
Status: DISCONTINUED | OUTPATIENT
Start: 2025-03-02 | End: 2025-03-02 | Stop reason: SURG

## 2025-03-02 RX ORDER — PHENYLEPHRINE HCL IN 0.9% NACL 1 MG/10 ML
SYRINGE (ML) INTRAVENOUS AS NEEDED
Status: DISCONTINUED | OUTPATIENT
Start: 2025-03-02 | End: 2025-03-02 | Stop reason: SURG

## 2025-03-02 RX ORDER — ONDANSETRON 2 MG/ML
4 INJECTION INTRAMUSCULAR; INTRAVENOUS
Status: DISCONTINUED | OUTPATIENT
Start: 2025-03-02 | End: 2025-03-02 | Stop reason: HOSPADM

## 2025-03-02 RX ORDER — FENTANYL CITRATE 50 UG/ML
50 INJECTION, SOLUTION INTRAMUSCULAR; INTRAVENOUS
Status: DISCONTINUED | OUTPATIENT
Start: 2025-03-02 | End: 2025-03-02 | Stop reason: HOSPADM

## 2025-03-02 RX ORDER — HYDROCODONE BITARTRATE AND ACETAMINOPHEN 7.5; 325 MG/1; MG/1
1 TABLET ORAL EVERY 6 HOURS PRN
Status: DISCONTINUED | OUTPATIENT
Start: 2025-03-02 | End: 2025-03-03

## 2025-03-02 RX ORDER — FLUMAZENIL 0.1 MG/ML
0.2 INJECTION INTRAVENOUS AS NEEDED
Status: DISCONTINUED | OUTPATIENT
Start: 2025-03-02 | End: 2025-03-02 | Stop reason: HOSPADM

## 2025-03-02 RX ORDER — FAMOTIDINE 10 MG/ML
20 INJECTION, SOLUTION INTRAVENOUS 2 TIMES DAILY
Status: DISCONTINUED | OUTPATIENT
Start: 2025-03-02 | End: 2025-03-08 | Stop reason: HOSPADM

## 2025-03-02 RX ORDER — NALOXONE HCL 0.4 MG/ML
0.04 VIAL (ML) INJECTION AS NEEDED
Status: DISCONTINUED | OUTPATIENT
Start: 2025-03-02 | End: 2025-03-02 | Stop reason: HOSPADM

## 2025-03-02 RX ORDER — ROCURONIUM BROMIDE 10 MG/ML
INJECTION, SOLUTION INTRAVENOUS AS NEEDED
Status: DISCONTINUED | OUTPATIENT
Start: 2025-03-02 | End: 2025-03-02 | Stop reason: SURG

## 2025-03-02 RX ADMIN — SUGAMMADEX 200 MG: 100 INJECTION, SOLUTION INTRAVENOUS at 19:47

## 2025-03-02 RX ADMIN — Medication 10 ML: at 22:37

## 2025-03-02 RX ADMIN — HYDROMORPHONE HYDROCHLORIDE 0.5 MG: 1 INJECTION, SOLUTION INTRAMUSCULAR; INTRAVENOUS; SUBCUTANEOUS at 15:49

## 2025-03-02 RX ADMIN — SODIUM CHLORIDE, SODIUM LACTATE, POTASSIUM CHLORIDE, CALCIUM CHLORIDE 100 ML/HR: 20; 30; 600; 310 INJECTION, SOLUTION INTRAVENOUS at 21:00

## 2025-03-02 RX ADMIN — FAMOTIDINE 20 MG: 10 INJECTION INTRAVENOUS at 21:22

## 2025-03-02 RX ADMIN — ROCURONIUM BROMIDE 70 MG: 10 INJECTION, SOLUTION INTRAVENOUS at 18:03

## 2025-03-02 RX ADMIN — IDARUCIZUMAB 5 G: 50 INJECTION INTRAVENOUS at 17:14

## 2025-03-02 RX ADMIN — IOPAMIDOL 100 ML: 612 INJECTION, SOLUTION INTRAVENOUS at 14:37

## 2025-03-02 RX ADMIN — Medication 150 MCG: at 18:16

## 2025-03-02 RX ADMIN — SODIUM CHLORIDE, POTASSIUM CHLORIDE, SODIUM LACTATE AND CALCIUM CHLORIDE: 600; 310; 30; 20 INJECTION, SOLUTION INTRAVENOUS at 19:41

## 2025-03-02 RX ADMIN — SODIUM CHLORIDE, POTASSIUM CHLORIDE, SODIUM LACTATE AND CALCIUM CHLORIDE: 600; 310; 30; 20 INJECTION, SOLUTION INTRAVENOUS at 17:57

## 2025-03-02 RX ADMIN — Medication 100 MCG: at 18:05

## 2025-03-02 RX ADMIN — HYDROMORPHONE HYDROCHLORIDE 0.5 MG: 1 INJECTION, SOLUTION INTRAMUSCULAR; INTRAVENOUS; SUBCUTANEOUS at 14:09

## 2025-03-02 RX ADMIN — HYDROMORPHONE HYDROCHLORIDE 0.25 MG: 1 INJECTION, SOLUTION INTRAMUSCULAR; INTRAVENOUS; SUBCUTANEOUS at 20:54

## 2025-03-02 RX ADMIN — SODIUM CHLORIDE, SODIUM LACTATE, POTASSIUM CHLORIDE, CALCIUM CHLORIDE 1000 ML: 20; 30; 600; 310 INJECTION, SOLUTION INTRAVENOUS at 14:09

## 2025-03-02 RX ADMIN — LIDOCAINE 1 PATCH: 4 PATCH TOPICAL at 22:36

## 2025-03-02 RX ADMIN — GABAPENTIN 100 MG: 100 CAPSULE ORAL at 22:36

## 2025-03-02 RX ADMIN — HYDROCODONE BITARTRATE AND ACETAMINOPHEN 1 TABLET: 10; 325 TABLET ORAL at 21:22

## 2025-03-02 RX ADMIN — DEXAMETHASONE SODIUM PHOSPHATE 4 MG: 4 INJECTION, SOLUTION INTRA-ARTICULAR; INTRALESIONAL; INTRAMUSCULAR; INTRAVENOUS; SOFT TISSUE at 18:15

## 2025-03-02 RX ADMIN — ONDANSETRON 4 MG: 2 INJECTION INTRAMUSCULAR; INTRAVENOUS at 19:43

## 2025-03-02 RX ADMIN — FENTANYL CITRATE 50 MCG: 50 INJECTION, SOLUTION INTRAMUSCULAR; INTRAVENOUS at 18:24

## 2025-03-02 RX ADMIN — FENTANYL CITRATE 50 MCG: 50 INJECTION, SOLUTION INTRAMUSCULAR; INTRAVENOUS at 18:26

## 2025-03-02 RX ADMIN — PROPOFOL 140 MG: 10 INJECTION, EMULSION INTRAVENOUS at 18:03

## 2025-03-02 RX ADMIN — FENTANYL CITRATE 150 MCG: 50 INJECTION, SOLUTION INTRAMUSCULAR; INTRAVENOUS at 17:57

## 2025-03-02 RX ADMIN — Medication 150 MCG: at 18:19

## 2025-03-02 RX ADMIN — Medication 150 MCG: at 18:37

## 2025-03-02 RX ADMIN — ONDANSETRON 4 MG: 2 INJECTION INTRAMUSCULAR; INTRAVENOUS at 14:09

## 2025-03-02 RX ADMIN — CEFAZOLIN 2 G: 1 INJECTION, POWDER, FOR SOLUTION INTRAMUSCULAR; INTRAVENOUS; PARENTERAL at 18:12

## 2025-03-02 RX ADMIN — LIDOCAINE HYDROCHLORIDE 100 MG: 20 INJECTION, SOLUTION EPIDURAL; INFILTRATION; INTRACAUDAL; PERINEURAL at 18:03

## 2025-03-02 NOTE — PLAN OF CARE
Goal Outcome Evaluation:   Patient arrived from the ER uncertain about plans for surgery. Patient up ad betty. A&Ox4. Room air. Dr. Bland aware and spoke with patient and daughter at bedside. Surgery came to take patient to ER. Daughter to remain in the room.

## 2025-03-02 NOTE — ANESTHESIA PREPROCEDURE EVALUATION
Anesthesia Evaluation     Patient summary reviewed   history of anesthetic complications:  PONV prolonged sedation  NPO Solid Status: > 8 hours             Airway   Mallampati: I  TM distance: >3 FB  Neck ROM: full  No difficulty expected  Dental - normal exam     Pulmonary    (+) ,sleep apnea on CPAP  (-) asthma, not a smoker  Cardiovascular   Exercise tolerance: excellent (>7 METS)    ECG reviewed    (+) hypertension, dysrhythmias Atrial Fib, hyperlipidemia  (-) past MI      Neuro/Psych- negative ROS  (-) seizures, TIA, CVA  GI/Hepatic/Renal/Endo    (+) GERD, GI bleeding , diabetes mellitus (diet controlled) type 2 well controlled  (-) liver disease    Musculoskeletal     Abdominal    Substance History      OB/GYN          Other   blood dyscrasia anemia,   history of cancer remission                  Anesthesia Plan    ASA 3 - emergent     general     (Praxbind dose to be given prior to or  )  intravenous induction     Anesthetic plan, risks, benefits, and alternatives have been provided, discussed and informed consent has been obtained with: patient.      CODE STATUS:    Level Of Support Discussed With: Health Care Surrogate  Code Status (Patient has no pulse and is not breathing): CPR (Attempt to Resuscitate)  Medical Interventions (Patient has pulse or is breathing): Full Support

## 2025-03-02 NOTE — ED PROVIDER NOTES
Subjective   History of Present Illness  Patient is a 69-year-old female who was sent to the ER with complaints of abdominal pain.  Reports that it started around 7 AM.  She is having sharp pain to the center of her abdomen.  She says she has been having diarrhea, but reports history of IBS.  Reports dark stool and states that she has been on Pepto-Bismol.  She denies nausea or vomiting.  Denies fevers.  Denies urinary symptoms.        Review of Systems   Gastrointestinal:  Positive for abdominal pain and diarrhea.   All other systems reviewed and are negative.      Past Medical History:   Diagnosis Date    A-fib     Abnormal ECG     tachycardia, a fib    Acid reflux     Anemia     Arthritis     Cancer     Diabetes mellitus     Hyperkalemia     Hyperlipidemia     Hypertension     Hyponatremia     IBS (irritable bowel syndrome)     PONV (postoperative nausea and vomiting)     Sleep apnea     USES CPAP    UTI (urinary tract infection)     UTI (urinary tract infection) 11/25/2016    Vaginal vault prolapse        Allergies   Allergen Reactions    Morphine And Codeine Nausea And Vomiting and Dizziness    Percocet [Oxycodone-Acetaminophen] Nausea And Vomiting and Dizziness       Past Surgical History:   Procedure Laterality Date    BREAST BIOPSY Left     X2    BUNIONECTOMY Left     CARDIAC CATHETERIZATION      CARDIAC CATHETERIZATION N/A 02/05/2021    Procedure: Right Heart Cath;  Surgeon: Van Marcelino MD;  Location:  [facility];  Service: Cardiology;  Laterality: N/A;    CATARACT EXTRACTION, BILATERAL      COLON SURGERY      COLONOSCOPY  09/21/2015    TRANSVERSE COLON ADENOMATOUS POLYP, HEMORRHOIDS, RECALL 5 YEARS, DR LAMAS    COLONOSCOPY N/A 11/13/2020    6 mm tubular adenomatous polyp at 15 cm proximal to the anus,diverticulosis of the sigmoid colon    COLONOSCOPY N/A 12/13/2023    - One 4 mm polyp at 40 cm proximal to the anus,. - One 4 mm polyp at 20 cm proximal to the anus, removed with a  cold snare. Resected and retrieved. - Diverticulosis in the sigmoid colon. - Hemorrhoids----tubular adenoma no evidence of high grade dysplasia    COLONOSCOPY N/A 12/22/2024    Dr. Ludwig-- The examined portion of the ileum was normal. - Diverticulosis in the entire examined colon. - Non-bleeding internal hemorrhoids. - No specimens collected.    COLOSTOMY      COLPOPEXY VAGINAL      2014    ENDOSCOPY N/A 11/03/2016    LA GRADE B ESOPHAGITIS WITH NO BLEEDING UPPER THIRD OF ESOPHAGUS, GERI-[place] TEAR GEJ, BILIOUS BLOOD TINGED COFFEE GROUND GATRIC FLUIDDR [facility]    ENDOSCOPY N/A 12/13/2023    Normal examined duodenum. - Normal stomach. Biopsied. - Z-line regular, 35 cm from the incisors. Dilated.-neg for h pylori    EXPLORATORY LAPAROTOMY N/A 10/14/2016    Procedure: LAPAROTOMY EXPLORATORY, LYSIS OF ADHESIONS, IRRIAGATION OF ABDOMEN, POSSIBLE BOWEL RESECTION;  Surgeon: Bacilio Marcial MD;  Location:  [facility] OR;  Service:     FOOT NAVICULAR EXCISION OR BONE GRAFT Left 01/04/2023    Procedure: Expansion Graft, Harvesting of Bone Graft/Bone Marrow Aspirate;  Surgeon: Frankie Zapata DPM;  Location:  [facility];  Service: Podiatry;  Laterality: Left;    HAMMER TOE REPAIR Left 01/04/2023    Procedure: Hammertoe Repair 2 and 3 - Left Foot;  Surgeon: Frankie Zapata DPM;  Location:  [facility];  Service: Podiatry;  Laterality: Left;    HARDWARE REMOVAL Left 01/04/2023    Procedure: Hardware Removal;  Surgeon: Frankie Zapata DPM;  Location:  [facility];  Service: Podiatry;  Laterality: Left;    HYSTERECTOMY      REVISION / TAKEDOWN COLOSTOMY      SACROCOLPOPEXY N/A 09/21/2016    Procedure: SACROCOLPOPEXY LAPAROSCOPIC WITH [employer] SI ROBOT, CONVERTED TO OPEN SACROCOLPOPEXY, RIGHT SALPINGO- OOPHERECTOMY, CYSTO;  Surgeon: Maribell Beth MD;  Location:  [facility];  Service:     TOE FUSION Left 01/06/2022    Procedure: FIRST METATARSOPHALANGEAL JOINT ARTHRODESIS WITH INTERNAL FIXATION - LEFT FOOT;  Surgeon: Jorgito Red  FAMILIA OVALLE;  Location:  [facility];  Service: Podiatry;  Laterality: Left;    TOE FUSION Left 01/04/2023    Procedure: Revisional 1st Metatarsophalangeal Joint Arthrodesis with Hardware Removal and Expansion Graft, Harvesting of Bone Graft/Bone Marrow Aspirate, Hammertoe Repair 2 and 3 - Left Foot;  Surgeon: Frankie Zapata DPM;  Location:  [facility];  Service: Podiatry;  Laterality: Left;       Family History   Problem Relation Age of Onset    Hypertension Mother     No Known Problems Father     Colon cancer Neg Hx     Colon polyps Neg Hx        Social History     Socioeconomic History    Marital status:    Tobacco Use    Smoking status: Never     Passive exposure: Never    Smokeless tobacco: Never   Vaping Use    Vaping status: Never Used   Substance and Sexual Activity    Alcohol use: No    Drug use: No    Sexual activity: Defer           Objective   Physical Exam  Vitals and nursing note reviewed.   Constitutional:       General: She is not in acute distress.     Appearance: She is well-developed and normal weight. She is not ill-appearing or toxic-appearing.   HENT:      Head: Normocephalic.   Cardiovascular:      Rate and Rhythm: Normal rate and regular rhythm.      Pulses: Normal pulses.      Heart sounds: Normal heart sounds.   Pulmonary:      Effort: Pulmonary effort is normal.      Breath sounds: Normal breath sounds.   Abdominal:      General: Abdomen is flat. Bowel sounds are normal. There is no distension.      Palpations: Abdomen is soft.      Tenderness: There is abdominal tenderness (center).   Musculoskeletal:         General: Normal range of motion.      Cervical back: Normal range of motion and neck supple.   Skin:     General: Skin is warm and dry.   Neurological:      General: No focal deficit present.      Mental Status: She is alert and oriented to person, place, and time. Mental status is at baseline.   Psychiatric:         Mood and Affect: Mood normal.         Behavior: Behavior normal.          Procedures       Labs Reviewed   COMPREHENSIVE METABOLIC PANEL - Abnormal; Notable for the following components:       Result Value    Glucose 127 (*)     Creatinine 0.48 (*)     BUN/Creatinine Ratio 27.1 (*)     All other components within normal limits    Narrative:     GFR Categories in Chronic Kidney Disease (CKD)      GFR Category          GFR (mL/min/1.73)    Interpretation  G1                     90 or greater         Normal or high (1)  G2                      60-89                Mild decrease (1)  G3a                   45-59                Mild to moderate decrease  G3b                   30-44                Moderate to severe decrease  G4                    15-29                Severe decrease  G5                    14 or less           Kidney failure          (1)In the absence of evidence of kidney disease, neither GFR category G1 or G2 fulfill the criteria for CKD.    eGFR calculation 2021 CKD-EPI creatinine equation, which does not include race as a factor   URINALYSIS W/ CULTURE IF INDICATED - Abnormal; Notable for the following components:    Blood, UA Trace (*)     Leuk Esterase, UA Small (1+) (*)     All other components within normal limits    Narrative:     In absence of clinical symptoms, the presence of pyuria, bacteria, and/or nitrites on the urinalysis result does not correlate with infection.   CBC WITH AUTO DIFFERENTIAL - Abnormal; Notable for the following components:    Neutrophil % 84.4 (*)     Lymphocyte % 9.6 (*)     Eosinophil % 0.2 (*)     Immature Grans % 0.6 (*)     Neutrophils, Absolute 7.47 (*)     All other components within normal limits   URINALYSIS, MICROSCOPIC ONLY - Abnormal; Notable for the following components:    RBC, UA 11-20 (*)     WBC, UA 3-5 (*)     Bacteria, UA 3+ (*)     Squamous Epithelial Cells, UA 7-12 (*)     All other components within normal limits   LIPASE - Normal   LACTIC ACID, PLASMA - Normal   URINALYSIS W/ CULTURE IF INDICATED   TYPE AND SCREEN    CBC AND DIFFERENTIAL    Narrative:     The following orders were created for panel order CBC & Differential.  Procedure                               Abnormality         Status                     ---------                               -----------         ------                     CBC Auto Differential[[id number]]        Abnormal            Final result                 Please view results for these tests on the individual orders.     CT Abdomen Pelvis With Contrast   Final Result       Acute small bowel obstruction with transition point in a left   paramidline ventral abdominal wall hernia.                   This report was signed and finalized on 3/2/2025 2:46 PM by Dr. nAdrew Gomez MD.                  ED Course  ED Course as of 03/02/25 1613   Sun Mar 02, 2025   1501 Messaged Dr. Bland for further recommendations. [KR]   1529 Dr. Bland is recommending admission to medicine service and NG tube placement [KR]   1535 Call placed to hospitalist  [KR]   1540 Discussed with MARÍA Castellanos who has accepted patient for further management. Patient will be admitted under Dr. Stanford's service.  [KR]      ED Course User Index  [KR] Tiffanie Prather APRN                                                       Medical Decision Making  Problems Addressed:  Small bowel obstruction: complicated acute illness or injury    Amount and/or Complexity of Data Reviewed  Labs: ordered.    Risk  Decision regarding hospitalization.        Final diagnoses:   Small bowel obstruction       ED Disposition  ED Disposition       ED Disposition   Decision to Admit    Condition   --    Comment   Level of Care: Med/Surg [1]   Diagnosis: SBO (small bowel obstruction) [324433]   Admitting Physician: HORACIO STANFORD [[id number]]   Certification: I Certify That Inpatient Hospital Services Are Medically Necessary For Greater Than 2 Midnights                 No follow-up provider specified.       Medication List      No changes were made to your  prescriptions during this visit.            Tiffanie Prather, MARÍA  03/02/25 1600       Tiffanie Prather APRN  03/02/25 [id number]

## 2025-03-02 NOTE — H&P
[facility]  HISTORY AND PHYSICAL    Date of Admission: 3/2/2025  Primary Care Physician: ANGEL Healy MD    Subjective   Primary Historian: Patient.    Chief Complaint: Small bowel obstruction    History of Present Illness  Patient is a 69-year-old presented ER complaining abdomen pain.  Symptoms started about 7 AM today with sharp central abdomen.  CT scan shows small bowel obstruction.  Patient also have a history of bowel obstruction.  Patient also passed dark stool but patient take  Pepto-Bismol.  Patient denies of nausea or vomiting or fever    Patient has a past medical history atrial fibs, reflux, anemia, arthritis, cancer, diabetes, hypokalemia , hypertension, hyponatremia, IBS, postop nausea vomiting, UTI, vaginal vault prolapse.    Review of Systems   Constitutional:  Positive for activity change, appetite change and fatigue. Negative for chills and fever.   HENT:  Negative for hearing loss, nosebleeds, tinnitus and trouble swallowing.    Eyes:  Negative for visual disturbance.   Respiratory:  Negative for cough, chest tightness, shortness of breath and wheezing.    Cardiovascular:  Negative for chest pain, palpitations and leg swelling.   Gastrointestinal:  Negative for abdominal distention, abdominal pain, blood in stool, constipation, diarrhea, nausea and vomiting.   Endocrine: Negative for cold intolerance, heat intolerance, polydipsia, polyphagia and polyuria.   Genitourinary:  Negative for decreased urine volume, difficulty urinating, dysuria, flank pain, frequency and hematuria.   Musculoskeletal:  Positive for arthralgias, gait problem and myalgias. Negative for joint swelling.   Skin:  Negative for rash.   Allergic/Immunologic: Negative for immunocompromised state.   Neurological:  Positive for weakness. Negative for dizziness, syncope, light-headedness and headaches.   Hematological:  Negative for adenopathy. Does not bruise/bleed  easily.   Psychiatric/Behavioral:  Negative for confusion and sleep disturbance. The patient is not nervous/anxious.         Otherwise complete ROS reviewed and negative except as mentioned in the HPI.    Past Med,    Past Surgical History:  Past Surgical History:   Procedure Laterality Date    BREAST BIOPSY Left     X2    BUNIONECTOMY Left     CARDIAC CATHETERIZATION      CARDIAC CATHETERIZATION N/A 02/05/2021    Procedure: Right Heart Cath;  Surgeon: Van Marcelino MD;  Location:  [facility];  Service: Cardiology;  Laterality: N/A;    CATARACT EXTRACTION, BILATERAL      COLON SURGERY      COLONOSCOPY  09/21/2015    TRANSVERSE COLON ADENOMATOUS POLYP, HEMORRHOIDS, RECALL 5 YEARS, DR LAMAS    COLONOSCOPY N/A 11/13/2020    6 mm tubular adenomatous polyp at 15 cm proximal to the anus,diverticulosis of the sigmoid colon    COLONOSCOPY N/A 12/13/2023    - One 4 mm polyp at 40 cm proximal to the anus,. - One 4 mm polyp at 20 cm proximal to the anus, removed with a cold snare. Resected and retrieved. - Diverticulosis in the sigmoid colon. - Hemorrhoids----tubular adenoma no evidence of high grade dysplasia    COLONOSCOPY N/A 12/22/2024    Dr. Ludwig-- The examined portion of the ileum was normal. - Diverticulosis in the entire examined colon. - Non-bleeding internal hemorrhoids. - No specimens collected.    COLOSTOMY      COLPOPEXY VAGINAL      2014    ENDOSCOPY N/A 11/03/2016    LA GRADE B ESOPHAGITIS WITH NO BLEEDING UPPER THIRD OF ESOPHAGUS, GERI-WADSWORTH TEAR GEJ, BILIOUS BLOOD TINGED COFFEE GROUND GATRIC FLUIDDR LAMAS    ENDOSCOPY N/A 12/13/2023    Normal examined duodenum. - Normal stomach. Biopsied. - Z-line regular, 35 cm from the incisors. Dilated.-neg for h pylori    EXPLORATORY LAPAROTOMY N/A 10/14/2016    Procedure: LAPAROTOMY EXPLORATORY, LYSIS OF ADHESIONS, IRRIAGATION OF ABDOMEN, POSSIBLE BOWEL RESECTION;  Surgeon: Bacilio Marcial MD;  Location: [facility] OR;  Service:     FOOT  NAVICULAR EXCISION OR BONE GRAFT Left 01/04/2023    Procedure: Expansion Graft, Harvesting of Bone Graft/Bone Marrow Aspirate;  Surgeon: Frankie Zapata DPM;  Location:  [facility] OR;  Service: Podiatry;  Laterality: Left;    HAMMER TOE REPAIR Left 01/04/2023    Procedure: Hammertoe Repair 2 and 3 - Left Foot;  Surgeon: Frankie Zapata DPM;  Location:  [facility] OR;  Service: Podiatry;  Laterality: Left;    HARDWARE REMOVAL Left 01/04/2023    Procedure: Hardware Removal;  Surgeon: Frankie Zapata DPM;  Location:  [facility] OR;  Service: Podiatry;  Laterality: Left;    HYSTERECTOMY      REVISION / TAKEDOWN COLOSTOMY      SACROCOLPOPEXY N/A 09/21/2016    Procedure: SACROCOLPOPEXY LAPAROSCOPIC WITH Diavibe ROBOT, CONVERTED TO OPEN SACROCOLPOPEXY, RIGHT SALPINGO- OOPHERECTOMY, CYSTO;  Surgeon: Maribell Beth MD;  Location:  [facility] OR;  Service:     TOE FUSION Left 01/06/2022    Procedure: FIRST METATARSOPHALANGEAL JOINT ARTHRODESIS WITH INTERNAL FIXATION - LEFT FOOT;  Surgeon: Jorgito Red DPM;  Location:  [facility] OR;  Service: Podiatry;  Laterality: Left;    TOE FUSION Left 01/04/2023    Procedure: Revisional 1st Metatarsophalangeal Joint Arthrodesis with Hardware Removal and Expansion Graft, Harvesting of Bone Graft/Bone Marrow Aspirate, Hammertoe Repair 2 and 3 - Left Foot;  Surgeon: Frankie Zapata DPM;  Location:  [facility] OR;  Service: Podiatry;  Laterality: Left;     Social History:  reports that she has never smoked. She has never been exposed to tobacco smoke. She has never used smokeless tobacco. She reports that she does not drink alcohol and does not use drugs.    Family History: family history includes Hypertension in her mother; No Known Problems in her father.       Allergies:  Allergies   Allergen Reactions    Morphine And Codeine Nausea And Vomiting and Dizziness    Percocet [Oxycodone-Acetaminophen] Nausea And Vomiting and Dizziness       Medications:  Prior to Admission medications    Medication  "Sig Start Date End Date Taking? Authorizing Provider   cyanocobalamin 1000 MCG/ML injection Inject 1 mL Every 30 (Thirty) Days as directed. 4/19/24   ANGEL Healy MD   dabigatran etexilate (PRADAXA) 150 MG capsu Take 1 capsule by mouth 2 (Two) Times a Day. 7/1/24   Abbey Serna APRN   dilTIAZem CD (Cardizem CD) 120 MG 24 hr capsule Take 1 capsule by mouth Daily. 11/13/24   Abbey Serna APRN   hydrOXYzine (ATARAX) 10 MG tablet Take 1 to 2 tablets by mouth Every 4 (Four) Hours As Needed. 7/23/24      omeprazole (priLOSEC) 20 MG capsule Take 1 capsule by mouth Daily. 2/2/24   ANGEL Healy MD   predniSONE (DELTASONE) 10 MG tablet Take 2 tablets by mouth Daily for 4 days, THEN 1 tablet Daily for 4 days, THEN 0.5 tablets Daily for 4 days. 2/24/25 3/8/25  Meenu Underwood APRN   tamoxifen (NOLVADEX) 10 MG tablet Take  by mouth 2 (Two) Times a Day.    Provider, MD David   tiZANidine (ZANAFLEX) 4 MG tablet Take 1 tablet by mouth Every 6 (Six) Hours As Needed for Muscle Spasms. 2/24/25   Meenu Underwood APRN     I have utilized all available immediate resources to obtain, update, or review the patient's current medications (including all prescriptions, over-the-counter products, herbals, cannabis/cannabidiol products, and vitamin/mineral/dietary (nutritional) supplements).    Objective     Vital Signs: /92   Pulse 78   Temp 97.7 °F (36.5 °C) (Oral)   Resp 18   Ht 160 cm (63\")   Wt 70 kg (154 lb 5.2 oz)   SpO2 100%   BMI 27.34 kg/m²   Physical Exam  Vitals and nursing note reviewed.   HENT:      Head: Normocephalic.   Eyes:      Conjunctiva/sclera: Conjunctivae normal.      Pupils: Pupils are equal, round, and reactive to light.   Cardiovascular:      Rate and Rhythm: Normal rate and regular rhythm.      Heart sounds: Normal heart sounds.   Pulmonary:      Effort: Pulmonary effort is normal. No respiratory distress.      Breath sounds: Normal breath sounds.   Abdominal:      " General: There is no distension.      Palpations: Abdomen is soft.      Tenderness: There is abdominal tenderness.      Comments: Extremely hypoactive bowel sounds   Musculoskeletal:         General: No swelling.      Cervical back: Neck supple.   Skin:     General: Skin is warm and dry.      Capillary Refill: Capillary refill takes 2 to 3 seconds.      Findings: No rash.   Neurological:      Mental Status: She is alert and oriented to person, place, and time.      Motor: Weakness present.   Psychiatric:         Mood and Affect: Mood normal.         Behavior: Behavior normal.         Thought Content: Thought content normal.           Results Reviewed:  Lab Results (last 24 hours)       Procedure Component Value Units Date/Time    Comprehensive Metabolic Panel [[id number]]  (Abnormal) Collected: 03/02/25 1358    Specimen: Blood from Arm, Right Updated: 03/02/25 1434     Glucose 127 mg/dL      BUN 13 mg/dL      Creatinine 0.48 mg/dL      Sodium 138 mmol/L      Potassium 4.5 mmol/L      Chloride 102 mmol/L      CO2 24.0 mmol/L      Calcium 9.7 mg/dL      Total Protein 7.1 g/dL      Albumin 4.0 g/dL      ALT (SGPT) 14 U/L      AST (SGOT) 27 U/L      Alkaline Phosphatase 96 U/L      Total Bilirubin 0.6 mg/dL      Globulin 3.1 gm/dL      A/G Ratio 1.3 g/dL      BUN/Creatinine Ratio 27.1     Anion Gap 12.0 mmol/L      eGFR 101.4 mL/min/1.73     Narrative:      GFR Categories in Chronic Kidney Disease (CKD)      GFR Category          GFR (mL/min/1.73)    Interpretation  G1                     90 or greater         Normal or high (1)  G2                      60-89                Mild decrease (1)  G3a                   45-59                Mild to moderate decrease  G3b                   30-44                Moderate to severe decrease  G4                    15-29                Severe decrease  G5                    14 or less           Kidney failure          (1)In the absence of evidence of kidney disease, neither GFR  category G1 or G2 fulfill the criteria for CKD.    eGFR calculation 2021 CKD-EPI creatinine equation, which does not include race as a factor    Lactic Acid, Plasma [[id number]]  (Normal) Collected: 03/02/25 1358    Specimen: Blood from Arm, Right Updated: 03/02/25 1432     Lactate 1.7 mmol/L     Lipase [[id number]]  (Normal) Collected: 03/02/25 1358    Specimen: Blood from Arm, Right Updated: 03/02/25 1429     Lipase 35 U/L     Urinalysis With Culture If Indicated - Urine, Clean Catch [[id number]]  (Abnormal) Collected: 03/02/25 1404    Specimen: Urine, Clean Catch Updated: 03/02/25 1418     Color, UA Yellow     Appearance, UA Clear     pH, UA 5.5     Specific Gravity, UA 1.016     Glucose, UA Negative     Ketones, UA Negative     Bilirubin, UA Negative     Blood, UA Trace     Protein, UA Negative     Leuk Esterase, UA Small (1+)     Nitrite, UA Negative     Urobilinogen, UA 0.2 E.U./dL    Narrative:      In absence of clinical symptoms, the presence of pyuria, bacteria, and/or nitrites on the urinalysis result does not correlate with infection.    Urinalysis, Microscopic Only - Urine, Clean Catch [[id number]]  (Abnormal) Collected: 03/02/25 1404    Specimen: Urine, Clean Catch Updated: 03/02/25 1418     RBC, UA 11-20 /HPF      WBC, UA 3-5 /HPF      Comment: Urine culture not indicated.        Bacteria, UA 3+ /HPF      Squamous Epithelial Cells, UA 7-12 /HPF      Hyaline Casts, UA None Seen /LPF      Methodology Automated Microscopy    CBC & Differential [[id number]]  (Abnormal) Collected: 03/02/25 1358    Specimen: Blood from Arm, Right Updated: 03/02/25 1412    Narrative:      The following orders were created for panel order CBC & Differential.  Procedure                               Abnormality         Status                     ---------                               -----------         ------                     CBC Auto Differential[[id number]]        Abnormal            Final result                 Please view  results for these tests on the individual orders.    CBC Auto Differential [[id number]]  (Abnormal) Collected: 03/02/25 1358    Specimen: Blood from Arm, Right Updated: 03/02/25 1412     WBC 8.85 10*3/mm3      RBC 4.56 10*6/mm3      Hemoglobin 13.5 g/dL      Hematocrit 42.0 %      MCV 92.1 fL      MCH 29.6 pg      MCHC 32.1 g/dL      RDW 13.8 %      RDW-SD 46.5 fl      MPV 10.6 fL      Platelets 201 10*3/mm3      Neutrophil % 84.4 %      Lymphocyte % 9.6 %      Monocyte % 5.0 %      Eosinophil % 0.2 %      Basophil % 0.2 %      Immature Grans % 0.6 %      Neutrophils, Absolute 7.47 10*3/mm3      Lymphocytes, Absolute 0.85 10*3/mm3      Monocytes, Absolute 0.44 10*3/mm3      Eosinophils, Absolute 0.02 10*3/mm3      Basophils, Absolute 0.02 10*3/mm3      Immature Grans, Absolute 0.05 10*3/mm3      nRBC 0.0 /100 WBC           Imaging Results (Last 24 Hours)       Procedure Component Value Units Date/Time    CT Abdomen Pelvis With Contrast [[id number]] Collected: 03/02/25 1438     Updated: 03/02/25 1449    Narrative:      EXAM/TECHNIQUE: CT abdomen and pelvis with IV contrast     INDICATION: Abdominal pain     COMPARISON: 12/20/2024     DLP: 429.03 mGy.cm. Automated exposure control was utilized to decrease  patient radiation dose.     FINDINGS:     Lung bases are clear. Heart is enlarged and there is dilatation of the  IVC and hepatic veins.     Slightly heterogeneous appearance of the liver. Mild hepatic steatosis.  No focal liver lesion. Multiple calcified gallstones in the gallbladder  lumen. No gallbladder wall thickening or adjacent fat stranding. No  biliary ductal dilatation.     Pancreas appears normal. Spleen is unremarkable. No adrenal gland  nodule.     No solid renal mass. No urolithiasis or hydronephrosis. No focal urinary  bladder wall thickening.      Postoperative change of left hemicolectomy. Colonic diverticulosis  without evidence of diverticulitis. Appendix is not identified and may  be surgically  absent.      There is a small bowel obstruction with transition point in a left  paramidline ventral abdominal hernia. The upstream bowel is dilated and  contains fecalized material, measuring up to 4.2 cm diameter (image 19  series 3). There is some mild interloop edema. No pneumatosis, portal  venous gas, or evidence of pneumoperitoneum. Distal esophagus and  stomach are unremarkable.     No ascites or free pelvic fluid. Prior hysterectomy. No pelvic mass or  collection.     Abdominal aorta is nonaneurysmal. No abdominal or pelvic  lymphadenopathy.      There are also periumbilical and right lateral abdominal wall  fat-containing hernias present. No acute osseous finding.       Impression:         Acute small bowel obstruction with transition point in a left  paramidline ventral abdominal wall hernia.              This report was signed and finalized on 3/2/2025 2:46 PM by Dr. Andrew Gomez MD.             I have personally reviewed and interpreted the radiology studies and ECG obtained at time of admission.     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     Small bowel obstruction.  NG tube in place.  Lactic ringer 100 cc an hour.  Dilaudid as needed.  CT scan abdomen pelvic-Acute small bowel obstruction with transition point in a left  paramidline ventral abdominal wall hernia.    UTI.  Rocephin x 3 days.    Hypertension/hyperlipidemia/CAD.  Hold Pradaxa, Pradaxabind after surgery today.  Hold Cardizem p.o.  Lopressor IV as needed.  Echocardiogram 1/29/2024- 51 - 55%,  Severe biatrial enlargement, Moderate tricuspid valve regurgitation, tricuspid  regurgitation is mildly elevated (35-45 mmHg), right ventricular cavity is moderately dilated-normal systolic function, No other significant (greater than mild) valvular pathology, Compared to most recent transthoracic exam (from 11/24/2020)- no obvious or significant change.     History of breast cancer.  Hold tamoxifen.    Sleep apnea.  CPAP from home.    Chronic pain.  Hold Zanaflex    Reflux . Pepcid.  Zofran as needed.    SCD.    Nutrition.  NPO.    Medical Decision Making  Number and Complexity of problems: Small bowel obstruction/hypertension/hyperlipidemia.  Differential Diagnosis: None.    Conditions and Status        Condition is unchanged.     [facility] Data  External documents reviewed: Previous note.  Cardiac tracing (EKG, telemetry) interpretation: None.  Radiology interpretation: CT scan .  Labs reviewed: Laboratory  Any tests that were considered but not ordered: Lab in a.m.     Decision rules/scores evaluated (example [id number], [place], etc): None     Discussed with: Patient and daughter     Care Planning  Shared decision making: Patient and daughter  Code status and discussions: Full code     Disposition  Social Determinants of Health that impact treatment or disposition: From home  Estimated length of stay is 2 to 5 days.     I confirmed that the patient's advanced care plan is present, code status is documented, and a surrogate decision maker is listed in the patient's medical record.     The patient's surrogate decision maker is daughter, Rachel.     The patient was seen and examined by me on 3/2/2025 at 1610.    Electronically signed by Geoff Baeur MD, 03/02/25, 16:15 CST.

## 2025-03-02 NOTE — TELEPHONE ENCOUNTER
"I am having abd pain, since 7 AM, this morning ate oatmeal, pain was coming strong at [employer]. I just got home, heating pad on belly, took pepto to sooth stomach. I just had large BM black stool, size of fist amt. I have been taking Pepto Bismol for several days. I have IBS is why I take Pepto bismol. Rating pain 10-11  belly, no distension she says, bowel movement yesterday also it was black also.   Reason for Disposition   Patient sounds very sick or weak to the triager    Additional Information   Negative: Shock suspected (e.g., cold/pale/clammy skin, too weak to stand, low BP, rapid pulse)   Negative: Difficult to awaken or acting confused (e.g., disoriented, slurred speech)   Negative: Passed out (i.e., lost consciousness, collapsed and was not responding)   Negative: Sounds like a life-threatening emergency to the triager   Negative: Followed an abdomen (stomach) injury   Negative: Chest pain   Negative: [1] Abdominal pain AND [2] pregnant < 20 weeks   Negative: [1] Abdominal pain AND [2] pregnant 20 or more weeks   Negative: [1] Abdominal pain AND [2] postpartum (from 0 to 6 weeks after delivery)   Negative: Pain is mainly in upper abdomen  (if needed ask: \"is it mainly above the belly button?\")   Negative: Abdomen bloating or swelling are main symptoms   Negative: [1] SEVERE pain (e.g., excruciating) AND [2] present > 1 hour   Negative: [1] SEVERE pain AND [2] age > 60 years   Negative: [1] Vomiting AND [2] contains red blood or black (\"coffee ground\") material  (Exception: Few red streaks in vomit that only happened once.)   Negative: Blood in bowel movements  (Exception: Blood on surface of BM with constipation.)   Negative: Black or tarry bowel movements  (Exception: Chronic-unchanged black-grey BMs AND is taking iron pills or Pepto-Bismol.)   Negative: [1] Vomiting AND [2] contains bile (green color)    Answer Assessment - Initial Assessment Questions  1. LOCATION: \"Where does it hurt?\"       Hurts middle " "of belly below belly button  2. RADIATION: \"Does the pain shoot anywhere else?\" (e.g., chest, back)      Belly pain  3. ONSET: \"When did the pain begin?\" (e.g., minutes, hours or days ago)       7AM startted  4. SUDDEN: \"Gradual or sudden onset?\"      All of sudden  5. PATTERN \"Does the pain come and go, or is it constant?\"     - If it comes and goes: \"How long does it last?\" \"Do you have pain now?\"      (Note: Comes and goes means the pain is intermittent. It goes away completely between bouts.)     - If constant: \"Is it getting better, staying the same, or getting worse?\"       (Note: Constant means the pain never goes away completely; most serious pain is constant and gets worse.)       Constant comes in waves of pain  6. SEVERITY: \"How bad is the pain?\"  (e.g., Scale 1-10; mild, moderate, or severe)     - MILD (1-3): Doesn't interfere with normal activities, abdomen soft and not tender to touch.      - MODERATE (4-7): Interferes with normal activities or awakens from sleep, abdomen tender to touch.      - SEVERE (8-10): Excruciating pain, doubled over, unable to do any normal activities.        Pain rated 10  7. RECURRENT SYMPTOM: \"Have you ever had this type of stomach pain before?\" If Yes, ask: \"When was the last time?\" and \"What happened that time?\"       no  8. CAUSE: \"What do you think is causing the stomach pain?\"      unknown  9. RELIEVING/AGGRAVATING FACTORS: \"What makes it better or worse?\" (e.g., antacids, bending or twisting motion, bowel movement)      no  10. OTHER SYMPTOMS: \"Do you have any other symptoms?\" (e.g., back pain, diarrhea, fever, urination pain, vomiting)        no  11. PREGNANCY: \"Is there any chance you are pregnant?\" \"When was your last menstrual period?\"        no    Protocols used: Abdominal Pain - Female-ADULT-AH    "

## 2025-03-02 NOTE — CONSULTS
GENERAL SURGERY CONSULTATION NOTE    Patient Name:  Yun Blackwell  YOB: 1955  MRN: [id number]    Patient Care Team:  ANGEL Healy MD as PCP - General (Internal Medicine)  Van Marcelino MD as Cardiologist (Cardiology)  Abbey Serna APRN as Nurse Practitioner (Family Medicine)    Date of Consultation: 03/02/25    Consulting Physician: Geoff Bauer MD    Reason for Consult: Incisional ventral hernia, small bowel obstruction    History of Present Illness  Yun Blackwell is a 69 y.o. female that I am seeing in consultation for an incarcerated ventral hernia with small bowel obstruction.  She began having pain at her umbilicus around 7 AM today.  This worsened after she went to [employer].  She has not been able to eat anything today and has had persistent nausea and vomiting.  She is on Pradaxa for atrial fibrillation which she took this morning.    Her surgical history is significant for a colon injury requiring an end ileostomy with subsequent ileostomy reversal.  She was told that she developed an incisional hernia last December when she had a CAT scan performed in the emergency department for abdominal pain.    Allergies   Allergies   Allergen Reactions    Morphine And Codeine Nausea And Vomiting and Dizziness    Percocet [Oxycodone-Acetaminophen] Nausea And Vomiting and Dizziness       Medications  cefTRIAXone, 1,000 mg, Intravenous, Q24H  famotidine, 20 mg, Intravenous, BID  sodium chloride, 10 mL, Intravenous, Once   Followed by  idaruCIZUmab, 5 g, Intravenous, Once   Followed by  sodium chloride, 10 mL, Intravenous, Once  sodium chloride, 10 mL, Intravenous, Q12H      lactated ringers, 100 mL/hr      No current facility-administered medications on file prior to encounter.     Current Outpatient Medications on File Prior to Encounter   Medication Sig    cyanocobalamin 1000 MCG/ML injection Inject 1 mL Every 30 (Thirty) Days as directed.    dabigatran etexilate (PRADAXA)  150 MG capsu Take 1 capsule by mouth 2 (Two) Times a Day.    dilTIAZem CD (Cardizem CD) 120 MG 24 hr capsule Take 1 capsule by mouth Daily.    hydrOXYzine (ATARAX) 10 MG tablet Take 1 to 2 tablets by mouth Every 4 (Four) Hours As Needed.    omeprazole (priLOSEC) 20 MG capsule Take 1 capsule by mouth Daily.    predniSONE (DELTASONE) 10 MG tablet Take 2 tablets by mouth Daily for 4 days, THEN 1 tablet Daily for 4 days, THEN 0.5 tablets Daily for 4 days.    tiZANidine (ZANAFLEX) 4 MG tablet Take 1 tablet by mouth Every 6 (Six) Hours As Needed for Muscle Spasms.    [DISCONTINUED] tamoxifen (NOLVADEX) 10 MG tablet Take  by mouth 2 (Two) Times a Day.       History  Past Medical History:   Diagnosis Date    A-fib     Abnormal ECG     tachycardia, a fib    Acid reflux     Anemia     Arthritis     Cancer     Diabetes mellitus     Hyperkalemia     Hyperlipidemia     Hypertension     Hyponatremia     IBS (irritable bowel syndrome)     PONV (postoperative nausea and vomiting)     Sleep apnea     USES CPAP    UTI (urinary tract infection)     UTI (urinary tract infection) 11/25/2016    Vaginal vault prolapse    ,   Past Surgical History:   Procedure Laterality Date    BREAST BIOPSY Left     X2    BUNIONECTOMY Left     CARDIAC CATHETERIZATION      CARDIAC CATHETERIZATION N/A 02/05/2021    Procedure: Right Heart Cath;  Surgeon: Van Marcelino MD;  Location:  [facility];  Service: Cardiology;  Laterality: N/A;    CATARACT EXTRACTION, BILATERAL      COLON SURGERY      COLONOSCOPY  09/21/2015    TRANSVERSE COLON ADENOMATOUS POLYP, HEMORRHOIDS, RECALL 5 YEARS, DR LAMAS    COLONOSCOPY N/A 11/13/2020    6 mm tubular adenomatous polyp at 15 cm proximal to the anus,diverticulosis of the sigmoid colon    COLONOSCOPY N/A 12/13/2023    - One 4 mm polyp at 40 cm proximal to the anus,. - One 4 mm polyp at 20 cm proximal to the anus, removed with a cold snare. Resected and retrieved. - Diverticulosis in the sigmoid colon.  - Hemorrhoids----tubular adenoma no evidence of high grade dysplasia    COLONOSCOPY N/A 12/22/2024    Dr. Ludwig-- The examined portion of the ileum was normal. - Diverticulosis in the entire examined colon. - Non-bleeding internal hemorrhoids. - No specimens collected.    COLOSTOMY      COLPOPEXY VAGINAL      2014    ENDOSCOPY N/A 11/03/2016    LA GRADE B ESOPHAGITIS WITH NO BLEEDING UPPER THIRD OF ESOPHAGUS, GERI-[place]TH TEAR GEJ, BILIOUS BLOOD TINGED COFFEE GROUND GATRIC FLUIDDR [facility]    ENDOSCOPY N/A 12/13/2023    Normal examined duodenum. - Normal stomach. Biopsied. - Z-line regular, 35 cm from the incisors. Dilated.-neg for h pylori    EXPLORATORY LAPAROTOMY N/A 10/14/2016    Procedure: LAPAROTOMY EXPLORATORY, LYSIS OF ADHESIONS, IRRIAGATION OF ABDOMEN, POSSIBLE BOWEL RESECTION;  Surgeon: Bacilio Marcial MD;  Location:  [facility] OR;  Service:     FOOT NAVICULAR EXCISION OR BONE GRAFT Left 01/04/2023    Procedure: Expansion Graft, Harvesting of Bone Graft/Bone Marrow Aspirate;  Surgeon: Frankie Zapata DPM;  Location:  [facility] OR;  Service: Podiatry;  Laterality: Left;    HAMMER TOE REPAIR Left 01/04/2023    Procedure: Hammertoe Repair 2 and 3 - Left Foot;  Surgeon: Frankie Zapata DPM;  Location:  [facility] OR;  Service: Podiatry;  Laterality: Left;    HARDWARE REMOVAL Left 01/04/2023    Procedure: Hardware Removal;  Surgeon: Frankie Zapata DPM;  Location:  [facility] OR;  Service: Podiatry;  Laterality: Left;    HYSTERECTOMY      REVISION / TAKEDOWN COLOSTOMY      SACROCOLPOPEXY N/A 09/21/2016    Procedure: SACROCOLPOPEXY LAPAROSCOPIC WITH NetrepidI SI ROBOT, CONVERTED TO OPEN SACROCOLPOPEXY, RIGHT SALPINGO- OOPHERECTOMY, CYSTO;  Surgeon: Maribell Beth MD;  Location:  [facility] OR;  Service:     TOE FUSION Left 01/06/2022    Procedure: FIRST METATARSOPHALANGEAL JOINT ARTHRODESIS WITH INTERNAL FIXATION - LEFT FOOT;  Surgeon: Jorgito Red DPM;  Location:  [facility] OR;  Service: Podiatry;  Laterality: Left;     TOE FUSION Left 01/04/2023    Procedure: Revisional 1st Metatarsophalangeal Joint Arthrodesis with Hardware Removal and Expansion Graft, Harvesting of Bone Graft/Bone Marrow Aspirate, Hammertoe Repair 2 and 3 - Left Foot;  Surgeon: Frankie Zapata DPM;  Location: [facility];  Service: Podiatry;  Laterality: Left;     Family History   Problem Relation Age of Onset    Hypertension Mother     No Known Problems Father     Colon cancer Neg Hx     Colon polyps Neg Hx      Social History     Tobacco Use    Smoking status: Never     Passive exposure: Never    Smokeless tobacco: Never   Vaping Use    Vaping status: Never Used   Substance Use Topics    Alcohol use: No    Drug use: No   Pt [facility]     Current Facility-Administered Medications:     cefTRIAXone (ROCEPHIN) 1,000 mg in sodium chloride 0.9 % 100 mL MBP, 1,000 mg, Intravenous, Q24H, Geoff Bauer MD    famotidine (PEPCID) injection 20 mg, 20 mg, Intravenous, BID, Geoff Bauer MD    HYDROmorphone (DILAUDID) injection 0.25 mg, 0.25 mg, Intravenous, Q4H PRN, Geoff Bauer MD    sodium chloride 0.9 % flush 10 mL, 10 mL, Intravenous, Once **FOLLOWED BY** idaruCIZUmab (PRAXBIND) IV solution solution 5 g, 5 g, Intravenous, Once **FOLLOWED BY** sodium chloride 0.9 % flush 10 mL, 10 mL, Intravenous, Once, Geoff Bauer MD    lactated ringers infusion, 100 mL/hr, Intravenous, Continuous, Geoff Bauer MD    metoprolol tartrate (LOPRESSOR) injection 5 mg, 5 mg, Intravenous, Q6H PRN, Geoff Bauer MD    ondansetron (ZOFRAN) injection 4 mg, 4 mg, Intravenous, Q6H PRN, Geoff Bauer MD    [COMPLETED] Insert Peripheral IV, , , Once **AND** sodium chloride 0.9 % flush 10 mL, 10 mL, Intravenous, PRN, Geoff Bauer MD    sodium chloride 0.9 % flush 10 mL, 10 mL, Intravenous, Q12H, Geoff Bauer MD    sodium chloride 0.9 % flush 10 mL, 10 mL, Intravenous, PRN, Geoff Bauer MD    sodium chloride 0.9 % infusion 40 mL, 40 mL, Intravenous, PRN,  "Geoff Bauer MD    Review of Systems  A comprehensive 14 point review of systems was performed and is negative unless otherwise noted.     Vital Signs  /91 (BP Location: Right arm, Patient Position: Lying)   Pulse 89   Temp 97.8 °F (36.6 °C) (Oral)   Resp 18   Ht 160 cm (63\")   Wt 70 kg (154 lb 5.2 oz)   SpO2 99%   BMI 27.34 kg/m²   Body mass index is 27.34 kg/m².   No intake or output data in the 24 hours ending 03/02/25 1658    Physical Exam  Constitutional:       Appearance: Normal appearance. She is normal weight.      Comments: Elderly female, in no acute distress. Normal development, normal body habitus. Well nourished   HENT:      Head: Normocephalic and atraumatic.      Right Ear: External ear normal.      Left Ear: External ear normal.      Ears:      Comments: Hearing intact     Nose: Nose normal.      Comments: Nares patent, no septal deviation, no drainage     Mouth/Throat:      Comments: Airway patent, dentition intact, mucus membranes moist  Eyes:      Extraocular Movements: Extraocular movements intact.      Conjunctiva/sclera: Conjunctivae normal.      Pupils: Pupils are equal, round, and reactive to light.      Comments: External eyelids grossly normal, vision intact, no scleral icterus   Neck:      Comments: Trachea midline  Cardiovascular:      Rate and Rhythm: Normal rate.      Comments: Normotensive, no JVD bilaterally  Pulmonary:      Effort: Pulmonary effort is normal.      Breath sounds: Normal breath sounds.      Comments: Normal respiratory rate  Abdominal:      General: Abdomen is flat.      Palpations: Abdomen is soft.      Comments: 4-5 cm firm bulge left of the umbilicus that is tender to palpation. Well healed midline scar.    Musculoskeletal:         General: Normal range of motion.      Cervical back: Normal range of motion.   Skin:     General: Skin is warm and dry.      Comments: Skin color is consistent with ethnicity   Neurological:      General: No focal deficit " present.      Mental Status: She is alert and oriented to person, place, and time.   Psychiatric:         Mood and Affect: Mood normal.         Behavior: Behavior normal.         Thought Content: Thought content normal.         Judgment: Judgment normal.      Comments: Patient understands disease process and has decision making capacity.          Results:  Labs:  I personally reviewed all pertinent labs.   Imaging: I personally reviewed all pertinent imaging studies.     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 female with a high-grade small bowel obstruction due to an incarcerated left paramedian incisional ventral hernia.  She is on Pradaxa for atrial fibrillation.  She has been reversed with Praxbind.  Given that she has had symptoms for 8 hours, I have explained to the patient the high risk for bowel ischemia and perforation.  She understands and would like to proceed with surgery.    Plan for laparoscopic robotic assisted ventral hernia repair, possible open ventral hernia repair, possible small bowel resection.    Hernia consent I discussed the risks, benefits and alternatives to surgery including risk of injury to surrounding structures, deep organ space and superficial skin infection, uncontrolled bleeding, the need for blood transfusion, possibility converting to open surgery, recurrent hernias, mesh related complications including mesh infection, mesh migration and mesh failure, the possibility of needing to remove the mesh, chronic pain, long-term drain and wound care, and the possibility of requiring more surgery or procedures in the future. Additionally, I counseled the patient on the risk of postoperative  cardiopulmonary complications. I gave the patient a chance to ask any questions and answered them thoroughly. The patient understood the risks and was agreeable to proceeding to surgery. Consent was obtained from the patient.       I discussed the patient's findings and my recommendations with the patient and/or family, as well as the nursing staff and primary care team.    Jarrett Bland MD, [facility]  General Surgery  3/2/2025  16:58 CST    Please note that portions of this note were completed with a voice recognition program.

## 2025-03-03 PROBLEM — K43.6 INCARCERATED VENTRAL HERNIA: Status: ACTIVE | Noted: 2025-03-03

## 2025-03-03 LAB
ALBUMIN SERPL-MCNC: 3.4 G/DL (ref 3.5–5.2)
ALBUMIN/GLOB SERPL: 1.3 G/DL
ALP SERPL-CCNC: 75 U/L (ref 39–117)
ALT SERPL W P-5'-P-CCNC: 13 U/L (ref 1–33)
ANION GAP SERPL CALCULATED.3IONS-SCNC: 10 MMOL/L (ref 5–15)
AST SERPL-CCNC: 23 U/L (ref 1–32)
BASOPHILS # BLD AUTO: 0.01 10*3/MM3 (ref 0–0.2)
BASOPHILS NFR BLD AUTO: 0.1 % (ref 0–1.5)
BILIRUB SERPL-MCNC: 1.3 MG/DL (ref 0–1.2)
BILIRUB UR QL STRIP: NEGATIVE
BUN SERPL-MCNC: 11 MG/DL (ref 8–23)
BUN/CREAT SERPL: 24.4 (ref 7–25)
CALCIUM SPEC-SCNC: 8.8 MG/DL (ref 8.6–10.5)
CHLORIDE SERPL-SCNC: 101 MMOL/L (ref 98–107)
CHOLEST SERPL-MCNC: 97 MG/DL (ref 0–200)
CLARITY UR: CLEAR
CO2 SERPL-SCNC: 25 MMOL/L (ref 22–29)
COLOR UR: YELLOW
CREAT SERPL-MCNC: 0.45 MG/DL (ref 0.57–1)
DEPRECATED RDW RBC AUTO: 47.5 FL (ref 37–54)
EGFRCR SERPLBLD CKD-EPI 2021: 103 ML/MIN/1.73
EOSINOPHIL # BLD AUTO: 0 10*3/MM3 (ref 0–0.4)
EOSINOPHIL NFR BLD AUTO: 0 % (ref 0.3–6.2)
ERYTHROCYTE [DISTWIDTH] IN BLOOD BY AUTOMATED COUNT: 13.9 % (ref 12.3–15.4)
GLOBULIN UR ELPH-MCNC: 2.6 GM/DL
GLUCOSE SERPL-MCNC: 133 MG/DL (ref 65–99)
GLUCOSE UR STRIP-MCNC: NEGATIVE MG/DL
HBA1C MFR BLD: 5.3 % (ref 4.8–5.6)
HCT VFR BLD AUTO: 39.6 % (ref 34–46.6)
HDLC SERPL-MCNC: 58 MG/DL (ref 40–60)
HGB BLD-MCNC: 12.6 G/DL (ref 12–15.9)
HGB UR QL STRIP.AUTO: NEGATIVE
IMM GRANULOCYTES # BLD AUTO: 0.03 10*3/MM3 (ref 0–0.05)
IMM GRANULOCYTES NFR BLD AUTO: 0.3 % (ref 0–0.5)
KETONES UR QL STRIP: NEGATIVE
LDLC SERPL CALC-MCNC: 21 MG/DL (ref 0–100)
LDLC/HDLC SERPL: 0.36 {RATIO}
LEUKOCYTE ESTERASE UR QL STRIP.AUTO: NEGATIVE
LYMPHOCYTES # BLD AUTO: 0.83 10*3/MM3 (ref 0.7–3.1)
LYMPHOCYTES NFR BLD AUTO: 7.9 % (ref 19.6–45.3)
MCH RBC QN AUTO: 29.6 PG (ref 26.6–33)
MCHC RBC AUTO-ENTMCNC: 31.8 G/DL (ref 31.5–35.7)
MCV RBC AUTO: 93 FL (ref 79–97)
MONOCYTES # BLD AUTO: 0.95 10*3/MM3 (ref 0.1–0.9)
MONOCYTES NFR BLD AUTO: 9 % (ref 5–12)
NEUTROPHILS NFR BLD AUTO: 8.73 10*3/MM3 (ref 1.7–7)
NEUTROPHILS NFR BLD AUTO: 82.7 % (ref 42.7–76)
NITRITE UR QL STRIP: NEGATIVE
NRBC BLD AUTO-RTO: 0 /100 WBC (ref 0–0.2)
PH UR STRIP.AUTO: 6 [PH] (ref 5–8)
PLATELET # BLD AUTO: 170 10*3/MM3 (ref 140–450)
PMV BLD AUTO: 11.1 FL (ref 6–12)
POTASSIUM SERPL-SCNC: 4.4 MMOL/L (ref 3.5–5.2)
PROT SERPL-MCNC: 6 G/DL (ref 6–8.5)
PROT UR QL STRIP: NEGATIVE
RBC # BLD AUTO: 4.26 10*6/MM3 (ref 3.77–5.28)
SODIUM SERPL-SCNC: 136 MMOL/L (ref 136–145)
SP GR UR STRIP: 1.01 (ref 1–1.03)
TRIGL SERPL-MCNC: 91 MG/DL (ref 0–150)
TSH SERPL DL<=0.05 MIU/L-ACNC: 0.98 UIU/ML (ref 0.27–4.2)
UROBILINOGEN UR QL STRIP: NORMAL
VLDLC SERPL-MCNC: 18 MG/DL (ref 5–40)
WBC NRBC COR # BLD AUTO: 10.55 10*3/MM3 (ref 3.4–10.8)

## 2025-03-03 PROCEDURE — 85025 COMPLETE CBC W/AUTO DIFF WBC: CPT | Performed by: FAMILY MEDICINE

## 2025-03-03 PROCEDURE — 80061 LIPID PANEL: CPT | Performed by: FAMILY MEDICINE

## 2025-03-03 PROCEDURE — 99231 SBSQ HOSP IP/OBS SF/LOW 25: CPT | Performed by: SURGERY

## 2025-03-03 PROCEDURE — 25010000002 KETOROLAC TROMETHAMINE PER 15 MG: Performed by: SURGERY

## 2025-03-03 PROCEDURE — 25010000002 DIAZEPAM PER 5 MG: Performed by: SURGERY

## 2025-03-03 PROCEDURE — 80053 COMPREHEN METABOLIC PANEL: CPT | Performed by: FAMILY MEDICINE

## 2025-03-03 PROCEDURE — 25010000002 HYDROMORPHONE 1 MG/ML SOLUTION: Performed by: SURGERY

## 2025-03-03 PROCEDURE — 25010000002 CEFTRIAXONE PER 250 MG: Performed by: STUDENT IN AN ORGANIZED HEALTH CARE EDUCATION/TRAINING PROGRAM

## 2025-03-03 PROCEDURE — 84443 ASSAY THYROID STIM HORMONE: CPT | Performed by: FAMILY MEDICINE

## 2025-03-03 PROCEDURE — 25810000003 LACTATED RINGERS PER 1000 ML: Performed by: FAMILY MEDICINE

## 2025-03-03 PROCEDURE — 25010000002 ONDANSETRON PER 1 MG: Performed by: FAMILY MEDICINE

## 2025-03-03 PROCEDURE — 81003 URINALYSIS AUTO W/O SCOPE: CPT | Performed by: FAMILY MEDICINE

## 2025-03-03 PROCEDURE — 83036 HEMOGLOBIN GLYCOSYLATED A1C: CPT | Performed by: FAMILY MEDICINE

## 2025-03-03 RX ORDER — CEPHALEXIN 500 MG/1
500 CAPSULE ORAL EVERY 12 HOURS SCHEDULED
Status: CANCELLED | OUTPATIENT
Start: 2025-03-03 | End: 2025-03-05

## 2025-03-03 RX ORDER — CYCLOBENZAPRINE HCL 10 MG
5 TABLET ORAL 3 TIMES DAILY
Status: DISCONTINUED | OUTPATIENT
Start: 2025-03-03 | End: 2025-03-08 | Stop reason: HOSPADM

## 2025-03-03 RX ORDER — PREDNISONE 10 MG/1
10 TABLET ORAL DAILY
COMMUNITY
End: 2025-03-08 | Stop reason: HOSPADM

## 2025-03-03 RX ORDER — DILTIAZEM HYDROCHLORIDE 120 MG/1
120 CAPSULE, COATED, EXTENDED RELEASE ORAL DAILY
Status: DISCONTINUED | OUTPATIENT
Start: 2025-03-04 | End: 2025-03-08 | Stop reason: HOSPADM

## 2025-03-03 RX ORDER — TAMOXIFEN CITRATE 20 MG/1
20 TABLET ORAL DAILY
COMMUNITY

## 2025-03-03 RX ORDER — GABAPENTIN 100 MG/1
100 CAPSULE ORAL EVERY 8 HOURS SCHEDULED
Status: COMPLETED | OUTPATIENT
Start: 2025-03-03 | End: 2025-03-07

## 2025-03-03 RX ORDER — DIAZEPAM 10 MG/2ML
5 INJECTION, SOLUTION INTRAMUSCULAR; INTRAVENOUS EVERY 8 HOURS
Status: DISPENSED | OUTPATIENT
Start: 2025-03-03 | End: 2025-03-04

## 2025-03-03 RX ORDER — HYDROCODONE BITARTRATE AND ACETAMINOPHEN 10; 325 MG/1; MG/1
2 TABLET ORAL EVERY 4 HOURS PRN
Status: DISCONTINUED | OUTPATIENT
Start: 2025-03-03 | End: 2025-03-03

## 2025-03-03 RX ORDER — HYDROXYZINE HYDROCHLORIDE 10 MG/1
10-20 TABLET, FILM COATED ORAL EVERY 4 HOURS PRN
COMMUNITY

## 2025-03-03 RX ORDER — HYDROCODONE BITARTRATE AND ACETAMINOPHEN 10; 325 MG/1; MG/1
1 TABLET ORAL EVERY 4 HOURS PRN
Status: DISPENSED | OUTPATIENT
Start: 2025-03-03 | End: 2025-03-07

## 2025-03-03 RX ORDER — KETOROLAC TROMETHAMINE 30 MG/ML
15 INJECTION, SOLUTION INTRAMUSCULAR; INTRAVENOUS EVERY 6 HOURS
Status: COMPLETED | OUTPATIENT
Start: 2025-03-03 | End: 2025-03-06

## 2025-03-03 RX ADMIN — GABAPENTIN 100 MG: 100 CAPSULE ORAL at 21:01

## 2025-03-03 RX ADMIN — HYDROMORPHONE HYDROCHLORIDE 0.75 MG: 1 INJECTION, SOLUTION INTRAMUSCULAR; INTRAVENOUS; SUBCUTANEOUS at 17:24

## 2025-03-03 RX ADMIN — SODIUM CHLORIDE, SODIUM LACTATE, POTASSIUM CHLORIDE, CALCIUM CHLORIDE 100 ML/HR: 20; 30; 600; 310 INJECTION, SOLUTION INTRAVENOUS at 14:35

## 2025-03-03 RX ADMIN — ONDANSETRON 4 MG: 2 INJECTION INTRAMUSCULAR; INTRAVENOUS at 17:46

## 2025-03-03 RX ADMIN — SODIUM CHLORIDE 1000 MG: 900 INJECTION INTRAVENOUS at 17:24

## 2025-03-03 RX ADMIN — Medication 10 ML: at 21:03

## 2025-03-03 RX ADMIN — GABAPENTIN 100 MG: 100 CAPSULE ORAL at 06:19

## 2025-03-03 RX ADMIN — FAMOTIDINE 20 MG: 10 INJECTION INTRAVENOUS at 09:13

## 2025-03-03 RX ADMIN — Medication 10 ML: at 09:14

## 2025-03-03 RX ADMIN — HYDROCODONE BITARTRATE AND ACETAMINOPHEN 2 TABLET: 10; 325 TABLET ORAL at 14:33

## 2025-03-03 RX ADMIN — DIAZEPAM 5 MG: 5 INJECTION, SOLUTION INTRAMUSCULAR; INTRAVENOUS at 12:09

## 2025-03-03 RX ADMIN — GABAPENTIN 100 MG: 100 CAPSULE ORAL at 14:29

## 2025-03-03 RX ADMIN — KETOROLAC TROMETHAMINE 15 MG: 30 INJECTION, SOLUTION INTRAMUSCULAR; INTRAVENOUS at 20:50

## 2025-03-03 RX ADMIN — CYCLOBENZAPRINE HYDROCHLORIDE 5 MG: 10 TABLET, FILM COATED ORAL at 20:50

## 2025-03-03 RX ADMIN — FAMOTIDINE 20 MG: 10 INJECTION INTRAVENOUS at 20:50

## 2025-03-03 RX ADMIN — HYDROMORPHONE HYDROCHLORIDE 0.5 MG: 1 INJECTION, SOLUTION INTRAMUSCULAR; INTRAVENOUS; SUBCUTANEOUS at 06:19

## 2025-03-03 RX ADMIN — HYDROCODONE BITARTRATE AND ACETAMINOPHEN 1 TABLET: 10; 325 TABLET ORAL at 03:21

## 2025-03-03 RX ADMIN — ONDANSETRON 4 MG: 2 INJECTION INTRAMUSCULAR; INTRAVENOUS at 10:08

## 2025-03-03 RX ADMIN — HYDROMORPHONE HYDROCHLORIDE 0.5 MG: 1 INJECTION, SOLUTION INTRAMUSCULAR; INTRAVENOUS; SUBCUTANEOUS at 00:52

## 2025-03-03 RX ADMIN — LIDOCAINE 1 PATCH: 4 PATCH TOPICAL at 09:13

## 2025-03-03 NOTE — PROGRESS NOTES
[facility]  INPATIENT PROGRESS NOTE    Patient Name: Yun Blackwell  Date of Admission: 3/2/2025  Today's Date: 03/03/25  Length of Stay: 1  Primary Care Physician: ANGEL Healy MD    Subjective   Chief Complaint: abdominal pain   HPI   No acute events overnight.  POD 1 s/p ventral hernia repair.  Today she is complaining of severe pain.  She states that the Dilaudid works best but wears off quickly.  He has not had a bowel movement yet.    Review of Systems   All pertinent negatives and positives are as above. All other systems have been reviewed and are negative unless otherwise stated.     Objective    Temp:  [97.7 °F (36.5 °C)-98.9 °F (37.2 °C)] 98.3 °F (36.8 °C)  Heart Rate:  [67-98] 77  Resp:  [12-18] 18  BP: ()/(48-92) 113/56  Physical Exam  GEN: Awake, alert, interactive, in NAD on 2L  HEENT: Atraumatic, EOMI, Anicteric  Lungs: CTAB  Heart: RRR, +S1/s2, no rub  ABD: lidocaine patch on mid-abdomen. Small incisions from surgery clean dry. Tenderness to mild palpation.   Extremities: atraumatic, no cyanosis, no edema  Skin: no rashes or lesions  Neuro: AAOx3, no focal deficits  Psych: anxious    Results Review:  I have reviewed the labs, radiology results, and diagnostic studies.    Laboratory Data:   Results from last 7 days   Lab Units 03/03/25  0652 03/02/25  1358   WBC 10*3/mm3 10.55 8.85   HEMOGLOBIN g/dL 12.6 13.5   HEMATOCRIT % 39.6 42.0   PLATELETS 10*3/mm3 170 201        Results from last 7 days   Lab Units 03/03/25  0652 03/02/25  1358   SODIUM mmol/L 136 138   POTASSIUM mmol/L 4.4 4.5   CHLORIDE mmol/L 101 102   CO2 mmol/L 25.0 24.0   BUN mg/dL 11 13   CREATININE mg/dL 0.45* 0.48*   CALCIUM mg/dL 8.8 9.7   BILIRUBIN mg/dL 1.3* 0.6   ALK PHOS U/L 75 96   ALT (SGPT) U/L 13 14   AST (SGOT) U/L 23 27   GLUCOSE mg/dL 133* 127*       Culture Data:   [unfilled]    Radiology Data:   Imaging Results (Last 24 Hours)       Procedure  Component Value Units Date/Time    CT Abdomen Pelvis With Contrast [[id number]] Collected: 03/02/25 1438     Updated: 03/02/25 1449    Narrative:      EXAM/TECHNIQUE: CT abdomen and pelvis with IV contrast     INDICATION: Abdominal pain     COMPARISON: 12/20/2024     DLP: 429.03 mGy.cm. Automated exposure control was utilized to decrease  patient radiation dose.     FINDINGS:     Lung bases are clear. Heart is enlarged and there is dilatation of the  IVC and hepatic veins.     Slightly heterogeneous appearance of the liver. Mild hepatic steatosis.  No focal liver lesion. Multiple calcified gallstones in the gallbladder  lumen. No gallbladder wall thickening or adjacent fat stranding. No  biliary ductal dilatation.     Pancreas appears normal. Spleen is unremarkable. No adrenal gland  nodule.     No solid renal mass. No urolithiasis or hydronephrosis. No focal urinary  bladder wall thickening.      Postoperative change of left hemicolectomy. Colonic diverticulosis  without evidence of diverticulitis. Appendix is not identified and may  be surgically absent.      There is a small bowel obstruction with transition point in a left  paramidline ventral abdominal hernia. The upstream bowel is dilated and  contains fecalized material, measuring up to 4.2 cm diameter (image 19  series 3). There is some mild interloop edema. No pneumatosis, portal  venous gas, or evidence of pneumoperitoneum. Distal esophagus and  stomach are unremarkable.     No ascites or free pelvic fluid. Prior hysterectomy. No pelvic mass or  collection.     Abdominal aorta is nonaneurysmal. No abdominal or pelvic  lymphadenopathy.      There are also periumbilical and right lateral abdominal wall  fat-containing hernias present. No acute osseous finding.       Impression:         Acute small bowel obstruction with transition point in a left  paramidline ventral abdominal wall hernia.              This report was signed and finalized on 3/2/2025 2:46 PM  by Dr. Andrew Gomez MD.               I have reviewed the patient's current medications.     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     Permanent atrial fibrillation        Treatment Plan    # Incarcerated ventral hernia s/p hernia repair 3/2/2025  # SBO 2/2 above  -Continue pain management with Norco and Dilaudid  - Today Valium added by surgery  - Bowel management as needed  - Continue full liquid diet, advance per surgery recommendations  - PT OT    # UTI  U/A: Positive leukocytes, 11-20 RBC, 3-5 WBC, 3+ bacteria  Rocephin started yesterday on admission however not given  - Getting first dose of Rocephin today, planning for 3-day course    # Atrial fibrillation  [id number]  Was on Cardizem and Pradaxa  Received 1 dose of idarucizumab after surgery  - Will resume home Cardizem 120 mg daily, she is no longer n.p.o.  - Continue holding anticoagulation    # History of breast cancer - on tamoxifen daily  # Diabetes mellitus - HgbA1c 5.3%. Monitor out hyperglycemia due to recent steroids prescribed by PCP for neck pain.  # Hypertension -controlled  # Sleep apnea  # Pulmonary hypertension  Echo 1/29/24: EF 51 to 55%.  Severe biatrial enlargement.  Moderate TVR.  No other significant valvular pathology.  - continue CPAP        Medical Decision Making  Number and Complexity of problems: 2 acute moderate complexity, others chronic  Differential Diagnosis: As above    Conditions and Status        New to me.  Improving     MDM Data  External documents reviewed: Reviewed  Cardiac tracing (EKG, telemetry) interpretation: Reviewed  Radiology interpretation: Reviewed  Labs reviewed: As above  Any tests that were considered but not ordered: None     Decision rules/scores evaluated (example  [id number], Wells, etc): [id number] Score: 4 (3/3/2025  5:23 PM)        Discussed with: Patient     Care Planning  Shared decision making: Patient apprised of current labs, vitals, imaging and treatment plan.  They are agreeable with proceeding with plans as discussed.   Code status and discussions: CPR    Disposition  Social Determinants of Health that impact treatment or disposition: None  I expect the patient to be discharged to home in TBD days.         Electronically signed by Jose Raul Archuleta MD, 03/03/25, 07:59 CST.

## 2025-03-03 NOTE — ANESTHESIA POSTPROCEDURE EVALUATION
"Patient: Yun Blackwell    Procedure Summary       Date: 03/02/25 Room / Location:  [facility] OR  /  [facility] OR    Anesthesia Start: 1757 Anesthesia Stop: 2002    Procedure: VENTRAL / INCISIONAL HERNIA REPAIR LAPAROSCOPIC WITH DAVINCI ROBOT (Abdomen) Diagnosis:     Surgeons: Jarrett Bland MD Provider: Jorgito Erazo CRNA    Anesthesia Type: general ASA Status: 3 - Emergent            Anesthesia Type: general    Vitals  Vitals Value Taken Time   BP 95/63 03/02/25 2000   Temp 97.9 °F (36.6 °C) 03/02/25 1957   Pulse 78 03/02/25 2002   Resp 12 03/02/25 1957   SpO2 91 % 03/02/25 2002   Vitals shown include unfiled device data.        Post Anesthesia Care and Evaluation    Patient location during evaluation: PACU  Patient participation: complete - patient participated  Level of consciousness: awake and alert  Pain management: adequate    Airway patency: patent  Anesthetic complications: No anesthetic complications    Cardiovascular status: acceptable  Respiratory status: acceptable  Hydration status: acceptable    Comments: Blood pressure 95/63, pulse 84, temperature 97.9 °F (36.6 °C), temperature source Temporal, resp. rate 12, height 160 cm (63\"), weight 70 kg (154 lb 5.2 oz), SpO2 96%, not currently breastfeeding.    Pt discharged from PACU based on cisco score >8    "

## 2025-03-03 NOTE — PLAN OF CARE
Goal Outcome Evaluation:   Pt is A&O X4; VSS on room air. IV on RAC, LR at 100. Up ad betty. Incision to abdomen X4, steri strips & open to air. Pt c/o nausea, vomiting, and pain/PRN meds given. SCDs. Last BM 03/01/2024. Clear liquid diet. Purewick/voiding. Safety maintained; call light within reach

## 2025-03-03 NOTE — BRIEF OP NOTE
VENTRAL / INCISIONAL HERNIA REPAIR LAPAROSCOPIC WITH DAVINCI ROBOT  Progress Note    Yun Blackwell  3/2/2025    Pre-op Diagnosis:   Incarcerated ventral hernia with obstruction, incarcerated ileostomy site hernia       Post-Op Diagnosis Codes:  Same    Procedure(s):      Procedure(s):  VENTRAL / INCISIONAL HERNIA REPAIR LAPAROSCOPIC WITH DAVINCI ROBOT              Surgeon(s):  Jarrett Bland MD    Anesthesia: General    Staff:   Circulator: Juan Ramon Pool RN  Scrub Person: Beba Ivey; Celi Hdz       Estimated Blood Loss: 10 mL    Urine Voided: * No values recorded between 3/2/2025  5:58 PM and 3/2/2025  7:52 PM *    Specimens:                None      Drains:   NG/OG Tube Nasogastric 18 Fr Right nostril (Active)   Site Assessment Clean;Dry;Intact 03/02/25 1604   Securement taped to nostril center 03/02/25 1604   Secured at (cm) 55 03/02/25 1604   NG/OG Site Interventions Site assessed 03/02/25 1604   Dressing Intervention New dressing 03/02/25 1604   Status Suction-low intermittent 03/02/25 1604   Drainage Appearance Pink tinged 03/02/25 1604   Surrounding Skin Dry;Intact 03/02/25 1604       Findings: 4 cm left paramedian incisional ventral hernia with incarcerated small bowel and mild dilation of the small bowel.  3 cm right lower quadrant ileostomy site incisional ventral hernia with incarcerated omentum.  1 cm umbilical hernia.  Both hernias reduced of its contents.  All hernia defect closed using a #1 Stratafix PDS suture.  All 3 defects were able to be covered excellent overlap using a 15 x 20 cm Ventralight ST mesh.  Surgicel powder applied to all raw surfaces with acceptable hemostasis.      Complications: None immediate       was responsible for performing the following activities: Retraction, Suction, Irrigation, Suturing, and Closing and their skilled assistance was necessary for the success of this case.    Jarrett Bland MD     Date: 3/2/2025  Time: 19:52 CST

## 2025-03-03 NOTE — ANESTHESIA PROCEDURE NOTES
Airway  Urgency: elective    Date/Time: 3/2/2025 6:04 PM  Airway not difficult    General Information and Staff    Patient location during procedure: OR  CRNA/CAA: Jorgito Erazo CRNA    Indications and Patient Condition  Indications for airway management: airway protection    Preoxygenated: yes  Mask difficulty assessment: 0 - not attempted    Final Airway Details  Final airway type: endotracheal airway      Successful airway: ETT  Cuffed: yes   Successful intubation technique: video laryngoscopy and RSI  Facilitating devices/methods: intubating stylet  Endotracheal tube insertion site: oral  Blade: Rodriguez  Blade size: 3  ETT size (mm): 7.0  Cormack-Lehane Classification: grade I - full view of glottis  Placement verified by: chest auscultation and capnometry   Cuff volume (mL): 10  Measured from: lips  ETT/EBT  to lips (cm): 21  Number of attempts at approach: 1  Assessment: lips, teeth, and gum same as pre-op and atraumatic intubation

## 2025-03-03 NOTE — CASE MANAGEMENT/SOCIAL WORK
Discharge Planning Assessment  [facility]     Patient Name: Yun Blackwell  MRN: [id number]  Today's Date: 3/3/2025    Admit Date: 3/2/2025        Discharge Needs Assessment       Row Name 03/03/25 1403       Living Environment    People in Home alone    Current Living Arrangements home    Primary Care Provided by self    Provides Primary Care For no one    Family Caregiver if Needed child(simran), adult    Quality of Family Relationships involved;helpful;supportive    Able to Return to Prior Arrangements yes       Resource/Environmental Concerns    Transportation Concerns none       Transition Planning    Patient/Family Anticipates Transition to home    Patient/Family Anticipated Services at Transition none    Transportation Anticipated family or friend will provide       Discharge Needs Assessment    Readmission Within the Last 30 Days no previous admission in last 30 days    Equipment Currently Used at Home none    Concerns to be Addressed no discharge needs identified;denies needs/concerns at this time    Discharge Coordination/Progress Spoke with patient to complete DC planning. Plans to return home at DC. Has a PCP and Rx coverage.Denies the need for HH or any new DME at this time. Will follow for DC needs.                   Discharge Plan    No documentation.                 Continued Care and Services - Admitted Since 3/2/2025    No active coordination exists for this encounter.       Selected Continued Care - Episodes Includes continued care and service providers with selected services from the active episodes listed below      Chronic Care Management Episode start date: 3/3/2025   There are no active outsourced providers for this episode.                    Demographic Summary    No documentation.                  Functional Status    No documentation.                  Psychosocial    No documentation.                  Abuse/Neglect    No documentation.                  Legal    No documentation.                   Substance Abuse    No documentation.                  Patient Forms    No documentation.                     Sonya Bangura RN

## 2025-03-03 NOTE — TELEPHONE ENCOUNTER
I called pt back just now, her daughter Anne-Marie answered, said she's in the hospital, had unexpected surgery about a bowel blockage. I told her update on phone call she made to us & told her I've made her f/u appt sonia/Rajiv too & id send all of this info in the mail to her. Anne-Marie said ok & thanks.

## 2025-03-03 NOTE — PLAN OF CARE
Problem: Adult Inpatient Plan of Care  Goal: Plan of Care Review  Outcome: Progressing   Goal Outcome Evaluation: Patient is A&O x 4. Patient has c/o abdominal pain r/t surgical procedure but is being treated with pain medication, please see MAR. Patients VS have remained stable throughout the night. At beginning of shift patient was on 3 LPM via NC, but now patient is on room-air. Patient has maintenance IVF infusing at 100 mL/hr. Patient has been very pleasant to take care. Family has been at bedside throughout the night and have been such a big help in the patients care. Call light and personal items in reach.

## 2025-03-03 NOTE — OP NOTE
OPERATIVE NOTE    Patient Name:  Yun Blackwell  YOB: 1955  MRN:  [id number]    Date of Surgery:  3/2/2025    PREOPERATIVE DIAGNOSIS: Incarcerated left paramedian ventral hernia with obstruction, incarcerated right lower quadrant ileostomy site hernia    POSTOPERATIVE DIAGNOSIS:  Same     PROCEDURE(S):  Robotic ventral hernia repair with mesh     ATTENDING SURGEON:  Jarrett Bland MD    ADDITIONAL SURGEON:  None    ASSISTANT(S):   Beba CST, Celi BLEDSOE    SPECIMENS:  None     ANESTHESIA:  General endotracheal anesthesia with TAP block    ESTIMATED BLOOD LOSS: 2 mL    FLUIDS:  1500 mL    WOUND CLASSIFICATION:  Class 1, clean    IMPLANTS: 15 x 20 cm Ventralight ST mesh    DRAINS:  None    FINDINGS:   4 cm left paramedian incisional ventral hernia with incarcerated small bowel and mild dilation of the small bowel.  3 cm right lower quadrant ileostomy site incisional ventral hernia with incarcerated omentum.  1 cm umbilical hernia.  Total defect size equaed 8 cm.  Both hernias reduced of its contents.  All hernia defect closed using a #1 Stratafix PDS suture.  All 3 defects were able to be covered excellent overlap using a 15 x 20 cm Ventralight ST mesh.  Surgicel powder applied to all raw surfaces with acceptable hemostasis.     INDICATIONS: The patient is a 69 y.o. female with a prior history of an ileostomy with subsequent reversal with a left paramedian incisional ventral hernia with incarcerated small bowel causing a small bowel obstruction.  She also has an incidentally discovered ileostomy site incisional ventral hernia with incarcerated fat.  She has had 8 hours of symptoms.  I have recommended emergent exploration and ventral hernia repair to mitigate the morbidity of bowel ischemia.    DESCRIPTION OF PROCEDURE:   The patient was seen in the preoperative holding area and the history and physical exam was updated. Informed consent was obtained from the patient. The patient was taken to the  operating room and placed on the operating room table in the supine position. SCDs were placed on both legs.  Ancef 2 g was ministered for preoperative antibiotics.  General anesthesia was administered and the patient was intubated without difficulty. The patient was prepped and draped in the usual sterile fashion. A full surgical timeout was performed verifying the correct patient, correct procedure and correct site for surgery.     Local anesthesia was infiltrated in the left upper quadrant at Armijo's point. A small skin incision was made using an 11 blade scalpel and a Veress needle was inserted with 2 satisfactory clicks. An excellent saline drop test confirmed correct intra-abdominal placement. Pneumoperitoneum was initiated to 12 mmHg.  Local anesthesia was injected at the usual trocar sites along the left flank. An incision was made in the left hypogastrium and a robotic 8 mm trocar was inserted under direct visualization.  There were omental adhesions throughout the abdominal cavity.  After tedious lysis of adhesions, safe abdominal entry was confirmed after examining the peritoneal cavity. The Veress needle stick site was examined and was hemostatic. The remainder of the trocars were placed under direct visualization.  The robot was docked and instruments were inserted safely.  The remainder of the greater omentum was lysed free.  In the left hemiabdomen, there was a hernia defect measuring 4 cm.  The remainder of the hernia contents were reduced.  There was a 1 cm umbilical hernia without any contents in the defect.  The right sided ileostomy site hernia measured approximately 3 cm.  This contained firmly incarcerated greater omentum.  The surrounding attachments were sharply divided using scissors and the fat was reduced from the hernia with gentle manual external palpation.  Once the abdominal wall was cleared off, pneumoperitoneum was decreased to 8 mmHg and the defects were closed using running #1  STRATAFIX PDS sutures under minimal tension.  The total hernia defect size equaled 8 cm.  A 15 x 20 cm Ventralight ST mesh was used to reinforce the closure in an IPOM fashion using three 3-0 absorbable V-loc sutures circumferentially. The mesh laid nice and flat.  The Echo 2 positioning system was removed along with the sutures.  Once a satisfactory exam had been performed and a preliminary count was correct, pneumoperitoneum was released and the trocars were removed under direct visualization.  The 12 mm trocar site in the left upper quadrant was closed using a 0 Vicryl suture in a figure-of-eight fashion using a Jl Romano transfascial suture passer.  The trocar site incisions were closed using 4-0 Vicryl sutures and dressed with Mastisol and Steri-Strips.    At the completion of the procedure, all sharp, sponge and instrument counts were correct x2. The patient was awoken from anesthesia and extubated in the operating room.  The patient was taken to the postanesthesia care unit in stable condition without any immediate complications.    Jarrett Bland MD  3/2/2025  19:51 CST    Please note that portions of this note were completed with a voice recognition program.

## 2025-03-04 ENCOUNTER — APPOINTMENT (OUTPATIENT)
Dept: GENERAL RADIOLOGY | Facility: HOSPITAL | Age: 70
DRG: 336 | End: 2025-03-04
Payer: MEDICARE

## 2025-03-04 LAB
ANION GAP SERPL CALCULATED.3IONS-SCNC: 6 MMOL/L (ref 5–15)
BASOPHILS # BLD AUTO: 0.01 10*3/MM3 (ref 0–0.2)
BASOPHILS NFR BLD AUTO: 0.1 % (ref 0–1.5)
BUN SERPL-MCNC: 17 MG/DL (ref 8–23)
BUN/CREAT SERPL: 28.3 (ref 7–25)
CALCIUM SPEC-SCNC: 8.1 MG/DL (ref 8.6–10.5)
CHLORIDE SERPL-SCNC: 99 MMOL/L (ref 98–107)
CO2 SERPL-SCNC: 28 MMOL/L (ref 22–29)
CREAT SERPL-MCNC: 0.6 MG/DL (ref 0.57–1)
DEPRECATED RDW RBC AUTO: 48.3 FL (ref 37–54)
EGFRCR SERPLBLD CKD-EPI 2021: 96.1 ML/MIN/1.73
EOSINOPHIL # BLD AUTO: 0.06 10*3/MM3 (ref 0–0.4)
EOSINOPHIL NFR BLD AUTO: 0.8 % (ref 0.3–6.2)
ERYTHROCYTE [DISTWIDTH] IN BLOOD BY AUTOMATED COUNT: 14.2 % (ref 12.3–15.4)
GLUCOSE SERPL-MCNC: 101 MG/DL (ref 65–99)
HCT VFR BLD AUTO: 36.7 % (ref 34–46.6)
HGB BLD-MCNC: 11.7 G/DL (ref 12–15.9)
IMM GRANULOCYTES # BLD AUTO: 0.02 10*3/MM3 (ref 0–0.05)
IMM GRANULOCYTES NFR BLD AUTO: 0.3 % (ref 0–0.5)
LYMPHOCYTES # BLD AUTO: 1.05 10*3/MM3 (ref 0.7–3.1)
LYMPHOCYTES NFR BLD AUTO: 14.6 % (ref 19.6–45.3)
MCH RBC QN AUTO: 29.9 PG (ref 26.6–33)
MCHC RBC AUTO-ENTMCNC: 31.9 G/DL (ref 31.5–35.7)
MCV RBC AUTO: 93.9 FL (ref 79–97)
MONOCYTES # BLD AUTO: 0.72 10*3/MM3 (ref 0.1–0.9)
MONOCYTES NFR BLD AUTO: 10 % (ref 5–12)
NEUTROPHILS NFR BLD AUTO: 5.35 10*3/MM3 (ref 1.7–7)
NEUTROPHILS NFR BLD AUTO: 74.2 % (ref 42.7–76)
PLATELET # BLD AUTO: 135 10*3/MM3 (ref 140–450)
PMV BLD AUTO: 10.9 FL (ref 6–12)
POTASSIUM SERPL-SCNC: 4 MMOL/L (ref 3.5–5.2)
RBC # BLD AUTO: 3.91 10*6/MM3 (ref 3.77–5.28)
SODIUM SERPL-SCNC: 133 MMOL/L (ref 136–145)
WBC NRBC COR # BLD AUTO: 7.21 10*3/MM3 (ref 3.4–10.8)

## 2025-03-04 PROCEDURE — 25010000002 KETOROLAC TROMETHAMINE PER 15 MG: Performed by: SURGERY

## 2025-03-04 PROCEDURE — 80048 BASIC METABOLIC PNL TOTAL CA: CPT | Performed by: STUDENT IN AN ORGANIZED HEALTH CARE EDUCATION/TRAINING PROGRAM

## 2025-03-04 PROCEDURE — 97162 PT EVAL MOD COMPLEX 30 MIN: CPT

## 2025-03-04 PROCEDURE — 99231 SBSQ HOSP IP/OBS SF/LOW 25: CPT | Performed by: NURSE PRACTITIONER

## 2025-03-04 PROCEDURE — 74018 RADEX ABDOMEN 1 VIEW: CPT

## 2025-03-04 PROCEDURE — 97116 GAIT TRAINING THERAPY: CPT

## 2025-03-04 PROCEDURE — 25810000003 LACTATED RINGERS PER 1000 ML: Performed by: FAMILY MEDICINE

## 2025-03-04 PROCEDURE — 85025 COMPLETE CBC W/AUTO DIFF WBC: CPT | Performed by: STUDENT IN AN ORGANIZED HEALTH CARE EDUCATION/TRAINING PROGRAM

## 2025-03-04 PROCEDURE — 25010000002 CEFTRIAXONE PER 250 MG: Performed by: STUDENT IN AN ORGANIZED HEALTH CARE EDUCATION/TRAINING PROGRAM

## 2025-03-04 RX ORDER — DOCUSATE SODIUM 100 MG/1
100 CAPSULE, LIQUID FILLED ORAL 2 TIMES DAILY
Status: DISCONTINUED | OUTPATIENT
Start: 2025-03-04 | End: 2025-03-08 | Stop reason: HOSPADM

## 2025-03-04 RX ORDER — POLYETHYLENE GLYCOL 3350 17 G/17G
17 POWDER, FOR SOLUTION ORAL DAILY
Status: DISCONTINUED | OUTPATIENT
Start: 2025-03-04 | End: 2025-03-08 | Stop reason: HOSPADM

## 2025-03-04 RX ORDER — TAMOXIFEN CITRATE 20 MG/1
20 TABLET ORAL DAILY
Status: DISCONTINUED | OUTPATIENT
Start: 2025-03-04 | End: 2025-03-08 | Stop reason: HOSPADM

## 2025-03-04 RX ADMIN — KETOROLAC TROMETHAMINE 15 MG: 30 INJECTION, SOLUTION INTRAMUSCULAR; INTRAVENOUS at 13:06

## 2025-03-04 RX ADMIN — DILTIAZEM HYDROCHLORIDE 120 MG: 120 CAPSULE, EXTENDED RELEASE ORAL at 08:59

## 2025-03-04 RX ADMIN — CYCLOBENZAPRINE HYDROCHLORIDE 5 MG: 10 TABLET, FILM COATED ORAL at 08:56

## 2025-03-04 RX ADMIN — SODIUM CHLORIDE, SODIUM LACTATE, POTASSIUM CHLORIDE, CALCIUM CHLORIDE 100 ML/HR: 20; 30; 600; 310 INJECTION, SOLUTION INTRAVENOUS at 00:49

## 2025-03-04 RX ADMIN — FAMOTIDINE 20 MG: 10 INJECTION INTRAVENOUS at 20:54

## 2025-03-04 RX ADMIN — GABAPENTIN 100 MG: 100 CAPSULE ORAL at 05:56

## 2025-03-04 RX ADMIN — SODIUM CHLORIDE 1000 MG: 900 INJECTION INTRAVENOUS at 16:32

## 2025-03-04 RX ADMIN — CYCLOBENZAPRINE HYDROCHLORIDE 5 MG: 10 TABLET, FILM COATED ORAL at 16:32

## 2025-03-04 RX ADMIN — GABAPENTIN 100 MG: 100 CAPSULE ORAL at 20:54

## 2025-03-04 RX ADMIN — KETOROLAC TROMETHAMINE 15 MG: 30 INJECTION, SOLUTION INTRAMUSCULAR; INTRAVENOUS at 20:54

## 2025-03-04 RX ADMIN — DOCUSATE SODIUM 100 MG: 100 CAPSULE, LIQUID FILLED ORAL at 20:54

## 2025-03-04 RX ADMIN — KETOROLAC TROMETHAMINE 15 MG: 30 INJECTION, SOLUTION INTRAMUSCULAR; INTRAVENOUS at 08:53

## 2025-03-04 RX ADMIN — CYCLOBENZAPRINE HYDROCHLORIDE 5 MG: 10 TABLET, FILM COATED ORAL at 20:55

## 2025-03-04 RX ADMIN — FAMOTIDINE 20 MG: 10 INJECTION INTRAVENOUS at 09:04

## 2025-03-04 RX ADMIN — GABAPENTIN 100 MG: 100 CAPSULE ORAL at 13:06

## 2025-03-04 RX ADMIN — Medication 10 ML: at 09:06

## 2025-03-04 RX ADMIN — KETOROLAC TROMETHAMINE 15 MG: 30 INJECTION, SOLUTION INTRAMUSCULAR; INTRAVENOUS at 02:12

## 2025-03-04 RX ADMIN — POLYETHYLENE GLYCOL 3350 17 G: 17 POWDER, FOR SOLUTION ORAL at 16:32

## 2025-03-04 RX ADMIN — TAMOXIFEN CITRATE 20 MG: 20 TABLET ORAL at 10:35

## 2025-03-04 RX ADMIN — LIDOCAINE 1 PATCH: 4 PATCH TOPICAL at 09:02

## 2025-03-04 NOTE — PLAN OF CARE
Goal Outcome Evaluation:  Plan of Care Reviewed With: patient        Progress: no change  Outcome Evaluation: PT eval completed.  Pt pleasant and agreeable to therapy.  Oriented X 4 and very interactive w/ therapist.  Pt reports really no pain at rest, however reports just w/ mvmt for incisional assessment, her pain increases.  Pt motivated to begin out of bed activity.  Pt performed bed mobility w/ min assist, tfers w/ min assist.  Stood 3 times to assist w/ hygiene and changing of brief/gown.  Pt w/ 1 posterior loss of balance in standing requiring assist to recover.  Pt performed gait w/ HH support tolerating ~ 40 ft requiring 2 standing rest.  Pt w/ increase c/os pain w/ out of bed activity.  Gaurded mobility w/ decrease gait speed, step length and foot clearance.  Pt w/ increase fall risk requiring assist and UE support.  Provided pt w/ rwx in room for continued use w/ out of bed activity w/ staff assist.  Pt education for aps and encouraged continue use of IS.  Will follow for progress and needs.  Anticipate pt's pain to improve increasing tolerance to activity and improving her I and reducing her fall risk.  Discharge plan pending progress.  If pt continues to require some assist, she may benefit from short term subacute care stay prior to returning home.    Anticipated Discharge Disposition (PT): home with assist

## 2025-03-04 NOTE — PROGRESS NOTES
[facility] Medicine Services  INPATIENT PROGRESS NOTE    Patient Name: Yun Blackwell  Date of Admission: 3/2/2025  Today's Date: 03/04/25  Length of Stay: 2  Primary Care Physician: ANGEL Healy MD    Subjective   Chief Complaint: abdominal pain      No acute events overnight.  POD 2 s/p ventral hernia repair.  Seen lying in bed comfortably.  She reports her pain is a lot better today.  She worked with physical therapy and was able to walk to the door and back, felt like she was limited due to pain with movement.  She has not had a bowel movement yet.    Review of Systems   All pertinent negatives and positives are as above. All other systems have been reviewed and are negative unless otherwise stated.     Objective    Temp:  [97.5 °F (36.4 °C)-98.6 °F (37 °C)] 98.4 °F (36.9 °C)  Heart Rate:  [] 97  Resp:  [15-18] 16  BP: (100-146)/(59-72) 100/60  Physical Exam  GEN: Awake, alert, interactive, in NAD on 2L  HEENT: Atraumatic, EOMI, Anicteric  Lungs: CTAB  Heart: RRR, +S1/s2, no rub  ABD:  Small incisions from surgery clean dry. Tenderness to mild palpation.  Bowel sounds heard on LUQ, but overall under active.  Extremities: atraumatic, no cyanosis, no edema  Skin: no rashes or lesions  Neuro: AAOx3, no focal deficits  Psych: Good mood, normal affect    Results Review:  I have reviewed the labs, radiology results, and diagnostic studies.    Laboratory Data:   Results from last 7 days   Lab Units 03/03/25  0652 03/02/25  1358   WBC 10*3/mm3 10.55 8.85   HEMOGLOBIN g/dL 12.6 13.5   HEMATOCRIT % 39.6 42.0   PLATELETS 10*3/mm3 170 201        Results from last 7 days   Lab Units 03/03/25  0652 03/02/25  1358   SODIUM mmol/L 136 138   POTASSIUM mmol/L 4.4 4.5   CHLORIDE mmol/L 101 102   CO2 mmol/L 25.0 24.0   BUN mg/dL 11 13   CREATININE mg/dL 0.45* 0.48*   CALCIUM mg/dL 8.8 9.7   BILIRUBIN mg/dL 1.3* 0.6   ALK PHOS U/L 75 96   ALT (SGPT) U/L 13 14   AST (SGOT) U/L 23  27   GLUCOSE mg/dL 133* 127*       Culture Data:   [unfilled]    Radiology Data:   Imaging Results (Last 24 Hours)       ** No results found for the last 24 hours. **            I have reviewed the patient's current medications.     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     Permanent atrial fibrillation        Treatment Plan    # Incarcerated ventral hernia s/p hernia repair 3/2/2025  # SBO 2/2 above  - Continue pain management with Norco and Dilaudid  -  Valium added yesterday by surgery  - Bowel management as needed  - Continue full liquid diet, advance per surgery recommendations  - PT/OT    # UTI  U/A: Positive leukocytes, 11-20 RBC, 3-5 WBC, 3+ bacteria  Rocephin started yesterday on admission however not given  - continue rocephin day 2/3    # Atrial fibrillation  [id number] 4  Was on Cardizem and Pradaxa  Received 1 dose of idarucizumab after surgery  - Continue home Cardizem 120 mg daily  - Continue holding anticoagulation     # History of breast cancer - on tamoxifen daily  # Diabetes mellitus - HgbA1c 5.3%. Monitor hyperglycemia due to recent steroids prescribed by PCP for neck pain.  Hyperglycemia improving  # Hypertension -controlled  # Sleep apnea  # Pulmonary hypertension  Echo 1/29/24: EF 51 to 55%.  Severe biatrial enlargement.  Moderate TVR.  No other significant valvular pathology.  - continue CPAP        Medical Decision Making  Number and Complexity of problems: 2 acute moderate complexity, others chronic  Differential Diagnosis: As above    Conditions and Status        Improving     MDM Data  External documents reviewed: Reviewed  Cardiac tracing (EKG, telemetry) interpretation: Reviewed  Radiology interpretation: Reviewed  Labs reviewed:  As above  Any tests that were considered but not ordered: None     Decision rules/scores evaluated (example [id number], Wells, etc): [id number] Score: 4 (3/3/2025  5:23 PM)        Discussed with: Patient     Care Planning  Shared decision making: Patient apprised of current labs, vitals, imaging and treatment plan.  They are agreeable with proceeding with plans as discussed.   Code status and discussions: CPR    Disposition  Social Determinants of Health that impact treatment or disposition: None  I expect the patient to be discharged to home in TBD days.         Electronically signed by Jose Raul Archuleta MD, 03/04/25, 10:15 CST.

## 2025-03-04 NOTE — PLAN OF CARE
Problem: Adult Inpatient Plan of Care  Goal: Plan of Care Review  Outcome: Progressing     Problem: Adult Inpatient Plan of Care  Goal: Absence of Hospital-Acquired Illness or Injury  Intervention: Identify and Manage Fall Risk  Recent Flowsheet Documentation  Taken 3/4/2025 0403 by Kalina Ortega RN  Safety Promotion/Fall Prevention: safety round/check completed  Taken 3/4/2025 0212 by Kalina Ortega RN  Safety Promotion/Fall Prevention: safety round/check completed  Taken 3/4/2025 0011 by Kalina Ortega RN  Safety Promotion/Fall Prevention: safety round/check completed  Taken 3/3/2025 2204 by Kalina Ortega RN  Safety Promotion/Fall Prevention: safety round/check completed  Taken 3/3/2025 2101 by Kalina Ortega RN  Safety Promotion/Fall Prevention: safety round/check completed  Taken 3/3/2025 2050 by Kalina Ortega RN  Safety Promotion/Fall Prevention: safety round/check completed  Taken 3/3/2025 1917 by Kalina Ortega RN  Safety Promotion/Fall Prevention: safety round/check completed  Intervention: Prevent Skin Injury  Recent Flowsheet Documentation  Taken 3/4/2025 0403 by Kalina Ortega RN  Body Position: position changed independently  Taken 3/4/2025 0212 by Kalina Ortega RN  Body Position: position changed independently  Taken 3/4/2025 0011 by Kalina Ortega RN  Body Position: position changed independently  Taken 3/3/2025 2204 by Kalina Ortega RN  Body Position: position changed independently  Taken 3/3/2025 2101 by Kalina Ortega RN  Body Position: position changed independently  Taken 3/3/2025 2050 by Kalina Ortega RN  Body Position: position changed independently  Taken 3/3/2025 1917 by Kalina Ortega RN  Body Position: position changed independently  Intervention: Prevent and Manage VTE (Venous Thromboembolism) Risk  Recent Flowsheet Documentation  Taken 3/3/2025 2050 by Kalina Ortega RN  VTE Prevention/Management: SCDs (sequential compression devices)  on  Goal: Optimal Comfort and Wellbeing  Intervention: Monitor Pain and Promote Comfort  Recent Flowsheet Documentation  Taken 3/3/2025 2050 by Kalina Ortega RN  Pain Management Interventions: pain medication given  Intervention: Provide Person-Centered Care  Recent Flowsheet Documentation  Taken 3/3/2025 2050 by Kalina Ortega RN  Trust Relationship/Rapport:   care explained   choices provided   emotional support provided   empathic listening provided   questions answered   reassurance provided   thoughts/feelings acknowledged   questions encouraged     Problem: Pain Acute  Goal: Optimal Pain Control and Function  Intervention: Develop Pain Management Plan  Recent Flowsheet Documentation  Taken 3/3/2025 2050 by Kalina Ortega RN  Pain Management Interventions: pain medication given     Problem: Mobility Impairment  Goal: Optimal Mobility  Intervention: Optimize Mobility  Recent Flowsheet Documentation  Taken 3/3/2025 2050 by Kalina Ortega RN  Activity Management: activity encouraged     Problem: Fall Injury Risk  Goal: Absence of Fall and Fall-Related Injury  Intervention: Promote Injury-Free Environment  Recent Flowsheet Documentation  Taken 3/4/2025 0403 by Kalina Ortega RN  Safety Promotion/Fall Prevention: safety round/check completed  Taken 3/4/2025 0212 by Kalina Ortega RN  Safety Promotion/Fall Prevention: safety round/check completed  Taken 3/4/2025 0011 by Kalina Ortega RN  Safety Promotion/Fall Prevention: safety round/check completed  Taken 3/3/2025 2204 by Kalina Ortega RN  Safety Promotion/Fall Prevention: safety round/check completed  Taken 3/3/2025 2101 by Kalina Ortega RN  Safety Promotion/Fall Prevention: safety round/check completed  Taken 3/3/2025 2050 by Kalina Ortega RN  Safety Promotion/Fall Prevention: safety round/check completed  Taken 3/3/2025 1917 by Kalina Ortega RN  Safety Promotion/Fall Prevention: safety round/check completed     Problem:  Adult Inpatient Plan of Care  Goal: Absence of Hospital-Acquired Illness or Injury  Intervention: Prevent Skin Injury  Recent Flowsheet Documentation  Taken 3/4/2025 0403 by Kalina Ortega RN  Body Position: position changed independently  Taken 3/4/2025 0212 by Kalina Ortega RN  Body Position: position changed independently  Taken 3/4/2025 0011 by Kalina Ortega RN  Body Position: position changed independently  Taken 3/3/2025 2204 by Kalina Ortega RN  Body Position: position changed independently  Taken 3/3/2025 2101 by Kalina Ortega RN  Body Position: position changed independently  Taken 3/3/2025 2050 by Kalina Ortega RN  Body Position: position changed independently  Taken 3/3/2025 1917 by Kalina Ortega RN  Body Position: position changed independently   Goal Outcome Evaluation:

## 2025-03-04 NOTE — PROGRESS NOTES
"       GENERAL SURGERY INPATIENT PROGRESS NOTE    Patient Name:  Yun Blackwell  YOB: 1955  MRN: [id number]    SUBJECTIVE    Complaining of quite a bit of pain this morning, that is not adequately controlled on current pain regimen.  She is tolerating a clear liquid diet, but not feeling very hungry and experiencing nausea.    Allergies:  Allergies   Allergen Reactions    Morphine And Codeine Nausea And Vomiting and Dizziness    Percocet [Oxycodone-Acetaminophen] Nausea And Vomiting and Dizziness       Medications:  cefTRIAXone, 1,000 mg, Intravenous, Q24H  diazePAM, 5 mg, Intravenous, Q8H  [START ON 3/4/2025] dilTIAZem CD, 120 mg, Oral, Daily  famotidine, 20 mg, Intravenous, BID  gabapentin, 100 mg, Oral, Q8H  Lidocaine, 1 patch, Transdermal, Q24H  sodium chloride, 10 mL, Intravenous, Once   Followed by  sodium chloride, 10 mL, Intravenous, Once  sodium chloride, 10 mL, Intravenous, Q12H    lactated ringers, 100 mL/hr, Last Rate: 100 mL/hr (03/03/25 1435)        OBJECTIVE    VITAL SIGNS  /65 (BP Location: Right arm, Patient Position: Lying)   Pulse 95   Temp 97.5 °F (36.4 °C) (Oral)   Resp 16   Ht 160 cm (63\")   Wt 70 kg (154 lb 5.2 oz)   SpO2 95%   BMI 27.34 kg/m²     Intake/Output Summary (Last 24 hours) at 3/3/2025 1856  Last data filed at 3/3/2025 1738  Gross per 24 hour   Intake 1000 ml   Output 1760 ml   Net -760 ml       PHYSICAL EXAM    General: Uncomfortable appearing elderly female, sitting up in bed, in no acute distress.  HEENT:  NCAT, PERRL, EOM intact bilaterally, hearing intact, nares patent  Heart:  Regular rate, normotensive, no JVD bilaterally  Lungs:  Normal respiratory effort, regular respiratory rate, no wheezing  Abdomen: Flat, soft, appropriately tender at her incisions which are clean dry and intact, no evidence of recurrent hernias.  Extremities:  No cyanosis, clubbing or edema.   Musculoskeletal:  Normal development of bilateral upper extremities. Normal " development of bilateral lower extremities.  Range of motion intact.  No evidence of trauma.  Skin:  No rashes, ecchymosis or jaundice. Dry and non-diaphoretic. Skin color is consistent with ethnicity.    RESULTS    Labs:  I personally reviewed all pertinent labs.   Imaging:  I personally reviewed all pertinent imaging studies.   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  Still awaiting return of bowel function.  I will advance her to a full liquid diet.  Frequent ambulation, incentive spirometry.  General surgery will continue to follow.    I discussed the patient's findings and my recommendations with the patient/family, as well as the primary care team.    Jarrett Bland MD, FACS  General Surgery  3/3/2025  18:56 CST    Please note that portions of this note were completed with a voice recognition program.

## 2025-03-04 NOTE — THERAPY EVALUATION
Patient Name: Yun Blackwell  : 1955    MRN: [id number]                              Today's Date: 3/4/2025       Admit Date: 3/2/2025    Visit Dx:     ICD-10-CM ICD-9-CM   1. Small bowel obstruction  K56.609 560.9   2. 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 breast    Precordial pain    COVID-19 vaccine dose declined    LEROY on CPAP    Prediabetes    Nonunion after arthrodesis    Hammertoe of left foot    BRBPR (bright red blood per rectum)    Full incontinence of feces    Abnormal mammogram    CPAP (continuous positive airway pressure) dependence    Difficulty with CPAP nasal mask use    Personal history of breast cancer    Mammographic microcalcification    Genetic susceptibility to other malignant neoplasm    Dry mouth    GI bleeding    SBO (small bowel obstruction)    Incarcerated ventral hernia     Past Medical History:   Diagnosis Date    A-fib     Abnormal ECG     tachycardia, a fib    Acid reflux     Anemia     Arthritis     Cancer     Diabetes mellitus     Hyperkalemia     Hyperlipidemia     Hypertension     Hyponatremia     IBS (irritable bowel syndrome)     PONV (postoperative nausea and vomiting)     Sleep apnea     USES CPAP    UTI (urinary tract infection)     UTI (urinary tract infection) 2016    Vaginal vault prolapse      Past Surgical History:   Procedure Laterality Date    BREAST BIOPSY Left     X2     BUNIONECTOMY Left     CARDIAC CATHETERIZATION      CARDIAC CATHETERIZATION N/A 02/05/2021    Procedure: Right Heart Cath;  Surgeon: Van Marcelino MD;  Location:  [facility];  Service: Cardiology;  Laterality: N/A;    CATARACT EXTRACTION, BILATERAL      COLON SURGERY      COLONOSCOPY  09/21/2015    TRANSVERSE COLON ADENOMATOUS POLYP, HEMORRHOIDS, RECALL 5 YEARS, DR LAMAS    COLONOSCOPY N/A 11/13/2020    6 mm tubular adenomatous polyp at 15 cm proximal to the anus,diverticulosis of the sigmoid colon    COLONOSCOPY N/A 12/13/2023    - One 4 mm polyp at 40 cm proximal to the anus,. - One 4 mm polyp at 20 cm proximal to the anus, removed with a cold snare. Resected and retrieved. - Diverticulosis in the sigmoid colon. - Hemorrhoids----tubular adenoma no evidence of high grade dysplasia    COLONOSCOPY N/A 12/22/2024    Dr. Ludwig-- The examined portion of the ileum was normal. - Diverticulosis in the entire examined colon. - Non-bleeding internal hemorrhoids. - No specimens collected.    COLOSTOMY      COLPOPEXY VAGINAL      2014    ENDOSCOPY N/A 11/03/2016    LA GRADE B ESOPHAGITIS WITH NO BLEEDING UPPER THIRD OF ESOPHAGUS, GERI-[place]TH TEAR GEJ, BILIOUS BLOOD TINGED COFFEE GROUND GATRIC FLUIDDR [facility]    ENDOSCOPY N/A 12/13/2023    Normal examined duodenum. - Normal stomach. Biopsied. - Z-line regular, 35 cm from the incisors. Dilated.-neg for h pylori    EXPLORATORY LAPAROTOMY N/A 10/14/2016    Procedure: LAPAROTOMY EXPLORATORY, LYSIS OF ADHESIONS, IRRIAGATION OF ABDOMEN, POSSIBLE BOWEL RESECTION;  Surgeon: Bacilio Marcial MD;  Location:  [facility];  Service:     FOOT NAVICULAR EXCISION OR BONE GRAFT Left 01/04/2023    Procedure: Expansion Graft, Harvesting of Bone Graft/Bone Marrow Aspirate;  Surgeon: Frankie Zapata DPM;  Location:  [facility];  Service: Podiatry;  Laterality: Left;    HAMMER TOE REPAIR Left 01/04/2023    Procedure: Hammertoe Repair 2 and 3 - Left Foot;  Surgeon: Benny  Frankie DUMONT DPM;  Location:  [facility];  Service: Podiatry;  Laterality: Left;    HARDWARE REMOVAL Left 01/04/2023    Procedure: Hardware Removal;  Surgeon: Frankie Zapata DPM;  Location:  [facility];  Service: Podiatry;  Laterality: Left;    HYSTERECTOMY      REVISION / TAKEDOWN COLOSTOMY      SACROCOLPOPEXY N/A 09/21/2016    Procedure: SACROCOLPOPEXY LAPAROSCOPIC WITH DAVINCI SI ROBOT, CONVERTED TO OPEN SACROCOLPOPEXY, RIGHT SALPINGO- OOPHERECTOMY, CYSTO;  Surgeon: Maribell Beth MD;  Location:  [facility];  Service:     TOE FUSION Left 01/06/2022    Procedure: FIRST METATARSOPHALANGEAL JOINT ARTHRODESIS WITH INTERNAL FIXATION - LEFT FOOT;  Surgeon: Jorgito Red DPM;  Location:  [facility];  Service: Podiatry;  Laterality: Left;    TOE FUSION Left 01/04/2023    Procedure: Revisional 1st Metatarsophalangeal Joint Arthrodesis with Hardware Removal and Expansion Graft, Harvesting of Bone Graft/Bone Marrow Aspirate, Hammertoe Repair 2 and 3 - Left Foot;  Surgeon: Frankie Zapata DPM;  Location:  [facility];  Service: Podiatry;  Laterality: Left;    VENTRAL HERNIA REPAIR N/A 3/2/2025    Procedure: VENTRAL / INCISIONAL HERNIA REPAIR LAPAROSCOPIC WITH DAVINCI ROBOT;  Surgeon: Jarrett Bland MD;  Location:  [facility];  Service: Robotics - DaVinci;  Laterality: N/A;      General Information       Row Name 03/04/25 0854          Physical Therapy Time and Intention    Document Type evaluation;other (see comments)  see MAR  -JE     Mode of Treatment physical therapy  -       Row Name 03/04/25 3921          General Information    Patient Profile Reviewed yes  -JE     Prior Level of Function independent:;all household mobility;community mobility;ADL's;home management;driving;shopping;using stairs  -JE     Existing Precautions/Restrictions fall;other (see comments);oxygen therapy device and L/min  abdominal surgery  -JE     Barriers to Rehab medically complex;physical barrier  -JE       Row Name 03/04/25 7288          Living  Environment    People in Home alone  -       Row Name 03/04/25 0851          Home Main Entrance    Number of Stairs, Main Entrance two  -     Stair Railings, Main Entrance railings on both sides of stairs  -       Row Name 03/04/25 0851          Stairs Within Home, Primary    Number of Stairs, Within Home, Primary none  -       Row Name 03/04/25 0851          Cognition    Orientation Status (Cognition) oriented x 4  -       Row Name 03/04/25 0851          Safety Issues/Impairments Affecting Functional Mobility    Safety Issues Affecting Function (Mobility) at risk behavior observed;friction/shear risk;safety precaution awareness  -     Impairments Affecting Function (Mobility) balance;endurance/activity tolerance;strength;shortness of breath;pain  -               User Key  (r) = Recorded By, (t) = Taken By, (c) = Cosigned By      Initials Name Provider Type    Mary Glover, PT Physical Therapist                   Mobility       Row Name 03/04/25 0920          Bed Mobility    Bed Mobility rolling left;sidelying-sit  -     Rolling Left [place] (Bed Mobility) moderate assist (50% patient effort);verbal cues  -     Sidelying-Sit [place] (Bed Mobility) moderate assist (50% patient effort);verbal cues  -     Assistive Device (Bed Mobility) head of bed elevated;bed rails  -     Comment, (Bed Mobility) increase time and effort  -       Row Name 03/04/25 0920          Sit-Stand Transfer    Sit-Stand [place] (Transfers) minimum assist (75% patient effort);verbal cues  -       Row Name 03/04/25 0920          Gait/Stairs (Locomotion)    [place] Level (Gait) minimum assist (75% patient effort);verbal cues  -     Assistive Device (Gait) other (see comments)  HHA  -     Distance in Feet (Gait) 40  -JE     Deviations/Abnormal Patterns (Gait) gait speed decreased;base of support, narrow;stride length decreased  -     Bilateral Gait Deviations forward flexed posture;heel  strike decreased  -     Comment, (Gait/Stairs) gaurded mobility  -               User Key  (r) = Recorded By, (t) = Taken By, (c) = Cosigned By      Initials Name Provider Type    Mary Glover, PT Physical Therapist                   Obj/Interventions       Row Name 03/04/25 0932          Range of Motion Comprehensive    Comment, General Range of Motion moves all 4 extremities functional for tasks, however very gaurded due to pain w/ activity at L side of abdomen  -       Row Name 03/04/25 0932          Strength Comprehensive (MMT)    Comment, General Manual Muscle Testing (MMT) Assessment no formal strength assesment, however moves extremities to complete functional tasks, however gaurded w/ c/os pain  -       Row Name 03/04/25 0932          Balance    Balance Assessment sitting static balance;sitting dynamic balance;standing static balance;standing dynamic balance  -     Static Sitting Balance standby assist  -     Dynamic Sitting Balance standby assist;contact guard  -     Position, Sitting Balance supported;sitting edge of bed  -     Static Standing Balance minimal assist;contact guard;verbal cues  -     Dynamic Standing Balance minimal assist;verbal cues  -     Position/Device Used, Standing Balance supported;other (see comments)  HHA  -       Row Name 03/04/25 0932          Sensory Assessment (Somatosensory)    Sensory Assessment (Somatosensory) sensation intact  -               User Key  (r) = Recorded By, (t) = Taken By, (c) = Cosigned By      Initials Name Provider Type    Mary Glover, PT Physical Therapist                   Goals/Plan       Row Name 03/04/25 0920          Bed Mobility Goal 1 (PT)    Activity/Assistive Device (Bed Mobility Goal 1, PT) rolling to left;rolling to right;scooting;sidelying to sit/sit to sidelying  -     [place] Level/Cues Needed (Bed Mobility Goal 1, PT) standby assist;modified independence  -     Time Frame (Bed Mobility Goal 1,  PT) long term goal (LTG);10 days  -JE     Progress/Outcomes (Bed Mobility Goal 1, PT) goal ongoing  -       Row Name 03/04/25 0920          Transfer Goal 1 (PT)    Activity/Assistive Device (Transfer Goal 1, PT) sit-to-stand/stand-to-sit;bed-to-chair/chair-to-bed;other (see comments)  rwx for bed to/from chair  -JE     [place] Level/Cues Needed (Transfer Goal 1, PT) standby assist  -JE     Time Frame (Transfer Goal 1, PT) long term goal (LTG);10 days  -JE     Progress/Outcome (Transfer Goal 1, PT) goal ongoing  -       Row Name 03/04/25 0920          Gait Training Goal 1 (PT)    Activity/Assistive Device (Gait Training Goal 1, PT) gait (walking locomotion);assistive device use;decrease fall risk;improve balance and speed;increase endurance/gait distance;walker, rolling  -JE     [place] Level (Gait Training Goal 1, PT) standby assist  -JE     Distance (Gait Training Goal 1, PT) 200 ft  -JE     Time Frame (Gait Training Goal 1, PT) long term goal (LTG);10 days  -JE     Progress/Outcome (Gait Training Goal 1, PT) goal ongoing  -       Row Name 03/04/25 0920          Stairs Goal 1 (PT)    Activity/Assistive Device (Stairs Goal 1, PT) ascending stairs;descending stairs;using handrail, left;using handrail, right;step-to-step;decrease fall risk;improve balance and speed  -JE     [place] Level/Cues Needed (Stairs Goal 1, PT) contact guard required  -     Number of Stairs (Stairs Goal 1, PT) 2  -JE     Time Frame (Stairs Goal 1, PT) long term goal (LTG);10 days  -JE     Progress/Outcome (Stairs Goal 1, PT) goal ongoing  -       Row Name 03/04/25 0920          Therapy Assessment/Plan (PT)    Planned Therapy Interventions (PT) balance training;bed mobility training;gait training;patient/family education;postural re-education;stair training;transfer training;other (see comments)  safety/falls prevention, skin protection/pressure relief  -               User Key  (r) = Recorded By, (t) = Taken By, (c)  = Cosigned By      Initials Name Provider Type    Mary Glover, PT Physical Therapist                   Clinical Impression       Row Name 03/04/25 0920          Pain    Pretreatment Pain Rating 0/10 - no pain  -     Posttreatment Pain Rating 6/10  -     Pain Location abdomen  -JE     Pain Side/Orientation left  -     Pain Management Interventions activity modification encouraged;breathing exercises utilized;exercise or physical activity utilized;nursing notified  -     Response to Pain Interventions activity participation with increased pain  -     Pre/Posttreatment Pain Comment reports she feels the pain when she turns to have incisions assessed  -       Row Name 03/04/25 0920          Plan of Care Review    Plan of Care Reviewed With patient  -     Progress no change  -     Outcome Evaluation PT eval completed.  Pt pleasant and agreeable to therapy.  Oriented X 4 and very interactive w/ therapist.  Pt reports really no pain at rest, however reports just w/ mvmt for incisional assessment, her pain increases.  Pt motivated to begin out of bed activity.  Pt performed bed mobility w/ min assist, tfers w/ min assist.  Stood 3 times to assist w/ hygiene and changing of brief/gown.  Pt w/ 1 posterior loss of balance in standing requiring assist to recover.  Pt performed gait w/ HH support tolerating ~ 40 ft requiring 2 standing rest.  Pt w/ increase c/os pain w/ out of bed activity.  Gaurded mobility w/ decrease gait speed, step length and foot clearance.  Pt w/ increase fall risk requiring assist and UE support.  Provided pt w/ rwx in room for continued use w/ out of bed activity w/ staff assist.  Pt education for aps and encouraged continue use of IS.  Will follow for progress and needs.  Anticipate pt's pain to improve increasing tolerance to activity and improving her I and reducing her fall risk.  Discharge plan pending progress.  If pt continues to require some assist, she may benefit from  short term subacute care stay prior to returning home.  -       Row Name 03/04/25 0920          Therapy Assessment/Plan (PT)    Patient/Family Therapy Goals Statement (PT) decrease pain, improve mobility, surgical healing  -JE     Rehab Potential (PT) good  -JE     Criteria for Skilled Interventions Met (PT) yes;meets criteria;skilled treatment is necessary  -JE     Therapy Frequency (PT) 2 times/day  -JE     Predicted Duration of Therapy Intervention (PT) until discharge or goals achieved  -       Row Name 03/04/25 0920          Vital Signs    Posttreatment Heart Rate (beats/min) 85  -JE     Pre SpO2 (%) 99  -JE     O2 Delivery Pre Treatment room air  -JE     Intra SpO2 (%) 92  -JE     Post SpO2 (%) 98  -JE     O2 Delivery Post Treatment nasal cannula  -JE     Pre Patient Position Supine  -JE     Intra Patient Position Standing  -JE     Post Patient Position Sitting  -       Row Name 03/04/25 0920          Positioning and Restraints    Pre-Treatment Position in bed  -JE     Post Treatment Position chair  -JE     In Chair notified nsg;reclined;call light within reach;encouraged to call for assist;with nsg;legs elevated  -               User Key  (r) = Recorded By, (t) = Taken By, (c) = Cosigned By      Initials Name Provider Type    Mary Glover, PT Physical Therapist                   Outcome Measures       Row Name 03/04/25 0920 03/04/25 0830       How much help from another person do you currently need...    Turning from your back to your side while in flat bed without using bedrails? 3  -JE 4  -KH    Moving from lying on back to sitting on the side of a flat bed without bedrails? 2  -JE 3  -KH    Moving to and from a bed to a chair (including a wheelchair)? 3  -JE 3  -KH    Standing up from a chair using your arms (e.g., wheelchair, bedside chair)? 3  -JE 3  -KH    Climbing 3-5 steps with a railing? 2  -JE 2  -KH    To walk in hospital room? 3  -JE 3  -KH    AM-PAC 6 Clicks Score (PT) 16  -JE 18   -PHUONG    Highest Level of Mobility Goal 5 --> Static standing  -EMILEE 6 --> Walk 10 steps or more  -      Row Name 03/04/25 0920          Functional Assessment    Outcome Measure Options AM-PAC 6 Clicks Basic Mobility (PT)  -               User Key  (r) = Recorded By, (t) = Taken By, (c) = Cosigned By      Initials Name Provider Type    Mary Glover, PT Physical Therapist    Latasha Howell, RN Registered Nurse                                 Physical Therapy Education        No education to display                  PT Recommendation and Plan  Planned Therapy Interventions (PT): balance training, bed mobility training, gait training, patient/family education, postural re-education, stair training, transfer training, other (see comments) (safety/falls prevention, skin protection/pressure relief)  Progress: no change  Outcome Evaluation: PT eval completed.  Pt pleasant and agreeable to therapy.  Oriented X 4 and very interactive w/ therapist.  Pt reports really no pain at rest, however reports just w/ mvmt for incisional assessment, her pain increases.  Pt motivated to begin out of bed activity.  Pt performed bed mobility w/ min assist, tfers w/ min assist.  Stood 3 times to assist w/ hygiene and changing of brief/gown.  Pt w/ 1 posterior loss of balance in standing requiring assist to recover.  Pt performed gait w/ HH support tolerating ~ 40 ft requiring 2 standing rest.  Pt w/ increase c/os pain w/ out of bed activity.  Gaurded mobility w/ decrease gait speed, step length and foot clearance.  Pt w/ increase fall risk requiring assist and UE support.  Provided pt w/ rwx in room for continued use w/ out of bed activity w/ staff assist.  Pt education for aps and encouraged continue use of IS.  Will follow for progress and needs.  Anticipate pt's pain to improve increasing tolerance to activity and improving her I and reducing her fall risk.  Discharge plan pending progress.  If pt continues to require some  assist, she may benefit from short term subacute care stay prior to returning home.     Time Calculation:         PT Charges       Row Name 03/04/25 1450             Time Calculation    Start Time 0920   additional time in chart review from 0851 until 0902  -      Stop Time 1040  -      Time Calculation (min) 80 min  -      PT Received On 03/04/25  -      PT Goal Re-Cert Due Date 03/14/25  -                User Key  (r) = Recorded By, (t) = Taken By, (c) = Cosigned By      Initials Name Provider Type    Mary Glover, PT Physical Therapist                  Therapy Charges for Today       Code Description Service Date Service Provider Modifiers Qty    24530216382 HC PT EVAL MOD COMPLEXITY 4 3/4/2025 Mary Estrada, PT GP 1    [id number] HC GAIT TRAINING EA 15 MIN 3/4/2025 Mary Estrada, PT GP 2            PT G-Codes  Outcome Measure Options: AM-PAC 6 Clicks Basic Mobility (PT)  AM-PAC 6 Clicks Score (PT): 16  PT Discharge Summary  Anticipated Discharge Disposition (PT): home with assist    Mary Estrada, PT  3/4/2025

## 2025-03-04 NOTE — PLAN OF CARE
Goal Outcome Evaluation:      Pt Aox4. VSS on 2L NC. IVF and abx infused per orders. Pt up to chair/brm today with therapy. C/o pain managed with prn and scheduled meds. Lap sites x4 on left lower abd, cdi with steri strips in place. Xray of abd performed today. Bowel regimen meds ordered and given per MD. Last BM was 2-24 per pt. Safety maintained.

## 2025-03-04 NOTE — PROGRESS NOTES
[facility] GENERAL SURGERY    PROGRESS NOTE    Today's Date: 03/04/25    Patient Name: Yun Blackwell  MRN: [id number]  CSN: [id number]  PCP: ANGEL Healy MD  Attending Provider: Jose Raul Archuleta MD  Length of Stay: 2    SUBJECTIVE     Yun Blackwell is currently resting in bed with family member at the bedside.  No unexpected events overnight.  Patient reports lower abdominal pain after she was up walking with PT today.  Patient reports nausea with intake. She denies vomiting.  She has not had a bowel movement.      Visit Dx:    ICD-10-CM ICD-9-CM   1. 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    Pulmonary hypertension    LEROY on CPAP    CPAP (continuous positive airway pressure) dependence    Incarcerated ventral hernia      History:   Past Medical History:   Diagnosis Date    A-fib     Abnormal ECG     tachycardia, a fib    Acid reflux     Anemia     Arthritis     Cancer     Diabetes mellitus     Hyperkalemia     Hyperlipidemia     Hypertension     Hyponatremia     IBS (irritable bowel syndrome)     PONV (postoperative nausea and vomiting)     Sleep apnea     USES CPAP    UTI (urinary tract infection)     UTI (urinary tract infection) 11/25/2016    Vaginal vault prolapse      Past Surgical History:   Procedure Laterality Date    BREAST BIOPSY Left     X2    BUNIONECTOMY Left     CARDIAC CATHETERIZATION      CARDIAC CATHETERIZATION N/A 02/05/2021    Procedure: Right Heart Cath;  Surgeon: Van Marcelino MD;  Location:  [facility] CATH [facility];  Service: Cardiology;  Laterality: N/A;    CATARACT EXTRACTION, BILATERAL      COLON SURGERY      COLONOSCOPY  09/21/2015    TRANSVERSE COLON ADENOMATOUS POLYP, HEMORRHOIDS, RECALL 5 YEARS, DR LAMAS    COLONOSCOPY N/A 11/13/2020    6 mm tubular adenomatous polyp at 15 cm proximal to the  anus,diverticulosis of the sigmoid colon    COLONOSCOPY N/A 12/13/2023    - One 4 mm polyp at 40 cm proximal to the anus,. - One 4 mm polyp at 20 cm proximal to the anus, removed with a cold snare. Resected and retrieved. - Diverticulosis in the sigmoid colon. - Hemorrhoids----tubular adenoma no evidence of high grade dysplasia    COLONOSCOPY N/A 12/22/2024    Dr. Ludwig-- The examined portion of the ileum was normal. - Diverticulosis in the entire examined colon. - Non-bleeding internal hemorrhoids. - No specimens collected.    COLOSTOMY      COLPOPEXY VAGINAL      2014    ENDOSCOPY N/A 11/03/2016    LA GRADE B ESOPHAGITIS WITH NO BLEEDING UPPER THIRD OF ESOPHAGUS, GERI-[place]ADSWORTH TEAR GEJ, BILIOUS BLOOD TINGED COFFEE GROUND GATRIC FLUIDDR [facility]    ENDOSCOPY N/A 12/13/2023    Normal examined duodenum. - Normal stomach. Biopsied. - Z-line regular, 35 cm from the incisors. Dilated.-neg for h pylori    EXPLORATORY LAPAROTOMY N/A 10/14/2016    Procedure: LAPAROTOMY EXPLORATORY, LYSIS OF ADHESIONS, IRRIAGATION OF ABDOMEN, POSSIBLE BOWEL RESECTION;  Surgeon: Bacilio Marcial MD;  Location:  [facility] OR;  Service:     FOOT NAVICULAR EXCISION OR BONE GRAFT Left 01/04/2023    Procedure: Expansion Graft, Harvesting of Bone Graft/Bone Marrow Aspirate;  Surgeon: Frankie Zapata DPM;  Location:  [facility] OR;  Service: Podiatry;  Laterality: Left;    HAMMER TOE REPAIR Left 01/04/2023    Procedure: Hammertoe Repair 2 and 3 - Left Foot;  Surgeon: Frankie Zapata DPM;  Location:  [facility] OR;  Service: Podiatry;  Laterality: Left;    HARDWARE REMOVAL Left 01/04/2023    Procedure: Hardware Removal;  Surgeon: Frankie Zapata DPM;  Location:  [facility] OR;  Service: Podiatry;  Laterality: Left;    HYSTERECTOMY      REVISION / TAKEDOWN COLOSTOMY      SACROCOLPOPEXY N/A 09/21/2016    Procedure: SACROCOLPOPEXY LAPAROSCOPIC WITH WEALTH at work SI ROBOT, CONVERTED TO OPEN SACROCOLPOPEXY, RIGHT SALPINGO- OOPHERECTOMY, CYSTO;  Surgeon: Maribell Beth,  MD;  Location:  [facility] OR;  Service:     TOE FUSION Left 01/06/2022    Procedure: FIRST METATARSOPHALANGEAL JOINT ARTHRODESIS WITH INTERNAL FIXATION - LEFT FOOT;  Surgeon: Jorgito Red DPM;  Location:  [facility] OR;  Service: Podiatry;  Laterality: Left;    TOE FUSION Left 01/04/2023    Procedure: Revisional 1st Metatarsophalangeal Joint Arthrodesis with Hardware Removal and Expansion Graft, Harvesting of Bone Graft/Bone Marrow Aspirate, Hammertoe Repair 2 and 3 - Left Foot;  Surgeon: Frankie Zapata DPM;  Location:  [facility] OR;  Service: Podiatry;  Laterality: Left;    VENTRAL HERNIA REPAIR N/A 3/2/2025    Procedure: VENTRAL / INCISIONAL HERNIA REPAIR LAPAROSCOPIC WITH DAVINCI ROBOT;  Surgeon: Jarrett Bland MD;  Location:  [facility] OR;  Service: Robotics - DaVinci;  Laterality: N/A;     Social History     Socioeconomic History    Marital status:    Tobacco Use    Smoking status: Never     Passive exposure: Never    Smokeless tobacco: Never   Vaping Use    Vaping status: Never Used   Substance and Sexual Activity    Alcohol use: No    Drug use: No    Sexual activity: Defer       Allergies:  Allergies   Allergen Reactions    Morphine And Codeine Nausea And Vomiting and Dizziness    Percocet [Oxycodone-Acetaminophen] Nausea And Vomiting and Dizziness       Medications:    Current Facility-Administered Medications:     cefTRIAXone (ROCEPHIN) 1,000 mg in sodium chloride 0.9 % 100 mL MBP, 1,000 mg, Intravenous, Q24H, Jose Raul Archuleta MD, Last Rate: 200 mL/hr at 03/03/25 1724, 1,000 mg at 03/03/25 1724    cyclobenzaprine (FLEXERIL) tablet 5 mg, 5 mg, Oral, TID, Jarrett Bland MD, 5 mg at 03/04/25 0856    diazePAM (VALIUM) injection 5 mg, 5 mg, Intravenous, Q8H, Jarrett Bland MD, 5 mg at 03/03/25 1209    dilTIAZem CD (CARDIZEM CD) 24 hr capsule 120 mg, 120 mg, Oral, Daily, Jose Raul Archuleta MD, 120 mg at 03/04/25 0859    famotidine (PEPCID) injection 20 mg, 20 mg, Intravenous, BID, Geoff Bauer MD, 20 mg at  03/04/25 0904    gabapentin (NEURONTIN) capsule 100 mg, 100 mg, Oral, Q8H, Jarrett Bland MD, 100 mg at 03/04/25 0556    HYDROcodone-acetaminophen (NORCO)  MG per tablet 1 tablet, 1 tablet, Oral, Q4H PRN, Jarrett Bland MD    HYDROmorphone (DILAUDID) injection 0.75 mg, 0.75 mg, Intravenous, Q4H PRN, Jarrett Bland MD, 0.75 mg at 03/03/25 1724    ketorolac (TORADOL) injection 15 mg, 15 mg, Intravenous, Q6H, Jarrett Bland MD, 15 mg at 03/04/25 0853    lactated ringers infusion, 100 mL/hr, Intravenous, Continuous, Geoff Bauer MD, Last Rate: 100 mL/hr at 03/04/25 0049, 100 mL/hr at 03/04/25 0049    Lidocaine 4 % 1 patch, 1 patch, Transdermal, Q24H, Jarrett Bland MD, 1 patch at 03/04/25 0902    metoprolol tartrate (LOPRESSOR) injection 5 mg, 5 mg, Intravenous, Q6H PRN, Geoff Bauer MD    ondansetron (ZOFRAN) injection 4 mg, 4 mg, Intravenous, Q6H PRN, Geoff Bauer MD, 4 mg at 03/03/25 1746    [COMPLETED] Insert Peripheral IV, , , Once **AND** sodium chloride 0.9 % flush 10 mL, 10 mL, Intravenous, PRN, Jarrett Bland MD    sodium chloride 0.9 % flush 10 mL, 10 mL, Intravenous, Once **FOLLOWED BY** [COMPLETED] idaruCIZUmab (PRAXBIND) IV solution solution 5 g, 5 g, Intravenous, Once, 5 g at 03/02/25 1714 **FOLLOWED BY** sodium chloride 0.9 % flush 10 mL, 10 mL, Intravenous, Once, Geoff Bauer MD    sodium chloride 0.9 % flush 10 mL, 10 mL, Intravenous, Q12H, Geoff Bauer MD, 10 mL at 03/04/25 0906    sodium chloride 0.9 % flush 10 mL, 10 mL, Intravenous, PRN, Geoff Bauer MD    sodium chloride 0.9 % infusion 40 mL, 40 mL, Intravenous, PRN, Geoff Bauer MD    tamoxifen (NOLVADEX) chemo tablet 20 mg, 20 mg, Oral, Daily, Jose Raul Archuleta MD      OBJECTIVE     Vitals:    03/04/25 0859   BP: 100/60   Pulse: 97   Resp:    Temp:    SpO2:        PHYSICAL EXAM   Physical Exam  Vitals and nursing note reviewed.   Constitutional:       General: She is awake.      Appearance: Normal appearance.      Comments:  Body mass index is 27.34 kg/m².    HENT:      Head: Normocephalic and atraumatic.   Eyes:      General: Lids are normal. Gaze aligned appropriately.   Cardiovascular:      Rate and Rhythm: Normal rate and regular rhythm.   Pulmonary:      Effort: Pulmonary effort is normal. No respiratory distress.   Abdominal:      General: There is distension.      Tenderness: There is abdominal tenderness (Patient reports slight tenderness with palpation).   Musculoskeletal:      Cervical back: Normal range of motion and neck supple.   Skin:     General: Skin is warm and dry.   Neurological:      Mental Status: She is alert and oriented to person, place, and time.   Psychiatric:         Attention and Perception: Attention normal.         Mood and Affect: Mood normal.         Speech: Speech normal.         Behavior: Behavior is cooperative.            Results Review:  Lab Results (last 48 hours)       Procedure Component Value Units Date/Time    Urinalysis With Culture If Indicated - Urine, Clean Catch [[id number]]  (Normal) Collected: 03/03/25 1449    Specimen: Urine, Clean Catch Updated: 03/03/25 1501     Color, UA Yellow     Appearance, UA Clear     pH, UA 6.0     Specific Gravity, UA 1.010     Glucose, UA Negative     Ketones, UA Negative     Bilirubin, UA Negative     Blood, UA Negative     Protein, UA Negative     Leuk Esterase, UA Negative     Nitrite, UA Negative     Urobilinogen, UA 0.2 E.U./dL    Narrative:      In absence of clinical symptoms, the presence of pyuria, bacteria, and/or nitrites on the urinalysis result does not correlate with infection.  Urine microscopic not indicated.    Hemoglobin A1c [[id number]]  (Normal) Collected: 03/03/25 0652    Specimen: Blood Updated: 03/03/25 1004     Hemoglobin A1C 5.30 %     Narrative:      Hemoglobin A1C Ranges:    Increased Risk for Diabetes  5.7% to 6.4%  Diabetes                     >= 6.5%  Diabetic Goal                < 7.0%    Comprehensive Metabolic Panel [[id number]]   (Abnormal) Collected: 03/03/25 0652    Specimen: Blood Updated: 03/03/25 0746     Glucose 133 mg/dL      BUN 11 mg/dL      Creatinine 0.45 mg/dL      Sodium 136 mmol/L      Potassium 4.4 mmol/L      Chloride 101 mmol/L      CO2 25.0 mmol/L      Calcium 8.8 mg/dL      Total Protein 6.0 g/dL      Albumin 3.4 g/dL      ALT (SGPT) 13 U/L      AST (SGOT) 23 U/L      Alkaline Phosphatase 75 U/L      Total Bilirubin 1.3 mg/dL      Globulin 2.6 gm/dL      A/G Ratio 1.3 g/dL      BUN/Creatinine Ratio 24.4     Anion Gap 10.0 mmol/L      eGFR 103.0 mL/min/1.73     Narrative:      GFR Categories in Chronic Kidney Disease (CKD)      GFR Category          GFR (mL/min/1.73)    Interpretation  G1                     90 or greater         Normal or high (1)  G2                      60-89                Mild decrease (1)  G3a                   45-59                Mild to moderate decrease  G3b                   30-44                Moderate to severe decrease  G4                    15-29                Severe decrease  G5                    14 or less           Kidney failure          (1)In the absence of evidence of kidney disease, neither GFR category G1 or G2 fulfill the criteria for CKD.    eGFR calculation 2021 CKD-EPI creatinine equation, which does not include race as a factor    Lipid Panel [[id number]] Collected: 03/03/25 0652    Specimen: Blood Updated: 03/03/25 0746     Total Cholesterol 97 mg/dL      Triglycerides 91 mg/dL      HDL Cholesterol 58 mg/dL      LDL Cholesterol  21 mg/dL      VLDL Cholesterol 18 mg/dL      LDL/HDL Ratio 0.36    Narrative:      Cholesterol Reference Ranges  (U.S. Department of Health and Human Services ATP III Classifications)    Desirable          <200 mg/dL  Borderline High    200-239 mg/dL  High Risk          >240 mg/dL      Triglyceride Reference Ranges  (U.S. Department of Health and Human Services ATP III Classifications)    Normal           <150 mg/dL  Borderline High  150-199  mg/dL  High             200-499 mg/dL  Very High        >500 mg/dL    HDL Reference Ranges  (U.S. Department of Health and Human Services ATP III Classifications)    Low     <40 mg/dl (major risk factor for CHD)  High    >60 mg/dl ('negative' risk factor for CHD)        LDL Reference Ranges  (U.S. Department of Health and Human Services ATP III Classifications)    Optimal          <100 mg/dL  Near Optimal     100-129 mg/dL  Borderline High  130-159 mg/dL  High             160-189 mg/dL  Very High        >189 mg/dL    LDL is calculated using the NIH LDL-C calculation.      TSH [[id number]]  (Normal) Collected: 03/03/25 0652    Specimen: Blood Updated: 03/03/25 0746     TSH 0.978 uIU/mL     CBC Auto Differential [[id number]]  (Abnormal) Collected: 03/03/25 0652    Specimen: Blood Updated: 03/03/25 0736     WBC 10.55 10*3/mm3      RBC 4.26 10*6/mm3      Hemoglobin 12.6 g/dL      Hematocrit 39.6 %      MCV 93.0 fL      MCH 29.6 pg      MCHC 31.8 g/dL      RDW 13.9 %      RDW-SD 47.5 fl      MPV 11.1 fL      Platelets 170 10*3/mm3      Neutrophil % 82.7 %      Lymphocyte % 7.9 %      Monocyte % 9.0 %      Eosinophil % 0.0 %      Basophil % 0.1 %      Immature Grans % 0.3 %      Neutrophils, Absolute 8.73 10*3/mm3      Lymphocytes, Absolute 0.83 10*3/mm3      Monocytes, Absolute 0.95 10*3/mm3      Eosinophils, Absolute 0.00 10*3/mm3      Basophils, Absolute 0.01 10*3/mm3      Immature Grans, Absolute 0.03 10*3/mm3      nRBC 0.0 /100 WBC     POC Glucose Once [[id number]]  (Abnormal) Collected: 03/02/25 2243    Specimen: Blood Updated: 03/02/25 2255     Glucose 141 mg/dL      Comment: : [id number] JuradoferMeter ID: [id number]       POC Glucose Once [[id number]]  (Abnormal) Collected: 03/02/25 2001    Specimen: Blood Updated: 03/02/25 2013     Glucose 146 mg/dL      Comment: : [id number] Claudepierre ArianeMeter ID: [id number]       Comprehensive Metabolic Panel [[id number]]  (Abnormal) Collected: 03/02/25  1358    Specimen: Blood from Arm, Right Updated: 03/02/25 1434     Glucose 127 mg/dL      BUN 13 mg/dL      Creatinine 0.48 mg/dL      Sodium 138 mmol/L      Potassium 4.5 mmol/L      Chloride 102 mmol/L      CO2 24.0 mmol/L      Calcium 9.7 mg/dL      Total Protein 7.1 g/dL      Albumin 4.0 g/dL      ALT (SGPT) 14 U/L      AST (SGOT) 27 U/L      Alkaline Phosphatase 96 U/L      Total Bilirubin 0.6 mg/dL      Globulin 3.1 gm/dL      A/G Ratio 1.3 g/dL      BUN/Creatinine Ratio 27.1     Anion Gap 12.0 mmol/L      eGFR 101.4 mL/min/1.73     Narrative:      GFR Categories in Chronic Kidney Disease (CKD)      GFR Category          GFR (mL/min/1.73)    Interpretation  G1                     90 or greater         Normal or high (1)  G2                      60-89                Mild decrease (1)  G3a                   45-59                Mild to moderate decrease  G3b                   30-44                Moderate to severe decrease  G4                    15-29                Severe decrease  G5                    14 or less           Kidney failure          (1)In the absence of evidence of kidney disease, neither GFR category G1 or G2 fulfill the criteria for CKD.    eGFR calculation 2021 CKD-EPI creatinine equation, which does not include race as a factor    Lactic Acid, Plasma [[id number]]  (Normal) Collected: 03/02/25 1358    Specimen: Blood from Arm, Right Updated: 03/02/25 1432     Lactate 1.7 mmol/L     Lipase [[id number]]  (Normal) Collected: 03/02/25 1358    Specimen: Blood from Arm, Right Updated: 03/02/25 1429     Lipase 35 U/L     Urinalysis With Culture If Indicated - Urine, Clean Catch [[id number]]  (Abnormal) Collected: 03/02/25 1404    Specimen: Urine, Clean Catch Updated: 03/02/25 1418     Color, UA Yellow     Appearance, UA Clear     pH, UA 5.5     Specific Gravity, UA 1.016     Glucose, UA Negative     Ketones, UA Negative     Bilirubin, UA Negative     Blood, UA Trace     Protein, UA Negative     Leuk  Esterase, UA Small (1+)     Nitrite, UA Negative     Urobilinogen, UA 0.2 E.U./dL    Narrative:      In absence of clinical symptoms, the presence of pyuria, bacteria, and/or nitrites on the urinalysis result does not correlate with infection.    Urinalysis, Microscopic Only - Urine, Clean Catch [[id number]]  (Abnormal) Collected: 03/02/25 1404    Specimen: Urine, Clean Catch Updated: 03/02/25 1418     RBC, UA 11-20 /HPF      WBC, UA 3-5 /HPF      Comment: Urine culture not indicated.        Bacteria, UA 3+ /HPF      Squamous Epithelial Cells, UA 7-12 /HPF      Hyaline Casts, UA None Seen /LPF      Methodology Automated Microscopy    CBC & Differential [[id number]]  (Abnormal) Collected: 03/02/25 1358    Specimen: Blood from Arm, Right Updated: 03/02/25 1412    Narrative:      The following orders were created for panel order CBC & Differential.  Procedure                               Abnormality         Status                     ---------                               -----------         ------                     CBC Auto Differential[[id number]]        Abnormal            Final result                 Please view results for these tests on the individual orders.    CBC Auto Differential [[id number]]  (Abnormal) Collected: 03/02/25 1358    Specimen: Blood from Arm, Right Updated: 03/02/25 1412     WBC 8.85 10*3/mm3      RBC 4.56 10*6/mm3      Hemoglobin 13.5 g/dL      Hematocrit 42.0 %      MCV 92.1 fL      MCH 29.6 pg      MCHC 32.1 g/dL      RDW 13.8 %      RDW-SD 46.5 fl      MPV 10.6 fL      Platelets 201 10*3/mm3      Neutrophil % 84.4 %      Lymphocyte % 9.6 %      Monocyte % 5.0 %      Eosinophil % 0.2 %      Basophil % 0.2 %      Immature Grans % 0.6 %      Neutrophils, Absolute 7.47 10*3/mm3      Lymphocytes, Absolute 0.85 10*3/mm3      Monocytes, Absolute 0.44 10*3/mm3      Eosinophils, Absolute 0.02 10*3/mm3      Basophils, Absolute 0.02 10*3/mm3      Immature Grans, Absolute 0.05 10*3/mm3      nRBC 0.0  /100 WBC           Imaging Results (Last 72 Hours)       Procedure Component Value Units Date/Time    CT Abdomen Pelvis With Contrast [[id number]] Collected: 03/02/25 1438     Updated: 03/02/25 1449    Narrative:      EXAM/TECHNIQUE: CT abdomen and pelvis with IV contrast     INDICATION: Abdominal pain     COMPARISON: 12/20/2024     DLP: 429.03 mGy.cm. Automated exposure control was utilized to decrease  patient radiation dose.     FINDINGS:     Lung bases are clear. Heart is enlarged and there is dilatation of the  IVC and hepatic veins.     Slightly heterogeneous appearance of the liver. Mild hepatic steatosis.  No focal liver lesion. Multiple calcified gallstones in the gallbladder  lumen. No gallbladder wall thickening or adjacent fat stranding. No  biliary ductal dilatation.     Pancreas appears normal. Spleen is unremarkable. No adrenal gland  nodule.     No solid renal mass. No urolithiasis or hydronephrosis. No focal urinary  bladder wall thickening.      Postoperative change of left hemicolectomy. Colonic diverticulosis  without evidence of diverticulitis. Appendix is not identified and may  be surgically absent.      There is a small bowel obstruction with transition point in a left  paramidline ventral abdominal hernia. The upstream bowel is dilated and  contains fecalized material, measuring up to 4.2 cm diameter (image 19  series 3). There is some mild interloop edema. No pneumatosis, portal  venous gas, or evidence of pneumoperitoneum. Distal esophagus and  stomach are unremarkable.     No ascites or free pelvic fluid. Prior hysterectomy. No pelvic mass or  collection.     Abdominal aorta is nonaneurysmal. No abdominal or pelvic  lymphadenopathy.      There are also periumbilical and right lateral abdominal wall  fat-containing hernias present. No acute osseous finding.       Impression:         Acute small bowel obstruction with transition point in a left  paramidline ventral abdominal wall hernia.               This report was signed and finalized on 3/2/2025 2:46 PM by Dr. Andrew Gomez MD.                  ASSESSMENT/PLAN       Examination and evaluation of wound(s) was performed.    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  Discussed importance of ambulation this morning. PT worked with patient today.   Patient has not had a bowel movement since 2/24 per RN.  Will continue full liquid diet at this time. KUB ordered. Pending KUB results, will start Colace BID and Miralax Daily.       Discussed findings and treatment plan including risks, benefits, and treatment options with patient in detail. Patient agreed with treatment plan.      This document has been electronically signed by MARÍA Cornejo on 3/4/2025 12:08 CST

## 2025-03-05 LAB
ANION GAP SERPL CALCULATED.3IONS-SCNC: 6 MMOL/L (ref 5–15)
BASOPHILS # BLD AUTO: 0.01 10*3/MM3 (ref 0–0.2)
BASOPHILS NFR BLD AUTO: 0.2 % (ref 0–1.5)
BUN SERPL-MCNC: 15 MG/DL (ref 8–23)
BUN/CREAT SERPL: 25.9 (ref 7–25)
CALCIUM SPEC-SCNC: 8.4 MG/DL (ref 8.6–10.5)
CHLORIDE SERPL-SCNC: 105 MMOL/L (ref 98–107)
CO2 SERPL-SCNC: 28 MMOL/L (ref 22–29)
CREAT SERPL-MCNC: 0.58 MG/DL (ref 0.57–1)
DEPRECATED RDW RBC AUTO: 49.1 FL (ref 37–54)
EGFRCR SERPLBLD CKD-EPI 2021: 96.9 ML/MIN/1.73
EOSINOPHIL # BLD AUTO: 0.16 10*3/MM3 (ref 0–0.4)
EOSINOPHIL NFR BLD AUTO: 2.8 % (ref 0.3–6.2)
ERYTHROCYTE [DISTWIDTH] IN BLOOD BY AUTOMATED COUNT: 14.1 % (ref 12.3–15.4)
GLUCOSE SERPL-MCNC: 85 MG/DL (ref 65–99)
HCT VFR BLD AUTO: 34.4 % (ref 34–46.6)
HGB BLD-MCNC: 10.8 G/DL (ref 12–15.9)
IMM GRANULOCYTES # BLD AUTO: 0.03 10*3/MM3 (ref 0–0.05)
IMM GRANULOCYTES NFR BLD AUTO: 0.5 % (ref 0–0.5)
LYMPHOCYTES # BLD AUTO: 1.24 10*3/MM3 (ref 0.7–3.1)
LYMPHOCYTES NFR BLD AUTO: 21.9 % (ref 19.6–45.3)
MCH RBC QN AUTO: 29.8 PG (ref 26.6–33)
MCHC RBC AUTO-ENTMCNC: 31.4 G/DL (ref 31.5–35.7)
MCV RBC AUTO: 94.8 FL (ref 79–97)
MONOCYTES # BLD AUTO: 0.55 10*3/MM3 (ref 0.1–0.9)
MONOCYTES NFR BLD AUTO: 9.7 % (ref 5–12)
NEUTROPHILS NFR BLD AUTO: 3.68 10*3/MM3 (ref 1.7–7)
NEUTROPHILS NFR BLD AUTO: 64.9 % (ref 42.7–76)
PLATELET # BLD AUTO: 117 10*3/MM3 (ref 140–450)
PMV BLD AUTO: 11 FL (ref 6–12)
POTASSIUM SERPL-SCNC: 4.3 MMOL/L (ref 3.5–5.2)
RBC # BLD AUTO: 3.63 10*6/MM3 (ref 3.77–5.28)
SODIUM SERPL-SCNC: 139 MMOL/L (ref 136–145)
WBC NRBC COR # BLD AUTO: 5.67 10*3/MM3 (ref 3.4–10.8)

## 2025-03-05 PROCEDURE — 97530 THERAPEUTIC ACTIVITIES: CPT

## 2025-03-05 PROCEDURE — 85025 COMPLETE CBC W/AUTO DIFF WBC: CPT | Performed by: STUDENT IN AN ORGANIZED HEALTH CARE EDUCATION/TRAINING PROGRAM

## 2025-03-05 PROCEDURE — 25010000002 KETOROLAC TROMETHAMINE PER 15 MG: Performed by: SURGERY

## 2025-03-05 PROCEDURE — 99231 SBSQ HOSP IP/OBS SF/LOW 25: CPT | Performed by: NURSE PRACTITIONER

## 2025-03-05 PROCEDURE — 97116 GAIT TRAINING THERAPY: CPT

## 2025-03-05 PROCEDURE — 25010000002 CEFTRIAXONE PER 250 MG: Performed by: STUDENT IN AN ORGANIZED HEALTH CARE EDUCATION/TRAINING PROGRAM

## 2025-03-05 PROCEDURE — 80048 BASIC METABOLIC PNL TOTAL CA: CPT | Performed by: STUDENT IN AN ORGANIZED HEALTH CARE EDUCATION/TRAINING PROGRAM

## 2025-03-05 RX ORDER — OXYCODONE HYDROCHLORIDE 5 MG/1
5 TABLET ORAL EVERY 4 HOURS PRN
Qty: 18 TABLET | Refills: 0 | Status: SHIPPED | OUTPATIENT
Start: 2025-03-05 | End: 2025-03-09

## 2025-03-05 RX ADMIN — GABAPENTIN 100 MG: 100 CAPSULE ORAL at 14:28

## 2025-03-05 RX ADMIN — FAMOTIDINE 20 MG: 10 INJECTION INTRAVENOUS at 09:45

## 2025-03-05 RX ADMIN — GABAPENTIN 100 MG: 100 CAPSULE ORAL at 06:15

## 2025-03-05 RX ADMIN — DILTIAZEM HYDROCHLORIDE 120 MG: 120 CAPSULE, EXTENDED RELEASE ORAL at 09:44

## 2025-03-05 RX ADMIN — POLYETHYLENE GLYCOL 3350 17 G: 17 POWDER, FOR SOLUTION ORAL at 09:52

## 2025-03-05 RX ADMIN — Medication 10 ML: at 21:18

## 2025-03-05 RX ADMIN — SODIUM CHLORIDE 1000 MG: 900 INJECTION INTRAVENOUS at 17:10

## 2025-03-05 RX ADMIN — LIDOCAINE 1 PATCH: 4 PATCH TOPICAL at 09:52

## 2025-03-05 RX ADMIN — CYCLOBENZAPRINE HYDROCHLORIDE 5 MG: 10 TABLET, FILM COATED ORAL at 17:09

## 2025-03-05 RX ADMIN — FAMOTIDINE 20 MG: 10 INJECTION INTRAVENOUS at 21:18

## 2025-03-05 RX ADMIN — KETOROLAC TROMETHAMINE 15 MG: 30 INJECTION, SOLUTION INTRAMUSCULAR; INTRAVENOUS at 09:43

## 2025-03-05 RX ADMIN — KETOROLAC TROMETHAMINE 15 MG: 30 INJECTION, SOLUTION INTRAMUSCULAR; INTRAVENOUS at 01:16

## 2025-03-05 RX ADMIN — KETOROLAC TROMETHAMINE 15 MG: 30 INJECTION, SOLUTION INTRAMUSCULAR; INTRAVENOUS at 14:28

## 2025-03-05 RX ADMIN — TAMOXIFEN CITRATE 20 MG: 20 TABLET ORAL at 09:44

## 2025-03-05 RX ADMIN — GABAPENTIN 100 MG: 100 CAPSULE ORAL at 21:18

## 2025-03-05 RX ADMIN — CYCLOBENZAPRINE HYDROCHLORIDE 5 MG: 10 TABLET, FILM COATED ORAL at 21:18

## 2025-03-05 RX ADMIN — KETOROLAC TROMETHAMINE 15 MG: 30 INJECTION, SOLUTION INTRAMUSCULAR; INTRAVENOUS at 21:18

## 2025-03-05 RX ADMIN — CYCLOBENZAPRINE HYDROCHLORIDE 5 MG: 10 TABLET, FILM COATED ORAL at 09:43

## 2025-03-05 RX ADMIN — DOCUSATE SODIUM 100 MG: 100 CAPSULE, LIQUID FILLED ORAL at 09:43

## 2025-03-05 RX ADMIN — DOCUSATE SODIUM 100 MG: 100 CAPSULE, LIQUID FILLED ORAL at 21:18

## 2025-03-05 NOTE — PROGRESS NOTES
[facility] GENERAL SURGERY    PROGRESS NOTE    Today's Date: 03/05/25    Patient Name: Yun Blackwell  MRN: [id number]  CSN: [id number]  PCP: ANGEL Healy MD  Attending Provider: Jose Raul Archuleta MD  Length of Stay: 3    SUBJECTIVE     Yun Blackwell is resting in bed with no family in the room.  No BM overnight.  AXR completed yesterday with no ileus. Colace and MiraLAX was started yesterday. She states she started passing flatus this morning.  Patient reports pain with movement.  She did work with physical therapy yesterday but is hesitant to move due to pain but makes the pain medication is providing good coverage.  She denies nausea or vomiting.     Visit Dx:    ICD-10-CM ICD-9-CM   1. Small bowel obstruction  K56.609 560.9   2. 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    Pulmonary hypertension    LEROY on CPAP    CPAP (continuous positive airway pressure) dependence    Incarcerated ventral hernia      History:   Past Medical History:   Diagnosis Date    A-fib     Abnormal ECG     tachycardia, a fib    Acid reflux     Anemia     Arthritis     Cancer     Diabetes mellitus     Hyperkalemia     Hyperlipidemia     Hypertension     Hyponatremia     IBS (irritable bowel syndrome)     PONV (postoperative nausea and vomiting)     Sleep apnea     USES CPAP    UTI (urinary tract infection)     UTI (urinary tract infection) 11/25/2016    Vaginal vault prolapse      Past Surgical History:   Procedure Laterality Date    BREAST BIOPSY Left     X2    BUNIONECTOMY Left     CARDIAC CATHETERIZATION      CARDIAC CATHETERIZATION N/A 02/05/2021    Procedure: Right Heart Cath;  Surgeon: Van Marcelino MD;  Location:  [facility]ATH [facility]AS[facility];  Service: Cardiology;  Laterality: N/A;    CATARACT EXTRACTION, BILATERAL       COLON SURGERY      COLONOSCOPY  09/21/2015    TRANSVERSE COLON ADENOMATOUS POLYP, HEMORRHOIDS, RECALL 5 YEARS, DR LAMAS    COLONOSCOPY N/A 11/13/2020    6 mm tubular adenomatous polyp at 15 cm proximal to the anus,diverticulosis of the sigmoid colon    COLONOSCOPY N/A 12/13/2023    - One 4 mm polyp at 40 cm proximal to the anus,. - One 4 mm polyp at 20 cm proximal to the anus, removed with a cold snare. Resected and retrieved. - Diverticulosis in the sigmoid colon. - Hemorrhoids----tubular adenoma no evidence of high grade dysplasia    COLONOSCOPY N/A 12/22/2024    Dr. Ludwig-- The examined portion of the ileum was normal. - Diverticulosis in the entire examined colon. - Non-bleeding internal hemorrhoids. - No specimens collected.    COLOSTOMY      COLPOPEXY VAGINAL      2014    ENDOSCOPY N/A 11/03/2016    LA GRADE B ESOPHAGITIS WITH NO BLEEDING UPPER THIRD OF ESOPHAGUS, GERI-WADSWORTH TEAR GEJ, BILIOUS BLOOD TINGED COFFEE GROUND GATRIC FLUIDDR LAMAS    ENDOSCOPY N/A 12/13/2023    Normal examined duodenum. - Normal stomach. Biopsied. - Z-line regular, 35 cm from the incisors. Dilated.-neg for h pylori    EXPLORATORY LAPAROTOMY N/A 10/14/2016    Procedure: LAPAROTOMY EXPLORATORY, LYSIS OF ADHESIONS, IRRIAGATION OF ABDOMEN, POSSIBLE BOWEL RESECTION;  Surgeon: Bacilio Marcial MD;  Location: [facility] OR;  Service:     FOOT NAVICULAR EXCISION OR BONE GRAFT Left 01/04/2023    Procedure: Expansion Graft, Harvesting of Bone Graft/Bone Marrow Aspirate;  Surgeon: Frankie Zapata DPM;  Location:  [facility] OR;  Service: Podiatry;  Laterality: Left;    HAMMER TOE REPAIR Left 01/04/2023    Procedure: Hammertoe Repair 2 and 3 - Left Foot;  Surgeon: Frankie Zapata DPM;  Location:  [facility] OR;  Service: Podiatry;  Laterality: Left;    HARDWARE REMOVAL Left 01/04/2023    Procedure: Hardware Removal;  Surgeon: Frankie Zapata DPM;  Location:  [facility] OR;  Service: Podiatry;  Laterality: Left;    HYSTERECTOMY      REVISION /  TAKEDOWN COLOSTOMY      SACROCOLPOPEXY N/A 09/21/2016    Procedure: SACROCOLPOPEXY LAPAROSCOPIC WITH DAVINCI SI ROBOT, CONVERTED TO OPEN SACROCOLPOPEXY, RIGHT SALPINGO- OOPHERECTOMY, CYSTO;  Surgeon: Maribell Beth MD;  Location:  [facility] OR;  Service:     TOE FUSION Left 01/06/2022    Procedure: FIRST METATARSOPHALANGEAL JOINT ARTHRODESIS WITH INTERNAL FIXATION - LEFT FOOT;  Surgeon: Jorgito Red DPM;  Location:  [facility] OR;  Service: Podiatry;  Laterality: Left;    TOE FUSION Left 01/04/2023    Procedure: Revisional 1st Metatarsophalangeal Joint Arthrodesis with Hardware Removal and Expansion Graft, Harvesting of Bone Graft/Bone Marrow Aspirate, Hammertoe Repair 2 and 3 - Left Foot;  Surgeon: Frankie Zapata DPM;  Location:  [facility] OR;  Service: Podiatry;  Laterality: Left;    VENTRAL HERNIA REPAIR N/A 3/2/2025    Procedure: VENTRAL / INCISIONAL HERNIA REPAIR LAPAROSCOPIC WITH [employer] ROBOT;  Surgeon: Jarrett Bland MD;  Location:  [facility] OR;  Service: Robotics - DaVinci;  Laterality: N/A;     Social History     Socioeconomic History    Marital status:    Tobacco Use    Smoking status: Never     Passive exposure: Never    Smokeless tobacco: Never   Vaping Use    Vaping status: Never Used   Substance and Sexual Activity    Alcohol use: No    Drug use: No    Sexual activity: Defer       Allergies:  Allergies   Allergen Reactions    Morphine And Codeine Nausea And Vomiting and Dizziness    Percocet [Oxycodone-Acetaminophen] Nausea And Vomiting and Dizziness       Medications:    Current Facility-Administered Medications:     cefTRIAXone (ROCEPHIN) 1,000 mg in sodium chloride 0.9 % 100 mL MBP, 1,000 mg, Intravenous, Q24H, Jose Raul Archuleta MD, Last Rate: 200 mL/hr at 03/04/25 1632, 1,000 mg at 03/04/25 1632    cyclobenzaprine (FLEXERIL) tablet 5 mg, 5 mg, Oral, TID, Jarrett Bland MD, 5 mg at 03/04/25 2055    dilTIAZem CD (CARDIZEM CD) 24 hr capsule 120 mg, 120 mg, Oral, Daily, Jose Raul Archuleta MD, 120 mg  at 03/04/25 0859    docusate sodium (COLACE) capsule 100 mg, 100 mg, Oral, BID, Crystal Pool MD, 100 mg at 03/04/25 2054    famotidine (PEPCID) injection 20 mg, 20 mg, Intravenous, BID, Geoff Bauer MD, 20 mg at 03/04/25 2054    gabapentin (NEURONTIN) capsule 100 mg, 100 mg, Oral, Q8H, Jarrett Bland MD, 100 mg at 03/05/25 0615    HYDROcodone-acetaminophen (NORCO)  MG per tablet 1 tablet, 1 tablet, Oral, Q4H PRN, Jarrett Bland MD    HYDROmorphone (DILAUDID) injection 0.75 mg, 0.75 mg, Intravenous, Q4H PRN, Jarrett Bland MD, 0.75 mg at 03/03/25 1724    ketorolac (TORADOL) injection 15 mg, 15 mg, Intravenous, Q6H, Jarrett Bland MD, 15 mg at 03/05/25 0116    Lidocaine 4 % 1 patch, 1 patch, Transdermal, Q24H, Jarrett Bland MD, 1 patch at 03/04/25 0902    metoprolol tartrate (LOPRESSOR) injection 5 mg, 5 mg, Intravenous, Q6H PRN, Geoff Bauer MD    ondansetron (ZOFRAN) injection 4 mg, 4 mg, Intravenous, Q6H PRN, Geoff Bauer MD, 4 mg at 03/03/25 1746    polyethylene glycol (MIRALAX) packet 17 g, 17 g, Oral, Daily, Crystal Pool MD, 17 g at 03/04/25 1632    [COMPLETED] Insert Peripheral IV, , , Once **AND** sodium chloride 0.9 % flush 10 mL, 10 mL, Intravenous, PRN, Jarrett Bland MD    sodium chloride 0.9 % flush 10 mL, 10 mL, Intravenous, Once **FOLLOWED BY** [COMPLETED] idaruCIZUmab (PRAXBIND) IV solution solution 5 g, 5 g, Intravenous, Once, 5 g at 03/02/25 1714 **FOLLOWED BY** sodium chloride 0.9 % flush 10 mL, 10 mL, Intravenous, Once, Geoff Bauer MD    sodium chloride 0.9 % flush 10 mL, 10 mL, Intravenous, Q12H, Geoff Bauer MD, 10 mL at 03/04/25 0906    sodium chloride 0.9 % flush 10 mL, 10 mL, Intravenous, PRN, Geoff Bauer MD    sodium chloride 0.9 % infusion 40 mL, 40 mL, Intravenous, PRN, Geoff Bauer MD    tamoxifen (NOLVADEX) chemo tablet 20 mg, 20 mg, Oral, Daily, Jose Raul Archuleta MD, 20 mg at 03/04/25 1035      OBJECTIVE     Vitals:    03/05/25 0857   BP: 109/50    Pulse: 103   Resp: 16   Temp: 98.1 °F (36.7 °C)   SpO2: 98%     Temp:  [97.9 °F (36.6 °C)-98.7 °F (37.1 °C)] 98.1 °F (36.7 °C)  Heart Rate:  [] 103  Resp:  [14-16] 16  BP: ()/(46-62) 109/50  Flow (L/min) (Oxygen Therapy):  [2] 2      PHYSICAL EXAM   Physical Exam  Vitals and nursing note reviewed.   Constitutional:       General: She is awake.      Appearance: Normal appearance.      Comments: Body mass index is 27.34 kg/m².    HENT:      Head: Normocephalic and atraumatic.   Eyes:      General: Lids are normal. Gaze aligned appropriately.   Cardiovascular:      Rate and Rhythm: Regular rhythm. Tachycardia present.      Comments: Heart Rate:  [] 103  Pulmonary:      Effort: Pulmonary effort is normal. No respiratory distress.   Abdominal:      General: Abdomen is flat. There is no distension.      Palpations: Abdomen is soft.      Tenderness: There is generalized abdominal tenderness.   Musculoskeletal:      Cervical back: Normal range of motion and neck supple.   Skin:     General: Skin is warm and dry.   Neurological:      Mental Status: She is alert and oriented to person, place, and time.   Psychiatric:         Attention and Perception: Attention normal.         Mood and Affect: Mood normal.         Speech: Speech normal.         Behavior: Behavior is cooperative.        Results Review:  Lab Results (last 48 hours)       Procedure Component Value Units Date/Time    Basic Metabolic Panel [[id number]]  (Abnormal) Collected: 03/05/25 0414    Specimen: Blood Updated: 03/05/25 0515     Glucose 85 mg/dL      BUN 15 mg/dL      Creatinine 0.58 mg/dL      Sodium 139 mmol/L      Potassium 4.3 mmol/L      Chloride 105 mmol/L      CO2 28.0 mmol/L      Calcium 8.4 mg/dL      BUN/Creatinine Ratio 25.9     Anion Gap 6.0 mmol/L      eGFR 96.9 mL/min/1.73     Narrative:      GFR Categories in Chronic Kidney Disease (CKD)      GFR Category          GFR (mL/min/1.73)    Interpretation  G1                     90  or greater         Normal or high (1)  G2                      60-89                Mild decrease (1)  G3a                   45-59                Mild to moderate decrease  G3b                   30-44                Moderate to severe decrease  G4                    15-29                Severe decrease  G5                    14 or less           Kidney failure          (1)In the absence of evidence of kidney disease, neither GFR category G1 or G2 fulfill the criteria for CKD.    eGFR calculation 2021 CKD-EPI creatinine equation, which does not include race as a factor    CBC & Differential [[id number]]  (Abnormal) Collected: 03/05/25 0414    Specimen: Blood Updated: 03/05/25 0450    Narrative:      The following orders were created for panel order CBC & Differential.  Procedure                               Abnormality         Status                     ---------                               -----------         ------                     CBC Auto Differential[[id number]]        Abnormal            Final result                 Please view results for these tests on the individual orders.    CBC Auto Differential [[id number]]  (Abnormal) Collected: 03/05/25 0414    Specimen: Blood Updated: 03/05/25 0450     WBC 5.67 10*3/mm3      RBC 3.63 10*6/mm3      Hemoglobin 10.8 g/dL      Hematocrit 34.4 %      MCV 94.8 fL      MCH 29.8 pg      MCHC 31.4 g/dL      RDW 14.1 %      RDW-SD 49.1 fl      MPV 11.0 fL      Platelets 117 10*3/mm3      Neutrophil % 64.9 %      Lymphocyte % 21.9 %      Monocyte % 9.7 %      Eosinophil % 2.8 %      Basophil % 0.2 %      Immature Grans % 0.5 %      Neutrophils, Absolute 3.68 10*3/mm3      Lymphocytes, Absolute 1.24 10*3/mm3      Monocytes, Absolute 0.55 10*3/mm3      Eosinophils, Absolute 0.16 10*3/mm3      Basophils, Absolute 0.01 10*3/mm3      Immature Grans, Absolute 0.03 10*3/mm3     Basic Metabolic Panel [[id number]]  (Abnormal) Collected: 03/04/25 1057    Specimen: Blood Updated:  03/04/25 1157     Glucose 101 mg/dL      BUN 17 mg/dL      Creatinine 0.60 mg/dL      Sodium 133 mmol/L      Potassium 4.0 mmol/L      Chloride 99 mmol/L      CO2 28.0 mmol/L      Calcium 8.1 mg/dL      BUN/Creatinine Ratio 28.3     Anion Gap 6.0 mmol/L      eGFR 96.1 mL/min/1.73     Narrative:      GFR Categories in Chronic Kidney Disease (CKD)      GFR Category          GFR (mL/min/1.73)    Interpretation  G1                     90 or greater         Normal or high (1)  G2                      60-89                Mild decrease (1)  G3a                   45-59                Mild to moderate decrease  G3b                   30-44                Moderate to severe decrease  G4                    15-29                Severe decrease  G5                    14 or less           Kidney failure          (1)In the absence of evidence of kidney disease, neither GFR category G1 or G2 fulfill the criteria for CKD.    eGFR calculation 2021 CKD-EPI creatinine equation, which does not include race as a factor    CBC & Differential [[id number]]  (Abnormal) Collected: 03/04/25 1057    Specimen: Blood Updated: 03/04/25 1116    Narrative:      The following orders were created for panel order CBC & Differential.  Procedure                               Abnormality         Status                     ---------                               -----------         ------                     CBC Auto Differential[[id number]]        Abnormal            Final result                 Please view results for these tests on the individual orders.    CBC Auto Differential [[id number]]  (Abnormal) Collected: 03/04/25 1057    Specimen: Blood Updated: 03/04/25 1116     WBC 7.21 10*3/mm3      RBC 3.91 10*6/mm3      Hemoglobin 11.7 g/dL      Hematocrit 36.7 %      MCV 93.9 fL      MCH 29.9 pg      MCHC 31.9 g/dL      RDW 14.2 %      RDW-SD 48.3 fl      MPV 10.9 fL      Platelets 135 10*3/mm3      Neutrophil % 74.2 %      Lymphocyte % 14.6 %      Monocyte  % 10.0 %      Eosinophil % 0.8 %      Basophil % 0.1 %      Immature Grans % 0.3 %      Neutrophils, Absolute 5.35 10*3/mm3      Lymphocytes, Absolute 1.05 10*3/mm3      Monocytes, Absolute 0.72 10*3/mm3      Eosinophils, Absolute 0.06 10*3/mm3      Basophils, Absolute 0.01 10*3/mm3      Immature Grans, Absolute 0.02 10*3/mm3     Urinalysis With Culture If Indicated - Urine, Clean Catch [[id number]]  (Normal) Collected: 03/03/25 1449    Specimen: Urine, Clean Catch Updated: 03/03/25 1501     Color, UA Yellow     Appearance, UA Clear     pH, UA 6.0     Specific Gravity, UA 1.010     Glucose, UA Negative     Ketones, UA Negative     Bilirubin, UA Negative     Blood, UA Negative     Protein, UA Negative     Leuk Esterase, UA Negative     Nitrite, UA Negative     Urobilinogen, UA 0.2 E.U./dL    Narrative:      In absence of clinical symptoms, the presence of pyuria, bacteria, and/or nitrites on the urinalysis result does not correlate with infection.  Urine microscopic not indicated.    Hemoglobin A1c [[id number]]  (Normal) Collected: 03/03/25 0652    Specimen: Blood Updated: 03/03/25 1004     Hemoglobin A1C 5.30 %     Narrative:      Hemoglobin A1C Ranges:    Increased Risk for Diabetes  5.7% to 6.4%  Diabetes                     >= 6.5%  Diabetic Goal                < 7.0%          Imaging Results (Last 72 Hours)       Procedure Component Value Units Date/Time    XR Abdomen 1 View [[id number]] Collected: 03/04/25 1435     Updated: 03/04/25 1439    Narrative:      EXAMINATION: XR ABDOMEN 1 VW-     3/4/2025 11:26 AM     HISTORY: Eval for ileus; K56.609-Unspecified intestinal obstruction,  unspecified as to partial versus complete obstruction     1 view abdomen.     COMPARISON:  3/2/2025 abdomen/pelvis CT.  7/15/2021 KUB.     Degenerative spine changes.  Large rim calcified gallstones.     Nonspecific bowel gas pattern.  No small bowel dilation.     Moderate fecal material within the cecum and ascending colon.  Air-filled  transverse and descending colon.       Impression:      1. Nonspecific bowel gas pattern.  2. No acute abnormality.           This report was signed and finalized on 3/4/2025 2:36 PM by Dr. Jim Izaguirre MD.       CT Abdomen Pelvis With Contrast [[id number]] Collected: 03/02/25 1438     Updated: 03/02/25 1449    Narrative:      EXAM/TECHNIQUE: CT abdomen and pelvis with IV contrast     INDICATION: Abdominal pain     COMPARISON: 12/20/2024     DLP: 429.03 mGy.cm. Automated exposure control was utilized to decrease  patient radiation dose.     FINDINGS:     Lung bases are clear. Heart is enlarged and there is dilatation of the  IVC and hepatic veins.     Slightly heterogeneous appearance of the liver. Mild hepatic steatosis.  No focal liver lesion. Multiple calcified gallstones in the gallbladder  lumen. No gallbladder wall thickening or adjacent fat stranding. No  biliary ductal dilatation.     Pancreas appears normal. Spleen is unremarkable. No adrenal gland  nodule.     No solid renal mass. No urolithiasis or hydronephrosis. No focal urinary  bladder wall thickening.      Postoperative change of left hemicolectomy. Colonic diverticulosis  without evidence of diverticulitis. Appendix is not identified and may  be surgically absent.      There is a small bowel obstruction with transition point in a left  paramidline ventral abdominal hernia. The upstream bowel is dilated and  contains fecalized material, measuring up to 4.2 cm diameter (image 19  series 3). There is some mild interloop edema. No pneumatosis, portal  venous gas, or evidence of pneumoperitoneum. Distal esophagus and  stomach are unremarkable.     No ascites or free pelvic fluid. Prior hysterectomy. No pelvic mass or  collection.     Abdominal aorta is nonaneurysmal. No abdominal or pelvic  lymphadenopathy.      There are also periumbilical and right lateral abdominal wall  fat-containing hernias present. No acute osseous finding.       Impression:          Acute small bowel obstruction with transition point in a left  paramidline ventral abdominal wall hernia.              This report was signed and finalized on 3/2/2025 2:46 PM by Dr. Andrew Gomez MD.                  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  Colace and MiraLAX started yesterday.  Patient passing flatus morning.  Continue importance of ambulation.  Patient will continue to work with PT.  Will continue to follow.    Discussed findings and treatment plan including risks, benefits, and treatment options with patient in detail. Patient agreed with treatment plan.      This document has been electronically signed by MARÍA Cornejo on 3/5/2025 09:25 CST

## 2025-03-05 NOTE — PLAN OF CARE
"Goal Outcome Evaluation:  Plan of Care Reviewed With: patient        Progress: improving  Outcome Evaluation: PT tx completed. Pt rates abdominal pnn 10/10 with mobility. States it \"burns\". She hold  pillow to her abdomen during walk. Amb Rianna 60'. Gait is very slow and gaurded due to pain.                             "

## 2025-03-05 NOTE — PROGRESS NOTES
[facility] Medicine Services  INPATIENT PROGRESS NOTE    Patient Name: Yun Blackwell  Date of Admission: 3/2/2025  Today's Date: 03/05/25  Length of Stay: 3  Primary Care Physician: ANGEL Healy MD    Subjective   Chief Complaint: abdominal pain      No acute events overnight.  POD 3 s/p ventral hernia repair.  Seen this morning sitting on recliner in no acute distress with binder around her torso which was provided to her by the physical therapist.  Is well managed.    Update: Had a bowel movement this afternoon.    Review of Systems   All pertinent negatives and positives are as above. All other systems have been reviewed and are negative unless otherwise stated.     Objective    Temp:  [97.9 °F (36.6 °C)-98.7 °F (37.1 °C)] 98.1 °F (36.7 °C)  Heart Rate:  [] 103  Resp:  [14-16] 16  BP: ()/(46-62) 109/50  Physical Exam  GEN: Awake, alert, interactive, in NAD on 2L  HEENT: Atraumatic, EOMI, Anicteric  Lungs: CTAB  Heart: RRR, +S1/s2, no rub  ABD:  Small incisions from surgery clean dry. Tenderness to mild palpation.  Bowel sounds heard on LUQ, but overall under active.  Extremities: atraumatic, no cyanosis, no edema  Skin: no rashes or lesions  Neuro: AAOx3, no focal deficits  Psych: Good mood, normal affect    Results Review:  I have reviewed the labs, radiology results, and diagnostic studies.    Laboratory Data:   Results from last 7 days   Lab Units 03/05/25 0414 03/04/25  1057 03/03/25  0652   WBC 10*3/mm3 5.67 7.21 10.55   HEMOGLOBIN g/dL 10.8* 11.7* 12.6   HEMATOCRIT % 34.4 36.7 39.6   PLATELETS 10*3/mm3 117* 135* 170        Results from last 7 days   Lab Units 03/05/25 0414 03/04/25  1057 03/03/25  0652 03/02/25  1358   SODIUM mmol/L 139 133* 136 138   POTASSIUM mmol/L 4.3 4.0 4.4 4.5   CHLORIDE mmol/L 105 99 101 102   CO2 mmol/L 28.0 28.0 25.0 24.0   BUN mg/dL 15 17 11 13   CREATININE mg/dL 0.58 0.60 0.45* 0.48*   CALCIUM mg/dL 8.4* 8.1* 8.8 9.7    BILIRUBIN mg/dL  --   --  1.3* 0.6   ALK PHOS U/L  --   --  75 96   ALT (SGPT) U/L  --   --  13 14   AST (SGOT) U/L  --   --  23 27   GLUCOSE mg/dL 85 101* 133* 127*       Culture Data:   [unfilled]    Radiology Data:   Imaging Results (Last 24 Hours)       Procedure Component Value Units Date/Time    XR Abdomen 1 View [[id number]] Collected: 03/04/25 1435     Updated: 03/04/25 1439    Narrative:      EXAMINATION: XR ABDOMEN 1 VW-     3/4/2025 11:26 AM     HISTORY: Eval for ileus; K56.609-Unspecified intestinal obstruction,  unspecified as to partial versus complete obstruction     1 view abdomen.     COMPARISON:  3/2/2025 abdomen/pelvis CT.  7/15/2021 KUB.     Degenerative spine changes.  Large rim calcified gallstones.     Nonspecific bowel gas pattern.  No small bowel dilation.     Moderate fecal material within the cecum and ascending colon.  Air-filled transverse and descending colon.       Impression:      1. Nonspecific bowel gas pattern.  2. No acute abnormality.           This report was signed and finalized on 3/4/2025 2:36 PM by Dr. Jim Izaguirre MD.               I have reviewed the patient's current medications.     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     Permanent atrial fibrillation        Treatment Plan    # Incarcerated ventral hernia s/p hernia repair 3/2/2025  # SBO 2/2 above  - Continue pain management with Norco and Dilaudid  - Bowel management as needed  - Continue full liquid diet, advance per surgery recommendations  - PT/OT    # UTI  U/A: Positive leukocytes, 11-20 RBC, 3-5 WBC, 3+ bacteria  Rocephin started yesterday on admission however not given  - continue rocephin day 3/3    # Atrial fibrillation  [id number]  Was on  Cardizem and Pradaxa  Received 1 dose of idarucizumab after surgery  - Continue home Cardizem 120 mg daily  - will resume anticoagulation tomorrow     # History of breast cancer - on tamoxifen daily  # Diabetes mellitus - HgbA1c 5.3%. Monitor hyperglycemia due to recent steroids prescribed by PCP for neck pain.  Hyperglycemia improving  # Hypertension -controlled  # Sleep apnea  # Pulmonary hypertension  Echo 1/29/24: EF 51 to 55%.  Severe biatrial enlargement.  Moderate TVR.  No other significant valvular pathology.  - continue CPAP      Medical Decision Making  Number and Complexity of problems: 2 acute moderate complexity, others chronic  Differential Diagnosis: As above    Conditions and Status        Improving.     MDM Data  External documents reviewed: Reviewed  Cardiac tracing (EKG, telemetry) interpretation: Reviewed  Radiology interpretation: Reviewed  Labs reviewed: As above  Any tests that were considered but not ordered: None     Decision rules/scores evaluated (example [id number], Wells, etc): No data recorded        Discussed with: Patient     Care Planning  Shared decision making: Patient apprised of current labs, vitals, imaging and treatment plan.  They are agreeable with proceeding with plans as discussed.   Code status and discussions: CPR    Disposition  Social Determinants of Health that impact treatment or disposition: None  I expect the patient to be discharged to home in 1 day.         Electronically signed by Jose Raul Archuleta MD, 03/05/25, 09:25 CST.

## 2025-03-05 NOTE — THERAPY TREATMENT NOTE
Acute Care - Physical Therapy Treatment Note  [facility]     Patient Name: Yun Blackwell  : 1955  MRN: [id number]  Today's Date: 3/5/2025      Visit Dx:     ICD-10-CM ICD-9-CM   1. Small bowel obstruction  K56.609 560.9   2. 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 breast    Precordial pain    COVID-19 vaccine dose declined    LEROY on CPAP    Prediabetes    Nonunion after arthrodesis    Hammertoe of left foot    BRBPR (bright red blood per rectum)    Full incontinence of feces    Abnormal mammogram    CPAP (continuous positive airway pressure) dependence    Difficulty with CPAP nasal mask use    Personal history of breast cancer    Mammographic microcalcification    Genetic susceptibility to other malignant neoplasm    Dry mouth    GI bleeding    SBO (small bowel obstruction)    Incarcerated ventral hernia     Past Medical History:   Diagnosis Date    A-fib     Abnormal ECG     tachycardia, a fib    Acid reflux     Anemia     Arthritis     Cancer     Diabetes mellitus     Hyperkalemia     Hyperlipidemia     Hypertension     Hyponatremia     IBS (irritable bowel syndrome)     PONV (postoperative nausea and vomiting)     Sleep apnea     USES CPAP    UTI (urinary tract infection)     UTI (urinary tract infection) 2016    Vaginal vault prolapse      Past Surgical History:   Procedure Laterality Date    BREAST BIOPSY Left     X2     BUNIONECTOMY Left     CARDIAC CATHETERIZATION      CARDIAC CATHETERIZATION N/A 02/05/2021    Procedure: Right Heart Cath;  Surgeon: Van Marcelino MD;  Location:  [facility] CATH [facility]AS[facility];  Service: Cardiology;  Laterality: N/A;    CATARACT EXTRACTION, BILATERAL      COLON SURGERY      COLONOSCOPY  09/21/2015    TRANSVERSE COLON ADENOMATOUS POLYP, HEMORRHOIDS, RECALL 5 YEARS, DR LAMAS    COLONOSCOPY N/A 11/13/2020    6 mm tubular adenomatous polyp at 15 cm proximal to the anus,diverticulosis of the sigmoid colon    COLONOSCOPY N/A 12/13/2023    - One 4 mm polyp at 40 cm proximal to the anus,. - One 4 mm polyp at 20 cm proximal to the anus, removed with a cold snare. Resected and retrieved. - Diverticulosis in the sigmoid colon. - Hemorrhoids----tubular adenoma no evidence of high grade dysplasia    COLONOSCOPY N/A 12/22/2024    Dr. Ludwig-- The examined portion of the ileum was normal. - Diverticulosis in the entire examined colon. - Non-bleeding internal hemorrhoids. - No specimens collected.    COLOSTOMY      COLPOPEXY VAGINAL      2014    ENDOSCOPY N/A 11/03/2016    LA GRADE B ESOPHAGITIS WITH NO BLEEDING UPPER THIRD OF ESOPHAGUS, GERI-[place]AD[place]TH TEAR GEJ, BILIOUS BLOOD TINGED COFFEE GROUND GATRIC FLUIDDR ADAMS    ENDOSCOPY N/A 12/13/2023    Normal examined duodenum. - Normal stomach. Biopsied. - Z-line regular, 35 cm from the incisors. Dilated.-neg for h pylori    EXPLORATORY LAPAROTOMY N/A 10/14/2016    Procedure: LAPAROTOMY EXPLORATORY, LYSIS OF ADHESIONS, IRRIAGATION OF ABDOMEN, POSSIBLE BOWEL RESECTION;  Surgeon: Bacilio Marcial MD;  Location:  [facility] OR;  Service:     FOOT NAVICULAR EXCISION OR BONE GRAFT Left 01/04/2023    Procedure: Expansion Graft, Harvesting of Bone Graft/Bone Marrow Aspirate;  Surgeon: Frankie Zapata DPM;  Location:  [facility] OR;  Service: Podiatry;  Laterality: Left;    HAMMER TOE REPAIR Left 01/04/2023    Procedure: Hammertoe Repair 2 and 3 - Left Foot;  Surgeon: Benny  Frankie DUMONT DPM;  Location:  [facility] OR;  Service: Podiatry;  Laterality: Left;    HARDWARE REMOVAL Left 01/04/2023    Procedure: Hardware Removal;  Surgeon: rFankie Zapata DPM;  Location:  [facility] OR;  Service: Podiatry;  Laterality: Left;    HYSTERECTOMY      REVISION / TAKEDOWN COLOSTOMY      SACROCOLPOPEXY N/A 09/21/2016    Procedure: SACROCOLPOPEXY LAPAROSCOPIC WITH [employer] SI ROBOT, CONVERTED TO OPEN SACROCOLPOPEXY, RIGHT SALPINGO- OOPHERECTOMY, CYSTO;  Surgeon: Maribell Beth MD;  Location:  [facility] OR;  Service:     TOE FUSION Left 01/06/2022    Procedure: FIRST METATARSOPHALANGEAL JOINT ARTHRODESIS WITH INTERNAL FIXATION - LEFT FOOT;  Surgeon: Jorgito Red DPM;  Location:  [facility] OR;  Service: Podiatry;  Laterality: Left;    TOE FUSION Left 01/04/2023    Procedure: Revisional 1st Metatarsophalangeal Joint Arthrodesis with Hardware Removal and Expansion Graft, Harvesting of Bone Graft/Bone Marrow Aspirate, Hammertoe Repair 2 and 3 - Left Foot;  Surgeon: Frankie Zapata DPM;  Location:  [facility];  Service: Podiatry;  Laterality: Left;    VENTRAL HERNIA REPAIR N/A 3/2/2025    Procedure: VENTRAL / INCISIONAL HERNIA REPAIR LAPAROSCOPIC WITH [employer] ROBOT;  Surgeon: Jarrett Bland MD;  Location:  [facility] OR;  Service: Robotics - DaVinci;  Laterality: N/A;     PT Assessment (Last 12 Hours)       PT Evaluation and Treatment       Row Name 03/05/25 0987          Physical Therapy Time and Intention    Subjective Information complains of;pain  -KJ     Document Type therapy note (daily note)  -KJ     Mode of Treatment physical therapy  -KJ     Patient Effort good  -KJ       Row Name 03/05/25 0941          General Information    Existing Precautions/Restrictions fall  abdominal sx; holds pillow to abdomen during walk  -KJ       Row Name 03/05/25 0946          Pain    Pretreatment Pain Rating 6/10  -KJ     Posttreatment Pain Rating 10/10  -KJ     Pain Location abdomen  -KJ     Pain Side/Orientation left  -KJ       Row  "Name 03/05/25 0948          Bed Mobility    Comment, (Bed Mobility) sitting bedside  -KJ       Row Name 03/05/25 0948          Sit-Stand Transfer    Sit-Stand [place] (Transfers) minimum assist (75% patient effort)  -KJ       Row Name 03/05/25 0948          Gait/Stairs (Locomotion)    [place] Level (Gait) verbal cues;minimum assist (75% patient effort)  -KJ     Distance in Feet (Gait) 30  x 2  -KJ     Deviations/Abnormal Patterns (Gait) gait speed decreased;stride length decreased  -KJ     Bilateral Gait Deviations forward flexed posture  -KJ     Comment, (Gait/Stairs) very gaurded during mobility  -KJ       Row Name             Wound 03/02/25 1818 abdomen    Wound - Properties Group Placement Date: 03/02/25  -HW Placement Time: 1818 -HW Location: abdomen  -HW Primary Wound Type: Incision  -HW    Retired Wound - Properties Group Placement Date: 03/02/25  - Placement Time: 1818 -HW Location: abdomen  -HW Primary Wound Type: Incision  -HW    Retired Wound - Properties Group Placement Date: 03/02/25  -HW Placement Time: 1818 -HW Location: abdomen  -HW Primary Wound Type: Incision  -HW    Retired Wound - Properties Group Date first assessed: 03/02/25  - Time first assessed: 1818 -HW Location: abdomen  -HW Primary Wound Type: Incision  -HW      Row Name 03/05/25 0948          Positioning and Restraints    Pre-Treatment Position in bed  -KJ     Post Treatment Position chair  -KJ     In Chair call light within reach  -KJ               User Key  (r) = Recorded By, (t) = Taken By, (c) = Cosigned By      Initials Name Provider Type    KJ Maki Kidd PTA Physical Therapist Assistant     Juan Ramon Pool, RN Registered Nurse                    Physical Therapy Education        No education to display                  PT Recommendation and Plan     Progress: improving  Outcome Evaluation: PT tx completed. Pt rates abdominal pnn 10/10 with mobility. States it \"burns\". She hold  pillow to her abdomen " during walk. Amb Rianna 60'. Gait is very slow and gaurded due to pain.       Time Calculation:    PT Charges       Row Name 03/05/25 1031             Time Calculation    Start Time 0948  -KJ      Stop Time 1031  -KJ      Time Calculation (min) 43 min  -KJ      PT Received On 03/05/25  -KJ      PT Goal Re-Cert Due Date 03/14/25  -KJ         Time Calculation- PT    Total Timed Code Minutes- PT 43 minute(s)  -KJ                User Key  (r) = Recorded By, (t) = Taken By, (c) = Cosigned By      Initials Name Provider Type    Maki Garcia PTA Physical Therapist Assistant                  Therapy Charges for Today       Code Description Service Date Service Provider Modifiers Qty    83466658274 HC GAIT TRAINING EA 15 MIN 3/5/2025 Maki Kidd PTA GP 2    75316679208 HC PT THERAPEUTIC ACT EA 15 MIN 3/5/2025 Maki Kidd PTA GP 1            PT G-Codes  Outcome Measure Options: AM-PAC 6 Clicks Basic Mobility (PT)  AM-PAC 6 Clicks Score (PT): 17    Maki Kidd PTA  3/5/2025

## 2025-03-05 NOTE — PLAN OF CARE
Goal Outcome Evaluation:  Plan of Care Reviewed With: patient        Progress: improving  Outcome Evaluation: Pt A&OX4. VSS on 2L via NC. No C/O pain reported. 4 exlap sites to ABD. IV to Rt FA infusing LR @ 75 ml/hr. Voiding via PW. Call light within reach, safety maintained.

## 2025-03-06 ENCOUNTER — APPOINTMENT (OUTPATIENT)
Dept: GENERAL RADIOLOGY | Facility: HOSPITAL | Age: 70
DRG: 336 | End: 2025-03-06
Payer: MEDICARE

## 2025-03-06 LAB
ANION GAP SERPL CALCULATED.3IONS-SCNC: 7 MMOL/L (ref 5–15)
BASOPHILS # BLD AUTO: 0.02 10*3/MM3 (ref 0–0.2)
BASOPHILS NFR BLD AUTO: 0.3 % (ref 0–1.5)
BUN SERPL-MCNC: 14 MG/DL (ref 8–23)
BUN/CREAT SERPL: 26.9 (ref 7–25)
CALCIUM SPEC-SCNC: 8.4 MG/DL (ref 8.6–10.5)
CHLORIDE SERPL-SCNC: 105 MMOL/L (ref 98–107)
CO2 SERPL-SCNC: 28 MMOL/L (ref 22–29)
CREAT SERPL-MCNC: 0.52 MG/DL (ref 0.57–1)
DEPRECATED RDW RBC AUTO: 48.2 FL (ref 37–54)
EGFRCR SERPLBLD CKD-EPI 2021: 99.5 ML/MIN/1.73
EOSINOPHIL # BLD AUTO: 0.15 10*3/MM3 (ref 0–0.4)
EOSINOPHIL NFR BLD AUTO: 2.4 % (ref 0.3–6.2)
ERYTHROCYTE [DISTWIDTH] IN BLOOD BY AUTOMATED COUNT: 14.1 % (ref 12.3–15.4)
GLUCOSE SERPL-MCNC: 90 MG/DL (ref 65–99)
HCT VFR BLD AUTO: 35.2 % (ref 34–46.6)
HGB BLD-MCNC: 11.2 G/DL (ref 12–15.9)
IMM GRANULOCYTES # BLD AUTO: 0.02 10*3/MM3 (ref 0–0.05)
IMM GRANULOCYTES NFR BLD AUTO: 0.3 % (ref 0–0.5)
LYMPHOCYTES # BLD AUTO: 1.15 10*3/MM3 (ref 0.7–3.1)
LYMPHOCYTES NFR BLD AUTO: 18.7 % (ref 19.6–45.3)
MCH RBC QN AUTO: 29.9 PG (ref 26.6–33)
MCHC RBC AUTO-ENTMCNC: 31.8 G/DL (ref 31.5–35.7)
MCV RBC AUTO: 94.1 FL (ref 79–97)
MONOCYTES # BLD AUTO: 0.67 10*3/MM3 (ref 0.1–0.9)
MONOCYTES NFR BLD AUTO: 10.9 % (ref 5–12)
NEUTROPHILS NFR BLD AUTO: 4.13 10*3/MM3 (ref 1.7–7)
NEUTROPHILS NFR BLD AUTO: 67.4 % (ref 42.7–76)
PLATELET # BLD AUTO: 127 10*3/MM3 (ref 140–450)
PMV BLD AUTO: 10.9 FL (ref 6–12)
POTASSIUM SERPL-SCNC: 4.1 MMOL/L (ref 3.5–5.2)
RBC # BLD AUTO: 3.74 10*6/MM3 (ref 3.77–5.28)
SODIUM SERPL-SCNC: 140 MMOL/L (ref 136–145)
WBC NRBC COR # BLD AUTO: 6.14 10*3/MM3 (ref 3.4–10.8)

## 2025-03-06 PROCEDURE — 25010000002 KETOROLAC TROMETHAMINE PER 15 MG: Performed by: SURGERY

## 2025-03-06 PROCEDURE — 25010000002 HYDROMORPHONE 1 MG/ML SOLUTION: Performed by: SURGERY

## 2025-03-06 PROCEDURE — 80048 BASIC METABOLIC PNL TOTAL CA: CPT | Performed by: STUDENT IN AN ORGANIZED HEALTH CARE EDUCATION/TRAINING PROGRAM

## 2025-03-06 PROCEDURE — 25010000002 ONDANSETRON PER 1 MG: Performed by: FAMILY MEDICINE

## 2025-03-06 PROCEDURE — 74018 RADEX ABDOMEN 1 VIEW: CPT

## 2025-03-06 PROCEDURE — 97116 GAIT TRAINING THERAPY: CPT

## 2025-03-06 PROCEDURE — 85025 COMPLETE CBC W/AUTO DIFF WBC: CPT | Performed by: STUDENT IN AN ORGANIZED HEALTH CARE EDUCATION/TRAINING PROGRAM

## 2025-03-06 PROCEDURE — 99231 SBSQ HOSP IP/OBS SF/LOW 25: CPT | Performed by: NURSE PRACTITIONER

## 2025-03-06 RX ORDER — HYDROCODONE BITARTRATE AND ACETAMINOPHEN 5; 325 MG/1; MG/1
1 TABLET ORAL EVERY 4 HOURS PRN
Status: CANCELLED | OUTPATIENT
Start: 2025-03-06 | End: 2025-03-11

## 2025-03-06 RX ORDER — HYDROCODONE BITARTRATE AND ACETAMINOPHEN 10; 325 MG/1; MG/1
1 TABLET ORAL EVERY 4 HOURS PRN
Status: CANCELLED | OUTPATIENT
Start: 2025-03-06 | End: 2025-03-07

## 2025-03-06 RX ORDER — LIDOCAINE 4 G/G
1 PATCH TOPICAL
Qty: 10 EACH | Refills: 0 | Status: SHIPPED | OUTPATIENT
Start: 2025-03-06

## 2025-03-06 RX ADMIN — Medication 10 ML: at 10:23

## 2025-03-06 RX ADMIN — GABAPENTIN 100 MG: 100 CAPSULE ORAL at 13:26

## 2025-03-06 RX ADMIN — DILTIAZEM HYDROCHLORIDE 120 MG: 120 CAPSULE, EXTENDED RELEASE ORAL at 09:22

## 2025-03-06 RX ADMIN — TAMOXIFEN CITRATE 20 MG: 20 TABLET ORAL at 09:24

## 2025-03-06 RX ADMIN — GABAPENTIN 100 MG: 100 CAPSULE ORAL at 21:00

## 2025-03-06 RX ADMIN — FAMOTIDINE 20 MG: 10 INJECTION INTRAVENOUS at 20:58

## 2025-03-06 RX ADMIN — HYDROMORPHONE HYDROCHLORIDE 0.75 MG: 1 INJECTION, SOLUTION INTRAMUSCULAR; INTRAVENOUS; SUBCUTANEOUS at 10:22

## 2025-03-06 RX ADMIN — DOCUSATE SODIUM 100 MG: 100 CAPSULE, LIQUID FILLED ORAL at 09:22

## 2025-03-06 RX ADMIN — CYCLOBENZAPRINE HYDROCHLORIDE 5 MG: 10 TABLET, FILM COATED ORAL at 20:58

## 2025-03-06 RX ADMIN — POLYETHYLENE GLYCOL 3350 17 G: 17 POWDER, FOR SOLUTION ORAL at 09:23

## 2025-03-06 RX ADMIN — HYDROCODONE BITARTRATE AND ACETAMINOPHEN 1 TABLET: 10; 325 TABLET ORAL at 16:26

## 2025-03-06 RX ADMIN — FAMOTIDINE 20 MG: 10 INJECTION INTRAVENOUS at 09:23

## 2025-03-06 RX ADMIN — CYCLOBENZAPRINE HYDROCHLORIDE 5 MG: 10 TABLET, FILM COATED ORAL at 09:19

## 2025-03-06 RX ADMIN — Medication 10 ML: at 21:01

## 2025-03-06 RX ADMIN — HYDROCODONE BITARTRATE AND ACETAMINOPHEN 1 TABLET: 10; 325 TABLET ORAL at 11:27

## 2025-03-06 RX ADMIN — LIDOCAINE 1 PATCH: 4 PATCH TOPICAL at 09:24

## 2025-03-06 RX ADMIN — KETOROLAC TROMETHAMINE 15 MG: 30 INJECTION, SOLUTION INTRAMUSCULAR; INTRAVENOUS at 13:26

## 2025-03-06 RX ADMIN — GABAPENTIN 100 MG: 100 CAPSULE ORAL at 05:01

## 2025-03-06 RX ADMIN — CYCLOBENZAPRINE HYDROCHLORIDE 5 MG: 10 TABLET, FILM COATED ORAL at 16:26

## 2025-03-06 RX ADMIN — DOCUSATE SODIUM 100 MG: 100 CAPSULE, LIQUID FILLED ORAL at 20:58

## 2025-03-06 RX ADMIN — ONDANSETRON 4 MG: 2 INJECTION INTRAMUSCULAR; INTRAVENOUS at 13:26

## 2025-03-06 RX ADMIN — KETOROLAC TROMETHAMINE 15 MG: 30 INJECTION, SOLUTION INTRAMUSCULAR; INTRAVENOUS at 03:02

## 2025-03-06 RX ADMIN — Medication 10 ML: at 09:25

## 2025-03-06 RX ADMIN — Medication 10 ML: at 13:26

## 2025-03-06 RX ADMIN — KETOROLAC TROMETHAMINE 15 MG: 30 INJECTION, SOLUTION INTRAMUSCULAR; INTRAVENOUS at 09:23

## 2025-03-06 NOTE — PROGRESS NOTES
[facility] Medicine Services  INPATIENT PROGRESS NOTE    Patient Name: Yun Blackwell  Date of Admission: 3/2/2025  Today's Date: 03/06/25  Length of Stay: 4  Primary Care Physician: ANGEL Healy MD    Subjective   Chief Complaint: abdominal pain      POD 4 s/p ventral hernia repair.  2 bowel movements yesterday 3/5/2025.    Unfortunately she had a bad night due to uncontrolled pain.  She was given medications and was under the impression that her pain meds were part of them, so she did not know that she had to ask for the PRN Norco.  This morning she is in a lot of pain, given 1 dose of Dilaudid.  We are planning on discharging her however her pain is uncontrolled and later in the afternoon she also developed an episode of vomiting.  KUB ordered.  Discharge canceled.      Review of Systems   All pertinent negatives and positives are as above. All other systems have been reviewed and are negative unless otherwise stated.     Objective    Temp:  [97.5 °F (36.4 °C)-98.3 °F (36.8 °C)] 97.5 °F (36.4 °C)  Heart Rate:  [64-89] 64  Resp:  [16] 16  BP: (102-133)/(55-85) 122/75  Physical Exam  GEN: Awake, alert, interactive, in NAD on 2L  HEENT: Atraumatic, EOMI, Anicteric  Lungs: CTAB  Heart: RRR, +S1/s2, no rub  ABD:  Small incisions from surgery clean dry. Tenderness to mild palpation.  Bowel sounds heard on LUQ, but overall under active.  Extremities: atraumatic, no cyanosis, no edema  Skin: no rashes or lesions  Neuro: AAOx3, no focal deficits  Psych: Good mood, normal affect    Results Review:  I have reviewed the labs, radiology results, and diagnostic studies.    Laboratory Data:   Results from last 7 days   Lab Units 03/06/25  0430 03/05/25 0414 03/04/25  1057   WBC 10*3/mm3 6.14 5.67 7.21   HEMOGLOBIN g/dL 11.2* 10.8* 11.7*   HEMATOCRIT % 35.2 34.4 36.7   PLATELETS 10*3/mm3 127* 117* 135*        Results from last 7 days   Lab Units 03/06/25  0430 03/05/25 0414  03/04/25  1057 03/03/25  0652 03/02/25  1358   SODIUM mmol/L 140 139 133* 136 138   POTASSIUM mmol/L 4.1 4.3 4.0 4.4 4.5   CHLORIDE mmol/L 105 105 99 101 102   CO2 mmol/L 28.0 28.0 28.0 25.0 24.0   BUN mg/dL 14 15 17 11 13   CREATININE mg/dL 0.52* 0.58 0.60 0.45* 0.48*   CALCIUM mg/dL 8.4* 8.4* 8.1* 8.8 9.7   BILIRUBIN mg/dL  --   --   --  1.3* 0.6   ALK PHOS U/L  --   --   --  75 96   ALT (SGPT) U/L  --   --   --  13 14   AST (SGOT) U/L  --   --   --  23 27   GLUCOSE mg/dL 90 85 101* 133* 127*       Culture Data:   @[facility]@    Radiology Data:   Imaging Results (Last 24 Hours)       ** No results found for the last 24 hours. **            I have reviewed the patient's current medications.     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     Permanent atrial fibrillation        Treatment Plan    # Incarcerated ventral hernia s/p hernia repair 3/2/2025  # SBO 2/2 above  - Continue pain management with Norco and Dilaudid  - Bowel management as needed  - Continue full liquid diet, advance per surgery recommendations  - PT/OT  - Episode of vomiting this afternoon, KUB ordered    # UTI  U/A: Positive leukocytes, 11-20 RBC, 3-5 WBC, 3+ bacteria  Rocephin started yesterday on admission however not given  - Completed 3 days of rocephin     # Atrial fibrillation  [id number] 4  Was on Cardizem and Pradaxa  Received 1 dose of idarucizumab after surgery  - Continue home Cardizem 120 mg daily  - Home Pradaxa resumed today    # History of breast cancer - on tamoxifen daily  # Diabetes mellitus - HgbA1c 5.3%. Monitor hyperglycemia due to recent steroids prescribed by PCP for neck pain.  Hyperglycemia improving  # Hypertension -controlled  # Sleep apnea  # Pulmonary hypertension  Echo 1/29/24: EF 51 to 55%.  Severe biatrial enlargement.   Moderate TVR.  No other significant valvular pathology.  - continue CPAP      Medical Decision Making  Number and Complexity of problems: 2 acute moderate complexity, others chronic  Differential Diagnosis: As above    Conditions and Status        Improving.     MDM Data  External documents reviewed: Reviewed  Cardiac tracing (EKG, telemetry) interpretation: Reviewed  Radiology interpretation: Reviewed  Labs reviewed: As above  Any tests that were considered but not ordered: None     Decision rules/scores evaluated (example [id number], Wells, etc): No data recorded        Discussed with: Patient     Care Planning  Shared decision making: Patient apprised of current labs, vitals, imaging and treatment plan.  They are agreeable with proceeding with plans as discussed.   Code status and discussions: CPR    Disposition  Social Determinants of Health that impact treatment or disposition: None  I expect the patient to be discharged to home in 1 day.         Electronically signed by Jose Raul Archuleta MD, 03/06/25, 17:55 CST.

## 2025-03-06 NOTE — THERAPY TREATMENT NOTE
Acute Care - Physical Therapy Treatment Note  [facility]     Patient Name: Yun Blackwell  : 1955  MRN: [id number]  Today's Date: 3/6/2025      Visit Dx:     ICD-10-CM ICD-9-CM   1. Small bowel obstruction  K56.609 560.9   2. Impaired functional mobility and activity tolerance [Z74.09]  Z74.09 V49.89   3. 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    ASD secundum    Pulmonary hypertension    Atypical ductal hyperplasia of breast    Ductal carcinoma in situ (DCIS) of left breast    Precordial pain    COVID-19 vaccine dose declined    LEROY on CPAP    Prediabetes    Nonunion after arthrodesis    Hammertoe of left foot    BRBPR (bright red blood per rectum)    Full incontinence of feces    Abnormal mammogram    Difficulty with CPAP nasal mask use    Personal history of breast cancer    Mammographic microcalcification    Genetic susceptibility to other malignant neoplasm    Dry mouth    GI bleeding    SBO (small bowel obstruction)    Incarcerated ventral hernia     Past Medical History:   Diagnosis Date    A-fib     Abnormal ECG     tachycardia, a fib    Acid reflux     Anemia     Arthritis     Cancer     Diabetes mellitus     Hyperkalemia     Hyperlipidemia     Hypertension     Hyponatremia     IBS (irritable bowel syndrome)     PONV (postoperative nausea and vomiting)     Sleep apnea     USES CPAP    UTI (urinary tract infection)     UTI (urinary tract infection) 2016    Vaginal vault prolapse      Past Surgical History:   Procedure Laterality Date    BREAST BIOPSY Left     X2    BUNIONECTOMY  Left     CARDIAC CATHETERIZATION      CARDIAC CATHETERIZATION N/A 02/05/2021    Procedure: Right Heart Cath;  Surgeon: Van Marcelino MD;  Location:  [facility];  Service: Cardiology;  Laterality: N/A;    CATARACT EXTRACTION, BILATERAL      COLON SURGERY      COLONOSCOPY  09/21/2015    TRANSVERSE COLON ADENOMATOUS POLYP, HEMORRHOIDS, RECALL 5 YEARS, DR LAMAS    COLONOSCOPY N/A 11/13/2020    6 mm tubular adenomatous polyp at 15 cm proximal to the anus,diverticulosis of the sigmoid colon    COLONOSCOPY N/A 12/13/2023    - One 4 mm polyp at 40 cm proximal to the anus,. - One 4 mm polyp at 20 cm proximal to the anus, removed with a cold snare. Resected and retrieved. - Diverticulosis in the sigmoid colon. - Hemorrhoids----tubular adenoma no evidence of high grade dysplasia    COLONOSCOPY N/A 12/22/2024    Dr. Ludwig-- The examined portion of the ileum was normal. - Diverticulosis in the entire examined colon. - Non-bleeding internal hemorrhoids. - No specimens collected.    COLOSTOMY      COLPOPEXY VAGINAL      2014    ENDOSCOPY N/A 11/03/2016    LA GRADE B ESOPHAGITIS WITH NO BLEEDING UPPER THIRD OF ESOPHAGUS, GERI-[place]AD[place]ORTH TEAR GEJ, BILIOUS BLOOD TINGED COFFEE GROUND GATRIC FLUIDDR ADAMS    ENDOSCOPY N/A 12/13/2023    Normal examined duodenum. - Normal stomach. Biopsied. - Z-line regular, 35 cm from the incisors. Dilated.-neg for h pylori    EXPLORATORY LAPAROTOMY N/A 10/14/2016    Procedure: LAPAROTOMY EXPLORATORY, LYSIS OF ADHESIONS, IRRIAGATION OF ABDOMEN, POSSIBLE BOWEL RESECTION;  Surgeon: Bacilio Marcial MD;  Location:  [facility] OR;  Service:     FOOT NAVICULAR EXCISION OR BONE GRAFT Left 01/04/2023    Procedure: Expansion Graft, Harvesting of Bone Graft/Bone Marrow Aspirate;  Surgeon: Frankie Zapata DPM;  Location:  [facility] OR;  Service: Podiatry;  Laterality: Left;    HAMMER TOE REPAIR Left 01/04/2023    Procedure: Hammertoe Repair 2 and 3 - Left Foot;  Surgeon: Frankie Zapata,  FAMILIA;  Location:  [facility];  Service: Podiatry;  Laterality: Left;    HARDWARE REMOVAL Left 01/04/2023    Procedure: Hardware Removal;  Surgeon: Frankie Zapata DPM;  Location:  [facility];  Service: Podiatry;  Laterality: Left;    HYSTERECTOMY      REVISION / TAKEDOWN COLOSTOMY      SACROCOLPOPEXY N/A 09/21/2016    Procedure: SACROCOLPOPEXY LAPAROSCOPIC WITH HEALBEI SI ROBOT, CONVERTED TO OPEN SACROCOLPOPEXY, RIGHT SALPINGO- OOPHERECTOMY, CYSTO;  Surgeon: Maribell Beth MD;  Location:  [facility];  Service:     TOE FUSION Left 01/06/2022    Procedure: FIRST METATARSOPHALANGEAL JOINT ARTHRODESIS WITH INTERNAL FIXATION - LEFT FOOT;  Surgeon: Jorgito Red DPM;  Location:  [facility];  Service: Podiatry;  Laterality: Left;    TOE FUSION Left 01/04/2023    Procedure: Revisional 1st Metatarsophalangeal Joint Arthrodesis with Hardware Removal and Expansion Graft, Harvesting of Bone Graft/Bone Marrow Aspirate, Hammertoe Repair 2 and 3 - Left Foot;  Surgeon: Frankie Zapata DPM;  Location:  [facility];  Service: Podiatry;  Laterality: Left;    VENTRAL HERNIA REPAIR N/A 3/2/2025    Procedure: VENTRAL / INCISIONAL HERNIA REPAIR LAPAROSCOPIC WITH [employer] ROBOT;  Surgeon: Jarrett Bland MD;  Location:  [facility];  Service: Robotics - Da[employer]inci;  Laterality: N/A;     PT Assessment (Last 12 Hours)       PT Evaluation and Treatment       Row Name 03/06/25 1359          Physical Therapy Time and Intention    Subjective Information complains of;nausea/vomiting  -KJ     Document Type therapy note (daily note)  -KJ     Mode of Treatment physical therapy  -KJ     Patient Effort good  -KJ       Row Name 03/06/25 1350          General Information    Existing Precautions/Restrictions fall  abdominal binder  -KJ       Row Name 03/06/25 1350          Pain    Pretreatment Pain Rating 4/10  -KJ     Posttreatment Pain Rating 7/10  -KJ     Pain Location abdomen  -KJ     Pain Side/Orientation left  -KJ       Row Name 03/06/25 1350          Bed  "Mobility    Sit-Supine [place] (Bed Mobility) verbal cues;contact guard  -KJ       Row Name 03/06/25 1350          Sit-Stand Transfer    Sit-Stand [place] (Transfers) verbal cues;contact guard;minimum assist (75% patient effort)  -KJ       Row Name 03/06/25 1350          Gait/Stairs (Locomotion)    [place] Level (Gait) verbal cues;contact guard;minimum assist (75% patient effort)  -KJ     Assistive Device (Gait) walker, front-wheeled  -KJ     Distance in Feet (Gait) 100  -KJ     Deviations/Abnormal Patterns (Gait) gait speed decreased  -KJ     Comment, (Gait/Stairs) had to stop due to nausea  -KJ       Row Name             Wound 03/02/25 1818 abdomen    Wound - Properties Group Placement Date: 03/02/25  -HW Placement Time: 1818 -HW Location: abdomen  -HW Primary Wound Type: Incision  -HW    Retired Wound - Properties Group Placement Date: 03/02/25  - Placement Time: 1818 -HW Location: abdomen  -HW Primary Wound Type: Incision  -HW    Retired Wound - Properties Group Placement Date: 03/02/25  -HW Placement Time: 1818 -HW Location: abdomen  -HW Primary Wound Type: Incision  -HW    Retired Wound - Properties Group Date first assessed: 03/02/25  - Time first assessed: 1818 -HW Location: abdomen  -HW Primary Wound Type: Incision  -HW      Row Name 03/06/25 1350          Positioning and Restraints    Pre-Treatment Position in bed  -KJ     Post Treatment Position bed  -KJ     In Bed call light within reach  -KJ               User Key  (r) = Recorded By, (t) = Taken By, (c) = Cosigned By      Initials Name Provider Type    Maki Garcia PTA Physical Therapist Assistant     Juan Ramon Pool, RN Registered Nurse                    Physical Therapy Education        No education to display                  PT Recommendation and Plan     Progress: improving  Outcome Evaluation: PT tx completed. Pt rates abdominal pnn 10/10 with mobility. States it \"burns\". She hold  pillow to her abdomen during " walk. Amb Rianna 60'. Gait is very slow and gaurded due to pain.       Time Calculation:    PT Charges       Row Name 03/06/25 1418             Time Calculation    Start Time 1350  -KJ      Stop Time 1418  -KJ      Time Calculation (min) 28 min  -KJ      PT Received On 03/06/25  -KJ      PT Goal Re-Cert Due Date 03/14/25  -KJ         Time Calculation- PT    Total Timed Code Minutes- PT 28 minute(s)  -KJ                User Key  (r) = Recorded By, (t) = Taken By, (c) = Cosigned By      Initials Name Provider Type    Maki Garcia PTA Physical Therapist Assistant                  Therapy Charges for Today       Code Description Service Date Service Provider Modifiers Qty    24774247175 HC GAIT TRAINING EA 15 MIN 3/5/2025 Maki Kidd, LEAH GP 2    08227632583 HC PT THERAPEUTIC ACT EA 15 MIN 3/5/2025 Maki Kidd, PTA GP 1    96854786179 HC GAIT TRAINING EA 15 MIN 3/6/2025 Maki Kidd, LEAH GP 2            PT G-Codes  Outcome Measure Options: AM-PAC 6 Clicks Basic Mobility (PT)  AM-PAC 6 Clicks Score (PT): 17    Maki Kidd PTA  3/6/2025

## 2025-03-06 NOTE — PROGRESS NOTES
[facility] GENERAL SURGERY    PROGRESS NOTE    Today's Date: 03/06/25    Patient Name: Yun Blackwell  MRN: [id number]  CSN: [id number]  PCP: ANGEL Healy MD  Attending Provider: Jose Raul Archuleta MD  Length of Stay: 4    SUBJECTIVE     Yun Blackwell is resting in bed. Patient reports she has had a BM and is having gas this morning.  Patient has continued working with PT. She reports improvement in pain and nausea. She is still having some pain with movement and slight nausea with meals.    Visit Dx:    ICD-10-CM ICD-9-CM   1. Small bowel obstruction  K56.609 560.9   2. Impaired functional mobility and activity tolerance [Z74.09]  Z74.09 V49.89   3. 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    Pulmonary hypertension    LEROY on CPAP    Incarcerated ventral hernia      History:   Past Medical History:   Diagnosis Date    A-fib     Abnormal ECG     tachycardia, a fib    Acid reflux     Anemia     Arthritis     Cancer     Diabetes mellitus     Hyperkalemia     Hyperlipidemia     Hypertension     Hyponatremia     IBS (irritable bowel syndrome)     PONV (postoperative nausea and vomiting)     Sleep apnea     USES CPAP    UTI (urinary tract infection)     UTI (urinary tract infection) 11/25/2016    Vaginal vault prolapse      Past Surgical History:   Procedure Laterality Date    BREAST BIOPSY Left     X2    BUNIONECTOMY Left     CARDIAC CATHETERIZATION      CARDIAC CATHETERIZATION N/A 02/05/2021    Procedure: Right Heart Cath;  Surgeon: Van Marcelino MD;  Location:  [facility] CATH [facility];  Service: Cardiology;  Laterality: N/A;    CATARACT EXTRACTION, BILATERAL      COLON SURGERY      COLONOSCOPY  09/21/2015    TRANSVERSE COLON ADENOMATOUS POLYP, HEMORRHOIDS, RECALL 5 YEARS, DR LAMAS    COLONOSCOPY N/A 11/13/2020    6 mm tubular  adenomatous polyp at 15 cm proximal to the anus,diverticulosis of the sigmoid colon    COLONOSCOPY N/A 12/13/2023    - One 4 mm polyp at 40 cm proximal to the anus,. - One 4 mm polyp at 20 cm proximal to the anus, removed with a cold snare. Resected and retrieved. - Diverticulosis in the sigmoid colon. - Hemorrhoids----tubular adenoma no evidence of high grade dysplasia    COLONOSCOPY N/A 12/22/2024    Dr. Ludwig-- The examined portion of the ileum was normal. - Diverticulosis in the entire examined colon. - Non-bleeding internal hemorrhoids. - No specimens collected.    COLOSTOMY      COLPOPEXY VAGINAL      2014    ENDOSCOPY N/A 11/03/2016    LA GRADE B ESOPHAGITIS WITH NO BLEEDING UPPER THIRD OF ESOPHAGUS, GERI-WAD[place]TH TEAR GEJ, BILIOUS BLOOD TINGED COFFEE GROUND GATRIC FLUIDDR [facility]    ENDOSCOPY N/A 12/13/2023    Normal examined duodenum. - Normal stomach. Biopsied. - Z-line regular, 35 cm from the incisors. Dilated.-neg for h pylori    EXPLORATORY LAPAROTOMY N/A 10/14/2016    Procedure: LAPAROTOMY EXPLORATORY, LYSIS OF ADHESIONS, IRRIAGATION OF ABDOMEN, POSSIBLE BOWEL RESECTION;  Surgeon: Bacilio Marcial MD;  Location:  [facility] OR;  Service:     FOOT NAVICULAR EXCISION OR BONE GRAFT Left 01/04/2023    Procedure: Expansion Graft, Harvesting of Bone Graft/Bone Marrow Aspirate;  Surgeon: Frankie Zapata DPM;  Location:  [facility] OR;  Service: Podiatry;  Laterality: Left;    HAMMER TOE REPAIR Left 01/04/2023    Procedure: Hammertoe Repair 2 and 3 - Left Foot;  Surgeon: Frankie Zapata DPM;  Location:  [facility] OR;  Service: Podiatry;  Laterality: Left;    HARDWARE REMOVAL Left 01/04/2023    Procedure: Hardware Removal;  Surgeon: Frankie Zapata DPM;  Location:  [facility] OR;  Service: Podiatry;  Laterality: Left;    HYSTERECTOMY      REVISION / TAKEDOWN COLOSTOMY      SACROCOLPOPEXY N/A 09/21/2016    Procedure: SACROCOLPOPEXY LAPAROSCOPIC WITH MozioI SI ROBOT, CONVERTED TO OPEN SACROCOLPOPEXY, RIGHT SALPINGO-  OOPHERECTOMY, CYSTO;  Surgeon: Maribell Beth MD;  Location:  [facility] OR;  Service:     TOE FUSION Left 01/06/2022    Procedure: FIRST METATARSOPHALANGEAL JOINT ARTHRODESIS WITH INTERNAL FIXATION - LEFT FOOT;  Surgeon: Jorgito Red DPM;  Location:  [facility] OR;  Service: Podiatry;  Laterality: Left;    TOE FUSION Left 01/04/2023    Procedure: Revisional 1st Metatarsophalangeal Joint Arthrodesis with Hardware Removal and Expansion Graft, Harvesting of Bone Graft/Bone Marrow Aspirate, Hammertoe Repair 2 and 3 - Left Foot;  Surgeon: Frankie Zapata DPM;  Location:  [facility] OR;  Service: Podiatry;  Laterality: Left;    VENTRAL HERNIA REPAIR N/A 3/2/2025    Procedure: VENTRAL / INCISIONAL HERNIA REPAIR LAPAROSCOPIC WITH DAVINCI ROBOT;  Surgeon: Jarrett Bland MD;  Location:  [facility] OR;  Service: Robotics - DaVinci;  Laterality: N/A;     Social History     Socioeconomic History    Marital status:    Tobacco Use    Smoking status: Never     Passive exposure: Never    Smokeless tobacco: Never   Vaping Use    Vaping status: Never Used   Substance and Sexual Activity    Alcohol use: No    Drug use: No    Sexual activity: Defer       Allergies:  Allergies   Allergen Reactions    Morphine And Codeine Nausea And Vomiting and Dizziness    Percocet [Oxycodone-Acetaminophen] Nausea And Vomiting and Dizziness       Medications:    Current Facility-Administered Medications:     cyclobenzaprine (FLEXERIL) tablet 5 mg, 5 mg, Oral, TID, Jarrett Bland MD, 5 mg at 03/06/25 0919    dilTIAZem CD (CARDIZEM CD) 24 hr capsule 120 mg, 120 mg, Oral, Daily, Jose Raul Archuleta MD, 120 mg at 03/06/25 0922    docusate sodium (COLACE) capsule 100 mg, 100 mg, Oral, BID, Crystal Pool MD, 100 mg at 03/06/25 0922    famotidine (PEPCID) injection 20 mg, 20 mg, Intravenous, BID, Geoff Bauer MD, 20 mg at 03/06/25 0923    gabapentin (NEURONTIN) capsule 100 mg, 100 mg, Oral, Q8H, Jarrett Bland MD, 100 mg at 03/06/25 0501     HYDROcodone-acetaminophen (NORCO)  MG per tablet 1 tablet, 1 tablet, Oral, Q4H PRN, Jarrett Bland MD    HYDROmorphone (DILAUDID) injection 0.75 mg, 0.75 mg, Intravenous, Q4H PRN, Jarrett Bland MD, 0.75 mg at 03/06/25 1022    ketorolac (TORADOL) injection 15 mg, 15 mg, Intravenous, Q6H, Jarrett Bland MD, 15 mg at 03/06/25 0923    Lidocaine 4 % 1 patch, 1 patch, Transdermal, Q24H, Jarrett Bland MD, 1 patch at 03/06/25 0924    metoprolol tartrate (LOPRESSOR) injection 5 mg, 5 mg, Intravenous, Q6H PRN, Geoff Bauer MD    ondansetron (ZOFRAN) injection 4 mg, 4 mg, Intravenous, Q6H PRN, Geoff Bauer MD, 4 mg at 03/03/25 1746    polyethylene glycol (MIRALAX) packet 17 g, 17 g, Oral, Daily, Crystal Pool MD, 17 g at 03/06/25 0923    [COMPLETED] Insert Peripheral IV, , , Once **AND** sodium chloride 0.9 % flush 10 mL, 10 mL, Intravenous, PRN, Jarrett Bland MD    sodium chloride 0.9 % flush 10 mL, 10 mL, Intravenous, Once **FOLLOWED BY** [COMPLETED] idaruCIZUmab (PRAXBIND) IV solution solution 5 g, 5 g, Intravenous, Once, 5 g at 03/02/25 1714 **FOLLOWED BY** sodium chloride 0.9 % flush 10 mL, 10 mL, Intravenous, Once, Geoff Bauer MD    sodium chloride 0.9 % flush 10 mL, 10 mL, Intravenous, Q12H, Geoff Bauer MD, 10 mL at 03/06/25 1023    sodium chloride 0.9 % flush 10 mL, 10 mL, Intravenous, PRN, Geoff Bauer MD    sodium chloride 0.9 % infusion 40 mL, 40 mL, Intravenous, PRN, Geoff Bauer MD    tamoxifen (NOLVADEX) chemo tablet 20 mg, 20 mg, Oral, Daily, Jose Raul Archuleta MD, 20 mg at 03/06/25 0924      OBJECTIVE     Vitals:    03/06/25 0922   BP: 133/72   Pulse: 89   Resp:    Temp:    SpO2:      Temp:  [97.8 °F (36.6 °C)-98.3 °F (36.8 °C)] 98.3 °F (36.8 °C)  Heart Rate:  [68-89] 89  Resp:  [16] 16  BP: (102-133)/(54-85) 133/72      PHYSICAL EXAM   Physical Exam  Vitals and nursing note reviewed.   Constitutional:       General: She is awake.      Appearance: Normal appearance.       Comments: Body mass index is 27.45 kg/m².    HENT:      Head: Normocephalic and atraumatic.   Eyes:      General: Lids are normal. Gaze aligned appropriately.   Cardiovascular:      Rate and Rhythm: Normal rate and regular rhythm.      Comments: Heart Rate:  [68-89] 89  Pulmonary:      Effort: Pulmonary effort is normal. No respiratory distress.   Abdominal:      General: Abdomen is flat. There is no distension.      Palpations: Abdomen is soft.      Tenderness: There is generalized abdominal tenderness.   Musculoskeletal:      Cervical back: Normal range of motion and neck supple.   Skin:     General: Skin is warm and dry.   Neurological:      Mental Status: She is alert and oriented to person, place, and time.   Psychiatric:         Attention and Perception: Attention normal.         Mood and Affect: Mood normal.         Speech: Speech normal.         Behavior: Behavior is cooperative.        Results Review:  Lab Results (last 48 hours)       Procedure Component Value Units Date/Time    Basic Metabolic Panel [[id number]]  (Abnormal) Collected: 03/05/25 0414    Specimen: Blood Updated: 03/05/25 0515     Glucose 85 mg/dL      BUN 15 mg/dL      Creatinine 0.58 mg/dL      Sodium 139 mmol/L      Potassium 4.3 mmol/L      Chloride 105 mmol/L      CO2 28.0 mmol/L      Calcium 8.4 mg/dL      BUN/Creatinine Ratio 25.9     Anion Gap 6.0 mmol/L      eGFR 96.9 mL/min/1.73     Narrative:      GFR Categories in Chronic Kidney Disease (CKD)      GFR Category          GFR (mL/min/1.73)    Interpretation  G1                     90 or greater         Normal or high (1)  G2                      60-89                Mild decrease (1)  G3a                   45-59                Mild to moderate decrease  G3b                   30-44                Moderate to severe decrease  G4                    15-29                Severe decrease  G5                    14 or less           Kidney failure          (1)In the absence of evidence of  kidney disease, neither GFR category G1 or G2 fulfill the criteria for CKD.    eGFR calculation 2021 CKD-EPI creatinine equation, which does not include race as a factor    CBC & Differential [[id number]]  (Abnormal) Collected: 03/05/25 0414    Specimen: Blood Updated: 03/05/25 0450    Narrative:      The following orders were created for panel order CBC & Differential.  Procedure                               Abnormality         Status                     ---------                               -----------         ------                     CBC Auto Differential[[id number]]        Abnormal            Final result                 Please view results for these tests on the individual orders.    CBC Auto Differential [[id number]]  (Abnormal) Collected: 03/05/25 0414    Specimen: Blood Updated: 03/05/25 0450     WBC 5.67 10*3/mm3      RBC 3.63 10*6/mm3      Hemoglobin 10.8 g/dL      Hematocrit 34.4 %      MCV 94.8 fL      MCH 29.8 pg      MCHC 31.4 g/dL      RDW 14.1 %      RDW-SD 49.1 fl      MPV 11.0 fL      Platelets 117 10*3/mm3      Neutrophil % 64.9 %      Lymphocyte % 21.9 %      Monocyte % 9.7 %      Eosinophil % 2.8 %      Basophil % 0.2 %      Immature Grans % 0.5 %      Neutrophils, Absolute 3.68 10*3/mm3      Lymphocytes, Absolute 1.24 10*3/mm3      Monocytes, Absolute 0.55 10*3/mm3      Eosinophils, Absolute 0.16 10*3/mm3      Basophils, Absolute 0.01 10*3/mm3      Immature Grans, Absolute 0.03 10*3/mm3     Basic Metabolic Panel [[id number]]  (Abnormal) Collected: 03/04/25 1057    Specimen: Blood Updated: 03/04/25 1157     Glucose 101 mg/dL      BUN 17 mg/dL      Creatinine 0.60 mg/dL      Sodium 133 mmol/L      Potassium 4.0 mmol/L      Chloride 99 mmol/L      CO2 28.0 mmol/L      Calcium 8.1 mg/dL      BUN/Creatinine Ratio 28.3     Anion Gap 6.0 mmol/L      eGFR 96.1 mL/min/1.73     Narrative:      GFR Categories in Chronic Kidney Disease (CKD)      GFR Category          GFR (mL/min/1.73)     Interpretation  G1                     90 or greater         Normal or high (1)  G2                      60-89                Mild decrease (1)  G3a                   45-59                Mild to moderate decrease  G3b                   30-44                Moderate to severe decrease  G4                    15-29                Severe decrease  G5                    14 or less           Kidney failure          (1)In the absence of evidence of kidney disease, neither GFR category G1 or G2 fulfill the criteria for CKD.    eGFR calculation 2021 CKD-EPI creatinine equation, which does not include race as a factor    CBC & Differential [[id number]]  (Abnormal) Collected: 03/04/25 1057    Specimen: Blood Updated: 03/04/25 1116    Narrative:      The following orders were created for panel order CBC & Differential.  Procedure                               Abnormality         Status                     ---------                               -----------         ------                     CBC Auto Differential[[id number]]        Abnormal            Final result                 Please view results for these tests on the individual orders.    CBC Auto Differential [[id number]]  (Abnormal) Collected: 03/04/25 1057    Specimen: Blood Updated: 03/04/25 1116     WBC 7.21 10*3/mm3      RBC 3.91 10*6/mm3      Hemoglobin 11.7 g/dL      Hematocrit 36.7 %      MCV 93.9 fL      MCH 29.9 pg      MCHC 31.9 g/dL      RDW 14.2 %      RDW-SD 48.3 fl      MPV 10.9 fL      Platelets 135 10*3/mm3      Neutrophil % 74.2 %      Lymphocyte % 14.6 %      Monocyte % 10.0 %      Eosinophil % 0.8 %      Basophil % 0.1 %      Immature Grans % 0.3 %      Neutrophils, Absolute 5.35 10*3/mm3      Lymphocytes, Absolute 1.05 10*3/mm3      Monocytes, Absolute 0.72 10*3/mm3      Eosinophils, Absolute 0.06 10*3/mm3      Basophils, Absolute 0.01 10*3/mm3      Immature Grans, Absolute 0.02 10*3/mm3     Urinalysis With Culture If Indicated - Urine, Clean Catch  [[id number]]  (Normal) Collected: 03/03/25 1449    Specimen: Urine, Clean Catch Updated: 03/03/25 1501     Color, UA Yellow     Appearance, UA Clear     pH, UA 6.0     Specific Gravity, UA 1.010     Glucose, UA Negative     Ketones, UA Negative     Bilirubin, UA Negative     Blood, UA Negative     Protein, UA Negative     Leuk Esterase, UA Negative     Nitrite, UA Negative     Urobilinogen, UA 0.2 E.U./dL    Narrative:      In absence of clinical symptoms, the presence of pyuria, bacteria, and/or nitrites on the urinalysis result does not correlate with infection.  Urine microscopic not indicated.    Hemoglobin A1c [[id number]]  (Normal) Collected: 03/03/25 0652    Specimen: Blood Updated: 03/03/25 1004     Hemoglobin A1C 5.30 %     Narrative:      Hemoglobin A1C Ranges:    Increased Risk for Diabetes  5.7% to 6.4%  Diabetes                     >= 6.5%  Diabetic Goal                < 7.0%          Imaging Results (Last 72 Hours)       Procedure Component Value Units Date/Time    XR Abdomen 1 View [[id number]] Collected: 03/04/25 1435     Updated: 03/04/25 1439    Narrative:      EXAMINATION: XR ABDOMEN 1 VW-     3/4/2025 11:26 AM     HISTORY: Eval for ileus; K56.609-Unspecified intestinal obstruction,  unspecified as to partial versus complete obstruction     1 view abdomen.     COMPARISON:  3/2/2025 abdomen/pelvis CT.  7/15/2021 K[facility].     Degenerative spine changes.  Large rim calcified gallstones.     Nonspecific bowel gas pattern.  No small bowel dilation.     Moderate fecal material within the cecum and ascending colon.  Air-filled transverse and descending colon.       Impression:      1. Nonspecific bowel gas pattern.  2. No acute abnormality.           This report was signed and finalized on 3/4/2025 2:36 PM by Dr. Jim Izaguirre MD.                  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 She reports BM last night and passing flatus this morning. She reports improvement in pain and nausea. Patient is clear for discharge from a surgical standpoint. DC per primary attending.  Patient is scheduled for follow up with Dr. Bland on 3/28.     Discussed findings and treatment plan including risks, benefits, and treatment options with patient in detail. Patient agreed with treatment plan.      This document has been electronically signed by MARÍA Cornejo on 3/6/2025 11:01 CST

## 2025-03-06 NOTE — DISCHARGE SUMMARY
[facility] Medicine Services  DISCHARGE SUMMARY       Date of Admission: 3/2/2025  Date of Discharge:  3/8/2025  Primary Care Physician: ANGEL Healy MD    Presenting Problem/History of Present Illness:  Presents 3/2 with complaints of sharp abdominal pain, CT scan showed bowel obstruction.  She was taken emergently to the OR for incarcerated hernia with bowel obstruction.    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 Left ventricular ejection fraction appears to be 51 - 55%.    Severe biatrial enlargement.    Moderate tricuspid valve regurgitation is present.    Estimated right ventricular systolic pressure from tricuspid regurgitation is mildly elevated (35-45 mmHg).    The right ventricular cavity is moderately dilated, with normal systolic function.    No other significant (greater than mild) valvular pathology.    Compared to most recent transthoracic exam (from 11/24/2020), no obvious or significant change.      Imaging Results (All)       Procedure Component Value Units Date/Time    XR Abdomen 1 View [[id number]] Collected: 03/04/25 1435     Updated: 03/04/25 1439    Narrative:      EXAMINATION: XR ABDOMEN 1 VW-     3/4/2025 11:26 AM     HISTORY: Eval for ileus; K56.609-Unspecified intestinal obstruction,  unspecified as to partial versus complete obstruction     1 view abdomen.      COMPARISON:  3/2/2025 abdomen/pelvis CT.  7/15/2021 KUB.     Degenerative spine changes.  Large rim calcified gallstones.     Nonspecific bowel gas pattern.  No small bowel dilation.     Moderate fecal material within the cecum and ascending colon.  Air-filled transverse and descending colon.       Impression:      1. Nonspecific bowel gas pattern.  2. No acute abnormality.           This report was signed and finalized on 3/4/2025 2:36 PM by Dr. Jim Izaguirre MD.       CT Abdomen Pelvis With Contrast [[id number]] Collected: 03/02/25 1438     Updated: 03/02/25 1449    Narrative:      EXAM/TECHNIQUE: CT abdomen and pelvis with IV contrast     INDICATION: Abdominal pain     COMPARISON: 12/20/2024     DLP: 429.03 mGy.cm. Automated exposure control was utilized to decrease  patient radiation dose.     FINDINGS:     Lung bases are clear. Heart is enlarged and there is dilatation of the  IVC and hepatic veins.     Slightly heterogeneous appearance of the liver. Mild hepatic steatosis.  No focal liver lesion. Multiple calcified gallstones in the gallbladder  lumen. No gallbladder wall thickening or adjacent fat stranding. No  biliary ductal dilatation.     Pancreas appears normal. Spleen is unremarkable. No adrenal gland  nodule.     No solid renal mass. No urolithiasis or hydronephrosis. No focal urinary  bladder wall thickening.      Postoperative change of left hemicolectomy. Colonic diverticulosis  without evidence of diverticulitis. Appendix is not identified and may  be surgically absent.      There is a small bowel obstruction with transition point in a left  paramidline ventral abdominal hernia. The upstream bowel is dilated and  contains fecalized material, measuring up to 4.2 cm diameter (image 19  series 3). There is some mild interloop edema. No pneumatosis, portal  venous gas, or evidence of pneumoperitoneum. Distal esophagus and  stomach are unremarkable.     No ascites or free pelvic fluid. Prior  hysterectomy. No pelvic mass or  collection.     Abdominal aorta is nonaneurysmal. No abdominal or pelvic  lymphadenopathy.      There are also periumbilical and right lateral abdominal wall  fat-containing hernias present. No acute osseous finding.       Impression:         Acute small bowel obstruction with transition point in a left  paramidline ventral abdominal wall hernia.              This report was signed and finalized on 3/2/2025 2:46 PM by Dr. Andrew Gomez MD.             LAB RESULTS:      Lab 03/08/25 0411 03/07/25  0512 03/06/25  0430 03/05/25  0414 03/04/25  1057 03/03/25  0652 03/02/25  1358   WBC 6.26 6.08 6.14 5.67 7.21   < > 8.85   HEMOGLOBIN 11.3* 11.8* 11.2* 10.8* 11.7*   < > 13.5   HEMATOCRIT 36.5 37.8 35.2 34.4 36.7   < > 42.0   PLATELETS 144 133* 127* 117* 135*   < > 201   NEUTROS ABS 3.76 3.90 4.13 3.68 5.35   < > 7.47*   IMMATURE GRANS (ABS) 0.03 0.03 0.02 0.03 0.02   < > 0.05   LYMPHS ABS 1.60 1.33 1.15 1.24 1.05   < > 0.85   MONOS ABS 0.64 0.64 0.67 0.55 0.72   < > 0.44   EOS ABS 0.21 0.17 0.15 0.16 0.06   < > 0.02   MCV 94.6 96.9 94.1 94.8 93.9   < > 92.1   LACTATE  --   --   --   --   --   --  1.7    < > = values in this interval not displayed.         Lab 03/08/25 0411 03/07/25  0512 03/06/25  0430 03/05/25  0414 03/04/25  1057 03/03/25  0652   SODIUM 137 138 140 139 133* 136   POTASSIUM 4.7 4.2 4.1 4.3 4.0 4.4   CHLORIDE 103 102 105 105 99 101   CO2 28.0 29.0 28.0 28.0 28.0 25.0   ANION GAP 6.0 7.0 7.0 6.0 6.0 10.0   BUN 10 14 14 15 17 11   CREATININE 0.50* 0.49* 0.52* 0.58 0.60 0.45*   EGFR 100.4 100.9 99.5 96.9 96.1 103.0   GLUCOSE 79 81 90 85 101* 133*   CALCIUM 8.4* 8.4* 8.4* 8.4* 8.1* 8.8   HEMOGLOBIN A1C  --   --   --   --   --  5.30   TSH  --   --   --   --   --  0.978         Lab 03/03/25  0652 03/02/25  1358   TOTAL PROTEIN 6.0 7.1   ALBUMIN 3.4* 4.0   GLOBULIN 2.6 3.1   ALT (SGPT) 13 14   AST (SGOT) 23 27   BILIRUBIN 1.3* 0.6   ALK PHOS 75 96   LIPASE  --  35              Lab 03/03/25  0652   CHOLESTEROL 97   LDL CHOL 21   HDL CHOL 58   TRIGLYCERIDES 91         Lab 03/02/25  1646   ABO TYPING O   RH TYPING Positive   ANTIBODY SCREEN Negative         Brief Urine Lab Results  (Last result in the past 365 days)        Color   Clarity   Blood   Leuk Est   Nitrite   Protein   CREAT   Urine HCG        03/03/25 1449 Yellow   Clear   Negative   Negative   Negative   Negative                 Microbiology Results (last 10 days)       ** No results found for the last 240 hours. **            Hospital Course:     Presents 3/2 with complaints of sharp abdominal pain, CT scan showed bowel obstruction.  She was taken emergently to the OR for incarcerated hernia with bowel obstruction.    # Incarcerated ventral hernia s/p hernia repair 3/2/2025  # SBO 2/2 above  Had a bowel movement 3/5/2025  - Pain initially managed with Norco and Dilaudid, pain improved and adequately managed with Norco.  Has not required any pain medications overnight.  - Bowel management as needed  - Has ambulated with physical therapy  - Tolerating diet    # UTI  U/A: Positive leukocytes, 11-20 RBC, 3-5 WBC, 3+ bacteria  Rocephin started yesterday on admission however not given  - completed 3 days of Rocephin     # Atrial fibrillation  [id number] 4  Was on Cardizem and Pradaxa  Received 1 dose of idarucizumab after surgery  - Continue home Cardizem 120 mg daily  - Resuming anticoagulation today     # History of breast cancer - on tamoxifen daily  # Diabetes mellitus - HgbA1c 5.3%. Monitor hyperglycemia due to recent steroids prescribed by PCP for neck pain.  Hyperglycemia improved.  # Hypertension -controlled  # Sleep apnea -on CPAP  # Pulmonary hypertension  Echo 1/29/24: EF 51 to 55%.  Severe biatrial enlargement.  Moderate TVR.  No other significant valvular pathology.  - continue CPAP      Physical Exam on Discharge:  /58 (BP Location: Right arm, Patient Position: Sitting)   Pulse 84   Temp 98 °F (36.7 °C)  "(Oral)   Resp 16   Ht 160 cm (63\")   Wt 70.3 kg (154 lb 15.7 oz)   SpO2 98%   BMI 27.45 kg/m²   Physical Exam  GEN: Awake, alert, interactive, in NAD  HEENT: Atraumatic, EOMI, Anicteric  Lungs: CTAB  Heart: RRR, +S1/s2, no rub  ABD:  Small incisions from surgery clean dry. Tenderness to mild palpation.  Bowel sounds heard on LUQ, but overall under active.  Extremities: atraumatic, no cyanosis, no edema  Skin: no rashes or lesions  Neuro: AAOx3, no focal deficits  Psych: Good mood, normal affect    Condition on Discharge: Stable    Discharge Disposition:  Home or Self Care    Discharge Medications:     Discharge Medications        New Medications        Instructions Start Date   Lidocaine 4 %   1 patch, Transdermal, Every 24 Hours Scheduled, Remove & Discard patch within 12 hours or as directed by MD      ondansetron 4 MG tablet  Commonly known as: Zofran   4 mg, Oral, Every 8 Hours PRN      oxyCODONE 5 MG immediate release tablet  Commonly known as: Roxicodone   5 mg, Oral, Every 4 Hours PRN             Continue These Medications        Instructions Start Date   bismuth subsalicylate 262 MG/15ML suspension  Commonly known as: PEPTO BISMOL   15 mL, Every 6 Hours PRN      cyanocobalamin 1000 MCG/ML injection   Inject 1 mL Every 30 (Thirty) Days as directed.      dabigatran etexilate 150 MG capsu  Commonly known as: PRADAXA   150 mg, Oral, 2 Times Daily      dilTIAZem  MG 24 hr capsule  Commonly known as: Cardizem CD   120 mg, Oral, Daily      hydrOXYzine 10 MG tablet  Commonly known as: ATARAX   10-20 mg, Every 4 Hours PRN      omeprazole 20 MG capsule  Commonly known as: priLOSEC   20 mg, Oral, Daily      tamoxifen 20 MG chemo tablet  Commonly known as: NOLVADEX   20 mg, Daily      tiZANidine 4 MG tablet  Commonly known as: ZANAFLEX   4 mg, Oral, Every 6 Hours PRN             Stop These Medications      predniSONE 10 MG tablet  Commonly known as: DELTASONE            Discharge Diet:   Diet Instructions  "   Regular diet         Dietary Orders (From admission, onward)       Start     Ordered    03/08/25 1157  Diet: Regular/House; Fluid Consistency: Thin (IDDSI 0)  Diet Effective Now        References:    Diet Order Definitions   Question Answer Comment   Diets: Regular/House    Fluid Consistency: Thin (IDDSI 0)        03/08/25 1156                    Activity at Discharge: Melissa as tolerated    Follow-up Appointments:   Future Appointments   Date Time Provider Department Center   3/11/2025  3:00 PM ANGEL Healy MD [facility]   3/28/2025  9:00 AM Jarrett Bland MD [facility]   4/28/2025  8:30 AM ANGEL Healy MD [facility]   7/31/2025  9:30 AM Van Marcelino MD [facility]       Test Results Pending at Discharge: None    Electronically signed by Jose Raul Archuleta MD, 03/08/25, 16:10 CST.    Time: 32 minutes.

## 2025-03-06 NOTE — PLAN OF CARE
Goal Outcome Evaluation:   Patient AAOx4. Able to make needs known. IV intact and patent. Room air. Patient had uncontrolled pain earlier in the shift. Gave IV dilaudid then hour later gave her PRN Hydrocodone and have been giving hydrocodone every 4-5 hours since and patient is reporting that is pain is controlled. Up in chair in room. Bed low and locked with call light in reach. Denies needs.

## 2025-03-06 NOTE — PLAN OF CARE
Goal Outcome Evaluation:   Patient met all of her goals during this admission.

## 2025-03-06 NOTE — PLAN OF CARE
Goal Outcome Evaluation:  Plan of Care Reviewed With: patient        Progress: improving  Outcome Evaluation: Pt A&OX4. VSS on RA. No C/O pain reported. 4 exlap sites to ABD. IV to Rt FA IID. Voiding via PW. Call light within reach, safety maintained.

## 2025-03-06 NOTE — PLAN OF CARE
Goal Outcome Evaluation:           Progress: improving  Outcome Evaluation: A&Ox4. Room air. Up with standby assistance with walker to BR/Ambulate. Voiding. BM x2. Sat in chair this afternoon. IV abx given per orders. C/o pain, well controlled with scheduled toradol and flexeril. Call light within reach. Possible D/C tomorrow.

## 2025-03-07 LAB
ANION GAP SERPL CALCULATED.3IONS-SCNC: 7 MMOL/L (ref 5–15)
BASOPHILS # BLD AUTO: 0.01 10*3/MM3 (ref 0–0.2)
BASOPHILS NFR BLD AUTO: 0.2 % (ref 0–1.5)
BUN SERPL-MCNC: 14 MG/DL (ref 8–23)
BUN/CREAT SERPL: 28.6 (ref 7–25)
CALCIUM SPEC-SCNC: 8.4 MG/DL (ref 8.6–10.5)
CHLORIDE SERPL-SCNC: 102 MMOL/L (ref 98–107)
CO2 SERPL-SCNC: 29 MMOL/L (ref 22–29)
CREAT SERPL-MCNC: 0.49 MG/DL (ref 0.57–1)
DEPRECATED RDW RBC AUTO: 49.1 FL (ref 37–54)
EGFRCR SERPLBLD CKD-EPI 2021: 100.9 ML/MIN/1.73
EOSINOPHIL # BLD AUTO: 0.17 10*3/MM3 (ref 0–0.4)
EOSINOPHIL NFR BLD AUTO: 2.8 % (ref 0.3–6.2)
ERYTHROCYTE [DISTWIDTH] IN BLOOD BY AUTOMATED COUNT: 13.8 % (ref 12.3–15.4)
GLUCOSE SERPL-MCNC: 81 MG/DL (ref 65–99)
HCT VFR BLD AUTO: 37.8 % (ref 34–46.6)
HGB BLD-MCNC: 11.8 G/DL (ref 12–15.9)
IMM GRANULOCYTES # BLD AUTO: 0.03 10*3/MM3 (ref 0–0.05)
IMM GRANULOCYTES NFR BLD AUTO: 0.5 % (ref 0–0.5)
LYMPHOCYTES # BLD AUTO: 1.33 10*3/MM3 (ref 0.7–3.1)
LYMPHOCYTES NFR BLD AUTO: 21.9 % (ref 19.6–45.3)
MCH RBC QN AUTO: 30.3 PG (ref 26.6–33)
MCHC RBC AUTO-ENTMCNC: 31.2 G/DL (ref 31.5–35.7)
MCV RBC AUTO: 96.9 FL (ref 79–97)
MONOCYTES # BLD AUTO: 0.64 10*3/MM3 (ref 0.1–0.9)
MONOCYTES NFR BLD AUTO: 10.5 % (ref 5–12)
NEUTROPHILS NFR BLD AUTO: 3.9 10*3/MM3 (ref 1.7–7)
NEUTROPHILS NFR BLD AUTO: 64.1 % (ref 42.7–76)
PLATELET # BLD AUTO: 133 10*3/MM3 (ref 140–450)
PMV BLD AUTO: 11 FL (ref 6–12)
POTASSIUM SERPL-SCNC: 4.2 MMOL/L (ref 3.5–5.2)
RBC # BLD AUTO: 3.9 10*6/MM3 (ref 3.77–5.28)
SODIUM SERPL-SCNC: 138 MMOL/L (ref 136–145)
WBC NRBC COR # BLD AUTO: 6.08 10*3/MM3 (ref 3.4–10.8)

## 2025-03-07 PROCEDURE — 25010000002 HYDROMORPHONE 1 MG/ML SOLUTION: Performed by: SURGERY

## 2025-03-07 PROCEDURE — 85025 COMPLETE CBC W/AUTO DIFF WBC: CPT | Performed by: STUDENT IN AN ORGANIZED HEALTH CARE EDUCATION/TRAINING PROGRAM

## 2025-03-07 PROCEDURE — 25010000002 ONDANSETRON PER 1 MG: Performed by: FAMILY MEDICINE

## 2025-03-07 PROCEDURE — 97530 THERAPEUTIC ACTIVITIES: CPT

## 2025-03-07 PROCEDURE — 80048 BASIC METABOLIC PNL TOTAL CA: CPT | Performed by: STUDENT IN AN ORGANIZED HEALTH CARE EDUCATION/TRAINING PROGRAM

## 2025-03-07 PROCEDURE — 97116 GAIT TRAINING THERAPY: CPT

## 2025-03-07 RX ORDER — ONDANSETRON 4 MG/1
4 TABLET, FILM COATED ORAL EVERY 8 HOURS PRN
Qty: 15 TABLET | Refills: 0 | Status: SHIPPED | OUTPATIENT
Start: 2025-03-07

## 2025-03-07 RX ORDER — DABIGATRAN ETEXILATE 150 MG/1
150 CAPSULE ORAL 2 TIMES DAILY
Status: DISCONTINUED | OUTPATIENT
Start: 2025-03-07 | End: 2025-03-08 | Stop reason: HOSPADM

## 2025-03-07 RX ORDER — HYDROCODONE BITARTRATE AND ACETAMINOPHEN 10; 325 MG/1; MG/1
1 TABLET ORAL ONCE AS NEEDED
Status: COMPLETED | OUTPATIENT
Start: 2025-03-07 | End: 2025-03-07

## 2025-03-07 RX ADMIN — HYDROCODONE BITARTRATE AND ACETAMINOPHEN 1 TABLET: 10; 325 TABLET ORAL at 10:58

## 2025-03-07 RX ADMIN — Medication 10 ML: at 08:56

## 2025-03-07 RX ADMIN — HYDROMORPHONE HYDROCHLORIDE 0.75 MG: 1 INJECTION, SOLUTION INTRAMUSCULAR; INTRAVENOUS; SUBCUTANEOUS at 06:40

## 2025-03-07 RX ADMIN — GABAPENTIN 100 MG: 100 CAPSULE ORAL at 05:00

## 2025-03-07 RX ADMIN — ONDANSETRON 4 MG: 2 INJECTION INTRAMUSCULAR; INTRAVENOUS at 07:04

## 2025-03-07 RX ADMIN — CYCLOBENZAPRINE HYDROCHLORIDE 5 MG: 10 TABLET, FILM COATED ORAL at 20:37

## 2025-03-07 RX ADMIN — GABAPENTIN 100 MG: 100 CAPSULE ORAL at 15:09

## 2025-03-07 RX ADMIN — POLYETHYLENE GLYCOL 3350 17 G: 17 POWDER, FOR SOLUTION ORAL at 08:55

## 2025-03-07 RX ADMIN — FAMOTIDINE 20 MG: 10 INJECTION INTRAVENOUS at 20:37

## 2025-03-07 RX ADMIN — HYDROCODONE BITARTRATE AND ACETAMINOPHEN 1 TABLET: 10; 325 TABLET ORAL at 23:03

## 2025-03-07 RX ADMIN — LIDOCAINE 1 PATCH: 4 PATCH TOPICAL at 08:56

## 2025-03-07 RX ADMIN — Medication 10 ML: at 20:39

## 2025-03-07 RX ADMIN — CYCLOBENZAPRINE HYDROCHLORIDE 5 MG: 10 TABLET, FILM COATED ORAL at 08:55

## 2025-03-07 RX ADMIN — DILTIAZEM HYDROCHLORIDE 120 MG: 120 CAPSULE, EXTENDED RELEASE ORAL at 08:55

## 2025-03-07 RX ADMIN — FAMOTIDINE 20 MG: 10 INJECTION INTRAVENOUS at 08:55

## 2025-03-07 RX ADMIN — DOCUSATE SODIUM 100 MG: 100 CAPSULE, LIQUID FILLED ORAL at 08:55

## 2025-03-07 RX ADMIN — HYDROCODONE BITARTRATE AND ACETAMINOPHEN 1 TABLET: 10; 325 TABLET ORAL at 05:00

## 2025-03-07 RX ADMIN — HYDROCODONE BITARTRATE AND ACETAMINOPHEN 1 TABLET: 10; 325 TABLET ORAL at 15:09

## 2025-03-07 RX ADMIN — CYCLOBENZAPRINE HYDROCHLORIDE 5 MG: 10 TABLET, FILM COATED ORAL at 15:09

## 2025-03-07 RX ADMIN — TAMOXIFEN CITRATE 20 MG: 20 TABLET ORAL at 08:55

## 2025-03-07 RX ADMIN — HYDROCODONE BITARTRATE AND ACETAMINOPHEN 1 TABLET: 10; 325 TABLET ORAL at 18:34

## 2025-03-07 RX ADMIN — DABIGATRAN ETEXILATE MESYLATE 150 MG: 150 CAPSULE ORAL at 20:37

## 2025-03-07 NOTE — THERAPY TREATMENT NOTE
Acute Care - Physical Therapy Treatment Note  [facility]     Patient Name: Yun Blcakwell  : 1955  MRN: [id number]  Today's Date: 3/7/2025      Visit Dx:     ICD-10-CM ICD-9-CM   1. Small bowel obstruction  K56.609 560.9   2. Impaired functional mobility and activity tolerance [Z74.09]  Z74.09 V49.89   3. 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    ASD secundum    Pulmonary hypertension    Atypical ductal hyperplasia of breast    Ductal carcinoma in situ (DCIS) of left breast    Precordial pain    COVID-19 vaccine dose declined    LEROY on CPAP    Prediabetes    Nonunion after arthrodesis    Hammertoe of left foot    BRBPR (bright red blood per rectum)    Full incontinence of feces    Abnormal mammogram    Difficulty with CPAP nasal mask use    Personal history of breast cancer    Mammographic microcalcification    Genetic susceptibility to other malignant neoplasm    Dry mouth    GI bleeding    SBO (small bowel obstruction)    Incarcerated ventral hernia     Past Medical History:   Diagnosis Date    A-fib     Abnormal ECG     tachycardia, a fib    Acid reflux     Anemia     Arthritis     Cancer     Diabetes mellitus     Hyperkalemia     Hyperlipidemia     Hypertension     Hyponatremia     IBS (irritable bowel syndrome)     PONV (postoperative nausea and vomiting)     Sleep apnea     USES CPAP    UTI (urinary tract infection)     UTI (urinary tract infection) 2016    Vaginal vault prolapse      Past Surgical History:   Procedure Laterality Date    BREAST BIOPSY Left     X2    BUNIONECTOMY  Left     CARDIAC CATHETERIZATION      CARDIAC CATHETERIZATION N/A 02/05/2021    Procedure: Right Heart Cath;  Surgeon: Van Marcelino MD;  Location:  [facility]ATH [facility];  Service: Cardiology;  Laterality: N/A;    CATARACT EXTRACTION, BILATERAL      COLON SURGERY      COLONOSCOPY  09/21/2015    TRANSVERSE COLON ADENOMATOUS POLYP, HEMORRHOIDS, RECALL 5 YEARS, DR LAMAS    COLONOSCOPY N/A 11/13/2020    6 mm tubular adenomatous polyp at 15 cm proximal to the anus,diverticulosis of the sigmoid colon    COLONOSCOPY N/A 12/13/2023    - One 4 mm polyp at 40 cm proximal to the anus,. - One 4 mm polyp at 20 cm proximal to the anus, removed with a cold snare. Resected and retrieved. - Diverticulosis in the sigmoid colon. - Hemorrhoids----tubular adenoma no evidence of high grade dysplasia    COLONOSCOPY N/A 12/22/2024    Dr. Ludwig-- The examined portion of the ileum was normal. - Diverticulosis in the entire examined colon. - Non-bleeding internal hemorrhoids. - No specimens collected.    COLOSTOMY      COLPOPEXY VAGINAL      2014    ENDOSCOPY N/A 11/03/2016    LA GRADE B ESOPHAGITIS WITH NO BLEEDING UPPER THIRD OF ESOPHAGUS, GERI-[place]AD[place]ORTH TEAR GEJ, BILIOUS BLOOD TINGED COFFEE GROUND GATRIC FLUIDDR [place]    ENDOSCOPY N/A 12/13/2023    Normal examined duodenum. - Normal stomach. Biopsied. - Z-line regular, 35 cm from the incisors. Dilated.-neg for h pylori    EXPLORATORY LAPAROTOMY N/A 10/14/2016    Procedure: LAPAROTOMY EXPLORATORY, LYSIS OF ADHESIONS, IRRIAGATION OF ABDOMEN, POSSIBLE BOWEL RESECTION;  Surgeon: Bacilio Marcial MD;  Location:  [facility] OR;  Service:     FOOT NAVICULAR EXCISION OR BONE GRAFT Left 01/04/2023    Procedure: Expansion Graft, Harvesting of Bone Graft/Bone Marrow Aspirate;  Surgeon: Frankie Zapata DPM;  Location:  [facility] OR;  Service: Podiatry;  Laterality: Left;    HAMMER TOE REPAIR Left 01/04/2023    Procedure: Hammertoe Repair 2 and 3 - Left Foot;  Surgeon: rFankie Zapata,  FAMILIA;  Location:  [facility] OR;  Service: Podiatry;  Laterality: Left;    HARDWARE REMOVAL Left 01/04/2023    Procedure: Hardware Removal;  Surgeon: Frankie Zapata DPM;  Location:  [facility] OR;  Service: Podiatry;  Laterality: Left;    HYSTERECTOMY      REVISION / TAKEDOWN COLOSTOMY      SACROCOLPOPEXY N/A 09/21/2016    Procedure: SACROCOLPOPEXY LAPAROSCOPIC WITH PollenizerI SI ROBOT, CONVERTED TO OPEN SACROCOLPOPEXY, RIGHT SALPINGO- OOPHERECTOMY, CYSTO;  Surgeon: Maribell Beth MD;  Location:  [facility];  Service:     TOE FUSION Left 01/06/2022    Procedure: FIRST METATARSOPHALANGEAL JOINT ARTHRODESIS WITH INTERNAL FIXATION - LEFT FOOT;  Surgeon: Jorgito Red DPM;  Location:  [facility];  Service: Podiatry;  Laterality: Left;    TOE FUSION Left 01/04/2023    Procedure: Revisional 1st Metatarsophalangeal Joint Arthrodesis with Hardware Removal and Expansion Graft, Harvesting of Bone Graft/Bone Marrow Aspirate, Hammertoe Repair 2 and 3 - Left Foot;  Surgeon: Frankie Zapata DPM;  Location:  [facility] OR;  Service: Podiatry;  Laterality: Left;    VENTRAL HERNIA REPAIR N/A 3/2/2025    Procedure: VENTRAL / INCISIONAL HERNIA REPAIR LAPAROSCOPIC WITH PickataleINCI ROBOT;  Surgeon: Jarrett Bland MD;  Location:  [facility] OR;  Service: Robotics - [facility];  Laterality: N/A;     PT Assessment (Last 12 Hours)       PT Evaluation and Treatment       Row Name 03/07/25 9795          Physical Therapy Time and Intention    Subjective Information no complaints  -KJ     Document Type therapy note (daily note)  -KJ     Mode of Treatment physical therapy  -KJ     Patient Effort good  -KJ       Row Name 03/07/25 7496          General Information    Existing Precautions/Restrictions fall  -KJ       Row Name 03/07/25 8518          Pain    Pretreatment Pain Rating 5/10  -KJ     Posttreatment Pain Rating 5/10  -KJ     Pain Location abdomen  -KJ     Pain Side/Orientation left  -KJ       Row Name 03/07/25 5345          Bed Mobility    Sit-Supine [place]  (Bed Mobility) independent  -KJ       Row Name 03/07/25 0845          Sit-Stand Transfer    Sit-Stand [place] (Transfers) independent  -KJ       Row Name 03/07/25 0845          Gait/Stairs (Locomotion)    [place] Level (Gait) verbal cues;standby assist  -KJ     Assistive Device (Gait) walker, front-wheeled  -KJ     Distance in Feet (Gait) 350  -KJ     Deviations/Abnormal Patterns (Gait) gait speed decreased  -KJ       Row Name             Wound 03/02/25 1818 abdomen    Wound - Properties Group Placement Date: 03/02/25  - Placement Time: 1818 -HW Location: abdomen  -HW Primary Wound Type: Incision  -HW    Retired Wound - Properties Group Placement Date: 03/02/25 - Placement Time: 1818 -HW Location: abdomen  -HW Primary Wound Type: Incision  -HW    Retired Wound - Properties Group Placement Date: 03/02/25  - Placement Time: 1818 -HW Location: abdomen  -HW Primary Wound Type: Incision  -HW    Retired Wound - Properties Group Date first assessed: 03/02/25 - Time first assessed: 1818 -HW Location: abdomen  -HW Primary Wound Type: Incision  -HW      Row Name 03/07/25 0845          Positioning and Restraints    Pre-Treatment Position in bed  -KJ     Post Treatment Position chair  -KJ               User Key  (r) = Recorded By, (t) = Taken By, (c) = Cosigned By      Initials Name Provider Type    Maki Garcia PTA Physical Therapist Assistant     Juan Ramon Pool, RN Registered Nurse                    Physical Therapy Education        No education to display                  PT Recommendation and Plan     Progress: improving  Outcome Evaluation: PT tx completed. Pt c/o she got nauseated again this morning after pn pill and or IV meds. Education on pn management. She would rather not take any IV pn meds and try nausea meds prior to pn meds with meals. She is mobilizing much better this am, ambulated 350' S. Encouraged increase activity, took purwick off and recommended amb to BR when  needed.       Time Calculation:    PT Charges       Row Name 03/07/25 1009             Time Calculation    Start Time 0845  -KJ      Stop Time 0926  -KJ      Time Calculation (min) 41 min  -KJ      PT Received On 03/07/25  -KJ      PT Goal Re-Cert Due Date 03/14/25  -KJ         Time Calculation- PT    Total Timed Code Minutes- PT 41 minute(s)  -KJ                User Key  (r) = Recorded By, (t) = Taken By, (c) = Cosigned By      Initials Name Provider Type    Maki Garcia, LEAH Physical Therapist Assistant                  Therapy Charges for Today       Code Description Service Date Service Provider Modifiers Qty    79632986197 HC GAIT TRAINING EA 15 MIN 3/6/2025 Maki Kidd, PTA GP 2    02371359949 HC GAIT TRAINING EA 15 MIN 3/7/2025 Maki Kidd, PTA GP 1    [id number] HC PT THERAPEUTIC ACT EA 15 MIN 3/7/2025 Maki Kidd, PTA GP 1            PT G-Codes  Outcome Measure Options: AM-PAC 6 Clicks Basic Mobility (PT)  AM-PAC 6 Clicks Score (PT): 17    Maki Kidd PTA  3/7/2025

## 2025-03-07 NOTE — PLAN OF CARE
Problem: Adult Inpatient Plan of Care  Goal: Plan of Care Review  Outcome: Progressing  Flowsheets (Taken 3/7/2025 1010)  Progress: improving  Outcome Evaluation: PT tx completed. Pt c/o she got nauseated again this morning after pn pill and or IV meds. Education on pn management. She would rather not take any IV pn meds and try nausea meds prior to pn meds with meals. She is mobilizing much better this am, ambulated 350' S. Encouraged increase activity, took purwick off and recommended amb to BR when needed.  Plan of Care Reviewed With: patient   Goal Outcome Evaluation:  Plan of Care Reviewed With: patient        Progress: improving  Outcome Evaluation: PT tx completed. Pt c/o she got nauseated again this morning after pn pill and or IV meds. Education on pn management. She would rather not take any IV pn meds and try nausea meds prior to pn meds with meals. She is mobilizing much better this am, ambulated 350' S. Encouraged increase activity, took purwick off and recommended amb to BR when needed.

## 2025-03-07 NOTE — PLAN OF CARE
Goal Outcome Evaluation:   Patient AAOx4. Able to make needs known. IV intact and patent. Room air. Patient had n/v at beginning of shift. Patient ask to be woke up at night to get PRN Hydrocodone so that her pain is not intense when she wakes up d/t dilaudid making her sick. Tolerates po pain medication well. Dressings clean, dry and intact. Denies needs. Bed low and locked with call light in reach.

## 2025-03-07 NOTE — THERAPY TREATMENT NOTE
Acute Care - Physical Therapy Treatment Note  [facility]     Patient Name: Yun Blackwell  : 1955  MRN: [id number]  Today's Date: 3/7/2025      Visit Dx:     ICD-10-CM ICD-9-CM   1. Small bowel obstruction  K56.609 560.9   2. Impaired functional mobility and activity tolerance [Z74.09]  Z74.09 V49.89   3. 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    ASD secundum    Pulmonary hypertension    Atypical ductal hyperplasia of breast    Ductal carcinoma in situ (DCIS) of left breast    Precordial pain    COVID-19 vaccine dose declined    LEROY on CPAP    Prediabetes    Nonunion after arthrodesis    Hammertoe of left foot    BRBPR (bright red blood per rectum)    Full incontinence of feces    Abnormal mammogram    Difficulty with CPAP nasal mask use    Personal history of breast cancer    Mammographic microcalcification    Genetic susceptibility to other malignant neoplasm    Dry mouth    GI bleeding    SBO (small bowel obstruction)    Incarcerated ventral hernia     Past Medical History:   Diagnosis Date    A-fib     Abnormal ECG     tachycardia, a fib    Acid reflux     Anemia     Arthritis     Cancer     Diabetes mellitus     Hyperkalemia     Hyperlipidemia     Hypertension     Hyponatremia     IBS (irritable bowel syndrome)     PONV (postoperative nausea and vomiting)     Sleep apnea     USES CPAP    UTI (urinary tract infection)     UTI (urinary tract infection) 2016    Vaginal vault prolapse      Past Surgical History:   Procedure Laterality Date    BREAST BIOPSY Left     X2    BUNIONECTOMY  Left     CARDIAC CATHETERIZATION      CARDIAC CATHETERIZATION N/A 02/05/2021    Procedure: Right Heart Cath;  Surgeon: Van Marcelino MD;  Location:  [facility];  Service: Cardiology;  Laterality: N/A;    CATARACT EXTRACTION, BILATERAL      COLON SURGERY      COLONOSCOPY  09/21/2015    TRANSVERSE COLON ADENOMATOUS POLYP, HEMORRHOIDS, RECALL 5 YEARS, DR LAMAS    COLONOSCOPY N/A 11/13/2020    6 mm tubular adenomatous polyp at 15 cm proximal to the anus,diverticulosis of the sigmoid colon    COLONOSCOPY N/A 12/13/2023    - One 4 mm polyp at 40 cm proximal to the anus,. - One 4 mm polyp at 20 cm proximal to the anus, removed with a cold snare. Resected and retrieved. - Diverticulosis in the sigmoid colon. - Hemorrhoids----tubular adenoma no evidence of high grade dysplasia    COLONOSCOPY N/A 12/22/2024    Dr. Ludwig-- The examined portion of the ileum was normal. - Diverticulosis in the entire examined colon. - Non-bleeding internal hemorrhoids. - No specimens collected.    COLOSTOMY      COLPOPEXY VAGINAL      2014    ENDOSCOPY N/A 11/03/2016    LA GRADE B ESOPHAGITIS WITH NO BLEEDING UPPER THIRD OF ESOPHAGUS, GERI-WADSWORTH TEAR GEJ, BILIOUS BLOOD TINGED COFFEE GROUND GATRIC FLUIDDR [facility]    ENDOSCOPY N/A 12/13/2023    Normal examined duodenum. - Normal stomach. Biopsied. - Z-line regular, 35 cm from the incisors. Dilated.-neg for h pylori    EXPLORATORY LAPAROTOMY N/A 10/14/2016    Procedure: LAPAROTOMY EXPLORATORY, LYSIS OF ADHESIONS, IRRIAGATION OF ABDOMEN, POSSIBLE BOWEL RESECTION;  Surgeon: Bacilio Marcial MD;  Location:  [facility] OR;  Service:     FOOT NAVICULAR EXCISION OR BONE GRAFT Left 01/04/2023    Procedure: Expansion Graft, Harvesting of Bone Graft/Bone Marrow Aspirate;  Surgeon: Frankie Zapata DPM;  Location:  [facility] OR;  Service: Podiatry;  Laterality: Left;    HAMMER TOE REPAIR Left 01/04/2023    Procedure: Hammertoe Repair 2 and 3 - Left Foot;  Surgeon: Frankie Zapata,  FAMILIA;  Location:  [facility];  Service: Podiatry;  Laterality: Left;    HARDWARE REMOVAL Left 01/04/2023    Procedure: Hardware Removal;  Surgeon: Frankie Zapata DPM;  Location:  [facility];  Service: Podiatry;  Laterality: Left;    HYSTERECTOMY      REVISION / TAKEDOWN COLOSTOMY      SACROCOLPOPEXY N/A 09/21/2016    Procedure: SACROCOLPOPEXY LAPAROSCOPIC WITH ClipikI SI ROBOT, CONVERTED TO OPEN SACROCOLPOPEXY, RIGHT SALPINGO- OOPHERECTOMY, CYSTO;  Surgeon: Maribell Beth MD;  Location:  [facility];  Service:     TOE FUSION Left 01/06/2022    Procedure: FIRST METATARSOPHALANGEAL JOINT ARTHRODESIS WITH INTERNAL FIXATION - LEFT FOOT;  Surgeon: Jorgito Red DPM;  Location:  [facility];  Service: Podiatry;  Laterality: Left;    TOE FUSION Left 01/04/2023    Procedure: Revisional 1st Metatarsophalangeal Joint Arthrodesis with Hardware Removal and Expansion Graft, Harvesting of Bone Graft/Bone Marrow Aspirate, Hammertoe Repair 2 and 3 - Left Foot;  Surgeon: Frankie Zapata DPM;  Location:  [facility];  Service: Podiatry;  Laterality: Left;    VENTRAL HERNIA REPAIR N/A 3/2/2025    Procedure: VENTRAL / INCISIONAL HERNIA REPAIR LAPAROSCOPIC WITH ClipikI ROBOT;  Surgeon: aJrrett Bland MD;  Location:  [facility];  Service: Robotics - [employer];  Laterality: N/A;     PT Assessment (Last 12 Hours)       PT Evaluation and Treatment       Row Name 03/07/25 1515 03/07/25 1115       Physical Therapy Time and Intention    Subjective Information no complaints  -KJ no complaints  -KJ    Document Type therapy note (daily note)  -KJ therapy note (daily note)  -KJ    Mode of Treatment physical therapy  -KJ physical therapy  -KJ    Patient Effort good  -KJ good  -KJ      Row Name 03/07/25 0845          Physical Therapy Time and Intention    Subjective Information no complaints  -KJ     Document Type therapy note (daily note)  -KJ     Mode of Treatment physical therapy  -KJ     Patient Effort good  -KJ       Row Name 03/07/25 1515 03/07/25  1115       General Information    Existing Precautions/Restrictions fall  -KJ fall  -KJ      Row Name 03/07/25 0845          General Information    Existing Precautions/Restrictions fall  -KJ       Row Name 03/07/25 1515 03/07/25 1115       Pain    Pretreatment Pain Rating 5/10  -KJ 5/10  -KJ    Posttreatment Pain Rating 5/10  -KJ 5/10  -KJ    Pain Location abdomen  -KJ --    Pain Side/Orientation left  -KJ left  -KJ      Row Name 03/07/25 0845          Pain    Pretreatment Pain Rating 5/10  -KJ     Posttreatment Pain Rating 5/10  -KJ     Pain Location abdomen  -KJ     Pain Side/Orientation left  -KJ       Row Name 03/07/25 1515 03/07/25 1115       Bed Mobility    Comment, (Bed Mobility) chair  -KJ chair  -KJ      Row Name 03/07/25 0845          Bed Mobility    Sit-Supine [place] (Bed Mobility) independent  -KJ       Row Name 03/07/25 1515 03/07/25 1115       Sit-Stand Transfer    Sit-Stand [place] (Transfers) independent  -KJ independent  -KJ      Row Name 03/07/25 0845          Sit-Stand Transfer    Sit-Stand [place] (Transfers) independent  -KJ       Row Name 03/07/25 1515 03/07/25 1115       Gait/Stairs (Locomotion)    [place] Level (Gait) verbal cues;standby assist;contact guard  -KJ verbal cues;standby assist  -KJ    Assistive Device (Gait) walker, front-wheeled  -KJ walker, front-wheeled  -KJ    Distance in Feet (Gait) 525  amb 75' without rwx  -  -KJ    Deviations/Abnormal Patterns (Gait) gait speed decreased  -KJ gait speed decreased  -KJ      Row Name 03/07/25 0845          Gait/Stairs (Locomotion)    [place] Level (Gait) verbal cues;standby assist  -KJ     Assistive Device (Gait) walker, front-wheeled  -KJ     Distance in Feet (Gait) 350  -KJ     Deviations/Abnormal Patterns (Gait) gait speed decreased  -KJ       Row Name             Wound 03/02/25 1818 abdomen    Wound - Properties Group Placement Date: 03/02/25  -HW Placement Time: 1818 -HW Location: abdomen  -HW Primary  Wound Type: Incision  -HW    Retired Wound - Properties Group Placement Date: 03/02/25  - Placement Time: 1818 -HW Location: abdomen  -HW Primary Wound Type: Incision  -HW    Retired Wound - Properties Group Placement Date: 03/02/25  - Placement Time: 1818 -HW Location: abdomen  -HW Primary Wound Type: Incision  -HW    Retired Wound - Properties Group Date first assessed: 03/02/25  - Time first assessed: 1818 -HW Location: abdomen  -HW Primary Wound Type: Incision  -HW      Row Name 03/07/25 1515 03/07/25 1115       Positioning and Restraints    Pre-Treatment Position sitting in chair/recliner  -KJ sitting in chair/recliner  -KJ    Post Treatment Position chair  -KJ chair  -KJ      Row Name 03/07/25 0845          Positioning and Restraints    Pre-Treatment Position in bed  -KJ     Post Treatment Position chair  -KJ               User Key  (r) = Recorded By, (t) = Taken By, (c) = Cosigned By      Initials Name Provider Type    Maki Garcia PTA Physical Therapist Assistant     Juan Ramon Pool, RN Registered Nurse                    Physical Therapy Education        No education to display                  PT Recommendation and Plan     Progress: improving  Outcome Evaluation: PT tx completed. Pt c/o she got nauseated again this morning after pn pill and or IV meds. Education on pn management. She would rather not take any IV pn meds and try nausea meds prior to pn meds with meals. She is mobilizing much better this am, ambulated 350' S. Encouraged increase activity, took purwick off and recommended amb to BR when needed.       Time Calculation:    PT Charges       Row Name 03/07/25 1530 03/07/25 1129 03/07/25 1009       Time Calculation    Start Time 1515  -KJ 1115  -KJ 0845  -KJ    Stop Time 1530  -KJ 1129  -KJ 0926  -KJ    Time Calculation (min) 15 min  -KJ 14 min  -KJ 41 min  -KJ    PT Received On 03/07/25  -KJ 03/07/25  -KJ 03/07/25  -KJ    PT Goal Re-Cert Due Date 03/14/25  -KJ 03/14/25  -KJ  03/14/25  -KJ       Time Calculation- PT    Total Timed Code Minutes- PT 15 minute(s)  -KJ 14 minute(s)  -KJ 41 minute(s)  -KJ              User Key  (r) = Recorded By, (t) = Taken By, (c) = Cosigned By      Initials Name Provider Type    Maki Garcia PTA Physical Therapist Assistant                  Therapy Charges for Today       Code Description Service Date Service Provider Modifiers Qty    52463942784 HC GAIT TRAINING EA 15 MIN 3/6/2025 Maki Kidd, PTA GP 2    51655556714 HC GAIT TRAINING EA 15 MIN 3/7/2025 Maki Kidd, PTA GP 1    56165343818 HC PT THERAPEUTIC ACT EA 15 MIN 3/7/2025 Maki Kidd, PTA GP 1    44722154694 HC GAIT TRAINING EA 15 MIN 3/7/2025 Maki Kidd, PTA GP 1    48929034429 HC GAIT TRAINING EA 15 MIN 3/7/2025 Maki Kidd, PTA GP 1            PT G-Codes  Outcome Measure Options: AM-PAC 6 Clicks Basic Mobility (PT)  AM-PAC 6 Clicks Score (PT): 17    Maki Kidd PTA  3/7/2025

## 2025-03-07 NOTE — THERAPY TREATMENT NOTE
Acute Care - Physical Therapy Treatment Note  [facility]     Patient Name: Yun Blackwell  : 1955  MRN: [id number]  Today's Date: 3/7/2025      Visit Dx:     ICD-10-CM ICD-9-CM   1. Small bowel obstruction  K56.609 560.9   2. Impaired functional mobility and activity tolerance [Z74.09]  Z74.09 V49.89   3. 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    ASD secundum    Pulmonary hypertension    Atypical ductal hyperplasia of breast    Ductal carcinoma in situ (DCIS) of left breast    Precordial pain    COVID-19 vaccine dose declined    LEROY on CPAP    Prediabetes    Nonunion after arthrodesis    Hammertoe of left foot    BRBPR (bright red blood per rectum)    Full incontinence of feces    Abnormal mammogram    Difficulty with CPAP nasal mask use    Personal history of breast cancer    Mammographic microcalcification    Genetic susceptibility to other malignant neoplasm    Dry mouth    GI bleeding    SBO (small bowel obstruction)    Incarcerated ventral hernia     Past Medical History:   Diagnosis Date    A-fib     Abnormal ECG     tachycardia, a fib    Acid reflux     Anemia     Arthritis     Cancer     Diabetes mellitus     Hyperkalemia     Hyperlipidemia     Hypertension     Hyponatremia     IBS (irritable bowel syndrome)     PONV (postoperative nausea and vomiting)     Sleep apnea     USES CPAP    UTI (urinary tract infection)     UTI (urinary tract infection) 2016    Vaginal vault prolapse      Past Surgical History:   Procedure Laterality Date    BREAST BIOPSY Left     X2    BUNIONECTOMY  Left     CARDIAC CATHETERIZATION      CARDIAC CATHETERIZATION N/A 02/05/2021    Procedure: Right Heart Cath;  Surgeon: Van Marcelino MD;  Location:  [facility];  Service: Cardiology;  Laterality: N/A;    CATARACT EXTRACTION, BILATERAL      COLON SURGERY      COLONOSCOPY  09/21/2015    TRANSVERSE COLON ADENOMATOUS POLYP, HEMORRHOIDS, RECALL 5 YEARS, DR LAMAS    COLONOSCOPY N/A 11/13/2020    6 mm tubular adenomatous polyp at 15 cm proximal to the anus,diverticulosis of the sigmoid colon    COLONOSCOPY N/A 12/13/2023    - One 4 mm polyp at 40 cm proximal to the anus,. - One 4 mm polyp at 20 cm proximal to the anus, removed with a cold snare. Resected and retrieved. - Diverticulosis in the sigmoid colon. - Hemorrhoids----tubular adenoma no evidence of high grade dysplasia    COLONOSCOPY N/A 12/22/2024    Dr. Ludwig-- The examined portion of the ileum was normal. - Diverticulosis in the entire examined colon. - Non-bleeding internal hemorrhoids. - No specimens collected.    COLOSTOMY      COLPOPEXY VAGINAL      2014    ENDOSCOPY N/A 11/03/2016    LA GRADE B ESOPHAGITIS WITH NO BLEEDING UPPER THIRD OF ESOPHAGUS, GERI-WADSWORTH TEAR GEJ, BILIOUS BLOOD TINGED COFFEE GROUND GATRIC FLUIDDR [facility]    ENDOSCOPY N/A 12/13/2023    Normal examined duodenum. - Normal stomach. Biopsied. - Z-line regular, 35 cm from the incisors. Dilated.-neg for h pylori    EXPLORATORY LAPAROTOMY N/A 10/14/2016    Procedure: LAPAROTOMY EXPLORATORY, LYSIS OF ADHESIONS, IRRIAGATION OF ABDOMEN, POSSIBLE BOWEL RESECTION;  Surgeon: Bacilio Marcial MD;  Location:  [facility] OR;  Service:     FOOT NAVICULAR EXCISION OR BONE GRAFT Left 01/04/2023    Procedure: Expansion Graft, Harvesting of Bone Graft/Bone Marrow Aspirate;  Surgeon: Frankie Zapata DPM;  Location:  [facility];  Service: Podiatry;  Laterality: Left;    HAMMER TOE REPAIR Left 01/04/2023    Procedure: Hammertoe Repair 2 and 3 - Left Foot;  Surgeon: Frankie Zapata,  FAMILIA;  Location:  [facility];  Service: Podiatry;  Laterality: Left;    HARDWARE REMOVAL Left 01/04/2023    Procedure: Hardware Removal;  Surgeon: Frankie Zapata DPM;  Location:  [facility] OR;  Service: Podiatry;  Laterality: Left;    HYSTERECTOMY      REVISION / TAKEDOWN COLOSTOMY      SACROCOLPOPEXY N/A 09/21/2016    Procedure: SACROCOLPOPEXY LAPAROSCOPIC WITH TroveI SI ROBOT, CONVERTED TO OPEN SACROCOLPOPEXY, RIGHT SALPINGO- OOPHERECTOMY, CYSTO;  Surgeon: Maribell Beth MD;  Location:  [facility];  Service:     TOE FUSION Left 01/06/2022    Procedure: FIRST METATARSOPHALANGEAL JOINT ARTHRODESIS WITH INTERNAL FIXATION - LEFT FOOT;  Surgeon: Jorgito Red DPM;  Location:  [facility];  Service: Podiatry;  Laterality: Left;    TOE FUSION Left 01/04/2023    Procedure: Revisional 1st Metatarsophalangeal Joint Arthrodesis with Hardware Removal and Expansion Graft, Harvesting of Bone Graft/Bone Marrow Aspirate, Hammertoe Repair 2 and 3 - Left Foot;  Surgeon: Frankie Zapata DPM;  Location:  [facility];  Service: Podiatry;  Laterality: Left;    VENTRAL HERNIA REPAIR N/A 3/2/2025    Procedure: VENTRAL / INCISIONAL HERNIA REPAIR LAPAROSCOPIC WITH TroveI ROBOT;  Surgeon: Jarrett Bland MD;  Location:  [facility];  Service: Robotics - [employer];  Laterality: N/A;     PT Assessment (Last 12 Hours)       PT Evaluation and Treatment       Row Name 03/07/25 [facility]5 03/07/25 0845       Physical Therapy Time and Intention    Subjective Information no complaints  -KJ no complaints  -KJ    Document Type therapy note (daily note)  -KJ therapy note (daily note)  -KJ    Mode of Treatment physical therapy  -KJ physical therapy  -KJ    Patient Effort good  -KJ good  -KJ      Row Name 03/07/25 1115 03/07/25 0845       General Information    Existing Precautions/Restrictions fall  -KJ fall  -KJ      Row Name 03/07/25 1115 03/07/25 0845       Pain    Pretreatment Pain Rating 5/10  -KJ 5/10  -KJ    Posttreatment Pain Rating 5/10  -KJ 5/10  -KJ     Pain Location -- abdomen  -KJ    Pain Side/Orientation left  -KJ left  -KJ      Row Name 03/07/25 1115 03/07/25 0845       Bed Mobility    Sit-Supine [place] (Bed Mobility) -- independent  -KJ    Comment, (Bed Mobility) chair  -KJ --      Row Name 03/07/25 1115 03/07/25 0845       Sit-Stand Transfer    Sit-Stand [place] (Transfers) independent  -KJ independent  -KJ      Row Name 03/07/25 1115 03/07/25 0845       Gait/Stairs (Locomotion)    [place] Level (Gait) verbal cues;standby assist  -KJ verbal cues;standby assist  -KJ    Assistive Device (Gait) walker, front-wheeled  -KJ walker, front-wheeled  -KJ    Distance in Feet (Gait) 525  -  -KJ    Deviations/Abnormal Patterns (Gait) gait speed decreased  -KJ gait speed decreased  -KJ      Row Name             Wound 03/02/25 1818 abdomen    Wound - Properties Group Placement Date: 03/02/25  - Placement Time: 1818 -HW Location: abdomen  -HW Primary Wound Type: Incision  -HW    Retired Wound - Properties Group Placement Date: 03/02/25  - Placement Time: 1818 -HW Location: abdomen  -HW Primary Wound Type: Incision  -HW    Retired Wound - Properties Group Placement Date: 03/02/25  - Placement Time: 1818 -HW Location: abdomen  -HW Primary Wound Type: Incision  -HW    Retired Wound - Properties Group Date first assessed: 03/02/25  - Time first assessed: 1818 -HW Location: abdomen  -HW Primary Wound Type: Incision  -HW      Row Name 03/07/25 1115 03/07/25 0845       Positioning and Restraints    Pre-Treatment Position sitting in chair/recliner  -KJ in bed  -KJ    Post Treatment Position chair  -KJ chair  -KJ              User Key  (r) = Recorded By, (t) = Taken By, (c) = Cosigned By      Initials Name Provider Type    KJ Maki Kidd PTA Physical Therapist Assistant    HW Juan Ramon Pool, RN Registered Nurse                    Physical Therapy Education        No education to display                  PT Recommendation and Plan     Progress:  improving  Outcome Evaluation: PT tx completed. Pt c/o she got nauseated again this morning after pn pill and or IV meds. Education on pn management. She would rather not take any IV pn meds and try nausea meds prior to pn meds with meals. She is mobilizing much better this am, ambulated 350' S. Encouraged increase activity, took purwick off and recommended amb to BR when needed.       Time Calculation:    PT Charges       Row Name 03/07/25 1129 03/07/25 1009          Time Calculation    Start Time 1115  -KJ 0845  -KJ     Stop Time 1129  -KJ 0926  -KJ     Time Calculation (min) 14 min  -KJ 41 min  -KJ     PT Received On 03/07/25  -KJ 03/07/25  -KJ     PT Goal Re-Cert Due Date 03/14/25  -KJ 03/14/25  -KJ        Time Calculation- PT    Total Timed Code Minutes- PT 14 minute(s)  -KJ 41 minute(s)  -KJ               User Key  (r) = Recorded By, (t) = Taken By, (c) = Cosigned By      Initials Name Provider Type    Maki Garcia PTA Physical Therapist Assistant                  Therapy Charges for Today       Code Description Service Date Service Provider Modifiers Qty    93983601774 HC GAIT TRAINING EA 15 MIN 3/6/2025 Maki Kidd, LEAH GP 2    77884004118 HC GAIT TRAINING EA 15 MIN 3/7/2025 Maki Kidd, PTA GP 1    27879400555 HC PT THERAPEUTIC ACT EA 15 MIN 3/7/2025 Maki Kidd PTA GP 1    31059389593 HC GAIT TRAINING EA 15 MIN 3/7/2025 Maki Kidd, PTA GP 1            PT G-Codes  Outcome Measure Options: AM-PAC 6 Clicks Basic Mobility (PT)  AM-PAC 6 Clicks Score (PT): 17    Maki Kidd PTA  3/7/2025

## 2025-03-08 ENCOUNTER — READMISSION MANAGEMENT (OUTPATIENT)
Dept: CALL CENTER | Facility: HOSPITAL | Age: 70
End: 2025-03-08
Payer: MEDICARE

## 2025-03-08 VITALS
TEMPERATURE: 98 F | WEIGHT: 154.98 LBS | OXYGEN SATURATION: 98 % | SYSTOLIC BLOOD PRESSURE: 125 MMHG | HEIGHT: 63 IN | BODY MASS INDEX: 27.46 KG/M2 | DIASTOLIC BLOOD PRESSURE: 58 MMHG | HEART RATE: 84 BPM | RESPIRATION RATE: 16 BRPM

## 2025-03-08 LAB
ANION GAP SERPL CALCULATED.3IONS-SCNC: 6 MMOL/L (ref 5–15)
BASOPHILS # BLD AUTO: 0.02 10*3/MM3 (ref 0–0.2)
BASOPHILS NFR BLD AUTO: 0.3 % (ref 0–1.5)
BUN SERPL-MCNC: 10 MG/DL (ref 8–23)
BUN/CREAT SERPL: 20 (ref 7–25)
CALCIUM SPEC-SCNC: 8.4 MG/DL (ref 8.6–10.5)
CHLORIDE SERPL-SCNC: 103 MMOL/L (ref 98–107)
CO2 SERPL-SCNC: 28 MMOL/L (ref 22–29)
CREAT SERPL-MCNC: 0.5 MG/DL (ref 0.57–1)
DEPRECATED RDW RBC AUTO: 47.5 FL (ref 37–54)
EGFRCR SERPLBLD CKD-EPI 2021: 100.4 ML/MIN/1.73
EOSINOPHIL # BLD AUTO: 0.21 10*3/MM3 (ref 0–0.4)
EOSINOPHIL NFR BLD AUTO: 3.4 % (ref 0.3–6.2)
ERYTHROCYTE [DISTWIDTH] IN BLOOD BY AUTOMATED COUNT: 13.8 % (ref 12.3–15.4)
GLUCOSE SERPL-MCNC: 79 MG/DL (ref 65–99)
HCT VFR BLD AUTO: 36.5 % (ref 34–46.6)
HGB BLD-MCNC: 11.3 G/DL (ref 12–15.9)
IMM GRANULOCYTES # BLD AUTO: 0.03 10*3/MM3 (ref 0–0.05)
IMM GRANULOCYTES NFR BLD AUTO: 0.5 % (ref 0–0.5)
LYMPHOCYTES # BLD AUTO: 1.6 10*3/MM3 (ref 0.7–3.1)
LYMPHOCYTES NFR BLD AUTO: 25.6 % (ref 19.6–45.3)
MCH RBC QN AUTO: 29.3 PG (ref 26.6–33)
MCHC RBC AUTO-ENTMCNC: 31 G/DL (ref 31.5–35.7)
MCV RBC AUTO: 94.6 FL (ref 79–97)
MONOCYTES # BLD AUTO: 0.64 10*3/MM3 (ref 0.1–0.9)
MONOCYTES NFR BLD AUTO: 10.2 % (ref 5–12)
NEUTROPHILS NFR BLD AUTO: 3.76 10*3/MM3 (ref 1.7–7)
NEUTROPHILS NFR BLD AUTO: 60 % (ref 42.7–76)
NRBC BLD AUTO-RTO: 0 /100 WBC (ref 0–0.2)
PLATELET # BLD AUTO: 144 10*3/MM3 (ref 140–450)
PMV BLD AUTO: 10.7 FL (ref 6–12)
POTASSIUM SERPL-SCNC: 4.7 MMOL/L (ref 3.5–5.2)
RBC # BLD AUTO: 3.86 10*6/MM3 (ref 3.77–5.28)
SODIUM SERPL-SCNC: 137 MMOL/L (ref 136–145)
WBC NRBC COR # BLD AUTO: 6.26 10*3/MM3 (ref 3.4–10.8)

## 2025-03-08 PROCEDURE — 80048 BASIC METABOLIC PNL TOTAL CA: CPT | Performed by: STUDENT IN AN ORGANIZED HEALTH CARE EDUCATION/TRAINING PROGRAM

## 2025-03-08 PROCEDURE — 85025 COMPLETE CBC W/AUTO DIFF WBC: CPT | Performed by: STUDENT IN AN ORGANIZED HEALTH CARE EDUCATION/TRAINING PROGRAM

## 2025-03-08 RX ORDER — HYDROCODONE BITARTRATE AND ACETAMINOPHEN 10; 325 MG/1; MG/1
1 TABLET ORAL EVERY 4 HOURS PRN
Status: COMPLETED | OUTPATIENT
Start: 2025-03-08 | End: 2025-03-08

## 2025-03-08 RX ORDER — HYDROCODONE BITARTRATE AND ACETAMINOPHEN 10; 325 MG/1; MG/1
1 TABLET ORAL ONCE AS NEEDED
Refills: 0 | Status: COMPLETED | OUTPATIENT
Start: 2025-03-08 | End: 2025-03-08

## 2025-03-08 RX ADMIN — HYDROCODONE BITARTRATE AND ACETAMINOPHEN 1 TABLET: 10; 325 TABLET ORAL at 06:49

## 2025-03-08 RX ADMIN — FAMOTIDINE 20 MG: 10 INJECTION INTRAVENOUS at 09:39

## 2025-03-08 RX ADMIN — DOCUSATE SODIUM 100 MG: 100 CAPSULE, LIQUID FILLED ORAL at 09:34

## 2025-03-08 RX ADMIN — LIDOCAINE 1 PATCH: 4 PATCH TOPICAL at 09:35

## 2025-03-08 RX ADMIN — HYDROCODONE BITARTRATE AND ACETAMINOPHEN 1 TABLET: 10; 325 TABLET ORAL at 13:31

## 2025-03-08 RX ADMIN — DABIGATRAN ETEXILATE MESYLATE 150 MG: 150 CAPSULE ORAL at 09:34

## 2025-03-08 RX ADMIN — Medication 10 ML: at 09:39

## 2025-03-08 RX ADMIN — CYCLOBENZAPRINE HYDROCHLORIDE 5 MG: 10 TABLET, FILM COATED ORAL at 09:34

## 2025-03-08 RX ADMIN — DILTIAZEM HYDROCHLORIDE 120 MG: 120 CAPSULE, EXTENDED RELEASE ORAL at 09:34

## 2025-03-08 RX ADMIN — POLYETHYLENE GLYCOL 3350 17 G: 17 POWDER, FOR SOLUTION ORAL at 09:35

## 2025-03-08 RX ADMIN — TAMOXIFEN CITRATE 20 MG: 20 TABLET ORAL at 09:35

## 2025-03-08 NOTE — PLAN OF CARE
Goal Outcome Evaluation:                Outcome Evaluation: Pt A&Ox4, able to make needs known. Up standby to bathroom voiding. No N/V this shift. Medications given per orders, 1x dose Norco given d/t med being discontinued, given with positive effect.  0/10 pain throughout rest of shift. Lap sites intact, approximated with steri strips, no drainage noted. IV to RFA IID. Safety maintained, call light within reach.

## 2025-03-08 NOTE — PROGRESS NOTES
[facility] Medicine Services  INPATIENT PROGRESS NOTE    Patient Name: Yun Blackwell  Date of Admission: 3/2/2025  Today's Date: 03/07/25  Length of Stay: 5  Primary Care Physician: ANGEL Healy MD    Subjective   Chief Complaint: abdominal pain      POD 5 s/p ventral hernia repair.    Patient not feeling well today she is complaining of nausea and belching.  She is requiring Dilaudid this morning.      Review of Systems   All pertinent negatives and positives are as above. All other systems have been reviewed and are negative unless otherwise stated.     Objective    Temp:  [97.8 °F (36.6 °C)-98.2 °F (36.8 °C)] 97.9 °F (36.6 °C)  Heart Rate:  [64-81] 75  Resp:  [14-16] 16  BP: (112-121)/(61-65) 112/65  Physical Exam  GEN: Awake, alert, interactive, in NAD on room air  HEENT: Atraumatic, EOMI, Anicteric  Lungs: CTAB  Heart: RRR, +S1/s2, no rub  ABD:  Small incisions from surgery clean dry. Tenderness to mild palpation.  Bowel sounds heard on LUQ, but overall under active.  Extremities: atraumatic, no cyanosis, no edema  Skin: no rashes or lesions  Neuro: AAOx3, no focal deficits  Psych: Good mood, normal affect    Results Review:  I have reviewed the labs, radiology results, and diagnostic studies.    Laboratory Data:   Results from last 7 days   Lab Units 03/07/25  0512 03/06/25  0430 03/05/25  0414   WBC 10*3/mm3 6.08 6.14 5.67   HEMOGLOBIN g/dL 11.8* 11.2* 10.8*   HEMATOCRIT % 37.8 35.2 34.4   PLATELETS 10*3/mm3 133* 127* 117*        Results from last 7 days   Lab Units 03/07/25  0512 03/06/25  0430 03/05/25  0414 03/04/25  1057 03/03/25  0652 03/02/25  1358   SODIUM mmol/L 138 140 139   < > 136 138   POTASSIUM mmol/L 4.2 4.1 4.3   < > 4.4 4.5   CHLORIDE mmol/L 102 105 105   < > 101 102   CO2 mmol/L 29.0 28.0 28.0   < > 25.0 24.0   BUN mg/dL 14 14 15   < > 11 13   CREATININE mg/dL 0.49* 0.52* 0.58   < > 0.45* 0.48*   CALCIUM mg/dL 8.4* 8.4* 8.4*   < > 8.8 9.7    BILIRUBIN mg/dL  --   --   --   --  1.3* 0.6   ALK PHOS U/L  --   --   --   --  75 96   ALT (SGPT) U/L  --   --   --   --  13 14   AST (SGOT) U/L  --   --   --   --  23 27   GLUCOSE mg/dL 81 90 85   < > 133* 127*    < > = values in this interval not displayed.       Culture Data:   [unfilled]    Radiology Data:   Imaging Results (Last 24 Hours)       Procedure Component Value Units Date/Time    XR Abdomen KUB [[id number]] Collected: 03/06/25 2011     Updated: 03/06/25 2015    Narrative:      EXAMINATION:  XR ABDOMEN KUB-  3/6/2025 5:44 PM     HISTORY: Vomiting. Recent surgery.     COMPARISON: 3/4/2025.     TECHNIQUE: Supine abdomen.     FINDINGS:   There is scattered air within small bowel and colon. No  small bowel distention is seen. There are gallstones projected over the  right upper to mid abdomen. There is no organomegaly. No acute bony  abnormality is seen.          Impression:      1. Gallstones.  2. The bowel gas pattern is within normal limits.           This report was signed and finalized on 3/6/2025 8:12 PM by Dr. Timothy Serna MD.               I have reviewed the patient's current medications.     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     Permanent atrial fibrillation        Treatment Plan    # Incarcerated ventral hernia s/p hernia repair 3/2/2025  # SBO 2/2 above  - Continue pain management with Norco and Dilaudid  - Bowel management as needed  - Continue full liquid diet, advance per surgery recommendations  - PT/OT  - Episode of vomiting this 3/6, KUB ordered showing normal bowel gas pattern  - Zofran for nausea and vomiting    # UTI  U/A: Positive leukocytes, 11-20 RBC, 3-5 WBC, 3+ bacteria  Rocephin started yesterday on admission however not given  - Completed 3 days of rocephin     # Atrial  fibrillation  [id number] 4  Was on Cardizem and Pradaxa  Received 1 dose of idarucizumab after surgery  - Continue home Cardizem 120 mg daily  - Home Pradaxa resumed     # History of breast cancer - on tamoxifen daily  # Diabetes mellitus - HgbA1c 5.3%. Monitor hyperglycemia due to recent steroids prescribed by PCP for neck pain.  Hyperglycemia improving  # Hypertension -controlled  # Sleep apnea  # Pulmonary hypertension  Echo 1/29/24: EF 51 to 55%.  Severe biatrial enlargement.  Moderate TVR.  No other significant valvular pathology.  - continue CPAP      Medical Decision Making  Number and Complexity of problems: 2 acute moderate complexity, others chronic  Differential Diagnosis: As above    Conditions and Status        Improving.     MDM Data  External documents reviewed: Reviewed  Cardiac tracing (EKG, telemetry) interpretation: Reviewed  Radiology interpretation: Reviewed  Labs reviewed: As above  Any tests that were considered but not ordered: None     Decision rules/scores evaluated (example [id number], [place], etc): No data recorded        Discussed with: Patient     Care Planning  Shared decision making: Patient apprised of current labs, vitals, imaging and treatment plan.  They are agreeable with proceeding with plans as discussed.   Code status and discussions: CPR    Disposition  Social Determinants of Health that impact treatment or disposition: None  I expect the patient to be discharged to home in 1 day.         Electronically signed by Jose Raul Archuleta MD, 03/07/25, 18:34 CST.

## 2025-03-08 NOTE — OUTREACH NOTE
Prep Survey      Flowsheet Row Responses   [facility] facility patient discharged from? [place]   Is LACE score < 7 ? No   Eligibility [facility]   Date of Admission 03/02/25   Date of Discharge 03/08/25   Discharge Disposition Home or Self Care   Discharge diagnosis Ventral/Incisional hernia repair lap   Does the patient have one of the following disease processes/diagnoses(primary or secondary)? General Surgery   Does the patient have Home health ordered? No   Is there a DME ordered? No   Prep survey completed? Yes            NBA DUMONT - Registered Nurse

## 2025-03-08 NOTE — THERAPY DISCHARGE NOTE
Acute Care - Physical Therapy Discharge Summary  [facility]       Patient Name: Yun Blackwell  : 1955  MRN: [id number]    Today's Date: 3/8/2025                 Admit Date: 3/2/2025      PT Recommendation and Plan    Visit Dx:    ICD-10-CM ICD-9-CM   1. Small bowel obstruction  K56.609 560.9   2. Impaired functional mobility and activity tolerance [Z74.09]  Z74.09 V49.89   3. Incarcerated ventral hernia  K43.6 552.20                PT Rehab Goals       Row Name 25 1500             Bed Mobility Goal 1 (PT)    Activity/Assistive Device (Bed Mobility Goal 1, PT) rolling to left;rolling to right;scooting;sidelying to sit/sit to sidelying  -AB      [place] Level/Cues Needed (Bed Mobility Goal 1, PT) standby assist;modified independence  -AB      Time Frame (Bed Mobility Goal 1, PT) long term goal (LTG);10 days  -AB      Progress/Outcomes (Bed Mobility Goal 1, PT) goal met  -AB         Transfer Goal 1 (PT)    Activity/Assistive Device (Transfer Goal 1, PT) sit-to-stand/stand-to-sit;bed-to-chair/chair-to-bed;other (see comments)  rwx for bed to/from chair  -AB      [place] Level/Cues Needed (Transfer Goal 1, PT) standby assist  -AB      Time Frame (Transfer Goal 1, PT) long term goal (LTG);10 days  -AB      Progress/Outcome (Transfer Goal 1, PT) goal met  -AB         Gait Training Goal 1 (PT)    Activity/Assistive Device (Gait Training Goal 1, PT) gait (walking locomotion);assistive device use;decrease fall risk;improve balance and speed;increase endurance/gait distance;walker, rolling  -AB      [place] Level (Gait Training Goal 1, PT) standby assist  -AB      Distance (Gait Training Goal 1, PT) 200 ft  -AB      Time Frame (Gait Training Goal 1, PT) long term goal (LTG);10 days  -AB      Progress/Outcome (Gait Training Goal 1, PT) goal met  -AB         Stairs Goal 1 (PT)    Activity/Assistive Device (Stairs Goal 1, PT) ascending stairs;descending stairs;using handrail, left;using  handrail, right;step-to-step;decrease fall risk;improve balance and speed  -AB      [place] Level/Cues Needed (Stairs Goal 1, PT) contact guard required  -AB      Number of Stairs (Stairs Goal 1, PT) 2  -AB      Time Frame (Stairs Goal 1, PT) long term goal (LTG);10 days  -AB      Progress/Outcome (Stairs Goal 1, PT) goal not met  -AB                User Key  (r) = Recorded By, (t) = Taken By, (c) = Cosigned By      Initials Name Provider Type Discipline    Melody Sahu, PTA Physical Therapist Assistant PT                        PT Discharge Summary  Anticipated Discharge Disposition (PT): home with assist  Reason for Discharge: Discharge from facility  Outcomes Achieved: Refer to plan of care for updates on goals achieved  Discharge Destination: Home with assist      Melody Yancey PTA   3/8/2025

## 2025-03-09 NOTE — NURSING NOTE
Pt to d/c home w/ family.  Reviewed AVS, new medications and upcoming doctor appts.  Discussed s/s of SBO, Infection and DVT.  IV removed.

## 2025-03-10 ENCOUNTER — TRANSITIONAL CARE MANAGEMENT TELEPHONE ENCOUNTER (OUTPATIENT)
Dept: CALL CENTER | Facility: HOSPITAL | Age: 70
End: 2025-03-10
Payer: MEDICARE

## 2025-03-10 ENCOUNTER — PATIENT OUTREACH (OUTPATIENT)
Dept: CASE MANAGEMENT | Facility: OTHER | Age: 70
End: 2025-03-10
Payer: MEDICARE

## 2025-03-10 DIAGNOSIS — I48.21 PERMANENT ATRIAL FIBRILLATION: Primary | ICD-10-CM

## 2025-03-10 DIAGNOSIS — K56.609 SBO (SMALL BOWEL OBSTRUCTION): ICD-10-CM

## 2025-03-10 NOTE — OUTREACH NOTE
AMBULATORY CASE MANAGEMENT NOTE    Names and Relationships of Patient/Support Persons: Contact: Yun Blackwell; Relationship: Self -     CCM Interim Update    Reached out to the patient to introduce and offer CCM. Patient is not interested at this time. She will reach out if her needs change.        Education Documentation  No documentation found.        Margie MALDONADO  Ambulatory Case Management    3/10/2025, 13:06 CDT

## 2025-03-10 NOTE — OUTREACH NOTE
Call Center TCM Note      Flowsheet Row Responses   [facility] patient discharged from? [place]   Does the patient have one of the following disease processes/diagnoses(primary or secondary)? General Surgery   TCM attempt successful? No   Unsuccessful attempts Attempt 1  [no one listed on  PCP verbal release]   Call Status Comments PCP HOSPITAL f/u appt on 3/11/25 at 3:00 PM with Dr. Alexander Healy.            Filomena Bowen RN    3/10/2025, 14:06 CDT            Filomena HORN - Registered Nurse

## 2025-03-10 NOTE — OUTREACH NOTE
Call Center TCM Note      Flowsheet Row Responses   [facility] patient discharged from? [place]   Does the patient have one of the following disease processes/diagnoses(primary or secondary)? General Surgery   TCM attempt successful? Yes   Call start time 1629   Call Status Comments [facility]/ appt on 3/11/25 at 3:00 PM with Dr. Alexander Healy.   Call end time 1535   Discharge diagnosis Ventral/Incisional hernia repair lap   Is patient permission given to speak with other caregiver? No   Person spoke with today (if not patient) and relationship Patient   Medication alerts for this patient Zofran, Oxycodone   Meds reviewed with patient/caregiver? Yes   Is the patient having any side effects they believe may be caused by any medication additions or changes? No   Does the patient have all medications related to this admission filled (includes all antibiotics, pain medications, etc.) Yes   Prescription comments Discussed side effects with Zofran, constipation. No questions or concerns with medications.   Is the patient taking all medications as directed (includes completed medication regime)? Yes   Comments [facility]/ appt tomorrow, 3/11/25 at 3:00 PM with Dr. Alexander Healy.   Does the patient have an appointment with their PCP within 7-14 days of discharge? Yes   Has home health visited the patient within 72 hours of discharge? N/A   Psychosocial issues? No   Comments Patient states she is up and moving about the home, just slower than normal. Patient has showered today. Eating and drinking well, no vomiting. Patient reports having normal bowel movements since discharge.   Did the patient receive a copy of their discharge instructions? Yes   Nursing interventions Reviewed instructions with patient   What is the patient's perception of their health status since discharge? Improving   Nursing interventions Nurse provided patient education   Is the patient /caregiver able to teach back basic post-op  care? Drive as instructed by MD in discharge instructions, Take showers only when approved by MD-sponge bathe until then, No tub bath, swimming, or hot tub until instructed by MD, Keep incision areas clean,dry and protected, Do not remove steri-strips, Lifting as instructed by MD in discharge instructions   Is the patient/caregiver able to teach back signs and symptoms of incisional infection? Increased redness, swelling or pain at the incisonal site, Increased drainage or bleeding, Incisional warmth, Pus or odor from incision, Fever   Is the patient/caregiver able to teach back steps to recovery at home? Set small, achievable goals for return to baseline health, Rest and rebuild strength, gradually increase activity, Make a list of questions for surgeon's appointment   If the patient is a current smoker, are they able to teach back resources for cessation? Not a smoker   Is the patient/caregiver able to teach back the hierarchy of who to call/visit for symptoms/problems? PCP, Specialist, Home health nurse, Urgent Care, ED, 911 Yes   TCM call completed? Yes   Wrap up additional comments Patient has a POST OP appt with Surgery, Dr. Jarrett Bland on 3/28/25 at 9:00 AM.  PCP hospitals f/u appt tomorrow, 3/11/25 at 3:00 PM with Dr. Alexander Healy.   Call end time 1535   Would this patient benefit from a Referral to [facility] Social Work? No   Is the patient interested in additional calls from an ambulatory ? No            Filomena Bowen RN    3/10/2025, 15:36 CDT            Filomena HORN - Registered Nurse

## 2025-03-11 ENCOUNTER — OFFICE VISIT (OUTPATIENT)
Dept: INTERNAL MEDICINE | Facility: CLINIC | Age: 70
End: 2025-03-11
Payer: MEDICARE

## 2025-03-11 VITALS
HEIGHT: 63 IN | TEMPERATURE: 98.6 F | DIASTOLIC BLOOD PRESSURE: 76 MMHG | SYSTOLIC BLOOD PRESSURE: 120 MMHG | WEIGHT: 159 LBS | BODY MASS INDEX: 28.17 KG/M2 | OXYGEN SATURATION: 94 % | HEART RATE: 88 BPM

## 2025-03-11 DIAGNOSIS — Z09 HOSPITAL DISCHARGE FOLLOW-UP: Primary | ICD-10-CM

## 2025-03-11 DIAGNOSIS — K56.609 SBO (SMALL BOWEL OBSTRUCTION): ICD-10-CM

## 2025-03-11 DIAGNOSIS — K43.6 INCARCERATED VENTRAL HERNIA: ICD-10-CM

## 2025-03-11 DIAGNOSIS — L89.321 PRESSURE INJURY OF LEFT BUTTOCK, STAGE 1: ICD-10-CM

## 2025-03-11 NOTE — PROGRESS NOTES
Transitional Care Follow Up Visit  Subjective     Yun Blackwell is a 69 y.o. female who presents for a transitional care management visit.    Within 48 business hours after discharge our office contacted her via telephone to coordinate her care and needs.      I reviewed and discussed the details of that call along with the discharge summary, hospital problems, inpatient lab results, inpatient diagnostic studies, and consultation reports with Yun.     Current outpatient and discharge medications have been reconciled for the patient.  Reviewed by: ANGEL Healy MD          3/8/2025     3:31 PM   Date of TCM Phone Call   [facility]   Date of Admission 3/2/2025   Date of Discharge 3/8/2025   Discharge Disposition Home or Self Care     Risk for Readmission (LACE) Score: 12 (3/8/2025  5:00 AM)      History of Present Illness   Course During Hospital Stay:       History of Present Illness  The patient presents for a hospital follow-up.    She reports a significant improvement in her condition, with no current need for pain medication. She was hospitalized for approximately one week due to an incarcerated hernia that led to bowel obstruction, necessitating surgical intervention.     She has discontinued the use of oxycodone and Zofran as she no longer requires them. Her diet has been gradually increasing, with a focus on salads and soups. She consumed a bagel this morning without any adverse effects. She was provided with lidocaine patches for pain management but discontinued their use due to skin irritation.      She also reports experiencing pruritus around the surgical site and is uncertain about the appropriateness of scratching it.     She also reports a burning sensation in her back, which she attributes to bedsores.         The following portions of the patient's history were reviewed and updated as appropriate: allergies, current medications, past family history, past medical history, past social  "history, past surgical history, and problem list.    Review of Systems    Objective   /76 (BP Location: Right arm, Patient Position: Sitting, Cuff Size: Adult)   Pulse 88   Temp 98.6 °F (37 °C) (Temporal)   Ht 160 cm (63\")   Wt 72.1 kg (159 lb)   SpO2 94%   BMI 28.17 kg/m²   Physical Exam  Cardiovascular:      Rate and Rhythm: Normal rate. Rhythm irregular.      Heart sounds: No murmur heard.  Abdominal:      Tenderness: There is no abdominal tenderness.       Skin:               Assessment & Plan   Diagnoses and all orders for this visit:    1. Hospital discharge follow-up (Primary)    2. Incarcerated ventral hernia    3. SBO (small bowel obstruction)        Assessment & Plan  1. Postoperative status following hernia repair and bowel obstruction surgery.  Her weight has remained stable post-surgery. The surgical incisions do not exhibit signs of infection, but there is minor skin irritation present. She has been advised to discontinue scratching the pruritic areas. She has been instructed to apply an antibiotic ointment, such as Neosporin, to the affected area on her left buttock twice daily. She has been advised to dispose of the remaining oxycodone at the pharmacy. She can retain the Zofran for potential future use in case of nausea or vomiting. She has been informed that the effectiveness of Zofran may decrease after a year.    Keep follow-up with Dr. Bland as already scheduled.      ANGEL Healy MD  03/11/2025  Electronically signed by ANGEL Healy MD  03/11/25, 4:23 PM CDT    Patient or patient representative verbalized consent for the use of Ambient Listening during the visit with  ANGEL Healy MD for chart documentation. 3/11/2025  16:23 CDT             "

## 2025-03-19 ENCOUNTER — READMISSION MANAGEMENT (OUTPATIENT)
Dept: CALL CENTER | Facility: HOSPITAL | Age: 70
End: 2025-03-19
Payer: MEDICARE

## 2025-03-19 NOTE — OUTREACH NOTE
General Surgery Week 2 Survey      Flowsheet Row Responses   [facility] patient discharged from? [place]   Does the patient have one of the following disease processes/diagnoses(primary or secondary)? General Surgery   Week 2 attempt successful? Yes   Call start time 1206   Call end time 1206   Discharge diagnosis Ventral/Incisional hernia repair lap   Meds reviewed with patient/caregiver? Yes   Is the patient having any side effects they believe may be caused by any medication additions or changes? No   Does the patient have all medications related to this admission filled (includes all antibiotics, pain medications, etc.) Yes   Is the patient taking all medications as directed (includes completed medication regime)? Yes   Does the patient have a follow up appointment scheduled with their surgeon? Yes   Has the patient kept scheduled appointments due by today? N/A   Has home health visited the patient within 72 hours of discharge? N/A   Psychosocial issues? No   What is the patient's perception of their health status since discharge? Improving   Is the patient/caregiver able to teach back signs and symptoms of incisional infection? Increased redness, swelling or pain at the incisonal site, Increased drainage or bleeding, Incisional warmth, Pus or odor from incision, Fever   Is the patient/caregiver able to teach back steps to recovery at home? Eat a well-balance diet, Rest and rebuild strength, gradually increase activity, Set small, achievable goals for return to baseline health   Week 2 call completed? Yes   Graduated Yes   Wrap up additional comments Pt doing well and sees surgeon Friday.   Call end time 1206            Katelin PINEDA - Registered Nurse

## 2025-03-25 ENCOUNTER — TELEPHONE (OUTPATIENT)
Dept: SURGERY | Facility: CLINIC | Age: 70
End: 2025-03-25
Payer: MEDICARE

## 2025-03-25 NOTE — TELEPHONE ENCOUNTER
Pt called with concerns of clear-bloody drainage coming out of one of the surgical incisions with steri-strips remaining in place. Denies any fever, chills, n/v, increased redness, or foul drainage. Instructed patient she may remove the steristrip to draining site and keep incision clean and dry. May apply bandaide to prevent drainage on clothing and change daily. If no drainage, leave open to air. Pt instructed to keep follow up appointment on Friday. Pt understood.

## 2025-03-28 ENCOUNTER — OFFICE VISIT (OUTPATIENT)
Dept: SURGERY | Facility: CLINIC | Age: 70
End: 2025-03-28
Payer: MEDICARE

## 2025-03-28 VITALS
BODY MASS INDEX: 28.17 KG/M2 | SYSTOLIC BLOOD PRESSURE: 128 MMHG | WEIGHT: 159 LBS | DIASTOLIC BLOOD PRESSURE: 76 MMHG | HEIGHT: 63 IN

## 2025-03-28 DIAGNOSIS — Z98.890 HISTORY OF VENTRAL HERNIA REPAIR: Primary | ICD-10-CM

## 2025-03-28 DIAGNOSIS — K43.9 ABDOMINAL WALL HERNIA AT PREVIOUS STOMA SITE: ICD-10-CM

## 2025-03-28 DIAGNOSIS — Z79.01 ON CONTINUOUS ORAL ANTICOAGULATION: ICD-10-CM

## 2025-03-28 DIAGNOSIS — Z87.19 HISTORY OF VENTRAL HERNIA REPAIR: Primary | ICD-10-CM

## 2025-03-28 NOTE — PROGRESS NOTES
GENERAL SURGERY OFFICE FOLLOW UP VISIT    Referring Provider: No ref. provider found  Primary Care Provider: ANGEL Healy MD    Chief Complaint   Patient presents with    Hernia       Subjective     Ms. Blackwell presents for follow-up after being hospitalized for incarcerated ventral hernias and is 3 weeks status post laparoscopic robotic assisted ventral hernia repair with mesh.  She is still having some tingling and sharp stabbing pain at her abdominal wall.  She is back on her anticoagulation.  Overall, she reports that she is doing better.  Her appetite is back to normal.  She is having regular bowel movements.    History:  Past Medical History:   Diagnosis Date    A-fib     Abnormal ECG     tachycardia, a fib    Acid reflux     Anemia     Arthritis     Cancer     Diabetes mellitus     Hyperkalemia     Hyperlipidemia     Hypertension     Hyponatremia     IBS (irritable bowel syndrome)     PONV (postoperative nausea and vomiting)     Sleep apnea     USES CPAP    UTI (urinary tract infection)     UTI (urinary tract infection) 11/25/2016    Vaginal vault prolapse    ,   Past Surgical History:   Procedure Laterality Date    BREAST BIOPSY Left     X2    BUNIONECTOMY Left     CARDIAC CATHETERIZATION      CARDIAC CATHETERIZATION N/A 02/05/2021    Procedure: Right Heart Cath;  Surgeon: Van Marcelino MD;  Location:  [facility];  Service: Cardiology;  Laterality: N/A;    CATARACT EXTRACTION, BILATERAL      COLON SURGERY      COLONOSCOPY  09/21/2015    TRANSVERSE COLON ADENOMATOUS POLYP, HEMORRHOIDS, RECALL 5 YEARS, DR LAMAS    COLONOSCOPY N/A 11/13/2020    6 mm tubular adenomatous polyp at 15 cm proximal to the anus,diverticulosis of the sigmoid colon    COLONOSCOPY N/A 12/13/2023    - One 4 mm polyp at 40 cm proximal to the anus,. - One 4 mm polyp at 20 cm proximal to the anus, removed with a cold snare. Resected and retrieved. - Diverticulosis in the sigmoid colon. -  Hemorrhoids----tubular adenoma no evidence of high grade dysplasia    COLONOSCOPY N/A 12/22/2024    Dr. Ludwig-- The examined portion of the ileum was normal. - Diverticulosis in the entire examined colon. - Non-bleeding internal hemorrhoids. - No specimens collected.    COLOSTOMY      COLPOPEXY VAGINAL      2014    ENDOSCOPY N/A 11/03/2016    LA GRADE B ESOPHAGITIS WITH NO BLEEDING UPPER THIRD OF ESOPHAGUS, GERI-[place] TEAR GEJ, BILIOUS BLOOD TINGED COFFEE GROUND GATRIC FLUIDDR [facility]    ENDOSCOPY N/A 12/13/2023    Normal examined duodenum. - Normal stomach. Biopsied. - Z-line regular, 35 cm from the incisors. Dilated.-neg for h pylori    EXPLORATORY LAPAROTOMY N/A 10/14/2016    Procedure: LAPAROTOMY EXPLORATORY, LYSIS OF ADHESIONS, IRRIAGATION OF ABDOMEN, POSSIBLE BOWEL RESECTION;  Surgeon: Bacilio Marcial MD;  Location:  [facility] OR;  Service:     FOOT NAVICULAR EXCISION OR BONE GRAFT Left 01/04/2023    Procedure: Expansion Graft, Harvesting of Bone Graft/Bone Marrow Aspirate;  Surgeon: Frankie Zapata DPM;  Location:  [facility] OR;  Service: Podiatry;  Laterality: Left;    HAMMER TOE REPAIR Left 01/04/2023    Procedure: Hammertoe Repair 2 and 3 - Left Foot;  Surgeon: Frankie Zapata DPM;  Location:  [facility];  Service: Podiatry;  Laterality: Left;    HARDWARE REMOVAL Left 01/04/2023    Procedure: Hardware Removal;  Surgeon: Frankie Zapata DPM;  Location:  [facility];  Service: Podiatry;  Laterality: Left;    HYSTERECTOMY      REVISION / TAKEDOWN COLOSTOMY      SACROCOLPOPEXY N/A 09/21/2016    Procedure: SACROCOLPOPEXY LAPAROSCOPIC WITH CopanionI SI ROBOT, CONVERTED TO OPEN SACROCOLPOPEXY, RIGHT SALPINGO- OOPHERECTOMY, CYSTO;  Surgeon: Maribell Beth MD;  Location:  [facility];  Service:     TOE FUSION Left 01/06/2022    Procedure: FIRST METATARSOPHALANGEAL JOINT ARTHRODESIS WITH INTERNAL FIXATION - LEFT FOOT;  Surgeon: Jorgito Red DPM;  Location:  [facility];  Service: Podiatry;  Laterality: Left;    TOE  FUSION Left 01/04/2023    Procedure: Revisional 1st Metatarsophalangeal Joint Arthrodesis with Hardware Removal and Expansion Graft, Harvesting of Bone Graft/Bone Marrow Aspirate, Hammertoe Repair 2 and 3 - Left Foot;  Surgeon: Frankie Zapata DPM;  Location:  [facility];  Service: Podiatry;  Laterality: Left;    VENTRAL HERNIA REPAIR N/A 3/2/2025    Procedure: VENTRAL / INCISIONAL HERNIA REPAIR LAPAROSCOPIC WITH DAVINCI ROBOT;  Surgeon: Jarrett Bland MD;  Location:  [facility];  Service: Robotics - DaVinci;  Laterality: N/A;   ,   Family History   Problem Relation Age of Onset    Hypertension Mother     No Known Problems Father     Colon cancer Neg Hx     Colon polyps Neg Hx    ,   Social History     Tobacco Use    Smoking status: Never     Passive exposure: Never    Smokeless tobacco: Never   Vaping Use    Vaping status: Never Used   Substance Use Topics    Alcohol use: No    Drug use: No       Current Outpatient Medications:     bismuth subsalicylate (PEPTO BISMOL) 262 MG/15ML suspension, Take 15 mL by mouth Every 6 (Six) Hours As Needed for Indigestion or Diarrhea., Disp: , Rfl:     cyanocobalamin 1000 MCG/ML injection, Inject 1 mL Every 30 (Thirty) Days as directed., Disp: 1 mL, Rfl: 11    dabigatran etexilate (PRADAXA) 150 MG capsu, Take 1 capsule by mouth 2 (Two) Times a Day., Disp: 60 capsule, Rfl: 11    dilTIAZem CD (Cardizem CD) 120 MG 24 hr capsule, Take 1 capsule by mouth Daily., Disp: 30 capsule, Rfl: 11    hydrOXYzine (ATARAX) 10 MG tablet, Take 1-2 tablets by mouth Every 4 (Four) Hours As Needed for Itching or Anxiety., Disp: , Rfl:     hydrOXYzine (ATARAX) 10 MG tablet, Take 1-2 tablets by mouth Every 4 (Four) to 6 (Six) Hours As Needed for itching, Disp: 30 tablet, Rfl: 3    Lidocaine 4 %, Place 1 patch on the skin as directed by provider Daily. Remove & Discard patch within 12 hours or as directed by MD, Disp: 10 each, Rfl: 0    omeprazole (priLOSEC) 20 MG capsule, Take 1 capsule by mouth Daily.,  "Disp: 30 capsule, Rfl: 11    ondansetron (Zofran) 4 MG tablet, Take 1 tablet by mouth Every 8 (Eight) Hours As Needed for Nausea or Vomiting., Disp: 15 tablet, Rfl: 0    tiZANidine (ZANAFLEX) 4 MG tablet, Take 1 tablet by mouth Every 6 (Six) Hours As Needed for Muscle Spasms., Disp: 60 tablet, Rfl: 2    tamoxifen (NOLVADEX) 20 MG chemo tablet, Take 1 tablet by mouth Daily. (Patient not taking: Reported on 3/28/2025), Disp: , Rfl:     Allergies:  Lactose intolerance (gi), Morphine and codeine, and Percocet [oxycodone-acetaminophen]      The following portions of the patient's history were reviewed and updated as appropriate: allergies, current medications, past family history, past medical history, past social history, past surgical history, and problem list.      Review of Systems  A comprehensive 14 point review of systems was performed and is negative unless otherwise noted      Objective   /76   Ht 160 cm (63\")   Wt 72.1 kg (159 lb)   BMI 28.17 kg/m²     BMI followup discussion/instruction with patient: none (medical contraindication)      Physical Exam  Constitutional:       Appearance: Normal appearance. She is normal weight.      Comments: Pleasant elderly female, in no acute distress. Normal development, normal body habitus. Well nourished   HENT:      Head: Normocephalic and atraumatic.      Right Ear: External ear normal.      Left Ear: External ear normal.      Ears:      Comments: Hearing intact     Nose: Nose normal.      Comments: Nares patent, no septal deviation, no drainage     Mouth/Throat:      Comments: Airway patent, dentition intact, mucus membranes moist  Eyes:      Extraocular Movements: Extraocular movements intact.      Conjunctiva/sclera: Conjunctivae normal.      Pupils: Pupils are equal, round, and reactive to light.      Comments: External eyelids grossly normal, vision intact, no scleral icterus   Neck:      Comments: Trachea midline  Cardiovascular:      Rate and Rhythm: " Normal rate.      Comments: Normotensive, no JVD bilaterally  Pulmonary:      Effort: Pulmonary effort is normal.      Breath sounds: Normal breath sounds.      Comments: Normal respiratory rate  Abdominal:      General: Abdomen is flat.      Palpations: Abdomen is soft.      Comments: Numerous well-healed scars.  No evidence of a right lower quadrant ileostomy site or midline ventral hernia recurrence.   Musculoskeletal:         General: Normal range of motion.      Cervical back: Normal range of motion.   Skin:     General: Skin is warm and dry.      Comments: Skin color is consistent with ethnicity   Neurological:      General: No focal deficit present.      Mental Status: She is alert and oriented to person, place, and time.   Psychiatric:         Mood and Affect: Mood normal.         Behavior: Behavior normal.         Thought Content: Thought content normal.         Judgment: Judgment normal.      Comments: Patient understands disease process and has decision making capacity.            Labs:  I personally reviewed all pertinent labs.   Imaging: I personally reviewed all pertinent imaging studies.   Pathology:      Assessment & Plan   Diagnoses and all orders for this visit:    1. History of ventral hernia repair (Primary)    2. On continuous oral anticoagulation    3. Abdominal wall hernia at previous stoma site    69-year-old female with a prior right lower quadrant ileostomy site hernia and a midline ventral hernia 3 weeks status post laparoscopic robotic assisted ventral hernia repair with mesh.  She is still having some pain and paresthesia at her ventral hernia sites however she is convalescing as expected and happy with her results.  Since she is doing so well, I will see her back in 3 months.  Sooner if needed.       Thank you for the referral and trusting me with the care of your patient.    Jarrett Bland MD, FACS  General Surgery  3/30/2025  14:15 CDT    Please note that portions of this note were  completed with a voice recognition program.

## 2025-03-31 DIAGNOSIS — Z87.19 HISTORY OF VENTRAL HERNIA REPAIR: Primary | ICD-10-CM

## 2025-03-31 DIAGNOSIS — Z98.890 HISTORY OF VENTRAL HERNIA REPAIR: Primary | ICD-10-CM

## 2025-03-31 RX ORDER — OXYCODONE HYDROCHLORIDE 5 MG/1
5 TABLET ORAL EVERY 6 HOURS PRN
Qty: 12 TABLET | Refills: 0 | Status: SHIPPED | OUTPATIENT
Start: 2025-03-31 | End: 2025-04-03

## 2025-03-31 RX ORDER — GABAPENTIN 100 MG/1
100 CAPSULE ORAL 3 TIMES DAILY
Qty: 30 CAPSULE | Refills: 0 | Status: SHIPPED | OUTPATIENT
Start: 2025-03-31 | End: 2025-04-10

## 2025-04-21 DIAGNOSIS — E53.8 B12 DEFICIENCY: ICD-10-CM

## 2025-04-21 RX ORDER — CYANOCOBALAMIN 1000 UG/ML
1000 INJECTION, SOLUTION INTRAMUSCULAR; SUBCUTANEOUS
Qty: 1 ML | Refills: 11 | Status: SHIPPED | OUTPATIENT
Start: 2025-04-21

## 2025-04-21 NOTE — TELEPHONE ENCOUNTER
Rx Refill Note  Requested Prescriptions     Pending Prescriptions Disp Refills    cyanocobalamin 1000 MCG/ML injection 1 mL 11     Sig: Inject 1 mL Every 30 (Thirty) Days as directed.      Last office visit with prescribing clinician: 3/11/2025   Last telemedicine visit with prescribing clinician: Visit date not found   Next office visit with prescribing clinician: 4/28/2025                         Would you like a call back once the refill request has been completed: [] Yes [] No    If the office needs to give you a call back, can they leave a voicemail: [] Yes [] No    Jose Ramires MA  04/21/25, 16:23 CDT

## 2025-04-28 ENCOUNTER — TELEPHONE (OUTPATIENT)
Dept: INTERNAL MEDICINE | Facility: CLINIC | Age: 70
End: 2025-04-28

## 2025-04-30 ENCOUNTER — OFFICE VISIT (OUTPATIENT)
Dept: INTERNAL MEDICINE | Facility: CLINIC | Age: 70
End: 2025-04-30
Payer: MEDICARE

## 2025-04-30 VITALS
BODY MASS INDEX: 27.21 KG/M2 | TEMPERATURE: 97.8 F | WEIGHT: 153.6 LBS | OXYGEN SATURATION: 98 % | SYSTOLIC BLOOD PRESSURE: 116 MMHG | HEART RATE: 95 BPM | HEIGHT: 63 IN | DIASTOLIC BLOOD PRESSURE: 76 MMHG

## 2025-04-30 DIAGNOSIS — I48.21 PERMANENT ATRIAL FIBRILLATION: ICD-10-CM

## 2025-04-30 DIAGNOSIS — Z13.31 DEPRESSION SCREEN: ICD-10-CM

## 2025-04-30 DIAGNOSIS — Z00.00 MEDICARE ANNUAL WELLNESS VISIT, SUBSEQUENT: Primary | ICD-10-CM

## 2025-04-30 DIAGNOSIS — E66.3 OVERWEIGHT (BMI 25.0-29.9): ICD-10-CM

## 2025-04-30 DIAGNOSIS — Z91.81 AT MODERATE RISK FOR FALL: ICD-10-CM

## 2025-04-30 DIAGNOSIS — R92.8 ABNORMAL MAMMOGRAM: ICD-10-CM

## 2025-04-30 DIAGNOSIS — D05.12 DUCTAL CARCINOMA IN SITU (DCIS) OF LEFT BREAST: ICD-10-CM

## 2025-04-30 DIAGNOSIS — R73.03 PREDIABETES: ICD-10-CM

## 2025-04-30 RX ORDER — OMEPRAZOLE 20 MG/1
20 CAPSULE, DELAYED RELEASE ORAL DAILY
Qty: 90 CAPSULE | Refills: 3 | Status: SHIPPED | OUTPATIENT
Start: 2025-04-30

## 2025-04-30 NOTE — PROGRESS NOTES
Subjective   The ABCs of the Annual Wellness Visit  Medicare Wellness Visit      Yun Blackwell is a 69 y.o. patient who presents for a Medicare Wellness Visit.    The following portions of the patient's history were reviewed and   updated as appropriate: allergies, current medications, past family history, past medical history, past social history, past surgical history, and problem list.    Compared to one year ago, the patient's physical   health is better.  Compared to one year ago, the patient's mental   health is the same.    Recent Hospitalizations:  This patient has had a [facility] admission record on file within the last 365 days.  Current Medical Providers:  Patient Care Team:  ANGEL Healy MD as PCP - General (Internal Medicine)  Van Marcelino MD as Cardiologist (Cardiology)  Abbey Serna APRN as Nurse Practitioner (Family Medicine)    Outpatient Medications Prior to Visit   Medication Sig Dispense Refill    cyanocobalamin 1000 MCG/ML injection Inject 1 mL into the appropriate muscle as directed by prescriber Every 30 (Thirty) Days. 1 mL 11    dilTIAZem CD (Cardizem CD) 120 MG 24 hr capsule Take 1 capsule by mouth Daily. 30 capsule 11    hydrOXYzine (ATARAX) 10 MG tablet Take 1-2 tablets by mouth Every 4 (Four) Hours As Needed for Itching or Anxiety.      hydrOXYzine (ATARAX) 10 MG tablet Take 1-2 tablets by mouth Every 4 (Four) to 6 (Six) Hours As Needed for itching 30 tablet 3    hydrOXYzine (ATARAX) 10 MG tablet Take 1-2 tablets by mouth Every 4 (Four) to 6 (Six) Hours As Needed for itching 30 tablet 3    omeprazole (priLOSEC) 20 MG capsule Take 1 capsule by mouth Daily. 30 capsule 11    ondansetron (Zofran) 4 MG tablet Take 1 tablet by mouth Every 8 (Eight) Hours As Needed for Nausea or Vomiting. 15 tablet 0    tiZANidine (ZANAFLEX) 4 MG tablet Take 1 tablet by mouth Every 6 (Six) Hours As Needed for Muscle Spasms. 60 tablet 2    dabigatran etexilate (PRADAXA) 150 MG capsu Take  1 capsule by mouth 2 (Two) Times a Day. 60 capsule 11    Lidocaine 4 % Place 1 patch on the skin as directed by provider Daily. Remove & Discard patch within 12 hours or as directed by MD (Patient not taking: Reported on 4/30/2025) 10 each 0    tamoxifen (NOLVADEX) 20 MG chemo tablet Take 1 tablet by mouth Daily. (Patient not taking: Reported on 3/28/2025)      bismuth subsalicylate (PEPTO BISMOL) 262 MG/15ML suspension Take 15 mL by mouth Every 6 (Six) Hours As Needed for Indigestion or Diarrhea. (Patient not taking: Reported on 4/30/2025)      gabapentin (NEURONTIN) 100 MG capsule Take 1 capsule by mouth 3 (Three) Times a Day for 10 days. 30 capsule 0     No facility-administered medications prior to visit.     No opioid medication identified on active medication list. I have reviewed chart for other potential  high risk medication/s and harmful drug interactions in the elderly.      Aspirin is not on active medication list.  Aspirin use is contraindicated for this patient due to: Use of Pradaxa.  .    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    ASD secundum    Pulmonary hypertension    Atypical ductal hyperplasia of breast    Ductal carcinoma in situ (DCIS) of left breast    Precordial pain    COVID-19 vaccine dose declined    LEROY on CPAP    Prediabetes    Nonunion after arthrodesis    Hammertoe of left foot    BRBPR (bright red blood per rectum)    Full incontinence of feces    Abnormal mammogram    Difficulty with CPAP nasal mask use    Personal history of breast cancer    Mammographic microcalcification    Genetic susceptibility to other malignant  "neoplasm    Dry mouth    GI bleeding    SBO (small bowel obstruction)    Incarcerated ventral hernia     Advance Care Planning Advance Directive is not on file.  ACP discussion was held with the patient during this visit. Patient has an advance directive (not in EMR), copy requested.            Objective   Vitals:    25 0748   BP: 116/76   BP Location: Left arm   Patient Position: Sitting   Cuff Size: Adult   Pulse: 95   Temp: 97.8 °F (36.6 °C)   TempSrc: Temporal   SpO2: 98%   Weight: 69.7 kg (153 lb 9.6 oz)   Height: 160 cm (63\")   Physical exam is similar to previous.  Heart is irregularly irregular consistent with permanent atrial fibrillation.    Estimated body mass index is 27.21 kg/m² as calculated from the following:    Height as of this encounter: 160 cm (63\").    Weight as of this encounter: 69.7 kg (153 lb 9.6 oz).                Does the patient have evidence of cognitive impairment? No  Lab Results   Component Value Date    TRIG 91 2025    HDL 58 2025    LDL 21 2025    VLDL 18 2025    HGBA1C 5.30 2025                                                                                               Health  Risk Assessment    Smoking Status:  Social History     Tobacco Use   Smoking Status Never    Passive exposure: Never   Smokeless Tobacco Never     Alcohol Consumption:  Social History     Substance and Sexual Activity   Alcohol Use No       Fall Risk Screen  STEADI Fall Risk Assessment was completed, and patient is at MODERATE risk for falls. Assessment completed on:2025    Depression Screening   Little interest or pleasure in doing things? Not at all   Feeling down, depressed, or hopeless? Not at all   PHQ-2 Total Score 0      Health Habits and Functional and Cognitive Screenin/30/2025     7:49 AM   Functional & Cognitive Status   Do you have difficulty preparing food and eating? No   Do you have difficulty bathing yourself, getting dressed or grooming " yourself? No   Do you have difficulty using the toilet? No   Do you have difficulty moving around from place to place? No   Do you have trouble with steps or getting out of a bed or a chair? No   Current Diet Well Balanced Diet   Dental Exam Not up to date   Eye Exam Up to date   Exercise (times per week) 1 times per week   Current Exercises Include Walking   Do you need help using the phone?  No   Are you deaf or do you have serious difficulty hearing?  No   Do you need help to go to places out of walking distance? No   Do you need help shopping? No   Do you need help preparing meals?  No   Do you need help with housework?  No   Do you need help with laundry? No   Do you need help taking your medications? No   Do you need help managing money? No   Do you ever drive or ride in a car without wearing a seat belt? No   Have you felt unusual stress, anger or loneliness in the last month? No   Who do you live with? Alone   If you need help, do you have trouble finding someone available to you? No   Have you been bothered in the last four weeks by sexual problems? No   Do you have difficulty concentrating, remembering or making decisions? Yes           Age-appropriate Screening Schedule:  Refer to the list below for future screening recommendations based on patient's age, sex and/or medical conditions. Orders for these recommended tests are listed in the plan section. The patient has been provided with a written plan.    Health Maintenance List  Health Maintenance   Topic Date Due    ZOSTER VACCINE (1 of 2) Never done    ANNUAL WELLNESS VISIT  04/19/2025    TDAP/TD VACCINES (1 - Tdap) 09/09/2025 (Originally 11/12/1974)    Pneumococcal Vaccine 50+ (1 of 2 - PCV) 10/21/2025 (Originally 11/12/1974)    INFLUENZA VACCINE  07/01/2025    DXA SCAN  10/10/2025    MAMMOGRAM  12/05/2026    COLORECTAL CANCER SCREENING  12/22/2027    HEPATITIS C SCREENING  Completed    COVID-19 Vaccine  Discontinued    DIABETIC FOOT EXAM  Discontinued     HEMOGLOBIN A1C  Discontinued    DIABETIC EYE EXAM  Discontinued    URINE MICROALBUMIN-CREATININE RATIO (uACR)  Discontinued                                                                                                                                                CMS Preventative Services Quick Reference  Risk Factors Identified During Encounter  Fall Risk-High or Moderate: Discussed Fall Prevention in the home she declines physical therapy  Immunizations Discussed/Encouraged: Tdap, Prevnar 20 (Pneumococcal 20-valent conjugate), and Shingrix she declines these vaccines  Polypharmacy: Medication List reviewed    The above risks/problems have been discussed with the patient.  Pertinent information has been shared with the patient in the After Visit Summary.  An After Visit Summary and PPPS were made available to the patient.    Follow Up:   Next Medicare Wellness visit to be scheduled in 1 year.     Assessment & Plan  Medicare annual wellness visit, subsequent         Depression screen         At moderate risk for fall         Prediabetes         Permanent atrial fibrillation         Abnormal mammogram    Orders:    Ambulatory Referral to High Risk Breast ([facility])    Ductal carcinoma in situ (DCIS) of left breast    Orders:    Ambulatory Referral to High Risk Breast ([facility])    Overweight (BMI 25.0-29.9)  Patient's (Body mass index is 27.21 kg/m².) indicates that they are overweight with health conditions that include impaired fasting glucose . Weight is unchanged. BMI is above average; BMI management plan is completed. We discussed portion control and increasing exercise.          Pradaxa getting more expensive.  I have advised her to talk with cardiology for options.    She had lab work March 3, 2025, and we do not need to obtain additional labs at this time.    Referred to high risk breast clinic as she is a previous patient of Dr. Branch.     Patient has been erroneously marked as diabetic. Based on the  available clinical information, she does not have diabetes and should therefore be excluded from diabetic health maintenance and quality measures for the remainder of the reporting period.      Follow Up:   Return in about 6 months (around 10/30/2025) for Recheck.

## 2025-05-05 ENCOUNTER — OFFICE VISIT (OUTPATIENT)
Dept: SURGERY | Facility: CLINIC | Age: 70
End: 2025-05-05
Payer: MEDICARE

## 2025-05-05 VITALS
HEART RATE: 106 BPM | SYSTOLIC BLOOD PRESSURE: 140 MMHG | BODY MASS INDEX: 27.11 KG/M2 | DIASTOLIC BLOOD PRESSURE: 82 MMHG | HEIGHT: 63 IN | OXYGEN SATURATION: 96 % | WEIGHT: 153 LBS

## 2025-05-05 DIAGNOSIS — Z98.890 S/P LUMPECTOMY, LEFT BREAST: ICD-10-CM

## 2025-05-05 DIAGNOSIS — E66.3 OVERWEIGHT WITH BODY MASS INDEX (BMI) OF 27 TO 27.9 IN ADULT: ICD-10-CM

## 2025-05-05 DIAGNOSIS — Z86.000 PERSONAL HISTORY OF IN-SITU NEOPLASM OF BREAST: Primary | ICD-10-CM

## 2025-05-05 NOTE — PROGRESS NOTES
Office New Patient History and Physical:     Referring Provider: ANGEL Healy MD    Chief Complaint   Patient presents with    breast       Subjective .     History of present illness:  Yun Blackwell is a 69 y.o. 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 Branch. She took Tamoxifen x 5 years; just stopped it recently. She had a biopsy in the  which was benign.   History of Present Illness  She reports no new masses or skin changes in her breasts. She has undergone genetic testing, which yielded negative results.     Supplemental Information  She is on Pradaxa but not on Xarelto or Coumadin.    SOCIAL HISTORY  She does not smoke.    FAMILY HISTORY  Her older sister lost her daughter from breast cancer. Another sister had a double mastectomy and passed away after stopping treatment for depression. A niece  from uterine cancer.    MEDICATIONS  Current: Pradaxa  Past: Tamoxifen       History  Past Medical History:   Diagnosis Date    A-fib     Abnormal ECG     tachycardia, a fib    Acid reflux     Anemia     Arthritis     Cancer     Diabetes mellitus     Hyperkalemia     Hyperlipidemia     Hypertension     Hyponatremia     IBS (irritable bowel syndrome)     PONV (postoperative nausea and vomiting)     Sleep apnea     USES CPAP    UTI (urinary tract infection)     UTI (urinary tract infection) 2016    Vaginal vault prolapse    ,   Past Surgical History:   Procedure Laterality Date    BREAST BIOPSY Left     X2    BUNIONECTOMY Left     CARDIAC CATHETERIZATION      CARDIAC CATHETERIZATION N/A 2021    Procedure: Right Heart Cath;  Surgeon: Van Marcelino MD;  Location:  [facility];  Service: Cardiology;  Laterality: N/A;    CATARACT EXTRACTION, BILATERAL      COLON SURGERY      COLONOSCOPY  2015    TRANSVERSE COLON ADENOMATOUS POLYP, HEMORRHOIDS, RECALL 5 YEARS, DR LAMAS    COLONOSCOPY N/A 2020    6 mm tubular  adenomatous polyp at 15 cm proximal to the anus,diverticulosis of the sigmoid colon    COLONOSCOPY N/A 12/13/2023    - One 4 mm polyp at 40 cm proximal to the anus,. - One 4 mm polyp at 20 cm proximal to the anus, removed with a cold snare. Resected and retrieved. - Diverticulosis in the sigmoid colon. - Hemorrhoids----tubular adenoma no evidence of high grade dysplasia    COLONOSCOPY N/A 12/22/2024    Dr. Ludwig-- The examined portion of the ileum was normal. - Diverticulosis in the entire examined colon. - Non-bleeding internal hemorrhoids. - No specimens collected.    COLOSTOMY      COLPOPEXY VAGINAL      2014    ENDOSCOPY N/A 11/03/2016    LA GRADE B ESOPHAGITIS WITH NO BLEEDING UPPER THIRD OF ESOPHAGUS, GERI-WADSWORTH TEAR GEJ, BILIOUS BLOOD TINGED COFFEE GROUND GATRIC FLUIDDR [facility]    ENDOSCOPY N/A 12/13/2023    Normal examined duodenum. - Normal stomach. Biopsied. - Z-line regular, 35 cm from the incisors. Dilated.-neg for h pylori    EXPLORATORY LAPAROTOMY N/A 10/14/2016    Procedure: LAPAROTOMY EXPLORATORY, LYSIS OF ADHESIONS, IRRIAGATION OF ABDOMEN, POSSIBLE BOWEL RESECTION;  Surgeon: Bacilio Marcial MD;  Location:  [facility];  Service:     FOOT NAVICULAR EXCISION OR BONE GRAFT Left 01/04/2023    Procedure: Expansion Graft, Harvesting of Bone Graft/Bone Marrow Aspirate;  Surgeon: Frankie Zapata DPM;  Location:  [facility];  Service: Podiatry;  Laterality: Left;    HAMMER TOE REPAIR Left 01/04/2023    Procedure: Hammertoe Repair 2 and 3 - Left Foot;  Surgeon: Frankie Zapata DPM;  Location:  [facility];  Service: Podiatry;  Laterality: Left;    HARDWARE REMOVAL Left 01/04/2023    Procedure: Hardware Removal;  Surgeon: Frankie Zapata DPM;  Location:  [facility];  Service: Podiatry;  Laterality: Left;    HYSTERECTOMY      REVISION / TAKEDOWN COLOSTOMY      SACROCOLPOPEXY N/A 09/21/2016    Procedure: SACROCOLPOPEXY LAPAROSCOPIC WITH EnishI SI ROBOT, CONVERTED TO OPEN SACROCOLPOPEXY, RIGHT SALPINGO-  OOPHERECTOMY, CYSTO;  Surgeon: Maribell Beth MD;  Location:  [facility];  Service:     TOE FUSION Left 01/06/2022    Procedure: FIRST METATARSOPHALANGEAL JOINT ARTHRODESIS WITH INTERNAL FIXATION - LEFT FOOT;  Surgeon: Jorgito Red DPM;  Location:  [facility];  Service: Podiatry;  Laterality: Left;    TOE FUSION Left 01/04/2023    Procedure: Revisional 1st Metatarsophalangeal Joint Arthrodesis with Hardware Removal and Expansion Graft, Harvesting of Bone Graft/Bone Marrow Aspirate, Hammertoe Repair 2 and 3 - Left Foot;  Surgeon: Frankie Zapata DPM;  Location:  [facility];  Service: Podiatry;  Laterality: Left;    VENTRAL HERNIA REPAIR N/A 3/2/2025    Procedure: VENTRAL / INCISIONAL HERNIA REPAIR LAPAROSCOPIC WITH DAVINCI ROBOT;  Surgeon: Jarrett Bland MD;  Location:  [facility];  Service: Robotics - DaVinci;  Laterality: N/A;   ,   Family History   Problem Relation Age of Onset    Hypertension Mother     No Known Problems Father     Colon cancer Neg Hx     Colon polyps Neg Hx    ,   Social History     Tobacco Use    Smoking status: Never     Passive exposure: Never    Smokeless tobacco: Never   Vaping Use    Vaping status: Never Used   Substance Use Topics    Alcohol use: No    Drug use: No   , (Not in a hospital admission)   and Allergies:  Lactose intolerance (gi), Morphine and codeine, and Percocet [oxycodone-acetaminophen]    Current Outpatient Medications:     cyanocobalamin 1000 MCG/ML injection, Inject 1 mL into the appropriate muscle as directed by prescriber Every 30 (Thirty) Days., Disp: 1 mL, Rfl: 11    dabigatran etexilate (PRADAXA) 150 MG capsu, Take 1 capsule by mouth 2 (Two) Times a Day., Disp: 60 capsule, Rfl: 11    dilTIAZem CD (Cardizem CD) 120 MG 24 hr capsule, Take 1 capsule by mouth Daily., Disp: 30 capsule, Rfl: 11    omeprazole (priLOSEC) 20 MG capsule, Take 1 capsule by mouth Daily., Disp: 90 capsule, Rfl: 3    hydrOXYzine (ATARAX) 10 MG tablet, Take 1-2 tablets by mouth Every 4 (Four)  "Hours As Needed for Itching or Anxiety. (Patient not taking: Reported on 5/5/2025), Disp: , Rfl:     hydrOXYzine (ATARAX) 10 MG tablet, Take 1-2 tablets by mouth Every 4 (Four) to 6 (Six) Hours As Needed for itching (Patient not taking: Reported on 5/5/2025), Disp: 30 tablet, Rfl: 3    hydrOXYzine (ATARAX) 10 MG tablet, Take 1-2 tablets by mouth Every 4 (Four) to 6 (Six) Hours As Needed for itching (Patient not taking: Reported on 5/5/2025), Disp: 30 tablet, Rfl: 3    Lidocaine 4 %, Place 1 patch on the skin as directed by provider Daily. Remove & Discard patch within 12 hours or as directed by MD (Patient not taking: Reported on 5/5/2025), Disp: 10 each, Rfl: 0    ondansetron (Zofran) 4 MG tablet, Take 1 tablet by mouth Every 8 (Eight) Hours As Needed for Nausea or Vomiting. (Patient not taking: Reported on 5/5/2025), Disp: 15 tablet, Rfl: 0    tamoxifen (NOLVADEX) 20 MG chemo tablet, Take 1 tablet by mouth Daily. (Patient not taking: Reported on 3/28/2025), Disp: , Rfl:     tiZANidine (ZANAFLEX) 4 MG tablet, Take 1 tablet by mouth Every 6 (Six) Hours As Needed for Muscle Spasms. (Patient not taking: Reported on 5/5/2025), Disp: 60 tablet, Rfl: 2      Objective     Vital Signs   /82   Pulse 106   Ht 160 cm (63\")   Wt 69.4 kg (153 lb)   SpO2 96%   BMI 27.10 kg/m²      Physical Exam:  General appearance - alert, well appearing, and in no distress  Mental status - alert, oriented to person, place, and time  Eyes - pupils equal and reactive, extraocular eye movements intact  Neurological - alert, oriented, normal speech, no focal findings or movement disorder noted  Breast Exam: Bilateral breasts without obvious deformities. Bilateral nipples everted. Patient examined in the supine position with the ipsilateral arm above the head. Incision well healed. No palpable masses bilaterally. No nipple discharge bilaterally. No palpable axillary nor supraclavicular adenopathy bilaterally.   Physical Exam    Results " Review:    The following data was reviewed by: Crystal Pool MD on 05/05/2025:  Dr. Branch's notes and Abbey Rock's notes reviewed.   [facility] DIGITAL DIAGNOSTIC UNILATERAL LEFT (12/05/2024 16:30 EST)       Assessment & Plan       Diagnoses and all orders for this visit:    1. Personal history of in-situ neoplasm of breast (Primary)    2. S/P lumpectomy, left breast    3. Overweight with body mass index (BMI) of 27 to 27.9 in adult       Assessment & Plan  1. History of preinvasive breast cancer.  She has a history of preinvasive breast cancer (DCIS) diagnosed in 2020, treated with lumpectomy and radiation. She completed a 5-year course of tamoxifen, finishing on 04/27/2025. A bilateral mammogram is scheduled for June 2025. Given that she is now 5 years post-diagnosis, annual follow-ups for breast examinations and mammograms are recommended.     PROCEDURE  The patient underwent a lumpectomy and radiation therapy for preinvasive breast cancer in 2020. She also had 3 benign breast biopsies last summer.       Crystal Pool MD  05/07/25  16:14 CDT      Patient or patient representative verbalized consent for the use of Ambient Listening during the visit with  Crystal Pool MD for chart documentation. 5/7/2025  16:14 CDT

## 2025-05-07 ENCOUNTER — HOSPITAL ENCOUNTER (OUTPATIENT)
Facility: HOSPITAL | Age: 70
Setting detail: OBSERVATION
Discharge: HOME | End: 2025-05-09
Attending: FAMILY MEDICINE | Admitting: HOSPITALIST
Payer: MEDICARE

## 2025-05-07 DIAGNOSIS — R09.A2 FOREIGN BODY SENSATION, THROAT: Primary | ICD-10-CM

## 2025-05-07 DIAGNOSIS — R07.0 THROAT PAIN IN ADULT: ICD-10-CM

## 2025-05-07 LAB
ALBUMIN SERPL-MCNC: 4.1 G/DL (ref 3.5–5.2)
ALBUMIN/GLOB SERPL: 1.5 G/DL
ALP SERPL-CCNC: 109 U/L (ref 39–117)
ALT SERPL W P-5'-P-CCNC: 10 U/L (ref 1–33)
ANION GAP SERPL CALCULATED.3IONS-SCNC: 12 MMOL/L (ref 5–15)
APTT PPP: 49.4 SECONDS (ref 24.5–36)
AST SERPL-CCNC: 20 U/L (ref 1–32)
BASOPHILS # BLD AUTO: 0.06 10*3/MM3 (ref 0–0.2)
BASOPHILS NFR BLD AUTO: 0.5 % (ref 0–1.5)
BILIRUB SERPL-MCNC: 0.7 MG/DL (ref 0–1.2)
BUN SERPL-MCNC: 15 MG/DL (ref 8–23)
BUN/CREAT SERPL: 32.6 (ref 7–25)
CALCIUM SPEC-SCNC: 8.9 MG/DL (ref 8.6–10.5)
CHLORIDE SERPL-SCNC: 108 MMOL/L (ref 98–107)
CO2 SERPL-SCNC: 22 MMOL/L (ref 22–29)
CREAT SERPL-MCNC: 0.46 MG/DL (ref 0.57–1)
DEPRECATED RDW RBC AUTO: 47.8 FL (ref 37–54)
EGFRCR SERPLBLD CKD-EPI 2021: 102.5 ML/MIN/1.73
EOSINOPHIL # BLD AUTO: 0.11 10*3/MM3 (ref 0–0.4)
EOSINOPHIL NFR BLD AUTO: 1 % (ref 0.3–6.2)
ERYTHROCYTE [DISTWIDTH] IN BLOOD BY AUTOMATED COUNT: 13.8 % (ref 12.3–15.4)
GLOBULIN UR ELPH-MCNC: 2.8 GM/DL
GLUCOSE SERPL-MCNC: 175 MG/DL (ref 65–99)
HCT VFR BLD AUTO: 40.8 % (ref 34–46.6)
HGB BLD-MCNC: 12.9 G/DL (ref 12–15.9)
IMM GRANULOCYTES # BLD AUTO: 0.05 10*3/MM3 (ref 0–0.05)
IMM GRANULOCYTES NFR BLD AUTO: 0.4 % (ref 0–0.5)
INR PPP: 1.54 (ref 0.91–1.09)
LYMPHOCYTES # BLD AUTO: 1.8 10*3/MM3 (ref 0.7–3.1)
LYMPHOCYTES NFR BLD AUTO: 15.6 % (ref 19.6–45.3)
MCH RBC QN AUTO: 29.6 PG (ref 26.6–33)
MCHC RBC AUTO-ENTMCNC: 31.6 G/DL (ref 31.5–35.7)
MCV RBC AUTO: 93.6 FL (ref 79–97)
MONOCYTES # BLD AUTO: 0.79 10*3/MM3 (ref 0.1–0.9)
MONOCYTES NFR BLD AUTO: 6.9 % (ref 5–12)
NEUTROPHILS NFR BLD AUTO: 75.6 % (ref 42.7–76)
NEUTROPHILS NFR BLD AUTO: 8.72 10*3/MM3 (ref 1.7–7)
NRBC BLD AUTO-RTO: 0 /100 WBC (ref 0–0.2)
PLATELET # BLD AUTO: 158 10*3/MM3 (ref 140–450)
PMV BLD AUTO: 10.5 FL (ref 6–12)
POTASSIUM SERPL-SCNC: 3.4 MMOL/L (ref 3.5–5.2)
PROT SERPL-MCNC: 6.9 G/DL (ref 6–8.5)
PROTHROMBIN TIME: 19.4 SECONDS (ref 11.8–14.8)
RBC # BLD AUTO: 4.36 10*6/MM3 (ref 3.77–5.28)
SODIUM SERPL-SCNC: 142 MMOL/L (ref 136–145)
WBC NRBC COR # BLD AUTO: 11.53 10*3/MM3 (ref 3.4–10.8)

## 2025-05-07 PROCEDURE — 99285 EMERGENCY DEPT VISIT HI MDM: CPT

## 2025-05-07 PROCEDURE — 80053 COMPREHEN METABOLIC PANEL: CPT | Performed by: NURSE PRACTITIONER

## 2025-05-07 PROCEDURE — 85610 PROTHROMBIN TIME: CPT | Performed by: NURSE PRACTITIONER

## 2025-05-07 PROCEDURE — 96375 TX/PRO/DX INJ NEW DRUG ADDON: CPT

## 2025-05-07 PROCEDURE — 96372 THER/PROPH/DIAG INJ SC/IM: CPT

## 2025-05-07 PROCEDURE — 85025 COMPLETE CBC W/AUTO DIFF WBC: CPT | Performed by: NURSE PRACTITIONER

## 2025-05-07 PROCEDURE — G0378 HOSPITAL OBSERVATION PER HR: HCPCS

## 2025-05-07 PROCEDURE — 25010000002 GLUCAGON (RDNA) PER 1 MG: Performed by: NURSE PRACTITIONER

## 2025-05-07 PROCEDURE — 96374 THER/PROPH/DIAG INJ IV PUSH: CPT

## 2025-05-07 PROCEDURE — 85730 THROMBOPLASTIN TIME PARTIAL: CPT | Performed by: NURSE PRACTITIONER

## 2025-05-07 RX ORDER — IBUPROFEN 600 MG/1
2 TABLET ORAL ONCE
Status: COMPLETED | OUTPATIENT
Start: 2025-05-07 | End: 2025-05-07

## 2025-05-07 RX ORDER — SODIUM CHLORIDE 0.9 % (FLUSH) 0.9 %
10 SYRINGE (ML) INJECTION EVERY 12 HOURS SCHEDULED
Status: DISCONTINUED | OUTPATIENT
Start: 2025-05-08 | End: 2025-05-09 | Stop reason: HOSPADM

## 2025-05-07 RX ORDER — BISACODYL 10 MG
10 SUPPOSITORY, RECTAL RECTAL DAILY PRN
Status: DISCONTINUED | OUTPATIENT
Start: 2025-05-07 | End: 2025-05-09 | Stop reason: HOSPADM

## 2025-05-07 RX ORDER — SODIUM CHLORIDE 9 MG/ML
75 INJECTION, SOLUTION INTRAVENOUS CONTINUOUS
Status: DISPENSED | OUTPATIENT
Start: 2025-05-08 | End: 2025-05-09

## 2025-05-07 RX ORDER — LIDOCAINE HYDROCHLORIDE 20 MG/ML
5 SOLUTION OROPHARYNGEAL ONCE
Status: COMPLETED | OUTPATIENT
Start: 2025-05-07 | End: 2025-05-07

## 2025-05-07 RX ORDER — ACETAMINOPHEN 160 MG/5ML
650 SOLUTION ORAL EVERY 4 HOURS PRN
Status: DISCONTINUED | OUTPATIENT
Start: 2025-05-07 | End: 2025-05-09 | Stop reason: HOSPADM

## 2025-05-07 RX ORDER — BISACODYL 5 MG/1
5 TABLET, DELAYED RELEASE ORAL DAILY PRN
Status: DISCONTINUED | OUTPATIENT
Start: 2025-05-07 | End: 2025-05-09 | Stop reason: HOSPADM

## 2025-05-07 RX ORDER — POLYETHYLENE GLYCOL 3350 17 G/17G
17 POWDER, FOR SOLUTION ORAL DAILY PRN
Status: DISCONTINUED | OUTPATIENT
Start: 2025-05-07 | End: 2025-05-09 | Stop reason: HOSPADM

## 2025-05-07 RX ORDER — ACETAMINOPHEN 650 MG/1
650 SUPPOSITORY RECTAL EVERY 4 HOURS PRN
Status: DISCONTINUED | OUTPATIENT
Start: 2025-05-07 | End: 2025-05-09 | Stop reason: HOSPADM

## 2025-05-07 RX ORDER — MORPHINE SULFATE 2 MG/ML
2 INJECTION, SOLUTION INTRAMUSCULAR; INTRAVENOUS EVERY 4 HOURS PRN
Status: DISCONTINUED | OUTPATIENT
Start: 2025-05-07 | End: 2025-05-08

## 2025-05-07 RX ORDER — NALOXONE HCL 0.4 MG/ML
0.4 VIAL (ML) INJECTION
Status: DISCONTINUED | OUTPATIENT
Start: 2025-05-07 | End: 2025-05-08

## 2025-05-07 RX ORDER — PANTOPRAZOLE SODIUM 40 MG/10ML
40 INJECTION, POWDER, LYOPHILIZED, FOR SOLUTION INTRAVENOUS ONCE
Status: COMPLETED | OUTPATIENT
Start: 2025-05-07 | End: 2025-05-07

## 2025-05-07 RX ORDER — ACETAMINOPHEN 325 MG/1
650 TABLET ORAL EVERY 4 HOURS PRN
Status: DISCONTINUED | OUTPATIENT
Start: 2025-05-07 | End: 2025-05-09 | Stop reason: HOSPADM

## 2025-05-07 RX ORDER — SODIUM CHLORIDE 9 MG/ML
40 INJECTION, SOLUTION INTRAVENOUS AS NEEDED
Status: DISCONTINUED | OUTPATIENT
Start: 2025-05-07 | End: 2025-05-09 | Stop reason: HOSPADM

## 2025-05-07 RX ORDER — AMOXICILLIN 250 MG
2 CAPSULE ORAL 2 TIMES DAILY PRN
Status: DISCONTINUED | OUTPATIENT
Start: 2025-05-07 | End: 2025-05-09 | Stop reason: HOSPADM

## 2025-05-07 RX ORDER — SODIUM CHLORIDE 0.9 % (FLUSH) 0.9 %
10 SYRINGE (ML) INJECTION AS NEEDED
Status: DISCONTINUED | OUTPATIENT
Start: 2025-05-07 | End: 2025-05-09 | Stop reason: HOSPADM

## 2025-05-07 RX ADMIN — LIDOCAINE HYDROCHLORIDE 5 ML: 20 SOLUTION ORAL at 22:28

## 2025-05-07 RX ADMIN — PANTOPRAZOLE SODIUM 40 MG: 40 INJECTION, POWDER, FOR SOLUTION INTRAVENOUS at 22:39

## 2025-05-07 RX ADMIN — Medication 2 MG: at 20:38

## 2025-05-08 ENCOUNTER — ANESTHESIA EVENT (OUTPATIENT)
Dept: PERIOP | Facility: HOSPITAL | Age: 70
End: 2025-05-08
Payer: MEDICARE

## 2025-05-08 ENCOUNTER — ANESTHESIA (OUTPATIENT)
Dept: PERIOP | Facility: HOSPITAL | Age: 70
End: 2025-05-08
Payer: MEDICARE

## 2025-05-08 LAB
ANION GAP SERPL CALCULATED.3IONS-SCNC: 11 MMOL/L (ref 5–15)
BASOPHILS # BLD AUTO: 0.05 10*3/MM3 (ref 0–0.2)
BASOPHILS NFR BLD AUTO: 0.5 % (ref 0–1.5)
BUN SERPL-MCNC: 15 MG/DL (ref 8–23)
BUN/CREAT SERPL: 39.5 (ref 7–25)
CALCIUM SPEC-SCNC: 8.7 MG/DL (ref 8.6–10.5)
CHLORIDE SERPL-SCNC: 109 MMOL/L (ref 98–107)
CO2 SERPL-SCNC: 23 MMOL/L (ref 22–29)
CREAT SERPL-MCNC: 0.38 MG/DL (ref 0.57–1)
DEPRECATED RDW RBC AUTO: 47.8 FL (ref 37–54)
EGFRCR SERPLBLD CKD-EPI 2021: 107.3 ML/MIN/1.73
EOSINOPHIL # BLD AUTO: 0.15 10*3/MM3 (ref 0–0.4)
EOSINOPHIL NFR BLD AUTO: 1.6 % (ref 0.3–6.2)
ERYTHROCYTE [DISTWIDTH] IN BLOOD BY AUTOMATED COUNT: 14.1 % (ref 12.3–15.4)
GLUCOSE BLDC GLUCOMTR-MCNC: 118 MG/DL (ref 70–130)
GLUCOSE BLDC GLUCOMTR-MCNC: 70 MG/DL (ref 70–130)
GLUCOSE BLDC GLUCOMTR-MCNC: 78 MG/DL (ref 70–130)
GLUCOSE SERPL-MCNC: 63 MG/DL (ref 65–99)
HCT VFR BLD AUTO: 39.3 % (ref 34–46.6)
HGB BLD-MCNC: 12.4 G/DL (ref 12–15.9)
IMM GRANULOCYTES # BLD AUTO: 0.03 10*3/MM3 (ref 0–0.05)
IMM GRANULOCYTES NFR BLD AUTO: 0.3 % (ref 0–0.5)
LYMPHOCYTES # BLD AUTO: 2.74 10*3/MM3 (ref 0.7–3.1)
LYMPHOCYTES NFR BLD AUTO: 28.6 % (ref 19.6–45.3)
MCH RBC QN AUTO: 29.4 PG (ref 26.6–33)
MCHC RBC AUTO-ENTMCNC: 31.6 G/DL (ref 31.5–35.7)
MCV RBC AUTO: 93.1 FL (ref 79–97)
MONOCYTES # BLD AUTO: 0.92 10*3/MM3 (ref 0.1–0.9)
MONOCYTES NFR BLD AUTO: 9.6 % (ref 5–12)
NEUTROPHILS NFR BLD AUTO: 5.68 10*3/MM3 (ref 1.7–7)
NEUTROPHILS NFR BLD AUTO: 59.4 % (ref 42.7–76)
NRBC BLD AUTO-RTO: 0 /100 WBC (ref 0–0.2)
PLATELET # BLD AUTO: 164 10*3/MM3 (ref 140–450)
PMV BLD AUTO: 11.3 FL (ref 6–12)
POTASSIUM SERPL-SCNC: 3.9 MMOL/L (ref 3.5–5.2)
POTASSIUM SERPL-SCNC: 4.2 MMOL/L (ref 3.5–5.2)
RBC # BLD AUTO: 4.22 10*6/MM3 (ref 3.77–5.28)
SODIUM SERPL-SCNC: 143 MMOL/L (ref 136–145)
WBC NRBC COR # BLD AUTO: 9.57 10*3/MM3 (ref 3.4–10.8)

## 2025-05-08 PROCEDURE — 25010000002 PROPOFOL 10 MG/ML EMULSION: Performed by: NURSE ANESTHETIST, CERTIFIED REGISTERED

## 2025-05-08 PROCEDURE — 25010000002 LIDOCAINE PF 2% 2 % SOLUTION: Performed by: NURSE ANESTHETIST, CERTIFIED REGISTERED

## 2025-05-08 PROCEDURE — G0378 HOSPITAL OBSERVATION PER HR: HCPCS

## 2025-05-08 PROCEDURE — 85025 COMPLETE CBC W/AUTO DIFF WBC: CPT | Performed by: FAMILY MEDICINE

## 2025-05-08 PROCEDURE — 25010000002 MORPHINE PER 10 MG: Performed by: HOSPITALIST

## 2025-05-08 PROCEDURE — 25010000002 MORPHINE PER 10 MG: Performed by: FAMILY MEDICINE

## 2025-05-08 PROCEDURE — 82948 REAGENT STRIP/BLOOD GLUCOSE: CPT

## 2025-05-08 PROCEDURE — 99214 OFFICE O/P EST MOD 30 MIN: CPT | Performed by: INTERNAL MEDICINE

## 2025-05-08 PROCEDURE — 96375 TX/PRO/DX INJ NEW DRUG ADDON: CPT

## 2025-05-08 PROCEDURE — 96376 TX/PRO/DX INJ SAME DRUG ADON: CPT

## 2025-05-08 PROCEDURE — 96365 THER/PROPH/DIAG IV INF INIT: CPT

## 2025-05-08 PROCEDURE — 80048 BASIC METABOLIC PNL TOTAL CA: CPT | Performed by: FAMILY MEDICINE

## 2025-05-08 PROCEDURE — 43235 EGD DIAGNOSTIC BRUSH WASH: CPT | Performed by: INTERNAL MEDICINE

## 2025-05-08 PROCEDURE — 25810000003 SODIUM CHLORIDE 0.9 % SOLUTION: Performed by: FAMILY MEDICINE

## 2025-05-08 PROCEDURE — 96366 THER/PROPH/DIAG IV INF ADDON: CPT

## 2025-05-08 PROCEDURE — 84132 ASSAY OF SERUM POTASSIUM: CPT | Performed by: FAMILY MEDICINE

## 2025-05-08 PROCEDURE — 25010000002 POTASSIUM CHLORIDE 10 MEQ/100ML SOLUTION: Performed by: FAMILY MEDICINE

## 2025-05-08 RX ORDER — SODIUM CHLORIDE 0.9 % (FLUSH) 0.9 %
10 SYRINGE (ML) INJECTION AS NEEDED
Status: CANCELLED | OUTPATIENT
Start: 2025-05-08

## 2025-05-08 RX ORDER — DEXTROSE MONOHYDRATE 25 G/50ML
12.5 INJECTION, SOLUTION INTRAVENOUS AS NEEDED
Status: CANCELLED | OUTPATIENT
Start: 2025-05-08

## 2025-05-08 RX ORDER — HYDROMORPHONE HYDROCHLORIDE 1 MG/ML
0.5 INJECTION, SOLUTION INTRAMUSCULAR; INTRAVENOUS; SUBCUTANEOUS
Status: DISCONTINUED | OUTPATIENT
Start: 2025-05-08 | End: 2025-05-08

## 2025-05-08 RX ORDER — LIDOCAINE HYDROCHLORIDE 20 MG/ML
5 SOLUTION OROPHARYNGEAL EVERY 6 HOURS PRN
Status: DISCONTINUED | OUTPATIENT
Start: 2025-05-08 | End: 2025-05-09 | Stop reason: HOSPADM

## 2025-05-08 RX ORDER — IBUPROFEN 600 MG/1
600 TABLET, FILM COATED ORAL EVERY 6 HOURS PRN
Status: DISCONTINUED | OUTPATIENT
Start: 2025-05-08 | End: 2025-05-08

## 2025-05-08 RX ORDER — MORPHINE SULFATE 2 MG/ML
1 INJECTION, SOLUTION INTRAMUSCULAR; INTRAVENOUS
Status: DISCONTINUED | OUTPATIENT
Start: 2025-05-08 | End: 2025-05-09 | Stop reason: HOSPADM

## 2025-05-08 RX ORDER — DILTIAZEM HYDROCHLORIDE 120 MG/1
120 CAPSULE, COATED, EXTENDED RELEASE ORAL DAILY
Status: DISCONTINUED | OUTPATIENT
Start: 2025-05-08 | End: 2025-05-09 | Stop reason: HOSPADM

## 2025-05-08 RX ORDER — LABETALOL HYDROCHLORIDE 5 MG/ML
5 INJECTION, SOLUTION INTRAVENOUS
Status: DISCONTINUED | OUTPATIENT
Start: 2025-05-08 | End: 2025-05-08 | Stop reason: HOSPADM

## 2025-05-08 RX ORDER — FLUMAZENIL 0.1 MG/ML
0.2 INJECTION INTRAVENOUS AS NEEDED
Status: DISCONTINUED | OUTPATIENT
Start: 2025-05-08 | End: 2025-05-08 | Stop reason: HOSPADM

## 2025-05-08 RX ORDER — DEXTROSE MONOHYDRATE 25 G/50ML
25 INJECTION, SOLUTION INTRAVENOUS
Status: DISCONTINUED | OUTPATIENT
Start: 2025-05-08 | End: 2025-05-09 | Stop reason: HOSPADM

## 2025-05-08 RX ORDER — FENTANYL CITRATE 50 UG/ML
25 INJECTION, SOLUTION INTRAMUSCULAR; INTRAVENOUS
Status: CANCELLED | OUTPATIENT
Start: 2025-05-08

## 2025-05-08 RX ORDER — PROPOFOL 10 MG/ML
VIAL (ML) INTRAVENOUS AS NEEDED
Status: DISCONTINUED | OUTPATIENT
Start: 2025-05-08 | End: 2025-05-08 | Stop reason: SURG

## 2025-05-08 RX ORDER — ONDANSETRON 2 MG/ML
4 INJECTION INTRAMUSCULAR; INTRAVENOUS
Status: DISCONTINUED | OUTPATIENT
Start: 2025-05-08 | End: 2025-05-08 | Stop reason: HOSPADM

## 2025-05-08 RX ORDER — LIDOCAINE HYDROCHLORIDE 20 MG/ML
INJECTION, SOLUTION EPIDURAL; INFILTRATION; INTRACAUDAL; PERINEURAL AS NEEDED
Status: DISCONTINUED | OUTPATIENT
Start: 2025-05-08 | End: 2025-05-08 | Stop reason: SURG

## 2025-05-08 RX ORDER — IBUPROFEN 600 MG/1
1 TABLET ORAL
Status: DISCONTINUED | OUTPATIENT
Start: 2025-05-08 | End: 2025-05-09 | Stop reason: HOSPADM

## 2025-05-08 RX ORDER — SUCCINYLCHOLINE/SOD CL,ISO/PF 200MG/10ML
SYRINGE (ML) INTRAVENOUS AS NEEDED
Status: DISCONTINUED | OUTPATIENT
Start: 2025-05-08 | End: 2025-05-08 | Stop reason: SURG

## 2025-05-08 RX ORDER — SODIUM CHLORIDE 0.9 % (FLUSH) 0.9 %
10 SYRINGE (ML) INJECTION EVERY 12 HOURS SCHEDULED
Status: CANCELLED | OUTPATIENT
Start: 2025-05-08

## 2025-05-08 RX ORDER — NALOXONE HCL 0.4 MG/ML
0.4 VIAL (ML) INJECTION
Status: DISCONTINUED | OUTPATIENT
Start: 2025-05-08 | End: 2025-05-09 | Stop reason: HOSPADM

## 2025-05-08 RX ORDER — NALOXONE HCL 0.4 MG/ML
0.04 VIAL (ML) INJECTION AS NEEDED
Status: DISCONTINUED | OUTPATIENT
Start: 2025-05-08 | End: 2025-05-08 | Stop reason: HOSPADM

## 2025-05-08 RX ORDER — DILTIAZEM HYDROCHLORIDE 5 MG/ML
10 INJECTION INTRAVENOUS EVERY 8 HOURS PRN
Status: DISCONTINUED | OUTPATIENT
Start: 2025-05-08 | End: 2025-05-08

## 2025-05-08 RX ORDER — FENTANYL CITRATE 50 UG/ML
50 INJECTION, SOLUTION INTRAMUSCULAR; INTRAVENOUS
Status: DISCONTINUED | OUTPATIENT
Start: 2025-05-08 | End: 2025-05-08 | Stop reason: HOSPADM

## 2025-05-08 RX ORDER — HYDROXYZINE HYDROCHLORIDE 25 MG/1
25 TABLET, FILM COATED ORAL 3 TIMES DAILY PRN
Status: DISCONTINUED | OUTPATIENT
Start: 2025-05-08 | End: 2025-05-09 | Stop reason: HOSPADM

## 2025-05-08 RX ORDER — OXYCODONE AND ACETAMINOPHEN 10; 325 MG/1; MG/1
1 TABLET ORAL EVERY 4 HOURS PRN
Status: DISCONTINUED | OUTPATIENT
Start: 2025-05-08 | End: 2025-05-08 | Stop reason: HOSPADM

## 2025-05-08 RX ORDER — DABIGATRAN ETEXILATE 150 MG/1
150 CAPSULE ORAL 2 TIMES DAILY
Status: DISCONTINUED | OUTPATIENT
Start: 2025-05-08 | End: 2025-05-09 | Stop reason: HOSPADM

## 2025-05-08 RX ORDER — POTASSIUM CHLORIDE 7.45 MG/ML
10 INJECTION INTRAVENOUS
Status: DISPENSED | OUTPATIENT
Start: 2025-05-08 | End: 2025-05-08

## 2025-05-08 RX ORDER — NICOTINE POLACRILEX 4 MG
15 LOZENGE BUCCAL
Status: DISCONTINUED | OUTPATIENT
Start: 2025-05-08 | End: 2025-05-09 | Stop reason: HOSPADM

## 2025-05-08 RX ADMIN — POTASSIUM CHLORIDE 10 MEQ: 7.46 INJECTION, SOLUTION INTRAVENOUS at 04:02

## 2025-05-08 RX ADMIN — Medication 100 MG: at 13:03

## 2025-05-08 RX ADMIN — Medication 10 ML: at 00:24

## 2025-05-08 RX ADMIN — Medication 10 ML: at 20:35

## 2025-05-08 RX ADMIN — DABIGATRAN ETEXILATE MESYLATE 150 MG: 150 CAPSULE ORAL at 20:35

## 2025-05-08 RX ADMIN — MORPHINE SULFATE 1 MG: 2 INJECTION, SOLUTION INTRAMUSCULAR; INTRAVENOUS at 09:31

## 2025-05-08 RX ADMIN — LIDOCAINE HYDROCHLORIDE 80 MG: 20 INJECTION, SOLUTION EPIDURAL; INFILTRATION; INTRACAUDAL; PERINEURAL at 13:03

## 2025-05-08 RX ADMIN — MORPHINE SULFATE 2 MG: 2 INJECTION, SOLUTION INTRAMUSCULAR; INTRAVENOUS at 01:53

## 2025-05-08 RX ADMIN — PROPOFOL 150 MG: 10 INJECTION, EMULSION INTRAVENOUS at 13:03

## 2025-05-08 RX ADMIN — SODIUM CHLORIDE 75 ML/HR: 9 INJECTION, SOLUTION INTRAVENOUS at 00:20

## 2025-05-08 RX ADMIN — MORPHINE SULFATE 2 MG: 2 INJECTION, SOLUTION INTRAMUSCULAR; INTRAVENOUS at 05:50

## 2025-05-08 RX ADMIN — POTASSIUM CHLORIDE 10 MEQ: 7.46 INJECTION, SOLUTION INTRAVENOUS at 00:21

## 2025-05-08 RX ADMIN — POTASSIUM CHLORIDE 10 MEQ: 7.46 INJECTION, SOLUTION INTRAVENOUS at 02:05

## 2025-05-08 NOTE — ED PROVIDER NOTES
Subjective   History of Present Illness  Patient is a 69-year-old female who presents to the ER with complaints of a pill stuck in the right side of her throat.  She states she was at [employer] and try to take her Pradaxa pill that is a capsule and she has had pain in the side of her throat feels like it stuck since that time.  She has tried to drink and it has not helped the sensation.  She has no stridor.  She has no shortness of breath.  She is able to handle her secretions.  She is talking without difficulty.  She has a history of GI issues and having to have her esophagus stretched states it has been 2 years since it has been stretched.     used: No        Review of Systems   Constitutional: Negative.    HENT: Negative.  Negative for drooling, trouble swallowing and voice change.    Eyes: Negative.    Respiratory: Negative.  Negative for chest tightness and shortness of breath.    Cardiovascular:  Negative for chest pain, palpitations and leg swelling.   Gastrointestinal:  Negative for abdominal pain, nausea and vomiting.   Endocrine: Negative.    Genitourinary: Negative.    Musculoskeletal: Negative.    Skin: Negative.    Allergic/Immunologic: Negative.    Neurological: Negative.    Hematological: Negative.    Psychiatric/Behavioral: Negative.         Past Medical History:   Diagnosis Date    A-fib     Abnormal ECG     tachycardia, a fib    Acid reflux     Anemia     Arthritis     Cancer     Diabetes mellitus     Hyperkalemia     Hyperlipidemia     Hypertension     Hyponatremia     IBS (irritable bowel syndrome)     PONV (postoperative nausea and vomiting)     Sleep apnea     USES CPAP    UTI (urinary tract infection)     UTI (urinary tract infection) 11/25/2016    Vaginal vault prolapse        Allergies   Allergen Reactions    Lactose Intolerance (Gi) Diarrhea    Morphine And Codeine Nausea And Vomiting and Dizziness    Percocet [Oxycodone-Acetaminophen] Nausea And Vomiting and Dizziness        Past Surgical History:   Procedure Laterality Date    BREAST BIOPSY Left     X2    BUNIONECTOMY Left     CARDIAC CATHETERIZATION      CARDIAC CATHETERIZATION N/A 02/05/2021    Procedure: Right Heart Cath;  Surgeon: Van Marcelino MD;  Location:  [facility];  Service: Cardiology;  Laterality: N/A;    CATARACT EXTRACTION, BILATERAL      COLON SURGERY      COLONOSCOPY  09/21/2015    TRANSVERSE COLON ADENOMATOUS POLYP, HEMORRHOIDS, RECALL 5 YEARS, DR LAMAS    COLONOSCOPY N/A 11/13/2020    6 mm tubular adenomatous polyp at 15 cm proximal to the anus,diverticulosis of the sigmoid colon    COLONOSCOPY N/A 12/13/2023    - One 4 mm polyp at 40 cm proximal to the anus,. - One 4 mm polyp at 20 cm proximal to the anus, removed with a cold snare. Resected and retrieved. - Diverticulosis in the sigmoid colon. - Hemorrhoids----tubular adenoma no evidence of high grade dysplasia    COLONOSCOPY N/A 12/22/2024    Dr. Ludwig-- The examined portion of the ileum was normal. - Diverticulosis in the entire examined colon. - Non-bleeding internal hemorrhoids. - No specimens collected.    COLOSTOMY      COLPOPEXY VAGINAL      2014    ENDOSCOPY N/A 11/03/2016    LA GRADE B ESOPHAGITIS WITH NO BLEEDING UPPER THIRD OF ESOPHAGUS, GERI-WADSWORTH TEAR GEJ, BILIOUS BLOOD TINGED COFFEE GROUND GATRIC FLUIDDR [facility]    ENDOSCOPY N/A 12/13/2023    Normal examined duodenum. - Normal stomach. Biopsied. - Z-line regular, 35 cm from the incisors. Dilated.-neg for h pylori    EXPLORATORY LAPAROTOMY N/A 10/14/2016    Procedure: LAPAROTOMY EXPLORATORY, LYSIS OF ADHESIONS, IRRIAGATION OF ABDOMEN, POSSIBLE BOWEL RESECTION;  Surgeon: Bacilio Marcial MD;  Location:  [facility];  Service:     FOOT NAVICULAR EXCISION OR BONE GRAFT Left 01/04/2023    Procedure: Expansion Graft, Harvesting of Bone Graft/Bone Marrow Aspirate;  Surgeon: Frankie Zapata DPM;  Location:  [facility];  Service: Podiatry;  Laterality: Left;    HAMMER TOE  REPAIR Left 01/04/2023    Procedure: Hammertoe Repair 2 and 3 - Left Foot;  Surgeon: Frankie Zapata DPM;  Location:  [facility];  Service: Podiatry;  Laterality: Left;    HARDWARE REMOVAL Left 01/04/2023    Procedure: Hardware Removal;  Surgeon: Frankie Zapata DPM;  Location:  [facility];  Service: Podiatry;  Laterality: Left;    HYSTERECTOMY      REVISION / TAKEDOWN COLOSTOMY      SACROCOLPOPEXY N/A 09/21/2016    Procedure: SACROCOLPOPEXY LAPAROSCOPIC WITH InteranaINCI SI ROBOT, CONVERTED TO OPEN SACROCOLPOPEXY, RIGHT SALPINGO- OOPHERECTOMY, CYSTO;  Surgeon: Maribell Beth MD;  Location:  [facility];  Service:     TOE FUSION Left 01/06/2022    Procedure: FIRST METATARSOPHALANGEAL JOINT ARTHRODESIS WITH INTERNAL FIXATION - LEFT FOOT;  Surgeon: Jorgito Red DPM;  Location:  [facility];  Service: Podiatry;  Laterality: Left;    TOE FUSION Left 01/04/2023    Procedure: Revisional 1st Metatarsophalangeal Joint Arthrodesis with Hardware Removal and Expansion Graft, Harvesting of Bone Graft/Bone Marrow Aspirate, Hammertoe Repair 2 and 3 - Left Foot;  Surgeon: Frankie Zapata DPM;  Location:  [facility];  Service: Podiatry;  Laterality: Left;    VENTRAL HERNIA REPAIR N/A 3/2/2025    Procedure: VENTRAL / INCISIONAL HERNIA REPAIR LAPAROSCOPIC WITH DAVINCI ROBOT;  Surgeon: Jarrett Bland MD;  Location:  [facility];  Service: Robotics - DaVinci;  Laterality: N/A;       Family History   Problem Relation Age of Onset    Hypertension Mother     No Known Problems Father     Colon cancer Neg Hx     Colon polyps Neg Hx        Social History     Socioeconomic History    Marital status:    Tobacco Use    Smoking status: Never     Passive exposure: Never    Smokeless tobacco: Never   Vaping Use    Vaping status: Never Used   Substance and Sexual Activity    Alcohol use: No    Drug use: No    Sexual activity: Defer           Objective   Physical Exam  Vitals and nursing note reviewed.   Constitutional:       Appearance: Normal  appearance.   HENT:      Head: Normocephalic and atraumatic.      Right Ear: Tympanic membrane, ear canal and external ear normal.      Left Ear: Tympanic membrane, ear canal and external ear normal.      Nose: Nose normal.      Mouth/Throat:      Mouth: Mucous membranes are moist.      Pharynx: Oropharynx is clear. No oropharyngeal exudate or posterior oropharyngeal erythema.   Eyes:      Extraocular Movements: Extraocular movements intact.      Conjunctiva/sclera: Conjunctivae normal.      Pupils: Pupils are equal, round, and reactive to light.   Cardiovascular:      Rate and Rhythm: Normal rate and regular rhythm.      Pulses: Normal pulses.      Heart sounds: Normal heart sounds.   Pulmonary:      Effort: Pulmonary effort is normal. No respiratory distress.      Breath sounds: Normal breath sounds. No stridor. No wheezing.   Abdominal:      General: Bowel sounds are normal.      Palpations: Abdomen is soft.   Musculoskeletal:         General: Normal range of motion.      Cervical back: Normal range of motion and neck supple.   Skin:     General: Skin is warm and dry.      Capillary Refill: Capillary refill takes less than 2 seconds.   Neurological:      Mental Status: She is alert and oriented to person, place, and time.   Psychiatric:         Mood and Affect: Mood normal.         Procedures           ED Course  ED Course as of 05/07/25 2241   Wed May 07, 2025   2204 Spoke with Dr. Kern from  she states that it is okay to give her viscous lidocaine and admit to hospitalist. [LD]      ED Course User Index  [LD] Geri Stone APRN          Labs Reviewed   COMPREHENSIVE METABOLIC PANEL   PROTIME-INR   APTT   CBC WITH AUTO DIFFERENTIAL   CBC AND DIFFERENTIAL    Narrative:     The following orders were created for panel order CBC & Differential.  Procedure                               Abnormality         Status                     ---------                               -----------         ------                      CBC Auto Differential[[id number]]                                                         Please view results for these tests on the individual orders.       No orders to display                                                     Medical Decision Making  Patient is a 69-year-old female who presents to the ER with complaints of a pill stuck in the right side of her throat.  She states she was at [employer] and try to take her Pradaxa pill that is a capsule and she has had pain in the side of her throat feels like it stuck since that time.  She has tried to drink and it has not helped the sensation.  She has no stridor.  She has no shortness of breath.  She is able to handle her secretions.  She is talking without difficulty.  She has a history of GI issues and having to have her esophagus stretched states it has been 2 years since it has been stretched.    Differential diagnosis include foreign body throat, foreign body sensation, irritation    No airway difficulty.  Patient was given dose of glucagon and Coke to drink to see if she could swallow the rest of the pill.  She is able to swallow but has had no relief in the symptoms.  Spoke with Dr. Kern with GI okay to give her some viscous lidocaine and admit her to hospitalist give a dose of Protonix and he will see in the morning.  Spoke with  and he will admit patient.    Amount and/or Complexity of Data Reviewed  Labs: ordered. Decision-making details documented in ED Course.    Risk  Prescription drug management.  Decision regarding hospitalization.        Final diagnoses:   Throat pain in adult   Foreign body sensation, throat       ED Disposition  ED Disposition       ED Disposition   Decision to Admit    Condition   --    Comment   Level of Care: Med/Surg [1]   Diagnosis: Foreign body sensation, throat [[id number]]   Admitting Physician: NORMAN FORMAN [[id number]   Attending Physician: NORMAN FORMAN [[id number]                 No  follow-up provider specified.       Medication List      No changes were made to your prescriptions during this visit.            Geri Stone, APRN  05/07/25 [id number]

## 2025-05-08 NOTE — CONSULTS
[facility] Gastroenterology  Inpatient Consult Note  Today's date:  05/08/25    Yun Blackwell  1955       Referring Provider: Josesito Sharma,*  Primary Physician: ANGEL Healy MD     Date of Admission: 5/7/2025  Date of Service:  05/08/25    Reason for Consultation/Chief Complaint:   Dysphagia, Odynophagia    History of present illness:    Yun is a 70 y/o female that presented to the hospital last night. She says that she took her Pradaxa around 6 pm. After that, she had pain in the throat and felt like the pill was stuck. She was having difficulty swallowing. Per the ER, patient was able to handle sips of liquid and her secretions. However, patient does have a bag in front of her and says that she is spitting up some saliva occasionally. She has not had anything to drink overnight or this morning. She had EGD about 11/2023. At that time, dilation was performed with 48 Fr.     Past Medical History:   Diagnosis Date    A-fib     Abnormal ECG     tachycardia, a fib    Acid reflux     Anemia     Arthritis     Cancer     Diabetes mellitus     Hyperkalemia     Hyperlipidemia     Hypertension     Hyponatremia     IBS (irritable bowel syndrome)     PONV (postoperative nausea and vomiting)     Sleep apnea     USES CPAP    UTI (urinary tract infection)     UTI (urinary tract infection) 11/25/2016    Vaginal vault prolapse          Past Surgical History:   Procedure Laterality Date    BREAST BIOPSY Left     X2    BUNIONECTOMY Left     CARDIAC CATHETERIZATION      CARDIAC CATHETERIZATION N/A 02/05/2021    Procedure: Right Heart Cath;  Surgeon: Van Marcelino MD;  Location: [facility] CATH INVASIVE LOCATION;  Service: Cardiology;  Laterality: N/A;    CATARACT EXTRACTION, BILATERAL      COLON SURGERY      COLONOSCOPY  09/21/2015    TRANSVERSE COLON ADENOMATOUS POLYP, HEMORRHOIDS, RECALL 5 YEARS, DR LAMAS    COLONOSCOPY N/A 11/13/2020    6 mm tubular adenomatous polyp at 15 cm proximal to the  anus,diverticulosis of the sigmoid colon    COLONOSCOPY N/A 12/13/2023    - One 4 mm polyp at 40 cm proximal to the anus,. - One 4 mm polyp at 20 cm proximal to the anus, removed with a cold snare. Resected and retrieved. - Diverticulosis in the sigmoid colon. - Hemorrhoids----tubular adenoma no evidence of high grade dysplasia    COLONOSCOPY N/A 12/22/2024    Dr. Ludwig-- The examined portion of the ileum was normal. - Diverticulosis in the entire examined colon. - Non-bleeding internal hemorrhoids. - No specimens collected.    COLOSTOMY      COLPOPEXY VAGINAL      2014    ENDOSCOPY N/A 11/03/2016    LA GRADE B ESOPHAGITIS WITH NO BLEEDING UPPER THIRD OF ESOPHAGUS, GERI-[place] TEAR GEJ, BILIOUS BLOOD TINGED COFFEE GROUND GATRIC FLUIDDR [facility]    ENDOSCOPY N/A 12/13/2023    Normal examined duodenum. - Normal stomach. Biopsied. - Z-line regular, 35 cm from the incisors. Dilated.-neg for h pylori    EXPLORATORY LAPAROTOMY N/A 10/14/2016    Procedure: LAPAROTOMY EXPLORATORY, LYSIS OF ADHESIONS, IRRIAGATION OF ABDOMEN, POSSIBLE BOWEL RESECTION;  Surgeon: Bacilio Marcial MD;  Location:  [facility] OR;  Service:     FOOT NAVICULAR EXCISION OR BONE GRAFT Left 01/04/2023    Procedure: Expansion Graft, Harvesting of Bone Graft/Bone Marrow Aspirate;  Surgeon: Frankie Zapata DPM;  Location:  [facility] OR;  Service: Podiatry;  Laterality: Left;    HAMMER TOE REPAIR Left 01/04/2023    Procedure: Hammertoe Repair 2 and 3 - Left Foot;  Surgeon: Frankie Zapata DPM;  Location:  [facility];  Service: Podiatry;  Laterality: Left;    HARDWARE REMOVAL Left 01/04/2023    Procedure: Hardware Removal;  Surgeon: Frankie Zapata DPM;  Location:  [facility];  Service: Podiatry;  Laterality: Left;    HYSTERECTOMY      REVISION / TAKEDOWN COLOSTOMY      SACROCOLPOPEXY N/A 09/21/2016    Procedure: SACROCOLPOPEXY LAPAROSCOPIC WITH [employer] SI ROBOT, CONVERTED TO OPEN SACROCOLPOPEXY, RIGHT SALPINGO- OOPHERECTOMY, CYSTO;  Surgeon: Maribell Beth,  MD;  Location:  [facility] OR;  Service:     TOE FUSION Left 01/06/2022    Procedure: FIRST METATARSOPHALANGEAL JOINT ARTHRODESIS WITH INTERNAL FIXATION - LEFT FOOT;  Surgeon: Jorgito Red DPM;  Location:  [facility];  Service: Podiatry;  Laterality: Left;    TOE FUSION Left 01/04/2023    Procedure: Revisional 1st Metatarsophalangeal Joint Arthrodesis with Hardware Removal and Expansion Graft, Harvesting of Bone Graft/Bone Marrow Aspirate, Hammertoe Repair 2 and 3 - Left Foot;  Surgeon: Frankie Zapata DPM;  Location:  [facility];  Service: Podiatry;  Laterality: Left;    VENTRAL HERNIA REPAIR N/A 3/2/2025    Procedure: VENTRAL / INCISIONAL HERNIA REPAIR LAPAROSCOPIC WITH [employer] ROBOT;  Surgeon: Jarrett Bland MD;  Location:  [facility];  Service: Robotics - DaVinci;  Laterality: N/A;          Allergies   Allergen Reactions    Lactose Intolerance (Gi) Diarrhea    Morphine And Codeine Nausea And Vomiting and Dizziness    Percocet [Oxycodone-Acetaminophen] Nausea And Vomiting and Dizziness         Social History     Tobacco Use    Smoking status: Never     Passive exposure: Never    Smokeless tobacco: Never   Substance Use Topics    Alcohol use: No          Family History   Problem Relation Age of Onset    Hypertension Mother     No Known Problems Father     Colon cancer Neg Hx     Colon polyps Neg Hx          Medications Prior to Admission   Medication Sig Dispense Refill Last Dose/Taking    cyanocobalamin 1000 MCG/ML injection Inject 1 mL into the appropriate muscle as directed by prescriber Every 30 (Thirty) Days. 1 mL 11 Past Week    dabigatran etexilate (PRADAXA) 150 MG capsu Take 1 capsule by mouth 2 (Two) Times a Day. 60 capsule 11 Past Week    dilTIAZem CD (Cardizem CD) 120 MG 24 hr capsule Take 1 capsule by mouth Daily. 30 capsule 11 Past Week    omeprazole (priLOSEC) 20 MG capsule Take 1 capsule by mouth Daily. 90 capsule 3 Past Week             Review of Systems:  Constitutional: No unexpected weight change,  no fatigue, no unexplained fever, no sweats or chills.   HEENT: No icteric sclera.  No hearing or visual deficits.  No sore throat.  No chronic nasal discharge.  Pulmonary: No chronic cough.  No hemoptysis.  No shortness of breath.  Cardiovascular: No chest pain.  No palpitations.  No shortness of breath.  Gastrointestinal: As above.  Musculoskeletal/extremities: No peripheral edema.  No cyanosis.  No claudications.  No back pain.  Genitourinary: No dysuria.  No blood in stool.  No urethral discharges.  Neurologic: No seizures.  No headaches.  No dizziness.  No gait problems.  Skin: No rash.  No icterus.  Mental: No psychosis.  No confusions.  No hallucinations.      Physical Exam:  Temp:  [97.6 °F (36.4 °C)-98.5 °F (36.9 °C)] 97.8 °F (36.6 °C)  Heart Rate:  [] 91  Resp:  [16-19] 16  BP: (103-148)/(62-88) 133/71    General:   HEENT: Nonicteric sclerae.  Moist oral mucosa.  PERRLA.  EOMI.  Clear pharynx.  Lungs: Clear to auscultation bilaterally.  No wheezing, rales or rhonchi.  Heart: Regular rate and rhythm.  Normal S1 and S2, no S3, S4 or murmur.  Abdomen: Soft, nondistended, nontender to palpation, with normoactive bowel sounds, no hepatosplenomegaly, no palpable masses.  Extremities: No cyanosis, edema or pulse deficits.  Skin: No rash or jaundice.      Results Review:  Lab Results (last 24 hours)       Procedure Component Value Units Date/Time    Potassium [[id number]]  (Normal) Collected: 05/08/25 1022    Specimen: Blood Updated: 05/08/25 1052     Potassium 4.2 mmol/L     POC Glucose Once [[id number]]  (Normal) Collected: 05/08/25 0550    Specimen: Blood Updated: 05/08/25 0601     Glucose 78 mg/dL      Comment: : [id number] Adela SaraMeter ID: [id number]       POC Glucose Once [[id number]]  (Normal) Collected: 05/08/25 0402    Specimen: Blood Updated: 05/08/25 0414     Glucose 70 mg/dL      Comment: : [id number] Campbell SaraMeter ID: [id number]       Basic Metabolic Panel [[id number]]   (Abnormal) Collected: 05/08/25 0250    Specimen: Blood Updated: 05/08/25 0355     Glucose 63 mg/dL      BUN 15 mg/dL      Creatinine 0.38 mg/dL      Sodium 143 mmol/L      Potassium 3.9 mmol/L      Comment: Slight hemolysis detected by analyzer. Result may be falsely elevated.        Chloride 109 mmol/L      CO2 23.0 mmol/L      Calcium 8.7 mg/dL      BUN/Creatinine Ratio 39.5     Anion Gap 11.0 mmol/L      eGFR 107.3 mL/min/1.73     Narrative:      GFR Categories in Chronic Kidney Disease (CKD)              GFR Category          GFR (mL/min/1.73)    Interpretation  G1                    90 or greater        Normal or high (1)  G2                    60-89                Mild decrease (1)  G3a                   45-59                Mild to moderate decrease  G3b                   30-44                Moderate to severe decrease  G4                    15-29                Severe decrease  G5                    14 or less           Kidney failure    (1)In the absence of evidence of kidney disease, neither GFR category G1 or G2 fulfill the criteria for CKD.    eGFR calculation 2021 CKD-EPI creatinine equation, which does not include race as a factor    CBC Auto Differential [[id number]]  (Abnormal) Collected: 05/08/25 0250    Specimen: Blood Updated: 05/08/25 0326     WBC 9.57 10*3/mm3      RBC 4.22 10*6/mm3      Hemoglobin 12.4 g/dL      Hematocrit 39.3 %      MCV 93.1 fL      MCH 29.4 pg      MCHC 31.6 g/dL      RDW 14.1 %      RDW-SD 47.8 fl      MPV 11.3 fL      Platelets 164 10*3/mm3      Neutrophil % 59.4 %      Lymphocyte % 28.6 %      Monocyte % 9.6 %      Eosinophil % 1.6 %      Basophil % 0.5 %      Immature Grans % 0.3 %      Neutrophils, Absolute 5.68 10*3/mm3      Lymphocytes, Absolute 2.74 10*3/mm3      Monocytes, Absolute 0.92 10*3/mm3      Eosinophils, Absolute 0.15 10*3/mm3      Basophils, Absolute 0.05 10*3/mm3      Immature Grans, Absolute 0.03 10*3/mm3      nRBC 0.0 /100 WBC     Comprehensive  Metabolic Panel [[id number]]  (Abnormal) Collected: 05/07/25 2244    Specimen: Blood Updated: 05/07/25 2310     Glucose 175 mg/dL      BUN 15 mg/dL      Creatinine 0.46 mg/dL      Sodium 142 mmol/L      Potassium 3.4 mmol/L      Chloride 108 mmol/L      CO2 22.0 mmol/L      Calcium 8.9 mg/dL      Total Protein 6.9 g/dL      Albumin 4.1 g/dL      ALT (SGPT) 10 U/L      AST (SGOT) 20 U/L      Alkaline Phosphatase 109 U/L      Total Bilirubin 0.7 mg/dL      Globulin 2.8 gm/dL      A/G Ratio 1.5 g/dL      BUN/Creatinine Ratio 32.6     Anion Gap 12.0 mmol/L      eGFR 102.5 mL/min/1.73     Narrative:      GFR Categories in Chronic Kidney Disease (CKD)              GFR Category          GFR (mL/min/1.73)    Interpretation  G1                    90 or greater        Normal or high (1)  G2                    60-89                Mild decrease (1)  G3a                   45-59                Mild to moderate decrease  G3b                   30-44                Moderate to severe decrease  G4                    15-29                Severe decrease  G5                    14 or less           Kidney failure    (1)In the absence of evidence of kidney disease, neither GFR category G1 or G2 fulfill the criteria for CKD.    eGFR calculation 2021 CKD-EPI creatinine equation, which does not include race as a factor    Protime-INR [[id number]]  (Abnormal) Collected: 05/07/25 2244    Specimen: Blood Updated: 05/07/25 2302     Protime 19.4 Seconds      INR 1.54    aPTT [[id number]]  (Abnormal) Collected: 05/07/25 2244    Specimen: Blood Updated: 05/07/25 2302     PTT 49.4 seconds     Narrative:      PTT = The equivalent PTT values for the therapeutic range of heparin levels at 0.3 to 0.7 U/ml are 77 - 99 seconds.    CBC & Differential [[id number]]  (Abnormal) Collected: 05/07/25 2244    Specimen: Blood Updated: 05/07/25 2251    Narrative:      The following orders were created for panel order CBC & Differential.  Procedure                                Abnormality         Status                     ---------                               -----------         ------                     CBC Auto Differential[[id number]]        Abnormal            Final result                 Please view results for these tests on the individual orders.    CBC Auto Differential [[id number]]  (Abnormal) Collected: 05/07/25 2244    Specimen: Blood Updated: 05/07/25 2251     WBC 11.53 10*3/mm3      RBC 4.36 10*6/mm3      Hemoglobin 12.9 g/dL      Hematocrit 40.8 %      MCV 93.6 fL      MCH 29.6 pg      MCHC 31.6 g/dL      RDW 13.8 %      RDW-SD 47.8 fl      MPV 10.5 fL      Platelets 158 10*3/mm3      Neutrophil % 75.6 %      Lymphocyte % 15.6 %      Monocyte % 6.9 %      Eosinophil % 1.0 %      Basophil % 0.5 %      Immature Grans % 0.4 %      Neutrophils, Absolute 8.72 10*3/mm3      Lymphocytes, Absolute 1.80 10*3/mm3      Monocytes, Absolute 0.79 10*3/mm3      Eosinophils, Absolute 0.11 10*3/mm3      Basophils, Absolute 0.06 10*3/mm3      Immature Grans, Absolute 0.05 10*3/mm3      nRBC 0.0 /100 WBC               Radiology Review:  Imaging Results (Last 72 Hours)       ** No results found for the last 72 hours. **            Impression:  -Dysphagia/Odynophagia. Rule out esophageal obstruction.    -Atrial fibrillation. On Pradaxa.    Plan:  -EGD today.      Ihsan Kern MD  05/08/25   10:57 CDT

## 2025-05-08 NOTE — CASE MANAGEMENT/SOCIAL WORK
Discharge Planning Assessment  [facility]     Patient Name: Yun Blackwell  MRN: [id number]  Today's Date: 5/8/2025    Admit Date: 5/7/2025        Discharge Needs Assessment       Row Name 05/08/25 1429       Living Environment    People in Home alone    Current Living Arrangements home    Potentially Unsafe Housing Conditions none    In the past 12 months has the electric, gas, oil, or water company threatened to shut off services in your home? No    Primary Care Provided by self    Provides Primary Care For no one    Family Caregiver if Needed child(simran), adult    Quality of Family Relationships helpful;involved;supportive    Able to Return to Prior Arrangements yes       Resource/Environmental Concerns    Resource/Environmental Concerns none    Transportation Concerns none       Transportation Needs    In the past 12 months, has lack of transportation kept you from medical appointments or from getting medications? no    In the past 12 months, has lack of transportation kept you from meetings, work, or from getting things needed for daily living? No       Food Insecurity    Within the past 12 months, you worried that your food would run out before you got the money to buy more. Never true    Within the past 12 months, the food you bought just didn't last and you didn't have money to get more. Never true       Transition Planning    Patient/Family Anticipates Transition to home with family    Patient/Family Anticipated Services at Transition none    Transportation Anticipated family or friend will provide       Discharge Needs Assessment    Equipment Currently Used at Home none    Concerns to be Addressed denies needs/concerns at this time    Do you want help finding or keeping work or a job? I do not need or want help    Do you want help with school or training? For example, starting or completing job training or getting a high school diploma, GED or equivalent No    Anticipated Changes Related to Illness none     Equipment Needed After Discharge none    Discharge Coordination/Progress Lives alone with family support. No DME used. PCP is Dr. Alexander Healy. Denies any needs. We will follow and assist if needed.                   Discharge Plan    No documentation.                      Demographic Summary    No documentation.                  Functional Status    No documentation.                  Psychosocial    No documentation.                  Abuse/Neglect    No documentation.                  Legal    No documentation.                  Substance Abuse    No documentation.                  Patient Forms    No documentation.                     Genevieve Stanley RN

## 2025-05-08 NOTE — ANESTHESIA PREPROCEDURE EVALUATION
Anesthesia Evaluation     Patient summary reviewed   history of anesthetic complications:  PONV prolonged sedation  NPO Solid Status: > 8 hours             Airway   Mallampati: I  TM distance: >3 FB  Neck ROM: full  No difficulty expected  Dental - normal exam     Pulmonary    (+) ,sleep apnea on CPAP  (-) asthma, not a smoker  Cardiovascular   Exercise tolerance: excellent (>7 METS)    ECG reviewed    (+) hypertension, dysrhythmias Atrial Fib, hyperlipidemia  (-) past MI      Neuro/Psych- negative ROS  (-) seizures, TIA, CVA  GI/Hepatic/Renal/Endo    (+) GERD, GI bleeding , diabetes mellitus (diet controlled) type 2 well controlled  (-) liver disease    Musculoskeletal     Abdominal    Substance History      OB/GYN          Other   blood dyscrasia anemia,   history of cancer remission                  Anesthesia Plan    ASA 3 - emergent     general   Rapid sequence  intravenous induction     Anesthetic plan, risks, benefits, and alternatives have been provided, discussed and informed consent has been obtained with: patient.      CODE STATUS:    Code Status (Patient has no pulse and is not breathing): CPR (Attempt to Resuscitate)  Medical Interventions (Patient has pulse or is breathing): Full Support

## 2025-05-08 NOTE — ANESTHESIA POSTPROCEDURE EVALUATION
Patient: Yun Blackwell    Procedure Summary       Date: 05/08/25 Room / [facility]:  [facility]  /  [facility]    Anesthesia Start: 1259 Anesthesia Stop: 1323    Procedure: ESOPHAGOGASTRODUODENOSCOPY WITH ANESTHESIA Diagnosis:     Surgeons: Ihsan Kern MD Provider: Jarrett Galaviz CRNA    Anesthesia Type: general ASA Status: 3 - Emergent            Anesthesia Type: general    Vitals  Vitals Value Taken Time   /73 05/08/25 13:48   Temp 98 °F (36.7 °C) 05/08/25 13:22   Pulse 90 05/08/25 13:50   Resp 16 05/08/25 13:45   SpO2 94 % 05/08/25 13:50   Vitals shown include unfiled device data.        Post Anesthesia Care and Evaluation    Patient location during evaluation: PHASE II  Patient participation: complete - patient participated  Level of consciousness: awake  Pain management: adequate    Airway patency: patent  Anesthetic complications: No anesthetic complications  Respiratory status: acceptable  Hydration status: acceptable

## 2025-05-08 NOTE — H&P
[facility] Medicine Services  HISTORY AND PHYSICAL    Date of Admission: 5/7/2025  Primary Care Physician: ANGEL Healy MD    Subjective   Primary Historian: Patient    Chief Complaint: Pill stuck in her throat    History of Present Illness  69-year-old female with past medical history of atrial fibrillation on Pradaxa, hypertension, GERD, sleep apnea, esophageal strictures, the presents to the emergency room complaining of pain in her throat and having the pill stuck.  She had received several treatments in the emergency room without success.  She was not able to tolerate p.o.  Case was discussed with gastroenterologist who recommended admission for EGD in a.m.    Review of Systems   Otherwise complete ROS reviewed and negative except as mentioned in the HPI.    Past Medical History:   Past Medical History:   Diagnosis Date    A-fib     Abnormal ECG     tachycardia, a fib    Acid reflux     Anemia     Arthritis     Cancer     Diabetes mellitus     Hyperkalemia     Hyperlipidemia     Hypertension     Hyponatremia     IBS (irritable bowel syndrome)     PONV (postoperative nausea and vomiting)     Sleep apnea     USES CPAP    UTI (urinary tract infection)     UTI (urinary tract infection) 11/25/2016    Vaginal vault prolapse      Past Surgical History:  Past Surgical History:   Procedure Laterality Date    BREAST BIOPSY Left     X2    BUNIONECTOMY Left     CARDIAC CATHETERIZATION      CARDIAC CATHETERIZATION N/A 02/05/2021    Procedure: Right Heart Cath;  Surgeon: Van Marcelino MD;  Location: [facility] INVASIVE LOCATION;  Service: Cardiology;  Laterality: N/A;    CATARACT EXTRACTION, BILATERAL      COLON SURGERY      COLONOSCOPY  09/21/2015    TRANSVERSE COLON ADENOMATOUS POLYP, HEMORRHOIDS, RECALL 5 YEARS, DR LAMAS    COLONOSCOPY N/A 11/13/2020    6 mm tubular adenomatous polyp at 15 cm proximal to the anus,diverticulosis of the sigmoid colon    COLONOSCOPY N/A  12/13/2023    - One 4 mm polyp at 40 cm proximal to the anus,. - One 4 mm polyp at 20 cm proximal to the anus, removed with a cold snare. Resected and retrieved. - Diverticulosis in the sigmoid colon. - Hemorrhoids----tubular adenoma no evidence of high grade dysplasia    COLONOSCOPY N/A 12/22/2024    Dr. Ludwig-- The examined portion of the ileum was normal. - Diverticulosis in the entire examined colon. - Non-bleeding internal hemorrhoids. - No specimens collected.    COLOSTOMY      COLPOPEXY VAGINAL      2014    ENDOSCOPY N/A 11/03/2016    LA GRADE B ESOPHAGITIS WITH NO BLEEDING UPPER THIRD OF ESOPHAGUS, GERI-[place]AD[place]TH TEAR GEJ, BILIOUS BLOOD TINGED COFFEE GROUND GATRIC FLUIDDR [facility]    ENDOSCOPY N/A 12/13/2023    Normal examined duodenum. - Normal stomach. Biopsied. - Z-line regular, 35 cm from the incisors. Dilated.-neg for h pylori    EXPLORATORY LAPAROTOMY N/A 10/14/2016    Procedure: LAPAROTOMY EXPLORATORY, LYSIS OF ADHESIONS, IRRIAGATION OF ABDOMEN, POSSIBLE BOWEL RESECTION;  Surgeon: Bacilio Marcial MD;  Location:  [facility] OR;  Service:     FOOT NAVICULAR EXCISION OR BONE GRAFT Left 01/04/2023    Procedure: Expansion Graft, Harvesting of Bone Graft/Bone Marrow Aspirate;  Surgeon: Frankie Zapata DPM;  Location:  [facility] OR;  Service: Podiatry;  Laterality: Left;    HAMMER TOE REPAIR Left 01/04/2023    Procedure: Hammertoe Repair 2 and 3 - Left Foot;  Surgeon: Frankie Zapata DPM;  Location:  [facility];  Service: Podiatry;  Laterality: Left;    HARDWARE REMOVAL Left 01/04/2023    Procedure: Hardware Removal;  Surgeon: Frankie Zapata DPM;  Location:  [facility];  Service: Podiatry;  Laterality: Left;    HYSTERECTOMY      REVISION / TAKEDOWN COLOSTOMY      SACROCOLPOPEXY N/A 09/21/2016    Procedure: SACROCOLPOPEXY LAPAROSCOPIC WITH Bswift SI ROBOT, CONVERTED TO OPEN SACROCOLPOPEXY, RIGHT SALPINGO- OOPHERECTOMY, CYSTO;  Surgeon: Maribell Beth MD;  Location:  [facility];  Service:     TOE FUSION Left  01/06/2022    Procedure: FIRST METATARSOPHALANGEAL JOINT ARTHRODESIS WITH INTERNAL FIXATION - LEFT FOOT;  Surgeon: Jorgito Red DPM;  Location:  [facility];  Service: Podiatry;  Laterality: Left;    TOE FUSION Left 01/04/2023    Procedure: Revisional 1st Metatarsophalangeal Joint Arthrodesis with Hardware Removal and Expansion Graft, Harvesting of Bone Graft/Bone Marrow Aspirate, Hammertoe Repair 2 and 3 - Left Foot;  Surgeon: Frankie Zapata DPM;  Location:  [facility];  Service: Podiatry;  Laterality: Left;    VENTRAL HERNIA REPAIR N/A 3/2/2025    Procedure: VENTRAL / INCISIONAL HERNIA REPAIR LAPAROSCOPIC WITH DAVINCI ROBOT;  Surgeon: Jarrett Bland MD;  Location:  [facility];  Service: Robotics - DaVinci;  Laterality: N/A;     Social History:  reports that she has never smoked. She has never been exposed to tobacco smoke. She has never used smokeless tobacco. She reports that she does not drink alcohol and does not use drugs.    Family History: family history includes Hypertension in her mother; No Known Problems in her father.       Allergies:  Allergies   Allergen Reactions    Lactose Intolerance (Gi) Diarrhea    Morphine And Codeine Nausea And Vomiting and Dizziness    Percocet [Oxycodone-Acetaminophen] Nausea And Vomiting and Dizziness       Medications:  Prior to Admission medications    Medication Sig Start Date End Date Taking? Authorizing Provider   cyanocobalamin 1000 MCG/ML injection Inject 1 mL into the appropriate muscle as directed by prescriber Every 30 (Thirty) Days. 4/21/25   Meenu Underwood APRN   dabigatran etexilate (PRADAXA) 150 MG capsu Take 1 capsule by mouth 2 (Two) Times a Day. 7/1/24   Abbey Serna APRN   dilTIAZem CD (Cardizem CD) 120 MG 24 hr capsule Take 1 capsule by mouth Daily. 11/13/24   Abbey Serna APRN   hydrOXYzine (ATARAX) 10 MG tablet Take 1-2 tablets by mouth Every 4 (Four) Hours As Needed for Itching or Anxiety.  Patient not taking: Reported on 5/5/2025     "ProviderDavid MD   hydrOXYzine (ATARAX) 10 MG tablet Take 1-2 tablets by mouth Every 4 (Four) to 6 (Six) Hours As Needed for itching  Patient not taking: Reported on 5/5/2025 3/27/25      hydrOXYzine (ATARAX) 10 MG tablet Take 1-2 tablets by mouth Every 4 (Four) to 6 (Six) Hours As Needed for itching  Patient not taking: Reported on 5/5/2025 4/28/25      Lidocaine 4 % Place 1 patch on the skin as directed by provider Daily. Remove & Discard patch within 12 hours or as directed by MD  Patient not taking: Reported on 5/5/2025 3/6/25   Jose Raul Archuleta MD   omeprazole (priLOSEC) 20 MG capsule Take 1 capsule by mouth Daily. 4/30/25   Meenu Underwood APRN   ondansetron (Zofran) 4 MG tablet Take 1 tablet by mouth Every 8 (Eight) Hours As Needed for Nausea or Vomiting.  Patient not taking: Reported on 5/5/2025 3/7/25   Jose Raul Archuleta MD   tamoxifen (NOLVADEX) 20 MG chemo tablet Take 1 tablet by mouth Daily.  Patient not taking: Reported on 3/28/2025    ProviderDavid MD   tiZANidine (ZANAFLEX) 4 MG tablet Take 1 tablet by mouth Every 6 (Six) Hours As Needed for Muscle Spasms.  Patient not taking: Reported on 5/5/2025 2/24/25   Meenu Underwood APRN     I have utilized all available immediate resources to obtain, update, or review the patient's current medications (including all prescriptions, over-the-counter products, herbals, cannabis/cannabidiol products, and vitamin/mineral/dietary (nutritional) supplements).    Objective     Vital Signs: /85 (BP Location: Right arm, Patient Position: Lying)   Pulse 92   Temp 98 °F (36.7 °C) (Oral)   Resp 16   Ht 160 cm (62.99\")   Wt 69.4 kg (153 lb)   SpO2 97%   BMI 27.11 kg/m²   Physical Exam  Constitutional:       General: She is not in acute distress.     Appearance: Normal appearance. She is not toxic-appearing or diaphoretic.   HENT:      Head: Normocephalic and atraumatic.      Right Ear: External ear normal.      Left Ear: " External ear normal.      Nose: Nose normal.      Mouth/Throat:      Mouth: Mucous membranes are moist. Mucous membranes are dry.   Eyes:      Extraocular Movements: Extraocular movements intact.      Conjunctiva/sclera: Conjunctivae normal.      Pupils: Pupils are equal, round, and reactive to light.   Cardiovascular:      Rate and Rhythm: Normal rate and regular rhythm.      Pulses: Normal pulses.   Pulmonary:      Effort: Pulmonary effort is normal. No respiratory distress.      Breath sounds: Normal breath sounds. No wheezing or rales.   Abdominal:      General: Abdomen is flat. Bowel sounds are normal. There is no distension.      Palpations: Abdomen is soft. There is no mass.      Tenderness: There is no abdominal tenderness.   Musculoskeletal:         General: Normal range of motion.      Cervical back: Normal range of motion and neck supple.   Skin:     General: Skin is warm and dry.      Capillary Refill: Capillary refill takes less than 2 seconds.   Neurological:      General: No focal deficit present.      Mental Status: She is alert and oriented to person, place, and time. Mental status is at baseline.      Motor: No weakness.      Coordination: Coordination normal.   Psychiatric:         Mood and Affect: Mood normal.         Behavior: Behavior normal.              Results Reviewed:  Lab Results (last 24 hours)       Procedure Component Value Units Date/Time    Comprehensive Metabolic Panel [[id number]]  (Abnormal) Collected: 05/07/25 2244    Specimen: Blood Updated: 05/07/25 2310     Glucose 175 mg/dL      BUN 15 mg/dL      Creatinine 0.46 mg/dL      Sodium 142 mmol/L      Potassium 3.4 mmol/L      Chloride 108 mmol/L      CO2 22.0 mmol/L      Calcium 8.9 mg/dL      Total Protein 6.9 g/dL      Albumin 4.1 g/dL      ALT (SGPT) 10 U/L      AST (SGOT) 20 U/L      Alkaline Phosphatase 109 U/L      Total Bilirubin 0.7 mg/dL      Globulin 2.8 gm/dL      A/G Ratio 1.5 g/dL      BUN/Creatinine Ratio 32.6      Anion Gap 12.0 mmol/L      eGFR 102.5 mL/min/1.73     Narrative:      GFR Categories in Chronic Kidney Disease (CKD)              GFR Category          GFR (mL/min/1.73)    Interpretation  G1                    90 or greater        Normal or high (1)  G2                    60-89                Mild decrease (1)  G3a                   45-59                Mild to moderate decrease  G3b                   30-44                Moderate to severe decrease  G4                    15-29                Severe decrease  G5                    14 or less           Kidney failure    (1)In the absence of evidence of kidney disease, neither GFR category G1 or G2 fulfill the criteria for CKD.    eGFR calculation 2021 CKD-EPI creatinine equation, which does not include race as a factor    Protime-INR [[id number]]  (Abnormal) Collected: 05/07/25 2244    Specimen: Blood Updated: 05/07/25 2302     Protime 19.4 Seconds      INR 1.54    aPTT [[id number]]  (Abnormal) Collected: 05/07/25 2244    Specimen: Blood Updated: 05/07/25 2302     PTT 49.4 seconds     Narrative:      PTT = The equivalent PTT values for the therapeutic range of heparin levels at 0.3 to 0.7 U/ml are 77 - 99 seconds.    CBC & Differential [[id number]]  (Abnormal) Collected: 05/07/25 2244    Specimen: Blood Updated: 05/07/25 2251    Narrative:      The following orders were created for panel order CBC & Differential.  Procedure                               Abnormality         Status                     ---------                               -----------         ------                     CBC Auto Differential[[id number]]        Abnormal            Final result                 Please view results for these tests on the individual orders.    CBC Auto Differential [[id number]]  (Abnormal) Collected: 05/07/25 2244    Specimen: Blood Updated: 05/07/25 2251     WBC 11.53 10*3/mm3      RBC 4.36 10*6/mm3      Hemoglobin 12.9 g/dL      Hematocrit 40.8 %      MCV 93.6 fL      MCH  29.6 pg      MCHC 31.6 g/dL      RDW 13.8 %      RDW-SD 47.8 fl      MPV 10.5 fL      Platelets 158 10*3/mm3      Neutrophil % 75.6 %      Lymphocyte % 15.6 %      Monocyte % 6.9 %      Eosinophil % 1.0 %      Basophil % 0.5 %      Immature Grans % 0.4 %      Neutrophils, Absolute 8.72 10*3/mm3      Lymphocytes, Absolute 1.80 10*3/mm3      Monocytes, Absolute 0.79 10*3/mm3      Eosinophils, Absolute 0.11 10*3/mm3      Basophils, Absolute 0.06 10*3/mm3      Immature Grans, Absolute 0.05 10*3/mm3      nRBC 0.0 /100 WBC           Imaging Results (Last 24 Hours)       ** No results found for the last 24 hours. **          I have personally reviewed and interpreted the radiology studies and ECG obtained at time of admission.     Assessment / Plan   Assessment:   Active Hospital Problems    Diagnosis     **Foreign body sensation, throat     LEROY on CPAP     Permanent atrial fibrillation      Treatment Plan  The patient will be admitted to my service here at [facility].   Observed in medical floor  Vitals every 4 hours  Diet n.p.o.  IV fluids normal saline 75 cc an hour  Activity as tolerated    Dysphagia  GI consult for EGD in a.m.  Hold Pradaxa    Atrial fibrillation  Continue home medications    Obstructive sleep apnea  May continue to wear home CPAP with home settings    DVT prophylaxis SCDs    Medical Decision Making  Number and Complexity of problems: 3  Differential Diagnosis: none    Conditions and Status        Condition is unchanged.     [facility] Data  External documents reviewed: [employer]  Cardiac tracing (EKG, telemetry) interpretation: -  Radiology interpretation: see above  Labs reviewed: see above  Any tests that were considered but not ordered: none     Decision rules/scores evaluated (example [id number], Wells, etc): -     Discussed with: patient     Care Planning  Shared decision making: patient  Code status and discussions: full code    Disposition  Social Determinants of Health that impact  treatment or disposition: none  Estimated length of stay is overnight.     I confirmed that the patient's advanced care plan is present, code status is documented, and a surrogate decision maker is listed in the patient's medical record.     The patient's surrogate decision maker is family, see records.     The patient was seen and examined by me on 5/07/2025 at 2342.    Electronically signed by Josesito Sharma MD, 05/07/25, 23:42 CDT.

## 2025-05-08 NOTE — PROGRESS NOTES
Gastroenterology Note:  EGD with no evidence of esophageal obstruction, lodged pill in the esophagus, or food bolus. There was mucosal edema in the oropharynx around the arytenoids. It is possible that this was site where pill was temporarily lodged.     Ok for clear liquid diet. If tolerates liquids, can advance to soft diet. Recommend outpatient follow up with GI. Nothing further to add from GI at this time. Will sign off, please call back if needed.

## 2025-05-08 NOTE — PLAN OF CARE
Goal Outcome Evaluation:  Plan of Care Reviewed With: patient        Progress: no change  Outcome Evaluation: Pt A&O x4. VSS. C/o pain. See MAR. EGD completed. Safety maintained.

## 2025-05-08 NOTE — PROGRESS NOTES
Patient Name: Yun Blackwell  Date of Admission: 5/7/2025  Today's Date: 05/08/25  Length of Stay: 0  Primary Care Physician: ANGEL Healy MD    Subjective   Chief Complaint: Pill stuck in her throat  Aspiration  Symptoms include sore throat.       Yun Blackwell is a 69-year-old female with past medical history of atrial fibrillation on Pradaxa, hypertension, GERD, sleep apnea, esophageal strictures, the presents to the emergency room complaining of pain in her throat and having the pill stuck. She had received several treatments in the emergency room without success. She was not able to tolerate p.o. Case was discussed with gastroenterologist who recommended admission for EGD in a.m.     Today: This morning patient continued to complain of significant pain on the right side of her throat and inability to swallow.  She has no complaints of shortness of breath, nausea, vomiting or diarrhea.  No decreased level of consciousness or excessive sputum.  Patient is anxious for EGD today as she feels that this will resolve her symptoms.    EGD today Dr. Kern with no evidence of esophageal obstruction, lodged pill in the esophagus, or food bolus.  There was mucosal edema in the oropharynx around the arytenoids   Is possible that this is the site where the pill was temporarily lodged.  He recommends clear liquid diet.  If tolerates liquid can advance to soft diet recommend outpatient follow-up with GI.  Patient may be discharged in the a.m. if continued tolerance.    Intake/Output Summary (Last 24 hours) at 5/8/2025 1043  Last data filed at 5/8/2025 0603  Gross per 24 hour   Intake --   Output 650 ml   Net -650 ml       Results for orders placed during the hospital encounter of 01/29/24    Adult Transthoracic Echo Complete w/ Color, Spectral and Contrast if necessary per protocol    Interpretation Summary    Left ventricular systolic function is low normal. Left ventricular ejection fraction appears to be 51 -  55%.    Severe biatrial enlargement.    Moderate tricuspid valve regurgitation is present.    Estimated right ventricular systolic pressure from tricuspid regurgitation is mildly elevated (35-45 mmHg).    The right ventricular cavity is moderately dilated, with normal systolic function.    No other significant (greater than mild) valvular pathology.    Compared to most recent transthoracic exam (from 11/24/2020), no obvious or significant change.       Review of Systems   HENT:  Positive for sore throat and trouble swallowing.       All pertinent negatives and positives are as above. All other systems have been reviewed and are negative unless otherwise stated.     Objective    Temp:  [97.6 °F (36.4 °C)-98.5 °F (36.9 °C)] 97.8 °F (36.6 °C)  Heart Rate:  [] 91  Resp:  [16-19] 16  BP: (103-148)/(62-88) 133/71  Physical Exam  Constitutional:       General: She is not in acute distress.     Appearance: Normal appearance. She is not toxic-appearing or diaphoretic.   HENT:      Head: Normocephalic and atraumatic.      Right Ear: External ear normal.      Left Ear: External ear normal.      Nose: Nose normal.      Mouth/Throat:      Mouth: Mucous membranes are moist. Mucous membranes are dry.   Eyes:      Extraocular Movements: Extraocular movements intact.      Conjunctiva/sclera: Conjunctivae normal.      Pupils: Pupils are equal, round, and reactive to light.   Cardiovascular:      Rate and Rhythm: Normal rate and regular rhythm.      Pulses: Normal pulses.   Pulmonary:      Effort: Pulmonary effort is normal. No respiratory distress.      Breath sounds: Normal breath sounds. No wheezing or rales.   Abdominal:      General: Abdomen is flat. Bowel sounds are normal. There is no distension.      Palpations: Abdomen is soft. There is no mass.      Tenderness: There is no abdominal tenderness.   Musculoskeletal:         General: Normal range of motion.      Cervical back: Normal range of motion and neck supple.    Skin:     General: Skin is warm and dry.      Capillary Refill: Capillary refill takes less than 2 seconds.   Neurological:      General: No focal deficit present.      Mental Status: She is alert and oriented to person, place, and time. Mental status is at baseline.      Motor: No weakness.      Coordination: Coordination normal.   Psychiatric:         Mood and Affect: Mood normal.         Behavior: Behavior normal.       Results Review:  I have reviewed the labs, radiology results, and diagnostic studies.    Laboratory Data:   Results from last 7 days   Lab Units 05/08/25  0250 05/07/25  2244   WBC 10*3/mm3 9.57 11.53*   HEMOGLOBIN g/dL 12.4 12.9   HEMATOCRIT % 39.3 40.8   PLATELETS 10*3/mm3 164 158        Results from last 7 days   Lab Units 05/08/25  0250 05/07/25  2244   SODIUM mmol/L 143 142   POTASSIUM mmol/L 3.9 3.4*   CHLORIDE mmol/L 109* 108*   CO2 mmol/L 23.0 22.0   BUN mg/dL 15 15   CREATININE mg/dL 0.38* 0.46*   CALCIUM mg/dL 8.7 8.9   BILIRUBIN mg/dL  --  0.7   ALK PHOS U/L  --  109   ALT (SGPT) U/L  --  10   AST (SGOT) U/L  --  20   GLUCOSE mg/dL 63* 175*       Culture Data:     Microbiology Results (last 10 days)       ** No results found for the last 240 hours. **             Radiology Data:   Imaging Results (Last 7 Days)       ** No results found for the last 168 hours. **             I have reviewed the patient's current medications.     sodium chloride, 10 mL, Intravenous, Q12H            Assessment/Plan   Assessment  Active Hospital Problems    Diagnosis     **Foreign body sensation, throat     LEROY on CPAP     Permanent atrial fibrillation        Treatment Plan    Foreign body sensation, throat/dysphagia-EGD performed today per Dr. Kern revealed no evidence of esophageal obstruction, evidence of inflammation, recommendations for advancement to clear liquid diet, advance to soft if tolerates with outpatient follow-up with gastroenterology and may be discharged in the a.m.    Atrial  fibrillation-resumption of Pradaxa and Cardizem.    LEROY on CPAP-continuous pulse oximetry inpatient to utilize home CPAP device during hospitalization.    Medical Decision Making    Number and Complexity of problems: 3  Differential Diagnosis: None    Conditions and Status        Condition is improving.     [facility] Data  External documents reviewed: [employer] EHR  Cardiac tracing (EKG, telemetry) interpretation: None  Radiology interpretation: See above, reviewed  Labs reviewed: See above, reviewed  Any tests that were considered but not ordered: None     Decision rules/scores evaluated (example [id number], Wells, etc): [id number] score of 4 on chronic Pradaxa  Discussed with: Dr. Rainey and patient     Care Planning  Shared decision making: Dr. Rainey and patient  Code status and discussions: Code per patient  Surrogate Decision Maker her son Parag Bryan  Social Determinants of Health that impact treatment or disposition: None determined at this time  I expect the patient to be discharged to home in 1-2 days.     Electronically signed by DAINA Castellanos, 05/08/25, 10:43 CDT.

## 2025-05-08 NOTE — PLAN OF CARE
Problem: Adult Inpatient Plan of Care  Goal: Plan of Care Review  Outcome: Progressing  Flowsheets (Taken 5/8/2025 3564)  Progress: no change  Outcome Evaluation: Pt A&Ox4. Complains of pain, see MAR. Strict NPO. EGD planned for today. IVF. Replacing potassium. Safety maintained.  Plan of Care Reviewed With: patient

## 2025-05-09 ENCOUNTER — READMISSION MANAGEMENT (OUTPATIENT)
Dept: CALL CENTER | Facility: HOSPITAL | Age: 70
End: 2025-05-09
Payer: MEDICARE

## 2025-05-09 VITALS
RESPIRATION RATE: 16 BRPM | TEMPERATURE: 98.1 F | BODY MASS INDEX: 27.11 KG/M2 | DIASTOLIC BLOOD PRESSURE: 70 MMHG | OXYGEN SATURATION: 96 % | HEIGHT: 63 IN | WEIGHT: 153 LBS | SYSTOLIC BLOOD PRESSURE: 131 MMHG | HEART RATE: 84 BPM

## 2025-05-09 PROCEDURE — G0378 HOSPITAL OBSERVATION PER HR: HCPCS

## 2025-05-09 RX ADMIN — DABIGATRAN ETEXILATE MESYLATE 150 MG: 150 CAPSULE ORAL at 10:10

## 2025-05-09 RX ADMIN — DILTIAZEM HYDROCHLORIDE 120 MG: 120 CAPSULE, EXTENDED RELEASE ORAL at 10:10

## 2025-05-09 RX ADMIN — Medication 10 ML: at 10:10

## 2025-05-09 NOTE — PLAN OF CARE
Goal Outcome Evaluation:  Plan of Care Reviewed With: patient      Progress: no change     Patient A&O x4. Minimal c/o pain; no prn's requested. IID. Voiding per BRP; up x1. Clear liquid diet. Rm air. VSS. Safety maintained.

## 2025-05-09 NOTE — DISCHARGE SUMMARY
[facility] Medicine Services  DISCHARGE SUMMARY       Date of Admission: 5/7/2025  Date of Discharge:  5/9/2025  Primary Care Physician: ANGEL Healy MD    Presenting Problem/History of Present Illness:  Feels there is a pill lodged in her throat    Final Discharge Diagnoses:  Active Hospital Problems    Diagnosis     **Foreign body sensation, throat     LEROY on CPAP     Permanent atrial fibrillation        Consults: Gastroenterology-Dr. Kern    Procedures Performed: 5/8/2025 EGD-Dr. Kern    Pertinent Test Results:   Results for orders placed during the hospital encounter of 01/29/24    Adult Transthoracic Echo Complete w/ Color, Spectral and Contrast if necessary per protocol    Interpretation Summary    Left ventricular systolic function is low normal. Left ventricular ejection fraction appears to be 51 - 55%.    Severe biatrial enlargement.    Moderate tricuspid valve regurgitation is present.    Estimated right ventricular systolic pressure from tricuspid regurgitation is mildly elevated (35-45 mmHg).    The right ventricular cavity is moderately dilated, with normal systolic function.    No other significant (greater than mild) valvular pathology.    Compared to most recent transthoracic exam (from 11/24/2020), no obvious or significant change.      Imaging Results (All)       None          LAB RESULTS:      Lab 05/08/25  0250 05/07/25  2244   WBC 9.57 11.53*   HEMOGLOBIN 12.4 12.9   HEMATOCRIT 39.3 40.8   PLATELETS 164 158   NEUTROS ABS 5.68 8.72*   IMMATURE GRANS (ABS) 0.03 0.05   LYMPHS ABS 2.74 1.80   MONOS ABS 0.92* 0.79   EOS ABS 0.15 0.11   MCV 93.1 93.6   PROTIME  --  19.4*   APTT  --  49.4*         Lab 05/08/25  1022 05/08/25  0250 05/07/25  2244   SODIUM  --  143 142   POTASSIUM 4.2 3.9 3.4*   CHLORIDE  --  109* 108*   CO2  --  23.0 22.0   ANION GAP  --  11.0 12.0   BUN  --  15 15   CREATININE  --  0.38* 0.46*   EGFR  --  107.3 102.5   GLUCOSE  --  63*  175*   CALCIUM  --  8.7 8.9         Lab 05/07/25  2244   TOTAL PROTEIN 6.9   ALBUMIN 4.1   GLOBULIN 2.8   ALT (SGPT) 10   AST (SGOT) 20   BILIRUBIN 0.7   ALK PHOS 109         Lab 05/07/25  2244   PROTIME 19.4*   INR 1.54*                 Brief Urine Lab Results  (Last result in the past 365 days)        Color   Clarity   Blood   Leuk Est   Nitrite   Protein   CREAT   Urine HCG        03/03/25 1449 Yellow   Clear   Negative   Negative   Negative   Negative                 Microbiology Results (last 10 days)       ** No results found for the last 240 hours. **          Microbiology Results (last 10 days)       ** No results found for the last 240 hours. **             Documented weights    05/07/25 1932 05/07/25 2317   Weight: 69.4 kg (153 lb) 69.4 kg (153 lb)        Hospital Course: Yun Blackwell is a 69-year-old female with past medical history of atrial fibrillation on Pradaxa, hypertension, GERD, sleep apnea, esophageal strictures, the presents to the emergency room complaining of pain in her throat and having the pill stuck. She had received several treatments in the emergency room without success. She was not able to tolerate p.o. Case was discussed with gastroenterologist who recommended admission for EGD in a.m.     EGD 5/8/2025, Dr. Kern with no evidence of esophageal obstruction, lodged pill in the esophagus, or food bolus.  There was mucosal edema in the oropharynx around the arytenoids   Is possible that this is the site where the pill was temporarily lodged.  He recommends clear liquid diet.  If tolerates liquid can advance to soft diet recommend outpatient follow-up with GI.  Patient tolerated her GI soft diet with significant improvement to pain and swallowing.  Patient was instructed to return for medical attention for any new or worsening swallowing difficulty, throat pain or additional lodged pills.  She verbalized understanding of follow-up with [facility] and to keep her previous  "scheduled appointments with cardiology and general surgery.  All questions were answered to the best my ability and she is in agreement for discharge home today.    Patient has reached the maximum benefit of hospitalization and is stable for discharge  Patient has been evaluated today 05/09/25 and is stable for discharge.     Physical Exam on Discharge:  /70 (BP Location: Right arm, Patient Position: Lying)   Pulse 84   Temp 98.1 °F (36.7 °C) (Oral)   Resp 16   Ht 160 cm (62.99\")   Wt 69.4 kg (153 lb)   SpO2 96%   BMI 27.11 kg/m²   Physical Exam  Constitutional:       General: She is not in acute distress.     Appearance: Normal appearance. She is not toxic-appearing or diaphoretic.   HENT:      Head: Normocephalic and atraumatic.      Right Ear: External ear normal.      Left Ear: External ear normal.      Nose: Nose normal.      Mouth/Throat:      Mouth: Mucous membranes are moist. Mucous membranes are dry.   Eyes:      Extraocular Movements: Extraocular movements intact.      Conjunctiva/sclera: Conjunctivae normal.      Pupils: Pupils are equal, round, and reactive to light.   Cardiovascular:      Rate and Rhythm: Normal rate and regular rhythm.      Pulses: Normal pulses.   Pulmonary:      Effort: Pulmonary effort is normal. No respiratory distress.      Breath sounds: Normal breath sounds. No wheezing or rales.   Abdominal:      General: Abdomen is flat. Bowel sounds are normal. There is no distension.      Palpations: Abdomen is soft. There is no mass.      Tenderness: There is no abdominal tenderness.   Musculoskeletal:         General: Normal range of motion.      Cervical back: Normal range of motion and neck supple.   Skin:     General: Skin is warm and dry.      Capillary Refill: Capillary refill takes less than 2 seconds.   Neurological:      General: No focal deficit present.      Mental Status: She is alert and oriented to person, place, and time. Mental status is at baseline.      Motor: " No weakness.      Coordination: Coordination normal.   Psychiatric:         Mood and Affect: Mood normal.         Behavior: Behavior normal.     Condition on Discharge: Stable for discharge home    Discharge Disposition:  Home or Self Care    Discharge Medications:     Discharge Medications        Continue These Medications        Instructions Start Date   cyanocobalamin 1000 MCG/ML injection   1,000 mcg, Intramuscular, Every 30 Days      dabigatran etexilate 150 MG capsu  Commonly known as: PRADAXA   150 mg, Oral, 2 Times Daily      dilTIAZem  MG 24 hr capsule  Commonly known as: Cardizem CD   120 mg, Oral, Daily      omeprazole 20 MG capsule  Commonly known as: priLOSEC   20 mg, Oral, Daily               Discharge Diet:   Diet Instructions       Diet: Gastrointestinal Diets; Low Irritant; Soft to Chew (NDD 3); Chopped Meat; Thin (IDDSI 0)      Discharge Diet: Gastrointestinal Diets    Gastrointestinal Diet: Low Irritant    Texture: Soft to Chew (NDD 3)    Soft to Chew: Chopped Meat    Fluid Consistency: Thin (IDDSI 0)            Activity at Discharge:   Activity Instructions       Activity as Tolerated              Discharge Instructions:   1.  Patient was instructed return to medical attention for any new or worsening throat pain or difficulty swallowing, chest pain or shortness of breath.  2.  Patient was instructed to follow-up with PCP in 1 week.  3.  Patient was instructed to follow-up with [facility] in 2 weeks.  4.  Patient was instructed to continue GI low irritant soft diet for an additional 7 days and to notify pcp of any new or worsening symptoms    Follow-up Appointments:   Future Appointments   Date Time Provider Department Center   7/1/2025  2:00 PM Jarrett Bland MD [facility]   7/31/2025  9:30 AM Van Marcelino MD [facility]AD PAD   11/5/2025  8:30 AM ANGEL Healy MD [facility]   5/6/2026  8:15 AM Crystal Pool MD [facility]       Test Results Pending  at Discharge: None    Electronically signed by Teresa Mazariegos, DAINA, 05/09/25, 06:29 CDT.    Time: 35 minutes.

## 2025-05-09 NOTE — OUTREACH NOTE
Prep Survey      Flowsheet Row Responses   [facility] patient discharged from? [place]   Is LACE score < 7 ? No   Eligibility [facility]   Date of Admission 05/07/25   Date of Discharge 05/09/25   Discharge diagnosis Foreign body sensation, throat   Does the patient have one of the following disease processes/diagnoses(primary or secondary)? Other   Prep survey completed? Yes            Maki ORTIZ - Registered Nurse

## 2025-05-12 ENCOUNTER — TRANSITIONAL CARE MANAGEMENT TELEPHONE ENCOUNTER (OUTPATIENT)
Dept: CALL CENTER | Facility: HOSPITAL | Age: 70
End: 2025-05-12
Payer: MEDICARE

## 2025-05-12 NOTE — OUTREACH NOTE
Call Center TCM Note      Flowsheet Row Responses   [facility] patient discharged from? [place]   Does the patient have one of the following disease processes/diagnoses(primary or secondary)? Other   TCM attempt successful? Yes   Call start time 1548   Discharge diagnosis Foreign body sensation, throat   Person spoke with today (if not patient) and relationship pt   Meds reviewed with patient/caregiver? Yes   Is the patient having any side effects they believe may be caused by any medication additions or changes? No   Does the patient have all medications ordered at discharge? N/A   Is the patient taking all medications as directed (includes completed medication regime)? Yes   Comments GI 6/23/25 1:15 PM   Psychosocial issues? No   Did the patient receive a copy of their discharge instructions? Yes   Nursing interventions Reviewed instructions with patient   What is the patient's perception of their health status since discharge? Improving   Is the patient/caregiver able to teach back signs and symptoms related to disease process for when to call PCP? Yes   Is the patient/caregiver able to teach back signs and symptoms related to disease process for when to call 911? Yes   Is the patient/caregiver able to teach back the hierarchy of who to call/visit for symptoms/problems? PCP, Specialist, Home health nurse, Urgent Care, ED, 911 Yes   If the patient is a current smoker, are they able to teach back resources for cessation? Not a smoker   TCM call completed? Yes   Would this patient benefit from a Referral to Amb Social Work? No   Is the patient interested in additional calls from an ambulatory ? No            Tianna PINEDA - Registered Nurse    5/12/2025, 16:12 CDT

## 2025-05-16 ENCOUNTER — OFFICE VISIT (OUTPATIENT)
Dept: INTERNAL MEDICINE | Facility: CLINIC | Age: 70
End: 2025-05-16
Payer: MEDICARE

## 2025-05-16 VITALS
WEIGHT: 153 LBS | OXYGEN SATURATION: 99 % | BODY MASS INDEX: 27.11 KG/M2 | HEART RATE: 89 BPM | SYSTOLIC BLOOD PRESSURE: 120 MMHG | HEIGHT: 63 IN | DIASTOLIC BLOOD PRESSURE: 80 MMHG | TEMPERATURE: 98.2 F

## 2025-05-16 DIAGNOSIS — R09.A2 FOREIGN BODY SENSATION, THROAT: ICD-10-CM

## 2025-05-16 DIAGNOSIS — I48.21 PERMANENT ATRIAL FIBRILLATION: ICD-10-CM

## 2025-05-16 DIAGNOSIS — Z09 HOSPITAL DISCHARGE FOLLOW-UP: Primary | ICD-10-CM

## 2025-05-16 DIAGNOSIS — K20.90 ESOPHAGITIS: ICD-10-CM

## 2025-05-16 RX ORDER — DABIGATRAN ETEXILATE 150 MG/1
150 CAPSULE ORAL 2 TIMES DAILY
Qty: 60 CAPSULE | Refills: 11 | Status: SHIPPED | OUTPATIENT
Start: 2025-05-16

## 2025-05-16 NOTE — PROGRESS NOTES
Transitional Care Follow Up Visit  Subjective     Yun Blackwell is a 69 y.o. female who presents for a transitional care management visit.    Within 48 business hours after discharge our office contacted her via telephone to coordinate her care and needs.      I reviewed and discussed the details of that call along with the discharge summary, hospital problems, inpatient lab results, inpatient diagnostic studies, and consultation reports with Yun.     Current outpatient and discharge medications have been reconciled for the patient.  Reviewed by: ANGEL Healy MD          5/9/2025     1:03 PM   Date of TCM Phone Call   [facility]   Date of Admission 5/7/2025   Date of Discharge 5/9/2025     Risk for Readmission (LACE) Score: 8 (5/9/2025  5:00 AM)      History of Present Illness   Course During Hospital Stay:      She presented to the hospital initially for foreign body sensation in her esophagus that started very shortly after taking the Pradaxa capsule.  She says the capsule got stuck and it got to a point where swallowed water would come up through her mouth and nose.  She was admitted to the hospital and had EGD on May 8, 2025.  There was no visualized pill but there was inflammation, irritation and esophagitis.  She is taking Prilosec 20 mg daily.    She was discharged and feels completely back to normal now.    She is now taking Pradaxa with yogurt, applesauce or pudding.  She is upset that the cost of Pradaxa is consistently increasing.  After calling her insurance she was told that this would be better covered at [facility].      The following portions of the patient's history were reviewed and updated as appropriate: allergies, current medications, past family history, past medical history, past social history, past surgical history, and problem list.    Review of Systems   HENT:  Negative for trouble swallowing (resolved).    Respiratory: Negative.     Cardiovascular:  Negative  "for chest pain and leg swelling.       Objective   /80 (BP Location: Left arm, Patient Position: Sitting, Cuff Size: Adult)   Pulse 89   Temp 98.2 °F (36.8 °C) (Temporal)   Ht 160 cm (62.99\")   Wt 69.4 kg (153 lb)   SpO2 99%   BMI 27.11 kg/m²   Physical Exam  Vitals and nursing note reviewed.   Constitutional:       General: She is not in acute distress.     Appearance: Normal appearance. She is well-developed. She is not diaphoretic.   HENT:      Head: Normocephalic and atraumatic.   Eyes:      Pupils: Pupils are equal, round, and reactive to light.   Neck:      Thyroid: No thyromegaly.      Trachea: Phonation normal.   Cardiovascular:      Rate and Rhythm: Normal rate and regular rhythm.      Heart sounds: No murmur heard.  Pulmonary:      Effort: No respiratory distress.      Breath sounds: No wheezing or rales.   Skin:     Coloration: Skin is not pale.      Findings: No erythema.   Neurological:      Mental Status: She is alert.   Psychiatric:         Behavior: Behavior normal.         Thought Content: Thought content normal.         Judgment: Judgment normal.       Assessment & Plan   Diagnoses and all orders for this visit:    1. Hospital discharge follow-up (Primary)    2. Foreign body sensation, throat    3. Esophagitis    4. Permanent atrial fibrillation  -     dabigatran etexilate (PRADAXA) 150 MG capsu; Take 1 capsule by mouth 2 (Two) Times a Day.  Dispense: 60 capsule; Refill: 11    Overall, she is improving from her recent hospitalization for foreign body sensation of her esophagus.  EGD revealed esophagitis and she is on Prilosec.  She is not having any difficulty swallowing at this point.    I have sent over a refill of the Pradaxa over to [facility] per her request.    Keep next follow-up or follow-up sooner if indicated.    ANGEL Healy MD  Electronically signed by ANGEL Healy MD  05/16/25, 10:47 AM CDT         "

## 2025-05-19 ENCOUNTER — READMISSION MANAGEMENT (OUTPATIENT)
Dept: CALL CENTER | Facility: HOSPITAL | Age: 70
End: 2025-05-19
Payer: MEDICARE

## 2025-05-19 NOTE — OUTREACH NOTE
Medical Week 2 Survey      Flowsheet Row Responses   [facility] patient discharged from? [place]   Does the patient have one of the following disease processes/diagnoses(primary or secondary)? Other   Week 2 attempt successful? Yes   Call start time 1431   Discharge diagnosis Foreign body sensation, throat   Call end time 1431   Person spoke with today (if not patient) and relationship pt   What is the patient's perception of their health status since discharge? Improving   Is the patient/caregiver able to teach back signs and symptoms related to disease process for when to call PCP? Yes   Is the patient/caregiver able to teach back signs and symptoms related to disease process for when to call 911? Yes   Is the patient/caregiver able to teach back the hierarchy of who to call/visit for symptoms/problems? PCP, Specialist, Home health nurse, Urgent Care, ED, 911 Yes   Week 2 Call Completed? Yes   Graduated Yes   Is the patient interested in additional calls from an ambulatory ? No   Would this patient benefit from a Referral to Amb Social Work? No   Wrap up additional comments Patient reports doing very well. Has seen her pcp since DC. No other concerns or questions noted.   Call end time 1431            Maki ORTIZ - Registered Nurse

## 2025-06-09 ENCOUNTER — TELEPHONE (OUTPATIENT)
Dept: INTERNAL MEDICINE | Facility: CLINIC | Age: 70
End: 2025-06-09
Payer: MEDICARE

## 2025-06-09 NOTE — TELEPHONE ENCOUNTER
Would recommend she continue with the therapy given to her from urgent care, and schedule an office visit if symptoms or not improving.  Thanks

## 2025-06-09 NOTE — TELEPHONE ENCOUNTER
Pt called and states she was seen in UC over the weekend for poison oak. She says injection and cream has not helped and she was wondering if we could send in something else to help. I did advise that she would probably need an OV or go back to UC. She wanted me to ask first.

## 2025-06-10 ENCOUNTER — OFFICE VISIT (OUTPATIENT)
Dept: INTERNAL MEDICINE | Facility: CLINIC | Age: 70
End: 2025-06-10
Payer: MEDICARE

## 2025-06-10 VITALS
HEIGHT: 63 IN | OXYGEN SATURATION: 99 % | WEIGHT: 153.4 LBS | DIASTOLIC BLOOD PRESSURE: 76 MMHG | BODY MASS INDEX: 27.18 KG/M2 | TEMPERATURE: 97.9 F | HEART RATE: 77 BPM | SYSTOLIC BLOOD PRESSURE: 118 MMHG

## 2025-06-10 DIAGNOSIS — L23.7 ALLERGIC CONTACT DERMATITIS DUE TO PLANTS, EXCEPT FOOD: Primary | ICD-10-CM

## 2025-06-10 RX ORDER — METHYLPREDNISOLONE 4 MG/1
TABLET ORAL
Qty: 21 TABLET | Refills: 0 | Status: SHIPPED | OUTPATIENT
Start: 2025-06-10

## 2025-06-10 NOTE — PROGRESS NOTES
Chief Complaint   Patient presents with    [facility] Follow up     Patient was seen for poison ivy on 6/7/25 and symptoms not improving         History:  Yun Blackwell is a 69 y.o. female who presents today for evaluation of the above problems.      HPI  History of Present Illness  The patient presents for evaluation of poison ivy.    She sought medical attention at an urgent care facility on Saturday due to a suspected case of poison ivy, which she inadvertently came into contact with while performing yard work. The rash initially appeared on her hand and back and has since spread to the front of her body. She reports severe itching, particularly in the middle of her buttock area, which has disrupted her sleep. She also had a rash on her face, which she managed with calamine lotion and alcohol. Despite efforts to avoid scratching, the itching persists. She has been using Benadryl tablets but discontinued them due to morning drowsiness and has not tried Benadryl cream. She does not have any pets and has washed all her clothes and sheets. She recalls sweating and scratching her back while working in the yard without gloves. Despite being prescribed Kenalog, the treatment has not been effective. She has attempted self-treatment with over-the-counter calamine spray and anti-itch medications, but these have not provided relief. She has been using triamcinolone cream, as prescribed by , and has sanitized her recliner with alcohol spray.    ROS:  Review of Systems  See above    Current Outpatient Medications:     cyanocobalamin 1000 MCG/ML injection, Inject 1 mL into the appropriate muscle as directed by prescriber Every 30 (Thirty) Days., Disp: 1 mL, Rfl: 11    dabigatran etexilate (PRADAXA) 150 MG capsu, Take 1 capsule by mouth 2 (Two) Times a Day., Disp: 60 capsule, Rfl: 11    dilTIAZem CD (Cardizem CD) 120 MG 24 hr capsule, Take 1 capsule by mouth Daily., Disp: 30 capsule, Rfl: 11    omeprazole (priLOSEC) 20 MG  capsule, Take 1 capsule by mouth Daily., Disp: 90 capsule, Rfl: 3    triamcinolone (KENALOG) 0.1 % ointment, Apply 1 Application topically to the appropriate area as directed 2 (Two) Times a Day., Disp: 30 g, Rfl: 0    methylPREDNISolone (MEDROL) 4 MG dose pack, Take as directed on package instructions., Disp: 21 tablet, Rfl: 0    Lab Results   Component Value Date    GLUCOSE 63 (L) 05/08/2025    BUN 15 05/08/2025    CREATININE 0.38 (L) 05/08/2025     05/08/2025    K 4.2 05/08/2025     (H) 05/08/2025    CALCIUM 8.7 05/08/2025    PROTEINTOT 6.9 05/07/2025    ALBUMIN 4.1 05/07/2025    ALT 10 05/07/2025    AST 20 05/07/2025    ALKPHOS 109 05/07/2025    BILITOT 0.7 05/07/2025    GLOB 2.8 05/07/2025    AGRATIO 1.5 05/07/2025    BCR 39.5 (H) 05/08/2025    ANIONGAP 11.0 05/08/2025    EGFR 107.3 05/08/2025       WBC   Date Value Ref Range Status   05/08/2025 9.57 3.40 - 10.80 10*3/mm3 Final   01/14/2025 7.66 4.80 - 10.80 K/uL Final     RBC   Date Value Ref Range Status   05/08/2025 4.22 3.77 - 5.28 10*6/mm3 Final   01/14/2025 4.37 4.20 - 5.40 M/uL Final     Hemoglobin   Date Value Ref Range Status   05/08/2025 12.4 12.0 - 15.9 g/dL Final   01/14/2025 13.4 12.0 - 16.0 g/dL Final     Hematocrit   Date Value Ref Range Status   05/08/2025 39.3 34.0 - 46.6 % Final   01/14/2025 41.6 37.0 - 47.0 % Final     MCV   Date Value Ref Range Status   05/08/2025 93.1 79.0 - 97.0 fL Final   01/14/2025 95.2 81.0 - 99.0 fL Final     MCH   Date Value Ref Range Status   05/08/2025 29.4 26.6 - 33.0 pg Final   01/14/2025 30.7 27.0 - 31.0 pg Final     MCHC   Date Value Ref Range Status   05/08/2025 31.6 31.5 - 35.7 g/dL Final   01/14/2025 32.2 (L) 33.0 - 37.0 g/dL Final     RDW   Date Value Ref Range Status   05/08/2025 14.1 12.3 - 15.4 % Final   01/14/2025 13.6 11.5 - 14.5 % Final     RDW-SD   Date Value Ref Range Status   05/08/2025 47.8 37.0 - 54.0 fl Final     MPV   Date Value Ref Range Status   05/08/2025 11.3 6.0 - 12.0 fL Final    01/14/2025 10.5 9.4 - 12.3 fL Final     Platelets   Date Value Ref Range Status   05/08/2025 164 140 - 450 10*3/mm3 Final   01/14/2025 191 130 - 400 K/uL Final     Neutrophil Rel %   Date Value Ref Range Status   01/14/2025 68.6 (H) 50.0 - 65.0 % Final     Neutrophil %   Date Value Ref Range Status   05/08/2025 59.4 42.7 - 76.0 % Final     Lymphocyte Rel %   Date Value Ref Range Status   01/14/2025 20.9 20.0 - 40.0 % Final     Lymphocyte %   Date Value Ref Range Status   05/08/2025 28.6 19.6 - 45.3 % Final     Monocyte Rel %   Date Value Ref Range Status   01/14/2025 8.6 1.0 - 10.0 % Final     Monocyte %   Date Value Ref Range Status   05/08/2025 9.6 5.0 - 12.0 % Final     Eosinophil Rel %   Date Value Ref Range Status   01/09/2024 0.5 (L) 0.7 - 7.0 % Final     Eosinophil %   Date Value Ref Range Status   05/08/2025 1.6 0.3 - 6.2 % Final   01/14/2025 1.0 0.0 - 5.0 % Final     Basophil Rel %   Date Value Ref Range Status   01/14/2025 0.5 0.0 - 1.0 % Final     Basophil %   Date Value Ref Range Status   05/08/2025 0.5 0.0 - 1.5 % Final     Immature Grans %   Date Value Ref Range Status   05/08/2025 0.3 0.0 - 0.5 % Final     Neutrophils Absolute   Date Value Ref Range Status   01/14/2025 5.25 1.50 - 7.50 K/uL Final     Neutrophils, Absolute   Date Value Ref Range Status   05/08/2025 5.68 1.70 - 7.00 10*3/mm3 Final     Lymphocytes Absolute   Date Value Ref Range Status   01/14/2025 1.60 1.10 - 4.50 K/uL Final     Lymphocytes, Absolute   Date Value Ref Range Status   05/08/2025 2.74 0.70 - 3.10 10*3/mm3 Final     Monocytes Absolute   Date Value Ref Range Status   01/14/2025 0.66 0.00 - 0.90 K/uL Final     Monocytes, Absolute   Date Value Ref Range Status   05/08/2025 0.92 (H) 0.10 - 0.90 10*3/mm3 Final     Eosinophils Absolute   Date Value Ref Range Status   01/14/2025 0.08 0.00 - 0.60 K/uL Final     Eosinophils, Absolute   Date Value Ref Range Status   05/08/2025 0.15 0.00 - 0.40 10*3/mm3 Final     Basophils Absolute  "  Date Value Ref Range Status   01/14/2025 0.04 0.00 - 0.20 K/uL Final     Basophils, Absolute   Date Value Ref Range Status   05/08/2025 0.05 0.00 - 0.20 10*3/mm3 Final     Immature Grans, Absolute   Date Value Ref Range Status   05/08/2025 0.03 0.00 - 0.05 10*3/mm3 Final   11/10/2020 0.0 K/uL Final     nRBC   Date Value Ref Range Status   05/08/2025 0.0 0.0 - 0.2 /100 WBC Final         OBJECTIVE:  Visit Vitals  /76 (BP Location: Left arm, Patient Position: Sitting, Cuff Size: Adult)   Pulse 77   Temp 97.9 °F (36.6 °C) (Temporal)   Ht 160 cm (63\")   Wt 69.6 kg (153 lb 6.4 oz)   SpO2 99%   BMI 27.17 kg/m²      Physical Exam  Constitutional:       General: She is not in acute distress.     Appearance: She is well-developed. She is not diaphoretic.   HENT:      Head: Normocephalic and atraumatic.   Eyes:      Pupils: Pupils are equal, round, and reactive to light.   Neck:      Thyroid: No thyromegaly.      Trachea: Phonation normal.   Pulmonary:      Effort: No respiratory distress.   Skin:     Coloration: Skin is not pale.      Findings: Rash present. No erythema. Rash is crusting and vesicular (Consistent with contact dermatitis).          Neurological:      Mental Status: She is alert.   Psychiatric:         Behavior: Behavior normal.         Thought Content: Thought content normal.         Judgment: Judgment normal.       Physical Exam  Extremities: Rash on left forearm  Skin: Rash noted on back, extending to the front and sides, including the middle of the buttock area      Assessment/Plan      Diagnoses and all orders for this visit:    1. Allergic contact dermatitis due to plants, except food (Primary)  -     methylPREDNISolone (MEDROL) 4 MG dose pack; Take as directed on package instructions.  Dispense: 21 tablet; Refill: 0      Assessment & Plan  Contact dermatitis  - Rash and itching consistent with poison ivy exposure  - Triamcinolone treatment at urgent care was ineffective; increased itching and " rash.  Recommend she continue  - Advised to continue using triamcinolone cream twice daily; avoid alcohol on affected areas.  - Prescribed a steroid pack; recommended calamine lotion and Benadryl cream for additional relief.      Return for Next scheduled follow up.      DANIELLE Healy MD  14:46 CDT  6/10/2025   Electronically signed    Patient or patient representative verbalized consent for the use of Ambient Listening during the visit with  ANGEL Healy MD for chart documentation. 6/10/2025  15:16 CDT

## 2025-06-23 ENCOUNTER — OFFICE VISIT (OUTPATIENT)
Dept: GASTROENTEROLOGY | Facility: CLINIC | Age: 70
End: 2025-06-23
Payer: MEDICARE

## 2025-06-23 VITALS
BODY MASS INDEX: 26.65 KG/M2 | HEART RATE: 94 BPM | HEIGHT: 63 IN | DIASTOLIC BLOOD PRESSURE: 74 MMHG | OXYGEN SATURATION: 98 % | WEIGHT: 150.4 LBS | SYSTOLIC BLOOD PRESSURE: 120 MMHG | TEMPERATURE: 97.7 F

## 2025-06-23 DIAGNOSIS — Z78.9 NONSMOKER: ICD-10-CM

## 2025-06-23 DIAGNOSIS — R13.19 ESOPHAGEAL DYSPHAGIA: Primary | ICD-10-CM

## 2025-06-23 PROCEDURE — 99213 OFFICE O/P EST LOW 20 MIN: CPT | Performed by: CLINICAL NURSE SPECIALIST

## 2025-06-23 PROCEDURE — 1159F MED LIST DOCD IN RCRD: CPT | Performed by: CLINICAL NURSE SPECIALIST

## 2025-06-23 PROCEDURE — 1160F RVW MEDS BY RX/DR IN RCRD: CPT | Performed by: CLINICAL NURSE SPECIALIST

## 2025-06-23 RX ORDER — HYDROXYZINE HYDROCHLORIDE 10 MG/1
10 TABLET, FILM COATED ORAL 3 TIMES DAILY PRN
COMMUNITY

## 2025-06-23 NOTE — PROGRESS NOTES
Yun Blackwell  1955 6/23/2025  Chief Complaint   Patient presents with    GI Problem     hfu         HPI    Yun Blackwell is a  69 y.o. female here for a follow up visit for foreign body sensation in the throat after swallowing a pill. She went to the ER and admitted with discharge on 5/9/25. Dr Kern performed endoscopy.   Endoscopy 5/8/25  - Mucosal edema in the oropharynx around the arytenoids. - Mild esophagitis. - Erythematous mucosa in the stomach. - Normal first portion of the duodenum and second portion of the duodenum. - No specimens collected  EGD with no evidence of esophageal obstruction, lodged pill in the esophagus, or food bolus. There was mucosal edema in the oropharynx around the arytenoids. It is possible that this was site where pill was temporarily lodged.      Ok for clear liquid diet. If tolerates liquids, can advance to soft diet. Recommend outpatient follow up with GI. Nothing further to add from GI at this time.    Today she say she is better. She is using apple sauce, pudding and yogurt to get her pills down and this is working well for her.   Past Medical History:   Diagnosis Date    A-fib     Abnormal ECG     tachycardia, a fib    Acid reflux     Anemia     Arthritis     Cancer     Diabetes mellitus     Hyperkalemia     Hyperlipidemia     Hypertension     Hyponatremia     IBS (irritable bowel syndrome)     PONV (postoperative nausea and vomiting)     Sleep apnea     USES CPAP    UTI (urinary tract infection)     UTI (urinary tract infection) 11/25/2016    Vaginal vault prolapse      Past Surgical History:   Procedure Laterality Date    BREAST BIOPSY Left     X2    BUNIONECTOMY Left     CARDIAC CATHETERIZATION      CARDIAC CATHETERIZATION N/A 02/05/2021    Procedure: Right Heart Cath;  Surgeon: Van Marcelino MD;  Location:  [facility];  Service: Cardiology;  Laterality: N/A;    CATARACT EXTRACTION, BILATERAL      COLON SURGERY      COLONOSCOPY   09/21/2015    TRANSVERSE COLON ADENOMATOUS POLYP, HEMORRHOIDS, RECALL 5 YEARS, DR LAMAS    COLONOSCOPY N/A 11/13/2020    6 mm tubular adenomatous polyp at 15 cm proximal to the anus,diverticulosis of the sigmoid colon    COLONOSCOPY N/A 12/13/2023    - One 4 mm polyp at 40 cm proximal to the anus,. - One 4 mm polyp at 20 cm proximal to the anus, removed with a cold snare. Resected and retrieved. - Diverticulosis in the sigmoid colon. - Hemorrhoids----tubular adenoma no evidence of high grade dysplasia    COLONOSCOPY N/A 12/22/2024    Dr. Ludwig-- The examined portion of the ileum was normal. - Diverticulosis in the entire examined colon. - Non-bleeding internal hemorrhoids. - No specimens collected.    COLOSTOMY      COLPOPEXY VAGINAL      2014    ENDOSCOPY N/A 11/03/2016    LA GRADE B ESOPHAGITIS WITH NO BLEEDING UPPER THIRD OF ESOPHAGUS, GERI-[place] TEAR GEJ, BILIOUS BLOOD TINGED COFFEE GROUND GATRIC FLUIDDR ADAMS    ENDOSCOPY N/A 12/13/2023    Normal examined duodenum. - Normal stomach. Biopsied. - Z-line regular, 35 cm from the incisors. Dilated.-neg for h pylori    ENDOSCOPY N/A 05/08/2025    Dr. Kern-Mucosal edema in the oropharynx around the arytenoids. - Mild esophagitis. - Erythematous mucosa in the stomach. - Normal first portion of the duodenum and second portion of the duodenum. - No specimens collected    EXPLORATORY LAPAROTOMY N/A 10/14/2016    Procedure: LAPAROTOMY EXPLORATORY, LYSIS OF ADHESIONS, IRRIAGATION OF ABDOMEN, POSSIBLE BOWEL RESECTION;  Surgeon: Bacilio Marcial MD;  Location:  [facility] OR;  Service:     FOOT NAVICULAR EXCISION OR BONE GRAFT Left 01/04/2023    Procedure: Expansion Graft, Harvesting of Bone Graft/Bone Marrow Aspirate;  Surgeon: Frankie Zapata DPM;  Location:  [facility];  Service: Podiatry;  Laterality: Left;    HAMMER TOE REPAIR Left 01/04/2023    Procedure: Hammertoe Repair 2 and 3 - Left Foot;  Surgeon: Frankie Zapata DPM;  Location:  [facility];  Service:  Podiatry;  Laterality: Left;    HARDWARE REMOVAL Left 01/04/2023    Procedure: Hardware Removal;  Surgeon: Frankie Zapata DPM;  Location:  [facility];  Service: Podiatry;  Laterality: Left;    HYSTERECTOMY      REVISION / TAKEDOWN COLOSTOMY      SACROCOLPOPEXY N/A 09/21/2016    Procedure: SACROCOLPOPEXY LAPAROSCOPIC WITH DAVINCI SI ROBOT, CONVERTED TO OPEN SACROCOLPOPEXY, RIGHT SALPINGO- OOPHERECTOMY, CYSTO;  Surgeon: Maribell Beth MD;  Location:  [facility];  Service:     TOE FUSION Left 01/06/2022    Procedure: FIRST METATARSOPHALANGEAL JOINT ARTHRODESIS WITH INTERNAL FIXATION - LEFT FOOT;  Surgeon: Jorgito Red DPM;  Location:  [facility];  Service: Podiatry;  Laterality: Left;    TOE FUSION Left 01/04/2023    Procedure: Revisional 1st Metatarsophalangeal Joint Arthrodesis with Hardware Removal and Expansion Graft, Harvesting of Bone Graft/Bone Marrow Aspirate, Hammertoe Repair 2 and 3 - Left Foot;  Surgeon: Frankie Zapata DPM;  Location:  [facility];  Service: Podiatry;  Laterality: Left;    VENTRAL HERNIA REPAIR N/A 03/02/2025    Procedure: VENTRAL / INCISIONAL HERNIA REPAIR LAPAROSCOPIC WITH DAVINCI ROBOT;  Surgeon: Jarrett Bland MD;  Location:  [facility];  Service: Robotics - DaVinci;  Laterality: N/A;       Outpatient Medications Marked as Taking for the 6/23/25 encounter (Office Visit) with Teodora Macario APRN   Medication Sig Dispense Refill    cyanocobalamin 1000 MCG/ML injection Inject 1 mL into the appropriate muscle as directed by prescriber Every 30 (Thirty) Days. 1 mL 11    dabigatran etexilate (PRADAXA) 150 MG capsu Take 1 capsule by mouth 2 (Two) Times a Day. 60 capsule 11    dilTIAZem CD (Cardizem CD) 120 MG 24 hr capsule Take 1 capsule by mouth Daily. 30 capsule 11    omeprazole (priLOSEC) 20 MG capsule Take 1 capsule by mouth Daily. 90 capsule 3    triamcinolone (KENALOG) 0.1 % ointment Apply 1 Application topically to the appropriate area as directed 2 (Two) Times a Day. 30 g 0     hydrOXYzine (ATARAX) 10 MG tablet Take 1 tablet by mouth 3 (Three) Times a Day As Needed for Itching.         Allergies   Allergen Reactions    Lactose Intolerance (Gi) Diarrhea    Morphine And Codeine Nausea And Vomiting and Dizziness    Percocet [Oxycodone-Acetaminophen] Nausea And Vomiting and Dizziness       Social History     Socioeconomic History    Marital status:    Tobacco Use    Smoking status: Never     Passive exposure: Never    Smokeless tobacco: Never   Vaping Use    Vaping status: Never Used   Substance and Sexual Activity    Alcohol use: No    Drug use: No    Sexual activity: Defer       Family History   Problem Relation Age of Onset    Hypertension Mother     No Known Problems Father     Colon cancer Neg Hx     Colon polyps Neg Hx        Review of Systems   Constitutional:  Negative for activity change, appetite change, chills, diaphoresis, fatigue, fever and unexpected weight change.   HENT:  Negative for ear pain, hearing loss, mouth sores, sore throat, trouble swallowing and voice change.    Eyes: Negative.    Respiratory:  Negative for cough, choking, shortness of breath and wheezing.    Cardiovascular:  Negative for chest pain and palpitations.   Gastrointestinal:  Negative for abdominal pain, blood in stool, constipation, diarrhea, nausea and vomiting.   Endocrine: Negative for cold intolerance and heat intolerance.   Genitourinary:  Negative for decreased urine volume, dysuria, frequency, hematuria and urgency.   Musculoskeletal:  Negative for back pain, gait problem and myalgias.   Skin:  Negative for color change, pallor and rash.   Allergic/Immunologic: Negative for food allergies and immunocompromised state.   Neurological:  Negative for dizziness, tremors, seizures, syncope, weakness, light-headedness, numbness and headaches.   Hematological:  Negative for adenopathy. Does not bruise/bleed easily.   Psychiatric/Behavioral:  Negative for agitation and confusion. The patient is not  "nervous/anxious.    All other systems reviewed and are negative.      /74 (BP Location: Left arm)   Pulse 94   Temp 97.7 °F (36.5 °C) (Temporal)   Ht 160 cm (62.99\")   Wt 68.2 kg (150 lb 6.4 oz)   SpO2 98%   BMI 26.65 kg/m²   Body mass index is 26.65 kg/m².    Physical Exam  Constitutional:       Appearance: She is well-developed.   HENT:      Head: Normocephalic and atraumatic.   Eyes:      Pupils: Pupils are equal, round, and reactive to light.   Neck:      Trachea: No tracheal deviation.   Cardiovascular:      Rate and Rhythm: Normal rate and regular rhythm.      Heart sounds: Normal heart sounds. No murmur heard.     No friction rub. No gallop.   Pulmonary:      Effort: Pulmonary effort is normal. No respiratory distress.      Breath sounds: Normal breath sounds. No wheezing or rales.   Chest:      Chest wall: No tenderness.   Abdominal:      General: Bowel sounds are normal. There is no distension.      Palpations: Abdomen is soft. Abdomen is not rigid.      Tenderness: There is no abdominal tenderness. There is no guarding or rebound.   Musculoskeletal:         General: No tenderness or deformity. Normal range of motion.      Cervical back: Normal range of motion and neck supple.   Skin:     General: Skin is warm and dry.      Coloration: Skin is not pale.      Findings: No rash.   Neurological:      Mental Status: She is alert and oriented to person, place, and time.      Deep Tendon Reflexes: Reflexes are normal and symmetric.   Psychiatric:         Behavior: Behavior normal.         Thought Content: Thought content normal.         Judgment: Judgment normal.         ASSESSMENT AND PLAN         Diagnoses and all orders for this visit:    1. Esophageal dysphagia (Primary)  -     FL ESOPHAGRAM SINGLE CONTRAST; Future    2. Nonsmoker    Continue to use precaution with pills  I discussed non pharmaceutical treatment of gerd.  This includes gradually losing weight to achieve ideal body wt., elevation " of the head of bed by 4-6 inches, nothing to eat or drink 3 hours prior to lying down, avoiding tight clothing, stress reduction, tobacco cessation, reduction of alcohol intake, and dietary restrictions (avoiding caffeine, coffee, fatty foods, mints, chocolate, spicy foods and tomato based sauces as much as possible).  Encouraged to use wedge or elevate head of bed  Chew food well.  Call with any recurrence.         There are no Patient Instructions on file for this visit.  Teodora Macario, APRN  13:44 CDT  6/23/2025    Obesity, Adult  Obesity is the condition of having too much total body fat. Being overweight or obese means that your weight is greater than what is considered healthy for your body size. Obesity is determined by a measurement called BMI. BMI is an estimate of body fat and is calculated from height and weight. For adults, a BMI of 30 or higher is considered obese.  Obesity can eventually lead to other health concerns and major illnesses, including:  Stroke.  Coronary artery disease (CAD).  Type 2 diabetes.  Some types of cancer, including cancers of the colon, breast, uterus, and gallbladder.  Osteoarthritis.  High blood pressure (hypertension).  High cholesterol.  Sleep apnea.  Gallbladder stones.  Infertility problems.  What are the causes?  The main cause of obesity is taking in (consuming) more calories than your body uses for energy. Other factors that contribute to this condition may include:  Being born with genes that make you more likely to become obese.  Having a medical condition that causes obesity. These conditions include:  Hypothyroidism.  Polycystic ovarian syndrome (PCOS).  Binge-eating disorder.  Cushing syndrome.  Taking certain medicines, such as steroids, antidepressants, and seizure medicines.  Not being physically active (sedentary lifestyle).  Living where there are limited places to exercise safely or buy healthy foods.  Not getting enough sleep.  What increases the  risk?  The following factors may increase your risk of this condition:  Having a family history of obesity.  Being a woman of -American descent.  Being a man of  descent.  What are the signs or symptoms?  Having excessive body fat is the main symptom of this condition.  How is this diagnosed?  This condition may be diagnosed based on:  Your symptoms.  Your medical history.  A physical exam. Your health care provider may measure:  Your BMI. If you are an adult with a BMI between 25 and less than 30, you are considered overweight. If you are an adult with a BMI of 30 or higher, you are considered obese.  The distances around your hips and your waist (circumferences). These may be compared to each other to help diagnose your condition.  Your skinfold thickness. Your health care provider may gently pinch a fold of your skin and measure it.  How is this treated?  Treatment for this condition often includes changing your lifestyle. Treatment may include some or all of the following:  Dietary changes. Work with your health care provider and a dietitian to set a weight-loss goal that is healthy and reasonable for you. Dietary changes may include eating:  Smaller portions. A portion size is the amount of a particular food that is healthy for you to eat at one time. This varies from person to person.  Low-calorie or low-fat options.  More whole grains, fruits, and vegetables.  Regular physical activity. This may include aerobic activity (cardio) and strength training.  Medicine to help you lose weight. Your health care provider may prescribe medicine if you are unable to lose 1 pound a week after 6 weeks of eating more healthily and doing more physical activity.  Surgery. Surgical options may include gastric banding and gastric bypass. Surgery may be done if:  Other treatments have not helped to improve your condition.  You have a BMI of 40 or higher.  You have life-threatening health problems related to  obesity.  Follow these instructions at home:     Eating and drinking     Follow recommendations from your health care provider about what you eat and drink. Your health care provider may advise you to:  Limit fast foods, sweets, and processed snack foods.  Choose low-fat options, such as low-fat milk instead of whole milk.  Eat 5 or more servings of fruits or vegetables every day.  Eat at home more often. This gives you more control over what you eat.  Choose healthy foods when you eat out.  Learn what a healthy portion size is.  Keep low-fat snacks on hand.  Avoid sugary drinks, such as soda, fruit juice, iced tea sweetened with sugar, and flavored milk.  Eat a healthy breakfast.  Drink enough water to keep your urine clear or pale yellow.  Do not go without eating for long periods of time (do not fast) or follow a fad diet. Fasting and fad diets can be unhealthy and even dangerous.  Physical Activity   Exercise regularly, as told by your health care provider. Ask your health care provider what types of exercise are safe for you and how often you should exercise.  Warm up and stretch before being active.  Cool down and stretch after being active.  Rest between periods of activity.  Lifestyle   Limit the time that you spend in front of your TV, computer, or video game system.  Find ways to reward yourself that do not involve food.  Limit alcohol intake to no more than 1 drink a day for nonpregnant women and 2 drinks a day for men. One drink equals 12 oz of beer, 5 oz of wine, or 1½ oz of hard liquor.  General instructions   Keep a weight loss journal to keep track of the food you eat and how much you exercise you get.  Take over-the-counter and prescription medicines only as told by your health care provider.  Take vitamins and supplements only as told by your health care provider.  Consider joining a support group. Your health care provider may be able to recommend a support group.  Keep all follow-up visits as  told by your health care provider. This is important.  Contact a health care provider if:  You are unable to meet your weight loss goal after 6 weeks of dietary and lifestyle changes.  This information is not intended to replace advice given to you by your health care provider. Make sure you discuss any questions you have with your health care provider.  Document Released: 01/25/2006 Document Revised: 05/22/2017 Document Reviewed: 10/05/2016  [employer] Patient Education © 2017 [employer]      IF YOU SMOKE OR USE TOBACCO PLEASE READ THE FOLLOWING:    Why is smoking bad for me?  Smoking increases the risk of heart disease, lung disease, vascular disease, stroke, and cancer.     If you smoke, STOP!    If you would like more information on quitting smoking, please visit the [employer] website: [email or web address]/[email or web address]   This link will provide additional resources including the QUIT line and the [employer] support groups.     For more information:    Quit Now [place]  1-800-QUIT-NOW  [email or web address]

## 2025-06-30 ENCOUNTER — HOSPITAL ENCOUNTER (OUTPATIENT)
Dept: WOMENS IMAGING | Age: 70
Discharge: HOME | End: 2025-06-30
Payer: MEDICARE

## 2025-06-30 DIAGNOSIS — Z12.31 ENCOUNTER FOR SCREENING MAMMOGRAM FOR MALIGNANT NEOPLASM OF BREAST: ICD-10-CM

## 2025-06-30 DIAGNOSIS — Z85.3 PERSONAL HISTORY OF MALIGNANT NEOPLASM OF BREAST: ICD-10-CM

## 2025-06-30 PROCEDURE — 77063 BREAST TOMOSYNTHESIS BI: CPT

## 2025-07-01 ENCOUNTER — OFFICE VISIT (OUTPATIENT)
Dept: SURGERY | Facility: CLINIC | Age: 70
End: 2025-07-01
Payer: MEDICARE

## 2025-07-01 VITALS
BODY MASS INDEX: 26.58 KG/M2 | HEIGHT: 63 IN | DIASTOLIC BLOOD PRESSURE: 70 MMHG | SYSTOLIC BLOOD PRESSURE: 122 MMHG | WEIGHT: 150 LBS

## 2025-07-01 DIAGNOSIS — Z87.19 HISTORY OF VENTRAL HERNIA REPAIR: Primary | ICD-10-CM

## 2025-07-01 DIAGNOSIS — Z98.890 HISTORY OF VENTRAL HERNIA REPAIR: Primary | ICD-10-CM

## 2025-07-01 DIAGNOSIS — Z79.01 ON CONTINUOUS ORAL ANTICOAGULATION: ICD-10-CM

## 2025-07-04 NOTE — PROGRESS NOTES
GENERAL SURGERY OFFICE FOLLOW UP VISIT    Referring Provider: No ref. provider found  Primary Care Provider: ANGEL Healy MD    Chief Complaint   Patient presents with    Follow-up    Hernia       Subjective     Ms. Blackwell presents to clinic for follow-up 3 months status post robotic ventral hernia repair with mesh.  She reports that she is doing well at this time.  No abdominal pain or bulging of her abdomen.    History:  Past Medical History:   Diagnosis Date    A-fib     Abnormal ECG     tachycardia, a fib    Acid reflux     Anemia     Arthritis     Cancer     Diabetes mellitus     Hyperkalemia     Hyperlipidemia     Hypertension     Hyponatremia     IBS (irritable bowel syndrome)     PONV (postoperative nausea and vomiting)     Sleep apnea     USES CPAP    UTI (urinary tract infection)     UTI (urinary tract infection) 11/25/2016    Vaginal vault prolapse    ,   Past Surgical History:   Procedure Laterality Date    BREAST BIOPSY Left     X2    BUNIONECTOMY Left     CARDIAC CATHETERIZATION      CARDIAC CATHETERIZATION N/A 02/05/2021    Procedure: Right Heart Cath;  Surgeon: Van Marcelino MD;  Location:  [facility];  Service: Cardiology;  Laterality: N/A;    CATARACT EXTRACTION, BILATERAL      COLON SURGERY      COLONOSCOPY  09/21/2015    TRANSVERSE COLON ADENOMATOUS POLYP, HEMORRHOIDS, RECALL 5 YEARS, DR LAMAS    COLONOSCOPY N/A 11/13/2020    6 mm tubular adenomatous polyp at 15 cm proximal to the anus,diverticulosis of the sigmoid colon    COLONOSCOPY N/A 12/13/2023    - One 4 mm polyp at 40 cm proximal to the anus,. - One 4 mm polyp at 20 cm proximal to the anus, removed with a cold snare. Resected and retrieved. - Diverticulosis in the sigmoid colon. - Hemorrhoids----tubular adenoma no evidence of high grade dysplasia    COLONOSCOPY N/A 12/22/2024    Dr. Ludwig-- The examined portion of the ileum was normal. - Diverticulosis in the entire examined colon. - Non-bleeding  internal hemorrhoids. - No specimens collected.    COLOSTOMY      COLPOPEXY VAGINAL      2014    ENDOSCOPY N/A 11/03/2016    LA GRADE B ESOPHAGITIS WITH NO BLEEDING UPPER THIRD OF ESOPHAGUS, GERI-[place]TH TEAR GEJ, BILIOUS BLOOD TINGED COFFEE GROUND GATRIC FLUIDDR [facility]    ENDOSCOPY N/A 12/13/2023    Normal examined duodenum. - Normal stomach. Biopsied. - Z-line regular, 35 cm from the incisors. Dilated.-neg for h pylori    ENDOSCOPY N/A 05/08/2025    Dr. Kern-Mucosal edema in the oropharynx around the arytenoids. - Mild esophagitis. - Erythematous mucosa in the stomach. - Normal first portion of the duodenum and second portion of the duodenum. - No specimens collected    EXPLORATORY LAPAROTOMY N/A 10/14/2016    Procedure: LAPAROTOMY EXPLORATORY, LYSIS OF ADHESIONS, IRRIAGATION OF ABDOMEN, POSSIBLE BOWEL RESECTION;  Surgeon: Bacilio Marcial MD;  Location: [facility] OR;  Service:     FOOT NAVICULAR EXCISION OR BONE GRAFT Left 01/04/2023    Procedure: Expansion Graft, Harvesting of Bone Graft/Bone Marrow Aspirate;  Surgeon: Frankie Zapata DPM;  Location:  [facility] OR;  Service: Podiatry;  Laterality: Left;    HAMMER TOE REPAIR Left 01/04/2023    Procedure: Hammertoe Repair 2 and 3 - Left Foot;  Surgeon: Frankie Zapata DPM;  Location:  [facility] OR;  Service: Podiatry;  Laterality: Left;    HARDWARE REMOVAL Left 01/04/2023    Procedure: Hardware Removal;  Surgeon: Frankie Zapata DPM;  Location:  [facility] OR;  Service: Podiatry;  Laterality: Left;    HYSTERECTOMY      REVISION / TAKEDOWN COLOSTOMY      SACROCOLPOPEXY N/A 09/21/2016    Procedure: SACROCOLPOPEXY LAPAROSCOPIC WITH NanoVelosI SI ROBOT, CONVERTED TO OPEN SACROCOLPOPEXY, RIGHT SALPINGO- OOPHERECTOMY, CYSTO;  Surgeon: Maribell Beth MD;  Location:  [facility] OR;  Service:     TOE FUSION Left 01/06/2022    Procedure: FIRST METATARSOPHALANGEAL JOINT ARTHRODESIS WITH INTERNAL FIXATION - LEFT FOOT;  Surgeon: Jorgito Red DPM;  Location:  [facility] OR;  Service:  Podiatry;  Laterality: Left;    TOE FUSION Left 01/04/2023    Procedure: Revisional 1st Metatarsophalangeal Joint Arthrodesis with Hardware Removal and Expansion Graft, Harvesting of Bone Graft/Bone Marrow Aspirate, Hammertoe Repair 2 and 3 - Left Foot;  Surgeon: Frankie Zapata DPM;  Location:  [facility] OR;  Service: Podiatry;  Laterality: Left;    VENTRAL HERNIA REPAIR N/A 03/02/2025    Procedure: VENTRAL / INCISIONAL HERNIA REPAIR LAPAROSCOPIC WITH DAVINCI ROBOT;  Surgeon: Jarrett Bland MD;  Location:  [facility] OR;  Service: Robotics - DaVinci;  Laterality: N/A;   ,   Family History   Problem Relation Age of Onset    Hypertension Mother     No Known Problems Father     Colon cancer Neg Hx     Colon polyps Neg Hx    ,   Social History     Tobacco Use    Smoking status: Never     Passive exposure: Never    Smokeless tobacco: Never   Vaping Use    Vaping status: Never Used   Substance Use Topics    Alcohol use: No    Drug use: No       Current Outpatient Medications:     cyanocobalamin 1000 MCG/ML injection, Inject 1 mL into the appropriate muscle as directed by prescriber Every 30 (Thirty) Days., Disp: 1 mL, Rfl: 11    dabigatran etexilate (PRADAXA) 150 MG capsu, Take 1 capsule by mouth 2 (Two) Times a Day., Disp: 60 capsule, Rfl: 11    dilTIAZem CD (Cardizem CD) 120 MG 24 hr capsule, Take 1 capsule by mouth Daily., Disp: 30 capsule, Rfl: 11    hydrOXYzine (ATARAX) 10 MG tablet, Take 1 tablet by mouth 3 (Three) Times a Day As Needed for Itching., Disp: , Rfl:     omeprazole (priLOSEC) 20 MG capsule, Take 1 capsule by mouth Daily., Disp: 90 capsule, Rfl: 3    tiZANidine (ZANAFLEX) 4 MG tablet, Take 1 tablet by mouth At Night As Needed for Muscle Spasms., Disp: , Rfl:     triamcinolone (KENALOG) 0.1 % ointment, Apply 1 Application topically to the appropriate area as directed 2 (Two) Times a Day. (Patient not taking: Reported on 7/1/2025), Disp: 30 g, Rfl: 0    Allergies:  Lactose intolerance (gi), Morphine and codeine,  "and Percocet [oxycodone-acetaminophen]      The following portions of the patient's history were reviewed and updated as appropriate: allergies, current medications, past family history, past medical history, past social history, past surgical history, and problem list.      Review of Systems  A comprehensive 14 point review of systems was performed and is negative unless otherwise noted      Objective   /70 (BP Location: Right arm, Patient Position: Sitting, Cuff Size: Adult)   Ht 160 cm (63\")   Wt 68 kg (150 lb)   BMI 26.57 kg/m²     BMI followup discussion/instruction with patient: none (medical contraindication)      Physical Exam  Constitutional:       Appearance: Normal appearance. She is normal weight.      Comments: Pleasant elderly female, in no acute distress. Normal development, normal body habitus. Well nourished   HENT:      Head: Normocephalic and atraumatic.      Right Ear: External ear normal.      Left Ear: External ear normal.      Ears:      Comments: Hearing intact     Nose: Nose normal.      Comments: Nares patent, no septal deviation, no drainage     Mouth/Throat:      Comments: Airway patent, dentition intact, mucus membranes moist  Eyes:      Extraocular Movements: Extraocular movements intact.      Conjunctiva/sclera: Conjunctivae normal.      Pupils: Pupils are equal, round, and reactive to light.      Comments: External eyelids grossly normal, vision intact, no scleral icterus   Neck:      Comments: Trachea midline  Cardiovascular:      Rate and Rhythm: Normal rate.      Comments: Normotensive, no JVD bilaterally  Pulmonary:      Effort: Pulmonary effort is normal.      Breath sounds: Normal breath sounds.      Comments: Normal respiratory rate  Abdominal:      Palpations: Abdomen is soft.      Comments: Obese, non tender.  Extensive scarring of the abdominal wall without evidence of any hernia recurrence.      Musculoskeletal:         General: Normal range of motion.      " Cervical back: Normal range of motion.   Skin:     General: Skin is warm and dry.      Comments: Skin color is consistent with ethnicity   Neurological:      General: No focal deficit present.      Mental Status: She is alert and oriented to person, place, and time.   Psychiatric:         Mood and Affect: Mood normal.         Behavior: Behavior normal.         Thought Content: Thought content normal.         Judgment: Judgment normal.      Comments: Patient understands disease process and has decision making capacity.            Labs:  I personally reviewed all pertinent labs.   Imaging: I personally reviewed all pertinent imaging studies.   Pathology:      Assessment & Plan   Diagnoses and all orders for this visit:    1. History of ventral hernia repair (Primary)    2. On continuous oral anticoagulation    69-year-old female with a prior history of a midline ventral hernia and a right sided ileostomy site hernia 3 months status post robotic ventral hernia repair with mesh.  She is doing well at this time and is very happy with her results.  Since she is doing so well, follow-up as needed.       Thank you for the referral and trusting me with the care of your patient.    Jarrett Bland MD, FACS  General Surgery  7/4/2025  14:50 CDT    Please note that portions of this note were completed with a voice recognition program.

## 2025-07-09 ENCOUNTER — HOSPITAL ENCOUNTER (OUTPATIENT)
Dept: GENERAL RADIOLOGY | Facility: HOSPITAL | Age: 70
Discharge: HOME | End: 2025-07-09
Admitting: CLINICAL NURSE SPECIALIST
Payer: MEDICARE

## 2025-07-09 DIAGNOSIS — R13.19 ESOPHAGEAL DYSPHAGIA: ICD-10-CM

## 2025-07-09 PROCEDURE — 74220 X-RAY XM ESOPHAGUS 1CNTRST: CPT

## 2025-07-09 RX ADMIN — BARIUM SULFATE 120 ML: 980 POWDER, FOR SUSPENSION ORAL at 09:14

## 2025-07-09 RX ADMIN — ANTACID/ANTIFLATULENT 1 PACKET: 380; 550; 10; 10 GRANULE, EFFERVESCENT ORAL at 09:14

## 2025-07-09 RX ADMIN — BARIUM SULFATE 240 ML: 960 POWDER, FOR SUSPENSION ORAL at 09:14

## 2025-07-09 RX ADMIN — BARIUM SULFATE 700 MG: 700 TABLET ORAL at 09:14

## 2025-07-11 ENCOUNTER — TELEPHONE (OUTPATIENT)
Dept: HEMATOLOGY | Age: 70
End: 2025-07-11

## 2025-07-11 NOTE — TELEPHONE ENCOUNTER
Called Patient and reminded patient of their appointment on 07/14/2025 and patient confirmed they would be here. Reminded patient to just come at appointment time, and to not come at the lab appointment time.  We have now moved to the [facility] [place] that is located between our old office and the ER at the [facility]. Letting the Pt know that our front entrance faces the  [employer] [place]. Reminded pt to eat well and be well hydrated for their labs

## 2025-07-14 DIAGNOSIS — D50.8 IRON DEFICIENCY ANEMIA SECONDARY TO INADEQUATE DIETARY IRON INTAKE: Primary | ICD-10-CM

## 2025-07-14 DIAGNOSIS — D05.12 DUCTAL CARCINOMA IN SITU (DCIS) OF LEFT BREAST: ICD-10-CM

## 2025-07-15 ENCOUNTER — OFFICE VISIT (OUTPATIENT)
Dept: HEMATOLOGY | Age: 70
End: 2025-07-15
Payer: MEDICARE

## 2025-07-15 ENCOUNTER — HOSPITAL ENCOUNTER (OUTPATIENT)
Dept: INFUSION THERAPY | Age: 70
Discharge: HOME | End: 2025-07-15
Payer: MEDICARE

## 2025-07-15 VITALS
SYSTOLIC BLOOD PRESSURE: 122 MMHG | TEMPERATURE: 98.1 F | HEART RATE: 69 BPM | DIASTOLIC BLOOD PRESSURE: 82 MMHG | WEIGHT: 151.2 LBS | OXYGEN SATURATION: 97 % | BODY MASS INDEX: 26.79 KG/M2 | HEIGHT: 63 IN

## 2025-07-15 DIAGNOSIS — D50.8 IRON DEFICIENCY ANEMIA SECONDARY TO INADEQUATE DIETARY IRON INTAKE: ICD-10-CM

## 2025-07-15 DIAGNOSIS — M85.852 OSTEOPENIA OF NECK OF LEFT FEMUR: ICD-10-CM

## 2025-07-15 DIAGNOSIS — D50.9 IRON DEFICIENCY ANEMIA, UNSPECIFIED IRON DEFICIENCY ANEMIA TYPE: ICD-10-CM

## 2025-07-15 DIAGNOSIS — E53.8 VITAMIN B 12 DEFICIENCY: ICD-10-CM

## 2025-07-15 DIAGNOSIS — D05.12 DUCTAL CARCINOMA IN SITU (DCIS) OF LEFT BREAST: ICD-10-CM

## 2025-07-15 DIAGNOSIS — Z85.3 HISTORY OF BREAST CANCER: Primary | ICD-10-CM

## 2025-07-15 LAB
ALBUMIN SERPL-MCNC: 4.1 G/DL (ref 3.5–5.2)
ALP SERPL-CCNC: 123 U/L (ref 35–104)
ALT SERPL-CCNC: 14 U/L (ref 5–33)
ANION GAP SERPL CALCULATED.3IONS-SCNC: 11 MMOL/L (ref 7–19)
AST SERPL-CCNC: 28 U/L (ref 5–32)
BASOPHILS # BLD: 0.04 K/UL (ref 0–0.2)
BASOPHILS NFR BLD: 0.6 % (ref 0–1)
BILIRUB SERPL-MCNC: 0.9 MG/DL (ref 0–1.2)
BUN SERPL-MCNC: 17 MG/DL (ref 8–23)
CALCIUM SERPL-MCNC: 9.8 MG/DL (ref 8.8–10.2)
CHLORIDE SERPL-SCNC: 106 MMOL/L (ref 98–107)
CO2 SERPL-SCNC: 25 MMOL/L (ref 22–29)
CREAT SERPL-MCNC: 0.9 MG/DL (ref 0.5–0.9)
EOSINOPHIL # BLD: 0.04 K/UL (ref 0–0.6)
EOSINOPHIL NFR BLD: 0.6 % (ref 0–5)
ERYTHROCYTE [DISTWIDTH] IN BLOOD BY AUTOMATED COUNT: 13.8 % (ref 11.5–14.5)
FERRITIN SERPL-MCNC: 143 NG/ML (ref 13–150)
GLUCOSE SERPL-MCNC: 120 MG/DL (ref 70–99)
HCT VFR BLD AUTO: 44.2 % (ref 37–47)
HGB BLD-MCNC: 14.2 G/DL (ref 12–16)
IRON SATN MFR SERPL: 24 % (ref 15–50)
IRON SERPL-MCNC: 66 UG/DL (ref 37–145)
LYMPHOCYTES # BLD: 1.26 K/UL (ref 1.1–4.5)
LYMPHOCYTES NFR BLD: 17.5 % (ref 20–40)
MCH RBC QN AUTO: 30.6 PG (ref 27–31)
MCHC RBC AUTO-ENTMCNC: 32.1 G/DL (ref 33–37)
MCV RBC AUTO: 95.3 FL (ref 81–99)
MONOCYTES # BLD: 0.59 K/UL (ref 0–0.9)
MONOCYTES NFR BLD: 8.2 % (ref 1–10)
NEUTROPHILS # BLD: 5.25 K/UL (ref 1.5–7.5)
NEUTS SEG NFR BLD: 72.5 % (ref 50–65)
PLATELET # BLD AUTO: 212 K/UL (ref 130–400)
PMV BLD AUTO: 10.6 FL (ref 9.4–12.3)
POTASSIUM SERPL-SCNC: 3.9 MMOL/L (ref 3.5–5.1)
PROT SERPL-MCNC: 7.1 G/DL (ref 6.4–8.3)
RBC # BLD AUTO: 4.64 M/UL (ref 4.2–5.4)
SODIUM SERPL-SCNC: 142 MMOL/L (ref 136–145)
TIBC SERPL-MCNC: 278 UG/DL (ref 250–400)
WBC # BLD AUTO: 7.22 K/UL (ref 4.8–10.8)

## 2025-07-15 PROCEDURE — 82728 ASSAY OF FERRITIN: CPT

## 2025-07-15 PROCEDURE — 85025 COMPLETE CBC W/AUTO DIFF WBC: CPT

## 2025-07-15 PROCEDURE — 83550 IRON BINDING TEST: CPT

## 2025-07-15 PROCEDURE — 80053 COMPREHEN METABOLIC PANEL: CPT

## 2025-07-15 PROCEDURE — 83540 ASSAY OF IRON: CPT

## 2025-07-15 PROCEDURE — 36415 COLL VENOUS BLD VENIPUNCTURE: CPT

## 2025-07-15 PROCEDURE — 99213 OFFICE O/P EST LOW 20 MIN: CPT

## 2025-07-15 RX ORDER — HYDROXYZINE HYDROCHLORIDE 10 MG/1
10 TABLET, FILM COATED ORAL EVERY 4 HOURS PRN
COMMUNITY
Start: 2025-06-30

## 2025-07-15 NOTE — PROGRESS NOTES
mass     Cancer (HCC)     Breast Cancer in SITU    CPAP (continuous positive airway pressure) dependence     Diabetes (HCC)     type 2 (currently diet-controlled)    Hyperlipidemia     elevated triglycerides, but low cholesterol level    Hypertension     Prolonged emergence from general anesthesia     Sleep apnea     CPAP       Past Surgical History:    Past Surgical History:   Procedure Laterality Date    BREAST BIOPSY Left 02/26/2019    EXCISION LEFT BREAST MASS WITH INTRAOP ULTRASOUND GUIDED NEEDLE LOCALIZATION performed by Tab Mendes MD at [facility] OR    BREAST LUMPECTOMY Left 01/31/2020    PREOP ULTRASOUND WITH LEFT LUMPECTOMY, NO SENTINEL NODE BIOPSY, INTRAOP ULTRASOUND GUIDED NEEDLE LOCALIZATION, BIOZORB, FLAPS, PECTORAL BLOCK, IORT performed by Tab Mendes MD at [facility] OR    BUNIONECTOMY      COLECTOMY      after vaginal prolapse repair/damaged    COLONOSCOPY      HERNIA REPAIR      HYSTERECTOMY (CERVIX STATUS UNKNOWN)      HYSTERECTOMY, VAGINAL      HODA STEREO BREAST BX W LOC DEVICE 1ST LESION LEFT Left 12/16/2021    HODA STEROTACTIC LOC BREAST [facility]SY [facility]    HODA STEREO BREAST BX W LOC DEVICE 1ST LESION LEFT Left 07/10/2023    HODA STEROTACTIC LOC BREAST BIOPSY LEFT 7/10/2023 [facility]    HODA STEREO BREAST BX W LOC DEVICE 1ST LESION LEFT Left 10/17/2023    HODA STEROTACTIC LOC BREAST BIOPSY LEFT 10/17/2023 [facility]    HODA STEREO BREAST BX W LOC DEVICE 1ST LESION LEFT Left 07/19/2024    HODA STEROTACTIC LOC BREAST BIOPSY LEFT 7/19/2024 [facility]    PLANTAR FASCIA SURGERY Bilateral 07/19/2016    PLANTAR FASCIA INJECTION FEET performed by Javad Adams II, DPM at [facility] OR    TUBAL LIGATION  1980    UPPER GASTROINTESTINAL ENDOSCOPY      VAGINAL PROLAPSE REPAIR  2013       Current Medications:    Current Outpatient Medications   Medication Sig Dispense Refill    hydrOXYzine HCl (ATARAX) 10 MG tablet Take 1 tablet by mouth every 4 hours as needed for

## 2025-07-23 NOTE — TELEPHONE ENCOUNTER
Rx Refill Note  Requested Prescriptions     Pending Prescriptions Disp Refills    tiZANidine (ZANAFLEX) 4 MG tablet       Sig: Take 1 tablet by mouth At Night As Needed for Muscle Spasms.      Last office visit with prescribing clinician: 6/10/2025   Last telemedicine visit with prescribing clinician: Visit date not found   Next office visit with prescribing clinician: 11/5/2025                         Would you like a call back once the refill request has been completed: [] Yes [] No    If the office needs to give you a call back, can they leave a voicemail: [] Yes [] No    Jose Ramires MA  07/23/25, 11:15 CDT

## 2025-07-24 NOTE — TELEPHONE ENCOUNTER
Rx Refill Note  Requested Prescriptions     Pending Prescriptions Disp Refills    tiZANidine (ZANAFLEX) 4 MG tablet 30 tablet 3     Sig: Take 1 tablet by mouth At Night As Needed for Muscle Spasms.      Last office visit with prescribing clinician: 6/10/2025   Last telemedicine visit with prescribing clinician: Visit date not found   Next office visit with prescribing clinician: 11/5/2025                         Would you like a call back once the refill request has been completed: [] Yes [] No    If the office needs to give you a call back, can they leave a voicemail: [] Yes [] No    Jose Ramires MA  07/24/25, 08:19 CDT      PATIENT HAS CALLED, SHE STATED THAT SHE IS NEEDING THIS MEDICATION FILLED.  SHE STATED THAT THE LAST PERSON THAT FILLED THIS MEDICATION WAS AMY [email or web address].

## 2025-08-01 ENCOUNTER — OFFICE VISIT (OUTPATIENT)
Dept: CARDIOLOGY | Facility: CLINIC | Age: 70
End: 2025-08-01
Payer: MEDICARE

## 2025-08-01 VITALS
OXYGEN SATURATION: 97 % | SYSTOLIC BLOOD PRESSURE: 116 MMHG | HEIGHT: 63 IN | WEIGHT: 153.6 LBS | DIASTOLIC BLOOD PRESSURE: 74 MMHG | BODY MASS INDEX: 27.21 KG/M2 | HEART RATE: 105 BPM

## 2025-08-01 DIAGNOSIS — Q24.5 CONGENITAL CORONARY ARTERY FISTULA TO PULMONARY ARTERY: ICD-10-CM

## 2025-08-01 DIAGNOSIS — K92.2 GASTROINTESTINAL HEMORRHAGE, UNSPECIFIED GASTROINTESTINAL HEMORRHAGE TYPE: ICD-10-CM

## 2025-08-01 DIAGNOSIS — I10 ESSENTIAL HYPERTENSION: ICD-10-CM

## 2025-08-01 DIAGNOSIS — Q21.11 ASD SECUNDUM: ICD-10-CM

## 2025-08-01 DIAGNOSIS — I48.21 PERMANENT ATRIAL FIBRILLATION: Primary | ICD-10-CM

## 2025-08-01 PROCEDURE — 93000 ELECTROCARDIOGRAM COMPLETE: CPT | Performed by: NURSE PRACTITIONER

## 2025-08-01 PROCEDURE — 3074F SYST BP LT 130 MM HG: CPT | Performed by: NURSE PRACTITIONER

## 2025-08-01 PROCEDURE — 99214 OFFICE O/P EST MOD 30 MIN: CPT | Performed by: NURSE PRACTITIONER

## 2025-08-01 PROCEDURE — 1159F MED LIST DOCD IN RCRD: CPT | Performed by: NURSE PRACTITIONER

## 2025-08-01 PROCEDURE — 3078F DIAST BP <80 MM HG: CPT | Performed by: NURSE PRACTITIONER

## 2025-08-01 PROCEDURE — 1160F RVW MEDS BY RX/DR IN RCRD: CPT | Performed by: NURSE PRACTITIONER

## 2025-08-01 NOTE — PROGRESS NOTES
Subjective:     Encounter Date: 08/01/25      Patient ID: Yun Blackwell is a 69 y.o. female.    Chief Complaint: Follow-up for permanent atrial fibrillation and ASD    History of Present Illness     Ms. Blackwell returns today for routine follow-up of the above.    By way of review, we follow her for an ASD, but a previous right heart catheterization showed no oxygenation step-up, and, therefore, he felt as though her right ventricular enlargement was not secondary to left-to-right shunting, and she would not be benefited in any way from ASD closure.    She followed up in June 2023 with Dr. Marcelino and did not have any complaints other than the cost of Eliquis.  She was denied manufacture assistance the previous year.  At that visit Dr. Marcelino transitioned her from Eliquis to Pradaxa and noted that if cost continues to be an issue Coumadin would be considered.  He also recommended repeat transthoracic echocardiogram in 2024.      I saw her December 2023 and she was doing well.  She reported an isolated episode of bleeding hemorrhoids.  We briefly discussed referral to structural clinic for consideration of Watchman device placement but she was not interested at that time.  She reported occasional palpitations that did not limit her or cause her other symptoms.  She was not having any shortness of breath whatsoever.  She had a repeat surveillance echocardiogram earlier this year with stable findings.  See below.    I saw her in July 2024 and she was doing well and was not having any bleeding issues, chest pain, shortness of breath or palpitations.  She admitted some chronic fatigue.    By way of review, I see that she has been hospitalized multiple times since her last visit.  1 admission was for what was labeled as GI bleeding.  According to gastroenterology notes this was felt to be related to hemorrhoids.  She has also had hernia surgery since her last visit.    Today she states she is doing well from a  cardiac perspective.  She denies any chest pain, shortness of breath or palpitations.  She still has some positional abdominal soreness sometimes after her hernia surgery.  She still occasionally sees bright red blood in the toilet, but she is unsure where this is coming from.      The following portions of the patient's history were reviewed and updated as appropriate: allergies, current medications, past family history, past medical history, past social history, past surgical history and problem list.    Review of Systems   Constitutional: Negative for malaise/fatigue.   Cardiovascular:  Negative for chest pain, claudication, dyspnea on exertion, leg swelling, near-syncope, orthopnea, palpitations, paroxysmal nocturnal dyspnea and syncope.   Respiratory:  Negative for cough and shortness of breath.    Hematologic/Lymphatic: Does not bruise/bleed easily.   Musculoskeletal:  Negative for falls.   Gastrointestinal:  Positive for abdominal pain and hemorrhoids. Negative for bloating.   Neurological:  Negative for dizziness, light-headedness and weakness.           Current Outpatient Medications:     cyanocobalamin 1000 MCG/ML injection, Inject 1 mL into the appropriate muscle as directed by prescriber Every 30 (Thirty) Days., Disp: 1 mL, Rfl: 11    dabigatran etexilate (PRADAXA) 150 MG capsu, Take 1 capsule by mouth 2 (Two) Times a Day., Disp: 60 capsule, Rfl: 11    dilTIAZem CD (Cardizem CD) 120 MG 24 hr capsule, Take 1 capsule by mouth Daily., Disp: 30 capsule, Rfl: 11    hydrOXYzine (ATARAX) 10 MG tablet, Take 1 tablet by mouth 3 (Three) Times a Day As Needed for Itching., Disp: , Rfl:     omeprazole (priLOSEC) 20 MG capsule, Take 1 capsule by mouth Daily., Disp: 90 capsule, Rfl: 3    tiZANidine (ZANAFLEX) 4 MG tablet, Take 1 tablet by mouth At Night As Needed for Muscle Spasms., Disp: 30 tablet, Rfl: 5    triamcinolone (KENALOG) 0.1 % ointment, Apply 1 Application topically to the appropriate area as directed 2  (Two) Times a Day., Disp: 30 g, Rfl: 0       Objective:      Vitals:    08/01/25 0917   BP: 116/74   Pulse: 105   SpO2: 97%     Weight: 153 pounds    Vitals and nursing note reviewed.   Constitutional:       General: Not in acute distress.     Appearance: Well-developed and not in distress. Not diaphoretic.   Neck:      Vascular: No JVD or JVR. JVD normal.   Pulmonary:      Effort: Pulmonary effort is normal. No respiratory distress.      Breath sounds: Normal breath sounds.   Cardiovascular:      Tachycardia present. Irregularly irregular rhythm.      Murmurs: There is a grade 2/6 systolic murmur.   Edema:     Peripheral edema absent.   Abdominal:      Tenderness: There is no abdominal tenderness.   Skin:     General: Skin is warm and dry.   Neurological:      Mental Status: Alert, oriented to person, place, and time and oriented to person, place and time.         Lab Review:   Lab Results   Component Value Date    GLUCOSE 120 (H) 07/15/2025    BUN 17 07/15/2025    CREATININE 0.9 07/15/2025    EGFRIFNONA >60 10/13/2022    EGFRIFAFRI 86 12/30/2021    BCR 39.5 (H) 05/08/2025    K 3.9 07/15/2025    CO2 25 07/15/2025    CALCIUM 9.8 07/15/2025    ALBUMIN 4.1 07/15/2025    AST 28 07/15/2025    ALT 14 07/15/2025     Lab Results   Component Value Date    WBC 7.22 07/15/2025    HGB 14.2 07/15/2025    HCT 44.2 07/15/2025    MCV 95.3 07/15/2025     07/15/2025     Lab Results   Component Value Date    TSH 0.978 03/03/2025               ECG 12 Lead    Date/Time: 8/1/2025 10:04 AM  Performed by: Abbey Serna APRN    Authorized by: Abbey Serna APRN  Comparison: compared with previous ECG from 7/29/2024  Similar to previous ECG  Rhythm: atrial fibrillation  Ectopy: unifocal PVCs  BPM: 105  Conduction: right bundle branch block    Clinical impression: abnormal EKG             right heart catheterization performed 2/5/21:  Right heart catheterization:  Hemodynamics (in mmHg)  RA: 9  RV: 28/8  PA: 28/12, m17  PCW: 14    "  Oximetry (all %)  High SVC: 69  Low SVC: 70  High IVC: 72  Low IVC: 78  High RA: 68  Low RA: 68  RV: 68  PA: 68  PCW: 70 (confirmed both by fluoroscopy and waveform)     Ao (by pulse oximetry): 98%     Cardiac output and index via assumed Flex method: 4.4 L/min, 2.5 L/min/m2     TPG: 3  PVR: <1 Wood unit     Estimated Blood Loss: 5mL  Specimens: None  Complications: None     Impression:  1. No evidence of pulmonary hypertension; normal mean PA pressure and normal pulmonary vascular resistence  2. No evidence of left-to-right shunt (ie no oxygen \"step up\")  3. Normal cardiac output     Plan: resume Eliquis tonight; office f/u.  At this point, and based on this data, does not seem as though patient would benefit from ASD closure and that her right ventricle and right atrium are severely dilated for some other reason.      Results for orders placed during the hospital encounter of 01/29/24    Adult Transthoracic Echo Complete w/ Color, Spectral and Contrast if necessary per protocol 02/04/2024  5:18 PM    Interpretation Summary    Left ventricular systolic function is low normal. Left ventricular ejection fraction appears to be 51 - 55%.    Severe biatrial enlargement.    Moderate tricuspid valve regurgitation is present.    Estimated right ventricular systolic pressure from tricuspid regurgitation is mildly elevated (35-45 mmHg).    The right ventricular cavity is moderately dilated, with normal systolic function.    No other significant (greater than mild) valvular pathology.    Compared to most recent transthoracic exam (from 11/24/2020), no obvious or significant change.    \"Extensive records reviewed from 2016 re: gi bleed on pradaxa, and subsequent notes through \"Care Everywhere\" in April 2017 regarding communications back-and-forth between patient and her cardiologist office at that time regarding Pradaxa.  A nurse note states that Dr. Matt advised switching from Pradaxa to Eliquis \"because of kidney " "function.\"  However, her renal function was normal at that time, and remained so now. \" -previous record review by Dr. 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 Healy in '17         ASD secundum    Overview   Added automatically from request for surgery [id number]         Essential hypertension       Gastrointestinal Abdominal     GI bleeding    Relevant Orders    Ambulatory Referral to Structural Heart       Recommendations/plans:    1. Permanent atrial fibrillation: Established problem, stable.  [id number] (female, age 68, hypertension).  Asymptomatic.    -continue cardizem 120mg daily for rate control  -Continue Pradaxa 150 mg twice daily for stroke prophylaxis.  She was transitioned from Eliquis to Pradaxa due to cost a few years ago    -given her issues with bleeding including hemorrhoidal bleeding, we discussed watchman again as alternative to long-term anticoagulation for stroke prophylaxis.  I provided her with educational materials.  She is interested in discussing this further with Dr. Marcelino so I have placed a referral to the structural heart clinic.      2. Secundum type ASD: Stable. previous catheterization revealed no oxygen step-up, so despite the fact that her right atrium and right ventricle are enlarged, it does not appear to be secondary to this, and since she is not dyspneic, there is no indication for closure at this time.   -See most recent echo findings above.    3.  RV/RA enlargement: as per above, doubt this is from ASD, nor from previously documented coronary artery to pulmonary artery fistula  -  She continues to deny any shortness of breath whatsoever.    Follow up in 6-12 months with Dr. Marcelino, or sooner with any new " or worsening symptoms for routine visit, but will plan for structural clinic visit in the near future.

## 2025-08-18 ENCOUNTER — OFFICE VISIT (OUTPATIENT)
Dept: CARDIOLOGY | Facility: CLINIC | Age: 70
End: 2025-08-18
Payer: MEDICARE

## 2025-08-18 VITALS
SYSTOLIC BLOOD PRESSURE: 150 MMHG | OXYGEN SATURATION: 98 % | HEART RATE: 88 BPM | HEIGHT: 63 IN | BODY MASS INDEX: 26.05 KG/M2 | DIASTOLIC BLOOD PRESSURE: 90 MMHG | WEIGHT: 147 LBS

## 2025-08-18 DIAGNOSIS — K62.5 BRBPR (BRIGHT RED BLOOD PER RECTUM): ICD-10-CM

## 2025-08-18 DIAGNOSIS — I51.7 RIGHT VENTRICULAR ENLARGEMENT: ICD-10-CM

## 2025-08-18 DIAGNOSIS — I27.20 PULMONARY HYPERTENSION: ICD-10-CM

## 2025-08-18 DIAGNOSIS — I48.21 PERMANENT ATRIAL FIBRILLATION: Primary | ICD-10-CM

## 2025-08-18 DIAGNOSIS — Q21.11 ASD SECUNDUM: ICD-10-CM

## 2025-08-21 DIAGNOSIS — I48.21 PERMANENT ATRIAL FIBRILLATION: ICD-10-CM

## 2025-08-22 ENCOUNTER — TELEPHONE (OUTPATIENT)
Dept: CARDIOLOGY | Facility: CLINIC | Age: 70
End: 2025-08-22
Payer: MEDICARE

## 2025-08-22 RX ORDER — DABIGATRAN ETEXILATE 150 MG/1
150 CAPSULE ORAL 2 TIMES DAILY
Qty: 180 CAPSULE | OUTPATIENT
Start: 2025-08-22

## 2025-08-26 ENCOUNTER — HOSPITAL ENCOUNTER (EMERGENCY)
Facility: HOSPITAL | Age: 70
Discharge: HOME | End: 2025-08-26
Attending: FAMILY MEDICINE | Admitting: FAMILY MEDICINE
Payer: MEDICARE

## 2025-08-26 ENCOUNTER — APPOINTMENT (OUTPATIENT)
Dept: GENERAL RADIOLOGY | Facility: HOSPITAL | Age: 70
End: 2025-08-26
Payer: MEDICARE

## 2025-08-26 ENCOUNTER — APPOINTMENT (OUTPATIENT)
Dept: CT IMAGING | Facility: HOSPITAL | Age: 70
End: 2025-08-26
Payer: MEDICARE

## 2025-08-26 VITALS
OXYGEN SATURATION: 97 % | BODY MASS INDEX: 26.58 KG/M2 | HEIGHT: 63 IN | TEMPERATURE: 98 F | SYSTOLIC BLOOD PRESSURE: 146 MMHG | DIASTOLIC BLOOD PRESSURE: 86 MMHG | WEIGHT: 150 LBS | RESPIRATION RATE: 20 BRPM | HEART RATE: 82 BPM
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
## 2025-08-26 LAB
ANION GAP SERPL CALCULATED.3IONS-SCNC: 12 MMOL/L (ref 5–15)
BASOPHILS # BLD AUTO: 0.04 10*3/MM3 (ref 0–0.2)
BASOPHILS NFR BLD AUTO: 0.6 % (ref 0–1.5)
BUN SERPL-MCNC: 18.4 MG/DL (ref 8–23)
BUN/CREAT SERPL: 43.8 (ref 7–25)
CALCIUM SPEC-SCNC: 9.9 MG/DL (ref 8.6–10.5)
CHLORIDE SERPL-SCNC: 104 MMOL/L (ref 98–107)
CO2 SERPL-SCNC: 25 MMOL/L (ref 22–29)
CREAT SERPL-MCNC: 0.42 MG/DL (ref 0.57–1)
DEPRECATED RDW RBC AUTO: 46.7 FL (ref 37–54)
EGFRCR SERPLBLD CKD-EPI 2021: 104.7 ML/MIN/1.73
EOSINOPHIL # BLD AUTO: 0.05 10*3/MM3 (ref 0–0.4)
EOSINOPHIL NFR BLD AUTO: 0.7 % (ref 0.3–6.2)
ERYTHROCYTE [DISTWIDTH] IN BLOOD BY AUTOMATED COUNT: 13.5 % (ref 12.3–15.4)
GLUCOSE SERPL-MCNC: 116 MG/DL (ref 65–99)
HCT VFR BLD AUTO: 44 % (ref 34–46.6)
HGB BLD-MCNC: 13.7 G/DL (ref 12–15.9)
IMM GRANULOCYTES # BLD AUTO: 0.06 10*3/MM3 (ref 0–0.05)
IMM GRANULOCYTES NFR BLD AUTO: 0.8 % (ref 0–0.5)
LYMPHOCYTES # BLD AUTO: 1.13 10*3/MM3 (ref 0.7–3.1)
LYMPHOCYTES NFR BLD AUTO: 15.8 % (ref 19.6–45.3)
MAGNESIUM SERPL-MCNC: 1.5 MG/DL (ref 1.6–2.4)
MCH RBC QN AUTO: 29.5 PG (ref 26.6–33)
MCHC RBC AUTO-ENTMCNC: 31.1 G/DL (ref 31.5–35.7)
MCV RBC AUTO: 94.6 FL (ref 79–97)
MONOCYTES # BLD AUTO: 0.51 10*3/MM3 (ref 0.1–0.9)
MONOCYTES NFR BLD AUTO: 7.1 % (ref 5–12)
NEUTROPHILS NFR BLD AUTO: 5.36 10*3/MM3 (ref 1.7–7)
NEUTROPHILS NFR BLD AUTO: 75 % (ref 42.7–76)
NRBC BLD AUTO-RTO: 0 /100 WBC (ref 0–0.2)
PLATELET # BLD AUTO: 183 10*3/MM3 (ref 140–450)
PMV BLD AUTO: 11.2 FL (ref 6–12)
POTASSIUM SERPL-SCNC: 4.1 MMOL/L (ref 3.5–5.2)
RBC # BLD AUTO: 4.65 10*6/MM3 (ref 3.77–5.28)
SODIUM SERPL-SCNC: 141 MMOL/L (ref 136–145)
WBC NRBC COR # BLD AUTO: 7.15 10*3/MM3 (ref 3.4–10.8)

## 2025-08-26 PROCEDURE — 73562 X-RAY EXAM OF KNEE 3: CPT

## 2025-08-26 PROCEDURE — 83735 ASSAY OF MAGNESIUM: CPT | Performed by: FAMILY MEDICINE

## 2025-08-26 PROCEDURE — 73502 X-RAY EXAM HIP UNI 2-3 VIEWS: CPT

## 2025-08-26 PROCEDURE — 80048 BASIC METABOLIC PNL TOTAL CA: CPT | Performed by: FAMILY MEDICINE

## 2025-08-26 PROCEDURE — 85025 COMPLETE CBC W/AUTO DIFF WBC: CPT | Performed by: FAMILY MEDICINE

## 2025-08-26 PROCEDURE — 96375 TX/PRO/DX INJ NEW DRUG ADDON: CPT

## 2025-08-26 PROCEDURE — 25510000001 IOPAMIDOL 61 % SOLUTION: Performed by: FAMILY MEDICINE

## 2025-08-26 PROCEDURE — 25010000002 MORPHINE PER 10 MG: Performed by: FAMILY MEDICINE

## 2025-08-26 PROCEDURE — 71260 CT THORAX DX C+: CPT

## 2025-08-26 PROCEDURE — 74177 CT ABD & PELVIS W/CONTRAST: CPT

## 2025-08-26 PROCEDURE — 99285 EMERGENCY DEPT VISIT HI MDM: CPT | Performed by: FAMILY MEDICINE

## 2025-08-26 PROCEDURE — 25010000002 ONDANSETRON PER 1 MG: Performed by: FAMILY MEDICINE

## 2025-08-26 PROCEDURE — 96374 THER/PROPH/DIAG INJ IV PUSH: CPT

## 2025-08-26 RX ORDER — SODIUM CHLORIDE 0.9 % (FLUSH) 0.9 %
10 SYRINGE (ML) INJECTION AS NEEDED
Status: DISCONTINUED | OUTPATIENT
Start: 2025-08-26 | End: 2025-08-26 | Stop reason: HOSPADM

## 2025-08-26 RX ORDER — IOPAMIDOL 612 MG/ML
100 INJECTION, SOLUTION INTRAVASCULAR
Status: COMPLETED | OUTPATIENT
Start: 2025-08-26 | End: 2025-08-26

## 2025-08-26 RX ORDER — ONDANSETRON 2 MG/ML
4 INJECTION INTRAMUSCULAR; INTRAVENOUS ONCE
Status: COMPLETED | OUTPATIENT
Start: 2025-08-26 | End: 2025-08-26

## 2025-08-26 RX ORDER — ORPHENADRINE CITRATE 100 MG/1
100 TABLET ORAL 2 TIMES DAILY
Qty: 28 TABLET | Refills: 0 | Status: SHIPPED | OUTPATIENT
Start: 2025-08-26 | End: 2025-09-09

## 2025-08-26 RX ADMIN — ONDANSETRON 4 MG: 2 INJECTION, SOLUTION INTRAMUSCULAR; INTRAVENOUS at 12:52

## 2025-08-26 RX ADMIN — MORPHINE SULFATE 4 MG: 4 INJECTION, SOLUTION INTRAMUSCULAR; INTRAVENOUS at 12:52

## 2025-08-26 RX ADMIN — IOPAMIDOL 100 ML: 612 INJECTION, SOLUTION INTRAVENOUS at 11:31

## 2025-08-27 ENCOUNTER — PATIENT OUTREACH (OUTPATIENT)
Dept: CASE MANAGEMENT | Facility: OTHER | Age: 70
End: 2025-08-27
Payer: MEDICARE

## 2025-08-29 ENCOUNTER — TELEPHONE (OUTPATIENT)
Age: 70
End: 2025-08-29
Payer: MEDICARE

## (undated) DEVICE — K-WIRE, SINGLE ENDED TROCAR TIP, SMOOTH, 0.9 X 150MM
Type: IMPLANTABLE DEVICE | Site: TOE FIRST | Status: NON-FUNCTIONAL
Brand: MONSTER SCREW SYSTEM
Removed: 2023-01-04

## (undated) DEVICE — ARGYLE YANKAUER BULB TIP WITH VENT: Brand: ARGYLE

## (undated) DEVICE — 4.0MM EGG BUR

## (undated) DEVICE — KT CLN CLEANOR SCPE

## (undated) DEVICE — MTP SPIN GUARD™ MALE REAMER, 21MM: Brand: BABY GORILLA®/GORILLA® PLATING SYSTEM

## (undated) DEVICE — PACKAGE ASSY BALLOON POUCH ST 5-6CM

## (undated) DEVICE — DAVINCI: Brand: MEDLINE INDUSTRIES, INC.

## (undated) DEVICE — SPNG GZ WOVN 4X4IN 12PLY 10/BX STRL

## (undated) DEVICE — THE CHANNEL CLEANING BRUSH IS A NYLON FLEXI BRUSH ATTACHED TO A FLEXIBLE PLASTIC SHEATH DESIGNED TO SAFELY REMOVE DEBRIS FROM FLEXIBLE ENDOSCOPES.

## (undated) DEVICE — SOLUTION IV IRRIG POUR BRL 0.9% SODIUM CHL 2F7124

## (undated) DEVICE — STRIP,CLOSURE,WOUND,MEDI-STRIP,1/2X4: Brand: MEDLINE

## (undated) DEVICE — THE SINGLE USE ETRAP – POLYP TRAP IS USED FOR SUCTION RETRIEVAL OF ENDOSCOPICALLY REMOVED POLYPS.: Brand: ETRAP

## (undated) DEVICE — BANDAGE,GAUZE,BULKEE II,4.5"X4.1YD,STRL: Brand: MEDLINE

## (undated) DEVICE — CVR UNIV C/ARM

## (undated) DEVICE — GLOVE SURG SZ 75 CRM LTX FREE POLYISOPRENE POLYMER BEAD ANTI

## (undated) DEVICE — SOLIDIFIER LIQUI LOC PLUS 2000CC

## (undated) DEVICE — CONMED SCOPE SAVER BITE BLOCK, 20X27 MM: Brand: SCOPE SAVER

## (undated) DEVICE — CUFF,BP,DISP,1 TUBE,ADULT,HP: Brand: MEDLINE

## (undated) DEVICE — PATIENT RETURN ELECTRODE, SINGLE-USE, CONTACT QUALITY MONITORING, ADULT, WITH 9FT CORD, FOR PATIENTS WEIGING OVER 33LBS. (15KG): Brand: MEGADYNE

## (undated) DEVICE — 3M™ IOBAN™ 2 ANTIMICROBIAL INCISE DRAPE 6650EZ: Brand: IOBAN™ 2

## (undated) DEVICE — TRAP FLD MINIVAC MEGADYNE 100ML

## (undated) DEVICE — STANDARD HYPODERMIC NEEDLE,POLYPROPYLENE HUB: Brand: MONOJECT

## (undated) DEVICE — DRILL, 2.0 X 110MM, SOLID, MEASURING, AO: Brand: BABY GORILLA®/GORILLA® PLATING SYSTEM

## (undated) DEVICE — Device

## (undated) DEVICE — YANKAUER,BULB TIP WITH VENT: Brand: ARGYLE

## (undated) DEVICE — CATH MONTR SWANGANZ WP 2L 7F110CM

## (undated) DEVICE — ANTIBACTERIAL UNDYED BRAIDED (POLYGLACTIN 910), SYNTHETIC ABSORBABLE SUTURE: Brand: COATED VICRYL

## (undated) DEVICE — COVER,MAYO STAND,STERILE: Brand: MEDLINE

## (undated) DEVICE — BLADELESS OBTURATOR: Brand: WECK VISTA

## (undated) DEVICE — CVR BRD ARM 13X30

## (undated) DEVICE — DISPOSABLE TOURNIQUET CUFF SINGLE BLADDER, SINGLE PORT AND QUICK CONNECT CONNECTOR: Brand: COLOR CUFF

## (undated) DEVICE — PK CATH CARD 30 CA/4

## (undated) DEVICE — BONE FENESTRATION PERFORATOR: Brand: BABY GORILLA®/GORILLA® PLATING SYSTEM

## (undated) DEVICE — APPL DURAPREP IODOPHOR APL 26ML

## (undated) DEVICE — PRECISION THIN (9.0 X 0.38 X 31.0MM)

## (undated) DEVICE — THE EXACTO COLD SNARE IS INTENDED TO BE USED WITHOUT DIATHERMIC ENERGY FOR THE ENDOSCOPIC RESECTION OF POLYP TISSUE IN THE GASTROINTESTINAL TRACT.: Brand: EXACTO

## (undated) DEVICE — SOL IRR NACL 0.9PCT BT 1000ML

## (undated) DEVICE — PK EXTRM 30

## (undated) DEVICE — Device: Brand: DEFENDO AIR/WATER/SUCTION AND BIOPSY VALVE

## (undated) DEVICE — BNDG ELAS CO-FLEX SLF ADHR 4IN5YD LF STRL

## (undated) DEVICE — SEAL

## (undated) DEVICE — APPL HEMO ENDO SURGICEL 2IN1 1P/U STRL

## (undated) DEVICE — SUT ETHLN 3/0 FS1 30IN 669H

## (undated) DEVICE — SYR LL TP 10ML STRL

## (undated) DEVICE — MTP SPIN GUARD™ FEMALE REAMER, 21MM: Brand: BABY GORILLA®/GORILLA® PLATING SYSTEM

## (undated) DEVICE — COUNTERSINK, 4.0 HEADED: Brand: MONSTER® SCREW SYSTEM

## (undated) DEVICE — NDL FLTR BLNT 18G 1 1/2IN

## (undated) DEVICE — 4-PORT MANIFOLD: Brand: NEPTUNE 2

## (undated) DEVICE — SPECIMEN ORIENTATION CHARMS, SIX DISTINCTLY SHAPED STERILE 10MM CHARMS: Brand: MARGINMAP

## (undated) DEVICE — SUTURE PERMAHAND SZ 2-0 L18IN NONABSORBABLE BLK L26MM SH C012D

## (undated) DEVICE — ADHS LIQ MASTISOL 2/3ML

## (undated) DEVICE — PACK,UNIVERSAL,NO GOWNS: Brand: MEDLINE

## (undated) DEVICE — MASK,OXYGEN,MED CONC,ADLT,7' TUB, UC: Brand: PENDING

## (undated) DEVICE — DRESSING GRMCDL 6 12FR D1N CNTR HOLE 4MM ANTMCRBL PRTCTVE DI

## (undated) DEVICE — GAUZE,SPONGE,FLUFF,6"X6.75",STRL,10/TRAY: Brand: MEDLINE

## (undated) DEVICE — SUTURE VCRL SZ 2-0 L36IN ABSRB UD L36MM CT-1 1/2 CIR J945H

## (undated) DEVICE — PEN: MARKING STD 100/CS: Brand: MEDICAL ACTION INDUSTRIES

## (undated) DEVICE — SUT VIC 5/0 PS3 18IN UD VCP500G

## (undated) DEVICE — SYSTEM SKIN CLSR 22CM DERMBND PRINEO

## (undated) DEVICE — KIT APPLICATOR BALLOON POUCH ST 4-5 CM

## (undated) DEVICE — SKIN PREP TRAY W/CHG: Brand: MEDLINE INDUSTRIES, INC.

## (undated) DEVICE — CATHETER URETH 24FR 30CC BLLN SILAS F 2 W SPEC M RND TIP

## (undated) DEVICE — GLV SURG SENSICARE W/ALOE PF LF 7.5 STRL

## (undated) DEVICE — DRILL, 2.6 X 130MM, CANNULATED, AO: Brand: MONSTER SCREW SYSTEM

## (undated) DEVICE — PINNACLE INTRODUCER SHEATH: Brand: PINNACLE

## (undated) DEVICE — SOLUTION IV 1000ML 0.9% SOD CHL PH 5 INJ USP VIAFLX PLAS

## (undated) DEVICE — DRILL, 1.7 X 120MM, CANNULATED, AO: Brand: MONSTER SCREW SYSTEM

## (undated) DEVICE — BAPTIST TURNOVER KIT: Brand: MEDLINE INDUSTRIES, INC.

## (undated) DEVICE — TBG SMPL FLTR LINE NASL 02/C02 A/ BX/100

## (undated) DEVICE — TIP COVER ACCESSORY

## (undated) DEVICE — PROBE DTECT MARGIN F/LUMPECTOMY

## (undated) DEVICE — KT DRP MINIVIEW STRL

## (undated) DEVICE — SUTURE VCRL SZ 3-0 L27IN ABSRB UD L26MM SH 1/2 CIR J416H

## (undated) DEVICE — GOWN,PREVENTION PLUS,2XL,ST,22/CS: Brand: MEDLINE

## (undated) DEVICE — DRSNG WND GZ CURAD OIL EMULSION 3X8IN STRL PK/3

## (undated) DEVICE — MORSELIZING BONE GRAFT HARVESTER, AO, 6MM: Brand: BABY GORILLA®/GORILLA® PLATING SYSTEM

## (undated) DEVICE — BONE MARROW HARVEST NEEDLE: Brand: BABY GORILLA/GORILLA PLATING SYSTEM

## (undated) DEVICE — TOWEL,OR,DSP,ST,BLUE,DLX,4/PK,20PK/CS: Brand: MEDLINE

## (undated) DEVICE — SENSR O2 OXIMAX FNGR A/ 18IN NONSTR

## (undated) DEVICE — CANN CO2/O2 NASL A/

## (undated) DEVICE — ADHESIVE SKIN CLSR 0.7ML TOP DERMBND ADV

## (undated) DEVICE — ENDOCUFF VISION PRP SM 10.4

## (undated) DEVICE — KT MANIFLD RT CUST BHPAD

## (undated) DEVICE — ARM DRAPE

## (undated) DEVICE — BNDG ELAS W/CLIP 6IN 10YD LF STRL

## (undated) DEVICE — DRSNG WND GZ CURAD OIL EMULSION 3X3IN STRL

## (undated) DEVICE — P28, K-WIRE, 1.6 X 150 MM, SINGLE TROCAR, SMOOTH, SS
Type: IMPLANTABLE DEVICE | Site: TOE FIRST | Status: NON-FUNCTIONAL
Brand: MULTI SYSTEM
Removed: 2023-01-04

## (undated) DEVICE — GOWN,PREVENTION PLUS,XL,ST,24/CS: Brand: MEDLINE

## (undated) DEVICE — GLOVE SURG SZ 6 CRM LTX FREE POLYISOPRENE POLYMER BEAD ANTI

## (undated) DEVICE — SUT VIC 0 UR6 27IN VCP603H

## (undated) DEVICE — GW STARTER FXD CORE J .035 3X150CM 3MM

## (undated) DEVICE — DRSNG GZ CURAD XEROFORM NONADHS 5X9IN STRL

## (undated) DEVICE — SYR LUERLOK 50ML

## (undated) DEVICE — PAD,NON-ADHERENT,3X8,STERILE,LF,1/PK: Brand: MEDLINE

## (undated) DEVICE — TBG INSUFFLATION LUER LOCK: Brand: MEDLINE INDUSTRIES, INC.

## (undated) DEVICE — SHEET,DRAPE,53X77,STERILE: Brand: MEDLINE

## (undated) DEVICE — FRAZIER SUCTION INSTRUMENT 10 FR W/CONTROL VENT & OBTURATOR: Brand: FRAZIER

## (undated) DEVICE — KIT APPLICATOR BALLOON POUCH ST 3-4 CM

## (undated) DEVICE — GLV SURG SENSICARE PI ORTHO SZ8 LF STRL

## (undated) DEVICE — PRECISION THIN (9.0 X 0.38 X 25.0MM)

## (undated) DEVICE — SYRINGE, LOCKING, 10CC, STERILE: Brand: BABY GORILLA/GORILLA PLATING SYSTEM

## (undated) DEVICE — INTENT TO BE USED WITH SUTURE MATERIAL FOR TISSUE CLOSURE: Brand: RICHARD-ALLAN® NEEDLE 1/2 CIRCLE TROCAR

## (undated) DEVICE — SUTURE VCRL SZ 3-0 L36IN ABSRB UD L36MM CT-1 1/2 CIR J944H

## (undated) DEVICE — UNDERCAST PADDING: Brand: DEROYAL

## (undated) DEVICE — GLOVE SURG SZ 85 L12IN FNGR ORTHO 126MIL CRM LTX FREE

## (undated) DEVICE — MINOR CDS: Brand: MEDLINE INDUSTRIES, INC.

## (undated) DEVICE — SNAR POLYP SENSATION MICRO OVL 13 240X40

## (undated) DEVICE — SCREW DRIVER ATTACHMENT, R3CON, SOLID, AO, HX-10, SHORT TAPER: Brand: BABY GORILLA/GORILLA PLATING SYSTEM

## (undated) DEVICE — OLIVE WIRE, SMOOTH, 1.4MM
Type: IMPLANTABLE DEVICE | Site: TOE FIRST | Status: NON-FUNCTIONAL
Brand: BABY GORILLA/GORILLA PLATING SYSTEM
Removed: 2023-01-04

## (undated) DEVICE — FRCP BIOP ENDO CAPTURA/PRO SERR SPK 2.8MM 230CM

## (undated) DEVICE — KT NDL GUIDE STRL 18GA

## (undated) DEVICE — PRECISION THIN (5.5 X 0.38 X 18.0MM)

## (undated) DEVICE — 35 ML SYRINGE LUER-LOCK TIP: Brand: MONOJECT

## (undated) DEVICE — INTENDED FOR TISSUE SEPARATION, AND OTHER PROCEDURES THAT REQUIRE A SHARP SURGICAL BLADE TO PUNCTURE OR CUT.: Brand: BARD-PARKER ® STAINLESS STEEL BLADES

## (undated) DEVICE — SOLUTION IV IRRIG WATER 1000ML POUR BRL 2F7114

## (undated) DEVICE — DEFENDO AIR WATER SUCTION AND BIOPSY VALVE KIT FOR  OLYMPUS: Brand: DEFENDO AIR/WATER/SUCTION AND BIOPSY VALVE

## (undated) DEVICE — TBG PENCL TELESCP MEGADYNE SMOKE EVAC 10FT

## (undated) DEVICE — 3 ML SYRINGE WITH HYPODERMIC SAFETY NEEDLE: Brand: MAGELLAN

## (undated) DEVICE — DRILL,  2.3 X 120MM, CANNULATED, AO: Brand: MONSTER SCREW SYSTEM

## (undated) DEVICE — DRILL,  2.4 X 140MM, SOLID, MEASURING, LONG, AO: Brand: BABY GORILLA®/GORILLA® PLATING SYSTEM

## (undated) DEVICE — CLIP INT M L GRN TI TRNSVRS GRV CHEVRON SHP W/ PRECIS TIP

## (undated) DEVICE — GLV SURG SENSICARE PI ORTHO SZ7.5 LF STRL

## (undated) DEVICE — SYRINGE IRRIG 60ML SFT PLIABLE BLB EZ TO GRP 1 HND USE W/

## (undated) DEVICE — NDL HYPO PRECISIONGLIDE REG 25G 1 1/2

## (undated) DEVICE — PK TURNOVER RM ADV

## (undated) DEVICE — Device: Brand: MEDEX

## (undated) DEVICE — SYR LUERLOK 20CC BX/50

## (undated) DEVICE — CATH F5 INF MP A2 100CM 2SH: Brand: INFINITI

## (undated) DEVICE — BLADELESS OBTURATOR, LONG: Brand: WECK VISTA

## (undated) DEVICE — SUTURE MNCRYL STRATAFIX PS 4-0 30CM